# Patient Record
Sex: FEMALE | Race: WHITE | Employment: UNEMPLOYED | URBAN - METROPOLITAN AREA
[De-identification: names, ages, dates, MRNs, and addresses within clinical notes are randomized per-mention and may not be internally consistent; named-entity substitution may affect disease eponyms.]

---

## 2021-04-04 ENCOUNTER — APPOINTMENT (OUTPATIENT)
Dept: VASCULAR SURGERY | Age: 51
DRG: 271 | End: 2021-04-04
Attending: EMERGENCY MEDICINE
Payer: COMMERCIAL

## 2021-04-04 ENCOUNTER — HOSPITAL ENCOUNTER (INPATIENT)
Age: 51
LOS: 10 days | Discharge: HOME HEALTH | DRG: 271 | End: 2021-04-14
Attending: EMERGENCY MEDICINE | Admitting: HOSPITALIST
Payer: COMMERCIAL

## 2021-04-04 ENCOUNTER — APPOINTMENT (OUTPATIENT)
Dept: CT IMAGING | Age: 51
DRG: 271 | End: 2021-04-04
Attending: EMERGENCY MEDICINE
Payer: COMMERCIAL

## 2021-04-04 DIAGNOSIS — M86.172 OTHER ACUTE OSTEOMYELITIS OF LEFT FOOT (HCC): Primary | ICD-10-CM

## 2021-04-04 DIAGNOSIS — L03.032 CELLULITIS OF TOE OF LEFT FOOT: ICD-10-CM

## 2021-04-04 PROBLEM — M86.9 OSTEOMYELITIS (HCC): Status: ACTIVE | Noted: 2021-04-04

## 2021-04-04 PROBLEM — I73.9 PAD (PERIPHERAL ARTERY DISEASE) (HCC): Status: ACTIVE | Noted: 2021-04-04

## 2021-04-04 PROBLEM — N17.9 AKI (ACUTE KIDNEY INJURY) (HCC): Status: ACTIVE | Noted: 2021-04-04

## 2021-04-04 PROBLEM — E11.621 TYPE 2 DIABETES MELLITUS WITH LEFT DIABETIC FOOT ULCER (HCC): Status: ACTIVE | Noted: 2021-04-04

## 2021-04-04 PROBLEM — E11.52 TYPE 2 DIABETES MELLITUS WITH DIABETIC PERIPHERAL ANGIOPATHY WITH GANGRENE (HCC): Status: ACTIVE | Noted: 2021-04-04

## 2021-04-04 PROBLEM — L97.529 TYPE 2 DIABETES MELLITUS WITH LEFT DIABETIC FOOT ULCER (HCC): Status: ACTIVE | Noted: 2021-04-04

## 2021-04-04 LAB
ALBUMIN SERPL-MCNC: 2.3 G/DL (ref 3.4–5)
ALBUMIN/GLOB SERPL: 0.5 {RATIO} (ref 0.8–1.7)
ALP SERPL-CCNC: 166 U/L (ref 45–117)
ALT SERPL-CCNC: 7 U/L (ref 13–56)
ANION GAP SERPL CALC-SCNC: 9 MMOL/L (ref 3–18)
AST SERPL-CCNC: 9 U/L (ref 10–38)
BASOPHILS # BLD: 0 K/UL (ref 0–0.1)
BASOPHILS NFR BLD: 0 % (ref 0–2)
BILIRUB SERPL-MCNC: 0.2 MG/DL (ref 0.2–1)
BUN SERPL-MCNC: 13 MG/DL (ref 7–18)
BUN/CREAT SERPL: 9 (ref 12–20)
CALCIUM SERPL-MCNC: 9 MG/DL (ref 8.5–10.1)
CHLORIDE SERPL-SCNC: 100 MMOL/L (ref 100–111)
CO2 SERPL-SCNC: 22 MMOL/L (ref 21–32)
CREAT SERPL-MCNC: 1.47 MG/DL (ref 0.6–1.3)
CRP SERPL-MCNC: 14.8 MG/DL (ref 0–0.3)
DIFFERENTIAL METHOD BLD: ABNORMAL
EOSINOPHIL # BLD: 0.1 K/UL (ref 0–0.4)
EOSINOPHIL NFR BLD: 1 % (ref 0–5)
ERYTHROCYTE [DISTWIDTH] IN BLOOD BY AUTOMATED COUNT: 12 % (ref 11.6–14.5)
ERYTHROCYTE [SEDIMENTATION RATE] IN BLOOD: >140 MM/HR (ref 0–30)
GLOBULIN SER CALC-MCNC: 4.7 G/DL (ref 2–4)
GLUCOSE BLD STRIP.AUTO-MCNC: 221 MG/DL (ref 70–110)
GLUCOSE BLD STRIP.AUTO-MCNC: 228 MG/DL (ref 70–110)
GLUCOSE SERPL-MCNC: 273 MG/DL (ref 74–99)
HBA1C MFR BLD: 8.4 % (ref 4.2–5.6)
HCT VFR BLD AUTO: 28.4 % (ref 35–45)
HGB BLD-MCNC: 9.5 G/DL (ref 12–16)
LACTATE BLD-SCNC: 1.86 MMOL/L (ref 0.4–2)
LYMPHOCYTES # BLD: 0.8 K/UL (ref 0.9–3.6)
LYMPHOCYTES NFR BLD: 6 % (ref 21–52)
MCH RBC QN AUTO: 31.5 PG (ref 24–34)
MCHC RBC AUTO-ENTMCNC: 33.5 G/DL (ref 31–37)
MCV RBC AUTO: 94 FL (ref 74–97)
MONOCYTES # BLD: 1 K/UL (ref 0.05–1.2)
MONOCYTES NFR BLD: 7 % (ref 3–10)
NEUTS SEG # BLD: 11.6 K/UL (ref 1.8–8)
NEUTS SEG NFR BLD: 86 % (ref 40–73)
PLATELET # BLD AUTO: 531 K/UL (ref 135–420)
PMV BLD AUTO: 8.3 FL (ref 9.2–11.8)
POTASSIUM SERPL-SCNC: 4.4 MMOL/L (ref 3.5–5.5)
PROT SERPL-MCNC: 7 G/DL (ref 6.4–8.2)
RBC # BLD AUTO: 3.02 M/UL (ref 4.2–5.3)
SODIUM SERPL-SCNC: 131 MMOL/L (ref 136–145)
WBC # BLD AUTO: 13.5 K/UL (ref 4.6–13.2)

## 2021-04-04 PROCEDURE — 93926 LOWER EXTREMITY STUDY: CPT

## 2021-04-04 PROCEDURE — 74011250636 HC RX REV CODE- 250/636: Performed by: HOSPITALIST

## 2021-04-04 PROCEDURE — 96375 TX/PRO/DX INJ NEW DRUG ADDON: CPT

## 2021-04-04 PROCEDURE — 74011250637 HC RX REV CODE- 250/637: Performed by: NURSE PRACTITIONER

## 2021-04-04 PROCEDURE — 73701 CT LOWER EXTREMITY W/DYE: CPT

## 2021-04-04 PROCEDURE — 96365 THER/PROPH/DIAG IV INF INIT: CPT

## 2021-04-04 PROCEDURE — 85652 RBC SED RATE AUTOMATED: CPT

## 2021-04-04 PROCEDURE — 85025 COMPLETE CBC W/AUTO DIFF WBC: CPT

## 2021-04-04 PROCEDURE — 74011636637 HC RX REV CODE- 636/637: Performed by: HOSPITALIST

## 2021-04-04 PROCEDURE — 74011250637 HC RX REV CODE- 250/637: Performed by: FAMILY MEDICINE

## 2021-04-04 PROCEDURE — 83036 HEMOGLOBIN GLYCOSYLATED A1C: CPT

## 2021-04-04 PROCEDURE — 74011000250 HC RX REV CODE- 250: Performed by: EMERGENCY MEDICINE

## 2021-04-04 PROCEDURE — 80053 COMPREHEN METABOLIC PANEL: CPT

## 2021-04-04 PROCEDURE — 86140 C-REACTIVE PROTEIN: CPT

## 2021-04-04 PROCEDURE — 74011250637 HC RX REV CODE- 250/637: Performed by: HOSPITALIST

## 2021-04-04 PROCEDURE — 83605 ASSAY OF LACTIC ACID: CPT

## 2021-04-04 PROCEDURE — 74011000636 HC RX REV CODE- 636: Performed by: EMERGENCY MEDICINE

## 2021-04-04 PROCEDURE — 87040 BLOOD CULTURE FOR BACTERIA: CPT

## 2021-04-04 PROCEDURE — 74011250636 HC RX REV CODE- 250/636: Performed by: EMERGENCY MEDICINE

## 2021-04-04 PROCEDURE — 65270000029 HC RM PRIVATE

## 2021-04-04 PROCEDURE — 82962 GLUCOSE BLOOD TEST: CPT

## 2021-04-04 PROCEDURE — 99285 EMERGENCY DEPT VISIT HI MDM: CPT

## 2021-04-04 RX ORDER — MAGNESIUM SULFATE 100 %
16 CRYSTALS MISCELLANEOUS AS NEEDED
Status: DISCONTINUED | OUTPATIENT
Start: 2021-04-04 | End: 2021-04-14 | Stop reason: HOSPADM

## 2021-04-04 RX ORDER — HEPARIN SODIUM 5000 [USP'U]/ML
5000 INJECTION, SOLUTION INTRAVENOUS; SUBCUTANEOUS EVERY 8 HOURS
Status: DISCONTINUED | OUTPATIENT
Start: 2021-04-04 | End: 2021-04-14 | Stop reason: HOSPADM

## 2021-04-04 RX ORDER — SODIUM CHLORIDE 9 MG/ML
75 INJECTION, SOLUTION INTRAVENOUS CONTINUOUS
Status: DISCONTINUED | OUTPATIENT
Start: 2021-04-04 | End: 2021-04-11

## 2021-04-04 RX ORDER — ATORVASTATIN CALCIUM 20 MG/1
40 TABLET, FILM COATED ORAL DAILY
Status: DISCONTINUED | OUTPATIENT
Start: 2021-04-04 | End: 2021-04-14 | Stop reason: HOSPADM

## 2021-04-04 RX ORDER — DEXTROSE MONOHYDRATE 100 MG/ML
125-250 INJECTION, SOLUTION INTRAVENOUS AS NEEDED
Status: DISCONTINUED | OUTPATIENT
Start: 2021-04-04 | End: 2021-04-14 | Stop reason: HOSPADM

## 2021-04-04 RX ORDER — SODIUM CHLORIDE 0.9 % (FLUSH) 0.9 %
5-40 SYRINGE (ML) INJECTION AS NEEDED
Status: DISCONTINUED | OUTPATIENT
Start: 2021-04-04 | End: 2021-04-14 | Stop reason: HOSPADM

## 2021-04-04 RX ORDER — SULFAMETHOXAZOLE AND TRIMETHOPRIM 800; 160 MG/1; MG/1
1 TABLET ORAL 2 TIMES DAILY
COMMUNITY
End: 2021-04-14

## 2021-04-04 RX ORDER — SODIUM CHLORIDE 0.9 % (FLUSH) 0.9 %
5-40 SYRINGE (ML) INJECTION EVERY 8 HOURS
Status: DISCONTINUED | OUTPATIENT
Start: 2021-04-04 | End: 2021-04-14 | Stop reason: HOSPADM

## 2021-04-04 RX ORDER — TRAMADOL HYDROCHLORIDE 50 MG/1
50 TABLET ORAL
Status: DISCONTINUED | OUTPATIENT
Start: 2021-04-04 | End: 2021-04-14 | Stop reason: HOSPADM

## 2021-04-04 RX ORDER — GUAIFENESIN 100 MG/5ML
81 LIQUID (ML) ORAL
Status: COMPLETED | OUTPATIENT
Start: 2021-04-04 | End: 2021-04-04

## 2021-04-04 RX ORDER — VANCOMYCIN/0.9 % SOD CHLORIDE 1.5G/250ML
1500 PLASTIC BAG, INJECTION (ML) INTRAVENOUS ONCE
Status: COMPLETED | OUTPATIENT
Start: 2021-04-04 | End: 2021-04-04

## 2021-04-04 RX ORDER — KETOROLAC TROMETHAMINE 15 MG/ML
15 INJECTION, SOLUTION INTRAMUSCULAR; INTRAVENOUS
Status: COMPLETED | OUTPATIENT
Start: 2021-04-04 | End: 2021-04-04

## 2021-04-04 RX ORDER — CALCIUM CARB/MAGNESIUM CARB 311-232MG
5 TABLET ORAL
Status: DISCONTINUED | OUTPATIENT
Start: 2021-04-04 | End: 2021-04-14 | Stop reason: HOSPADM

## 2021-04-04 RX ORDER — INSULIN LISPRO 100 [IU]/ML
INJECTION, SOLUTION INTRAVENOUS; SUBCUTANEOUS
Status: DISCONTINUED | OUTPATIENT
Start: 2021-04-04 | End: 2021-04-14 | Stop reason: HOSPADM

## 2021-04-04 RX ADMIN — IOPAMIDOL 100 ML: 612 INJECTION, SOLUTION INTRAVENOUS at 12:43

## 2021-04-04 RX ADMIN — INSULIN LISPRO 4 UNITS: 100 INJECTION, SOLUTION INTRAVENOUS; SUBCUTANEOUS at 22:23

## 2021-04-04 RX ADMIN — ATORVASTATIN CALCIUM 40 MG: 20 TABLET, FILM COATED ORAL at 22:21

## 2021-04-04 RX ADMIN — SODIUM CHLORIDE 1000 ML: 900 INJECTION, SOLUTION INTRAVENOUS at 12:40

## 2021-04-04 RX ADMIN — Medication 10 ML: at 17:39

## 2021-04-04 RX ADMIN — HEPARIN SODIUM 5000 UNITS: 5000 INJECTION INTRAVENOUS; SUBCUTANEOUS at 17:49

## 2021-04-04 RX ADMIN — ASPIRIN 81 MG: 81 TABLET, CHEWABLE ORAL at 22:21

## 2021-04-04 RX ADMIN — TRAMADOL HYDROCHLORIDE 50 MG: 50 TABLET, FILM COATED ORAL at 20:36

## 2021-04-04 RX ADMIN — Medication 5 MG: at 23:29

## 2021-04-04 RX ADMIN — INSULIN LISPRO 4 UNITS: 100 INJECTION, SOLUTION INTRAVENOUS; SUBCUTANEOUS at 17:46

## 2021-04-04 RX ADMIN — SODIUM CHLORIDE 75 ML/HR: 900 INJECTION, SOLUTION INTRAVENOUS at 17:39

## 2021-04-04 RX ADMIN — SODIUM CHLORIDE 1000 ML: 900 INJECTION, SOLUTION INTRAVENOUS at 12:26

## 2021-04-04 RX ADMIN — Medication 10 ML: at 22:26

## 2021-04-04 RX ADMIN — KETOROLAC TROMETHAMINE 15 MG: 15 INJECTION, SOLUTION INTRAMUSCULAR; INTRAVENOUS at 12:39

## 2021-04-04 RX ADMIN — WATER 1 G: 1 INJECTION INTRAMUSCULAR; INTRAVENOUS; SUBCUTANEOUS at 12:39

## 2021-04-04 RX ADMIN — VANCOMYCIN HYDROCHLORIDE 1500 MG: 10 INJECTION, POWDER, LYOPHILIZED, FOR SOLUTION INTRAVENOUS at 13:05

## 2021-04-04 NOTE — PROGRESS NOTES
Pharmacy Dosing Services:   Consult for Vancomycin Dosing by Pharmacy by Dr. Rosa Maria Núñez provided for this 46y.o. year old female , for indication of SSTI. Day of Therapy 1    Ht Readings from Last 1 Encounters:   04/04/21 172.7 cm (68\")        Wt Readings from Last 1 Encounters:   04/04/21 68.5 kg (151 lb)        Other Current Antibiotics Ceftriaxone   Serum Creatinine Lab Results   Component Value Date/Time    Creatinine 1.47 (H) 04/04/2021 12:05 PM      Creatinine Clearance Estimated Creatinine Clearance: 45.7 mL/min (A) (based on SCr of 1.47 mg/dL (H)). BUN Lab Results   Component Value Date/Time    BUN 13 04/04/2021 12:05 PM      WBC Lab Results   Component Value Date/Time    WBC 13.5 (H) 04/04/2021 12:05 PM      H/H Lab Results   Component Value Date/Time    HGB 9.5 (L) 04/04/2021 12:05 PM      Platelets Lab Results   Component Value Date/Time    PLATELET 021 (H) 53/83/4154 12:05 PM      Temp 97.5 °F (36.4 °C)     Start Vancomycin therapy, with loading dose of 1500 (mg) given at 1305 on 4/4/21. Follow with maintenance dose of 1000 (mg) at 1300 on 4/5/21, every 24 hours. Dose calculated to approximate a therapeutic trough of 10-15 mcg/mL. Pharmacy to follow daily and will make changes to dose and/or frequency based on clinical status.       Pharmacist Luis Toney, BELLED

## 2021-04-04 NOTE — PROGRESS NOTES
Bedside and Verbal shift change report given to Cat Salazar RN  (oncoming nurse) by Ashley OTERO, AMANDEEP (offgoing nurse). Report included the following information SBAR, Kardex and MAR. SHIFT UPDATES:  Patient was admitted with a diagnosis of osteomyelitis of right foot with necrosis of great metatarsal of right foot. Patient presented with Lactic level of 1.86 and presented with no complications or signs/symptoms of infection. Podiatry were consulted to follow up in accordance to osteomyelitis which was confirmed via bilateral duplex scan study performed today prior to unit transfer. Additionally, wound consult was inputted for assessment of patient's right great toe necrosis by wound team. Dr. Rossy Gutierres was informed that patient had a blood sugar of 221. Patient was started on insulin lispro sliding scale therapy AC/HS and ordered Diabetic Carb Control diet. Purewick catheter was implemented for urinary output monitoring due to patient's difficulty with walking to bathroom due to left foot wound. Additionally, patient's left foot wound was dressed with dry dressing (clean 4x4 gauzes, kerlix wrap and Ace bandaging). Patient is receiving IV Normal Saline at 75 cc/hr for IV continuous fluid therapy. Patient has Tramadol 50 mg by mouth every 6 hours PRN for pain. Patient did not present with any concerns of pain. ABNORMAL LAB:   Results for Jeyson Hill (MRN [id number]) as of 4/4/2021 14:48   Ref.  Range 4/4/2021 12:05 4/4/2021 12:19 4/4/2021 12:25   Lactic Acid (POC) Latest Ref Range: 0.40 - 2.00 mmol/L  1.86    WBC Latest Ref Range: 4.6 - 13.2 K/uL 13.5 (H)     RBC Latest Ref Range: 4.20 - 5.30 M/uL 3.02 (L)     HGB Latest Ref Range: 12.0 - 16.0 g/dL 9.5 (L)     HCT Latest Ref Range: 35.0 - 45.0 % 28.4 (L)     MCV Latest Ref Range: 74.0 - 97.0 FL 94.0     MCH Latest Ref Range: 24.0 - 34.0 PG 31.5     MCHC Latest Ref Range: 31.0 - 37.0 g/dL 33.5     RDW Latest Ref Range: 11.6 - 14.5 % 12.0     PLATELET Latest Ref Range: 135 - 420 K/uL 531 (H)     MPV Latest Ref Range: 9.2 - 11.8 FL 8.3 (L)     NEUTROPHILS Latest Ref Range: 40 - 73 % 86 (H)     LYMPHOCYTES Latest Ref Range: 21 - 52 % 6 (L)     MONOCYTES Latest Ref Range: 3 - 10 % 7     EOSINOPHILS Latest Ref Range: 0 - 5 % 1     BASOPHILS Latest Ref Range: 0 - 2 % 0     DF Latest Units:   AUTOMATED     ABS. NEUTROPHILS Latest Ref Range: 1.8 - 8.0 K/UL 11.6 (H)     ABS. LYMPHOCYTES Latest Ref Range: 0.9 - 3.6 K/UL 0.8 (L)     ABS. MONOCYTES Latest Ref Range: 0.05 - 1.2 K/UL 1.0     ABS. EOSINOPHILS Latest Ref Range: 0.0 - 0.4 K/UL 0.1     ABS. BASOPHILS Latest Ref Range: 0.0 - 0.1 K/UL 0.0     Sed rate, automated Latest Ref Range: 0 - 30 mm/hr >140 (H)     Sodium Latest Ref Range: 136 - 145 mmol/L 131 (L)     Potassium Latest Ref Range: 3.5 - 5.5 mmol/L 4.4     Chloride Latest Ref Range: 100 - 111 mmol/L 100     CO2 Latest Ref Range: 21 - 32 mmol/L 22     Anion gap Latest Ref Range: 3.0 - 18 mmol/L 9     Glucose Latest Ref Range: 74 - 99 mg/dL 273 (H)     BUN Latest Ref Range: 7.0 - 18 MG/DL 13     Creatinine Latest Ref Range: 0.6 - 1.3 MG/DL 1.47 (H)     BUN/Creatinine ratio Latest Ref Range: 12 - 20   9 (L)     Calcium Latest Ref Range: 8.5 - 10.1 MG/DL 9.0     GFR est non-AA Latest Ref Range: >60 ml/min/1.73m2 37 (L)     GFR est AA Latest Ref Range: >60 ml/min/1.73m2 45 (L)     Bilirubin, total Latest Ref Range: 0.2 - 1.0 MG/DL 0.2     Protein, total Latest Ref Range: 6.4 - 8.2 g/dL 7.0     Albumin Latest Ref Range: 3.4 - 5.0 g/dL 2.3 (L)     Globulin Latest Ref Range: 2.0 - 4.0 g/dL 4.7 (H)     A-G Ratio Latest Ref Range: 0.8 - 1.7   0.5 (L)     ALT Latest Ref Range: 13 - 56 U/L 7 (L)     AST Latest Ref Range: 10 - 38 U/L 9 (L)     Alk. phosphatase Latest Ref Range: 45 - 117 U/L 166 (H)     CULTURE, BLOOD Unknown   Rpt   C REACTIVE PROTEIN, QT Unknown Rpt       Wounds?   Right Foot Necrotic Great Metatarsal (Big toe) (Wound consult was inputted for assessment. ) Right Anterior ankle and right leg abrasion (Open to Air). Central Lines? None. Last BM: 4/1/2021       Pending Labs for AM Draw: None. Discharge plan:  Patient was admitted on 4/4/2021 and is pending to have case management assessment to determine the possible discharge location and transition needs. Nightshift RN Staff will be notified to inform dayshift RN staff on 4/5/2021 to follow up with case management team on 4/5/2021 in accordance to performing discharge needs & location assessment.     Bryan Sanchez  4/4/2021  7:02 PM

## 2021-04-04 NOTE — H&P
History & Physical    Patient: Magnus Pinto MRN: [id number]  CSN: [id number]    YOB: 1970  Age: 46 y.o. Sex: female      DOA: 4/4/2021  Primary Care Provider:  None      Assessment/Plan     Patient Active Problem List   Diagnosis Code    Osteomyelitis ([facility] M86.9    Type 2 diabetes mellitus with left diabetic foot ulcer ([facility] E11.621, L97.529    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 ([facility] E11.52    YUNIEL (acute kidney injury) ([facility] N17.9    PAD (peripheral artery disease) ([facility] I73.9       Admit to medical floor    DFU/osteomyelitis -  Started on antibiotics vancomycin, ceftriaxone. Podiatry consulted by ER. PAD -  Left LE arterial dopplers reviewed. Discussed with Dr. Lana Gomes  Likely chronic occlusion  He will see patient. YUNIEL -  Likely CKD from DM. Follow renal function. DM -  Start on SSI  ADA diet  Check HbA1c    Estimated length of stay : 3-5 days    CC: left foot DFU/gangrene       HPI:     Magnus Pinto is a 46 y.o. female with no significant past history with concerns of left great toe infection and bluish discoloration. Patient reports that she first noticed a problem with her left great toe about a week ago. She was initially seen at [facility] on March 25, 2021. X-ray there showed osteomyelitis she was given antibiotic Bactrim and was asked to follow-up with podiatry. She was seen by podiatry on April 1, 2021. She was advised to get admitted to the hospital or go to ER however she needed to run some errands. She presented to ER today. She denies any fever, chills, headache, shortness of breath, nausea, vomiting. She denies any history of diabetes or any heart disease. She denies any problem with her kidneys, no history of any vascular disease. In ER she was noted to have elevated white count of above 13,000. Her left great toe gangrenous. Her BUNs/creatinine at 13/1.47. Her blood glucose at 273.   CT scan of her left great toe showed evidence of osteomyelitis and first MTP septic arthritis. Lower extremity arterial Doppler with evidence of peripheral arterial disease. History reviewed. No pertinent past medical history. History reviewed. No pertinent surgical history. History reviewed. No pertinent family history. Social History     Socioeconomic History    Marital status:      Spouse name: Not on file    Number of children: Not on file    Years of education: Not on file    Highest education level: Not on file   Tobacco Use    Smoking status: Current Every Day Smoker     Packs/day: 1.50    Smokeless tobacco: Never Used   Substance and Sexual Activity    Alcohol use: Not Currently     Comment: on rare occasion    Drug use: Not Currently       Prior to Admission medications    Medication Sig Start Date End Date Taking? Authorizing Provider   trimethoprim-sulfamethoxazole (BACTRIM DS, SEPTRA DS) 160-800 mg per tablet Take 1 Tab by mouth two (2) times a day. Indications: Left foot infection. Yes Other, MD Daryn       No Known Allergies    Review of Systems  Gen: No fever, chills, malaise, weight loss/gain. Heent: No headache, rhinorrhea, epistaxis, ear pain, hearing loss, sinus pain, neck pain/stiffness, sore throat. Heart: No chest pain, palpitations, EUGENE, pnd, or orthopnea. Resp: No cough, hemoptysis, wheezing and shortness of breath. GI: No nausea, vomiting, diarrhea, constipation, melena or hematochezia. : No urinary obstruction, dysuria or hematuria. Derm: see above. Musc/skeletal: no bone or joint complains. Vasc: see above  Endo: No heat/cold intolerance, no polyuria,polydipsia or polyphagia. Neuro: No unilateral weakness, numbness, tingling. No seizures. Heme: No easy bruising or bleeding.           Physical Exam:     Physical Exam:  Visit Vitals  /80 (BP 1 Location: Right upper arm, BP Patient Position: At rest)   Pulse 80   Temp 98.3 °F (36.8 °C)   Resp 18   Ht 5' 8.03\" (1.728 m)   Wt 73.1 kg (161 lb 3.2 oz)   SpO2 100%   BMI 24.49 kg/m²      O2 Device: Room air    Temp (24hrs), Av.9 °F (36.6 °C), Min:97.5 °F (36.4 °C), Max:98.3 °F (36.8 °C)    No intake/output data recorded. No intake/output data recorded. General:  Awake, cooperative, no distress. Head:  Normocephalic, without obvious abnormality, atraumatic. Eyes:  Conjunctivae/corneas clear, sclera anicteric, PERRL, EOMs intact. Nose: Nares normal. No drainage or sinus tenderness. Throat: Lips, mucosa, and tongue normal.    Neck: Supple, symmetrical, trachea midline, no adenopathy. Lungs:   Clear to auscultation bilaterally. Heart:   S1, S2, no murmur, click, rub or gallop. Abdomen: Soft, non-tender. Bowel sounds normal. No masses,  No organomegaly. Extremities: Left great toe gangrene, DFU. Foot warm   Pulses: Absent/weak distal pulses bilaterally. Skin: Skin color pink, turgor normal. See above   Neurologic: CNII-XII intact. No focal motor or sensory deficit.        Labs Reviewed:    CMP:   Lab Results   Component Value Date/Time     (L) 2021 12:05 PM    K 4.4 2021 12:05 PM     2021 12:05 PM    CO2 22 2021 12:05 PM    AGAP 9 2021 12:05 PM     (H) 2021 12:05 PM    BUN 13 2021 12:05 PM    CREA 1.47 (H) 2021 12:05 PM    GFRAA 45 (L) 2021 12:05 PM    GFRNA 37 (L) 2021 12:05 PM    CA 9.0 2021 12:05 PM    ALB 2.3 (L) 2021 12:05 PM    TP 7.0 2021 12:05 PM    GLOB 4.7 (H) 2021 12:05 PM    AGRAT 0.5 (L) 2021 12:05 PM    ALT 7 (L) 2021 12:05 PM     CBC:   Lab Results   Component Value Date/Time    WBC 13.5 (H) 2021 12:05 PM    HGB 9.5 (L) 2021 12:05 PM    HCT 28.4 (L) 2021 12:05 PM     (H) 2021 12:05 PM         Procedures/imaging: see electronic medical records for all procedures/Xrays and details which were not copied into this note but were reviewed prior to creation of Plan    Please note that this dictation was completed with [employer], the computer voice recognition software. Quite often unanticipated grammatical, syntax, homophones, and other interpretive errors are inadvertently transcribed by the computer software. Please disregard these errors. Please excuse any errors that have escaped final proofreading.         CC: None

## 2021-04-04 NOTE — ED TRIAGE NOTES
Patient ambulatory into ER c/o of L foot pain. Patient [facility] and was put on antibiotics for infected L big toe and side of foot. Pt followed up with orthopedic and was told to come to the hospital if foot did not get better after continuing antibiotic.

## 2021-04-04 NOTE — PROGRESS NOTES
04/04/21 1135 04/04/21 1215 04/04/21 1230   Vital Signs   Temp 97.5 °F (36.4 °C)  --   --    Temp Source Tympanic  --   --    Pulse (Heart Rate) (!) 107  --  85   Resp Rate 18  --  16   O2 Sat (%) 100 %  --  100 %   BP 98/76  --  120/74   MAP (Monitor)  --   --  83   MAP (Calculated) 83  --  89   BP 1 Method Automatic  --   --    BP 1 Location  --   --   --    BP Patient Position At rest  --   --    Electrodes Replaced  --  Yes  --    Pain 1   Pain Scale 1 Numeric (0 - 10)  --   --    Pain Intensity 1 5  --   --    Patient Stated Pain Goal 0  --   --    Pain Location 1 Foot  --   --    Pain Orientation 1 Right  --   --    Pain Description 1 Aching  --   --       04/04/21 1600   Vital Signs   Temp 98.3 °F (36.8 °C)   Temp Source Oral   Pulse (Heart Rate) 80   Resp Rate 18   O2 Sat (%) 100 %   /80   MAP (Monitor)  --    MAP (Calculated) 90   BP 1 Method Automatic   BP 1 Location Right upper arm   BP Patient Position At rest   Electrodes Replaced  --    Pain 1   Pain Scale 1 Numeric (0 - 10)   Pain Intensity 1 0   Patient Stated Pain Goal 0   Pain Location 1  --    Pain Orientation 1  --    Pain Description 1  --        S: Patient's admission to unit & admission profile/assessment completed/MD notified of admission to unit. B: As of 1454 Pm, ED RN Leda Anderson provided telephone handoff report for patient who was admitted with left great metatarasal osteomyelitis with necrosis. Nurse Flo Dunn stated that patient sustained an injury to left foot and right leg after falling on 3/24/2021. Nurse Flo Dunn stated that patient was taking oral Bactrim for treatment but informed nursing staff that wound had worsen since discharge which was why she presented for admission today. Patient's bilateral duplex study presented with limited blood flow to left limb. Nurse Will Krueger stated that patient received 2 liters of normal saline 1000 cc bolus while in ED.  Nurse Will Krueger stated that patient presented with an assessed capillary refill of left extremity of greater than 5 seconds. Nurse Abhay Trejo stated that patient presented awake, alert and responsive with the following vital signs listed above for 1130 am and 1230 Pm prior to transfer to unit. Understanding and acceptance of handoff report was verbalized. A: Patient presented to unit via stretcher with ED staff around 1600 pm and presented awake, alert and responsive with the following vital signs listed above and a blood sugar level of 221. Patient presented with an incentive spirometry goal rate of 2500. Secondary Skin assessment was performed with Charge RN Dianna Pinto in which patient presented with left great metatarsal necrosis and right anterior ankle and knee abrasion. Wound consult was inputted to assess patient's left great metatarsal necrosis. Podiatry consult order was acknowledged. Patient's admission profile and assessment were completed. Patient had external purewick catheter placed for urinary output monitoring. Patient was informed of fall protocol/precautions and patient's bedside rails were advanced to ensure patient safety prior to leaving the room. At 1600 pm, Attending hospitalist Dr. Yesenia Alvarado was provided  page and at 1630 pm Dr. Kathy Schaeffer as notified during PM rounds of patient's admission to unit and blood sugar level of 221. R: Will continue with prescribed plan of care as directed.    Bryan Sanchez  4/4/2021  4:52 PM

## 2021-04-04 NOTE — ED PROVIDER NOTES
[address]  EMERGENCY DEPARTMENT HISTORY AND PHYSICAL EXAM    12:11 PM    Date: 4/4/2021  Patient Name: Gera Area    History of Presenting Illness     Chief Complaint   Patient presents with    Foot Pain       History Provided By: Patient  Location/Duration/Severity/Modifying factors   41-year-old female with no provided past medical history presenting to the emergency department with left great toe foot wound. It has been going on for about 1 week. [facility] Patient tells me that she had x-rays done of her left knee, and left tib-fib was diagnosed with a \"hairline fracture\" of the knee area. She tells me that she was also given prescription for Bactrim at that time. On the paperwork she provided there was a diagnosis of acute osteomyelitis as well. Patient is given follow-up with a podiatrist.  She says she saw the podiatrist on 4/1 put her back again on Bactrim. Is unclear exactly when the toe turned black. It is black today. Reports it was black when she saw the podiatrist a few days ago. She reports that she was told that if it did not improve on the Bactrim to come to the ER. Her pain is rated as a 5 out of 10. Denies any history of diabetes. Reports family history of diabetes. She denies any history of vascular disease that she is aware of. Reports that she had a callus in the area that over time became infected. This was about a month ago and notes initial symptoms develop.           PCP: None    Current Facility-Administered Medications   Medication Dose Route Frequency Provider Last Rate Last Admin    cefTRIAXone (ROCEPHIN) 1 g in sterile water (preservative free) 10 mL IV syringe  1 g IntraVENous Q24H Alek MAZARIEGOS DO   1 g at 04/04/21 1239    sodium chloride 0.9 % bolus infusion 1,000 mL  1,000 mL IntraVENous Frank MAZARIEGOS DO   Stopped at 04/04/21 1600    Followed by   Polly Cushing sodium chloride 0.9 % bolus infusion 1,000 mL 1,000 mL IntraVENous Angelita Middleton DO   Stopped at 04/04/21 1600    Followed by   [facility] sodium chloride 0.9 % bolus infusion 55 mL  55 mL IntraVENous ONCE Jostin Schmitt DO        vancomycin (VANCOCIN) 1500 mg in  ml infusion  1,500 mg IntraVENous Jostin Middleton DO   Stopped at 04/04/21 1600    [START ON 4/5/2021] vancomycin (VANCOCIN) 1,000 mg in 0.9% sodium chloride 250 mL (VIAL-MATE)  1,000 mg IntraVENous Q24H Gideon Boyle DO        Vancomycin Dosing Per Pharmacy  1 Each Other Rx Dosing/Monitoring Gideon Boyle DO           Past History     Past Medical History:  History reviewed. No pertinent past medical history. Past Surgical History:  History reviewed. No pertinent surgical history. Family History:  History reviewed. No pertinent family history. Social History:  Social History     Tobacco Use    Smoking status: Current Every Day Smoker     Packs/day: 1.50    Smokeless tobacco: Never Used   Substance Use Topics    Alcohol use: Not Currently     Comment: on rare occasion    Drug use: Not Currently       Allergies:  No Known Allergies    I reviewed and confirmed the above information with patient and updated as necessary. Review of Systems     Review of Systems   Constitutional: Negative for chills and fever. HENT: Negative for congestion, rhinorrhea, sinus pressure and sneezing. Eyes: Negative for visual disturbance. Respiratory: Negative for cough and shortness of breath. Cardiovascular: Negative for chest pain. Gastrointestinal: Negative for abdominal pain, diarrhea, nausea and vomiting. Genitourinary: Negative for dysuria, frequency and urgency. Musculoskeletal: Negative for back pain and neck pain. Skin: Positive for color change and wound. Negative for rash. Neurological: Negative for syncope, numbness and headaches.        Physical Exam     Visit Vitals  /74   Pulse 85   Temp 97.5 °F (36.4 °C)   Resp 16   Ht 5' 8\" (1.727 m)   Wt 68.5 kg (151 lb) SpO2 100%   BMI 22.96 kg/m²       Physical Exam  Vitals signs and nursing note reviewed. Constitutional:       General: She is not in acute distress. Appearance: Normal appearance. She is normal weight. She is not toxic-appearing. HENT:      Head: Normocephalic and atraumatic. Right Ear: External ear normal.      Left Ear: External ear normal.      Nose: Nose normal.      Mouth/Throat:      Mouth: Mucous membranes are moist.      Pharynx: Oropharynx is clear. Eyes:      Conjunctiva/sclera: Conjunctivae normal.      Pupils: Pupils are equal, round, and reactive to light. Neck:      Musculoskeletal: Normal range of motion and neck supple. No muscular tenderness. Cardiovascular:      Rate and Rhythm: Regular rhythm. Tachycardia present. Pulses: Normal pulses. Heart sounds: Normal heart sounds. No murmur. Pulmonary:      Effort: Pulmonary effort is normal.      Breath sounds: Normal breath sounds. No wheezing, rhonchi or rales. Abdominal:      General: Abdomen is flat. Tenderness: There is no abdominal tenderness. There is no guarding or rebound. Musculoskeletal: Normal range of motion. General: No swelling or tenderness. Right lower leg: No edema. Left lower leg: No edema. Comments: Left great toe was black in appearance, significant opening of the wound, does not clearly probe to bone over the MTP. The patient retains movement of the great toe. Very foul-smelling purulence, see picture below. Lymphadenopathy:      Cervical: No cervical adenopathy. Skin:     General: Skin is warm and dry. Capillary Refill: Capillary refill takes less than 2 seconds. Findings: No rash. Neurological:      General: No focal deficit present. Mental Status: She is alert.              Diagnostic Study Results     Labs -  Recent Results (from the past 24 hour(s))   CBC WITH AUTOMATED DIFF    Collection Time: 04/04/21 12:05 PM   Result Value Ref Range    WBC 13. 5 (H) 4.6 - 13.2 K/uL    RBC 3.02 (L) 4.20 - 5.30 M/uL    HGB 9.5 (L) 12.0 - 16.0 g/dL    HCT 28.4 (L) 35.0 - 45.0 %    MCV 94.0 74.0 - 97.0 FL    MCH 31.5 24.0 - 34.0 PG    MCHC 33.5 31.0 - 37.0 g/dL    RDW 12.0 11.6 - 14.5 %    PLATELET 977 (H) 311 - 420 K/uL    MPV 8.3 (L) 9.2 - 11.8 FL    NEUTROPHILS 86 (H) 40 - 73 %    LYMPHOCYTES 6 (L) 21 - 52 %    MONOCYTES 7 3 - 10 %    EOSINOPHILS 1 0 - 5 %    BASOPHILS 0 0 - 2 %    ABS. NEUTROPHILS 11.6 (H) 1.8 - 8.0 K/UL    ABS. LYMPHOCYTES 0.8 (L) 0.9 - 3.6 K/UL    ABS. MONOCYTES 1.0 0.05 - 1.2 K/UL    ABS. EOSINOPHILS 0.1 0.0 - 0.4 K/UL    ABS. BASOPHILS 0.0 0.0 - 0.1 K/UL    DF AUTOMATED     METABOLIC PANEL, COMPREHENSIVE    Collection Time: 04/04/21 12:05 PM   Result Value Ref Range    Sodium 131 (L) 136 - 145 mmol/L    Potassium 4.4 3.5 - 5.5 mmol/L    Chloride 100 100 - 111 mmol/L    CO2 22 21 - 32 mmol/L    Anion gap 9 3.0 - 18 mmol/L    Glucose 273 (H) 74 - 99 mg/dL    BUN 13 7.0 - 18 MG/DL    Creatinine 1.47 (H) 0.6 - 1.3 MG/DL    BUN/Creatinine ratio 9 (L) 12 - 20      GFR est AA 45 (L) >60 ml/min/1.73m2    GFR est non-AA 37 (L) >60 ml/min/1.73m2    Calcium 9.0 8.5 - 10.1 MG/DL    Bilirubin, total 0.2 0.2 - 1.0 MG/DL    ALT (SGPT) 7 (L) 13 - 56 U/L    AST (SGOT) 9 (L) 10 - 38 U/L    Alk.  phosphatase 166 (H) 45 - 117 U/L    Protein, total 7.0 6.4 - 8.2 g/dL    Albumin 2.3 (L) 3.4 - 5.0 g/dL    Globulin 4.7 (H) 2.0 - 4.0 g/dL    A-G Ratio 0.5 (L) 0.8 - 1.7     SED RATE (ESR)    Collection Time: 04/04/21 12:05 PM   Result Value Ref Range    Sed rate, automated >140 (H) 0 - 30 mm/hr   POC LACTIC ACID    Collection Time: 04/04/21 12:19 PM   Result Value Ref Range    Lactic Acid (POC) 1.86 0.40 - 2.00 mmol/L   DUPLEX LOWER EXT ARTERY LEFT    Collection Time: 04/04/21  2:04 PM   Result Value Ref Range    Right CFA dist sys .7 cm/s    Left CFA dist sys .3 cm/s    Left super femoral dist sys PSV 80.6 cm/s    Left Prox PFA A .0 cm/s    Left popliteal dist sys PSV 41.8 cm/s    Left popliteal prox sys PSV 84.5 cm/s    Left Dist PTA PSV 15.1 cm/s    Left Dist PTA PSV 36.6 cm/s    Left Dist PTA PSV 36.6 cm/s    Left Dist Peroneal Velocity 47.9 cm/s    Left Dist RON Velocity 23.9 cm/s    Left Pop A Prox Zain Ratio 1.05          Radiologic Studies -   CT FOOT LT W CONT   Final Result      1. Ulcer along the plantar and medial aspect of distal forefoot extends very   close to the bone. Soft tissue edema with extensive mottled soft tissue air and   areas of platelike fluid around the base of the first toe and plantar aspect of   first metatarsal head, likely combination of soft tissue infection and ischemia. 2. Mottled air within the bone at the distal first metatarsal and proximal   phalangeal base with areas of bony irregularity. While extent of air may in part   reflect ischemia, strongly suspect osteomyelitis and first MTP septic arthritis. X-Ray from [facility] 3/25 Below                Medical Decision Making   I am the first provider for this patient. I reviewed the vital signs, available nursing notes, past medical history, past surgical history, family history and social history. Vital Signs-Reviewed the patient's vital signs. Records Reviewed: Nursing Notes and Old Medical Records (Time of Review: 12:11 PM)    ED Course: Progress Notes, Reevaluation, and Consults:  ED Course as of Apr 04 1535   Sun Apr 04, 2021   1210 Reviewed the x-rays [facility] from [facility].  Does seem consistent with an erosive or lytic lesion of the left MTP, concerning for osteomyelitis. [SADIE]      ED Course User Index  [SADIE] Blackburn [place], DO     Consult to podiatry: Discussed with Dr. Maribell Early, of the podiatry service. Agreed with plan for admission. Advised patient's primary podiatrist could see but may need reconsultation.     Provider Notes (Medical Decision Making):   MDM  Number of Diagnoses or Management Options  Cellulitis of toe of left foot  Other acute osteomyelitis of left foot ([facility] [address]  Diagnosis management comments: 49-year-old female with no medical history presenting to the ED with complaints of a left great toe wound. On exam it is clinically black and ischemic. Looks gangrenous. Clinically adjacent cellulitis. [facility] and had a podiatrist office visit recently. She was put on 2 courses of Bactrim. Differential includes ischemic limb, osteomyelitis, cellulitis, necrotizing fasciitis, abscess, wet gangrene, dry gangrene, etc.    Plan will be to get blood cultures, blood work, CT of the foot, arterial Dopplers as well. She was able to have a dopplerable pulse however I cannot palpate on exam suspect likely peripheral vascular disease. Plan will be to get the above work-up contact podiatry for further guidance. I will go ahead and start her on Rocephin and vancomycin. Reviewed her results: The patient has of vascular disease in her lower extremities. 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  CT shows possible ulceration, significant osteomyelitis and septic arthritis of the first MTP. Patient was started on parenteral antibiotics. Discussed with the hospitalist on-call, Dr. Olimpia Braswell who agreed to admit the patient. Patient updated and agree with the plan for admission as well. Re Chan DO        Procedures        Core Measures:  For Hospitalized Patients:    1. Hospitalization Decision Time:  The decision to hospitalize the patient was made by Re Chan DO at 1400 on 4/4/2021    2. Aspirin: Aspirin was not given because the patient did not present with a stroke at the time of their Emergency Department evaluation    2:04 PM  Patient is being admitted to the hospital by Olimpia Braswell. The results of their tests and reasons for their admission have been discussed with them and/or available family.  They convey agreement and understanding for the need to be admitted and for their admission diagnosis. CONDITIONS ON ADMISSION:  Sepsis is not present at the time of admission. Deep Vein Thrombosis is not present at the time of admission. Thrombosis is not present at the time of admission. Urinary Tract Infection is not present at the time of admission. Pneumonia is not present at the time of admission. MRSA is not determined to be present or not present at the time of admission, strong suspicion for MRSA in wound. Wound infection is present at the time of admission. Pressure Ulcer is not present at the time of admission. CLINICAL IMPRESSION:    1. Other acute osteomyelitis of left foot ([address]    2. Cellulitis of toe of left foot          Diagnosis     Clinical Impression:   1. Other acute osteomyelitis of left foot ([address]    2. Cellulitis of toe of left foot        Disposition: Admit    Follow-up Information    None          Current Discharge Medication List      CONTINUE these medications which have NOT CHANGED    Details   trimethoprim-sulfamethoxazole (BACTRIM DS, SEPTRA DS) 160-800 mg per tablet Take 1 Tab by mouth two (2) times a day. Arabella Blanton DO   Emergency Medicine   April 4, 2021, 12:11 PM     This note is dictated utilizing Dragon voice recognition software. Unfortunately this leads to occasional typographical errors using the voice recognition. I apologize in advance if the situation occurs. If questions occur please do not hesitate to contact me directly.     Arabella Blanton, DO

## 2021-04-04 NOTE — PROGRESS NOTES
Patient presented to hospital today and presents compliant to current plan of care as directed.    Bryan Sanchez  4/4/2021  4:37 PM

## 2021-04-05 ENCOUNTER — APPOINTMENT (OUTPATIENT)
Dept: GENERAL RADIOLOGY | Age: 51
DRG: 271 | End: 2021-04-05
Attending: HOSPITALIST
Payer: COMMERCIAL

## 2021-04-05 ENCOUNTER — APPOINTMENT (OUTPATIENT)
Dept: VASCULAR SURGERY | Age: 51
DRG: 271 | End: 2021-04-05
Attending: NURSE PRACTITIONER
Payer: COMMERCIAL

## 2021-04-05 ENCOUNTER — ANESTHESIA EVENT (OUTPATIENT)
Dept: SURGERY | Age: 51
DRG: 271 | End: 2021-04-05
Payer: COMMERCIAL

## 2021-04-05 PROBLEM — I65.23 BILATERAL CAROTID ARTERY STENOSIS: Status: ACTIVE | Noted: 2021-04-05

## 2021-04-05 LAB
ANION GAP SERPL CALC-SCNC: 4 MMOL/L (ref 3–18)
BUN SERPL-MCNC: 12 MG/DL (ref 7–18)
BUN/CREAT SERPL: 10 (ref 12–20)
CALCIUM SERPL-MCNC: 7.9 MG/DL (ref 8.5–10.1)
CHLORIDE SERPL-SCNC: 108 MMOL/L (ref 100–111)
CO2 SERPL-SCNC: 23 MMOL/L (ref 21–32)
CREAT SERPL-MCNC: 1.18 MG/DL (ref 0.6–1.3)
ERYTHROCYTE [DISTWIDTH] IN BLOOD BY AUTOMATED COUNT: 12.1 % (ref 11.6–14.5)
GLUCOSE BLD STRIP.AUTO-MCNC: 138 MG/DL (ref 70–110)
GLUCOSE BLD STRIP.AUTO-MCNC: 162 MG/DL (ref 70–110)
GLUCOSE BLD STRIP.AUTO-MCNC: 164 MG/DL (ref 70–110)
GLUCOSE BLD STRIP.AUTO-MCNC: 180 MG/DL (ref 70–110)
GLUCOSE SERPL-MCNC: 117 MG/DL (ref 74–99)
HCT VFR BLD AUTO: 24 % (ref 35–45)
HGB BLD-MCNC: 7.9 G/DL (ref 12–16)
LEFT CCA DIST DIAS: 38.1 CM/S
LEFT CCA DIST SYS: 115.7 CM/S
LEFT CCA MID DIAS: 25.2 CM/S
LEFT CCA MID SYS: 101.11 CM/S
LEFT CCA PROX DIAS: 32.3 CM/S
LEFT CCA PROX SYS: 210.7 CM/S
LEFT CFA DIST SYS PSV: 162.3 CM/S
LEFT DIST ATA VELOCITY: 23.9 CM/S
LEFT DIST PTA PSV: 15.1 CM/S
LEFT DIST PTA PSV: 36.6 CM/S
LEFT DIST PTA PSV: 36.6 CM/S
LEFT ECA DIAS: 16.94 CM/S
LEFT ECA SYS: 131.2 CM/S
LEFT ICA DIST DIAS: 25.2 CM/S
LEFT ICA DIST SYS: 78.5 CM/S
LEFT ICA MID DIAS: 44 CM/S
LEFT ICA MID SYS: 141 CM/S
LEFT ICA PROX DIAS: 80 CM/S
LEFT ICA PROX SYS: 247 CM/S
LEFT ICA/CCA SYS: 2.14
LEFT PERONEAL DIST VELOCITY: 47.9 CM/S
LEFT POP A PROX VEL RATIO: 1.05
LEFT POPLITEAL DIST SYS PSV: 41.8 CM/S
LEFT POPLITEAL PROX SYS PSV: 84.5 CM/S
LEFT PROX PFA A PSV: 207 CM/S
LEFT SUBCLAVIAN DIAS: 0 CM/S
LEFT SUBCLAVIAN SYS: 194 CM/S
LEFT SUPER FEMORAL DIST SYS PSV: 80.6 CM/S
LEFT VERTEBRAL DIAS: 20.35 CM/S
LEFT VERTEBRAL SYS: 91.4 CM/S
MCH RBC QN AUTO: 31.2 PG (ref 24–34)
MCHC RBC AUTO-ENTMCNC: 32.9 G/DL (ref 31–37)
MCV RBC AUTO: 94.9 FL (ref 74–97)
PLATELET # BLD AUTO: 411 K/UL (ref 135–420)
PMV BLD AUTO: 8.2 FL (ref 9.2–11.8)
POTASSIUM SERPL-SCNC: 5.3 MMOL/L (ref 3.5–5.5)
RBC # BLD AUTO: 2.53 M/UL (ref 4.2–5.3)
RIGHT CCA DIST DIAS: 24.8 CM/S
RIGHT CCA DIST SYS: 127.3 CM/S
RIGHT CCA MID DIAS: 18.4 CM/S
RIGHT CCA MID SYS: 185.64 CM/S
RIGHT CCA PROX DIAS: 29.6 CM/S
RIGHT CCA PROX SYS: 213.5 CM/S
RIGHT CFA DIST SYS PSV: 156.7 CM/S
RIGHT ECA DIAS: 14.97 CM/S
RIGHT ECA SYS: 115.5 CM/S
RIGHT ICA DIST DIAS: 54.8 CM/S
RIGHT ICA DIST SYS: 184.9 CM/S
RIGHT ICA MID DIAS: 79.5 CM/S
RIGHT ICA MID SYS: 237.9 CM/S
RIGHT ICA PROX DIAS: 107.1 CM/S
RIGHT ICA PROX SYS: 288.5 CM/S
RIGHT ICA/CCA SYS: 2.3
RIGHT SUBCLAVIAN DIAS: 18.1 CM/S
RIGHT SUBCLAVIAN SYS: 234.7 CM/S
RIGHT VERTEBRAL DIAS: 7.25 CM/S
RIGHT VERTEBRAL SYS: 30.3 CM/S
SODIUM SERPL-SCNC: 135 MMOL/L (ref 136–145)
WBC # BLD AUTO: 9.3 K/UL (ref 4.6–13.2)

## 2021-04-05 PROCEDURE — 74011250636 HC RX REV CODE- 250/636: Performed by: EMERGENCY MEDICINE

## 2021-04-05 PROCEDURE — 74011636637 HC RX REV CODE- 636/637: Performed by: HOSPITALIST

## 2021-04-05 PROCEDURE — 71045 X-RAY EXAM CHEST 1 VIEW: CPT

## 2021-04-05 PROCEDURE — 93880 EXTRACRANIAL BILAT STUDY: CPT

## 2021-04-05 PROCEDURE — 74011250636 HC RX REV CODE- 250/636: Performed by: HOSPITALIST

## 2021-04-05 PROCEDURE — 74011000250 HC RX REV CODE- 250: Performed by: EMERGENCY MEDICINE

## 2021-04-05 PROCEDURE — 80048 BASIC METABOLIC PNL TOTAL CA: CPT

## 2021-04-05 PROCEDURE — 65270000029 HC RM PRIVATE

## 2021-04-05 PROCEDURE — 74011250637 HC RX REV CODE- 250/637: Performed by: HOSPITALIST

## 2021-04-05 PROCEDURE — 74011250636 HC RX REV CODE- 250/636: Performed by: FAMILY MEDICINE

## 2021-04-05 PROCEDURE — 82962 GLUCOSE BLOOD TEST: CPT

## 2021-04-05 PROCEDURE — 85027 COMPLETE CBC AUTOMATED: CPT

## 2021-04-05 PROCEDURE — 36415 COLL VENOUS BLD VENIPUNCTURE: CPT

## 2021-04-05 RX ORDER — ACETAMINOPHEN 325 MG/1
650 TABLET ORAL
Status: DISCONTINUED | OUTPATIENT
Start: 2021-04-05 | End: 2021-04-14 | Stop reason: HOSPADM

## 2021-04-05 RX ORDER — MORPHINE SULFATE 2 MG/ML
2 INJECTION, SOLUTION INTRAMUSCULAR; INTRAVENOUS
Status: DISCONTINUED | OUTPATIENT
Start: 2021-04-05 | End: 2021-04-14 | Stop reason: HOSPADM

## 2021-04-05 RX ORDER — ASPIRIN 81 MG/1
81 TABLET ORAL DAILY
Status: DISCONTINUED | OUTPATIENT
Start: 2021-04-05 | End: 2021-04-05

## 2021-04-05 RX ADMIN — INSULIN LISPRO 2 UNITS: 100 INJECTION, SOLUTION INTRAVENOUS; SUBCUTANEOUS at 16:48

## 2021-04-05 RX ADMIN — ASPIRIN 81 MG: 81 TABLET, COATED ORAL at 16:04

## 2021-04-05 RX ADMIN — WATER 1 G: 1 INJECTION INTRAMUSCULAR; INTRAVENOUS; SUBCUTANEOUS at 12:41

## 2021-04-05 RX ADMIN — TRAMADOL HYDROCHLORIDE 50 MG: 50 TABLET, FILM COATED ORAL at 09:55

## 2021-04-05 RX ADMIN — TRAMADOL HYDROCHLORIDE 50 MG: 50 TABLET, FILM COATED ORAL at 16:04

## 2021-04-05 RX ADMIN — Medication 10 ML: at 07:20

## 2021-04-05 RX ADMIN — HEPARIN SODIUM 5000 UNITS: 5000 INJECTION INTRAVENOUS; SUBCUTANEOUS at 01:21

## 2021-04-05 RX ADMIN — SODIUM CHLORIDE 75 ML/HR: 900 INJECTION, SOLUTION INTRAVENOUS at 23:23

## 2021-04-05 RX ADMIN — Medication 10 ML: at 16:50

## 2021-04-05 RX ADMIN — MORPHINE SULFATE 2 MG: 2 INJECTION, SOLUTION INTRAMUSCULAR; INTRAVENOUS at 20:14

## 2021-04-05 RX ADMIN — MORPHINE SULFATE 2 MG: 2 INJECTION, SOLUTION INTRAMUSCULAR; INTRAVENOUS at 01:21

## 2021-04-05 RX ADMIN — INSULIN LISPRO 2 UNITS: 100 INJECTION, SOLUTION INTRAVENOUS; SUBCUTANEOUS at 22:15

## 2021-04-05 RX ADMIN — SODIUM CHLORIDE 75 ML/HR: 900 INJECTION, SOLUTION INTRAVENOUS at 07:19

## 2021-04-05 RX ADMIN — Medication 10 ML: at 22:16

## 2021-04-05 RX ADMIN — MORPHINE SULFATE 2 MG: 2 INJECTION, SOLUTION INTRAMUSCULAR; INTRAVENOUS at 23:23

## 2021-04-05 RX ADMIN — VANCOMYCIN HYDROCHLORIDE 1000 MG: 1 INJECTION, POWDER, LYOPHILIZED, FOR SOLUTION INTRAVENOUS at 12:41

## 2021-04-05 NOTE — DIABETES MGMT
Diabetes Patient/Family Education Record  Factors That  May Influence Patients Ability  to Learn or  Comply with Recommendations   []   Language barrier    []   Cultural needs   []   Motivation    []   Cognitive limitation    []   Physical   []   Education    []   Physiological factors   []   Hearing/vision/speaking impairment   []   [employer] beliefs    []   Financial factors   []  Other:   [x]  No factors identified at this time.      Person Instructed:   [x]   Patient   []   Family   []  Other     Preference for Learning:   [x]   Verbal   [x]   Written   []  Demonstration     Level of Comprehension & Competence:    [x]  Good                                      [x] Fair                                     []  Poor                             []  Needs Reinforcement   [x]  Teachback completed    Education Component: pt provided with What is Diabetes Survival Booklet; general overview of DM disease process   [x]  Medication management, including how to administer insulin (if appropriate) and potential medication interactions    []  Nutritional management -obtain usual meal pattern   []  Exercise   []  Signs, symptoms, and treatment of hyperglycemia and hypoglycemia   [] Prevention, recognition and treatment of hyperglycemia and hypoglycemia   []  Importance of blood glucose monitoring and how to obtain a blood glucose meter    []  Instruction on use of the blood glucose meter   [x]  Discuss the importance of HbA1C monitoring    []  Sick day guidelines   []  Proper use and disposal of lancets, needles, syringes or insulin pens (if appropriate)   []  Potential long-term complications (retinopathy, kidney disease, neuropathy, foot care)   [] Information about whom to contact in case of emergency or for more information    []  Goal:  Patient/family will demonstrate understanding of Diabetes Self Management Skills by: (date) __5/31_____  Plan for post-discharge education or self-management support:    [] Outpatient class schedule provided            [] Patient Declined    [] Scheduled for outpatient classes (date) _______  Verify:  Does patient understand how diabetes medications work? ________n/a____________________  Does patient know what their most recent A1c is? ____________yes_______________________  Does patient monitor glucose at home? ______________n/a_____________________________  Does patient have difficulty obtaining diabetes medications or testing supplies? _____no____________         Patsy Rodrigues MS, RN, CDE  Glycemic Control Team  [phone]  Pager [phone] ([facility] 8:00-4:30P)  *After Hours pager [phone]

## 2021-04-05 NOTE — PROGRESS NOTES
Podiatry:  Patient seen today at bedside for left foot infection with gangrene. She is scheduled for an angiogram tomorrow for her left lower extremity. Ordered MRI left foot to show full extent of bone infection. Hopefully she can have that done in the morning. She is scheduled to the OR tomorrow afternoon at 6 PM for left foot surgery. See consult for details.

## 2021-04-05 NOTE — CONSULTS
Podiatry Consult    Subjective:         Date of Consultation: April 5, 2021     Referring Physician: Dr Audrey Agudelo    Patient is a 46 y.o.  female diabetic who is being seen for painful, black, gangrenous left foot. She states that it started about 1.5 weeks ago when her callus came off the bottom of her left foot. Then she fell and hit her foot a few days later and noticed her left big toe turning black. So she went to [facility] first where she was given a antibiotic shot and sent to Dr. Jeff Easton. She was seen by Dr. Jeff Easton who sent her to [facility]. Patient Active Problem List    Diagnosis Date Noted    Bilateral carotid artery stenosis 04/05/2021    Osteomyelitis ([facility] 04/04/2021    Type 2 diabetes mellitus with left diabetic foot ulcer ([facility] 04/04/2021    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 ([facility] 04/04/2021    YUNIEL (acute kidney injury) ([facility] 04/04/2021    PAD (peripheral artery disease) ([facility] 04/04/2021     History reviewed. No pertinent past medical history. History reviewed. No pertinent surgical history. History reviewed. No pertinent family history.    Social History     Tobacco Use    Smoking status: Current Every Day Smoker     Packs/day: 1.50    Smokeless tobacco: Never Used   Substance Use Topics    Alcohol use: Not Currently     Comment: on rare occasion     Current Facility-Administered Medications   Medication Dose Route Frequency Provider Last Rate Last Admin    morphine injection 2 mg  2 mg IntraVENous Q3H PRN Esdras Ribera MD   2 mg at 04/05/21 0121    cefTRIAXone (ROCEPHIN) 1 g in sterile water (preservative free) 10 mL IV syringe  1 g IntraVENous Q24H Funmilayo Loth K, DO   1 g at 04/05/21 1241    vancomycin (VANCOCIN) 1,000 mg in 0.9% sodium chloride 250 mL (VIAL-MATE)  1,000 mg IntraVENous Q24H Funmilayo Loth K, DO   1,000 mg at 04/05/21 1241    Vancomycin Dosing Per Pharmacy  1 Each Other Rx Dosing/Monitoring Warden Konrad DO        sodium chloride (NS) flush 5-40 mL  5-40 mL IntraVENous Q8H Zee Bonilla MD   10 mL at 21 1650    sodium chloride (NS) flush 5-40 mL  5-40 mL IntraVENous PRN Narayan Zimmerman MD        0.9% sodium chloride infusion  75 mL/hr IntraVENous CONTINUOUS Kaycee GARCIA MD 75 mL/hr at 21 0719 75 mL/hr at 21 0719    heparin (porcine) injection 5,000 Units  5,000 Units SubCUTAneous Q8H Kaycee GARCIA MD   Stopped at 21 1000    glucose chewable tablet 16 g  16 g Oral PRN Narayan Zimmerman MD        glucagon (GLUCAGEN) injection 1 mg  1 mg IntraMUSCular PRN Narayan Zimmerman MD        insulin lispro (HUMALOG) injection   SubCUTAneous AC&HS Narayan Zimmerman MD   2 Units at 21 1648    dextrose 10% infusion 125-250 mL  125-250 mL IntraVENous PRN Narayan Zimmerman MD        traMADoL Delford Ally) tablet 50 mg  50 mg Oral Q6H PRN Narayan Zimmerman MD   50 mg at 21 1604    atorvastatin (LIPITOR) tablet 40 mg  40 mg Oral DAILY Jace MOSQUEDA NP   40 mg at 21 2221    melatonin (rapid dissolve) tablet 5 mg  5 mg Oral QHS PRN Vladislav Ribera MD   5 mg at 21 2329      No Known Allergies     Review of Systems:  A comprehensive review of systems was negative except for that written in the History of Present Illness. Objective:     Patient Vitals for the past 8 hrs:   BP Temp Pulse Resp SpO2   21 1524 108/64 98.6 °F (37 °C) 76 18 100 %     Temp (24hrs), Av.1 °F (37.3 °C), Min:98.6 °F (37 °C), Max:99.7 °F (37.6 °C)      Physical Exam:        Vascular:  Dorsalis pedis pulses are 0/4 bilateral.  Posterior tibial pulses are 0/4 bilateral.  Capillary fill time is less than 5 seconds, bilateral.  Edema is observed left lower extremity. Varicosities are not observed bilateral lower extremities.    Derm:  Skin temperature of lower extremities warm to cool proximal to distal bilateral.  Inspection of skin shows black gangrenous changes to the left big toe and medial forefoot with open ulcer plantar medial first MPJ. Surrounding erythema with purulent drainage left forefoot. Ortho:  Muscle strength 5/5 for all groups tested. Muscle tone normal. Inspection/palpation of bones - joints- muscle shows - within normal limits bilateral lower extremities, except left foot. Neuro:  Light touch, sharp/dull, vibratory, and proprioception sensations all decreased bilateral lower extremities. Deep tendon reflexes decreased bilaterally.       Lab Review:   Recent Results (from the past 24 hour(s))   GLUCOSE, POC    Collection Time: 04/04/21 10:14 PM   Result Value Ref Range    Glucose (POC) 228 (H) 70 - 904 mg/dL   METABOLIC PANEL, BASIC    Collection Time: 04/05/21  2:30 AM   Result Value Ref Range    Sodium 135 (L) 136 - 145 mmol/L    Potassium 5.3 3.5 - 5.5 mmol/L    Chloride 108 100 - 111 mmol/L    CO2 23 21 - 32 mmol/L    Anion gap 4 3.0 - 18 mmol/L    Glucose 117 (H) 74 - 99 mg/dL    BUN 12 7.0 - 18 MG/DL    Creatinine 1.18 0.6 - 1.3 MG/DL    BUN/Creatinine ratio 10 (L) 12 - 20      GFR est AA 59 (L) >60 ml/min/1.73m2    GFR est non-AA 48 (L) >60 ml/min/1.73m2    Calcium 7.9 (L) 8.5 - 10.1 MG/DL   CBC W/O DIFF    Collection Time: 04/05/21  2:30 AM   Result Value Ref Range    WBC 9.3 4.6 - 13.2 K/uL    RBC 2.53 (L) 4.20 - 5.30 M/uL    HGB 7.9 (L) 12.0 - 16.0 g/dL    HCT 24.0 (L) 35.0 - 45.0 %    MCV 94.9 74.0 - 97.0 FL    MCH 31.2 24.0 - 34.0 PG    MCHC 32.9 31.0 - 37.0 g/dL    RDW 12.1 11.6 - 14.5 %    PLATELET 825 122 - 019 K/uL    MPV 8.2 (L) 9.2 - 11.8 FL   GLUCOSE, POC    Collection Time: 04/05/21  7:48 AM   Result Value Ref Range    Glucose (POC) 138 (H) 70 - 110 mg/dL   GLUCOSE, POC    Collection Time: 04/05/21 11:08 AM   Result Value Ref Range    Glucose (POC) 162 (H) 70 - 110 mg/dL   DUPLEX CAROTID BILATERAL    Collection Time: 04/05/21 11:55 AM   Result Value Ref Range    Right cca dist sys 127.3 cm/s    Right CCA dist quiñones 24.8 cm/s    RIGHT COMMON CAROTID ARTERY MID S 185.64 cm/s    RIGHT COMMON CAROTID ARTERY MID D 18.40 cm/s    Right CCA prox sys 213.5 cm/s    Right CCA prox quiñones 29.6 cm/s    Right eca sys 115.5 cm/s    RIGHT EXTERNAL CAROTID ARTERY D 14.97 cm/s    Right ICA dist sys 184.9 cm/s    Right ICA dist quiñones 54.8 cm/s    Right ICA mid sys 237.9 cm/s    Right ICA mid quiñones 79.5 cm/s    Right ICA prox sys 288.5 cm/s    Right ICA prox quiñones 107.1 cm/s    Right vertebral sys 30.3 cm/s    RIGHT VERTEBRAL ARTERY D 7.25 cm/s    Right subclavian sys 234.7 cm/s    RIGHT SUBCLAVIAN ARTERY D 18.10 cm/s    Right ICA/CCA sys 2.3     Left CCA dist sys 115.7 cm/s    Left CCA dist quiñones 38.1 cm/s    LEFT COMMON CAROTID ARTERY MID S 101.11 cm/s    LEFT COMMON CAROTID ARTERY MID D 25.20 cm/s    Left CCA prox sys 210.7 cm/s    Left CCA prox quiñones 32.3 cm/s    Left ECA sys 131.2 cm/s    LEFT EXTERNAL CAROTID ARTERY D 16.94 cm/s    Left ICA dist sys 78.5 cm/s    Left ICA dist quiñones 25.2 cm/s    Left ICA mid sys 141.0 cm/s    Left ICA mid quiñones 44.0 cm/s    Left ICA prox sys 247.0 cm/s    Left ICA prox quiñones 80.0 cm/s    Left vertebral sys 91.4 cm/s    LEFT VERTEBRAL ARTERY D 20.35 cm/s    Left subclavian sys 194.0 cm/s    LEFT SUBCLAVIAN ARTERY D 0.00 cm/s    Left ICA/CCA sys 2.14    GLUCOSE, POC    Collection Time: 04/05/21  3:29 PM   Result Value Ref Range    Glucose (POC) 164 (H) 70 - 110 mg/dL       Impression:     Uncontrolled diabetes with vascular and neurological manifestation  Gangrene left foot with cellulitis and ulcer  Rule out osteomyelitis left foot    Recommendation:     Patient seen at bedside today for left foot gangrene. Explained to the patient that she would require surgical correction to remove all dead, black, gangrenous tissue left foot with underlying bone to save her left foot. She stated that she understood this and agreed. Patient is scheduled to OR tomorrow afternoon (6PM) after her angiogram for Dr. Josette Ly.   I called and spoke to vascular tonight, and  Roberto Farfan agreed with the need for her surgical correction with amputation, but hopefully would be able to restore blood flow to save her foot/leg. Continue IV antibiotics as per medicine. May consider ID consult. Recommend MRI left foot to see full extent of bone infection. CT suggests bone infection left foot without specific amount of bone involvement. (big toe, first metatarsal, second metatarsal.. Rizwan Finley )   Recommend ANGELIC vascular study of right lower extremity as per Dr Antoni Rainey

## 2021-04-05 NOTE — PROGRESS NOTES
Reason for Admission:    Osteomylitis L great toe                   RUR Score:   9%                  Plan for utilizing home health:          PCP: First and Last name:  None     Name of Practice:    Are you a current patient: Yes/No:    Approximate date of last visit:    Can you participate in a virtual visit with your PCP:                     Current Advanced Directive/Advance Care Plan: Full Code      Healthcare Decision Maker:   Click here to complete [place] including selection of the Healthcare Decision Maker Relationship (ie \"Primary\")                             Transition of Care Plan:  Home with Physician F/U vs [facility]/HH (Pt will need to schedule her own PCP f/u)     Patient lives with her mother and was independent prior to this admission. Pt states that [facility] and was not interested in a PCP at this time. Pt transports herself around. Noted MD flaca please advise if pt will need to d/c on ABX therapy. CM will continue to follow. Care Management Interventions  PCP Verified by CM: No(Pt does not have a PCP.  Goes to [facility].)  Mode of Transport at Discharge: Self  Current Support Network: Relative's Home(Pt lives with her mother)  Confirm Follow Up Transport: Family  The Plan for Transition of Care is Related to the Following Treatment Goals : Osteomylitis L great toe  Discharge Location  Discharge Placement: Home with family assistance

## 2021-04-05 NOTE — PROGRESS NOTES
Request noted for angiogram on 4/6/2021. Plan for pt to be NPO after MN.  Orders placed for PT/INR and PTT to be done in AM.

## 2021-04-05 NOTE — PROGRESS NOTES
Hospitalist Progress Note    Patient: Blanca Hernandez MRN: [id number]  CSN: [id number]    YOB: 1970  Age: 46 y.o. Sex: female    DOA: 4/4/2021 LOS:  LOS: 1 day                Assessment/Plan     Patient Active Problem List   Diagnosis Code    Osteomyelitis ([facility] M86.9    Type 2 diabetes mellitus with left diabetic foot ulcer ([facility] E11.621, L97.529    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 ([facility] E11.52    YUNIEL (acute kidney injury) ([facility] N17.9    PAD (peripheral artery disease) ([facility] I73.9    Bilateral carotid artery stenosis I65.23            46 y.o. female with no significant past history with concerns of left great toe infection and bluish discoloration. Likely undiagnosed DM and CKD. DFU/osteomyelitis/gangrene -  Started on antibiotics vancomycin, ceftriaxone. Discussed withPodiatry. Plan for amputation tomorrow     PAD -  Left LE arterial dopplers reviewed. Plan for angiogram tomorrow. Bilateral carotid artery -  Carotid doppler with 70% stenosis bilaterally. Will start on aspirin post surgery.     YUNIEL -  Likely CKD from DM. Follow renal function.      DM -  continue on SSI  ADA diet  HbA1c 8.4    Disposition : TBD    Review of systems  General: No fevers or chills. Cardiovascular: No chest pain or pressure. No palpitations. Pulmonary: No shortness of breath. Gastrointestinal: No nausea, vomiting. Physical Exam:  General: Awake, cooperative, no acute distress    HEENT: NC, Atraumatic. PERRLA, anicteric sclerae. Lungs: CTA Bilaterally. No Wheezing/Rhonchi/Rales. Heart:  S1 S2,  No murmur, No Rubs, No Gallops  Abdomen: Soft, Non distended, Non tender.  +Bowel sounds,   Extremities: Left great toe gangrene, no palpable distal pulses bilaterally  Psych:   Not anxious or agitated. Neurologic:  No acute neurological deficit.            Vital signs/Intake and Output:  Visit Vitals  /64 (BP 1 Location: Right upper arm, BP Patient Position: At rest)   Pulse 76   Temp 98.6 °F (37 °C)   Resp 18   Ht 5' 8.03\" (1.728 m)   Wt 73.1 kg (161 lb 3.2 oz)   SpO2 100%   BMI 24.49 kg/m²     Current Shift:  04/05 0701 - 04/05 1900  In: -   Out: 1000 [Urine:1000]  Last three shifts:  04/03 1901 - 04/05 0700  In: 2451.3 [P.O.:480; I.V.:1971.3]  Out: 1500 [Urine:1500]            Labs: Results:       Chemistry Recent Labs     04/05/21 0230 04/04/21  1205   * 273*   * 131*   K 5.3 4.4    100   CO2 23 22   BUN 12 13   CREA 1.18 1.47*   CA 7.9* 9.0   AGAP 4 9   BUCR 10* 9*   AP  --  166*   TP  --  7.0   ALB  --  2.3*   GLOB  --  4.7*   AGRAT  --  0.5*      CBC w/Diff Recent Labs     04/05/21 0230 04/04/21  1205   WBC 9.3 13.5*   RBC 2.53* 3.02*   HGB 7.9* 9.5*   HCT 24.0* 28.4*    531*   GRANS  --  86*   LYMPH  --  6*   EOS  --  1      Cardiac Enzymes No results for input(s): CPK, CKND1, CONSTANTINO in the last 72 hours. No lab exists for component: CKRMB, TROIP   Coagulation No results for input(s): PTP, INR, APTT, INREXT, INREXT in the last 72 hours. Lipid Panel No results found for: CHOL, CHOLPOCT, CHOLX, CHLST, CHOLV, 056569, HDL, HDLP, LDL, LDLC, DLDLP, 442853, VLDLC, VLDL, TGLX, TRIGL, TRIGP, TGLPOCT, CHHD, CHHDX   BNP No results for input(s): BNPP in the last 72 hours.    Liver Enzymes Recent Labs     04/04/21  1205   TP 7.0   ALB 2.3*   *      Thyroid Studies No results found for: T4, T3U, TSH, TSHEXT, TSHEXT     Procedures/imaging: see electronic medical records for all procedures/Xrays and details which were not copied into this note but were reviewed prior to creation of Plan

## 2021-04-05 NOTE — DIABETES MGMT
GLYCEMIC CONTROL PLAN OF CARE        Diabetes Management:      Assessment: known h/o T2DM,HbA1C not within recommended range for age + comorbids    BG in target range (non-ICU\" < 180 ; -180):  [] Yes  [x] No      Steroids: No    TDD previous day = 8 units Humalog Normal Insulin Sensitivity Corrective Coverage    Recommend: assessment of home DM med management   - recommend scheduled dose of insulin once PO intake resumes    Addendum:  - pt told this writer no h/o DM prior to admission to [facility]  - [address] RN will provide new diagnosis education  - see [facility] RN ed notes    Most recent blood glucose results:   Lab Results   Component Value Date/Time     (H) 04/05/2021 02:30 AM    GLUCPOC 162 (H) 04/05/2021 11:08 AM    GLUCPOC 138 (H) 04/05/2021 07:48 AM    GLUCPOC 228 (H) 04/04/2021 10:14 PM         Hypo: No    HbA1C: equivalent  to ave BGlucose of 192 mg/dl for 2-3 months prior to admission  Lab Results   Component Value Date/Time    Hemoglobin A1c 8.4 (H) 04/04/2021 12:05 PM       Adequate glycemic control PTA:  [] Yes  [x] No      Home diabetes medications:   Key Antihyperglycemic Medications     Patient is on no antihyperglycemic meds.         Goals:  Blood glucose will be within target range of 70 - 180 mg/dL by: 4/22    Diet:   Active Orders   Diet    DIET NPO Except Meds, Past Midnight       Patient Vitals for the past 100 hrs:   % Diet Eaten   04/04/21 1909 100 %       Education:  __X___ Refer to Diabetes Education Record                       _____ Education not indicated at this time        Mikayla Apodaca 87, RN, CDE  Glycemic Control Team  [phone]  Pager [phone] (M-TH 8:00-4:30P)  *After Hours pager [phone]

## 2021-04-05 NOTE — WOUND CARE
IP WOUND CONSULT Black Yepez MEDICAL RECORD NUMBER:  [id number] AGE: 46 y.o. GENDER: female  : 1970 TODAY'S DATE:  2021 GENERAL  
 
[] Follow-up [x] New Consult Black Yepez is a 46 y.o. female referred by:  
[] Physician 
[x] Nursing 
[] Other: PAST MEDICAL HISTORY History reviewed. No pertinent past medical history. PAST SURGICAL HISTORY History reviewed. No pertinent surgical history. FAMILY HISTORY History reviewed. No pertinent family history. SOCIAL HISTORY Social History Tobacco Use  Smoking status: Current Every Day Smoker Packs/day: 1.50  Smokeless tobacco: Never Used Substance Use Topics  Alcohol use: Not Currently Comment: on rare occasion  Drug use: Not Currently ALLERGIES No Known Allergies MEDICATIONS No current facility-administered medications on file prior to encounter. Current Outpatient Medications on File Prior to Encounter Medication Sig Dispense Refill  trimethoprim-sulfamethoxazole (BACTRIM DS, SEPTRA DS) 160-800 mg per tablet Take 1 Tab by mouth two (2) times a day. Indications: Left foot infection. Wt Readings from Last 3 Encounters:  
21 73.1 kg (161 lb 3.2 oz)  
13 68 kg (150 lb) [unfilled] Visit Vitals /62 (BP 1 Location: Right upper arm, BP Patient Position: At rest) Pulse 70 Temp 99.1 °F (37.3 °C) Resp 16 Ht 5' 8.03\" (1.728 m) Wt 73.1 kg (161 lb 3.2 oz) SpO2 97% BMI 24.49 kg/m² ASSESSMENT Ulcer Identification: 
Ulcer Type: diabetic Contributing Factors: diabetes, poor glucose control and arterial insufficiency PLAN Skin Care & Pressure Relief Recommendations Minimize layers of linen Pads under patient to optimize support surface Turn/reposition approximately every 2 hours Manage incontinence Promote continence; Skin Protective lotion/cream to buttocks and sacrum daily and as needed with incontinence care Offload heels pillows Recommendations: Pt seen by wound care, dry dressing applied to left great toe. Patient waiting to be seen by Podiatry, wound care will be available for advanced dressings and wound care if requested. Teaching completed with:  
[x] Patient          
[] Family member      
[] Caregiver         
[] Nursing 
[] Other Patient/Caregiver Teaching: 
Level of patient/caregiver understanding able to:  
[] Indicates understanding       [] Needs reinforcement 
[] Unsuccessful      [x] Verbal Understanding 
[] Demonstrated understanding       [] No evidence of learning 
[] Refused teaching         [] N/A Electronically signed by Melinda Ross RN on 4/5/2021 at 11:18 AM

## 2021-04-05 NOTE — PROGRESS NOTES
7444 Received bedside shift report from off going nurse Cat Salazar, RN. Report included the following information SBAR, Kardex, Intake/Output, MAR and Recent Results. 1006 Pt ambulating with walker to the bathroom independently. [id number] Paged Dr. Helen Ordaz to inform him that radiology said angiogram will be tomorrow Tuesday. Received telephone order with read back for nicotine patch. Paged Dr. Enmanuel Sevilla to request NPO status to be changed based on procedure tomorrow. Dr. Enmanuel Sevilla said that he didn't place the NPO status and that he anticipates surgery today to remove the toe (podiatry). Informed patient that for now we must remain NPO.    1119 Pt off floor to vascular. 1920 Gave bedside shift report to oncoming nurse Cat Salazar, AMANDEEP. Report included the following information: SBAR, Kardex, Intake/Output, MAR and Recent Results.

## 2021-04-05 NOTE — CONSULTS
Vascular Surgery Consult      Impression:     Dry gangrene of the Left hallux  PAD with SFA occlusion  Premature atherosclerosis  Tobacco dependence  DM type II, new onset  B/L carotid bruits on exam by Dr. London Rodriguez:     - Recommend podiatry consult  - Will need LLE arteriogram this week to improve perfusion for healing a digital amputation  - NPO at midnight tonight, will discuss with attendings tomorrow am timing of possible intervention  - Obtain a carotid duplex tomorrow for B/L bruits  - Discussed the importance of absolute smoking cessation, pt agrees she needs to decrease the amount of cigarettes she smokes  - Needs aggressive medical management for DM (new diagnosis this admission)  - Start antiplatelet tx with ASA 81 mg and statin therapy with atorvastatin 40 mg (AST and ALT normal at 7 and 9), goal LDL < 100 mg/dL    Reason for consultation: Left hallux gangrene, PAD    HPI:  Claudette Lawson is a 46 year ool d woman who was admitted today for osteomyelitis of her Left big toe. She has had a \"black toe\" for approximately 1 week. Pt was seen by an OP podiatrist and x-rays were obtained. A PVL was obtained in the ED today that shows occlusion of the SFA. On questioning, she denies any symptoms of claudication though she is vague on the distance she is able to walk. 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 She states she fell a few days go and has some superficial abrasions to her Right leg that appear to be healing approprietly. Not on any antiplatelet or statin tx.     Patient Active Problem List   Diagnosis Code    Osteomyelitis ([address] M86.9    Type 2 diabetes mellitus with left diabetic foot ulcer ([address] E11.621, L97.529    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 ([address] E11.52    YUNIEL (acute kidney injury) ([address] N17.9    PAD (peripheral artery disease) ([facility] I73.9 History reviewed. No pertinent past medical history. History reviewed. No pertinent surgical history. History reviewed. No pertinent family history. No Known Allergies      Home Medications:     Prior to Admission medications    Medication Sig Start Date End Date Taking? Authorizing Provider   trimethoprim-sulfamethoxazole (BACTRIM DS, SEPTRA DS) 160-800 mg per tablet Take 1 Tab by mouth two (2) times a day. Indications: Left foot infection. Yes Other, MD Daryn       Review of Systems:     Denies active c/o chest pain or shortness of breath at rest.  Denies recent fever/chills. Denies nausea/vomiting. No known clotting or bleeding diathesis. No recent illness. Physical Assessment:     Visit Vitals  BP (!) 118/56 (BP 1 Location: Left upper arm)   Pulse 81   Temp 99.7 °F (37.6 °C)   Resp 18   Ht 5' 8.03\" (1.728 m)   Wt 73.1 kg (161 lb 3.2 oz)   SpO2 100%   BMI 24.49 kg/m²     Neuro: Alert and oriented  HEENT: Bilateral cervical bruits  CV: RRR  Resp: Normal effort  GI: soft, non-tender, non-distended  MSK: pedal and popliteal pulses non-palpable, R femoral 2+ and L femoral +1, BL feet overall appear warm and well perfused except for the Left hallux  Skin: warm, dry, dry gangrene to L hallux, no odor or significant drainage         Basic Metabolic Profile  Lab Results   Component Value Date     (L) 04/04/2021    CO2 22 04/04/2021    BUN 13 04/04/2021       Paz Lau NP-C [facility]      As above. Seen and examined by me. 47 yo female with gangrenous left great toe from diabetes (new dx) and arterial insufficiency. Smokes 1.5 ppd. Palpable femoral pulses bilaterally and no palpable popliteal or pedal pulses. Duplex of LLE confirms SFA occlusion with reconstitution of distal SFA/pop/tibial vessels. Left foot o/w warm and clinical picture not c/w acute limb ischemia.   Will need podiatric debridement and will tentatively plan LLE arteriogram with intervention for 4/6.     [facility]  [facility] Asst Prof Surgery  PG [phone]  Cell [phone]

## 2021-04-06 ENCOUNTER — APPOINTMENT (OUTPATIENT)
Dept: MRI IMAGING | Age: 51
DRG: 271 | End: 2021-04-06
Attending: PODIATRIST
Payer: COMMERCIAL

## 2021-04-06 ENCOUNTER — ANESTHESIA (OUTPATIENT)
Dept: SURGERY | Age: 51
DRG: 271 | End: 2021-04-06
Payer: COMMERCIAL

## 2021-04-06 ENCOUNTER — APPOINTMENT (OUTPATIENT)
Dept: INTERVENTIONAL RADIOLOGY/VASCULAR | Age: 51
DRG: 271 | End: 2021-04-06
Attending: STUDENT IN AN ORGANIZED HEALTH CARE EDUCATION/TRAINING PROGRAM
Payer: COMMERCIAL

## 2021-04-06 LAB
ABO + RH BLD: NORMAL
ANION GAP SERPL CALC-SCNC: 6 MMOL/L (ref 3–18)
APTT PPP: 46.1 SEC (ref 23–36.4)
ATRIAL RATE: 71 BPM
BLOOD GROUP ANTIBODIES SERPL: NORMAL
BUN SERPL-MCNC: 13 MG/DL (ref 7–18)
BUN/CREAT SERPL: 13 (ref 12–20)
CALCIUM SERPL-MCNC: 8.2 MG/DL (ref 8.5–10.1)
CALCULATED P AXIS, ECG09: 33 DEGREES
CALCULATED R AXIS, ECG10: 56 DEGREES
CALCULATED T AXIS, ECG11: 44 DEGREES
CHLORIDE SERPL-SCNC: 107 MMOL/L (ref 100–111)
CO2 SERPL-SCNC: 23 MMOL/L (ref 21–32)
CREAT SERPL-MCNC: 1.03 MG/DL (ref 0.6–1.3)
DIAGNOSIS, 93000: NORMAL
ERYTHROCYTE [DISTWIDTH] IN BLOOD BY AUTOMATED COUNT: 12.2 % (ref 11.6–14.5)
GLUCOSE BLD STRIP.AUTO-MCNC: 133 MG/DL (ref 70–110)
GLUCOSE BLD STRIP.AUTO-MCNC: 134 MG/DL (ref 70–110)
GLUCOSE BLD STRIP.AUTO-MCNC: 158 MG/DL (ref 70–110)
GLUCOSE BLD STRIP.AUTO-MCNC: 195 MG/DL (ref 70–110)
GLUCOSE BLD STRIP.AUTO-MCNC: 198 MG/DL (ref 70–110)
GLUCOSE SERPL-MCNC: 131 MG/DL (ref 74–99)
HCG SERPL QL: NEGATIVE
HCT VFR BLD AUTO: 23.5 % (ref 35–45)
HGB BLD-MCNC: 7.6 G/DL (ref 12–16)
INR PPP: 1.2 (ref 0.8–1.2)
MCH RBC QN AUTO: 31 PG (ref 24–34)
MCHC RBC AUTO-ENTMCNC: 32.3 G/DL (ref 31–37)
MCV RBC AUTO: 95.9 FL (ref 74–97)
P-R INTERVAL, ECG05: 164 MS
PLATELET # BLD AUTO: 394 K/UL (ref 135–420)
PMV BLD AUTO: 8.2 FL (ref 9.2–11.8)
POTASSIUM SERPL-SCNC: 5.6 MMOL/L (ref 3.5–5.5)
PROTHROMBIN TIME: 15.4 SEC (ref 11.5–15.2)
Q-T INTERVAL, ECG07: 378 MS
QRS DURATION, ECG06: 86 MS
QTC CALCULATION (BEZET), ECG08: 410 MS
RBC # BLD AUTO: 2.45 M/UL (ref 4.2–5.3)
SODIUM SERPL-SCNC: 136 MMOL/L (ref 136–145)
SPECIMEN EXP DATE BLD: NORMAL
VENTRICULAR RATE, ECG03: 71 BPM
WBC # BLD AUTO: 8.8 K/UL (ref 4.6–13.2)

## 2021-04-06 PROCEDURE — 77030013708 HC HNDPC SUC IRR PULS STRY –B: Performed by: PODIATRIST

## 2021-04-06 PROCEDURE — 85730 THROMBOPLASTIN TIME PARTIAL: CPT

## 2021-04-06 PROCEDURE — 74011250636 HC RX REV CODE- 250/636: Performed by: EMERGENCY MEDICINE

## 2021-04-06 PROCEDURE — 77030040361 HC SLV COMPR DVT MDII -B: Performed by: PODIATRIST

## 2021-04-06 PROCEDURE — 36415 COLL VENOUS BLD VENIPUNCTURE: CPT

## 2021-04-06 PROCEDURE — 87186 SC STD MICRODIL/AGAR DIL: CPT

## 2021-04-06 PROCEDURE — 74011636637 HC RX REV CODE- 636/637: Performed by: HOSPITALIST

## 2021-04-06 PROCEDURE — 74011250636 HC RX REV CODE- 250/636: Performed by: FAMILY MEDICINE

## 2021-04-06 PROCEDURE — A9575 INJ GADOTERATE MEGLUMI 0.1ML: HCPCS | Performed by: HOSPITALIST

## 2021-04-06 PROCEDURE — 73720 MRI LWR EXTREMITY W/O&W/DYE: CPT

## 2021-04-06 PROCEDURE — 76010000154 HC OR TIME FIRST 0.5 HR: Performed by: PODIATRIST

## 2021-04-06 PROCEDURE — 80048 BASIC METABOLIC PNL TOTAL CA: CPT

## 2021-04-06 PROCEDURE — 76210000006 HC OR PH I REC 0.5 TO 1 HR: Performed by: PODIATRIST

## 2021-04-06 PROCEDURE — 65270000029 HC RM PRIVATE

## 2021-04-06 PROCEDURE — 74011250637 HC RX REV CODE- 250/637: Performed by: HOSPITALIST

## 2021-04-06 PROCEDURE — 87077 CULTURE AEROBIC IDENTIFY: CPT

## 2021-04-06 PROCEDURE — 74011250637 HC RX REV CODE- 250/637: Performed by: NURSE PRACTITIONER

## 2021-04-06 PROCEDURE — 74011636320 HC RX REV CODE- 636/320: Performed by: HOSPITALIST

## 2021-04-06 PROCEDURE — 77030006788 HC BLD SAW OSC STRY -B: Performed by: PODIATRIST

## 2021-04-06 PROCEDURE — 86901 BLOOD TYPING SEROLOGIC RH(D): CPT

## 2021-04-06 PROCEDURE — 88305 TISSUE EXAM BY PATHOLOGIST: CPT

## 2021-04-06 PROCEDURE — 85610 PROTHROMBIN TIME: CPT

## 2021-04-06 PROCEDURE — 74011250636 HC RX REV CODE- 250/636: Performed by: HOSPITALIST

## 2021-04-06 PROCEDURE — 74011250636 HC RX REV CODE- 250/636: Performed by: PODIATRIST

## 2021-04-06 PROCEDURE — 87205 SMEAR GRAM STAIN: CPT

## 2021-04-06 PROCEDURE — 76060000031 HC ANESTHESIA FIRST 0.5 HR: Performed by: PODIATRIST

## 2021-04-06 PROCEDURE — 85027 COMPLETE CBC AUTOMATED: CPT

## 2021-04-06 PROCEDURE — 74011000250 HC RX REV CODE- 250: Performed by: EMERGENCY MEDICINE

## 2021-04-06 PROCEDURE — 74011250637 HC RX REV CODE- 250/637: Performed by: FAMILY MEDICINE

## 2021-04-06 PROCEDURE — 88311 DECALCIFY TISSUE: CPT

## 2021-04-06 PROCEDURE — 88304 TISSUE EXAM BY PATHOLOGIST: CPT

## 2021-04-06 PROCEDURE — 74011250636 HC RX REV CODE- 250/636: Performed by: ANESTHESIOLOGY

## 2021-04-06 PROCEDURE — 82962 GLUCOSE BLOOD TEST: CPT

## 2021-04-06 PROCEDURE — 2709999900 HC NON-CHARGEABLE SUPPLY: Performed by: PODIATRIST

## 2021-04-06 PROCEDURE — 87075 CULTR BACTERIA EXCEPT BLOOD: CPT

## 2021-04-06 PROCEDURE — 93005 ELECTROCARDIOGRAM TRACING: CPT

## 2021-04-06 PROCEDURE — 74011000250 HC RX REV CODE- 250: Performed by: PODIATRIST

## 2021-04-06 PROCEDURE — 84703 CHORIONIC GONADOTROPIN ASSAY: CPT

## 2021-04-06 RX ORDER — HEPARIN SODIUM 200 [USP'U]/100ML
INJECTION, SOLUTION INTRAVENOUS
Status: DISCONTINUED
Start: 2021-04-06 | End: 2021-04-06 | Stop reason: WASHOUT

## 2021-04-06 RX ORDER — MIDAZOLAM HYDROCHLORIDE 1 MG/ML
.5-2 INJECTION, SOLUTION INTRAMUSCULAR; INTRAVENOUS
Status: DISCONTINUED | OUTPATIENT
Start: 2021-04-06 | End: 2021-04-13 | Stop reason: HOSPADM

## 2021-04-06 RX ORDER — HEPARIN SODIUM 1000 [USP'U]/ML
1000-10000 INJECTION, SOLUTION INTRAVENOUS; SUBCUTANEOUS
Status: DISCONTINUED | OUTPATIENT
Start: 2021-04-06 | End: 2021-04-14 | Stop reason: HOSPADM

## 2021-04-06 RX ORDER — FENTANYL CITRATE 50 UG/ML
25-100 INJECTION, SOLUTION INTRAMUSCULAR; INTRAVENOUS
Status: DISCONTINUED | OUTPATIENT
Start: 2021-04-06 | End: 2021-04-07 | Stop reason: SDUPTHER

## 2021-04-06 RX ORDER — SODIUM CHLORIDE, SODIUM LACTATE, POTASSIUM CHLORIDE, CALCIUM CHLORIDE 600; 310; 30; 20 MG/100ML; MG/100ML; MG/100ML; MG/100ML
125 INJECTION, SOLUTION INTRAVENOUS CONTINUOUS
Status: DISCONTINUED | OUTPATIENT
Start: 2021-04-06 | End: 2021-04-06

## 2021-04-06 RX ORDER — LIDOCAINE HYDROCHLORIDE 10 MG/ML
1-10 INJECTION, SOLUTION EPIDURAL; INFILTRATION; INTRACAUDAL; PERINEURAL
Status: ACTIVE | OUTPATIENT
Start: 2021-04-06 | End: 2021-04-07

## 2021-04-06 RX ORDER — HEPARIN SODIUM 200 [USP'U]/100ML
500 INJECTION, SOLUTION INTRAVENOUS
Status: DISPENSED | OUTPATIENT
Start: 2021-04-06 | End: 2021-04-07

## 2021-04-06 RX ORDER — ONDANSETRON 2 MG/ML
4 INJECTION INTRAMUSCULAR; INTRAVENOUS
Status: DISCONTINUED | OUTPATIENT
Start: 2021-04-06 | End: 2021-04-14 | Stop reason: HOSPADM

## 2021-04-06 RX ORDER — PROPOFOL 10 MG/ML
INJECTION, EMULSION INTRAVENOUS AS NEEDED
Status: DISCONTINUED | OUTPATIENT
Start: 2021-04-06 | End: 2021-04-06 | Stop reason: HOSPADM

## 2021-04-06 RX ORDER — MIDAZOLAM HYDROCHLORIDE 1 MG/ML
INJECTION, SOLUTION INTRAMUSCULAR; INTRAVENOUS AS NEEDED
Status: DISCONTINUED | OUTPATIENT
Start: 2021-04-06 | End: 2021-04-06 | Stop reason: HOSPADM

## 2021-04-06 RX ORDER — FENTANYL CITRATE 50 UG/ML
INJECTION, SOLUTION INTRAMUSCULAR; INTRAVENOUS AS NEEDED
Status: DISCONTINUED | OUTPATIENT
Start: 2021-04-06 | End: 2021-04-06 | Stop reason: HOSPADM

## 2021-04-06 RX ADMIN — WATER 1 G: 1 INJECTION INTRAMUSCULAR; INTRAVENOUS; SUBCUTANEOUS at 12:23

## 2021-04-06 RX ADMIN — Medication 10 ML: at 06:00

## 2021-04-06 RX ADMIN — PROPOFOL 150 MG: 10 INJECTION, EMULSION INTRAVENOUS at 18:59

## 2021-04-06 RX ADMIN — MORPHINE SULFATE 2 MG: 2 INJECTION, SOLUTION INTRAMUSCULAR; INTRAVENOUS at 23:35

## 2021-04-06 RX ADMIN — VANCOMYCIN HYDROCHLORIDE 1000 MG: 1 INJECTION, POWDER, LYOPHILIZED, FOR SOLUTION INTRAVENOUS at 23:34

## 2021-04-06 RX ADMIN — ACETAMINOPHEN 650 MG: 325 TABLET, FILM COATED ORAL at 00:18

## 2021-04-06 RX ADMIN — MIDAZOLAM 2 MG: 1 INJECTION INTRAMUSCULAR; INTRAVENOUS at 18:49

## 2021-04-06 RX ADMIN — ATORVASTATIN CALCIUM 40 MG: 20 TABLET, FILM COATED ORAL at 08:45

## 2021-04-06 RX ADMIN — VANCOMYCIN HYDROCHLORIDE 1000 MG: 1 INJECTION, POWDER, LYOPHILIZED, FOR SOLUTION INTRAVENOUS at 12:22

## 2021-04-06 RX ADMIN — ONDANSETRON HYDROCHLORIDE 4 MG: 2 INJECTION INTRAMUSCULAR; INTRAVENOUS at 17:54

## 2021-04-06 RX ADMIN — TRAMADOL HYDROCHLORIDE 50 MG: 50 TABLET, FILM COATED ORAL at 20:35

## 2021-04-06 RX ADMIN — TRAMADOL HYDROCHLORIDE 50 MG: 50 TABLET, FILM COATED ORAL at 02:56

## 2021-04-06 RX ADMIN — Medication 10 ML: at 21:22

## 2021-04-06 RX ADMIN — GADOTERATE MEGLUMINE 15 ML: 376.9 INJECTION INTRAVENOUS at 11:10

## 2021-04-06 RX ADMIN — HEPARIN SODIUM 5000 UNITS: 5000 INJECTION INTRAVENOUS; SUBCUTANEOUS at 02:53

## 2021-04-06 RX ADMIN — FENTANYL CITRATE 100 MCG: 50 INJECTION, SOLUTION INTRAMUSCULAR; INTRAVENOUS at 18:59

## 2021-04-06 RX ADMIN — SODIUM CHLORIDE 75 ML/HR: 900 INJECTION, SOLUTION INTRAVENOUS at 20:35

## 2021-04-06 RX ADMIN — INSULIN LISPRO 2 UNITS: 100 INJECTION, SOLUTION INTRAVENOUS; SUBCUTANEOUS at 17:55

## 2021-04-06 RX ADMIN — SODIUM CHLORIDE, SODIUM LACTATE, POTASSIUM CHLORIDE, AND CALCIUM CHLORIDE 125 ML/HR: 600; 310; 30; 20 INJECTION, SOLUTION INTRAVENOUS at 17:55

## 2021-04-06 NOTE — CONSULTS
History & Physical      Patient: Brunilda Torre               Sex: female          DOA: 4/4/2021       YOB: 1970      Age:  46 y.o.        LOS:  LOS: 2 days               Subjective:   Brunilda Torre is a 46 y.o. female  who I saw last week. She had presented with wet gangrene and I recommended she go to the hospital immediately. She presented a few days later and was subsequently admitted. She has no pain.  She underwent endovascular procedure today    Medications:     Current Facility-Administered Medications:     vancomycin (VANCOCIN) 1,000 mg in 0.9% sodium chloride 250 mL (VIAL-MATE), 1,000 mg, IntraVENous, Q12H, Jostin Lemus DO, 1,000 mg at 04/06/21 1222    ondansetron (ZOFRAN) injection 4 mg, 4 mg, IntraVENous, Q8H PRN, Zee Valenzuela MD, 4 mg at 04/06/21 1754    lidocaine (PF) (XYLOCAINE) 10 mg/mL (1 %) injection 1-10 mL, 1-10 mL, SubCUTAneous, RAD ONCE, Anthony Berman MD    heparinized saline 2 units/mL infusion 1,000 Units, 500 mL, Irrigation, RAD ONCE, Anthony Berman MD    iopamidoL (ISOVUE 250) 51 % contrast injection 1-100 mL, 1-100 mL, IntraarTERial, RAD CONTINUOUS, Anthony Berman MD    fentaNYL citrate (PF) injection  mcg,  mcg, IntraVENous, Rad Antonella Nieves Gordan Coventry, MD    midazolam (VERSED) injection 0.5-2 mg, 0.5-2 mg, IntraVENous, Rad Antonella Nieves Gordan Coventry, MD    heparin (porcine) 1,000 unit/mL injection 1,000-10,000 Units, 1,000-10,000 Units, IntraVENous, Rad Antonella Nieves Gordan Coventry, MD    lactated Ringers infusion, 125 mL/hr, IntraVENous, CONTINUOUS, Senthil Schultz DPM, Last Rate: 125 mL/hr at 04/06/21 1755, 125 mL/hr at 04/06/21 1755    morphine injection 2 mg, 2 mg, IntraVENous, Q3H PRN, Payal Ribera MD, 2 mg at 04/05/21 4833    acetaminophen (TYLENOL) tablet 650 mg, 650 mg, Oral, Q6H PRN, Trevor Ribera MD, 650 mg at 04/06/21 0018    cefTRIAXone (ROCEPHIN) 1 g in sterile water (preservative free) 10 mL IV syringe, 1 g, IntraVENous, Q24H, Jostin Snow, DO, 1 g at 04/06/21 1223    Vancomycin Dosing Per Pharmacy, 1 Each, Other, Rx Dosing/Monitoring, [facility],     sodium chloride (NS) flush 5-40 mL, 5-40 mL, IntraVENous, Q8H, Zee Bonilla MD, 10 mL at 04/06/21 0600    sodium chloride (NS) flush 5-40 mL, 5-40 mL, IntraVENous, PRN, Zee Cabrales MD    0.9% sodium chloride infusion, 75 mL/hr, IntraVENous, CONTINUOUS, Zee Cabrales MD, Last Rate: 75 mL/hr at 04/05/21 2323, 75 mL/hr at 04/05/21 2323    heparin (porcine) injection 5,000 Units, 5,000 Units, SubCUTAneous, Q8H, Zee Bonilla MD, Stopped at 04/06/21 1000    glucose chewable tablet 16 g, 16 g, Oral, PRN, Rosina Elliott MD    glucagon (GLUCAGEN) injection 1 mg, 1 mg, IntraMUSCular, PRN, Rosina Elliott MD    insulin lispro (HUMALOG) injection, , SubCUTAneous, AC&HS, Rosina Elliott MD, 2 Units at 04/06/21 1755    dextrose 10% infusion 125-250 mL, 125-250 mL, IntraVENous, PRN, Rosina Elliott MD    traMADoL (ULTRAM) tablet 50 mg, 50 mg, Oral, Q6H PRN, Rosina Elliott MD, 50 mg at 04/06/21 0256    atorvastatin (LIPITOR) tablet 40 mg, 40 mg, Oral, DAILY, Isamar MOSQUEDA NP, 40 mg at 04/06/21 0845    melatonin (rapid dissolve) tablet 5 mg, 5 mg, Oral, QHS PRN, Nilo Ribera MD, 5 mg at 04/04/21 2329    Review of Systems  Negative for chest pain and shortness of breath        Objective:      Visit Vitals  BP (!) 146/54 (BP 1 Location: Left upper arm, BP Patient Position: At rest;Supine)   Pulse 73   Temp 98.2 °F (36.8 °C)   Resp 16   Ht 5' 8.03\" (1.728 m)   Wt 73.1 kg (161 lb 3.2 oz)   SpO2 99%   BMI 24.49 kg/m²       Physical Exam:  Infectious gangrene to the left foot and great toe  Purulence    Labs:  Recent Results (from the past 24 hour(s))   GLUCOSE, POC    Collection Time: 04/05/21 10:14 PM   Result Value Ref Range    Glucose (POC) 180 (H) 70 - 110 mg/dL   METABOLIC PANEL, BASIC    Collection Time: 04/06/21  3:55 AM   Result Value Ref Range    Sodium 136 136 - 145 mmol/L    Potassium 5.6 (H) 3.5 - 5.5 mmol/L    Chloride 107 100 - 111 mmol/L    CO2 23 21 - 32 mmol/L    Anion gap 6 3.0 - 18 mmol/L    Glucose 131 (H) 74 - 99 mg/dL    BUN 13 7.0 - 18 MG/DL    Creatinine 1.03 0.6 - 1.3 MG/DL    BUN/Creatinine ratio 13 12 - 20      GFR est AA >60 >60 ml/min/1.73m2    GFR est non-AA 56 (L) >60 ml/min/1.73m2    Calcium 8.2 (L) 8.5 - 10.1 MG/DL   CBC W/O DIFF    Collection Time: 04/06/21  3:55 AM   Result Value Ref Range    WBC 8.8 4.6 - 13.2 K/uL    RBC 2.45 (L) 4.20 - 5.30 M/uL    HGB 7.6 (L) 12.0 - 16.0 g/dL    HCT 23.5 (L) 35.0 - 45.0 %    MCV 95.9 74.0 - 97.0 FL    MCH 31.0 24.0 - 34.0 PG    MCHC 32.3 31.0 - 37.0 g/dL    RDW 12.2 11.6 - 14.5 %    PLATELET 216 399 - 267 K/uL    MPV 8.2 (L) 9.2 - 11.8 FL   PROTHROMBIN TIME + INR    Collection Time: 04/06/21  3:55 AM   Result Value Ref Range    Prothrombin time 15.4 (H) 11.5 - 15.2 sec    INR 1.2 0.8 - 1.2     PTT    Collection Time: 04/06/21  3:55 AM   Result Value Ref Range    aPTT 46.1 (H) 23.0 - 36.4 SEC   GLUCOSE, POC    Collection Time: 04/06/21  7:19 AM   Result Value Ref Range    Glucose (POC) 195 (H) 70 - 110 mg/dL   EKG, 12 LEAD, INITIAL    Collection Time: 04/06/21  7:22 AM   Result Value Ref Range    Ventricular Rate 71 BPM    Atrial Rate 71 BPM    P-R Interval 164 ms    QRS Duration 86 ms    Q-T Interval 378 ms    QTC Calculation (Bezet) 410 ms    Calculated P Axis 33 degrees    Calculated R Axis 56 degrees    Calculated T Axis 44 degrees    Diagnosis       Normal sinus rhythm  Normal ECG  No previous ECGs available     GLUCOSE, POC    Collection Time: 04/06/21 11:44 AM   Result Value Ref Range    Glucose (POC) 198 (H) 70 - 110 mg/dL   GLUCOSE, POC    Collection Time: 04/06/21  5:26 PM   Result Value Ref Range    Glucose (POC) 158 (H) 70 - 110 mg/dL           Assessment/Plan     Principal Problem: Osteomyelitis ([facility] (4/4/2021)    Active Problems:    Type 2 diabetes mellitus with left diabetic foot ulcer ([facility] (4/4/2021)      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 ([facility] (4/4/2021)      YUNIEL (acute kidney injury) ([facility] (4/4/2021)      PAD (peripheral artery disease) ([facility] (4/4/2021)      Bilateral carotid artery stenosis (4/5/2021)        Patient seen and evaluated today. Recommended open partial TMA. Risk, Hopefully foot can be reperfused by vascular.   I will resect and amputate all infected and non viable tissue with hopes of wound closure and limb salvage later      Doretha Medina DPM  April 6, 2021

## 2021-04-06 NOTE — DIABETES MGMT
GLYCEMIC CONTROL PLAN OF CARE        Diabetes Management:      Assessment: known h/o T2DM,HbA1C not within recommended range for age + comorbids    BG in target range (non-ICU\" < 180 ; -180):  [] Yes  [x] No      Steroids: No    TDD previous day = 4 units Humalog Normal Insulin Sensitivity Corrective Coverage    Recommend: continue with corrective coverage  - recommend discharge on Metformin with OP PCM follow up  - pt told this writer no h/o DM prior to admission to [facility]  - [address] RN will provide new diagnosis education  - see [facility] RN ed notes    Most recent blood glucose results:   Lab Results   Component Value Date/Time     (H) 04/06/2021 03:55 AM    GLUCPOC 198 (H) 04/06/2021 11:44 AM    GLUCPOC 195 (H) 04/06/2021 07:19 AM    GLUCPOC 180 (H) 04/05/2021 10:14 PM         Hypo: No    HbA1C: equivalent  to ave BGlucose of 192 mg/dl for 2-3 months prior to admission  Lab Results   Component Value Date/Time    Hemoglobin A1c 8.4 (H) 04/04/2021 12:05 PM       Adequate glycemic control PTA:  [] Yes  [x] No      Home diabetes medications:   Key Antihyperglycemic Medications     Patient is on no antihyperglycemic meds.         Goals:  Blood glucose will be within target range of 70 - 180 mg/dL by: 4/22    Diet:   Active Orders   Diet    DIET NPO Except Meds, Past Midnight       Patient Vitals for the past 100 hrs:   % Diet Eaten   04/04/21 1909 100 %       Education:  __X___ Refer to Diabetes Education Record                       _____ Education not indicated at this time        Everett Apodaca 87, RN, CDE  Glycemic Control Team  [phone]  Pager [phone] (M-TH 8:00-4:30P)  *After Hours pager [phone]

## 2021-04-06 NOTE — ANESTHESIA PREPROCEDURE EVALUATION
Relevant Problems   No relevant active problems       Anesthetic History   No history of anesthetic complications            Review of Systems / Medical History  Patient summary reviewed, nursing notes reviewed and pertinent labs reviewed    Pulmonary  Within defined limits                 Neuro/Psych   Within defined limits           Cardiovascular                  Exercise tolerance: >4 METS  Comments: Carotid disease and peripheral vascular dz   GI/Hepatic/Renal     GERD    Renal disease: CRI    Pertinent negatives: No liver disease   Endo/Other    Diabetes         Other Findings   Comments: Infected foot (osteo)          Physical Exam    Airway  Mallampati: III  TM Distance: 4 - 6 cm  Neck ROM: normal range of motion   Mouth opening: Normal     Cardiovascular               Dental    Dentition: Poor dentition     Pulmonary                 Abdominal  GI exam deferred       Other Findings            Anesthetic Plan    ASA: 3  Anesthesia type: MAC          Induction: Intravenous  Anesthetic plan and risks discussed with: Patient

## 2021-04-06 NOTE — PROGRESS NOTES
Vancomycin - Pharmacy to Dose ( Skin and Soft Tissue Infection )    Patient currently on Vancomycin 1000 mg IV q24hrs. Renal Function has improved, CrCl 65.3 ml/min  Will change dosing to 1000 mg IV q12hrs to achieve Goal Trough of 10-15 mcg/ml. Level after 3-4 doses. Pharmacy will continue to follow and adjust.    Tresa Hay [facility]  [phone]

## 2021-04-06 NOTE — PROGRESS NOTES
Case Management noted scheduled surgical intervention. If appropriate, please order PT/OT consult following surgical intervention to assist with discharge planning. CM continuing to follow patient for d/c needs.

## 2021-04-06 NOTE — ANESTHESIA POSTPROCEDURE EVALUATION
Post-Anesthesia Evaluation and Assessment    Patient: Cholo Parada MRN: [id number]  SSN: [id number]   YOB: 1970  Age: 46 y.o. Sex: female      Cardiovascular Function/Vital Signs  BP (!) 142/66   Pulse 72   Temp 36.5 °C (97.7 °F)   Resp 14   Ht 5' 8.03\" (1.728 m)   Wt 73.1 kg (161 lb 3.2 oz)   SpO2 98%   BMI 24.49 kg/m²     Patient is status post Procedure(s) with comments:  INCISION AND DRAINAGE WITH AMPUTATION LEFT 1ST RAY AND AMPUTATION OF 1ST PROXIMAL METATARSAL - PULSE LAVAGE, TPS. Nausea/Vomiting: Controlled. Postoperative hydration reviewed and adequate. Pain:  Pain Scale 1: Numeric (0 - 10) (04/06/21 1941)  Pain Intensity 1: 0 (04/06/21 1941)   Managed. Neurological Status:   Neuro (WDL): Within Defined Limits (04/06/21 1941)   At baseline. Mental Status and Level of Consciousness: Arousable. Pulmonary Status:   O2 Device: Room air (04/06/21 1941)   Adequate oxygenation and airway patent. Complications related to anesthesia: None    Post-anesthesia assessment completed. No concerns. Patient has met all discharge requirements. Signed By: Luis Brody CRNA    April 6, 2021             Procedure(s):  INCISION AND DRAINAGE WITH AMPUTATION LEFT 1ST RAY AND AMPUTATION OF 1ST PROXIMAL METATARSAL. general    <BSHSIANPOST>    INITIAL Post-op Vital signs:   Vitals Value Taken Time   /66 04/06/21 1941   Temp 36.5 °C (97.7 °F) 04/06/21 1921   Pulse 74 04/06/21 1943   Resp 11 04/06/21 1943   SpO2 99 % 04/06/21 1943   Vitals shown include unvalidated device data.

## 2021-04-06 NOTE — PROGRESS NOTES
Bedside shift change report given to Dustin Bullock  (oncoming nurse) by Cat Salazar RN  (offgoing nurse). Report included the following information SBAR, Kardex and Recent Results.

## 2021-04-06 NOTE — PROGRESS NOTES
0516 Received bedside shift report from off going nurse Cat Salazar, RN. Report included the following information SBAR, Kardex, Intake/Output, MAR and Recent Results. 0800 KRISTIN Manning informed me that the patient told her she does not want Dr. Brannon Darnell to operate on her and that she wants a different surgeon. I approached patient to clarify her desires and the patient told me she has it all under control. She informed me that she spoke with her preferred Dr. Tori Stewart today and that he said that he would do the surgery. She said she didn't need any assistance from me. 1042 MRI informed me that patient is nauseaus and vomiting prior to entering MRI machine. Paged Dr. Robel Tomas for possible medication therapy. 1630 Pt off floor to Stephanie Kamara 50 bedside shift report to oncoming nurse Cat Salazar, AMANDEEP. Report included the following information: SBAR, Kardex, Intake/Output, MAR and Recent Results.

## 2021-04-06 NOTE — PERIOP NOTES
TRANSFER - OUT REPORT:    Verbal report given to April RN on Blanca Erp  being transferred to [facility] for routine post - op       Report consisted of patients Situation, Background, Assessment and   Recommendations(SBAR). Information from the following report(s) SBAR, Procedure Summary, Intake/Output and MAR was reviewed with the receiving nurse. Lines:   Peripheral IV 04/06/21 Anterior;Right Wrist (Active)   Site Assessment Clean, dry, & intact 04/06/21 1921   Phlebitis Assessment 0 04/06/21 1921   Infiltration Assessment 0 04/06/21 1921   Dressing Status Clean, dry, & intact 04/06/21 1921   Dressing Type Transparent;Tape 04/06/21 1921   Hub Color/Line Status Pink; Infusing 04/06/21 1921        Opportunity for questions and clarification was provided.       Patient transported with:   Registered Nurse

## 2021-04-06 NOTE — PROGRESS NOTES
Dr. Rosalia Oquendo notified temp 101.1, received orders to give Tylenol for fever. Asked MD about giving SQ Heparin at 0200 and tomorrow.  Instructed to give 0200 dose but to hold remainder doses prior to surgery on 4/6/21

## 2021-04-06 NOTE — BRIEF OP NOTE
Brief Postoperative Note    Patient: Deena Bell  YOB: 1970  MRN: [id number]    Date of Procedure: 4/6/2021     Pre-Op Diagnosis: GANGRENE OF LEFT HALLUX    Post-Op Diagnosis: Same as preoperative diagnosis.       Procedure(s):  INCISION AND DRAINAGE WITH AMPUTATION LEFT 1ST RAY, PT IS IN ROOM 316    Surgeon(s):  Lor Schultz DPM    Surgical Assistant: Surg Asst-1: Naila Segal    Anesthesia: MAC     Estimated Blood Loss (mL): Minimal    Complications: None    Specimens:   ID Type Source Tests Collected by Time Destination   1 : Left 1st Ray Preservative Foot, left  Janet Andrea DPM 4/6/2021 1906 Pathology   2 : Left Proximal Metatarsal Preservative   Janet Andrea DPM 4/6/2021 1908 Pathology   1 : Left Foot Swab Wound  CULTURE, ANAEROBIC, CULTURE, WOUND Edward Carolina DPM 4/6/2021 1907 Microbiology        Implants: * No implants in log *    Drains: * No LDAs found *    Findings: Necrotic bone and soft tissue,,gangrene    Electronically Signed by Namrata Graham DPM on 4/6/2021 at 7:24 PM

## 2021-04-06 NOTE — PROGRESS NOTES
Hospitalist Progress Note    Patient: Magnus Pinto MRN: [id number]  CSN: [id number]    YOB: 1970  Age: 46 y.o. Sex: female    DOA: 4/4/2021 LOS:  LOS: 2 days                Assessment/Plan     Patient Active Problem List   Diagnosis Code    Osteomyelitis ([facility] M86.9    Type 2 diabetes mellitus with left diabetic foot ulcer [facility] E11.621, L97.529    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 ([facility] E11.52    YUNIEL (acute kidney injury) ([facility] N17.9    PAD (peripheral artery disease) ([facility]) I73.9    Bilateral carotid artery stenosis I65.23            46 y.o. female with no significant past history with concerns of left great toe infection and bluish discoloration. Likely undiagnosed DM and CKD. DFU/osteomyelitis/gangrene -  on antibiotics vancomycin, ceftriaxone. Plan for amputation today     PAD -  Left LE arterial dopplers reviewed. Plan for left lower ext angiogram today. Bilateral carotid artery -  Carotid doppler with 70% stenosis bilaterally. Will start on aspirin post surgery.     YUNIEL -  Likely CKD from DM. Follow renal function.      DM -  continue on SSI  ADA diet  HbA1c 8.4    Disposition : TBD    Review of systems  General: No fevers or chills. Cardiovascular: No chest pain or pressure. No palpitations. Pulmonary: No shortness of breath. Gastrointestinal: No nausea, vomiting. Physical Exam:  General: Awake, cooperative, no acute distress    HEENT: NC, Atraumatic. PERRLA, anicteric sclerae. Lungs: CTA Bilaterally. No Wheezing/Rhonchi/Rales. Heart:  S1 S2,  No murmur, No Rubs, No Gallops  Abdomen: Soft, Non distended, Non tender.  +Bowel sounds,   Extremities: Left great toe gangrene, no palpable distal pulses bilaterally  Psych:   Not anxious or agitated. Neurologic:  No acute neurological deficit.            Vital signs/Intake and Output:  Visit Vitals  /62 (BP 1 Location: Left arm, BP Patient Position: At rest)   Pulse 72   Temp 98.3 °F (36.8 °C)   Resp 18   Ht 5' 8.03\" (1.728 m)   Wt 73.1 kg (161 lb 3.2 oz)   SpO2 98%   BMI 24.49 kg/m²     Current Shift:  04/06 0701 - 04/06 1900  In: -   Out: 700 [Urine:700]  Last three shifts:  04/04 1901 - 04/06 0700  In: 4463.8 [P.O.:720; I.V.:3743.8]  Out: 3500 [Urine:3500]            Labs: Results:       Chemistry Recent Labs     04/06/21  0355 04/05/21  0230 04/04/21  1205   * 117* 273*    135* 131*   K 5.6* 5.3 4.4    108 100   CO2 23 23 22   BUN 13 12 13   CREA 1.03 1.18 1.47*   CA 8.2* 7.9* 9.0   AGAP 6 4 9   BUCR 13 10* 9*   AP  --   --  166*   TP  --   --  7.0   ALB  --   --  2.3*   GLOB  --   --  4.7*   AGRAT  --   --  0.5*      CBC w/Diff Recent Labs     04/06/21  0355 04/05/21  0230 04/04/21  1205   WBC 8.8 9.3 13.5*   RBC 2.45* 2.53* 3.02*   HGB 7.6* 7.9* 9.5*   HCT 23.5* 24.0* 28.4*    411 531*   GRANS  --   --  86*   LYMPH  --   --  6*   EOS  --   --  1      Cardiac Enzymes No results for input(s): CPK, CKND1, CONSTANTINO in the last 72 hours. No lab exists for component: CKRMB, TROIP   Coagulation Recent Labs     04/06/21 0355   PTP 15.4*   INR 1.2   APTT 46.1*       Lipid Panel No results found for: CHOL, CHOLPOCT, CHOLX, CHLST, CHOLV, 027661, HDL, HDLP, LDL, LDLC, DLDLP, 269434, VLDLC, VLDL, TGLX, TRIGL, TRIGP, TGLPOCT, CHHD, CHHDX   BNP No results for input(s): BNPP in the last 72 hours.    Liver Enzymes Recent Labs     04/04/21  1205   TP 7.0   ALB 2.3*   *      Thyroid Studies No results found for: T4, T3U, TSH, TSHEXT, TSHEXT     Procedures/imaging: see electronic medical records for all procedures/Xrays and details which were not copied into this note but were reviewed prior to creation of Plan

## 2021-04-07 ENCOUNTER — APPOINTMENT (OUTPATIENT)
Dept: VASCULAR SURGERY | Age: 51
DRG: 271 | End: 2021-04-07
Attending: HOSPITALIST
Payer: COMMERCIAL

## 2021-04-07 ENCOUNTER — APPOINTMENT (OUTPATIENT)
Dept: INTERVENTIONAL RADIOLOGY/VASCULAR | Age: 51
DRG: 271 | End: 2021-04-07
Attending: STUDENT IN AN ORGANIZED HEALTH CARE EDUCATION/TRAINING PROGRAM
Payer: COMMERCIAL

## 2021-04-07 LAB
ANION GAP SERPL CALC-SCNC: 6 MMOL/L (ref 3–18)
BUN SERPL-MCNC: 10 MG/DL (ref 7–18)
BUN/CREAT SERPL: 11 (ref 12–20)
CALCIUM SERPL-MCNC: 8.5 MG/DL (ref 8.5–10.1)
CHLORIDE SERPL-SCNC: 106 MMOL/L (ref 100–111)
CO2 SERPL-SCNC: 24 MMOL/L (ref 21–32)
CREAT SERPL-MCNC: 0.87 MG/DL (ref 0.6–1.3)
ERYTHROCYTE [DISTWIDTH] IN BLOOD BY AUTOMATED COUNT: 11.7 % (ref 11.6–14.5)
GLUCOSE BLD STRIP.AUTO-MCNC: 129 MG/DL (ref 70–110)
GLUCOSE BLD STRIP.AUTO-MCNC: 175 MG/DL (ref 70–110)
GLUCOSE BLD STRIP.AUTO-MCNC: 175 MG/DL (ref 70–110)
GLUCOSE BLD STRIP.AUTO-MCNC: 193 MG/DL (ref 70–110)
GLUCOSE SERPL-MCNC: 157 MG/DL (ref 74–99)
HCT VFR BLD AUTO: 24.8 % (ref 35–45)
HGB BLD-MCNC: 7.8 G/DL (ref 12–16)
MCH RBC QN AUTO: 30.4 PG (ref 24–34)
MCHC RBC AUTO-ENTMCNC: 31.5 G/DL (ref 31–37)
MCV RBC AUTO: 96.5 FL (ref 74–97)
PLATELET # BLD AUTO: 374 K/UL (ref 135–420)
PMV BLD AUTO: 8.8 FL (ref 9.2–11.8)
POTASSIUM SERPL-SCNC: 5.4 MMOL/L (ref 3.5–5.5)
RBC # BLD AUTO: 2.57 M/UL (ref 4.2–5.3)
SODIUM SERPL-SCNC: 136 MMOL/L (ref 136–145)
WBC # BLD AUTO: 10.5 K/UL (ref 4.6–13.2)

## 2021-04-07 PROCEDURE — 93926 LOWER EXTREMITY STUDY: CPT

## 2021-04-07 PROCEDURE — 85027 COMPLETE CBC AUTOMATED: CPT

## 2021-04-07 PROCEDURE — 99152 MOD SED SAME PHYS/QHP 5/>YRS: CPT

## 2021-04-07 PROCEDURE — 74011000250 HC RX REV CODE- 250: Performed by: EMERGENCY MEDICINE

## 2021-04-07 PROCEDURE — 82962 GLUCOSE BLOOD TEST: CPT

## 2021-04-07 PROCEDURE — 047N3ZZ DILATION OF LEFT POPLITEAL ARTERY, PERCUTANEOUS APPROACH: ICD-10-PCS | Performed by: STUDENT IN AN ORGANIZED HEALTH CARE EDUCATION/TRAINING PROGRAM

## 2021-04-07 PROCEDURE — 74011000636 HC RX REV CODE- 636: Performed by: STUDENT IN AN ORGANIZED HEALTH CARE EDUCATION/TRAINING PROGRAM

## 2021-04-07 PROCEDURE — C1725 CATH, TRANSLUMIN NON-LASER: HCPCS

## 2021-04-07 PROCEDURE — B4101ZZ FLUOROSCOPY OF ABDOMINAL AORTA USING LOW OSMOLAR CONTRAST: ICD-10-PCS | Performed by: STUDENT IN AN ORGANIZED HEALTH CARE EDUCATION/TRAINING PROGRAM

## 2021-04-07 PROCEDURE — 74011250636 HC RX REV CODE- 250/636: Performed by: HOSPITALIST

## 2021-04-07 PROCEDURE — 047L3Z1 DILATION OF LEFT FEMORAL ARTERY USING DRUG-COATED BALLOON, PERCUTANEOUS APPROACH: ICD-10-PCS | Performed by: STUDENT IN AN ORGANIZED HEALTH CARE EDUCATION/TRAINING PROGRAM

## 2021-04-07 PROCEDURE — 04CL3ZZ EXTIRPATION OF MATTER FROM LEFT FEMORAL ARTERY, PERCUTANEOUS APPROACH: ICD-10-PCS | Performed by: STUDENT IN AN ORGANIZED HEALTH CARE EDUCATION/TRAINING PROGRAM

## 2021-04-07 PROCEDURE — 74011250636 HC RX REV CODE- 250/636: Performed by: EMERGENCY MEDICINE

## 2021-04-07 PROCEDURE — 36415 COLL VENOUS BLD VENIPUNCTURE: CPT

## 2021-04-07 PROCEDURE — 74011250636 HC RX REV CODE- 250/636: Performed by: STUDENT IN AN ORGANIZED HEALTH CARE EDUCATION/TRAINING PROGRAM

## 2021-04-07 PROCEDURE — B41G1ZZ FLUOROSCOPY OF LEFT LOWER EXTREMITY ARTERIES USING LOW OSMOLAR CONTRAST: ICD-10-PCS | Performed by: STUDENT IN AN ORGANIZED HEALTH CARE EDUCATION/TRAINING PROGRAM

## 2021-04-07 PROCEDURE — 65270000029 HC RM PRIVATE

## 2021-04-07 PROCEDURE — 74011250637 HC RX REV CODE- 250/637: Performed by: STUDENT IN AN ORGANIZED HEALTH CARE EDUCATION/TRAINING PROGRAM

## 2021-04-07 PROCEDURE — 77030013516 IR ANGIO EXT LOWER LT

## 2021-04-07 PROCEDURE — 99153 MOD SED SAME PHYS/QHP EA: CPT

## 2021-04-07 PROCEDURE — 74011636637 HC RX REV CODE- 636/637: Performed by: HOSPITALIST

## 2021-04-07 PROCEDURE — 80048 BASIC METABOLIC PNL TOTAL CA: CPT

## 2021-04-07 PROCEDURE — 047Q3ZZ DILATION OF LEFT ANTERIOR TIBIAL ARTERY, PERCUTANEOUS APPROACH: ICD-10-PCS | Performed by: STUDENT IN AN ORGANIZED HEALTH CARE EDUCATION/TRAINING PROGRAM

## 2021-04-07 PROCEDURE — 74011250637 HC RX REV CODE- 250/637: Performed by: FAMILY MEDICINE

## 2021-04-07 PROCEDURE — 74011250636 HC RX REV CODE- 250/636: Performed by: FAMILY MEDICINE

## 2021-04-07 RX ORDER — LIDOCAINE HYDROCHLORIDE 10 MG/ML
1-10 INJECTION, SOLUTION EPIDURAL; INFILTRATION; INTRACAUDAL; PERINEURAL
Status: DISCONTINUED | OUTPATIENT
Start: 2021-04-07 | End: 2021-04-11

## 2021-04-07 RX ORDER — CLOPIDOGREL 300 MG/1
300 TABLET, FILM COATED ORAL ONCE
Status: COMPLETED | OUTPATIENT
Start: 2021-04-07 | End: 2021-04-07

## 2021-04-07 RX ORDER — HEPARIN SODIUM 200 [USP'U]/100ML
500 INJECTION, SOLUTION INTRAVENOUS
Status: DISCONTINUED | OUTPATIENT
Start: 2021-04-07 | End: 2021-04-11

## 2021-04-07 RX ORDER — GUAIFENESIN 100 MG/5ML
81 LIQUID (ML) ORAL DAILY
Status: DISCONTINUED | OUTPATIENT
Start: 2021-04-07 | End: 2021-04-14 | Stop reason: HOSPADM

## 2021-04-07 RX ORDER — ONDANSETRON 2 MG/ML
4 INJECTION INTRAMUSCULAR; INTRAVENOUS ONCE
Status: COMPLETED | OUTPATIENT
Start: 2021-04-07 | End: 2021-04-07

## 2021-04-07 RX ORDER — CLOPIDOGREL BISULFATE 75 MG/1
75 TABLET ORAL DAILY
Status: DISCONTINUED | OUTPATIENT
Start: 2021-04-08 | End: 2021-04-14 | Stop reason: HOSPADM

## 2021-04-07 RX ORDER — ONDANSETRON 2 MG/ML
INJECTION INTRAMUSCULAR; INTRAVENOUS
Status: DISPENSED
Start: 2021-04-07 | End: 2021-04-08

## 2021-04-07 RX ORDER — FENTANYL CITRATE 50 UG/ML
25-100 INJECTION, SOLUTION INTRAMUSCULAR; INTRAVENOUS
Status: DISCONTINUED | OUTPATIENT
Start: 2021-04-07 | End: 2021-04-12 | Stop reason: HOSPADM

## 2021-04-07 RX ADMIN — FENTANYL CITRATE 25 MCG: 50 INJECTION, SOLUTION INTRAMUSCULAR; INTRAVENOUS at 13:48

## 2021-04-07 RX ADMIN — MIDAZOLAM 0.5 MG: 1 INJECTION INTRAMUSCULAR; INTRAVENOUS at 15:05

## 2021-04-07 RX ADMIN — MORPHINE SULFATE 2 MG: 2 INJECTION, SOLUTION INTRAMUSCULAR; INTRAVENOUS at 04:07

## 2021-04-07 RX ADMIN — ONDANSETRON 4 MG: 2 INJECTION INTRAMUSCULAR; INTRAVENOUS at 15:57

## 2021-04-07 RX ADMIN — Medication 10 ML: at 17:39

## 2021-04-07 RX ADMIN — MIDAZOLAM 0.5 MG: 1 INJECTION INTRAMUSCULAR; INTRAVENOUS at 13:33

## 2021-04-07 RX ADMIN — FENTANYL CITRATE 25 MCG: 50 INJECTION, SOLUTION INTRAMUSCULAR; INTRAVENOUS at 15:14

## 2021-04-07 RX ADMIN — HEPARIN SODIUM IN SODIUM CHLORIDE 1000 UNITS: 200 INJECTION INTRAVENOUS at 12:53

## 2021-04-07 RX ADMIN — FENTANYL CITRATE 25 MCG: 50 INJECTION, SOLUTION INTRAMUSCULAR; INTRAVENOUS at 14:42

## 2021-04-07 RX ADMIN — MIDAZOLAM 0.5 MG: 1 INJECTION INTRAMUSCULAR; INTRAVENOUS at 16:00

## 2021-04-07 RX ADMIN — MIDAZOLAM 1 MG: 1 INJECTION INTRAMUSCULAR; INTRAVENOUS at 13:13

## 2021-04-07 RX ADMIN — HEPARIN SODIUM 7000 UNITS: 1000 INJECTION INTRAVENOUS; SUBCUTANEOUS at 13:36

## 2021-04-07 RX ADMIN — MIDAZOLAM 0.5 MG: 1 INJECTION INTRAMUSCULAR; INTRAVENOUS at 14:03

## 2021-04-07 RX ADMIN — MIDAZOLAM 0.5 MG: 1 INJECTION INTRAMUSCULAR; INTRAVENOUS at 15:45

## 2021-04-07 RX ADMIN — HEPARIN SODIUM 5000 UNITS: 5000 INJECTION INTRAVENOUS; SUBCUTANEOUS at 18:43

## 2021-04-07 RX ADMIN — WATER 1 G: 1 INJECTION INTRAMUSCULAR; INTRAVENOUS; SUBCUTANEOUS at 18:44

## 2021-04-07 RX ADMIN — FENTANYL CITRATE 25 MCG: 50 INJECTION, SOLUTION INTRAMUSCULAR; INTRAVENOUS at 13:33

## 2021-04-07 RX ADMIN — HEPARIN SODIUM 5000 UNITS: 5000 INJECTION INTRAVENOUS; SUBCUTANEOUS at 02:00

## 2021-04-07 RX ADMIN — ONDANSETRON HYDROCHLORIDE 4 MG: 2 INJECTION INTRAMUSCULAR; INTRAVENOUS at 18:43

## 2021-04-07 RX ADMIN — IOPAMIDOL 114 ML: 510 INJECTION, SOLUTION INTRAVASCULAR at 16:29

## 2021-04-07 RX ADMIN — ACETAMINOPHEN 650 MG: 325 TABLET, FILM COATED ORAL at 21:58

## 2021-04-07 RX ADMIN — MIDAZOLAM 0.5 MG: 1 INJECTION INTRAMUSCULAR; INTRAVENOUS at 14:42

## 2021-04-07 RX ADMIN — FENTANYL CITRATE 50 MCG: 50 INJECTION, SOLUTION INTRAMUSCULAR; INTRAVENOUS at 13:13

## 2021-04-07 RX ADMIN — MORPHINE SULFATE 2 MG: 2 INJECTION, SOLUTION INTRAMUSCULAR; INTRAVENOUS at 18:42

## 2021-04-07 RX ADMIN — ASPIRIN 81 MG: 81 TABLET, CHEWABLE ORAL at 16:57

## 2021-04-07 RX ADMIN — FENTANYL CITRATE 25 MCG: 50 INJECTION, SOLUTION INTRAMUSCULAR; INTRAVENOUS at 15:23

## 2021-04-07 RX ADMIN — CLOPIDOGREL BISULFATE 300 MG: 300 TABLET, FILM COATED ORAL at 16:57

## 2021-04-07 RX ADMIN — Medication 5 MG: at 22:00

## 2021-04-07 RX ADMIN — VANCOMYCIN HYDROCHLORIDE 1000 MG: 1 INJECTION, POWDER, LYOPHILIZED, FOR SOLUTION INTRAVENOUS at 17:34

## 2021-04-07 RX ADMIN — HEPARIN SODIUM 2000 UNITS: 1000 INJECTION INTRAVENOUS; SUBCUTANEOUS at 14:31

## 2021-04-07 RX ADMIN — Medication 10 ML: at 06:00

## 2021-04-07 RX ADMIN — HEPARIN SODIUM 1000 UNITS: 1000 INJECTION INTRAVENOUS; SUBCUTANEOUS at 15:27

## 2021-04-07 RX ADMIN — INSULIN LISPRO 2 UNITS: 100 INJECTION, SOLUTION INTRAVENOUS; SUBCUTANEOUS at 21:50

## 2021-04-07 RX ADMIN — FENTANYL CITRATE 25 MCG: 50 INJECTION, SOLUTION INTRAMUSCULAR; INTRAVENOUS at 15:59

## 2021-04-07 NOTE — OP NOTES
[facility]  OPERATIVE REPORT    Name:  Miguel Angel Portillo  MR#:   [id number]  :  1970  ACCOUNT #:  [de-identified]  DATE OF SERVICE:  2021    PREOPERATIVE DIAGNOSES:  1. Abscess, cellulitis, left foot. 2.  Gangrene, left foot. 3.  Possible osteomyelitis, left foot. 4.  Peripheral vascular disease, left foot. POSTOPERATIVE DIAGNOSES:  1. Abscess, cellulitis, left foot. 2.  Gangrene, left foot. 3.  Possible osteomyelitis, left foot. 4.  Peripheral vascular disease, left foot. PROCEDURE PERFORMED:  1. Incision and drainage of abscess, subfascial, left foot. 2.  Excision of ulcer, left foot. 3.  Excision of infected bone, first metatarsal and toe, left foot. SURGEON:  Wilfredo Schultz DPM.    ASSISTANT:  None    ANESTHESIA:  General.    COMPLICATIONS:  None. SPECIMENS REMOVED:  Bone. IMPLANTS:  None. ESTIMATED BLOOD LOSS:  None. INDICATIONS:  This is an unfortunate 40-year-old female who had severe gangrene of the left foot, for the most part demarcated around the first ray, but there are early signs of ischemic change throughout the midfoot and second toe. She has poor vascularization to the foot and is scheduled for an endovascular procedure with Dr. Navi Salinas. High risk for limb amputation and lower leg amputation. I reviewed the procedure with her at length and discussed the risks, benefits, and alternatives. PREPARATION OF PROCEDURE:  The patient was taken to the operating room and placed on the operative table in a supine position. After induction of IV sedation and administration of antibiotic, which she received on the floor, the left lower extremity was then prepped and draped in usual sterile manner. The blood was then exsanguinated. PROCEDURE #1:  Incision and drainage of subfascial abscess, left foot:   At this point, an incision was made along the plantar medial aspect of the left foot and deepened down to the level of the underlying fascia at which point, this was incised. They used blunt dissection to carry the dissection down to the level of the deep medial compartment of the left foot. Cultures were taken for culture and sensitivity, and I evacuated the liquefactive necrosis and abscess from the area. PROCEDURE #2:  Excision of ulcer, left foot: At this point, a circumferential incision with sharp debridement was carried out around the plantar aspect of the first metatarsal of the left foot. I excised the gangrenous tissue with the use of a scalpel blade. I removed it in its entirety. Very little bleeding was noted, very poor perfusion to the foot. The ulcer excised was approximately 30 cm2, full thickness down to the level of the underlying muscle. PROCEDURE #3:  Incision to bone cortex with amputation of the infected first metatarsal and great toe, left foot: At this point, I created a circumferential incision around the first ray. I transected the first metatarsal proximally and disarticulated the first ray completely. I then sent it to Pathology for gross and microscopic examination. I also took a small sample of the proximal first metatarsal, so as to assess for any residual infection. A Simpulse irrigation was carried out around the open wound and I debrided all the liquefactive necrosis at length and then packed the wound open. She will be considered for revascularization procedure tomorrow and hopefully transmetatarsal amputation later in the week with flap closure.       GREGORIO Cobb/IKE_SAMANTHAM_I/[email or web address]_P  D:  04/06/2021 19:29  T:  04/07/2021 5:45  JOB #:  [id number]

## 2021-04-07 NOTE — PROGRESS NOTES
TRANSFER - OUT REPORT:    Verbal report given to AMANDEEP Desai(name) on Reidbie Caller  being transferred to care unit(unit) for routine progression of care       Report consisted of patients Situation, Background, Assessment and   Recommendations(SBAR). Information from the following report(s) SBAR, Procedure Summary, MAR, Med Rec Status and Cardiac Rhythm nsr was reviewed with the receiving nurse. Lines:   Peripheral IV 04/06/21 Anterior;Right Wrist (Active)   Site Assessment Clean, dry, & intact 04/07/21 0400   Phlebitis Assessment 0 04/07/21 0400   Infiltration Assessment 0 04/07/21 0400   Dressing Status Clean, dry, & intact 04/07/21 0400   Dressing Type Transparent 04/07/21 0400   Hub Color/Line Status Pink; Infusing 04/07/21 0400   Alcohol Cap Used Yes 04/07/21 0400        Opportunity for questions and clarification was provided.       Patient transported with:   Registered Nurse  Pressure dressing CDI R groin

## 2021-04-07 NOTE — PROGRESS NOTES
Bedside shift change report given to Martha Jackson (oncoming nurse) by Arpit Boykin RN (offgoing nurse). Report included the following information SBAR, Kardex and Recent Results.

## 2021-04-07 NOTE — PROGRESS NOTES
Pt reports she has the hiccups, denies nausea at this time but zofran to be  Administered per verbal order by md nimo.

## 2021-04-07 NOTE — PROGRESS NOTES
1640 Received S/P Angiogram. Pt awake and alert and tolerated procedure well. Rt groin accessed, Tegaderm dressing dry and intact. Pedal and post-tibial positive with doppler. Closure device perclose in place. Pt denies much pain, 3/10 and tolerable. 1700 Pt tolerated dinner well. No further complaints. 1745 U/S being done @ bedside. .    1800 Primary nurse updated and pt will be taken back to room. 1309 Calos Turcios completed and pt taken back to room in stable condition. Hand over to primary nurse.

## 2021-04-07 NOTE — PROGRESS NOTES
2010- Pt returned from pacu, frequent vss, pt assessed. Pt with no complaints of pain at this time. Pt given gingerale and tv dinner and jello per request. Pt instructed to call to prevent fall. Pt voiced understanding.  Left foot ace c/d/i
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
## 2021-04-07 NOTE — OP NOTES
VASCULAR SURGERY OPERATIVE REPORT    PATIENT NAME:  Bo Carlin  [id number]  1970    DATE OF PROCEDURE:  4/7/2021    PREOP DIAGNOSIS:  R C5 chronic limb threatening ischemia  Status post left first metatarsal amputation  Diabetic wound  SFA occlusion      POSTOP DIAGNOSIS:   Same    PROCEDURE:  Procedure(s):  1. Ultrasound-guided access of the right common femoral artery  2. Limited oblique iliofemoral arteriogram on the right  3. Aortogram  4. Left lower extremity angiogram  5. Percutaneous angioplasty left anterior tibial artery origin and tibioperoneal trunk, 2 mm balloon  6. Percutaneous angioplasty left SFA 4 mm balloon and 5 mm DCB  7. Percutaneous angioplasty left PFA, 4 mm  8. Percutaneous atherectomy left SFA, Hawk 1M  9. Percutaneous closure of right common femoral arteriotomy, Pro-glide  10. Radiographic supervision interpretation    SURGEON:  Phuong Martini MD    ANESTHESIA:  Local anesthetic with 1% lidocaine and moderate sedation administered by AMANDEEP Ramirez, under my direct supervision from 1313 hrs. until 1621 hrs. 4 mg of Versed and 200 mcg of fentanyl    ESTIMATED BLOOD LOSS:  30 mL    CONTRAST: 114 mL    FLUOROSCOPY TIME: 51.8 minutes / 304 mGy    INDICATIONS:  Ms. Erin Estrella is a 46 y.o. female who presented with uncontrolled diabetes and a left foot wound. She underwent left first metatarsal ray amputation. For improvement in wound healing due to chronic limb threatening ischemia, she was offered arteriogram and possible endovascular interventions on the left. Benefits, alternatives, and risks of the procedure were discussed. All questions were answered. The patient understands and is willing to proceed. DESCRIPTION OF PROCEDURE:  The patient was properly identified, and after ensuring the appropriately signed documents were secured, the patient was brought to the endovascular suite and placed supine on the table. She was administered monitored sedation.   A appropriate timeout procedure was completed wherein the patient, procedure, site, positioning, allergies, equipment, and administration of antibiotics were verified prior to beginning an agreed-upon by all present. The bilateral groins were prepped and draped in usual sterile fashion. Under ultrasound guidance, the right common femoral artery was visualized and found to be patent. Local anesthetic was instilled in the skin and subcutaneous tissues overlying the artery. Using Seldinger technique the artery was accessed with a micropuncture following a Bentson wire into the distal aorta over which a 5 Kyrgyz sheath was placed through which a Omni catheter was placed in the perivisceral aorta. Flush aortogram was obtained demonstrating patent bilateral renal arteries without stenosis. The infrarenal aorta is patent without stenosis. The aortic bifurcation is a narrow angle and there is 50% calcium on the left common iliac artery origin. On the right the common and external iliac arteries are patent. On the left, the internal iliac artery origin is severely stenotic. The left external iliac artery is patent. Catheter and wire were passed over the aortic bifurcation to the left external iliac artery where bolus evangelina arteriogram was obtained of the left leg. This demonstrates patent profunda origin with 75 to 90% stenosis with post dilation at the origin of the profunda the SFA origin is just patent and then occludes just after the origin until Mariano's canal where it 3 fills by collaterals the distal SFA and P1 segment have diffuse calcifications the distal popliteal artery is patent without stenosis. The RON origin has greater than 75% stenosis the TP trunk origin greater than 75% stenosis there is two-vessel runoff to the foot via anterior tibial artery and peroneal artery the posterior tibial artery occludes in the mid calf. There is a plantar arch that is contiguous with in the foot.   After reviewing the diagnostic imaging, intervention was indicated. The patient was given an intravenous bolus of heparin and this was allowed adequate time to circulate. The 5 Jordanian sheath was exchanged for a 6 [place] by 45 cm sheath placed in the left common femoral artery. The SFA lesion was crossed with a Glidewire and a 3 5 catheter in a subintimal fashion. Follow-up angiogram demonstrates reentrance into the true lumen next wire and catheter were passed into the peroneal artery and a 1 4 wire was placed in the peroneal artery and a 1 4 wire was placed into the anterior tibial artery. The origins of these vessels were angioplastied with a 2 mm balloon. Post angiogram demonstrates adequate result. The SFA was then angioplastied with a 4 mm balloon with good result in reasonable luminal gain however the line of bulky calcium was evident. The origin of the SFA and the proximal SFA were then percutaneously arthrectomy with [facility] 1M device. Following this, the SFA was angioplastied with a 5 mm DCB. Post angiogram demonstrates adequate flow throughout the leg without evidence for dissection or perforation with two-vessel runoff into the foot. No further endovascular interventions were indicated. The spider wire was then recaptured with an 035 catheter and removed entirely. There were small bits of debris. Next the 6 Jordanian sheath was withdrawn into the right external iliac artery and limited right iliofemoral oblique arteriogram was obtained demonstrating patent right external iliac artery common femoral artery SFA origin and PFA origin with anteriorly placed arteriotomy within the common femoral artery suitable for percutaneous closure. This was then achieved with a proglide device after the sheath was removed. Excellent hemostasis was achieved. Sterile dressings applied. This ended the procedure. All wires catheters and sheaths have been removed.   Patient taken a postprocedure care unit in good condition apparent tolerated the procedure well.     Tavo Bolden MD, [address] [facility]  [phone] office  [phone] mobile

## 2021-04-07 NOTE — DIABETES MGMT
GLYCEMIC CONTROL PLAN OF CARE        Diabetes Management:      Assessment: known h/o T2DM,HbA1C not within recommended range for age + comorbids    BG in target range (non-ICU\" < 180 ; -180):  [] Yes  [x] No      Steroids: No    TDD previous day = 2 units Humalog Normal Insulin Sensitivity Corrective Coverage    Recommend: continue with corrective coverage  - recommend discharge on Metformin with OP PCM follow up  - pt told this writer no h/o DM prior to admission to [facility]  - [address] RN will provide new diagnosis education  - see GC RN ed notes    Most recent blood glucose results:   Lab Results   Component Value Date/Time     (H) 04/07/2021 01:22 AM    GLUCPOC 175 (H) 04/07/2021 07:33 AM    GLUCPOC 133 (H) 04/06/2021 09:03 PM    GLUCPOC 134 (H) 04/06/2021 07:25 PM         Hypo: No    HbA1C: equivalent  to ave BGlucose of 192 mg/dl for 2-3 months prior to admission  Lab Results   Component Value Date/Time    Hemoglobin A1c 8.4 (H) 04/04/2021 12:05 PM       Adequate glycemic control PTA:  [] Yes  [x] No      Home diabetes medications:   Key Antihyperglycemic Medications     Patient is on no antihyperglycemic meds.         Goals:  Blood glucose will be within target range of 70 - 180 mg/dL by: 4/22    Diet:   Active Orders   Diet    DIET NPO       Patient Vitals for the past 100 hrs:   % Diet Eaten   04/07/21 0621 0 %   04/04/21 1909 100 %       Education:  __X___ Refer to Diabetes Education Record (attempted to see patient today off unit for procedure)                       _____ Education not indicated at this time        Mir Apodaca 87, RN, CDE  Glycemic Control Team  [phone]  Pager [phone] (M-TH 8:00-4:30P)  *After Hours pager [phone]

## 2021-04-07 NOTE — WOUND CARE
Nursing staff to perform daily wound care as ordered by Dr. Marcos Sosa ( \"Open amp left foot. ..daily dressing change and irrigation\") . Wound care team not available for daily dressing changes.

## 2021-04-07 NOTE — PROGRESS NOTES
conducted a Follow up consultation and Spiritual Assessment for Elin Law, who is a 46 y.o.,female. The  provided the following Interventions:  Continued the relationship of care and support. Listened empathically. Patient just wants to go home  Offered assurance of continued prayer on patients behalf. Chart reviewed. The following outcomes were achieved:  Patient expressed gratitude for pastoral care visit. Assessment:  There are no further spiritual or Bahai issues which require Spiritual Care Services interventions at this time. Plan:  Chaplains will continue to follow and will provide pastoral care on an as needed/requested basis.  recommends bedside caregivers page  on duty if patient shows signs of acute spiritual or emotional distress.       Sister Binta Holliday, [place], [address]  [phone]

## 2021-04-07 NOTE — PROGRESS NOTES
Patient transferred to room 316 via bed stable, VSS. Dressing to LT foot remains clean, dry, and intact.        04/06/21 2001   Modified Jose R Score   Activity 2   Respiration 2   Circulation 2   Consciousness 2   O2 Saturation 2   Jose R Score 10   Vital Signs   Temp 99.5 °F (37.5 °C)   Temp Source Oral   Pulse (Heart Rate) 70   Resp Rate 16   BP (!) 142/62   BP 1 Location Left upper arm   BP Patient Position At rest;Supine   BP 1 Method Automatic   Oxygen Therapy   O2 Sat (%) 96 %

## 2021-04-07 NOTE — PROGRESS NOTES
Hospitalist Progress Note    Patient: Jakub Armijo MRN: [id number]  CSN: [id number]    YOB: 1970  Age: 46 y.o. Sex: female    DOA: 4/4/2021 LOS:  LOS: 3 days                Assessment/Plan     Patient Active Problem List   Diagnosis Code    Osteomyelitis ([facility] M86.9    Type 2 diabetes mellitus with left diabetic foot ulcer ([facility] E11.621, L97.529    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 ([facility] E11.52    YUNIEL (acute kidney injury) ([facility] N17.9    PAD (peripheral artery disease) ([facility] I73.9    Bilateral carotid artery stenosis I65.23        Multiple attempts to see patient today, off floor for procedure. 46 y.o. female with no significant past history with concerns of left great toe infection and bluish discoloration. Likely undiagnosed DM and CKD. DFU/osteomyelitis/gangrene -  Continue with antibiotics  S/p I&D with amputation left 1st ray. On 04/06/2021     PAD -  Left LE arterial dopplers reviewed. Plan for left lower ext angiogram today. Bilateral carotid artery -  Carotid doppler with 70% stenosis bilaterally. Will start on aspirin post surgery.     YUNIEL -  resolved     DM -  continue on SSI  ADA diet  HbA1c 8.4    Disposition : TBD    Review of systems  General: No fevers or chills. Cardiovascular: No chest pain or pressure. No palpitations. Pulmonary: No shortness of breath. Gastrointestinal: No nausea, vomiting. Physical Exam:  General: Awake, cooperative, no acute distress    HEENT: NC, Atraumatic. PERRLA, anicteric sclerae. Lungs: CTA Bilaterally. No Wheezing/Rhonchi/Rales. Heart:  S1 S2,  No murmur, No Rubs, No Gallops  Abdomen: Soft, Non distended, Non tender.  +Bowel sounds,   Extremities: Left great toe gangrene, no palpable distal pulses bilaterally  Psych:   Not anxious or agitated. Neurologic:  No acute neurological deficit.            Vital signs/Intake and Output:  Visit Vitals  BP (!) 148/64 (BP 1 Location: Right upper arm, BP Patient Position: At rest)   Pulse 72   Temp 98.1 °F (36.7 °C)   Resp 20   Ht 5' 8.03\" (1.728 m)   Wt 73.1 kg (161 lb 3.2 oz)   SpO2 99%   BMI 24.49 kg/m²     Current Shift:  No intake/output data recorded. Last three shifts:  04/06 0701 - 04/07 1900  In: 0   Out: 1700 [Urine:1700]            Labs: Results:       Chemistry Recent Labs     04/07/21  0122 04/06/21 0355 04/05/21  0230   * 131* 117*    136 135*   K 5.4 5.6* 5.3    107 108   CO2 24 23 23   BUN 10 13 12   CREA 0.87 1.03 1.18   CA 8.5 8.2* 7.9*   AGAP 6 6 4   BUCR 11* 13 10*      CBC w/Diff Recent Labs     04/07/21 0122 04/06/21 0355 04/05/21  0230   WBC 10.5 8.8 9.3   RBC 2.57* 2.45* 2.53*   HGB 7.8* 7.6* 7.9*   HCT 24.8* 23.5* 24.0*    394 411      Cardiac Enzymes No results for input(s): CPK, CKND1, CONSTANTINO in the last 72 hours. No lab exists for component: CKRMB, TROIP   Coagulation Recent Labs     04/06/21 0355   PTP 15.4*   INR 1.2   APTT 46.1*       Lipid Panel No results found for: CHOL, CHOLPOCT, CHOLX, CHLST, CHOLV, 872203, HDL, HDLP, LDL, LDLC, DLDLP, 041243, VLDLC, VLDL, TGLX, TRIGL, TRIGP, TGLPOCT, CHHD, CHHDX   BNP No results for input(s): BNPP in the last 72 hours. Liver Enzymes No results for input(s): TP, ALB, TBIL, AP in the last 72 hours.     No lab exists for component: SGOT, GPT, DBIL   Thyroid Studies No results found for: T4, T3U, TSH, TSHEXT, TSHEXT     Procedures/imaging: see electronic medical records for all procedures/Xrays and details which were not copied into this note but were reviewed prior to creation of Plan

## 2021-04-08 LAB
ANION GAP SERPL CALC-SCNC: 6 MMOL/L (ref 3–18)
BUN SERPL-MCNC: 8 MG/DL (ref 7–18)
BUN/CREAT SERPL: 11 (ref 12–20)
CALCIUM SERPL-MCNC: 7.6 MG/DL (ref 8.5–10.1)
CHLORIDE SERPL-SCNC: 108 MMOL/L (ref 100–111)
CO2 SERPL-SCNC: 24 MMOL/L (ref 21–32)
CREAT SERPL-MCNC: 0.71 MG/DL (ref 0.6–1.3)
DATE LAST DOSE: ABNORMAL
GLUCOSE BLD STRIP.AUTO-MCNC: 117 MG/DL (ref 70–110)
GLUCOSE BLD STRIP.AUTO-MCNC: 144 MG/DL (ref 70–110)
GLUCOSE BLD STRIP.AUTO-MCNC: 195 MG/DL (ref 70–110)
GLUCOSE BLD STRIP.AUTO-MCNC: 263 MG/DL (ref 70–110)
GLUCOSE SERPL-MCNC: 88 MG/DL (ref 74–99)
POTASSIUM SERPL-SCNC: 4.2 MMOL/L (ref 3.5–5.5)
REPORTED DOSE,DOSE: ABNORMAL UNITS
REPORTED DOSE/TIME,TMG: 526
RIGHT CFA DIST SYS PSV: 185.9 CM/S
RIGHT DIST ATA VELOCITY: 40.1 CM/S
RIGHT DIST PTA PSV: 22.6 CM/S
RIGHT PERONEAL MID SYS PSV: 35.3 CM/S
RIGHT POP A PROX VEL RATIO: 1.31
RIGHT POPLITEAL DIST SYS PSV: 46 CM/S
RIGHT POPLITEAL PROX SYS PSV: 50.3 CM/S
RIGHT PROX PFA A PSV: 336.9 CM/S
RIGHT SFA MID VEL RATIO: 0
RIGHT SFA PROX VEL RATIO: 0.2
RIGHT SUPER FEMORAL DIST SYS PSV: 38.4 CM/S
RIGHT SUPER FEMORAL MID SYS PSV: 0 CM/S
RIGHT SUPER FEMORAL PROX SYS PSV: 40.7 CM/S
SODIUM SERPL-SCNC: 138 MMOL/L (ref 136–145)
VANCOMYCIN TROUGH SERPL-MCNC: 20.2 UG/ML (ref 10–20)

## 2021-04-08 PROCEDURE — 74011250637 HC RX REV CODE- 250/637: Performed by: NURSE PRACTITIONER

## 2021-04-08 PROCEDURE — 80202 ASSAY OF VANCOMYCIN: CPT

## 2021-04-08 PROCEDURE — 74011250637 HC RX REV CODE- 250/637: Performed by: HOSPITALIST

## 2021-04-08 PROCEDURE — 74011250636 HC RX REV CODE- 250/636: Performed by: HOSPITALIST

## 2021-04-08 PROCEDURE — 74011636637 HC RX REV CODE- 636/637: Performed by: HOSPITALIST

## 2021-04-08 PROCEDURE — 82962 GLUCOSE BLOOD TEST: CPT

## 2021-04-08 PROCEDURE — 65270000029 HC RM PRIVATE

## 2021-04-08 PROCEDURE — 80048 BASIC METABOLIC PNL TOTAL CA: CPT

## 2021-04-08 PROCEDURE — 36415 COLL VENOUS BLD VENIPUNCTURE: CPT

## 2021-04-08 PROCEDURE — 74011250637 HC RX REV CODE- 250/637: Performed by: STUDENT IN AN ORGANIZED HEALTH CARE EDUCATION/TRAINING PROGRAM

## 2021-04-08 PROCEDURE — 74011000250 HC RX REV CODE- 250: Performed by: EMERGENCY MEDICINE

## 2021-04-08 PROCEDURE — 74011250636 HC RX REV CODE- 250/636: Performed by: EMERGENCY MEDICINE

## 2021-04-08 RX ADMIN — WATER 1 G: 1 INJECTION INTRAMUSCULAR; INTRAVENOUS; SUBCUTANEOUS at 12:09

## 2021-04-08 RX ADMIN — INSULIN LISPRO 2 UNITS: 100 INJECTION, SOLUTION INTRAVENOUS; SUBCUTANEOUS at 21:15

## 2021-04-08 RX ADMIN — SODIUM CHLORIDE 75 ML/HR: 900 INJECTION, SOLUTION INTRAVENOUS at 02:10

## 2021-04-08 RX ADMIN — TRAMADOL HYDROCHLORIDE 50 MG: 50 TABLET, FILM COATED ORAL at 19:42

## 2021-04-08 RX ADMIN — VANCOMYCIN HYDROCHLORIDE 1000 MG: 1 INJECTION, POWDER, LYOPHILIZED, FOR SOLUTION INTRAVENOUS at 05:26

## 2021-04-08 RX ADMIN — CLOPIDOGREL BISULFATE 75 MG: 75 TABLET ORAL at 08:43

## 2021-04-08 RX ADMIN — ATORVASTATIN CALCIUM 40 MG: 20 TABLET, FILM COATED ORAL at 08:43

## 2021-04-08 RX ADMIN — HEPARIN SODIUM 5000 UNITS: 5000 INJECTION INTRAVENOUS; SUBCUTANEOUS at 12:09

## 2021-04-08 RX ADMIN — HEPARIN SODIUM 5000 UNITS: 5000 INJECTION INTRAVENOUS; SUBCUTANEOUS at 17:58

## 2021-04-08 RX ADMIN — SODIUM CHLORIDE 75 ML/HR: 900 INJECTION, SOLUTION INTRAVENOUS at 16:20

## 2021-04-08 RX ADMIN — INSULIN LISPRO 6 UNITS: 100 INJECTION, SOLUTION INTRAVENOUS; SUBCUTANEOUS at 12:08

## 2021-04-08 RX ADMIN — VANCOMYCIN HYDROCHLORIDE 1000 MG: 1 INJECTION, POWDER, LYOPHILIZED, FOR SOLUTION INTRAVENOUS at 17:59

## 2021-04-08 RX ADMIN — HEPARIN SODIUM 5000 UNITS: 5000 INJECTION INTRAVENOUS; SUBCUTANEOUS at 02:08

## 2021-04-08 RX ADMIN — ASPIRIN 81 MG: 81 TABLET, CHEWABLE ORAL at 08:43

## 2021-04-08 RX ADMIN — Medication 10 ML: at 22:00

## 2021-04-08 RX ADMIN — Medication 10 ML: at 14:36

## 2021-04-08 NOTE — PROGRESS NOTES
Up with walker to bathroom and surgical shoe on L foot. Voiding to clear yellow urine. L foot non-wt bearing, dressing CDI. Given Tylenol for mild foot pain and Melatonin for sleep prn. Pt irritated when staff enters room    0210 Pt reluctance to VS checks, patient care and medication administration. Accepted assistance with OOB activity. Tylenol given for mild foot pain     0750 Bedside and Verbal shift change report given to Willard Kumari RN (oncoming nurse) by Chrissy Mckinnon RN   (offgoing nurse). Report included the following information SBAR, Kardex, Intake/Output, MAR and Recent Results.

## 2021-04-08 NOTE — PROGRESS NOTES
Progress Note      Patient: Deena Bell               Sex: female          DOA: 4/4/2021       YOB: 1970      Age:  46 y.o.        LOS:  LOS: 4 days               Subjective:   Patient seen today for follow up.   No pain    Medications:     Current Facility-Administered Medications:     Vancomycin Trough Level - 30 minutes prior to 17:00 dose 4/8/21, 1 Each, Other, ONCE, Jostin Boyer DO    heparinized saline 2 units/mL infusion 1,000 Units, 500 mL, Irrigation, RAD CONTINUOUS, Puma Berman MD, 1,000 Units at 04/07/21 1253    iopamidoL (ISOVUE 250) 51 % contrast injection 1-100 mL, 1-100 mL, IntraarTERial, RAD CONTINUOUS, Alana Young MD, 114 mL at 04/07/21 1629    lidocaine (PF) (XYLOCAINE) 10 mg/mL (1 %) injection 1-10 mL, 1-10 mL, SubCUTAneous, RAD CONTINUOUS, Puma Berman MD    fentaNYL citrate (PF) injection  mcg,  mcg, IntraVENous, Rad Multiple, Puma Berman MD, 25 mcg at 04/07/21 1559    clopidogreL (PLAVIX) tablet 75 mg, 75 mg, Oral, DAILY, Puma Berman MD, 75 mg at 04/08/21 3487    aspirin chewable tablet 81 mg, 81 mg, Oral, DAILY, Alana Young MD, 81 mg at 04/08/21 0843    vancomycin (VANCOCIN) 1,000 mg in 0.9% sodium chloride 250 mL (VIAL-MATE), 1,000 mg, IntraVENous, Q12H, oJstin Leija DO, 1,000 mg at 04/08/21 0526    ondansetron (ZOFRAN) injection 4 mg, 4 mg, IntraVENous, Q8H PRN, Zee Brewer MD, 4 mg at 04/07/21 1843    midazolam (VERSED) injection 0.5-2 mg, 0.5-2 mg, IntraVENous, Rad Multiple, Puma Berman MD, 0.5 mg at 04/07/21 1600    heparin (porcine) 1,000 unit/mL injection 1,000-10,000 Units, 1,000-10,000 Units, IntraVENous, Rad Multiple, Antonella, Puma Jeff MD, 1,000 Units at 04/07/21 1527    morphine injection 2 mg, 2 mg, IntraVENous, Q3H PRN, Delaney Ribera MD, 2 mg at 04/07/21 1842    acetaminophen (TYLENOL) tablet 650 mg, 650 mg, Oral, Q6H PRN, Mayra Rojas MD, 650 mg at 04/07/21 2158    cefTRIAXone (ROCEPHIN) 1 g in sterile water (preservative free) 10 mL IV syringe, 1 g, IntraVENous, Q24H, Jostin Andrade DO, 1 g at 04/08/21 1209    Vancomycin Dosing Per Pharmacy, 1 Each, Other, Rx Dosing/Monitoring, Hunter Willams DO    sodium chloride (NS) flush 5-40 mL, 5-40 mL, IntraVENous, Q8H, Zee Bonilla MD, 10 mL at 04/08/21 1436    sodium chloride (NS) flush 5-40 mL, 5-40 mL, IntraVENous, PRN, Zee Ward MD    0.9% sodium chloride infusion, 75 mL/hr, IntraVENous, CONTINUOUS, Zee Ward MD, Last Rate: 75 mL/hr at 04/08/21 1620, 75 mL/hr at 04/08/21 1620    heparin (porcine) injection 5,000 Units, 5,000 Units, SubCUTAneous, Q8H, Zee Bonilla MD, 5,000 Units at 04/08/21 1209    glucose chewable tablet 16 g, 16 g, Oral, PRN, Carmine Garcia MD    glucagon (GLUCAGEN) injection 1 mg, 1 mg, IntraMUSCular, PRN, Carmine Garcia MD    insulin lispro (HUMALOG) injection, , SubCUTAneous, AC&HS, Carmine Garcia MD, Stopped at 04/08/21 1620    dextrose 10% infusion 125-250 mL, 125-250 mL, IntraVENous, PRN, Carmine Garcia MD    traMADoL Jake Hoit) tablet 50 mg, 50 mg, Oral, Q6H PRN, Carmine Garcia MD, 50 mg at 04/06/21 2035    atorvastatin (LIPITOR) tablet 40 mg, 40 mg, Oral, DAILY, Yanique MOSQUEDA NP, 40 mg at 04/08/21 0843    melatonin (rapid dissolve) tablet 5 mg, 5 mg, Oral, QHS PRN, Cary Ribera MD, 5 mg at 04/07/21 2200    Review of Systems  Negative for chest pain and shortness of breath        Objective:      Visit Vitals  /63   Pulse 76   Temp 98.6 °F (37 °C)   Resp 18   Ht 5' 8.03\" (1.728 m)   Wt 73.1 kg (161 lb 3.2 oz)   SpO2 98%   BMI 24.49 kg/m²       Physical Exam:  Open amputation site seems to be well perfused but necrotic tissue still noted. No SOI.     Labs:  Recent Results (from the past 24 hour(s))   DUPLEX LOWER EXT ARTERY RIGHT    Collection Time: 04/07/21  6:16 PM   Result Value Ref Range    Right CFA dist sys .9 cm/s    Right Prox PFA A .9 cm/s    Right super femoral dist sys PSV 38.4 cm/s    Right super femoral mid sys PSV 0.0 cm/s    Right super femoral prox sys PSV 40.7 cm/s    Right popliteal dist sys PSV 46.0 cm/s    Right popliteal prox sys PSV 50.3 cm/s    Right mid peroneal sys PSV 35.3 cm/s    Right Dist PTA PSV 22.6 cm/s    Right Dist RON Velocity 40.1 cm/s    Right SFA Prox Zain Ratio 0.2     Right SFA Mid Zain Ratio 0.0     Right Pop A Prox Zain Ratio 1.31    GLUCOSE, POC    Collection Time: 04/07/21  9:14 PM   Result Value Ref Range    Glucose (POC) 175 (H) 70 - 195 mg/dL   METABOLIC PANEL, BASIC    Collection Time: 04/08/21  2:22 AM   Result Value Ref Range    Sodium 138 136 - 145 mmol/L    Potassium 4.2 3.5 - 5.5 mmol/L    Chloride 108 100 - 111 mmol/L    CO2 24 21 - 32 mmol/L    Anion gap 6 3.0 - 18 mmol/L    Glucose 88 74 - 99 mg/dL    BUN 8 7.0 - 18 MG/DL    Creatinine 0.71 0.6 - 1.3 MG/DL    BUN/Creatinine ratio 11 (L) 12 - 20      GFR est AA >60 >60 ml/min/1.73m2    GFR est non-AA >60 >60 ml/min/1.73m2    Calcium 7.6 (L) 8.5 - 10.1 MG/DL   GLUCOSE, POC    Collection Time: 04/08/21  7:52 AM   Result Value Ref Range    Glucose (POC) 117 (H) 70 - 110 mg/dL   GLUCOSE, POC    Collection Time: 04/08/21 11:17 AM   Result Value Ref Range    Glucose (POC) 263 (H) 70 - 110 mg/dL   GLUCOSE, POC    Collection Time: 04/08/21  4:11 PM   Result Value Ref Range    Glucose (POC) 144 (H) 70 - 110 mg/dL   VANCOMYCIN, TROUGH    Collection Time: 04/08/21  4:50 PM   Result Value Ref Range    Vancomycin,trough 20.2 (HH) 10.0 - 20.0 ug/mL    Reported dose date [id number]      Reported dose time: 0526      Reported dose: 1000 MG UNITS           Assessment/Plan     Principal Problem:    Osteomyelitis ([facility] (4/4/2021)    Active Problems:    Type 2 diabetes mellitus with left diabetic foot ulcer ([facility] (4/4/2021)      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 ([facility] (4/4/2021)      YUNIEL (acute kidney injury) ([facility] (4/4/2021)      PAD [facility]peripheral artery disease) [facility] (4/4/2021)      Bilateral carotid artery stenosis (4/5/2021)        Patient seen and evaluated today. Wound care has not been by to see the patient yet. Dressing changed. Will order wound vac and try for TMA and flap closure next week. Pathology reveals osteomyelitis of distal and proximal 1st MT  Cultures S.  Aureus  ID consult

## 2021-04-08 NOTE — PROGRESS NOTES
Problem: Falls - Risk of  Goal: *Absence of Falls  Description: Document Vasquez Maple Fall Risk and appropriate interventions in the flowsheet. Outcome: Progressing Towards Goal  Note: Fall Risk Interventions:  Mobility Interventions: Assess mobility with egress test         Medication Interventions: Teach patient to arise slowly    Elimination Interventions:  Toilet paper/wipes in reach    History of Falls Interventions: Evaluate medications/consider consulting pharmacy

## 2021-04-08 NOTE — PROGRESS NOTES
1006  Bedside and verbal shift change report given to Cassi Yap RN (on coming nurse) by Chuy Adorno RN (off going nurse). Report included the following information SBAR, Kardex, OR Summary, Intake/Output and MAR.    1620  Pt wants to go home. 1658  Notified Lab for vanco trough needed on 1630.    1942  Bedside and verbal shift change report given by AMANDEEP Chan (off going nurse) to PETROS Bernabe RN(on coming nurse). Report included the following information SBAR, Kardex, OR Summary, Intake/Output and MAR.

## 2021-04-08 NOTE — PROGRESS NOTES
Pharmacy Clinical Services: Vancomycin   Scr improved to 0.71  CrCl ~ 94.7 ml/min   WBC = 10.5 on 4/7/21  Afebrile   Indication: Diabetic foot ulcer / Gangrene / Osteomyelitis ==> s/p amputation left great toe on 4/6/21  Vancomycin trough = 20.2 ( goal 15-20 )  Last dose 4/8/21 @ 1800 ==> no change but will time out next dose to 10 am tomorrow to let trough come down slightly   Continue Vancomycin 1 gm IV q12h   Pharmacy to continue to follow and adjust dose as necessary based on clinical status  Jean Ortega [phone]

## 2021-04-08 NOTE — PROGRESS NOTES
Hospitalist Progress Note    Patient: Brunilda Torre MRN: [id number]  CSN: [id number]    YOB: 1970  Age: 46 y.o. Sex: female    DOA: 4/4/2021 LOS:  LOS: 4 days                Assessment/Plan     Patient Active Problem List   Diagnosis Code    Osteomyelitis ([facility] 75[facility] M86.9    Type 2 diabetes mellitus with left diabetic foot ulcer ([facility] E11.621, L97.529    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 ([facility] E11.52    YUNIEL (acute kidney injury) ([facility] N17.9    PAD (peripheral artery disease) ([facility] I73.9    Bilateral carotid artery stenosis I65.23            46 y.o. female with no significant past history with concerns of left great toe infection and bluish discoloration. Likely undiagnosed DM and CKD. DFU/osteomyelitis/gangrene -  Continue with antibiotics  S/p I&D with amputation left 1st ray. On 04/06/2021     PAD -  Left LE arterial dopplers reviewed. S/p left LE angiogram with angioplasty, atherectomy. Bilateral carotid artery -  Carotid doppler with 70% stenosis bilaterally. On aspirin, plavix, statin     YUNIEL -  resolved     DM -  continue on SSI  ADA diet  HbA1c 8.4    She is very uncooperative, does not want to discuss about her care. Does not want to listen about her severe PVD. Disposition : TBD    Review of systems  General: No fevers or chills. Cardiovascular: No chest pain or pressure. No palpitations. Pulmonary: No shortness of breath. Gastrointestinal: No nausea, vomiting. Physical Exam:  General: Awake, cooperative, no acute distress    HEENT: NC, Atraumatic. PERRLA, anicteric sclerae. Bilateral carotid bruit. Lungs: CTA Bilaterally. No Wheezing/Rhonchi/Rales. Heart:  S1 S2,  No murmur, No Rubs, No Gallops  Abdomen: Soft, Non distended, Non tender.  +Bowel sounds,   Extremities: Left foot with dressing, s/p surgery as above  Psych:   Not anxious or agitated. Neurologic:  No acute neurological deficit.            Vital signs/Intake and Output:  Visit Vitals  BP (!) 119/56   Pulse 81   Temp 98 °F (36.7 °C)   Resp 18   Ht 5' 8.03\" (1.728 m)   Wt 73.1 kg (161 lb 3.2 oz)   SpO2 99%   BMI 24.49 kg/m²     Current Shift:  No intake/output data recorded. Last three shifts:  04/06 1901 - 04/08 0700  In: 1360 [I.V.:1360]  Out: 1800 [Urine:1800]            Labs: Results:       Chemistry Recent Labs     04/08/21  0222 04/07/21 0122 04/06/21  0355   GLU 88 157* 131*    136 136   K 4.2 5.4 5.6*    106 107   CO2 24 24 23   BUN 8 10 13   CREA 0.71 0.87 1.03   CA 7.6* 8.5 8.2*   AGAP 6 6 6   BUCR 11* 11* 13      CBC w/Diff Recent Labs     04/07/21 0122 04/06/21  0355   WBC 10.5 8.8   RBC 2.57* 2.45*   HGB 7.8* 7.6*   HCT 24.8* 23.5*    394      Cardiac Enzymes No results for input(s): CPK, CKND1, CONSTANTINO in the last 72 hours. No lab exists for component: CKRMB, TROIP   Coagulation Recent Labs     04/06/21  0355   PTP 15.4*   INR 1.2   APTT 46.1*       Lipid Panel No results found for: CHOL, CHOLPOCT, CHOLX, CHLST, CHOLV, 852064, HDL, HDLP, LDL, LDLC, DLDLP, 797733, VLDLC, VLDL, TGLX, TRIGL, TRIGP, TGLPOCT, CHHD, CHHDX   BNP No results for input(s): BNPP in the last 72 hours. Liver Enzymes No results for input(s): TP, ALB, TBIL, AP in the last 72 hours.     No lab exists for component: SGOT, GPT, DBIL   Thyroid Studies No results found for: T4, T3U, TSH, TSHEXT, TSHEXT     Procedures/imaging: see electronic medical records for all procedures/Xrays and details which were not copied into this note but were reviewed prior to creation of Plan

## 2021-04-09 ENCOUNTER — APPOINTMENT (OUTPATIENT)
Dept: INTERVENTIONAL RADIOLOGY/VASCULAR | Age: 51
DRG: 271 | End: 2021-04-09
Attending: INTERNAL MEDICINE
Payer: COMMERCIAL

## 2021-04-09 PROBLEM — I96 GANGRENE OF LEFT FOOT (HCC): Status: ACTIVE | Noted: 2021-04-09

## 2021-04-09 LAB
ANION GAP SERPL CALC-SCNC: 4 MMOL/L (ref 3–18)
BACTERIA SPEC CULT: ABNORMAL
BACTERIA SPEC CULT: ABNORMAL
BUN SERPL-MCNC: 7 MG/DL (ref 7–18)
BUN/CREAT SERPL: 9 (ref 12–20)
CALCIUM SERPL-MCNC: 7.9 MG/DL (ref 8.5–10.1)
CHLORIDE SERPL-SCNC: 108 MMOL/L (ref 100–111)
CO2 SERPL-SCNC: 26 MMOL/L (ref 21–32)
CREAT SERPL-MCNC: 0.79 MG/DL (ref 0.6–1.3)
GLUCOSE BLD STRIP.AUTO-MCNC: 134 MG/DL (ref 70–110)
GLUCOSE BLD STRIP.AUTO-MCNC: 198 MG/DL (ref 70–110)
GLUCOSE BLD STRIP.AUTO-MCNC: 241 MG/DL (ref 70–110)
GLUCOSE BLD STRIP.AUTO-MCNC: 72 MG/DL (ref 70–110)
GLUCOSE SERPL-MCNC: 114 MG/DL (ref 74–99)
GRAM STN SPEC: ABNORMAL
GRAM STN SPEC: ABNORMAL
POTASSIUM SERPL-SCNC: 5.1 MMOL/L (ref 3.5–5.5)
SERVICE CMNT-IMP: ABNORMAL
SERVICE CMNT-IMP: ABNORMAL
SODIUM SERPL-SCNC: 138 MMOL/L (ref 136–145)

## 2021-04-09 PROCEDURE — 80048 BASIC METABOLIC PNL TOTAL CA: CPT

## 2021-04-09 PROCEDURE — 74011250636 HC RX REV CODE- 250/636: Performed by: INTERNAL MEDICINE

## 2021-04-09 PROCEDURE — 36573 INSJ PICC RS&I 5 YR+: CPT

## 2021-04-09 PROCEDURE — 74011250637 HC RX REV CODE- 250/637: Performed by: STUDENT IN AN ORGANIZED HEALTH CARE EDUCATION/TRAINING PROGRAM

## 2021-04-09 PROCEDURE — 74011250636 HC RX REV CODE- 250/636: Performed by: PHYSICIAN ASSISTANT

## 2021-04-09 PROCEDURE — 77030019934 HC DRSG VAC ASST KCON -B

## 2021-04-09 PROCEDURE — 74011636637 HC RX REV CODE- 636/637: Performed by: HOSPITALIST

## 2021-04-09 PROCEDURE — 82962 GLUCOSE BLOOD TEST: CPT

## 2021-04-09 PROCEDURE — 74011250636 HC RX REV CODE- 250/636: Performed by: HOSPITALIST

## 2021-04-09 PROCEDURE — 02HV33Z INSERTION OF INFUSION DEVICE INTO SUPERIOR VENA CAVA, PERCUTANEOUS APPROACH: ICD-10-PCS | Performed by: RADIOLOGY

## 2021-04-09 PROCEDURE — 74011000250 HC RX REV CODE- 250: Performed by: INTERNAL MEDICINE

## 2021-04-09 PROCEDURE — 65270000029 HC RM PRIVATE

## 2021-04-09 PROCEDURE — 74011250637 HC RX REV CODE- 250/637: Performed by: NURSE PRACTITIONER

## 2021-04-09 PROCEDURE — 74011250636 HC RX REV CODE- 250/636

## 2021-04-09 PROCEDURE — 36415 COLL VENOUS BLD VENIPUNCTURE: CPT

## 2021-04-09 PROCEDURE — 74011000250 HC RX REV CODE- 250: Performed by: PHYSICIAN ASSISTANT

## 2021-04-09 PROCEDURE — 74011250637 HC RX REV CODE- 250/637: Performed by: HOSPITALIST

## 2021-04-09 PROCEDURE — 97605 NEG PRS WND THER DME<=50SQCM: CPT

## 2021-04-09 PROCEDURE — 2709999900 HC NON-CHARGEABLE SUPPLY

## 2021-04-09 RX ORDER — LIDOCAINE HYDROCHLORIDE 10 MG/ML
1-20 INJECTION INFILTRATION; PERINEURAL
Status: COMPLETED | OUTPATIENT
Start: 2021-04-09 | End: 2021-04-09

## 2021-04-09 RX ORDER — HEPARIN SODIUM (PORCINE) LOCK FLUSH IV SOLN 100 UNIT/ML 100 UNIT/ML
500 SOLUTION INTRAVENOUS
Status: DISCONTINUED | OUTPATIENT
Start: 2021-04-09 | End: 2021-04-14 | Stop reason: HOSPADM

## 2021-04-09 RX ORDER — HEPARIN SODIUM 200 [USP'U]/100ML
500 INJECTION, SOLUTION INTRAVENOUS
Status: DISPENSED | OUTPATIENT
Start: 2021-04-09 | End: 2021-04-09

## 2021-04-09 RX ADMIN — ONDANSETRON HYDROCHLORIDE 4 MG: 2 INJECTION INTRAMUSCULAR; INTRAVENOUS at 17:14

## 2021-04-09 RX ADMIN — ASPIRIN 81 MG: 81 TABLET, CHEWABLE ORAL at 08:38

## 2021-04-09 RX ADMIN — Medication 10 ML: at 14:00

## 2021-04-09 RX ADMIN — INSULIN LISPRO 2 UNITS: 100 INJECTION, SOLUTION INTRAVENOUS; SUBCUTANEOUS at 12:39

## 2021-04-09 RX ADMIN — HEPARIN SODIUM IN SODIUM CHLORIDE 1000 UNITS: 200 INJECTION INTRAVENOUS at 11:50

## 2021-04-09 RX ADMIN — HEPARIN SODIUM (PORCINE) LOCK FLUSH IV SOLN 100 UNIT/ML 500 UNITS: 100 SOLUTION at 11:51

## 2021-04-09 RX ADMIN — HEPARIN SODIUM 5000 UNITS: 5000 INJECTION INTRAVENOUS; SUBCUTANEOUS at 03:22

## 2021-04-09 RX ADMIN — INSULIN LISPRO 4 UNITS: 100 INJECTION, SOLUTION INTRAVENOUS; SUBCUTANEOUS at 18:06

## 2021-04-09 RX ADMIN — MEROPENEM 1 G: 1 INJECTION, POWDER, FOR SOLUTION INTRAVENOUS at 10:59

## 2021-04-09 RX ADMIN — MEROPENEM 1 G: 1 INJECTION, POWDER, FOR SOLUTION INTRAVENOUS at 18:06

## 2021-04-09 RX ADMIN — ATORVASTATIN CALCIUM 40 MG: 20 TABLET, FILM COATED ORAL at 08:38

## 2021-04-09 RX ADMIN — Medication 10 ML: at 06:40

## 2021-04-09 RX ADMIN — HEPARIN SODIUM 5000 UNITS: 5000 INJECTION INTRAVENOUS; SUBCUTANEOUS at 18:06

## 2021-04-09 RX ADMIN — CLOPIDOGREL BISULFATE 75 MG: 75 TABLET ORAL at 08:38

## 2021-04-09 RX ADMIN — Medication 10 ML: at 21:54

## 2021-04-09 RX ADMIN — LIDOCAINE HYDROCHLORIDE 3 ML: 10 INJECTION, SOLUTION INFILTRATION; PERINEURAL at 11:51

## 2021-04-09 RX ADMIN — TRAMADOL HYDROCHLORIDE 50 MG: 50 TABLET, FILM COATED ORAL at 17:14

## 2021-04-09 RX ADMIN — TRAMADOL HYDROCHLORIDE 50 MG: 50 TABLET, FILM COATED ORAL at 08:39

## 2021-04-09 NOTE — PROCEDURES
Interventional Radiology Brief Procedure Note    Performed By:  Kurt Hemphill PA-C    Attending Radiologist: Michelle Parker MD    Pre-operative Diagnosis:  Need for long term IV antibiotics    Post-operative Diagnosis: Same as pre-op diagnosis    Procedure(s) Performed:  Ultrasound & fluoroscopic guided PICC line placement    Anesthesia:  Local      Findings:  Successful right arm dual lumen 5F PICC line placement. Please see dictated report for details. Complications: None immediate    Estimated Blood Loss:  Minimal    Specimens: None    Condition: Stable    Disposition:  Return to nursing unit. PICC line is ready for immediate use.        Kurt Hemphill PA-C  [facility]  Vascular & Interventional Radiology  4/9/2021

## 2021-04-09 NOTE — PROGRESS NOTES
Hospitalist Progress Note    Patient: Saleem Go MRN: [id number]  CSN: [id number]    YOB: 1970  Age: 46 y.o. Sex: female    DOA: 4/4/2021 LOS:  LOS: 5 days                Assessment/Plan     Patient Active Problem List   Diagnosis Code    Osteomyelitis ([facility] 75[facility] M86.9    Type 2 diabetes mellitus with left diabetic foot ulcer ([facility] E11.621, L97.529    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 ([facility] E11.52    YUNIEL (acute kidney injury) ([facility] N17.9    PAD (peripheral artery disease) ([facility] I73.9    Bilateral carotid artery stenosis I65.23    Gangrene of left foot [facility]) I96            46 y.o. female with no significant past history with concerns of left great toe infection and bluish discoloration. Likely undiagnosed DM and CKD. DFU/gangrene -  Continue with antibiotics  S/p I&D with amputation left 1st ray. On 04/06/2021. Plan for TMA on Tuesday    Osteomyelitis -  Seen by ID,  Cultures growing MSSA  On meropenem     PAD -  Left LE arterial dopplers reviewed. S/p left LE angiogram with angioplasty, atherectomy. Discussed with vascular, right LE arterial dopplers with SFA occlusion is chronic. Will need angiogram and further as outpatient. Bilateral carotid artery -  Carotid doppler with 70% stenosis bilaterally. On aspirin, plavix, statin     YUNIEL -  resolved     DM -  continue on SSI  ADA diet  HbA1c 8.4    She is very uncooperative, does not want to discuss about her care. Does not want to listen about her severe PVD. Disposition : TBD    Review of systems  General: No fevers or chills. Cardiovascular: No chest pain or pressure. No palpitations. Pulmonary: No shortness of breath. Gastrointestinal: No nausea, vomiting. Physical Exam:  General: Awake, cooperative, no acute distress    HEENT: NC, Atraumatic. PERRLA, anicteric sclerae. Bilateral carotid bruit. Lungs: CTA Bilaterally. No Wheezing/Rhonchi/Rales.   Heart:  S1 S2,  No murmur, No Rubs, No Gallops  Abdomen: Soft, Non distended, Non tender.  +Bowel sounds,   Extremities: Left foot with dressing, s/p surgery as above  Psych:   Not anxious or agitated. Neurologic:  No acute neurological deficit. Vital signs/Intake and Output:  Visit Vitals  /60   Pulse 70   Temp 98.5 °F (36.9 °C)   Resp 18   Ht 5' 8.03\" (1.728 m)   Wt 73.1 kg (161 lb 3.2 oz)   SpO2 100%   BMI 24.49 kg/m²     Current Shift:  No intake/output data recorded. Last three shifts:  04/07 1901 - 04/09 0700  In: 2298 [P.O.:360; I.V.:1938]  Out: 800 [Urine:800]            Labs: Results:       Chemistry Recent Labs     04/09/21 0220 04/08/21  0222 04/07/21  0122   * 88 157*    138 136   K 5.1 4.2 5.4    108 106   CO2 26 24 24   BUN 7 8 10   CREA 0.79 0.71 0.87   CA 7.9* 7.6* 8.5   AGAP 4 6 6   BUCR 9* 11* 11*      CBC w/Diff Recent Labs     04/07/21  0122   WBC 10.5   RBC 2.57*   HGB 7.8*   HCT 24.8*         Cardiac Enzymes No results for input(s): CPK, CKND1, CONSTANTINO in the last 72 hours. No lab exists for component: CKRMB, TROIP   Coagulation No results for input(s): PTP, INR, APTT, INREXT, INREXT in the last 72 hours. Lipid Panel No results found for: CHOL, CHOLPOCT, CHOLX, CHLST, CHOLV, 371714, HDL, HDLP, LDL, LDLC, DLDLP, 319627, VLDLC, VLDL, TGLX, TRIGL, TRIGP, TGLPOCT, CHHD, CHHDX   BNP No results for input(s): BNPP in the last 72 hours. Liver Enzymes No results for input(s): TP, ALB, TBIL, AP in the last 72 hours.     No lab exists for component: SGOT, GPT, DBIL   Thyroid Studies No results found for: T4, T3U, TSH, TSHEXT, TSHEXT     Procedures/imaging: see electronic medical records for all procedures/Xrays and details which were not copied into this note but were reviewed prior to creation of Plan

## 2021-04-09 NOTE — PROGRESS NOTES
Problem: Falls - Risk of  Goal: *Absence of Falls  Description: Document Marietta Fall Risk and appropriate interventions in the flowsheet. Outcome: Progressing Towards Goal  Note: Fall Risk Interventions:  Mobility Interventions: Assess mobility with egress test         Medication Interventions: Teach patient to arise slowly    Elimination Interventions: Call light in reach, Patient to call for help with toileting needs    History of Falls Interventions: Evaluate medications/consider consulting pharmacy         Problem: Patient Education: Go to Patient Education Activity  Goal: Patient/Family Education  Outcome: Progressing Towards Goal     Problem: Lower Extremity Wound Care  Goal: *Non-infected wound: Improvement of existing wound, absence of infection, and maintenance of skin integrity  Outcome: Progressing Towards Goal  Goal: *Infected Wound: Prevention of further infection and promotion of healing  Description: Infection control procedures (eg: clean dressings, clean gloves, hand washing, precautions to isolate wound from contamination, sterile instruments used for wound debridement) should be implemented.   Outcome: Progressing Towards Goal  Goal: Interventions  Outcome: Progressing Towards Goal

## 2021-04-09 NOTE — PROGRESS NOTES
Bedside and Verbal shift change report given to [address] (oncoming nurse) by Abril Lopez (offgoing nurse). Report included the following information SBAR, Kardex, Intake/Output, MAR and Recent Results. Shift Summary-   Patient Vitals for the past 12 hrs:   Temp Pulse Resp BP SpO2   04/09/21 0317 98.7 °F (37.1 °C) 69 18 124/62 97 %   04/08/21 2115 98.6 °F (37 °C) 73 18 128/73 98 %   04/08/21 1620 98.6 °F (37 °C) 76 18 104/63 98 %   Pt given PRN tramadol x1 overnight for pain rated 5/10 to L foot. Bedside and Verbal shift change report given to [facility] (oncoming nurse) by [address] (offgoing nurse). Report included the following information SBAR, Kardex, Intake/Output, MAR and Recent Results.

## 2021-04-09 NOTE — CONSULTS
[facility]  (A Division of [address] [place] [address])      Consultation Note      Date of Admission: 4/4/2021    Date of Note: 4/9/2021      Reason for Referral: Gangrene L foot      Current Antimicrobials:    Prior Antimicrobials:  1. Vanco IV (4/4-) #5  2. CAX IV (4/4-) #5 none       Assessment: Rec / Plan:   L Foot (dry) gangrene - Hallux area/OM  4/4:  CRP 148mg/L  All this is the setting of newly diagnosed DMT2 (unbeknownst to her) and significant PVD/PAD for which she underwent re-vascularization procedure (4/7) after her debridement (4/6). I called [facility] and her OR cultures are growing MSSA only -- but she has friends present as well given her gangrene/gas. Will broaden her abx and drop the vancomycin today. ->POD #3    PICC today  Stop the vancomycin and CAX broadening out to meropenem so as to kill the anaerobes that were present. She'll likely need more debridements before this is all done/goes home. Go long - 6wks IV followed by PO if there are still skin deficits still present. My colleague Dr Cony Santana will  care on Monday. Thanks for consult   PVD/PAD - carotid disease present too    DMT2 - poor/uncontrolled A1c 8.4%    YUNIEL - resolved              Microbiology:  4/6 - OR (+) MSSA     4/4 - BCx x2 (-)      Lines / Catheters: peripherals      HPI:  CC:  \"Is my truck safe? Is it still in parking lot? \"  Ms Valeriano Espinal is a 50y newly-diagnosed diabetic WF who was admitted 4/4 with several day history of DFU L great toe/hallux area for which she went to amputation L 1st MT/toe on 4/6 after re-vascularization procedure of her L leg. States to me that she has a big callous on that area of her foot for longest time and it fell off last week showing the ulcer. See the ED picture. Yah. Better today. Wants to go  Home as soon as possible. Open to more debridements as needed.   Worried about her yellow Kassie Miu in the ED parking lot (\"Is it still there?\")    Manager at [employer]. Active Hospital Problems    Diagnosis Date Noted    Bilateral carotid artery stenosis 04/05/2021    Osteomyelitis ([facility] 04/04/2021    Type 2 diabetes mellitus with left diabetic foot ulcer ([facility] 04/04/2021    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 ([facility] 04/04/2021    YUNIEL (acute kidney injury) ([facility] 04/04/2021    PAD (peripheral artery disease) ([facility] 04/04/2021     History reviewed. No pertinent past medical history. History reviewed. No pertinent surgical history. History reviewed. No pertinent family history.   Social History     Socioeconomic History    Marital status:      Spouse name: Not on file    Number of children: Not on file    Years of education: Not on file    Highest education level: Not on file   Occupational History    Not on file   Social Needs    Financial resource strain: Not on file    Food insecurity     Worry: Not on file     Inability: Not on file    Transportation needs     Medical: Not on file     Non-medical: Not on file   Tobacco Use    Smoking status: Current Every Day Smoker     Packs/day: 1.50    Smokeless tobacco: Never Used   Substance and Sexual Activity    Alcohol use: Not Currently     Comment: on rare occasion    Drug use: Not Currently    Sexual activity: Not on file   Lifestyle    Physical activity     Days per week: Not on file     Minutes per session: Not on file    Stress: Not on file   Relationships    Social connections     Talks on phone: Not on file     Gets together: Not on file     Attends Shinto service: Not on file     Active member of club or organization: Not on file     Attends meetings of clubs or organizations: Not on file     Relationship status: Not on file    Intimate partner violence     Fear of current or ex partner: Not on file     Emotionally abused: Not on file     Physically abused: Not on file     Forced sexual activity: Not on file   Other Topics Concern    Not on file   Social History Narrative    Not on file       Allergies:  Patient has no known allergies.      Medications:  Current Facility-Administered Medications   Medication Dose Route Frequency    meropenem (MERREM) 1 g in sterile water (preservative free) 20 mL IV syringe  1 g IntraVENous Q8H    heparinized saline 2 units/mL infusion 1,000 Units  500 mL Irrigation RAD CONTINUOUS    iopamidoL (ISOVUE 250) 51 % contrast injection 1-100 mL  1-100 mL IntraarTERial RAD CONTINUOUS    lidocaine (PF) (XYLOCAINE) 10 mg/mL (1 %) injection 1-10 mL  1-10 mL SubCUTAneous RAD CONTINUOUS    fentaNYL citrate (PF) injection  mcg   mcg IntraVENous Rad Multiple    clopidogreL (PLAVIX) tablet 75 mg  75 mg Oral DAILY    aspirin chewable tablet 81 mg  81 mg Oral DAILY    ondansetron (ZOFRAN) injection 4 mg  4 mg IntraVENous Q8H PRN    midazolam (VERSED) injection 0.5-2 mg  0.5-2 mg IntraVENous Rad Multiple    heparin (porcine) 1,000 unit/mL injection 1,000-10,000 Units  1,000-10,000 Units IntraVENous Rad Multiple    morphine injection 2 mg  2 mg IntraVENous Q3H PRN    acetaminophen (TYLENOL) tablet 650 mg  650 mg Oral Q6H PRN    sodium chloride (NS) flush 5-40 mL  5-40 mL IntraVENous Q8H    sodium chloride (NS) flush 5-40 mL  5-40 mL IntraVENous PRN    0.9% sodium chloride infusion  75 mL/hr IntraVENous CONTINUOUS    heparin (porcine) injection 5,000 Units  5,000 Units SubCUTAneous Q8H    glucose chewable tablet 16 g  16 g Oral PRN    glucagon (GLUCAGEN) injection 1 mg  1 mg IntraMUSCular PRN    insulin lispro (HUMALOG) injection   SubCUTAneous AC&HS    dextrose 10% infusion 125-250 mL  125-250 mL IntraVENous PRN    traMADoL (ULTRAM) tablet 50 mg  50 mg Oral Q6H PRN    atorvastatin (LIPITOR) tablet 40 mg  40 mg Oral DAILY    melatonin (rapid dissolve) tablet 5 mg  5 mg Oral QHS PRN        ROS:  Constitutional: negative for fevers, chills and sweats  Respiratory: negative for cough or dyspnea on exertion  Cardiovascular: negative for chest pain, dyspnea, lower extremity edema  Gastrointestinal: negative for nausea, vomiting, diarrhea and abdominal pain     Physical Exam:    HPI:  Temp (24hrs), Av.5 °F (36.9 °C), Min:98.1 °F (36.7 °C), Max:98.7 °F (37.1 °C)    Visit Vitals  /69 (BP 1 Location: Right upper arm, BP Patient Position: At rest)   Pulse 75   Temp 98.1 °F (36.7 °C)   Resp 16   Ht 5' 8.03\" (1.728 m)   Wt 73.1 kg (161 lb 3.2 oz)   LMP 2018 (Within Years)   SpO2 98%   BMI 24.49 kg/m²       General: Well developed, well nourished 46 y.o. WHITE female in no acute distress. ENT: ENT exam normal, no neck nodes or sinus tenderness  Head: normocephalic, without obvious abnormality  Mouth:  mucous membranes moist, pharynx normal without lesions  Neck: supple, symmetrical, trachea midline   Cardio:  regular rate and rhythm, S1, S2 normal, no murmur, click, rub or gallop  Chest: inspection normal - no chest wall deformities or tenderness, respiratory effort normal  Lungs: clear to auscultation, no wheezes or rales and unlabored breathing  Abdomen: soft, non-tender. Bowel sounds normal. No masses, no organomegaly.   Extremities:  no redness or tenderness in the calves or thighs, no edema, vascular changes R foot (no hair); L foot wrapped (but see admission/ED photo)  Neuro: Grossly normal       Lab results:    Chemistry  Recent Labs     21  0220 21  0222 21  0122   * 88 157*    138 136   K 5.1 4.2 5.4    108 106   CO2 26 24 24   BUN 7 8 10   CREA 0.79 0.71 0.87   CA 7.9* 7.6* 8.5   AGAP 4 6 6   BUCR 9* 11* 11*       CBC w/ Diff  Recent Labs     21  0122   WBC 10.5   RBC 2.57*   HGB 7.8*   HCT 24.8*          Microbiology  All Micro Results     Procedure Component Value Units Date/Time    CULTURE, ANAEROBIC [[id number]] Collected: 21 1907    Order Status: Completed Specimen: Foot, left Updated: 21 1011     Special Requests: NO SPECIAL REQUESTS        Culture result: CHECKING FOR POSSIBLE  ANAEROBES       CULTURE, WOUND Eulalio Pennant STAIN [[id number]]  (Abnormal)  (Susceptibility) Collected: 04/06/21 1907    Order Status: Completed Specimen: Wound from Foot Updated: 04/09/21 1010     Special Requests: NO SPECIAL REQUESTS        GRAM STAIN NO WBC'S SEEN         NO ORGANISMS SEEN        Culture result:       LIGHT STAPHYLOCOCCUS AUREUS          CULTURE, BLOOD [[id number]] Collected: 04/04/21 1230    Order Status: Completed Specimen: Blood Updated: 04/09/21 0719     Special Requests: NO SPECIAL REQUESTS        Culture result: NO GROWTH 5 DAYS       CULTURE, BLOOD [[id number]] Collected: 04/04/21 1225    Order Status: Completed Specimen: Blood Updated: 04/09/21 0719     Special Requests: NO SPECIAL REQUESTS        Culture result: NO GROWTH 5 DAYS              Mel Vieyra MD  Cell [phone]  [facility]  4/9/2021   10:35 AM

## 2021-04-09 NOTE — WOUND CARE
2nd attempt today to see patient today to apply wound vac. 1st attempt, pt down in intervention radiology. 2nd attempt, pt stated she was on the phone and now is not a good time. Was not given the opportunity to explain the purpose of visit. Will attempt to apply wound vac again, if pt refuses or is not available, wound vac will not be applied until Monday 4/12.

## 2021-04-09 NOTE — PROGRESS NOTES
0840- Pt given scheduled AM meds and PRN pain med. 1000- Reassessed pt pain. Pt stated the pain is a lot better now. 1330- Attempted to restart pt continuous fluids however pt refused at this time. Pt educated. Will attempt again to restart fluids at a later time when pt is ready.

## 2021-04-09 NOTE — DIABETES MGMT
Diabetes Patient/Family Education Record  Factors That  May Influence Patients Ability  to Learn or  Comply with Recommendations   []   Language barrier    []   Cultural needs   []   Motivation    []   Cognitive limitation    []   Physical   []   Education    []   Physiological factors   []   Hearing/vision/speaking impairment   []   Latter-day beliefs    []   Financial factors   []  Other:   [x]  No factors identified at this time.      Person Instructed:   [x]   Patient   []   Family   []  Other     Preference for Learning:   [x]   Verbal   [x]   Written  Healthy meal planning  Target BG and A1C  5 day sample menus   []  Demonstration     Level of Comprehension & Competence:    [x]  Good                                      [] Fair                                     []  Poor                             []  Needs Reinforcement   [x]  Teachback completed    Education Component:   [x]  Medication management, including how to administer insulin (if appropriate) and potential medication interactions    [x]  Nutritional management -obtain usual meal pattern   []  Exercise   []  Signs, symptoms, and treatment of hyperglycemia and hypoglycemia   [] Prevention, recognition and treatment of hyperglycemia and hypoglycemia   []  Importance of blood glucose monitoring and how to obtain a blood glucose meter    []  Instruction on use of the blood glucose meter   [x]  Discuss the importance of HbA1C monitoring    []  Sick day guidelines   []  Proper use and disposal of lancets, needles, syringes or insulin pens (if appropriate)   [x]  Potential long-term complications (retinopathy, kidney disease, neuropathy, foot care)   [] Information about whom to contact in case of emergency or for more information    []  Goal:  Patient/family will demonstrate understanding of Diabetes Self Management Skills by: (date) _5/1/21______  Plan for post-discharge education or self-management support:    [] Outpatient class schedule provided            [] Patient Declined    [] Scheduled for outpatient classes (date) _______  Verify:  Does patient understand how diabetes medications work? __discussed breifly________________________  Does patient know what their most recent A1c is? __yes_________________________________  Does patient monitor glucose at home? __newly dx_________________________________________  Does patient have difficulty obtaining diabetes medications or testing supplies?  ___do not anticipate______________       Claudette Marshall, [address] [place] [address], [facility]   Office:  [phone]

## 2021-04-09 NOTE — DIABETES MGMT
Initial Nutrition Assessment and Glycemic Control Plan of Care    Type and Reason for Visit: Initial, Patient education    Nutrition Recommendations/Plan:   1. On-going diabetes education -see diabetes education record for details. 2. Adjust diet to 1500 calorie consistent CHO to better cover estimated nutrient needs. Nutrition Assessment:     Pt admitted with dry gangrene, hallux area/OM left foot, T2DM (newly diagnosed), PAD, YUNIEL. She is s/p debridement 4/6/21 and  revascularization procedure 4/7/21. Pt appears well nourished and po intake noted to be adequate per meal time observation. Pt seems concerned about \"eating too much\" with respect to her blood glucose. Diet history reveals irregular eating habits PTA. Malnutrition Assessment:  Malnutrition Status:  No malnutrition      Diabetes Management:   Provided on-going diabetes education for home including meal planning guidelines and BG targets. Pt appears to be cutting back too severely with her diet/menu choices. Discussed appropriate, healthy CHO sources and amounts. Recent blood glucose:    Lab Results   Component Value Date/Time    Glucose 114 (H) 04/09/2021 02:20 AM    Glucose (POC) 198 (H) 04/09/2021 10:55 AM      Within target range (non-ICU: <140; ICU<180):  varied    Current Insulin regimen: corrective lispro, normal insulin sensitivity ACHS  Home medication/insulin regimen: none - newly dx  HbA1c: 8.4% equivalent  to ave Blood Glucose of 192  mg/dl for 2-3 months prior to admission   Adequate glycemic control PTA:   No, newly dx    Nutrition History and Allergies: NKFA's. pt reports her usual weight is 151 lbs and to her knowledge it was stable PTA. She usually skips breakfast and lunch but eats dinner. At one time she consumed several regular Dr. Hernandez  sodas/day but states she recently cut back to about 1/d. Does not  care for sweets but likes fried foods.     Estimated Daily Nutrient Needs:  Energy (kcal): 8995; Weight Used for Energy Requirements: Current  Protein (g): 85; Weight Used for Protein Requirements: Current  Fluid (ml/day): 1800; Method Used for Fluid Requirements: 1 ml/kcal    Nutrition Related Findings:       Pt reports her appetite if good and states she usually does not eat a lot. Currently eating lunch with interest.  Medications include Lipitor, NS at 75 ml/hr. PICC placed 4/9/21. Wounds:    Diabetic ulcer       Current Nutrition Therapies:  DIET DIABETIC WITH OPTIONS Consistent Carb 1200kcal; Regular   Meal intake:   Patient Vitals for the past 168 hrs:   % Diet Eaten   04/08/21 1621 30 %   04/08/21 0842 100 %   04/07/21 0621 0 %   04/04/21 1909 100 %       Anthropometric Measures:  · Height:  5' 8.03\" (172.8 cm)  · Current Body Wt:  70.9 kg (156 lb 4.9 oz)(4/6/21)   · Admission Body Wt:       · Usual Body Wt:  68.5 kg (151 lb)     · Ideal Body Wt:  140 lbs:      · BMI Category:  Normal weight (BMI 22.0-24.9) age over 72     Last Weight Metrics:  Weight Loss Metrics 4/4/2021 7/30/2013   Today's Wt 161 lb 3.2 oz 150 lb   BMI 24.49 kg/m2 21.52 kg/m2       Nutrition Diagnosis:   · Altered nutrition-related lab values related to endocrine dysfunction as evidenced by lab values(A1C - 8.4%)    Nutrition Interventions:   Food and/or Nutrient Delivery: Modify current diet  Nutrition Education /Couseling:  Education completed  Coordination of Nutrition Care: Continue to monitor while inpatient    Goals:   Blood glucose will be in target range (70 to 180 mg/dL) within 3 days. PO intake will meet >75% of patient estimated nutritional needs within the next 7 days.         Nutrition Monitoring and Evaluation:   Behavioral-Environmental Outcomes: Knowledge or skill  Food/Nutrient Intake Outcomes: Food and nutrient intake  Physical Signs/Symptoms Outcomes: Biochemical data, Skin, Meal time behavior    Discharge Planning:    Continue current diet     Electronically signed by Claudette Marshall, RD on 4/9/2021 at 1:53 PM    Contact: Monster Farmer, [address], [address] [place] [address]   Office:  [phone]

## 2021-04-09 NOTE — WOUND CARE
IP WOUND CONSULT Heri Mills MEDICAL RECORD NUMBER:  [id number] AGE: 46 y.o. GENDER: female  : 1970 TODAY'S DATE:  2021 GENERAL [x] Follow-up [] New Consult Heri Mills is a 46 y.o. female referred by:  
[x] Physician 
[] Nursing 
[] Other: PAST MEDICAL HISTORY History reviewed. No pertinent past medical history. PAST SURGICAL HISTORY History reviewed. No pertinent surgical history. FAMILY HISTORY History reviewed. No pertinent family history. SOCIAL HISTORY Social History Tobacco Use  Smoking status: Current Every Day Smoker Packs/day: 1.50  Smokeless tobacco: Never Used Substance Use Topics  Alcohol use: Not Currently Comment: on rare occasion  Drug use: Not Currently ALLERGIES No Known Allergies MEDICATIONS No current facility-administered medications on file prior to encounter. Current Outpatient Medications on File Prior to Encounter Medication Sig Dispense Refill  trimethoprim-sulfamethoxazole (BACTRIM DS, SEPTRA DS) 160-800 mg per tablet Take 1 Tab by mouth two (2) times a day. Indications: Left foot infection. Wt Readings from Last 3 Encounters:  
21 73.1 kg (161 lb 3.2 oz) 21 70.9 kg (156 lb 4.8 oz) 13 68 kg (150 lb) [unfilled] Visit Vitals /60 Pulse 70 Temp 98.5 °F (36.9 °C) Resp 18 Ht 5' 8.03\" (1.728 m) Wt 73.1 kg (161 lb 3.2 oz) LMP 2018 (Within Years) SpO2 100% BMI 24.49 kg/m² ASSESSMENT Ulcer Identification: 
Ulcer Type: diabetic Contributing Factors: surgically created Wound Foot Medial;Left 21 (Active) Wound Etiology Surgical 21 1605 Dressing Status New dressing applied 21 8096 Cleansed Cleansed with saline 21 1605 Dressing/Treatment Negative Pressure Wound Therapy 21 1605 Wound Length (cm) 6.3 cm 21 1605 Wound Width (cm) 6 cm 21 1605 Wound Depth (cm) 2 cm 04/09/21 1605 Wound Surface Area (cm^2) 37.8 cm^2 04/09/21 1605 Wound Volume (cm^3) 75.6 cm^3 04/09/21 1605 Wound Assessment Devitalized tissue;Pink/red;Purple/maroon 04/09/21 1605 Drainage Amount Small 04/09/21 1605 Drainage Description Serosanguinous 04/09/21 1605 Wound Odor Mild 04/09/21 1605 Mary-Wound/Incision Assessment Fragile 04/09/21 1605 Edges Defined edges 04/09/21 1605 Number of days: 0 PLAN Skin Care & Pressure Relief Recommendations Minimize layers of linen Pads under patient to optimize support surface Turn/reposition approximately every 2 hours Pillow wedges Offload heels pillows Physician/Provider notified: Yes Recommendations: Negative Pressure therapy suction set at 125 mm hg.  Secured with kenneth and ace wrap. Teaching completed with:  
[x] Patient          
[] Family member      
[] Caregiver         
[] Nursing 
[] Other Patient/Caregiver Teaching: 
Level of patient/caregiver understanding able to:  
[] Indicates understanding       [] Needs reinforcement 
[] Unsuccessful      [x] Verbal Understanding 
[] Demonstrated understanding       [] No evidence of learning 
[] Refused teaching         [] N/A Electronically signed by Vanna Golden RN on 4/9/2021 at 4:08 PM  
 
 
 
Negative Pressure NAME:  Brice Bustos YOB: 1970 MEDICAL RECORD NUMBER:  [id number] DATE:  4/4/2021  Applied Negative Pressure to left foot wound(s)  [x] Applied skin barrier prep to mary-wound.  [x] Cut strips of plastic drape to picture frame wound so that mary-wound is     covered with the drape.  [] If bridging dressing to less prominent site, cover any intact skin that will come in contact with the Negative Pressure Therapy sponge, gauze or channel drain with plastic drape. The sponge should never touch intact skin.   
 [x] Cut sponge, gauze or channel drain to size which will fit into the wound/ulcer bed without being forced.  [x] Be sure the sponge is large enough to hold the entire round plastic flange which is attached to the tubing. Never allow flange to be larger than the sponge or it will produce suction damaging intact skin.  Total number of individual pieces of foam used within the wound bed: 1 
 [x] If bridging the dressing away from the primary site, be sure the bridge leads to a piece of sponge large enough to hold the entire flange without allowing any of the flange to overlap onto intact skin.  [x] Covered sponge, gauze or channel drain with plastic drape.  [x] Cut a hole in this plastic drape directly over the sponge the same size as the plastic drain tubing.  [x] Removed plastic liner from flange and apply it directly over the hole you cut.  [x] Removed the plastic cover from the flange.  [x] Attached the tubing to the wound/ulcer Negative Pressure Therapy and turn it on to be sure a vacuum is created and that there are no leaks.  [x] If air leaks occur, use plastic drape to patch them.  [x] Secured Negative Pressure Therapy dressing with ace wrap loosely if located on an extremity. Maintain tubing outside of ace wrap. Tubing must not exert pressure on intact skin. Response to treatment:  Patient tolerated vac application, states pain level was a 8. Bedside RN to give pain medication. Applied per [facility] Guidelines Electronically signed by Jolly Osman RN on 4/9/2021 at 4:12 PM

## 2021-04-09 NOTE — PROGRESS NOTES
DISCHARGE PLAN: Anticipated to remain hospitalized through weekend. Home with [facility] vs HH on 6 weeks IV ABX therapy.      Patient lives with her mother and was independent prior to this admission. Pt states that she goes to [facility] and was not interested in a PCP at this time. Pt transports herself around. Noted osteomylitis. CM will continue to follow for d/c needs.

## 2021-04-10 LAB
ANION GAP SERPL CALC-SCNC: 5 MMOL/L (ref 3–18)
BACTERIA SPEC CULT: NORMAL
BACTERIA SPEC CULT: NORMAL
BUN SERPL-MCNC: 7 MG/DL (ref 7–18)
BUN/CREAT SERPL: 8 (ref 12–20)
CALCIUM SERPL-MCNC: 7.8 MG/DL (ref 8.5–10.1)
CHLORIDE SERPL-SCNC: 108 MMOL/L (ref 100–111)
CO2 SERPL-SCNC: 27 MMOL/L (ref 21–32)
CREAT SERPL-MCNC: 0.83 MG/DL (ref 0.6–1.3)
GLUCOSE BLD STRIP.AUTO-MCNC: 125 MG/DL (ref 70–110)
GLUCOSE BLD STRIP.AUTO-MCNC: 146 MG/DL (ref 70–110)
GLUCOSE BLD STRIP.AUTO-MCNC: 156 MG/DL (ref 70–110)
GLUCOSE BLD STRIP.AUTO-MCNC: 173 MG/DL (ref 70–110)
GLUCOSE SERPL-MCNC: 133 MG/DL (ref 74–99)
POTASSIUM SERPL-SCNC: 4.2 MMOL/L (ref 3.5–5.5)
SERVICE CMNT-IMP: NORMAL
SERVICE CMNT-IMP: NORMAL
SODIUM SERPL-SCNC: 140 MMOL/L (ref 136–145)

## 2021-04-10 PROCEDURE — 80048 BASIC METABOLIC PNL TOTAL CA: CPT

## 2021-04-10 PROCEDURE — 74011250637 HC RX REV CODE- 250/637: Performed by: STUDENT IN AN ORGANIZED HEALTH CARE EDUCATION/TRAINING PROGRAM

## 2021-04-10 PROCEDURE — 74011636637 HC RX REV CODE- 636/637: Performed by: INTERNAL MEDICINE

## 2021-04-10 PROCEDURE — 74011000250 HC RX REV CODE- 250: Performed by: INTERNAL MEDICINE

## 2021-04-10 PROCEDURE — 82962 GLUCOSE BLOOD TEST: CPT

## 2021-04-10 PROCEDURE — 74011250637 HC RX REV CODE- 250/637: Performed by: NURSE PRACTITIONER

## 2021-04-10 PROCEDURE — 74011000250 HC RX REV CODE- 250: Performed by: FAMILY MEDICINE

## 2021-04-10 PROCEDURE — 74011636637 HC RX REV CODE- 636/637: Performed by: HOSPITALIST

## 2021-04-10 PROCEDURE — 74011250636 HC RX REV CODE- 250/636: Performed by: INTERNAL MEDICINE

## 2021-04-10 PROCEDURE — 74011250636 HC RX REV CODE- 250/636: Performed by: FAMILY MEDICINE

## 2021-04-10 PROCEDURE — 65270000029 HC RM PRIVATE

## 2021-04-10 PROCEDURE — 74011250636 HC RX REV CODE- 250/636: Performed by: HOSPITALIST

## 2021-04-10 PROCEDURE — 36593 DECLOT VASCULAR DEVICE: CPT

## 2021-04-10 RX ORDER — INSULIN LISPRO 100 [IU]/ML
2 INJECTION, SOLUTION INTRAVENOUS; SUBCUTANEOUS
Status: DISCONTINUED | OUTPATIENT
Start: 2021-04-10 | End: 2021-04-14 | Stop reason: HOSPADM

## 2021-04-10 RX ADMIN — MEROPENEM 1 G: 1 INJECTION, POWDER, FOR SOLUTION INTRAVENOUS at 18:32

## 2021-04-10 RX ADMIN — Medication 10 ML: at 05:50

## 2021-04-10 RX ADMIN — SODIUM CHLORIDE 75 ML/HR: 900 INJECTION, SOLUTION INTRAVENOUS at 17:23

## 2021-04-10 RX ADMIN — CLOPIDOGREL BISULFATE 75 MG: 75 TABLET ORAL at 10:13

## 2021-04-10 RX ADMIN — ALTEPLASE 0.5 MG: 2.2 INJECTION, POWDER, LYOPHILIZED, FOR SOLUTION INTRAVENOUS at 05:00

## 2021-04-10 RX ADMIN — ATORVASTATIN CALCIUM 40 MG: 20 TABLET, FILM COATED ORAL at 10:13

## 2021-04-10 RX ADMIN — Medication 10 ML: at 17:23

## 2021-04-10 RX ADMIN — MEROPENEM 1 G: 1 INJECTION, POWDER, FOR SOLUTION INTRAVENOUS at 03:26

## 2021-04-10 RX ADMIN — HEPARIN SODIUM 5000 UNITS: 5000 INJECTION INTRAVENOUS; SUBCUTANEOUS at 17:22

## 2021-04-10 RX ADMIN — INSULIN LISPRO 2 UNITS: 100 INJECTION, SOLUTION INTRAVENOUS; SUBCUTANEOUS at 17:22

## 2021-04-10 RX ADMIN — ASPIRIN 81 MG: 81 TABLET, CHEWABLE ORAL at 10:13

## 2021-04-10 RX ADMIN — Medication 10 ML: at 22:09

## 2021-04-10 RX ADMIN — INSULIN LISPRO 2 UNITS: 100 INJECTION, SOLUTION INTRAVENOUS; SUBCUTANEOUS at 13:53

## 2021-04-10 RX ADMIN — HEPARIN SODIUM 5000 UNITS: 5000 INJECTION INTRAVENOUS; SUBCUTANEOUS at 10:13

## 2021-04-10 RX ADMIN — MEROPENEM 1 G: 1 INJECTION, POWDER, FOR SOLUTION INTRAVENOUS at 12:22

## 2021-04-10 RX ADMIN — HEPARIN SODIUM 5000 UNITS: 5000 INJECTION INTRAVENOUS; SUBCUTANEOUS at 03:26

## 2021-04-10 NOTE — ROUTINE PROCESS
1928 Bedside and Verbal shift change report given to Lai Greenfield RN (oncoming nurse) by PETROS Dietz RN (offgoing nurse). Report included the following information SBAR, Kardex, Intake/Output, MAR and Recent Results. Pt currently refusing vitals states he just did them. [address] with Dr Maged Lama R/T no blood return to purple port on DL PICC line. N.O for Cathflo.  
 
0602 Blood return noted to purple port on PICC, Cathflo deemed effective Bedside and Verbal shift change report given to Antwon Vinson RN (oncoming nurse) by Lai Greenfield RN (offgoing nurse). Report included the following information SBAR, Kardex, Intake/Output, MAR and Recent Results.

## 2021-04-10 NOTE — PROGRESS NOTES
Hospitalist Progress Note    Patient: Deena Bell MRN: [id number]  CSN: [id number]    YOB: 1970  Age: 46 y.o. Sex: female    DOA: 4/4/2021 LOS:  LOS: 6 days            Assessment/Plan   1. Left great toe osteomyelitis for TMA . Cultures growing MSSA- currently on vanc/meropenem  2. PAD  3. DM2  4. Tobacco use  5.carotid stenosis  6. yuniel- resolved    Plan:  - continue IV antibiotics  - continue accuchecks, start low dose meal time insulin, correction if needed  - stop IV fluids  - discussed need for tobacco cessation in light of her current medical conditions. She gets angry and does not \" want to hear it right now\"      Patient Active Problem List   Diagnosis Code    Osteomyelitis ([facility] M86.9    Type 2 diabetes mellitus with left diabetic foot ulcer ([facility] E11.621, L97.529    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 ([facility] E11.52    YUNIEL (acute kidney injury) ([facility] N17.9    PAD (peripheral artery disease) ([facility]) I73.9    Bilateral carotid artery stenosis I65.23    Gangrene of left foot ([facility]) I96               Subjective:    cc: \" im fine\"  No acute events overnight        REVIEW OF SYSTEMS:  General: No fevers or chills. Cardiovascular: No chest pain or pressure. No palpitations. Pulmonary: No shortness of breath. Gastrointestinal: No nausea, vomiting. Objective:        Vital signs/Intake and Output:  Visit Vitals  BP (!) 157/75 (BP 1 Location: Left upper arm, BP Patient Position: Sitting)   Pulse 73   Temp 97.7 °F (36.5 °C)   Resp 16   Ht 5' 8.03\" (1.728 m)   Wt 79 kg (174 lb 1.6 oz)   SpO2 100%   BMI 26.47 kg/m²     Current Shift:  No intake/output data recorded. Last three shifts:  No intake/output data recorded. Body mass index is 26.47 kg/m².     Physical Exam:  GEN: Alert and oriented times three NAD  EYES: conjunctiva normal, lids with out lesions  HEENT: MMM, No thyromegaly, no lymphadenopathy  HEART: RRR +S1 +S2, no JVD, pulses 2+ distally  LUNGS: CTA B/L no wheezes, rales or rhonchi  ABDOMEN: + BS, soft NT/ND no organomegaly,  No rebound  EXTREMITIES: No edema cyanosis, cap refill normal foot wrapped cdi   SKIN: no rashes or skin breakdown, no nodules, normal turgor  Current Facility-Administered Medications   Medication Dose Route Frequency    meropenem (MERREM) 1 g in sterile water (preservative free) 20 mL IV syringe  1 g IntraVENous Q8H    heparin (porcine) 100 unit/mL injection 500 Units  500 Units InterCATHeter Q8H PRN    heparinized saline 2 units/mL infusion 1,000 Units  500 mL Irrigation RAD CONTINUOUS    iopamidoL (ISOVUE 250) 51 % contrast injection 1-100 mL  1-100 mL IntraarTERial RAD CONTINUOUS    lidocaine (PF) (XYLOCAINE) 10 mg/mL (1 %) injection 1-10 mL  1-10 mL SubCUTAneous RAD CONTINUOUS    fentaNYL citrate (PF) injection  mcg   mcg IntraVENous Rad Multiple    clopidogreL (PLAVIX) tablet 75 mg  75 mg Oral DAILY    aspirin chewable tablet 81 mg  81 mg Oral DAILY    ondansetron (ZOFRAN) injection 4 mg  4 mg IntraVENous Q8H PRN    midazolam (VERSED) injection 0.5-2 mg  0.5-2 mg IntraVENous Rad Multiple    heparin (porcine) 1,000 unit/mL injection 1,000-10,000 Units  1,000-10,000 Units IntraVENous Rad Multiple    morphine injection 2 mg  2 mg IntraVENous Q3H PRN    acetaminophen (TYLENOL) tablet 650 mg  650 mg Oral Q6H PRN    sodium chloride (NS) flush 5-40 mL  5-40 mL IntraVENous Q8H    sodium chloride (NS) flush 5-40 mL  5-40 mL IntraVENous PRN    0.9% sodium chloride infusion  75 mL/hr IntraVENous CONTINUOUS    heparin (porcine) injection 5,000 Units  5,000 Units SubCUTAneous Q8H    glucose chewable tablet 16 g  16 g Oral PRN    glucagon (GLUCAGEN) injection 1 mg  1 mg IntraMUSCular PRN    insulin lispro (HUMALOG) injection   SubCUTAneous AC&HS    dextrose 10% infusion 125-250 mL  125-250 mL IntraVENous PRN    traMADoL (ULTRAM) tablet 50 mg  50 mg Oral Q6H PRN    atorvastatin (LIPITOR) tablet 40 mg  40 mg Oral DAILY    melatonin (rapid dissolve) tablet 5 mg  5 mg Oral QHS PRN         All the patient's labs over the past 24 hours were reviewed both during my initial daily workflow process and at the time notated as \"note time\" in [facility]. (It is not time stamped separately in this workflow.)  Select labs are listed below.         Labs: Results:       Chemistry Recent Labs     04/10/21  0333 04/09/21  0220 04/08/21  0222   * 114* 88    138 138   K 4.2 5.1 4.2    108 108   CO2 27 26 24   BUN 7 7 8   CREA 0.83 0.79 0.71   CA 7.8* 7.9* 7.6*   AGAP 5 4 6   BUCR 8* 9* 11*                                  Procedures/imaging: see electronic medical records for all procedures/Xrays and details which were not copied into this note but were reviewed prior to creation of [facility], DO  Internal Medicine/Geriatrics

## 2021-04-10 NOTE — PROGRESS NOTES
Problem: Falls - Risk of  Goal: *Absence of Falls  Description: Document Isidro Lee Fall Risk and appropriate interventions in the flowsheet. Outcome: Progressing Towards Goal  Note: Fall Risk Interventions:  Mobility Interventions: Utilize walker, cane, or other assistive device, Assess mobility with egress test         Medication Interventions: Teach patient to arise slowly, Evaluate medications/consider consulting pharmacy    Elimination Interventions: Call light in reach, Patient to call for help with toileting needs    History of Falls Interventions: Evaluate medications/consider consulting pharmacy         Problem: Patient Education: Go to Patient Education Activity  Goal: Patient/Family Education  Outcome: Progressing Towards Goal     Problem: Diabetes Self-Management  Goal: *Disease process and treatment process  Description: Define diabetes and identify own type of diabetes; list 3 options for treating diabetes. Outcome: Progressing Towards Goal  Goal: *Incorporating nutritional management into lifestyle  Description: Describe effect of type, amount and timing of food on blood glucose; list 3 methods for planning meals. Outcome: Progressing Towards Goal  Goal: *Incorporating physical activity into lifestyle  Description: State effect of exercise on blood glucose levels. Outcome: Progressing Towards Goal  Goal: *Developing strategies to promote health/change behavior  Description: Define the ABC's of diabetes; identify appropriate screenings, schedule and personal plan for screenings. Outcome: Progressing Towards Goal  Goal: *Using medications safely  Description: State effect of diabetes medications on diabetes; name diabetes medication taking, action and side effects. Outcome: Progressing Towards Goal  Goal: *Monitoring blood glucose, interpreting and using results  Description: Identify recommended blood glucose targets  and personal targets.   Outcome: Progressing Towards Goal  Goal: *Prevention, detection, treatment of acute complications  Description: List symptoms of hyper- and hypoglycemia; describe how to treat low blood sugar and actions for lowering  high blood glucose level. Outcome: Progressing Towards Goal  Goal: *Prevention, detection and treatment of chronic complications  Description: Define the natural course of diabetes and describe the relationship of blood glucose levels to long term complications of diabetes. Outcome: Progressing Towards Goal  Goal: *Developing strategies to address psychosocial issues  Description: Describe feelings about living with diabetes; identify support needed and support network  Outcome: Progressing Towards Goal  Goal: *Insulin pump training  Outcome: Progressing Towards Goal  Goal: *Sick day guidelines  Outcome: Progressing Towards Goal  Goal: *Patient Specific Goal (EDIT GOAL, INSERT TEXT)  Outcome: Progressing Towards Goal     Problem: Pressure Injury - Risk of  Goal: *Prevention of pressure injury  Description: Document Flo Scale and appropriate interventions in the flowsheet.   Outcome: Progressing Towards Goal  Note: Pressure Injury Interventions:  Sensory Interventions: Minimize linen layers    Moisture Interventions: Absorbent underpads    Activity Interventions: PT/OT evaluation, Pressure redistribution bed/mattress(bed type)    Mobility Interventions: PT/OT evaluation, Pressure redistribution bed/mattress (bed type)    Nutrition Interventions: Document food/fluid/supplement intake    Friction and Shear Interventions: Minimize layers

## 2021-04-10 NOTE — PROGRESS NOTES
Problem: Falls - Risk of  Goal: *Absence of Falls  Description: Document Mauricio Jane Fall Risk and appropriate interventions in the flowsheet.   Outcome: Progressing Towards Goal  Note: Fall Risk Interventions:  Mobility Interventions: Utilize walker, cane, or other assistive device, Assess mobility with egress test         Medication Interventions: Teach patient to arise slowly, Evaluate medications/consider consulting pharmacy    Elimination Interventions: Call light in reach, Patient to call for help with toileting needs    History of Falls Interventions: Evaluate medications/consider consulting pharmacy

## 2021-04-11 LAB
ANION GAP SERPL CALC-SCNC: 4 MMOL/L (ref 3–18)
BASOPHILS # BLD: 0.1 K/UL (ref 0–0.1)
BASOPHILS NFR BLD: 1 % (ref 0–2)
BUN SERPL-MCNC: 9 MG/DL (ref 7–18)
BUN/CREAT SERPL: 11 (ref 12–20)
CALCIUM SERPL-MCNC: 8.1 MG/DL (ref 8.5–10.1)
CHLORIDE SERPL-SCNC: 108 MMOL/L (ref 100–111)
CO2 SERPL-SCNC: 28 MMOL/L (ref 21–32)
CREAT SERPL-MCNC: 0.84 MG/DL (ref 0.6–1.3)
DIFFERENTIAL METHOD BLD: ABNORMAL
EOSINOPHIL # BLD: 0.2 K/UL (ref 0–0.4)
EOSINOPHIL NFR BLD: 2 % (ref 0–5)
ERYTHROCYTE [DISTWIDTH] IN BLOOD BY AUTOMATED COUNT: 11.9 % (ref 11.6–14.5)
GLUCOSE BLD STRIP.AUTO-MCNC: 132 MG/DL (ref 70–110)
GLUCOSE BLD STRIP.AUTO-MCNC: 143 MG/DL (ref 70–110)
GLUCOSE BLD STRIP.AUTO-MCNC: 157 MG/DL (ref 70–110)
GLUCOSE BLD STRIP.AUTO-MCNC: 165 MG/DL (ref 70–110)
GLUCOSE SERPL-MCNC: 120 MG/DL (ref 74–99)
HCT VFR BLD AUTO: 22.6 % (ref 35–45)
HGB BLD-MCNC: 7.2 G/DL (ref 12–16)
LYMPHOCYTES # BLD: 0.9 K/UL (ref 0.9–3.6)
LYMPHOCYTES NFR BLD: 12 % (ref 21–52)
MCH RBC QN AUTO: 30.5 PG (ref 24–34)
MCHC RBC AUTO-ENTMCNC: 31.9 G/DL (ref 31–37)
MCV RBC AUTO: 95.8 FL (ref 74–97)
MONOCYTES # BLD: 0.5 K/UL (ref 0.05–1.2)
MONOCYTES NFR BLD: 7 % (ref 3–10)
NEUTS SEG # BLD: 5.5 K/UL (ref 1.8–8)
NEUTS SEG NFR BLD: 77 % (ref 40–73)
PLATELET # BLD AUTO: 332 K/UL (ref 135–420)
PMV BLD AUTO: 8.7 FL (ref 9.2–11.8)
POTASSIUM SERPL-SCNC: 4.3 MMOL/L (ref 3.5–5.5)
RBC # BLD AUTO: 2.36 M/UL (ref 4.2–5.3)
SODIUM SERPL-SCNC: 140 MMOL/L (ref 136–145)
WBC # BLD AUTO: 7.2 K/UL (ref 4.6–13.2)

## 2021-04-11 PROCEDURE — 74011250636 HC RX REV CODE- 250/636: Performed by: INTERNAL MEDICINE

## 2021-04-11 PROCEDURE — 74011250636 HC RX REV CODE- 250/636: Performed by: HOSPITALIST

## 2021-04-11 PROCEDURE — 65270000029 HC RM PRIVATE

## 2021-04-11 PROCEDURE — 74011636637 HC RX REV CODE- 636/637: Performed by: INTERNAL MEDICINE

## 2021-04-11 PROCEDURE — 74011250637 HC RX REV CODE- 250/637: Performed by: STUDENT IN AN ORGANIZED HEALTH CARE EDUCATION/TRAINING PROGRAM

## 2021-04-11 PROCEDURE — 74011636637 HC RX REV CODE- 636/637: Performed by: HOSPITALIST

## 2021-04-11 PROCEDURE — 74011250636 HC RX REV CODE- 250/636: Performed by: FAMILY MEDICINE

## 2021-04-11 PROCEDURE — 85025 COMPLETE CBC W/AUTO DIFF WBC: CPT

## 2021-04-11 PROCEDURE — 82962 GLUCOSE BLOOD TEST: CPT

## 2021-04-11 PROCEDURE — 80048 BASIC METABOLIC PNL TOTAL CA: CPT

## 2021-04-11 PROCEDURE — 74011250637 HC RX REV CODE- 250/637: Performed by: NURSE PRACTITIONER

## 2021-04-11 PROCEDURE — 74011250637 HC RX REV CODE- 250/637: Performed by: INTERNAL MEDICINE

## 2021-04-11 PROCEDURE — 74011000250 HC RX REV CODE- 250: Performed by: INTERNAL MEDICINE

## 2021-04-11 RX ORDER — LOSARTAN POTASSIUM 25 MG/1
25 TABLET ORAL DAILY
Status: DISCONTINUED | OUTPATIENT
Start: 2021-04-11 | End: 2021-04-14 | Stop reason: HOSPADM

## 2021-04-11 RX ADMIN — MORPHINE SULFATE 2 MG: 2 INJECTION, SOLUTION INTRAMUSCULAR; INTRAVENOUS at 03:57

## 2021-04-11 RX ADMIN — ATORVASTATIN CALCIUM 40 MG: 20 TABLET, FILM COATED ORAL at 09:26

## 2021-04-11 RX ADMIN — HEPARIN SODIUM 5000 UNITS: 5000 INJECTION INTRAVENOUS; SUBCUTANEOUS at 09:26

## 2021-04-11 RX ADMIN — MEROPENEM 1 G: 1 INJECTION, POWDER, FOR SOLUTION INTRAVENOUS at 11:35

## 2021-04-11 RX ADMIN — Medication 10 ML: at 06:19

## 2021-04-11 RX ADMIN — ASPIRIN 81 MG: 81 TABLET, CHEWABLE ORAL at 09:26

## 2021-04-11 RX ADMIN — CLOPIDOGREL BISULFATE 75 MG: 75 TABLET ORAL at 09:26

## 2021-04-11 RX ADMIN — HEPARIN SODIUM 5000 UNITS: 5000 INJECTION INTRAVENOUS; SUBCUTANEOUS at 03:29

## 2021-04-11 RX ADMIN — LOSARTAN POTASSIUM 25 MG: 25 TABLET, FILM COATED ORAL at 09:26

## 2021-04-11 RX ADMIN — INSULIN LISPRO 2 UNITS: 100 INJECTION, SOLUTION INTRAVENOUS; SUBCUTANEOUS at 14:05

## 2021-04-11 RX ADMIN — INSULIN LISPRO 2 UNITS: 100 INJECTION, SOLUTION INTRAVENOUS; SUBCUTANEOUS at 13:00

## 2021-04-11 RX ADMIN — MEROPENEM 1 G: 1 INJECTION, POWDER, FOR SOLUTION INTRAVENOUS at 03:30

## 2021-04-11 RX ADMIN — INSULIN LISPRO 2 UNITS: 100 INJECTION, SOLUTION INTRAVENOUS; SUBCUTANEOUS at 22:38

## 2021-04-11 RX ADMIN — Medication 10 ML: at 22:37

## 2021-04-11 RX ADMIN — Medication 10 ML: at 14:06

## 2021-04-11 RX ADMIN — INSULIN LISPRO 2 UNITS: 100 INJECTION, SOLUTION INTRAVENOUS; SUBCUTANEOUS at 09:25

## 2021-04-11 RX ADMIN — MORPHINE SULFATE 2 MG: 2 INJECTION, SOLUTION INTRAMUSCULAR; INTRAVENOUS at 18:15

## 2021-04-11 RX ADMIN — MEROPENEM 1 G: 1 INJECTION, POWDER, FOR SOLUTION INTRAVENOUS at 18:07

## 2021-04-11 RX ADMIN — INSULIN LISPRO 2 UNITS: 100 INJECTION, SOLUTION INTRAVENOUS; SUBCUTANEOUS at 18:08

## 2021-04-11 RX ADMIN — HEPARIN SODIUM 5000 UNITS: 5000 INJECTION INTRAVENOUS; SUBCUTANEOUS at 18:08

## 2021-04-11 NOTE — PROGRESS NOTES
Hospitalist Progress Note    Patient: Brice Bustos MRN: [id number]  CSN: [id number]    YOB: 1970  Age: 46 y.o. Sex: female    DOA: 4/4/2021 LOS:  LOS: 7 days            Assessment/Plan   1. Left great toe osteomyelitis for TMA . Cultures growing MSSA- currently on vanc/meropenem  2. PAD  3. DM2  4. Elevated BP  5. Tobacco use  6.carotid stenosis  7. yuniel- resolved  8. ABL anemia, hg stable     Plan:  - continue IV antibiotics  - continue accuchecks, continue low dose meal time insulin, correction if needed  - stop IV fluids- didn't come off chart  - start low dose losartan  - dvt ppx heparin    Patient Active Problem List   Diagnosis Code    Osteomyelitis ([facility] M86.9    Type 2 diabetes mellitus with left diabetic foot ulcer ([facility] E11.621, L97.529    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 ([facility] [facility] E11.52    YUNIEL (acute kidney injury) ([facility] N17.9    PAD (peripheral artery disease) ([facility] I73.9    Bilateral carotid artery stenosis I65.23    Gangrene of left foot (HCC) I96               Subjective:    cc: osteomyelitis  No acute events overnight        REVIEW OF SYSTEMS:  General: No fevers or chills. Cardiovascular: No chest pain or pressure. No palpitations. Pulmonary: No shortness of breath. Gastrointestinal: No nausea, vomiting. Objective:        Vital signs/Intake and Output:  Visit Vitals  BP (!) 143/55 (BP 1 Location: Left upper arm, BP Patient Position: At rest)   Pulse 77   Temp 98.3 °F (36.8 °C)   Resp 16   Ht 5' 8.03\" (1.728 m)   Wt 79 kg (174 lb 1.6 oz)   SpO2 99%   BMI 26.47 kg/m²     Current Shift:  04/11 0701 - 04/11 1900  In: -   Out: 3000 [Urine:3000]  Last three shifts:  04/09 1901 - 04/11 0700  In: -   Out: 3300 [Urine:3300]    Body mass index is 26.47 kg/m².     Physical Exam:  GEN: Alert and oriented times three NAD  EYES: conjunctiva normal, lids with out lesions  HEENT: MMM, No thyromegaly, no lymphadenopathy  HEART: RRR +S1 +S2, no JVD, pulses 2+ distally  LUNGS: CTA B/L no wheezes, rales or rhonchi  ABDOMEN: + BS, soft NT/ND no organomegaly,  No rebound  EXTREMITIES: No edema cyanosis, cap refill normal, foot wrapped cdi   SKIN: no rashes or skin breakdown, no nodules, normal turgor  Current Facility-Administered Medications   Medication Dose Route Frequency    losartan (COZAAR) tablet 25 mg  25 mg Oral DAILY    insulin lispro (HUMALOG) injection 2 Units  2 Units SubCUTAneous TIDAC    meropenem (MERREM) 1 g in sterile water (preservative free) 20 mL IV syringe  1 g IntraVENous Q8H    heparin (porcine) 100 unit/mL injection 500 Units  500 Units InterCATHeter Q8H PRN    heparinized saline 2 units/mL infusion 1,000 Units  500 mL Irrigation RAD CONTINUOUS    iopamidoL (ISOVUE 250) 51 % contrast injection 1-100 mL  1-100 mL IntraarTERial RAD CONTINUOUS    lidocaine (PF) (XYLOCAINE) 10 mg/mL (1 %) injection 1-10 mL  1-10 mL SubCUTAneous RAD CONTINUOUS    fentaNYL citrate (PF) injection  mcg   mcg IntraVENous Rad Multiple    clopidogreL (PLAVIX) tablet 75 mg  75 mg Oral DAILY    aspirin chewable tablet 81 mg  81 mg Oral DAILY    ondansetron (ZOFRAN) injection 4 mg  4 mg IntraVENous Q8H PRN    midazolam (VERSED) injection 0.5-2 mg  0.5-2 mg IntraVENous Rad Multiple    heparin (porcine) 1,000 unit/mL injection 1,000-10,000 Units  1,000-10,000 Units IntraVENous Rad Multiple    morphine injection 2 mg  2 mg IntraVENous Q3H PRN    acetaminophen (TYLENOL) tablet 650 mg  650 mg Oral Q6H PRN    sodium chloride (NS) flush 5-40 mL  5-40 mL IntraVENous Q8H    sodium chloride (NS) flush 5-40 mL  5-40 mL IntraVENous PRN    heparin (porcine) injection 5,000 Units  5,000 Units SubCUTAneous Q8H    glucose chewable tablet 16 g  16 g Oral PRN    glucagon (GLUCAGEN) injection 1 mg  1 mg IntraMUSCular PRN    insulin lispro (HUMALOG) injection   SubCUTAneous AC&HS    dextrose 10% infusion 125-250 mL  125-250 mL IntraVENous PRN    traMADoL (ULTRAM) tablet 50 mg  50 mg Oral Q6H PRN    atorvastatin (LIPITOR) tablet 40 mg  40 mg Oral DAILY    melatonin (rapid dissolve) tablet 5 mg  5 mg Oral QHS PRN         All the patient's labs over the past 24 hours were reviewed both during my initial daily workflow process and at the time notated as \"note time\" in [employer]. (It is not time stamped separately in this workflow.)  Select labs are listed below.         Labs: Results:       Chemistry Recent Labs     04/11/21  0411 04/10/21  0333 04/09/21  0220   * 133* 114*    140 138   K 4.3 4.2 5.1    108 108   CO2 28 27 26   BUN 9 7 7   CREA 0.84 0.83 0.79   CA 8.1* 7.8* 7.9*   AGAP 4 5 4   BUCR 11* 8* 9*      CBC w/Diff Recent Labs     04/11/21 0411   WBC 7.2   RBC 2.36*   HGB 7.2*   HCT 22.6*      GRANS 77*   LYMPH 12*   EOS 2                              Procedures/imaging: see electronic medical records for all procedures/Xrays and details which were not copied into this note but were reviewed prior to creation of Harshil Connelly DO  Internal Medicine/Geriatrics

## 2021-04-11 NOTE — PROGRESS NOTES
Bedside and verbal report given to DANIELLE Adan RN (oncoming nurse) by Aroldo Mccollum, AMANDEEP  (off going nurse). Report included the following information SBAR, Kardex, OR Summary, Intake/Output, and MAR.    0357 PRN Morphine given for pain  Uneventful shift, no acute changes    Bedside and verbal report given by (off going nurse) DANIELLE Adan RN to (oncoming nurse) Aroldo Mccollum RN. Report included the following information SBAR, Kardex, OR Summary, Intake/Output, and MAR.

## 2021-04-11 NOTE — PROGRESS NOTES
Problem: Falls - Risk of  Goal: *Absence of Falls  Description: Document Anthony Bullock Fall Risk and appropriate interventions in the flowsheet. Outcome: Progressing Towards Goal  Note: Fall Risk Interventions:  Mobility Interventions: Utilize walker, cane, or other assistive device, PT Consult for mobility concerns, PT Consult for assist device competence         Medication Interventions: Teach patient to arise slowly    Elimination Interventions: Call light in reach, Toilet paper/wipes in reach    History of Falls Interventions: Evaluate medications/consider consulting pharmacy         Problem: Lower Extremity Wound Care  Goal: *Non-infected wound: Improvement of existing wound, absence of infection, and maintenance of skin integrity  Outcome: Progressing Towards Goal  Goal: *Infected Wound: Prevention of further infection and promotion of healing  Description: Infection control procedures (eg: clean dressings, clean gloves, hand washing, precautions to isolate wound from contamination, sterile instruments used for wound debridement) should be implemented. Outcome: Progressing Towards Goal  Goal: Interventions  Outcome: Progressing Towards Goal     Problem: Diabetes Self-Management  Goal: *Disease process and treatment process  Description: Define diabetes and identify own type of diabetes; list 3 options for treating diabetes. Outcome: Progressing Towards Goal  Goal: *Incorporating nutritional management into lifestyle  Description: Describe effect of type, amount and timing of food on blood glucose; list 3 methods for planning meals. Outcome: Progressing Towards Goal  Goal: *Incorporating physical activity into lifestyle  Description: State effect of exercise on blood glucose levels. Outcome: Progressing Towards Goal  Goal: *Developing strategies to promote health/change behavior  Description: Define the ABC's of diabetes; identify appropriate screenings, schedule and personal plan for screenings.   Outcome: Progressing Towards Goal  Goal: *Using medications safely  Description: State effect of diabetes medications on diabetes; name diabetes medication taking, action and side effects. Outcome: Progressing Towards Goal  Goal: *Monitoring blood glucose, interpreting and using results  Description: Identify recommended blood glucose targets  and personal targets. Outcome: Progressing Towards Goal  Goal: *Prevention, detection, treatment of acute complications  Description: List symptoms of hyper- and hypoglycemia; describe how to treat low blood sugar and actions for lowering  high blood glucose level. Outcome: Progressing Towards Goal  Goal: *Prevention, detection and treatment of chronic complications  Description: Define the natural course of diabetes and describe the relationship of blood glucose levels to long term complications of diabetes. Outcome: Progressing Towards Goal  Goal: *Developing strategies to address psychosocial issues  Description: Describe feelings about living with diabetes; identify support needed and support network  Outcome: Progressing Towards Goal  Goal: *Insulin pump training  Outcome: Progressing Towards Goal  Goal: *Sick day guidelines  Outcome: Progressing Towards Goal  Goal: *Patient Specific Goal (EDIT GOAL, INSERT TEXT)  Outcome: Progressing Towards Goal     Problem: Pressure Injury - Risk of  Goal: *Prevention of pressure injury  Description: Document Flo Scale and appropriate interventions in the flowsheet.   Outcome: Progressing Towards Goal  Note: Pressure Injury Interventions:  Sensory Interventions: Minimize linen layers    Moisture Interventions: Absorbent underpads    Activity Interventions: Pressure redistribution bed/mattress(bed type), PT/OT evaluation    Mobility Interventions: HOB 30 degrees or less, Pressure redistribution bed/mattress (bed type), PT/OT evaluation    Nutrition Interventions: Document food/fluid/supplement intake    Friction and Shear Interventions: Minimize layers                Problem: Pain  Goal: *Control of Pain  Outcome: Progressing Towards Goal     Problem: Patient Education: Go to Patient Education Activity  Goal: Patient/Family Education  Outcome: Progressing Towards Goal

## 2021-04-11 NOTE — ROUTINE PROCESS
Bedside and Verbal shift change report given to Eloisa Patterson RN (oncoming nurse) by DANIELLE Adan RN (offgoing nurse). Report included the following information SBAR, Kardex, Procedure Summary, Intake/Output, MAR, and Recent Results. 1815- Patient requesting pain medication for 6/10 pain. Medication given per orders. Bedside and Verbal shift change report given to DANIELLE Adan RN (oncoming nurse) by Eloisa Patterson RN (offgoing nurse). Report included the following information SBAR, Kardex, Procedure Summary, Intake/Output, MAR, and Recent Results.

## 2021-04-11 NOTE — PROGRESS NOTES
Problem: Falls - Risk of  Goal: *Absence of Falls  Description: Document Mesfin Ace Fall Risk and appropriate interventions in the flowsheet. Outcome: Progressing Towards Goal  Note: Fall Risk Interventions:  Mobility Interventions: Utilize walker, cane, or other assistive device         Medication Interventions: Teach patient to arise slowly    Elimination Interventions: Call light in reach    History of Falls Interventions: Evaluate medications/consider consulting pharmacy         Problem: Pressure Injury - Risk of  Goal: *Prevention of pressure injury  Description: Document Flo Scale and appropriate interventions in the flowsheet.   Outcome: Progressing Towards Goal  Note: Pressure Injury Interventions:  Sensory Interventions: Minimize linen layers    Moisture Interventions: Absorbent underpads    Activity Interventions: Increase time out of bed    Mobility Interventions: PT/OT evaluation    Nutrition Interventions: Document food/fluid/supplement intake    Friction and Shear Interventions: Minimize layers

## 2021-04-12 ENCOUNTER — ANESTHESIA (OUTPATIENT)
Dept: SURGERY | Age: 51
DRG: 271 | End: 2021-04-12
Payer: COMMERCIAL

## 2021-04-12 ENCOUNTER — ANESTHESIA EVENT (OUTPATIENT)
Dept: SURGERY | Age: 51
DRG: 271 | End: 2021-04-12
Payer: COMMERCIAL

## 2021-04-12 ENCOUNTER — HOME HEALTH ADMISSION (OUTPATIENT)
Dept: HOME HEALTH SERVICES | Facility: HOME HEALTH | Age: 51
End: 2021-04-12
Payer: COMMERCIAL

## 2021-04-12 PROBLEM — Z91.199 NON COMPLIANCE WITH MEDICAL TREATMENT: Status: ACTIVE | Noted: 2021-04-12

## 2021-04-12 LAB
ANION GAP SERPL CALC-SCNC: 5 MMOL/L (ref 3–18)
BASOPHILS # BLD: 0.1 K/UL (ref 0–0.1)
BASOPHILS NFR BLD: 1 % (ref 0–2)
BUN SERPL-MCNC: 11 MG/DL (ref 7–18)
BUN/CREAT SERPL: 13 (ref 12–20)
CALCIUM SERPL-MCNC: 7.9 MG/DL (ref 8.5–10.1)
CHLORIDE SERPL-SCNC: 107 MMOL/L (ref 100–111)
CO2 SERPL-SCNC: 27 MMOL/L (ref 21–32)
CREAT SERPL-MCNC: 0.87 MG/DL (ref 0.6–1.3)
CRP SERPL-MCNC: 3.7 MG/DL (ref 0–0.3)
DIFFERENTIAL METHOD BLD: ABNORMAL
EOSINOPHIL # BLD: 0.2 K/UL (ref 0–0.4)
EOSINOPHIL NFR BLD: 2 % (ref 0–5)
ERYTHROCYTE [DISTWIDTH] IN BLOOD BY AUTOMATED COUNT: 12.3 % (ref 11.6–14.5)
GLUCOSE BLD STRIP.AUTO-MCNC: 120 MG/DL (ref 70–110)
GLUCOSE BLD STRIP.AUTO-MCNC: 122 MG/DL (ref 70–110)
GLUCOSE BLD STRIP.AUTO-MCNC: 144 MG/DL (ref 70–110)
GLUCOSE BLD STRIP.AUTO-MCNC: 145 MG/DL (ref 70–110)
GLUCOSE BLD STRIP.AUTO-MCNC: 152 MG/DL (ref 70–110)
GLUCOSE SERPL-MCNC: 135 MG/DL (ref 74–99)
HCT VFR BLD AUTO: 22.4 % (ref 35–45)
HGB BLD-MCNC: 7.3 G/DL (ref 12–16)
LYMPHOCYTES # BLD: 1.1 K/UL (ref 0.9–3.6)
LYMPHOCYTES NFR BLD: 13 % (ref 21–52)
MCH RBC QN AUTO: 31.1 PG (ref 24–34)
MCHC RBC AUTO-ENTMCNC: 32.6 G/DL (ref 31–37)
MCV RBC AUTO: 95.3 FL (ref 74–97)
MONOCYTES # BLD: 0.7 K/UL (ref 0.05–1.2)
MONOCYTES NFR BLD: 9 % (ref 3–10)
NEUTS SEG # BLD: 6 K/UL (ref 1.8–8)
NEUTS SEG NFR BLD: 74 % (ref 40–73)
PLATELET # BLD AUTO: 327 K/UL (ref 135–420)
PMV BLD AUTO: 8.6 FL (ref 9.2–11.8)
POTASSIUM SERPL-SCNC: 4 MMOL/L (ref 3.5–5.5)
RBC # BLD AUTO: 2.35 M/UL (ref 4.2–5.3)
SODIUM SERPL-SCNC: 139 MMOL/L (ref 136–145)
WBC # BLD AUTO: 8.2 K/UL (ref 4.6–13.2)

## 2021-04-12 PROCEDURE — 2709999900 HC NON-CHARGEABLE SUPPLY: Performed by: PODIATRIST

## 2021-04-12 PROCEDURE — 74011250637 HC RX REV CODE- 250/637: Performed by: NURSE PRACTITIONER

## 2021-04-12 PROCEDURE — 88307 TISSUE EXAM BY PATHOLOGIST: CPT

## 2021-04-12 PROCEDURE — 0Y6N0ZC DETACHMENT AT LEFT FOOT, PARTIAL 3RD RAY, OPEN APPROACH: ICD-10-PCS | Performed by: PODIATRIST

## 2021-04-12 PROCEDURE — 77030006788 HC BLD SAW OSC STRY -B: Performed by: PODIATRIST

## 2021-04-12 PROCEDURE — 85025 COMPLETE CBC W/AUTO DIFF WBC: CPT

## 2021-04-12 PROCEDURE — 0Y6N0Z9 DETACHMENT AT LEFT FOOT, PARTIAL 1ST RAY, OPEN APPROACH: ICD-10-PCS | Performed by: PODIATRIST

## 2021-04-12 PROCEDURE — 74011250636 HC RX REV CODE- 250/636: Performed by: HOSPITALIST

## 2021-04-12 PROCEDURE — 74011000250 HC RX REV CODE- 250: Performed by: INTERNAL MEDICINE

## 2021-04-12 PROCEDURE — 74011250637 HC RX REV CODE- 250/637: Performed by: STUDENT IN AN ORGANIZED HEALTH CARE EDUCATION/TRAINING PROGRAM

## 2021-04-12 PROCEDURE — 74011250637 HC RX REV CODE- 250/637: Performed by: INTERNAL MEDICINE

## 2021-04-12 PROCEDURE — 77030031139 HC SUT VCRL2 J&J -A: Performed by: PODIATRIST

## 2021-04-12 PROCEDURE — 74011250637 HC RX REV CODE- 250/637: Performed by: FAMILY MEDICINE

## 2021-04-12 PROCEDURE — 0Y6N0ZF DETACHMENT AT LEFT FOOT, PARTIAL 5TH RAY, OPEN APPROACH: ICD-10-PCS | Performed by: PODIATRIST

## 2021-04-12 PROCEDURE — 77030013708 HC HNDPC SUC IRR PULS STRY –B: Performed by: PODIATRIST

## 2021-04-12 PROCEDURE — 88311 DECALCIFY TISSUE: CPT

## 2021-04-12 PROCEDURE — 77030040361 HC SLV COMPR DVT MDII -B: Performed by: PODIATRIST

## 2021-04-12 PROCEDURE — 74011000258 HC RX REV CODE- 258: Performed by: HOSPITALIST

## 2021-04-12 PROCEDURE — 82962 GLUCOSE BLOOD TEST: CPT

## 2021-04-12 PROCEDURE — 76210000006 HC OR PH I REC 0.5 TO 1 HR: Performed by: PODIATRIST

## 2021-04-12 PROCEDURE — 74011250636 HC RX REV CODE- 250/636: Performed by: PODIATRIST

## 2021-04-12 PROCEDURE — 86140 C-REACTIVE PROTEIN: CPT

## 2021-04-12 PROCEDURE — 77030002916 HC SUT ETHLN J&J -A: Performed by: PODIATRIST

## 2021-04-12 PROCEDURE — 77030012508 HC MSK AIRWY LMA AMBU -A: Performed by: ANESTHESIOLOGY

## 2021-04-12 PROCEDURE — 76010000138 HC OR TIME 0.5 TO 1 HR: Performed by: PODIATRIST

## 2021-04-12 PROCEDURE — 0Y6N0ZD DETACHMENT AT LEFT FOOT, PARTIAL 4TH RAY, OPEN APPROACH: ICD-10-PCS | Performed by: PODIATRIST

## 2021-04-12 PROCEDURE — 65270000029 HC RM PRIVATE

## 2021-04-12 PROCEDURE — 74011250636 HC RX REV CODE- 250/636: Performed by: ANESTHESIOLOGY

## 2021-04-12 PROCEDURE — 74011000250 HC RX REV CODE- 250: Performed by: REGISTERED NURSE

## 2021-04-12 PROCEDURE — 74011250636 HC RX REV CODE- 250/636: Performed by: INTERNAL MEDICINE

## 2021-04-12 PROCEDURE — 74011250636 HC RX REV CODE- 250/636: Performed by: REGISTERED NURSE

## 2021-04-12 PROCEDURE — 0JXR0ZB TRANSFER LEFT FOOT SUBCUTANEOUS TISSUE AND FASCIA WITH SKIN AND SUBCUTANEOUS TISSUE, OPEN APPROACH: ICD-10-PCS | Performed by: PODIATRIST

## 2021-04-12 PROCEDURE — 0Y6N0ZB DETACHMENT AT LEFT FOOT, PARTIAL 2ND RAY, OPEN APPROACH: ICD-10-PCS | Performed by: PODIATRIST

## 2021-04-12 PROCEDURE — 74011250637 HC RX REV CODE- 250/637: Performed by: HOSPITALIST

## 2021-04-12 PROCEDURE — 76060000032 HC ANESTHESIA 0.5 TO 1 HR: Performed by: PODIATRIST

## 2021-04-12 PROCEDURE — 74011250636 HC RX REV CODE- 250/636: Performed by: FAMILY MEDICINE

## 2021-04-12 PROCEDURE — 77030013079 HC BLNKT BAIR HGGR 3M -A: Performed by: ANESTHESIOLOGY

## 2021-04-12 PROCEDURE — 80048 BASIC METABOLIC PNL TOTAL CA: CPT

## 2021-04-12 RX ORDER — SODIUM CHLORIDE, SODIUM LACTATE, POTASSIUM CHLORIDE, CALCIUM CHLORIDE 600; 310; 30; 20 MG/100ML; MG/100ML; MG/100ML; MG/100ML
INJECTION, SOLUTION INTRAVENOUS
Status: DISCONTINUED | OUTPATIENT
Start: 2021-04-12 | End: 2021-04-12 | Stop reason: HOSPADM

## 2021-04-12 RX ORDER — LIDOCAINE HYDROCHLORIDE 20 MG/ML
INJECTION, SOLUTION EPIDURAL; INFILTRATION; INTRACAUDAL; PERINEURAL AS NEEDED
Status: DISCONTINUED | OUTPATIENT
Start: 2021-04-12 | End: 2021-04-12 | Stop reason: HOSPADM

## 2021-04-12 RX ORDER — ALBUTEROL SULFATE 0.83 MG/ML
2.5 SOLUTION RESPIRATORY (INHALATION)
Status: DISCONTINUED | OUTPATIENT
Start: 2021-04-12 | End: 2021-04-12 | Stop reason: HOSPADM

## 2021-04-12 RX ORDER — SODIUM CHLORIDE 0.9 % (FLUSH) 0.9 %
5-40 SYRINGE (ML) INJECTION AS NEEDED
Status: DISCONTINUED | OUTPATIENT
Start: 2021-04-12 | End: 2021-04-12 | Stop reason: HOSPADM

## 2021-04-12 RX ORDER — EPHEDRINE SULFATE/0.9% NACL/PF 50 MG/5 ML
SYRINGE (ML) INTRAVENOUS AS NEEDED
Status: DISCONTINUED | OUTPATIENT
Start: 2021-04-12 | End: 2021-04-12 | Stop reason: HOSPADM

## 2021-04-12 RX ORDER — MIDAZOLAM HYDROCHLORIDE 1 MG/ML
INJECTION, SOLUTION INTRAMUSCULAR; INTRAVENOUS AS NEEDED
Status: DISCONTINUED | OUTPATIENT
Start: 2021-04-12 | End: 2021-04-12 | Stop reason: HOSPADM

## 2021-04-12 RX ORDER — NALOXONE HYDROCHLORIDE 0.4 MG/ML
0.1 INJECTION, SOLUTION INTRAMUSCULAR; INTRAVENOUS; SUBCUTANEOUS AS NEEDED
Status: DISCONTINUED | OUTPATIENT
Start: 2021-04-12 | End: 2021-04-12 | Stop reason: HOSPADM

## 2021-04-12 RX ORDER — ONDANSETRON 2 MG/ML
INJECTION INTRAMUSCULAR; INTRAVENOUS AS NEEDED
Status: DISCONTINUED | OUTPATIENT
Start: 2021-04-12 | End: 2021-04-12 | Stop reason: HOSPADM

## 2021-04-12 RX ORDER — SODIUM CHLORIDE, SODIUM LACTATE, POTASSIUM CHLORIDE, CALCIUM CHLORIDE 600; 310; 30; 20 MG/100ML; MG/100ML; MG/100ML; MG/100ML
100 INJECTION, SOLUTION INTRAVENOUS CONTINUOUS
Status: DISCONTINUED | OUTPATIENT
Start: 2021-04-12 | End: 2021-04-12 | Stop reason: HOSPADM

## 2021-04-12 RX ORDER — METOCLOPRAMIDE HYDROCHLORIDE 5 MG/ML
INJECTION INTRAMUSCULAR; INTRAVENOUS AS NEEDED
Status: DISCONTINUED | OUTPATIENT
Start: 2021-04-12 | End: 2021-04-12 | Stop reason: HOSPADM

## 2021-04-12 RX ORDER — SODIUM CHLORIDE 0.9 % (FLUSH) 0.9 %
5-40 SYRINGE (ML) INJECTION EVERY 8 HOURS
Status: DISCONTINUED | OUTPATIENT
Start: 2021-04-12 | End: 2021-04-12 | Stop reason: HOSPADM

## 2021-04-12 RX ORDER — HYDROMORPHONE HYDROCHLORIDE 1 MG/ML
0.5 INJECTION, SOLUTION INTRAMUSCULAR; INTRAVENOUS; SUBCUTANEOUS
Status: DISCONTINUED | OUTPATIENT
Start: 2021-04-12 | End: 2021-04-12 | Stop reason: HOSPADM

## 2021-04-12 RX ORDER — SODIUM CHLORIDE, SODIUM LACTATE, POTASSIUM CHLORIDE, CALCIUM CHLORIDE 600; 310; 30; 20 MG/100ML; MG/100ML; MG/100ML; MG/100ML
75 INJECTION, SOLUTION INTRAVENOUS CONTINUOUS
Status: CANCELLED | OUTPATIENT
Start: 2021-04-12

## 2021-04-12 RX ORDER — PROPOFOL 10 MG/ML
INJECTION, EMULSION INTRAVENOUS AS NEEDED
Status: DISCONTINUED | OUTPATIENT
Start: 2021-04-12 | End: 2021-04-12 | Stop reason: HOSPADM

## 2021-04-12 RX ORDER — FENTANYL CITRATE 50 UG/ML
INJECTION, SOLUTION INTRAMUSCULAR; INTRAVENOUS AS NEEDED
Status: DISCONTINUED | OUTPATIENT
Start: 2021-04-12 | End: 2021-04-12 | Stop reason: HOSPADM

## 2021-04-12 RX ORDER — GLYCOPYRROLATE 0.2 MG/ML
INJECTION INTRAMUSCULAR; INTRAVENOUS AS NEEDED
Status: DISCONTINUED | OUTPATIENT
Start: 2021-04-12 | End: 2021-04-12 | Stop reason: HOSPADM

## 2021-04-12 RX ORDER — FENTANYL CITRATE 50 UG/ML
25 INJECTION, SOLUTION INTRAMUSCULAR; INTRAVENOUS AS NEEDED
Status: DISCONTINUED | OUTPATIENT
Start: 2021-04-12 | End: 2021-04-12 | Stop reason: HOSPADM

## 2021-04-12 RX ORDER — DIPHENHYDRAMINE HYDROCHLORIDE 50 MG/ML
12.5 INJECTION, SOLUTION INTRAMUSCULAR; INTRAVENOUS
Status: DISCONTINUED | OUTPATIENT
Start: 2021-04-12 | End: 2021-04-12 | Stop reason: HOSPADM

## 2021-04-12 RX ADMIN — ONDANSETRON HYDROCHLORIDE 4 MG: 2 INJECTION INTRAMUSCULAR; INTRAVENOUS at 18:23

## 2021-04-12 RX ADMIN — MEROPENEM 1 G: 1 INJECTION, POWDER, FOR SOLUTION INTRAVENOUS at 12:31

## 2021-04-12 RX ADMIN — Medication 10 MG: at 18:40

## 2021-04-12 RX ADMIN — GLYCOPYRROLATE 0.2 MG: 0.2 INJECTION INTRAMUSCULAR; INTRAVENOUS at 18:23

## 2021-04-12 RX ADMIN — LIDOCAINE HYDROCHLORIDE 80 MG: 20 INJECTION, SOLUTION EPIDURAL; INFILTRATION; INTRACAUDAL; PERINEURAL at 18:11

## 2021-04-12 RX ADMIN — HEPARIN SODIUM 5000 UNITS: 5000 INJECTION INTRAVENOUS; SUBCUTANEOUS at 02:54

## 2021-04-12 RX ADMIN — FENTANYL CITRATE 100 MCG: 50 INJECTION, SOLUTION INTRAMUSCULAR; INTRAVENOUS at 18:04

## 2021-04-12 RX ADMIN — MIDAZOLAM 2 MG: 1 INJECTION INTRAMUSCULAR; INTRAVENOUS at 18:04

## 2021-04-12 RX ADMIN — MORPHINE SULFATE 2 MG: 2 INJECTION, SOLUTION INTRAMUSCULAR; INTRAVENOUS at 23:06

## 2021-04-12 RX ADMIN — ONDANSETRON HYDROCHLORIDE 4 MG: 2 INJECTION INTRAMUSCULAR; INTRAVENOUS at 23:51

## 2021-04-12 RX ADMIN — Medication 10 MG: at 18:55

## 2021-04-12 RX ADMIN — CLOPIDOGREL BISULFATE 75 MG: 75 TABLET ORAL at 09:09

## 2021-04-12 RX ADMIN — PROPOFOL 200 MG: 10 INJECTION, EMULSION INTRAVENOUS at 18:11

## 2021-04-12 RX ADMIN — Medication 10 MG: at 18:28

## 2021-04-12 RX ADMIN — Medication 5 MG: at 23:06

## 2021-04-12 RX ADMIN — SODIUM CHLORIDE, SODIUM LACTATE, POTASSIUM CHLORIDE, AND CALCIUM CHLORIDE: 600; 310; 30; 20 INJECTION, SOLUTION INTRAVENOUS at 18:04

## 2021-04-12 RX ADMIN — METOCLOPRAMIDE 5 MG: 5 INJECTION, SOLUTION INTRAMUSCULAR; INTRAVENOUS at 18:24

## 2021-04-12 RX ADMIN — ATORVASTATIN CALCIUM 40 MG: 20 TABLET, FILM COATED ORAL at 09:09

## 2021-04-12 RX ADMIN — LOSARTAN POTASSIUM 25 MG: 25 TABLET, FILM COATED ORAL at 09:09

## 2021-04-12 RX ADMIN — FENTANYL CITRATE 25 MCG: 50 INJECTION, SOLUTION INTRAMUSCULAR; INTRAVENOUS at 19:28

## 2021-04-12 RX ADMIN — SODIUM CHLORIDE 1.5 G: 900 INJECTION INTRAVENOUS at 23:06

## 2021-04-12 RX ADMIN — TRAMADOL HYDROCHLORIDE 50 MG: 50 TABLET, FILM COATED ORAL at 09:15

## 2021-04-12 RX ADMIN — Medication 10 MG: at 18:53

## 2021-04-12 RX ADMIN — Medication 10 ML: at 06:27

## 2021-04-12 RX ADMIN — FENTANYL CITRATE 25 MCG: 50 INJECTION, SOLUTION INTRAMUSCULAR; INTRAVENOUS at 19:44

## 2021-04-12 RX ADMIN — Medication 10 ML: at 22:00

## 2021-04-12 RX ADMIN — MEROPENEM 1 G: 1 INJECTION, POWDER, FOR SOLUTION INTRAVENOUS at 02:55

## 2021-04-12 RX ADMIN — SODIUM CHLORIDE 1.5 G: 900 INJECTION INTRAVENOUS at 18:17

## 2021-04-12 RX ADMIN — Medication 10 ML: at 14:00

## 2021-04-12 RX ADMIN — ASPIRIN 81 MG: 81 TABLET, CHEWABLE ORAL at 09:09

## 2021-04-12 RX ADMIN — Medication 10 MG: at 18:26

## 2021-04-12 NOTE — PROGRESS NOTES
[facility]  (A Division of [address] [place] [address])    Follow-up Note      Date of Admission: 4/4/2021     Date of Note:  4/12/2021    Summary:    Dry gangrene of Left great toe/hallux post ampuation 4/6 after re-vascularization. Patient with newly diagnosed DM and with significant PVD/PAD. Wound cultures with mixed growth- MSSA and anaerobes       Interval History:  Events /notes past few days reviewed. Plans for TMA tomorrow. Tolerating abx ok. States disagreement with MD that saw her prior, but not clear as to who she is referring. \"Fiance\"with her         Current Antimicrobials: Prior Antimicrobials:   Meropenem 4/9 to date   Ceftriaxone 4/4- 4/9  Vancomycin 4/4 to 4/9        Assessment:  Plan:   L Foot (dry) gangrene - Hallux area/OM  4/4:  CRP 148mg/L  All this is the setting of newly diagnosed DMT2 (unbeknownst to her) and significant PVD/PAD for which she underwent re-vascularization procedure (4/7) after her debridement (4/6). OR cultures are growing MSSA + anaerobes   ->POD # 6 of left toe     Can step down to Unasyn from meropenem for mixed infection  If no significant soft tissue infection, likely short term abx with resection of OM/infected area. Will follow after surgical procedure.   D/w Dr Fer Paige     PVD/PAD      DMT2-New                  Microbiology:  4/4- Blood culture: 2 set: NGSF  4/6: Wound culture: MSSA + anaerobic growth    Lines / Catheters:    Right PICC- 4/9    Patient Active Problem List   Diagnosis Code    Osteomyelitis ([address] M86.9    Type 2 diabetes mellitus with left diabetic foot ulcer ([address] E11.621, L97.529    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 ([address] E11.52    YUNIEL (acute kidney injury) ([address] N17.9    PAD (peripheral artery disease) ([address] I73.9    Bilateral carotid artery stenosis I65.23    Gangrene of left foot (HCC) I96    Non compliance with medical treatment Z91.19       Current Facility-Administered Medications   Medication Dose Route Frequency    losartan (COZAAR) tablet 25 mg  25 mg Oral DAILY    insulin lispro (HUMALOG) injection 2 Units  2 Units SubCUTAneous TIDAC    meropenem (MERREM) 1 g in sterile water (preservative free) 20 mL IV syringe  1 g IntraVENous Q8H    heparin (porcine) 100 unit/mL injection 500 Units  500 Units InterCATHeter Q8H PRN    clopidogreL (PLAVIX) tablet 75 mg  75 mg Oral DAILY    aspirin chewable tablet 81 mg  81 mg Oral DAILY    ondansetron (ZOFRAN) injection 4 mg  4 mg IntraVENous Q8H PRN    midazolam (VERSED) injection 0.5-2 mg  0.5-2 mg IntraVENous Rad Multiple    heparin (porcine) 1,000 unit/mL injection 1,000-10,000 Units  1,000-10,000 Units IntraVENous Rad Multiple    morphine injection 2 mg  2 mg IntraVENous Q3H PRN    acetaminophen (TYLENOL) tablet 650 mg  650 mg Oral Q6H PRN    sodium chloride (NS) flush 5-40 mL  5-40 mL IntraVENous Q8H    sodium chloride (NS) flush 5-40 mL  5-40 mL IntraVENous PRN    [Held by provider] heparin (porcine) injection 5,000 Units  5,000 Units SubCUTAneous Q8H    glucose chewable tablet 16 g  16 g Oral PRN    glucagon (GLUCAGEN) injection 1 mg  1 mg IntraMUSCular PRN    insulin lispro (HUMALOG) injection   SubCUTAneous AC&HS    dextrose 10% infusion 125-250 mL  125-250 mL IntraVENous PRN    traMADoL (ULTRAM) tablet 50 mg  50 mg Oral Q6H PRN    atorvastatin (LIPITOR) tablet 40 mg  40 mg Oral DAILY    melatonin (rapid dissolve) tablet 5 mg  5 mg Oral QHS PRN           Objective:  Visit Vitals  /68 (BP 1 Location: Left arm, BP Patient Position: At rest)   Pulse 79   Temp 98 °F (36.7 °C)   Resp 16   Ht 5' 8\" (1.727 m)   Wt 79 kg (174 lb 1.6 oz)   LMP 2018 (Within Years)   SpO2 100%   BMI 26.47 kg/m²       Temp (24hrs), Av.4 °F (36.9 °C), Min:98 °F (36.7 °C), Max:98.7 °F (37.1 °C)      GEN: WDWN, in no acute distress  HEENT: Unicteric, intact EOM  CHEST: Non laboured breathing.    EXT: Dressing on left foot  CNS: A and O X2. No gross CN deficit. Moves all extremities  Mood: Flat affect       Lab results:    Chemistry  Recent Labs     04/12/21  0427 04/11/21  0411 04/10/21  0333   * 120* 133*    140 140   K 4.0 4.3 4.2    108 108   CO2 27 28 27   BUN 11 9 7   CREA 0.87 0.84 0.83   CA 7.9* 8.1* 7.8*   AGAP 5 4 5   BUCR 13 11* 8*       CBC w/ Diff  Recent Labs     04/12/21 0427 04/11/21  0411   WBC 8.2 7.2   RBC 2.35* 2.36*   HGB 7.3* 7.2*   HCT 22.4* 22.6*    332   GRANS 74* 77*   LYMPH 13* 12*   EOS 2 2       Microbiology  All Micro Results     Procedure Component Value Units Date/Time    CULTURE, BLOOD [[id number]] Collected: 04/04/21 1225    Order Status: Completed Specimen: Blood Updated: 04/10/21 0609     Special Requests: NO SPECIAL REQUESTS        Culture result: NO GROWTH 6 DAYS       CULTURE, BLOOD [[id number]] Collected: 04/04/21 1230    Order Status: Completed Specimen: Blood Updated: 04/10/21 0609     Special Requests: NO SPECIAL REQUESTS        Culture result: NO GROWTH 6 DAYS       CULTURE, ANAEROBIC [[id number]]  (Abnormal) Collected: 04/06/21 1907    Order Status: Completed Specimen: Foot, left Updated: 04/09/21 1449     Special Requests: NO SPECIAL REQUESTS        Culture result:       LIGHT ANAEROBIC GRAM POSITIVE COCCI          CULTURE, WOUND W [address] [[id number]]  (Abnormal)  (Susceptibility) Collected: 04/06/21 1907    Order Status: Completed Specimen: Wound from Foot Updated: 04/09/21 1010     Special Requests: NO SPECIAL REQUESTS        GRAM STAIN NO WBC'S SEEN         NO ORGANISMS SEEN        Culture result:       LIGHT STAPHYLOCOCCUS AUREUS                 Tania Yu MD  Infectious Disease Specialist  Cell: [phone]: [phone]

## 2021-04-12 NOTE — PERIOP NOTES
[facility] made aware that SBAR is ready for review. Patient assigned room #316.  [facility] will be the nurse

## 2021-04-12 NOTE — PROGRESS NOTES
Bedside and verbal report given to DANIELLE Matias RN (oncoming nurse) by Karl Hardin RN  (off going nurse). Report included the following information SBAR, Kardex, OR Summary, Intake/Output, and MAR. Uneventful shift; no acute changes, no complaints of pain    Bedside and verbal report given by (off going nurse) DANIELLE Matias RN to (oncoming nurse) [address]. Report included the following information SBAR, Kardex, OR Summary, Intake/Output, and MAR.

## 2021-04-12 NOTE — ANESTHESIA POSTPROCEDURE EVALUATION
Procedure(s):  LEFT TRANSMETATARSAL AMPUTATION WITH ROTATION FLAP. general    Anesthesia Post Evaluation      Multimodal analgesia: multimodal analgesia used between 6 hours prior to anesthesia start to PACU discharge  Patient location during evaluation: PACU  Patient participation: complete - patient participated  Level of consciousness: awake  Pain score: 4  Airway patency: patent  Anesthetic complications: no  Cardiovascular status: acceptable  Respiratory status: acceptable  Hydration status: acceptable  Post anesthesia nausea and vomiting:  none  Final Post Anesthesia Temperature Assessment:  Normothermia (36.0-37.5 degrees C)      INITIAL Post-op Vital signs:   Vitals Value Taken Time   /71 04/12/21 1935   Temp 36.3 °C (97.4 °F) 04/12/21 1904   Pulse 93 04/12/21 1937   Resp 10 04/12/21 1937   SpO2 100 % 04/12/21 1937   Vitals shown include unvalidated device data.

## 2021-04-12 NOTE — PROGRESS NOTES
Comprehensive Nutrition Assessment    Type and Reason for Visit: Initial, RD nutrition re-screen/LOS  LOS day 7    Nutrition Recommendations/Plan:    Restart diet following surgery     Nutrition Assessment:   Nutrition History: NKFA's. pt reports her usual weight is 151 lbs and to her knowledge it was stable PTA. She usually skips breakfast and lunch but eats dinner. At one time she consumed several regular Dr. Kim Lao sodas/day but states she recently cut back to about 1/d. Does not  care for sweets but likes fried foods. Cultural/Muslim/Ethnic Food Preference(s): None   Nutrition Background: PMH: DM2, YUNIEL, PAD. Patient presented with foot wound found to be gangrene. Incision and drainage of abscess on 4/7. Plan today for transmetatarsal amputation. Daily Update:  Spoke with patient and family member who was present in the room. Patient reports that she has had a lower appetite and po for about 1 year. However she reports no weight loss during that time. She says she has been trying to do better at eating 3 daily meals here and will try to eat better at home. Patient understood need for additional protein following surgery however declined nutritional supplement at this time. Nutrition Related Findings:     Wound Type: Diabetic ulcer    Current Nutrition Therapies:  DIET NPO    Current Intake:   Average Meal Intake: 51-75% Average Supplement Intake: None ordered Additional Caloric Sources: none    Anthropometric Measures:  Height: 5' 8\" (172.7 cm)  Current Body Wt: 79 kg (174 lb 2.6 oz)(4/9), Weight source: Bed scale  BMI: 26.5, Overweight (BMI 25.0-29. 9)     Ideal Body Weight (lbs) (Calculated): 140 lbs (64 kg), 124.4 %  Usual Body Wt: 68.5 kg (151 lb), Percent weight change: 15.3          Edema: Generalized: No Edema (4/12/2021  9:12 AM)  LLE: Trace (4/12/2021  9:12 AM)     Estimated Daily Nutrient Needs:  Energy (kcal/day): 4475-0822 (Kcal/kg(20-25), Weight Used: Current(79 kg))  Protein (g/day):  (1.2-1.5 g/kg) Weight Used: (Current)  Fluid (ml/day): 1800 (1 ml/kcal)    Nutrition Diagnosis:   · Inadequate protein intake related to increased demand for energy/nutrients as evidenced by wounds(protein needs above)    Nutrition Interventions:   Food and/or Nutrient Delivery: Continue NPO(restart diet following surgery)  Nutrition Education/Counseling: Education completed  Coordination of Nutrition Care: Continue to monitor while inpatient    Goals: Active Goal: Intake >75% of daily nutritional needs within 7 days    Nutrition Monitoring and Evaluation:   Behavioral-Environmental Outcomes: Knowledge or skill  Food/Nutrient Intake Outcomes: Food and nutrient intake  Physical Signs/Symptoms Outcomes: Other (specify)(wounds)    Discharge Planning:     Too soon to determine    Electronically signed by Mino Palafox MS, RD, LD on 4/12/2021 at 12:01 PM.  Contact: [phone]

## 2021-04-12 NOTE — BRIEF OP NOTE
Brief Postoperative Note    Patient: Cholo Parada  YOB: 1970  MRN: [id number]    Date of Procedure: 4/12/2021     Pre-Op Diagnosis: GANGRENE    Post-Op Diagnosis: Same as preoperative diagnosis.       Procedure(s):  LEFT TRANSMETATARSAL AMPUTATION WITH ROTATION FLAP    Surgeon(s):  Isabel Schultz DPM    Surgical Assistant: Surg Asst-1: Dallas Blanton    Anesthesia: General     Estimated Blood Loss (mL): Minimal    Complications: None    Specimens:   ID Type Source Tests Collected by Time Destination   1 : transmetatarsal left foot  Preservative Foot, left  Lexi Fung DPM 4/12/2021 1841 Pathology        Implants: * No implants in log *    Drains: * No LDAs found *    Findings: Necrotic tissue    Electronically Signed by Shantal Millan DPM on 4/12/2021 at 6:43 PM

## 2021-04-12 NOTE — PROGRESS NOTES
0730 Beside and verbal shift change report given to [facility] COLONY (oncoming nurse) by Amrit Sprague (off going nurse). Report included the following information SBAR, Kardex,OR summary, Intake/output and MAR.     1128 Pt refused Blood sugar stick    1915 Bedside and Verbal shift change report given to AMANDEEP Soriano (oncoming nurse) by [facility] (offgoing nurse). Report included the following information SBAR, Kardex, Intake/Output, and MAR.

## 2021-04-12 NOTE — ANESTHESIA PREPROCEDURE EVALUATION
Relevant Problems   No relevant active problems       Anesthetic History   No history of anesthetic complications            Review of Systems / Medical History  Patient summary reviewed and pertinent labs reviewed    Pulmonary              Pertinent negatives: No sleep apnea and smoker     Neuro/Psych   Within defined limits           Cardiovascular    Hypertension: well controlled              Exercise tolerance: >4 METS     GI/Hepatic/Renal     GERD: well controlled        Pertinent negatives: No hiatal hernia   Endo/Other    Diabetes: poorly controlled, type 2, using insulin  Hyperthyroidism: well controlled       Other Findings              Physical Exam    Airway  Mallampati: I  TM Distance: > 6 cm  Neck ROM: normal range of motion   Mouth opening: Normal     Cardiovascular    Rhythm: regular  Rate: normal         Dental      Comments: Multiple chipped and cracked   Pulmonary  Breath sounds clear to auscultation               Abdominal  GI exam deferred       Other Findings            Anesthetic Plan    ASA: 3  Anesthesia type: general          Induction: Intravenous  Anesthetic plan and risks discussed with: Patient

## 2021-04-12 NOTE — PERIOP NOTES
Dr. Ignacia Zhang and Dr. Cassandra Sullivan both aware of plavix and asa dose given today. No further orders.

## 2021-04-12 NOTE — DIABETES MGMT
Diabetes Patient/Family Education Record  Factors That  May Influence Patients Ability  to Learn or  Comply with Recommendations   []   Language barrier    []   Cultural needs   []   Motivation    []   Cognitive limitation    []   Physical   []   Education    []   Physiological factors   []   Hearing/vision/speaking impairment   []   Lutheran beliefs    []   Financial factors   []  Other:   [x]  No factors identified at this time.      Person Instructed:   [x]   Patient   []   Family   []  Other     Preference for Learning:   [x]   Verbal   []   Written   []  Demonstration     Level of Comprehension & Competence:    [x]  Good                                      [] Fair                                     []  Poor                             []  Needs Reinforcement   [x]  Teachback completed    Education Component:   [x]  Medication management, including how to administer insulin (if appropriate) and potential medication interactions    [x]  Nutritional management -obtain usual meal pattern   []  Exercise   []  Signs, symptoms, and treatment of hyperglycemia and hypoglycemia   [] Prevention, recognition and treatment of hyperglycemia and hypoglycemia   []  Importance of blood glucose monitoring and how to obtain a blood glucose meter    []  Instruction on use of the blood glucose meter   [x]  Discuss the importance of HbA1C monitoring    []  Sick day guidelines   []  Proper use and disposal of lancets, needles, syringes or insulin pens (if appropriate)   []  Potential long-term complications (retinopathy, kidney disease, neuropathy, foot care)   [] Information about whom to contact in case of emergency or for more information    [x]  Goal:  Patient/family will demonstrate understanding of Diabetes Self Management Skills by: (date) ___5/31____  Plan for post-discharge education or self-management support:    [] Outpatient class schedule provided            [] Patient Declined    [] Scheduled for outpatient classes (date) _______  Verify:  Does patient understand how diabetes medications work? ______yes______________________  Does patient know what their most recent A1c is? ___yes________________________________  Does patient monitor glucose at home? ___________no________________________________  Does patient have difficulty obtaining diabetes medications or testing supplies? ______no___________         Margarette Gbison MS, RN, CDE  Glycemic Control Team  [phone]  Pager [phone] (- 8:00-4:30P)  *After Hours pager [phone]

## 2021-04-12 NOTE — DISCHARGE INSTRUCTIONS
Patient Education        Angiogram on 4/07/2021 by Dr Ann Escoto : What to Expect at Home  Your Recovery  An angiogram is an X-ray test that uses dye and a camera to take pictures of the blood flow in an artery or a vein. The doctor inserted a thin, flexible tube (catheter) into a blood vessel in your groin. In some cases, the catheter is placed in a blood vessel in the arm. An angiogram is done for many reasons. For example, you may have an angiogram to find the source of bleeding, such as an ulcer. Or it may be done to look for blocked blood vessels in your lungs. After an angiogram, your groin or arm may have a bruise and feel sore for a day or two. You can do light activities around the house but nothing strenuous for several days. Your doctor may give you specific instructions on when you can do your normal activities again, such as driving and going back to work. This care sheet gives you a general idea about how long it will take for you to recover. But each person recovers at a different pace. Follow the steps below to feel better as quickly as possible. How can you care for yourself at home? Activity    · Do not do strenuous exercise and do not lift, pull, or push anything heavy until your doctor says it is okay. This may be for a day or two. You can walk around the house and do light activity, such as cooking.     · If the catheter was placed in your groin, try not to walk up stairs for the first couple of days.     · If the catheter was placed in your arm near your wrist, do not bend your wrist deeply for the first couple of days. Be careful using your hand to get into and out of a chair or bed.     · If your doctor recommends it, get more exercise. Walking is a good choice. Bit by bit, increase the amount you walk every day. Try for at least 30 minutes on most days of the week. Diet    · Drink plenty of fluids to help your body flush out the dye.  If you have kidney, heart, or liver disease and have to limit fluids, talk with your doctor before you increase the amount of fluids you drink.     · You can eat your normal diet. If your stomach is upset, try bland, low-fat foods like plain rice, broiled chicken, toast, and yogurt. Medicines    · Be safe with medicines. Read and follow all instructions on the label. ? If the doctor gave you a prescription medicine for pain, take it as prescribed. ? If you are not taking a prescription pain medicine, ask your doctor if you can take an over-the-counter medicine.     · If you take aspirin or some other blood thinner, ask your doctor if and when to start taking it again. Make sure that you understand exactly what your doctor wants you to do.     · Your doctor will tell you if and when you can restart your medicines. He or she will also give you instructions about taking any new medicines. Care of the catheter site    · You will have a dressing over the cut (incision). A dressing helps the incision heal and protects it. Your doctor will tell you how to take care of this.     · Put ice or a cold pack on the area for 10 to 20 minutes at a time to help with soreness or swelling. Put a thin cloth between the ice and your skin.     · You may shower 24 to 48 hours after the procedure, if your doctor okays it. Pat the incision dry.     · Do not soak the catheter site until it is healed. Don't take a bath for 1 week, or until your doctor tells you it is okay.     · Watch for bleeding from the site. A small amount of blood (up to the size of a quarter) on the bandage can be normal.     · If you are bleeding, lie down and press on the area for 15 minutes to try to make it stop. If the bleeding does not stop, call your doctor or seek immediate medical care. Follow-up care is a key part of your treatment and safety. Be sure to make and go to all appointments, and call your doctor if you are having problems.  It's also a good idea to know your test results and keep a list of the medicines you take. When should you call for help? Call 911 anytime you think you may need emergency care. For example, call if:    · You passed out (lost consciousness).     · You have severe trouble breathing.     · You have sudden chest pain and shortness of breath, or you cough up blood. Call your doctor now or seek immediate medical care if:    · You are bleeding from the area where the catheter was put in your artery.     · You have a fast-growing, painful lump at the catheter site.     · You have signs of infection, such as:  ? Increased pain, swelling, warmth, or redness. ? Red streaks leading from the incision. ? Pus draining from the incision. ? A fever. Watch closely for any changes in your health, and be sure to contact your doctor if:    · You don't get better as expected. Where can you learn more? Go to http://www.gray.com/  Enter [id number] in the search box to learn more about \"Angiogram: What to Expect at Home. \"  Current as of: September 23, 2020               Content Version: 12.8  © 7171-7434 [employer]. Care instructions adapted under license by [employer] (which disclaims liability or warranty for this information). If you have questions about a medical condition or this instruction, always ask your healthcare professional. [address] any warranty or liability for your use of this information. DO YOU KNOW THE WARNING SIGNS OF HEART DISEASE? People with diabetes are 2 times as likely to get heart disease. Talk to your doctor about reducing your risk factors today. What is heart disease?  Heart disease occurs when blood vessels become blocked and the heart can't pump enough oxygen-rich blood to the rest of the body.       Heart disease is a chronic condition that progresses over time: at first, people may not have symptoms, but over time they may experience shortness of breath, fatigue, irregular heartbeat, chest pain, and a lack of ability to exercise or walk around.  This can lead to heart attack, stroke, and eventually heart failure. WHAT CAN YOU DO TO LOWER YOUR RISK & SUPPORT A HEALTHY HEART? Manage your blood glucose, blood pressure, and cholesterol levels.  Know your A1C and time in range. Talk with your doctor about ways to achieve your time in range target.  Ask your doctor about SGLT-2 inhibitors and GLP-1 agonists to lower blood glucose and improve heart health.  A statin at full dose for managing LDL cholesterol is important for people with type 2 diabetes.  Screen for and treat high blood pressure: aim for a blood pressure of less than 130/80. Do not smoke.  If you smoke, talk with your doctor about a plan to quit. Exercise regularly and lose weight if needed.  150 minutes of exercise a week or 10,000 steps per day is a great target for exercise. Exercise helps the heart stay strong and slows the progression of heart failure; if you already have heart disease, talk with your doctor about safe ways to approach physical activity, such as walking.  Ask your doctor if it would be helpful to lose weight. Eat a healthy, balanced diet that supports stable blood glucose levels.  Eat fiber-rich foods -- see [employer]org/nutrition.  Avoid sugar and reduce carbohydrates, salt, and saturated fats. Avoiding highly processed/packaged foods can help you do this.       OTHER RISK FACTORS    []  HIGH BLOOD PRESSURE  []  HIGH CHOLESTEROL  []  OBESITY  []  SMOKING  []  LACK OF PHYSICAL ACTIVITY  []  FAMILY HISTORY OF HEART DISEASE  []

## 2021-04-12 NOTE — PERIOP NOTES
TRANSFER - OUT REPORT:    Verbal report given to AMANDEEP Ghosh (name) on Isaiah Peters  being transferred to [facility](unit) for routine post - op       Report consisted of patients Situation, Background, Assessment and   Recommendations(SBAR). Information from the following report(s) SBAR, Kardex, OR Summary, MAR and Cardiac Rhythm NSR was reviewed with the receiving nurse. Lines:   PICC Double Lumen 66/70/69 Right;Basilic (Active)   Central Line Being Utilized Yes 04/12/21 1925   Criteria for Appropriate Use Long term IV/antibiotic administration 04/12/21 0912   Site Assessment Clean, dry, & intact 04/12/21 1925   Phlebitis Assessment 0 04/12/21 1925   Infiltration Assessment 0 04/12/21 1925   Dressing Status Clean, dry, & intact 04/12/21 1925   Action Taken Open ports on tubing capped 04/12/21 0912   Dressing Type Disk with Chlorhexadine gluconate (CHG) 04/12/21 1925   Hub Color/Line Status Red;Capped;Flushed 04/12/21 0912   Positive Blood Return (Site #1) Yes 04/12/21 0912   Hub Color/Line Status Purple;Capped; Patent 04/12/21 0912   Positive Blood Return (Site #2) Yes 04/12/21 0912   Alcohol Cap Used Yes 04/12/21 0912        Opportunity for questions and clarification was provided.       Patient transported with:   O2 @ 2 liters  Registered Nurse  [facility]

## 2021-04-12 NOTE — DIABETES MGMT
GLYCEMIC CONTROL PLAN OF CARE        Diabetes Management:      Assessment: known h/o T2DM,HbA1C not within recommended range for age + comorbids    BG in target range (non-ICU\" < 180 ; -180):  [] Yes  [x] No      Steroids: No    TDD previous day = 10 units Humalog Normal Insulin Sensitivity Corrective Coverage    Recommend: continue with corrective coverage  - recommend discharge on Metformin with OP PCM follow up  - pt told this writer no h/o DM prior to admission to [facility]  - [address] RN will provide new diagnosis education  - see [facility] RN ed notes    Most recent blood glucose results:   Lab Results   Component Value Date/Time     (H) 04/12/2021 04:27 AM    GLUCPOC 145 (H) 04/12/2021 07:38 AM    GLUCPOC 165 (H) 04/11/2021 10:13 PM    GLUCPOC 132 (H) 04/11/2021 04:43 PM         Hypo: No    HbA1C: equivalent  to ave BGlucose of 192 mg/dl for 2-3 months prior to admission  Lab Results   Component Value Date/Time    Hemoglobin A1c 8.4 (H) 04/04/2021 12:05 PM       Adequate glycemic control PTA:  [] Yes  [x] No      Home diabetes medications:   Key Antihyperglycemic Medications     Patient is on no antihyperglycemic meds.         Goals:  Blood glucose will be within target range of 70 - 180 mg/dL by: 4/22    Diet:   Active Orders   Diet    DIET NPO       Patient Vitals for the past 100 hrs:   % Diet Eaten   04/08/21 1621 30 %   04/08/21 0842 100 %       Education:  __X___ Refer to Diabetes Education Record (attempted to see patient today off unit for procedure)                       _____ Education not indicated at this time        Caro Apodaca 87, RN, CDE  Glycemic Control Team  [phone]  Pager [phone] (M-TH 8:00-4:30P)  *After Hours pager [phone]

## 2021-04-12 NOTE — PROGRESS NOTES
Progress Note      Patient: Adan Rojas               Sex: female          DOA: 4/4/2021       YOB: 1970      Age:  46 y.o.        LOS:  LOS: 8 days               Subjective:   Patient seen today for follow up.   Wound vac in place    Medications:     Current Facility-Administered Medications:     losartan (COZAAR) tablet 25 mg, 25 mg, Oral, DAILY, Love Jobs, DO, 25 mg at 04/11/21 0926    insulin lispro (HUMALOG) injection 2 Units, 2 Units, SubCUTAneous, TIDAC, Love Jobs, DO, 2 Units at 04/11/21 1808    meropenem (MERREM) 1 g in sterile water (preservative free) 20 mL IV syringe, 1 g, IntraVENous, Q8H, Orly Guevara MD, 1 g at 04/12/21 0255    heparin (porcine) 100 unit/mL injection 500 Units, 500 Units, InterCATHeter, Q8H PRN, CHRISTINE Watson, 500 Units at 04/09/21 1151    fentaNYL citrate (PF) injection  mcg,  mcg, IntraVENous, Rad Antonella Nieves Bennett Drum, MD, 25 mcg at 04/07/21 1559    clopidogreL (PLAVIX) tablet 75 mg, 75 mg, Oral, DAILY, Pauline Gowers, MD, 75 mg at 04/11/21 6975    aspirin chewable tablet 81 mg, 81 mg, Oral, DAILY, Pauline Gowers, MD, 81 mg at 04/11/21 0926    ondansetron (ZOFRAN) injection 4 mg, 4 mg, IntraVENous, Q8H PRN, Zee Moreau MD, 4 mg at 04/09/21 1714    midazolam (VERSED) injection 0.5-2 mg, 0.5-2 mg, IntraVENous, Antonella Lguo Bennett Drum, MD, 0.5 mg at 04/07/21 1600    heparin (porcine) 1,000 unit/mL injection 1,000-10,000 Units, 1,000-10,000 Units, IntraVENous, Rad Antonella Nieves Bennett Drum, MD, 1,000 Units at 04/07/21 1527    morphine injection 2 mg, 2 mg, IntraVENous, Q3H PRN, Payal Ribera MD, 2 mg at 04/11/21 1815    acetaminophen (TYLENOL) tablet 650 mg, 650 mg, Oral, Q6H PRN, Gabi Ribera MD, 650 mg at 04/07/21 3099    sodium chloride (NS) flush 5-40 mL, 5-40 mL, IntraVENous, Q8H, Zee Bonilla MD, 10 mL at 04/12/21 3729   sodium chloride (NS) flush 5-40 mL, 5-40 mL, IntraVENous, PRN, Zee Conley MD    heparin (porcine) injection 5,000 Units, 5,000 Units, SubCUTAneous, Q8H, Zee Bonilla MD, 5,000 Units at 04/12/21 0254    glucose chewable tablet 16 g, 16 g, Oral, PRN, Nena Conley MD    glucagon (GLUCAGEN) injection 1 mg, 1 mg, IntraMUSCular, PRN, Larisa Phillips MD    insulin lispro (HUMALOG) injection, , SubCUTAneous, AC&HS, Larisa Phillips MD, 2 Units at 04/11/21 2238    dextrose 10% infusion 125-250 mL, 125-250 mL, IntraVENous, PRN, Larisa Phillips MD    traMADoL (ULTRAM) tablet 50 mg, 50 mg, Oral, Q6H PRN, Larisa Phillips MD, 50 mg at 04/09/21 1714    atorvastatin (LIPITOR) tablet 40 mg, 40 mg, Oral, DAILY, Mimi MOSQUEDA NP, 40 mg at 04/11/21 0926    melatonin (rapid dissolve) tablet 5 mg, 5 mg, Oral, QHS PRN, Danyelle Ribera MD, 5 mg at 04/07/21 2200    Review of Systems  Negative for chest pain and shortness of breath        Objective:      Visit Vitals  /62 (BP 1 Location: Left arm, BP Patient Position: At rest)   Pulse 71   Temp 98.4 °F (36.9 °C)   Resp 16   Ht 5' 8.03\" (1.728 m)   Wt 79 kg (174 lb 1.6 oz)   SpO2 98%   BMI 26.47 kg/m²       Physical Exam:  Open necrotic amp site of the left foot.      Labs:  Recent Results (from the past 24 hour(s))   GLUCOSE, POC    Collection Time: 04/11/21  8:30 AM   Result Value Ref Range    Glucose (POC) 143 (H) 70 - 110 mg/dL   GLUCOSE, POC    Collection Time: 04/11/21 12:31 PM   Result Value Ref Range    Glucose (POC) 157 (H) 70 - 110 mg/dL   GLUCOSE, POC    Collection Time: 04/11/21  4:43 PM   Result Value Ref Range    Glucose (POC) 132 (H) 70 - 110 mg/dL   GLUCOSE, POC    Collection Time: 04/11/21 10:13 PM   Result Value Ref Range    Glucose (POC) 165 (H) 70 - 110 mg/dL   CBC WITH AUTOMATED DIFF    Collection Time: 04/12/21  4:27 AM   Result Value Ref Range    WBC 8.2 4.6 - 13.2 K/uL    RBC 2.35 (L) 4.20 - 5.30 M/uL    HGB 7.3 (L) 12.0 - 16.0 g/dL    HCT 22.4 (L) 35.0 - 45.0 %    MCV 95.3 74.0 - 97.0 FL    MCH 31.1 24.0 - 34.0 PG    MCHC 32.6 31.0 - 37.0 g/dL    RDW 12.3 11.6 - 14.5 %    PLATELET 470 949 - 504 K/uL    MPV 8.6 (L) 9.2 - 11.8 FL    NEUTROPHILS 74 (H) 40 - 73 %    LYMPHOCYTES 13 (L) 21 - 52 %    MONOCYTES 9 3 - 10 %    EOSINOPHILS 2 0 - 5 %    BASOPHILS 1 0 - 2 %    ABS. NEUTROPHILS 6.0 1.8 - 8.0 K/UL    ABS. LYMPHOCYTES 1.1 0.9 - 3.6 K/UL    ABS. MONOCYTES 0.7 0.05 - 1.2 K/UL    ABS. EOSINOPHILS 0.2 0.0 - 0.4 K/UL    ABS. BASOPHILS 0.1 0.0 - 0.1 K/UL    DF AUTOMATED     METABOLIC PANEL, BASIC    Collection Time: 04/12/21  4:27 AM   Result Value Ref Range    Sodium 139 136 - 145 mmol/L    Potassium 4.0 3.5 - 5.5 mmol/L    Chloride 107 100 - 111 mmol/L    CO2 27 21 - 32 mmol/L    Anion gap 5 3.0 - 18 mmol/L    Glucose 135 (H) 74 - 99 mg/dL    BUN 11 7.0 - 18 MG/DL    Creatinine 0.87 0.6 - 1.3 MG/DL    BUN/Creatinine ratio 13 12 - 20      GFR est AA >60 >60 ml/min/1.73m2    GFR est non-AA >60 >60 ml/min/1.73m2    Calcium 7.9 (L) 8.5 - 10.1 MG/DL           Assessment/Plan     Principal Problem:    Gangrene of left foot (HCC) (4/9/2021)    Active Problems:    Osteomyelitis ([facility] [address] (4/4/2021)      Type 2 diabetes mellitus with left diabetic foot ulcer ([facility] [address] (4/4/2021)      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 ([facility] [address] (4/4/2021)      YUNIEL (acute kidney injury) ([facility] [address] (4/4/2021)      PAD (peripheral artery disease) ([facility] [address] (4/4/2021)      Bilateral carotid artery stenosis (4/5/2021)        Patient seen and evaluated today. Recommended transmetatarsal amputation today with flap closure. High probability of BKA given poor perfusion, extent of necrosis and underlying OM. Will make every effort for limb salvage.

## 2021-04-12 NOTE — PROGRESS NOTES
Problem: Falls - Risk of  Goal: *Absence of Falls  Description: Document Vasquez Maple Fall Risk and appropriate interventions in the flowsheet. Outcome: Progressing Towards Goal  Note: Fall Risk Interventions:  Mobility Interventions: Assess mobility with egress test, Patient to call before getting OOB, Utilize walker, cane, or other assistive device         Medication Interventions: Teach patient to arise slowly    Elimination Interventions: Call light in reach    History of Falls Interventions: Evaluate medications/consider consulting pharmacy         Problem: Patient Education: Go to Patient Education Activity  Goal: Patient/Family Education  Outcome: Progressing Towards Goal     Problem: Lower Extremity Wound Care  Goal: *Non-infected wound: Improvement of existing wound, absence of infection, and maintenance of skin integrity  Outcome: Progressing Towards Goal  Goal: *Infected Wound: Prevention of further infection and promotion of healing  Description: Infection control procedures (eg: clean dressings, clean gloves, hand washing, precautions to isolate wound from contamination, sterile instruments used for wound debridement) should be implemented. Outcome: Progressing Towards Goal  Goal: Interventions  Outcome: Progressing Towards Goal     Problem: Patient Education: Go to Patient Education Activity  Goal: Patient/Family Education  Outcome: Progressing Towards Goal     Problem: Diabetes Self-Management  Goal: *Disease process and treatment process  Description: Define diabetes and identify own type of diabetes; list 3 options for treating diabetes. Outcome: Progressing Towards Goal  Goal: *Incorporating nutritional management into lifestyle  Description: Describe effect of type, amount and timing of food on blood glucose; list 3 methods for planning meals.   Outcome: Progressing Towards Goal  Goal: *Incorporating physical activity into lifestyle  Description: State effect of exercise on blood glucose levels. Outcome: Progressing Towards Goal  Goal: *Developing strategies to promote health/change behavior  Description: Define the ABC's of diabetes; identify appropriate screenings, schedule and personal plan for screenings. Outcome: Progressing Towards Goal  Goal: *Using medications safely  Description: State effect of diabetes medications on diabetes; name diabetes medication taking, action and side effects. Outcome: Progressing Towards Goal  Goal: *Monitoring blood glucose, interpreting and using results  Description: Identify recommended blood glucose targets  and personal targets. Outcome: Progressing Towards Goal  Goal: *Prevention, detection, treatment of acute complications  Description: List symptoms of hyper- and hypoglycemia; describe how to treat low blood sugar and actions for lowering  high blood glucose level. Outcome: Progressing Towards Goal  Goal: *Prevention, detection and treatment of chronic complications  Description: Define the natural course of diabetes and describe the relationship of blood glucose levels to long term complications of diabetes. Outcome: Progressing Towards Goal  Goal: *Developing strategies to address psychosocial issues  Description: Describe feelings about living with diabetes; identify support needed and support network  Outcome: Progressing Towards Goal  Goal: *Insulin pump training  Outcome: Progressing Towards Goal  Goal: *Sick day guidelines  Outcome: Progressing Towards Goal  Goal: *Patient Specific Goal (EDIT GOAL, INSERT TEXT)  Outcome: Progressing Towards Goal     Problem: Patient Education: Go to Patient Education Activity  Goal: Patient/Family Education  Outcome: Progressing Towards Goal     Problem: Pressure Injury - Risk of  Goal: *Prevention of pressure injury  Description: Document Flo Scale and appropriate interventions in the flowsheet.   Outcome: Progressing Towards Goal  Note: Pressure Injury Interventions:  Sensory Interventions: Assess changes in LOC, Avoid rigorous massage over bony prominences, Minimize linen layers, Keep linens dry and wrinkle-free, Pressure redistribution bed/mattress (bed type)    Moisture Interventions: Absorbent underpads, Minimize layers    Activity Interventions: Pressure redistribution bed/mattress(bed type)    Mobility Interventions: HOB 30 degrees or less    Nutrition Interventions: Document food/fluid/supplement intake    Friction and Shear Interventions: Minimize layers                Problem: Patient Education: Go to Patient Education Activity  Goal: Patient/Family Education  Outcome: Progressing Towards Goal     Problem: Pain  Goal: *Control of Pain  Outcome: Progressing Towards Goal

## 2021-04-12 NOTE — PROGRESS NOTES
Hospitalist Progress Note    Patient: Vanessa Vaca MRN: [id number]  [facility]: [id number]    YOB: 1970  Age: 46 y.o. Sex: female    DOA: 4/4/2021 LOS:  LOS: 8 days                Assessment/Plan     Patient Active Problem List   Diagnosis Code    Osteomyelitis ([facility] M86.9    Type 2 diabetes mellitus with left diabetic foot ulcer ([facility] E11.621, L97.529    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 ([facility] E11.52    YUNIEL (acute kidney injury) ([facility] N17.9    PAD (peripheral artery disease) ([facility] I73.9    Bilateral carotid artery stenosis I65.23    Gangrene of left foot (HCC) I96    Non compliance with medical treatment Z91.19            46 y.o. female with no significant past history with concerns of left great toe infection and bluish discoloration. Likely undiagnosed DM and CKD. DFU/gangrene -  Continue with antibiotics  S/p I&D with amputation left 1st ray. On 04/06/2021. Plan for TMA today    Osteomyelitis -  Seen by ID,  Cultures growing MSSA  On meropenem     PAD -  Left LE arterial dopplers reviewed. S/p left LE angiogram with angioplasty, atherectomy. Discussed with vascular, right LE arterial dopplers with SFA occlusion is chronic. Will need angiogram and further inventigation as outpatient. Bilateral carotid artery -  Carotid doppler with 70% stenosis bilaterally. On aspirin, plavix, statin     YUNIEL -  resolved     DM -  continue on SSI  ADA diet  HbA1c 8.4    She is very uncooperative, does not want to discuss about her care. Does not want to listen about her severe PVD. Disposition : TBD    Review of systems  General: No fevers or chills. Cardiovascular: No chest pain or pressure. No palpitations. Pulmonary: No shortness of breath. Gastrointestinal: No nausea, vomiting. Physical Exam:  General: Awake, cooperative, no acute distress    HEENT: NC, Atraumatic. PERRLA, anicteric sclerae. Bilateral carotid bruit.   Lungs: CTA Bilaterally. No Wheezing/Rhonchi/Rales. Heart:  S1 S2,  No murmur, No Rubs, No Gallops  Abdomen: Soft, Non distended, Non tender.  +Bowel sounds,   Extremities: Left foot with dressing, s/p surgery as above  Psych:   Not anxious or agitated. Neurologic:  No acute neurological deficit. Vital signs/Intake and Output:  Visit Vitals  BP (!) 153/59   Pulse 67   Temp 98.4 °F (36.9 °C)   Resp 16   Ht 5' 8\" (1.727 m)   Wt 79 kg (174 lb 1.6 oz)   SpO2 99%   BMI 26.47 kg/m²     Current Shift:  No intake/output data recorded. Last three shifts:  04/10 1901 - 04/12 0700  In: -   Out: 3000 [Urine:3000]            Labs: Results:       Chemistry Recent Labs     04/12/21  0427 04/11/21  0411 04/10/21  0333   * 120* 133*    140 140   K 4.0 4.3 4.2    108 108   CO2 27 28 27   BUN 11 9 7   CREA 0.87 0.84 0.83   CA 7.9* 8.1* 7.8*   AGAP 5 4 5   BUCR 13 11* 8*      CBC w/Diff Recent Labs     04/12/21 0427 04/11/21 0411   WBC 8.2 7.2   RBC 2.35* 2.36*   HGB 7.3* 7.2*   HCT 22.4* 22.6*    332   GRANS 74* 77*   LYMPH 13* 12*   EOS 2 2      Cardiac Enzymes No results for input(s): CPK, CKND1, CONSTANTINO in the last 72 hours. No lab exists for component: CKRMB, TROIP   Coagulation No results for input(s): PTP, INR, APTT, INREXT, INREXT in the last 72 hours. Lipid Panel No results found for: CHOL, CHOLPOCT, CHOLX, CHLST, CHOLV, 297133, HDL, HDLP, LDL, LDLC, DLDLP, 813555, VLDLC, VLDL, TGLX, TRIGL, TRIGP, TGLPOCT, CHHD, CHHDX   BNP No results for input(s): BNPP in the last 72 hours. Liver Enzymes No results for input(s): TP, ALB, TBIL, AP in the last 72 hours.     No lab exists for component: SGOT, GPT, DBIL   Thyroid Studies No results found for: T4, T3U, TSH, TSHEXT, TSHEXT     Procedures/imaging: see electronic medical records for all procedures/Xrays and details which were not copied into this note but were reviewed prior to creation of Plan

## 2021-04-12 NOTE — PROGRESS NOTES
Problem: Falls - Risk of  Goal: *Absence of Falls  Description: Document Dallin Khan Fall Risk and appropriate interventions in the flowsheet. Outcome: Progressing Towards Goal  Note: Fall Risk Interventions:  Mobility Interventions: Utilize walker, cane, or other assistive device         Medication Interventions: Teach patient to arise slowly    Elimination Interventions: Call light in reach    History of Falls Interventions: Evaluate medications/consider consulting pharmacy         Problem: Pressure Injury - Risk of  Goal: *Prevention of pressure injury  Description: Document Flo Scale and appropriate interventions in the flowsheet.   Outcome: Progressing Towards Goal  Note: Pressure Injury Interventions:  Sensory Interventions: Minimize linen layers    Moisture Interventions: Absorbent underpads    Activity Interventions: Pressure redistribution bed/mattress(bed type)    Mobility Interventions: HOB 30 degrees or less    Nutrition Interventions: Document food/fluid/supplement intake    Friction and Shear Interventions: Minimize layers                Problem: Pain  Goal: *Control of Pain  Outcome: Progressing Towards Goal

## 2021-04-13 LAB
GLUCOSE BLD STRIP.AUTO-MCNC: 124 MG/DL (ref 70–110)
GLUCOSE BLD STRIP.AUTO-MCNC: 149 MG/DL (ref 70–110)
GLUCOSE BLD STRIP.AUTO-MCNC: 156 MG/DL (ref 70–110)
GLUCOSE BLD STRIP.AUTO-MCNC: 157 MG/DL (ref 70–110)

## 2021-04-13 PROCEDURE — 74011000258 HC RX REV CODE- 258: Performed by: INTERNAL MEDICINE

## 2021-04-13 PROCEDURE — 74011250637 HC RX REV CODE- 250/637: Performed by: STUDENT IN AN ORGANIZED HEALTH CARE EDUCATION/TRAINING PROGRAM

## 2021-04-13 PROCEDURE — 77010033678 HC OXYGEN DAILY

## 2021-04-13 PROCEDURE — 74011636637 HC RX REV CODE- 636/637: Performed by: INTERNAL MEDICINE

## 2021-04-13 PROCEDURE — 74011250636 HC RX REV CODE- 250/636: Performed by: HOSPITALIST

## 2021-04-13 PROCEDURE — 74011250637 HC RX REV CODE- 250/637: Performed by: HOSPITALIST

## 2021-04-13 PROCEDURE — 74011250636 HC RX REV CODE- 250/636: Performed by: INTERNAL MEDICINE

## 2021-04-13 PROCEDURE — 74011250637 HC RX REV CODE- 250/637: Performed by: NURSE PRACTITIONER

## 2021-04-13 PROCEDURE — 74011000258 HC RX REV CODE- 258: Performed by: HOSPITALIST

## 2021-04-13 PROCEDURE — 82962 GLUCOSE BLOOD TEST: CPT

## 2021-04-13 PROCEDURE — 74011250637 HC RX REV CODE- 250/637: Performed by: INTERNAL MEDICINE

## 2021-04-13 PROCEDURE — 65270000029 HC RM PRIVATE

## 2021-04-13 PROCEDURE — 74011636637 HC RX REV CODE- 636/637: Performed by: HOSPITALIST

## 2021-04-13 RX ADMIN — Medication 10 ML: at 06:11

## 2021-04-13 RX ADMIN — LOSARTAN POTASSIUM 25 MG: 25 TABLET, FILM COATED ORAL at 08:40

## 2021-04-13 RX ADMIN — INSULIN LISPRO 2 UNITS: 100 INJECTION, SOLUTION INTRAVENOUS; SUBCUTANEOUS at 08:39

## 2021-04-13 RX ADMIN — INSULIN LISPRO 2 UNITS: 100 INJECTION, SOLUTION INTRAVENOUS; SUBCUTANEOUS at 08:40

## 2021-04-13 RX ADMIN — SODIUM CHLORIDE 3 G: 900 INJECTION INTRAVENOUS at 17:09

## 2021-04-13 RX ADMIN — Medication 10 ML: at 17:08

## 2021-04-13 RX ADMIN — TRAMADOL HYDROCHLORIDE 50 MG: 50 TABLET, FILM COATED ORAL at 12:24

## 2021-04-13 RX ADMIN — SODIUM CHLORIDE 1.5 G: 900 INJECTION INTRAVENOUS at 12:13

## 2021-04-13 RX ADMIN — SODIUM CHLORIDE 1.5 G: 900 INJECTION INTRAVENOUS at 06:11

## 2021-04-13 RX ADMIN — INSULIN LISPRO 2 UNITS: 100 INJECTION, SOLUTION INTRAVENOUS; SUBCUTANEOUS at 17:08

## 2021-04-13 RX ADMIN — CLOPIDOGREL BISULFATE 75 MG: 75 TABLET ORAL at 08:40

## 2021-04-13 RX ADMIN — INSULIN LISPRO 2 UNITS: 100 INJECTION, SOLUTION INTRAVENOUS; SUBCUTANEOUS at 12:13

## 2021-04-13 RX ADMIN — TRAMADOL HYDROCHLORIDE 50 MG: 50 TABLET, FILM COATED ORAL at 23:09

## 2021-04-13 RX ADMIN — ASPIRIN 81 MG: 81 TABLET, CHEWABLE ORAL at 08:40

## 2021-04-13 RX ADMIN — ATORVASTATIN CALCIUM 40 MG: 20 TABLET, FILM COATED ORAL at 08:40

## 2021-04-13 NOTE — PROGRESS NOTES
Hospitalist Progress Note    Patient: Gera Area MRN: [id number]  CSN: [id number]    YOB: 1970  Age: 46 y.o. Sex: female    DOA: 4/4/2021 LOS:  LOS: 9 days                Assessment/Plan     Patient Active Problem List   Diagnosis Code    Osteomyelitis ([facility] M86.9    Type 2 diabetes mellitus with left diabetic foot ulcer ([facility] E11.621, L97.529    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 ([facility] E11.52    YUNIEL (acute kidney injury) ([facility] N17.9    PAD (peripheral artery disease) ([facility] I73.9    Bilateral carotid artery stenosis I65.23    Gangrene of left foot (HCC) I96    Non compliance with medical treatment Z91.19            46 y.o. female with no significant past history with concerns of left great toe infection and bluish discoloration. Likely undiagnosed DM and CKD. DFU/gangrene -  Continue with antibiotics  S/p I&D with amputation left 1st ray. On 04/06/2021. S/p left TMA. Osteomyelitis -  Seen by ID,  Cultures growing MSSA  On meropenem     PAD -  Left LE arterial dopplers reviewed. S/p left LE angiogram with angioplasty, atherectomy. Discussed with vascular, right LE arterial dopplers with SFA occlusion is chronic. Will need angiogram and further inventigation as outpatient. Bilateral carotid artery -  Carotid doppler with 70% stenosis bilaterally. On aspirin, plavix, statin     YUNIEL -  resolved     DM -  continue on SSI  ADA diet  HbA1c 8.4    She is very uncooperative, does not want to discuss about her care. Does not want to listen about her severe PVD. Disposition : TBD    Review of systems  General: No fevers or chills. Cardiovascular: No chest pain or pressure. No palpitations. Pulmonary: No shortness of breath. Gastrointestinal: No nausea, vomiting. Physical Exam:  General: Awake, cooperative, no acute distress    HEENT: NC, Atraumatic. PERRLA, anicteric sclerae. Bilateral carotid bruit.   Lungs: CTA Bilaterally. No Wheezing/Rhonchi/Rales. Heart:  S1 S2,  No murmur, No Rubs, No Gallops  Abdomen: Soft, Non distended, Non tender.  +Bowel sounds,   Extremities: Left foot with dressing, s/p surgery as above  Psych:   Not anxious or agitated. Neurologic:  No acute neurological deficit. Vital signs/Intake and Output:  Visit Vitals  BP (!) 117/47 (BP 1 Location: Left upper arm, BP Patient Position: At rest;Lying right side)   Pulse 87   Temp 98.1 °F (36.7 °C)   Resp 16   Ht 5' 8\" (1.727 m)   Wt 79 kg (174 lb 1.6 oz)   SpO2 100%   BMI 26.47 kg/m²     Current Shift:  No intake/output data recorded. Last three shifts:  04/11 1901 - 04/13 0700  In: 800 [P.O.:500; I.V.:300]  Out: 400             Labs: Results:       Chemistry Recent Labs     04/12/21 0427 04/11/21 0411   * 120*    140   K 4.0 4.3    108   CO2 27 28   BUN 11 9   CREA 0.87 0.84   CA 7.9* 8.1*   AGAP 5 4   BUCR 13 11*      CBC w/Diff Recent Labs     04/12/21 0427 04/11/21 0411   WBC 8.2 7.2   RBC 2.35* 2.36*   HGB 7.3* 7.2*   HCT 22.4* 22.6*    332   GRANS 74* 77*   LYMPH 13* 12*   EOS 2 2      Cardiac Enzymes No results for input(s): CPK, CKND1, CONSTANTINO in the last 72 hours. No lab exists for component: CKRMB, TROIP   Coagulation No results for input(s): PTP, INR, APTT, INREXT, INREXT in the last 72 hours. Lipid Panel No results found for: CHOL, CHOLPOCT, CHOLX, CHLST, CHOLV, 334266, HDL, HDLP, LDL, LDLC, DLDLP, 181547, VLDLC, VLDL, TGLX, TRIGL, TRIGP, TGLPOCT, CHHD, CHHDX   BNP No results for input(s): BNPP in the last 72 hours. Liver Enzymes No results for input(s): TP, ALB, TBIL, AP in the last 72 hours.     No lab exists for component: SGOT, GPT, DBIL   Thyroid Studies No results found for: T4, T3U, TSH, TSHEXT, TSHEXT     Procedures/imaging: see electronic medical records for all procedures/Xrays and details which were not copied into this note but were reviewed prior to creation of Plan

## 2021-04-13 NOTE — OP NOTES
[facility]  OPERATIVE REPORT    Name:  Isidoro Romberg  MR#:   [id number]  :  1970  ACCOUNT #:  [de-identified]  DATE OF SERVICE:  2021    PREOPERATIVE DIAGNOSES:  1.  Gangrene, left foot. 2.  Peripheral artery disease, left foot. 3.  Osteomyelitis. POSTOPERATIVE DIAGNOSES:  1.  Gangrene, left foot. 2.  Peripheral artery disease, left foot. 3.  Osteomyelitis. PROCEDURES PERFORMED:  1. Transmetatarsal amputation of the left foot. 2.  Full-thickness rotational flap, greater than 20 cm2, left foot. SURGEON:  Lissy Hewitt DPM    ASSISTANT:  Roxy Nielsen    ANESTHESIA:  General.    COMPLICATIONS:  None. SPECIMENS REMOVED:  Bone    IMPLANTS:  None. ESTIMATED BLOOD LOSS:  Minimal.    INDICATIONS:  This is a very pleasant but unfortunate 60-year-old female who has had gangrene associated with the left foot. She recently underwent open incision and drainage with partial amputation of the left great toe and first metatarsal.  She is here today for transmetatarsal amputation and attempt at wound closure with rotational flap complications, including delayed healing, nonhealing, continued pain and discomfort, and need for future surgery. No guarantees were given. Informed consent was obtained. Also high risk for lower limb amputation. PROCEDURE:  The patient was taken to the operating room, placed on the table in supine position. After induction of IV sedation and administration of a general anesthetic, the left lower extremity was then prepped and draped in the usual sterile manner. No hemostasis was used. 1.  Transmetatarsal amputation, left foot. At this point, a fishmouth incision was made on the distal aspect of the left foot. The incision was deepened down to the level of the subcutaneous tissue, taking care to Bovie retract or ligate all neurovascular structures as necessary.   The incision was then deepened down to the level of the underlying periosteum, after which point, it was elevated from the underlying bone and a full-thickness rotational flap was created plantarly. I disarticulated the metatarsals so as to maintain an anatomic parabola and I sent to Pathology for gross microscopic examination. After this, I performed copious irrigation with normal sterile saline solution using pulse lavage and bacitracin. 2.  Full-thickness rotational flap, greater than 20 cm2, left. At this point, I had a very large deficit on the medial aspect of the left foot, at the site of previous amputation, incision and drainage. I made a decision at this point to extend the incision from procedure 1 more laterally, and I created a full-thickness flap along the plantar lateral aspect of the left foot. I freed as much soft tissues I could dorsally and plantarly, and then I rotated the skin in a medial direction so as to get full coverage on the medial aspect of the foot. Fortunately, I was able to cover over 20 cm2 of an open area that was left open after the prior operation. The flap plantarly seemed to be well perfused and without significant necrosis. I actually had some redundant skin left over, which I trimmed and then repaired the flap to the dorsal skin using 3-0 Vicryl in several areas, as well as 3-0 nylon in an interrupted horizontal mattress fashion using a no-touch technique. The surgical site was then dressed with Xeroform, 4x4s, Brianne, and Ace bandage dressing lightly applied. The patient was then transported from the OR to the PACU with vital signs stable and vascular status intact. She will follow up as an outpatient in my office.       GREGORIO [id number]  D:  04/12/2021 18:49  T:  04/13/2021 10:39  JOB #:  [id number]

## 2021-04-13 NOTE — DIABETES MGMT
GLYCEMIC CONTROL PLAN OF CARE        Diabetes Management:      Assessment: known h/o T2DM,HbA1C not within recommended range for age + comorbids    BG in target range (non-ICU\" < 180 ; -180):  [] Yes  [x] No      Steroids: No    TDD previous day = NPO for surgery    Recommend: continue with corrective coverage, do believe BG will trend up now that PO intake has resumed  - recommend discharge on Metformin with OP PCM follow up  - pt told this writer no h/o DM prior to admission [facility]  - [address] RN will provide new diagnosis education  - see [facility] RN ed notes    Most recent blood glucose results:   Lab Results   Component Value Date/Time    GLUCPOC 157 (H) 04/13/2021 07:37 AM    GLUCPOC 152 (H) 04/12/2021 09:15 PM    GLUCPOC 122 (H) 04/12/2021 07:04 PM         Hypo: No    HbA1C: equivalent  to ave BGlucose of 192 mg/dl for 2-3 months prior to admission  Lab Results   Component Value Date/Time    Hemoglobin A1c 8.4 (H) 04/04/2021 12:05 PM       Adequate glycemic control PTA:  [] Yes  [x] No      Home diabetes medications:   Key Antihyperglycemic Medications     Patient is on no antihyperglycemic meds. Goals:  Blood glucose will be within target range of 70 - 180 mg/dL by: 4/22    Diet:   Active Orders   Diet    DIET DIABETIC CONSISTENT CARB Regular       No data found.     Education:  __X___ Refer to Diabetes Education Record (attempted to see patient today off unit for procedure)                       _____ Education not indicated at this time        Con Apodaca 87, RN, CDE  Glycemic Control Team  [phone]  Pager [phone] (M-TH 8:00-4:30P)  *After Hours pager [phone]

## 2021-04-13 NOTE — PROGRESS NOTES
[facility]  (A Division of [address] [place] [address])    Consult FU Note      Date of Admission: 4/4/2021     Date of Note:  4/13/2021    Summary:    Dry gangrene of Left great toe/hallux post ampuation 4/6 after re-vascularization. Patient with newly diagnosed DM and with significant PVD/PAD. Wound cultures with mixed growth- MSSA and anaerobes       Subjective: \" I am going home, will not go to rehab no matter what\"      Interval History:  Events/chart reviewed. Had TMA-Left late yesterday. Biopsy sent per op Note. She has no issue with current abx- no            Current Antimicrobials: Prior Antimicrobials:   Unasyn 4/12 to date Ceftriaxone 4/4- 4/9  Vancomycin 4/4 to 4/9  Meropenem 4/9 to 4/12        Assessment:  Plan:   -L Foot (dry) gangrene - Hallux area/OM  Post ray ampuation 4/6/21- with proximal metatarsal OM and 1st proximal phalanx OM  - Left TMA 4/12/21: Pathology sent per Op Note    4/4:  CRP 14.8mg/L 4/12: 3.7  All this is the setting of newly diagnosed DMT2 (unbeknownst to her) and significant PVD/PAD for which she underwent re-vascularization procedure (4/7) after her debridement (4/6). OR cultures are growing MSSA + anaerobes   Likely OM part fully resected by TMA. Op note indicates Pathology sent, can await result for plan of final duration of Antibiotc( If Podiatry feels the resection was clearly higher from OM area, then can shorten duration to 1 week)  FU biopsy result- 4/12    For Outpatient Parenteral Antibiotic therapy( OPAT) to be arranged by [facility]. Routine PICC care( right arm) including use of TPA as needed if clogging. Unasyn 3gm IVPB Q8 hour X6 weeks from 4/12/21  Weekly CBC w/diff, BUN/Cr, CRP, LFT on Mondays- fax result to [phone]- att'n: Dr Jodie Sifuentes with me in 1 week from discharge at wound care clinic. If proximal biopsy margin is clear of OM, will dc abx. Will sign off.  Available as needed if further questions PVD/PAD  DMT2-new            Microbiology:  4/4- Blood culture: 2 set: NGSF  4/6: Wound culture: MSSA + anaerobic growth    Lines / Catheters:    Right PICC- 4/9    Patient Active Problem List   Diagnosis Code    Osteomyelitis ([facility] M86.9    Type 2 diabetes mellitus with left diabetic foot ulcer ([facility] E11.621, L97.529    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 ([facility] E11.52    YUNIEL (acute kidney injury) ([facility] N17.9    PAD (peripheral artery disease) ([facility]) I73.9    Bilateral carotid artery stenosis I65.23    Gangrene of left foot ([facility]) I96    Non compliance with medical treatment Z91.19       Current Facility-Administered Medications   Medication Dose Route Frequency    ampicillin-sulbactam (UNASYN) 1.5 g in 0.9% sodium chloride (MBP/ADV) 50 mL MBP  1.5 g IntraVENous Q6H    losartan (COZAAR) tablet 25 mg  25 mg Oral DAILY    insulin lispro (HUMALOG) injection 2 Units  2 Units SubCUTAneous TIDAC    heparin (porcine) 100 unit/mL injection 500 Units  500 Units InterCATHeter Q8H PRN    clopidogreL (PLAVIX) tablet 75 mg  75 mg Oral DAILY    aspirin chewable tablet 81 mg  81 mg Oral DAILY    ondansetron (ZOFRAN) injection 4 mg  4 mg IntraVENous Q8H PRN    midazolam (VERSED) injection 0.5-2 mg  0.5-2 mg IntraVENous Rad Multiple    heparin (porcine) 1,000 unit/mL injection 1,000-10,000 Units  1,000-10,000 Units IntraVENous Rad Multiple    morphine injection 2 mg  2 mg IntraVENous Q3H PRN    acetaminophen (TYLENOL) tablet 650 mg  650 mg Oral Q6H PRN    sodium chloride (NS) flush 5-40 mL  5-40 mL IntraVENous Q8H    sodium chloride (NS) flush 5-40 mL  5-40 mL IntraVENous PRN    [Held by provider] heparin (porcine) injection 5,000 Units  5,000 Units SubCUTAneous Q8H    glucose chewable tablet 16 g  16 g Oral PRN    glucagon (GLUCAGEN) injection 1 mg  1 mg IntraMUSCular PRN    insulin lispro (HUMALOG) injection   SubCUTAneous AC&HS    dextrose 10% infusion 125-250 mL  125-250 mL IntraVENous PRN    traMADoL (ULTRAM) tablet 50 mg  50 mg Oral Q6H PRN    atorvastatin (LIPITOR) tablet 40 mg  40 mg Oral DAILY    melatonin (rapid dissolve) tablet 5 mg  5 mg Oral QHS PRN           Objective:  Visit Vitals  BP (!) 117/47 (BP 1 Location: Left upper arm, BP Patient Position: At rest;Lying right side)   Pulse 87   Temp 98.1 °F (36.7 °C)   Resp 16   Ht 5' 8\" (1.727 m)   Wt 79 kg (174 lb 1.6 oz)   LMP 2018 (Within Years)   SpO2 100%   BMI 26.47 kg/m²       Temp (24hrs), Av °F (36.7 °C), Min:97.4 °F (36.3 °C), Max:98.7 °F (37.1 °C)      GEN: WDWN, in no acute distress  HEENT: Unicteric, intact EOM  CHEST: Non laboured breathing. EXT: Dressing on left foot  CNS: A and O X2. No gross CN deficit.  Moves all extremities  Mood: Flat affect       Lab results:    Chemistry  Recent Labs     21  0427 21  0411   * 120*    140   K 4.0 4.3    108   CO2 27 28   BUN 11 9   CREA 0.87 0.84   CA 7.9* 8.1*   AGAP 5 4   BUCR 13 11*       CBC w/ Diff  Recent Labs     21  0427 21  0411   WBC 8.2 7.2   RBC 2.35* 2.36*   HGB 7.3* 7.2*   HCT 22.4* 22.6*    332   GRANS 74* 77*   LYMPH 13* 12*   EOS 2 2       Microbiology  All Micro Results     Procedure Component Value Units Date/Time    CULTURE, BLOOD [[id number]] Collected: 21 1225    Order Status: Completed Specimen: Blood Updated: 04/10/21 0609     Special Requests: NO SPECIAL REQUESTS        Culture result: NO GROWTH 6 DAYS       CULTURE, BLOOD [[id number]] Collected: 21 1230    Order Status: Completed Specimen: Blood Updated: 04/10/21 0609     Special Requests: NO SPECIAL REQUESTS        Culture result: NO GROWTH 6 DAYS       CULTURE, ANAEROBIC [[id number]]  (Abnormal) Collected: 21 1907    Order Status: Completed Specimen: Foot, left Updated: 21 144     Special Requests: NO SPECIAL REQUESTS        Culture result:       LIGHT ANAEROBIC GRAM POSITIVE COCCI CULTURE, Lesia Hinton STAIN [[id number]]  (Abnormal)  (Susceptibility) Collected: 04/06/21 1907    Order Status: Completed Specimen: Wound from Foot Updated: 04/09/21 1010     Special Requests: NO SPECIAL REQUESTS        GRAM STAIN NO WBC'S SEEN         NO ORGANISMS SEEN        Culture result:       LIGHT STAPHYLOCOCCUS AUREUS                 Total time: 25 minutes    Tania Burnett MD  [facility])  Cell Number: . If no call or text back within 30 minutes, please call office number.   Office Number: [phone]

## 2021-04-13 NOTE — DIABETES MGMT
Diabetes Patient/Family Education Record  Factors That  May Influence Patients Ability  to Learn or  Comply with Recommendations   []   Language barrier    []   Cultural needs   []   Motivation    []   Cognitive limitation    []   Physical   []   Education    []   Physiological factors   []   Hearing/vision/speaking impairment   []   Taoist beliefs    []   Financial factors   []  Other:   [x]  No factors identified at this time.      Person Instructed:   [x]   Patient   []   Family   []  Other     Preference for Learning:   [x]   Verbal   []   Written   []  Demonstration     Level of Comprehension & Competence:    [x]  Good                                      [] Fair                                     []  Poor                             []  Needs Reinforcement   [x]  Teachback completed    Education Component:   [x]  Medication management, including how to administer insulin (if appropriate) and potential medication interactions    []  Nutritional management -obtain usual meal pattern   []  Exercise   []  Signs, symptoms, and treatment of hyperglycemia and hypoglycemia   [] Prevention, recognition and treatment of hyperglycemia and hypoglycemia   [x]  Importance of blood glucose monitoring and how to obtain a blood glucose meter; pt would like to SMBG; educated pt on best times to monitor blood glucose   []  Instruction on use of the blood glucose meter   [x]  Discuss the importance of HbA1C monitoring    []  Sick day guidelines   []  Proper use and disposal of lancets, needles, syringes or insulin pens (if appropriate)   []  Potential long-term complications (retinopathy, kidney disease, neuropathy, foot care)   [] Information about whom to contact in case of emergency or for more information    []  Goal:  Patient/family will demonstrate understanding of Diabetes Self Management Skills by: (date) _______  Plan for post-discharge education or self-management support:    [] Outpatient class schedule provided            [] Patient Declined    [] Scheduled for outpatient classes (date) _______  Verify:  Does patient understand how diabetes medications work? ____________________________  Does patient know what their most recent A1c is? ___________________________________  Does patient monitor glucose at home? ___________________________________________  Does patient have difficulty obtaining diabetes medications or testing supplies? _________________         Arthea Brittaney MS, RN, CDE  Glycemic Control Team  [phone]  Pager [phone] (M-TH 8:00-4:30P)  *After Hours pager [phone]

## 2021-04-14 VITALS
DIASTOLIC BLOOD PRESSURE: 80 MMHG | TEMPERATURE: 98 F | HEIGHT: 68 IN | RESPIRATION RATE: 18 BRPM | SYSTOLIC BLOOD PRESSURE: 131 MMHG | BODY MASS INDEX: 26.39 KG/M2 | OXYGEN SATURATION: 100 % | HEART RATE: 82 BPM | WEIGHT: 174.1 LBS

## 2021-04-14 LAB — GLUCOSE BLD STRIP.AUTO-MCNC: 151 MG/DL (ref 70–110)

## 2021-04-14 PROCEDURE — 82962 GLUCOSE BLOOD TEST: CPT

## 2021-04-14 PROCEDURE — 74011000258 HC RX REV CODE- 258: Performed by: INTERNAL MEDICINE

## 2021-04-14 PROCEDURE — 74011250637 HC RX REV CODE- 250/637: Performed by: STUDENT IN AN ORGANIZED HEALTH CARE EDUCATION/TRAINING PROGRAM

## 2021-04-14 PROCEDURE — 74011636637 HC RX REV CODE- 636/637: Performed by: HOSPITALIST

## 2021-04-14 PROCEDURE — 74011250637 HC RX REV CODE- 250/637: Performed by: INTERNAL MEDICINE

## 2021-04-14 PROCEDURE — 74011636637 HC RX REV CODE- 636/637: Performed by: INTERNAL MEDICINE

## 2021-04-14 PROCEDURE — 74011250637 HC RX REV CODE- 250/637: Performed by: NURSE PRACTITIONER

## 2021-04-14 PROCEDURE — 74011250636 HC RX REV CODE- 250/636: Performed by: INTERNAL MEDICINE

## 2021-04-14 PROCEDURE — 77010033678 HC OXYGEN DAILY

## 2021-04-14 RX ORDER — LOSARTAN POTASSIUM 25 MG/1
25 TABLET ORAL DAILY
Qty: 30 TAB | Refills: 5 | Status: SHIPPED | OUTPATIENT
Start: 2021-04-15

## 2021-04-14 RX ORDER — ATORVASTATIN CALCIUM 40 MG/1
40 TABLET, FILM COATED ORAL DAILY
Qty: 30 TAB | Refills: 5 | Status: SHIPPED | OUTPATIENT
Start: 2021-04-15

## 2021-04-14 RX ORDER — METFORMIN HYDROCHLORIDE 500 MG/1
500 TABLET ORAL 2 TIMES DAILY WITH MEALS
Qty: 60 TAB | Refills: 0 | Status: SHIPPED | OUTPATIENT
Start: 2021-04-14 | End: 2021-09-03

## 2021-04-14 RX ORDER — CLOPIDOGREL BISULFATE 75 MG/1
75 TABLET ORAL DAILY
Qty: 30 TAB | Refills: 5 | Status: SHIPPED | OUTPATIENT
Start: 2021-04-15 | End: 2021-09-03

## 2021-04-14 RX ORDER — GUAIFENESIN 100 MG/5ML
81 LIQUID (ML) ORAL DAILY
Qty: 30 TAB | Refills: 5 | Status: SHIPPED | OUTPATIENT
Start: 2021-04-15

## 2021-04-14 RX ADMIN — INSULIN LISPRO 2 UNITS: 100 INJECTION, SOLUTION INTRAVENOUS; SUBCUTANEOUS at 12:59

## 2021-04-14 RX ADMIN — SODIUM CHLORIDE 3 G: 900 INJECTION INTRAVENOUS at 12:59

## 2021-04-14 RX ADMIN — Medication 10 ML: at 07:09

## 2021-04-14 RX ADMIN — SODIUM CHLORIDE 3 G: 900 INJECTION INTRAVENOUS at 00:00

## 2021-04-14 RX ADMIN — INSULIN LISPRO 2 UNITS: 100 INJECTION, SOLUTION INTRAVENOUS; SUBCUTANEOUS at 08:42

## 2021-04-14 RX ADMIN — INSULIN LISPRO 2 UNITS: 100 INJECTION, SOLUTION INTRAVENOUS; SUBCUTANEOUS at 08:41

## 2021-04-14 RX ADMIN — ATORVASTATIN CALCIUM 40 MG: 20 TABLET, FILM COATED ORAL at 08:42

## 2021-04-14 RX ADMIN — SODIUM CHLORIDE 3 G: 900 INJECTION INTRAVENOUS at 07:08

## 2021-04-14 RX ADMIN — CLOPIDOGREL BISULFATE 75 MG: 75 TABLET ORAL at 08:42

## 2021-04-14 RX ADMIN — ASPIRIN 81 MG: 81 TABLET, CHEWABLE ORAL at 08:42

## 2021-04-14 RX ADMIN — INSULIN LISPRO 2 UNITS: 100 INJECTION, SOLUTION INTRAVENOUS; SUBCUTANEOUS at 12:58

## 2021-04-14 NOTE — PROGRESS NOTES
CM met with patient to review discharge plan. Pt informed that [facility] will manage her home IV Abx therapy. The RN will report to her home tomorrow morning for her first home dose. [facility] will provide skilled nursing and wound care and will begin services    Pt requested that [facility] staff \"knock very hard on her home door or call/text at [phone] if no one answers. She states that her bedroom is in the back of the home and it's hard to hear knocks on the door. Will, [facility] and Pt's MDs/RN made aware. Pt verbalizes understanding and agreeable to d/c plan. Pt states that her cousin will transport her home today and will assist with her care at home.

## 2021-04-14 NOTE — DISCHARGE SUMMARY
Discharge Summary    Patient: Blanca Hernandez MRN: [id number]  CSN: [id number]    YOB: 1970  Age: 46 y.o. Sex: female    DOA: 4/4/2021 LOS:  LOS: 10 days   Discharge Date:      Primary Care Provider:  None    Admission Diagnoses: Osteomyelitis [facility]) [M86.9]    Discharge Diagnoses:    Problem List as of 4/14/2021 Never Reviewed          Codes Class Noted - Resolved    Non compliance with medical treatment ICD-10-CM: Z91.19  ICD-9-CM: V15.81  4/12/2021 - Present        * (Principal) Gangrene of left foot ([facility] ICD-10-CM: U42  ICD-9-CM: 785.4  4/9/2021 - Present        Bilateral carotid artery stenosis ICD-10-CM: I65.23  ICD-9-CM: 433.10, 433.30  4/5/2021 - Present        Osteomyelitis ([place] [address].) ICD-10-CM: M86.9  ICD-9-CM: 730.20  4/4/2021 - Present        Type 2 diabetes mellitus with left diabetic foot ulcer ([facility] ICD-10-CM: E11.621, S38.021  ICD-9-CM: 250.80, 707.15  4/4/2021 - Present        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 [facility]) ICD-10-CM: E11.52  ICD-9-CM: 250.70, 443.81, 785.4  4/4/2021 - Present        YUNIEL (acute kidney injury) ([facility] ICD-10-CM: N17.9  ICD-9-CM: 584.9  4/4/2021 - Present        PAD (peripheral artery disease) ([facility] ICD-10-CM: I73.9  ICD-9-CM: 443.9  4/4/2021 - Present              Discharge Medications:     Current Discharge Medication List      START taking these medications    Details   ampicillin-sulbactam 3 gram 3 g IVPB 3 g by IntraVENous route every six (6) hours for 42 days. Qty: 1 Dose, Refills: 0      aspirin 81 mg chewable tablet Take 1 Tab by mouth daily. Qty: 30 Tab, Refills: 5      atorvastatin (LIPITOR) 40 mg tablet Take 1 Tab by mouth daily. Qty: 30 Tab, Refills: 5      clopidogreL (PLAVIX) 75 mg tab Take 1 Tab by mouth daily. Qty: 30 Tab, Refills: 5      losartan (COZAAR) 25 mg tablet Take 1 Tab by mouth daily.   Qty: 30 Tab, Refills: 5      metFORMIN (GLUCOPHAGE) 500 mg tablet Take 1 Tab by mouth two (2) times daily (with meals). Qty: 60 Tab, Refills: 0         STOP taking these medications       trimethoprim-sulfamethoxazole (BACTRIM DS, SEPTRA DS) 160-800 mg per tablet Comments:   Reason for Stopping:               Discharge Condition: Good    Procedures : Left 1st ray amputation, left TMA, angiogram LLE with angioplasty and atherectomy. Consults: Infectious Disease, Vascular Surgery and Podiatry      PHYSICAL EXAM   Visit Vitals  /80 (BP 1 Location: Left arm, BP Patient Position: At rest)   Pulse 82   Temp 98 °F (36.7 °C)   Resp 18   Ht 5' 8\" (1.727 m)   Wt 79 kg (174 lb 1.6 oz)   SpO2 100%   BMI 26.47 kg/m²     General: Awake, cooperative, no acute distress    HEENT: NC, Atraumatic. PERRLA, EOMI. Anicteric sclerae. Lungs:  CTA Bilaterally. No Wheezing/Rhonchi/Rales. Heart:  Regular  rhythm,  No murmur, No Rubs, No Gallops  Abdomen: Soft, Non distended, Non tender. +Bowel sounds,   Extremities: Left TMA, weak/absent distal pulses RLE. Psych:   Not anxious or agitated. Neurologic:  No acute neurological deficits. Admission HPI :   Amrit Gonzalez is a 46 y.o. female with no significant past history with concerns of left great toe infection and bluish discoloration. Patient reports that she first noticed a problem with her left great toe about a week ago. She was initially seen at patient [facility] on March 25, 2021. X-ray there showed osteomyelitis she was given antibiotic Bactrim and was asked to follow-up with podiatry. She was seen by podiatry on April 1, 2021. She was advised to get admitted to the hospital or go to ER however she needed to run some errands. She presented to ER today. She denies any fever, chills, headache, shortness of breath, nausea, vomiting. She denies any history of diabetes or any heart disease. She denies any problem with her kidneys, no history of any vascular disease. In ER she was noted to have elevated white count of above 13,000.   Her left great toe gangrenous. Her BUNs/creatinine at 13/1.47. Her blood glucose at 273. CT scan of her left great toe showed evidence of osteomyelitis and first MTP septic arthritis. Lower extremity arterial Doppler with evidence of peripheral arterial disease. Hospital Course :   Ms. Milagros Díaz was admitted to medical floor. She was seen and followed by podiatry, ID and vascular surgery. She did not had any acute events during hospitalization. DFU/gangrene -  Started on antibiotics  S/p I&D with amputation left 1st ray. On 04/06/2021. S/p left TMA. Cultures growing MSSA + anaerobes. ID recommended 6 weeks of unasyn.     Osteomyelitis -  Seen by ID,  Cultures growing MSSA  S/p TMA     PAD -  Left LE arterial dopplers reviewed. S/p left LE angiogram with angioplasty, atherectomy. Discussed with vascular, right LE arterial dopplers with SFA occlusion is chronic. Will need angiogram and further inventigation as outpatient. Started On aspirin, plavix, statin     Bilateral carotid artery -  Carotid doppler with 70% stenosis bilaterally.          YUNIEL -  resolved     DM -  started on SSI  ADA diet  HbA1c 8.4  Will discharge on metformin, follow up with PCP and increase dose to 1000mg. Follow with labs. Activity: Activity as tolerated    Diet: Diabetic Diet    Follow-up: PCP, vascular, podiatry    Disposition: home with home health    Minutes spent on discharge: 45       Labs: Results:       Chemistry Recent Labs     04/12/21 0427   *      K 4.0      CO2 27   BUN 11   CREA 0.87   CA 7.9*   AGAP 5   BUCR 13      CBC w/Diff Recent Labs     04/12/21 0427   WBC 8.2   RBC 2.35*   HGB 7.3*   HCT 22.4*      GRANS 74*   LYMPH 13*   EOS 2      Cardiac Enzymes No results for input(s): CPK, CKND1, CONSTANTINO in the last 72 hours. No lab exists for component: CKRMB, TROIP   Coagulation No results for input(s): PTP, INR, APTT, INREXT in the last 72 hours.     Lipid Panel No results found for: CHOL, CHOLPOCT, CHOLX, CHLST, CHOLV, [id number], HDL, HDLP, LDL, LDLC, DLDLP, 480816, VLDLC, VLDL, TGLX, TRIGL, TRIGP, TGLPOCT, CHHD, CHHDX   BNP No results for input(s): BNPP in the last 72 hours. Liver Enzymes No results for input(s): TP, ALB, TBIL, AP in the last 72 hours. No lab exists for component: SGOT, GPT, DBIL   Thyroid Studies No results found for: T4, T3U, TSH, TSHEXT         Significant Diagnostic Studies: Mri Foot Lt W Wo Cont    Result Date: 4/6/2021  Procedure: MRI of the left forefoot with and without contrast. HISTORY: -From Provider:  rule out osteomyelitis left hallux, first and second metatarsals -Additional: None Comparisons: None Technique: Sagittal, coronal and axial T1 pre-, sagittal and coronal STIR, axial T2 fat-sat and 3 plane post gadolinium T1 fat-sat sequences of the forefoot were obtained. Significant motion artifact degrades the imaging, even with repeated attempts. Findings: Soft tissues: Suspected skin defect along the plantar aspect of the 1st metatarsal head, suggesting a focal ulcer. Subjacent to this, there is low T1, nonenhancing STIR and T2 hyperintense signal, with foci of very low signal intensity, in keeping with gas. There is heterogeneous T1 hypointense, heterogeneously STIR hyperintense nonenhancing soft tissue surrounding the 1st MTP joint. Osseous: 1st digit: There is T1 hypointense, nonenhancing signal with foci of very low signal intensity in keeping with air/gas at the proximal aspect of the 1st proximal phalanx, sesamoids and distal aspect of the 1st metatarsal. More proximally the 1st metatarsal diaphysis, there is ill-defined T1 hypointense, STIR hyperintense, enhancing signal. There is no definite signal abnormality in the subjacent medial/1st cuneiform. Subchondral cyst formation in the proximal medial cuneiform at articulation with the navicular is more in keeping with degenerative change.  There is dorsiflexion and dorsal subluxation of the 1st proximal phalanx at the MTP joint and volar subluxation of the 1st distal phalanx at the IP joint. 2nd-5th digit: There is very slight T1 signal loss, minimally enhancing and STIR hyperintense at the dorsal, medial margin of the base of the 2nd proximal phalanx. It is unclear whether this is related to volume averaging versus focal osseous abnormality such as edema/osteomyelitis. No additional suspicious marrow signal abnormalities are seen. There is dorsiflexion at the MTP joints and dorsiflexion at the PIP joints. Musculotendinous: There is heterogeneous hyperintensity and enhancement in the atrophic intrinsic foot musculature. Mild enhancement surrounding FHL tendon. Lisfranc ligament complex within normal limits. Other: Estimated 0.8 x 3.0 x 4.5 cm lobulated fluid collection, without significant enhancement, plantar to the plantar aponeurosis, slightly insinuating within it at the level of the 1st-3rd metatarsal necks. 1. 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 Poor delineation of the proximal base of the 1st proximal phalanx, suspicious for septic arthritis at the 1st MTP joint. Surrounding soft tissue cellulitis and soft tissue gas tracking from the ulcer site in the plantar aspect of the 1st metatarsal head. 2. Slight signal abnormality and enhancement at the dorsal medial margin of the base of the 2nd metatarsal, in proximity to similar signal abnormality at the lateral base of the 1st metatarsal. It is unclear whether this is related to volume averaging from the joint margin, versus true signal abnormality. If true, this can reflect edema or osteomyelitis. 3. Signal abnormalities in the atrophic intrinsic foot musculature, in keeping with edema/myositis. Mild FHL tenosynovitis.  Lobulated plantar fluid collection, potentially infected/abscess but without evident gas, predominantly superficial and insinuating into the distal plantar aponeurosis. A page was sent to the referring clinician to discuss the results. Ct Foot Lt W Cont    Result Date: 4/4/2021  Exam:  Extremity CT CLINICAL INDICATION/HISTORY: Great toe wound. Assess for abscess or osteomyelitis. COMPARISONS: Plain radiographs, 12/10/2015. TECHNIQUE: CT of the left foot was performed, following intravenous contrast administration. Coronal and sagittal reconstructions were generated. One or more dose reduction techniques were used on this CT: automated exposure control, adjustment of the mAs and/or kVp according to patient size, and iterative reconstruction techniques. The specific techniques used on this CT exam have been documented in the patient's electronic medical record. -------------------------------------------------------------- FINDINGS: BONES: Advanced osteopenia. Plantar and Achilles calcaneal spurs. Numerous small foci of air within the marrow space of the distal first metatarsal and proximal aspect of first proximal phalanx, and in both hallux sesamoids. There are areas of cortical ill-definition along both the medial and lateral aspect of the proximal phalangeal base, as well as the dorsal medial aspect of the first metatarsal head. No large areas of bony destruction identified. There is slight dorsal subluxation at the first MTP articulation and volar subluxation at the first IP articulation, with advanced articular cartilage loss at both joints. No evidence of bony erosion or fracture elsewhere in the foot. SOFT TISSUES: Mild generalized soft tissue edema around the foot. Relatively large area of superficial soft tissue irregularity compatible with ulcer along the medial and plantar aspects of the distal forefoot at the level of the first MTP and proximal toe. Ulcer extends very close to the depth of the bone at the first MTP and tibial hallux sesamoid.  Mottled air around the base of first toe, extending along the plantar and medial aspects of the distal first metatarsal. Areas of ill-defined platelike fluid around the base of the first proximal phalanx and the plantar aspect of the first metatarsal head. First MTP joint effusion. --------------------------------------------------------------    1. Ulcer along the plantar and medial aspect of distal forefoot extends very close to the bone. Soft tissue edema with extensive mottled soft tissue air and areas of platelike fluid around the base of the first toe and plantar aspect of first metatarsal head, likely combination of soft tissue infection and ischemia. 2. Mottled air within the bone at the distal first metatarsal and proximal phalangeal base with areas of bony irregularity. While extent of air may in part reflect ischemia, strongly suspect osteomyelitis and first MTP septic arthritis. Xr Chest Port    Result Date: 4/5/2021  EXAM: One view chest x-ray CLINICAL INDICATION/HISTORY: Preoperative exam with planning for left foot surgery. COMPARISON: No prior relevant study available for comparison. TECHNIQUE: Single AP view of the chest was obtained. _______________ FINDINGS: HEART, VESSELS, MEDIASTINUM: Heart size is normal. No vascular congestion. LUNGS, PLEURAL SPACES: The lungs are clear. No effusion or pneumothorax. BONY THORAX, SOFT TISSUES: Unremarkable. _______________     No acute cardiopulmonary process. Duplex Carotid Bilateral    Result Date: 4/5/2021  Bilateral greater than 70% stenosis of the internal carotid arteries. No significant stenosis in the external carotid arteries bilaterally. Right vertebral artery is bidirectional.     Duplex Lower Ext Artery Left    Result Date: 4/5/2021  · Triphasic Doppler waveforms in the left distal common femoral and profunda femoris artery · Doppler waveforms are absent in the proximal superficial femoral and mid superficial femoral artery. · Left superficial femoral artery is reconstituted distally and shows monophasic flow.       Duplex Lower Ext Artery Right    Result Date: 4/8/2021  · Mid segment of the right superficial femoral artery shows no color flow and doppler signal · Distally the superficial femoral artery is reconstituted by the deeper vessels and shows monophasic flow distally. Ir Guide Insert Picc Over 5 Yrs    Result Date: 4/9/2021  PROCEDURE:  Ultrasound & Fluoroscopic Guided Right Arm PICC Line Placement CLINICAL INDICATION/HISTORY: Need for long-term IV antibiotics PERFORMED BY: Gume Lewis PA-C ATTENDING RADIOLOGIST: Yovana Garcia MD SUPERVISION: General TECHNIQUE: After explaining the procedure to the patient, including the potential risks, benefits, and alternatives, informed written and verbal consent was obtained. A time out was performed to verify appropriate patient, procedure, and site at 1148 hours. The procedure was performed following standard maximal barrier technique which includes, cap, mask, sterile gown, sterile gloves, sterile full body drape, hand hygiene with alcohol foam, and 2% chlorhexidine for cutaneous antisepsis. Sterile ultrasound gel and a sterile cover for the US probe was used. Ultrasound evaluation for potential access sites was performed. The right basilic vein is patent and normally compresses. A permanent recording was created for the patient record. The patient's upper arm was prepped and draped in sterile fashion and 1% Lidocaine was used for local anesthesia. The vein was accessed in the upper arm with a 21 gauge needle under ultrasound guidance. A guide wire was advanced under fluoroscopic control to the superior vena cava. The distance between the superior vena cava and skin puncture site was measured and a 5 [employer] [place] double lumen power PICC was cut to the appropriate length. Overall catheter length was 38 cm. The PICC was advanced to the SVC under fluoroscopic control. The line was flushed with heparinized saline and adhered to the skin with a Statlock device.  Final coned AP view of the chest was obtained to document catheter position. The patient tolerated the procedure well and there were no immediate complications. FINDINGS: Final coned AP view of the chest shows good position of the PICC with its tip in the SVC. Both ports flushed easily and there was good blood return. Radiation dose (reference air kerma):  2 mGy. Fluoroscopy Time: 0.2 minutes. GUIDANCE: Ultrasound and fluoroscopic guidance was used to position (and confirm the position of) the needle and catheter. Image(s) saved in PACS: Ultrasound and fluoroscopy. Successful placement of a double lumen power PICC line via the right arm. The PICC line is ready for immediate use. No results found for this or any previous visit. Please note that this dictation was completed with [email or web address], the computer voice recognition software. Quite often unanticipated grammatical, syntax, homophones, and other interpretive errors are inadvertently transcribed by the computer software. Please disregard these errors. Please excuse any errors that have escaped final proofreading.      CC: None

## 2021-04-14 NOTE — PROGRESS NOTES
Patient refused to have noon vital signs taken or have her blood sugar checked because she is busy doing her taxes

## 2021-04-14 NOTE — PROGRESS NOTES
RN attempted to administer antibiotics due at noon, patient stated she wasn't ready and needs more time, she is tired of being bothered. RN asked patient to please call when she is ready for medications, RN is unable to administer insulin, patient is currently refusing to have an blood glucose check completed via fingerstick.

## 2021-04-14 NOTE — PROGRESS NOTES
Progress Note      Patient: Floyd Cassidy               Sex: female          DOA: 4/4/2021       YOB: 1970      Age:  46 y.o.        LOS:  LOS: 10 days               Subjective:   Patient seen today for follow up. Doing well.  No complaints    Medications:     Current Facility-Administered Medications:     ampicillin-sulbactam (UNASYN) 3 g in 0.9% sodium chloride (MBP/ADV) 100 mL MBP, 3 g, IntraVENous, Q6H, Tania Hendrix MD, Last Rate: 100 mL/hr at 04/14/21 0708, 3 g at 04/14/21 0708    losartan (COZAAR) tablet 25 mg, 25 mg, Oral, DAILY, Maria Del Carmen Ronquillo DO, 25 mg at 04/13/21 0840    insulin lispro (HUMALOG) injection 2 Units, 2 Units, SubCUTAneous, TIDAC, Maria Del Carmen Ronquillo DO, 2 Units at 04/13/21 1708    heparin (porcine) 100 unit/mL injection 500 Units, 500 Units, InterCATHeter, Q8H PRN, CHRISTINE Figueroa, 500 Units at 04/09/21 1151    clopidogreL (PLAVIX) tablet 75 mg, 75 mg, Oral, DAILY, Leif Berman MD, 75 mg at 04/13/21 0840    aspirin chewable tablet 81 mg, 81 mg, Oral, DAILY, Vira Worrell MD, 81 mg at 04/13/21 0840    ondansetron (ZOFRAN) injection 4 mg, 4 mg, IntraVENous, Q8H PRN, Zee Enciso MD, 4 mg at 04/12/21 2351    heparin (porcine) 1,000 unit/mL injection 1,000-10,000 Units, 1,000-10,000 Units, IntraVENous, Rad Multiple, Leif Berman MD, 1,000 Units at 04/07/21 1527    morphine injection 2 mg, 2 mg, IntraVENous, Q3H PRN, Payal Ribera MD, 2 mg at 04/12/21 2306    acetaminophen (TYLENOL) tablet 650 mg, 650 mg, Oral, Q6H PRN, Payal Ribera MD, 650 mg at 04/07/21 2158    sodium chloride (NS) flush 5-40 mL, 5-40 mL, IntraVENous, Q8H, Zee Bonilla MD, 10 mL at 04/14/21 0709    sodium chloride (NS) flush 5-40 mL, 5-40 mL, IntraVENous, PRN, Em Fallon MD    [Held by provider] heparin (porcine) injection 5,000 Units, 5,000 Units, SubCUTAneous, Q8H, Em Fallon MD, Stopped at 04/12/21 1000   glucose chewable tablet 16 g, 16 g, Oral, PRN, Holli Clemente MD    glucagon (GLUCAGEN) injection 1 mg, 1 mg, IntraMUSCular, PRN, Holli Clemente MD    insulin lispro (HUMALOG) injection, , SubCUTAneous, AC&HS, Katt South MD, Stopped at 04/13/21 2200    dextrose 10% infusion 125-250 mL, 125-250 mL, IntraVENous, PRN, Holli Clemente MD    traMADoL (ULTRAM) tablet 50 mg, 50 mg, Oral, Q6H PRN, Katt South MD, 50 mg at 04/13/21 2309    atorvastatin (LIPITOR) tablet 40 mg, 40 mg, Oral, DAILY, Flynn MOSQUEDA NP, 40 mg at 04/13/21 0840    melatonin (rapid dissolve) tablet 5 mg, 5 mg, Oral, QHS PRN, Benny Ribera MD, 5 mg at 04/12/21 2306    Review of Systems  Negative for chest pain and shortness of breath        Objective:      Visit Vitals  BP (!) 118/56 (BP 1 Location: Left arm, BP Patient Position: At rest)   Pulse 71   Temp 98.3 °F (36.8 °C)   Resp 17   Ht 5' 8\" (1.727 m)   Wt 79 kg (174 lb 1.6 oz)   SpO2 98%   BMI 26.47 kg/m²       Physical Exam:  Flap well perfused.  No SOI    Labs:  Recent Results (from the past 24 hour(s))   GLUCOSE, POC    Collection Time: 04/13/21 11:31 AM   Result Value Ref Range    Glucose (POC) 149 (H) 70 - 110 mg/dL   GLUCOSE, POC    Collection Time: 04/13/21  4:19 PM   Result Value Ref Range    Glucose (POC) 156 (H) 70 - 110 mg/dL   GLUCOSE, POC    Collection Time: 04/13/21  9:15 PM   Result Value Ref Range    Glucose (POC) 124 (H) 70 - 110 mg/dL   GLUCOSE, POC    Collection Time: 04/14/21  7:17 AM   Result Value Ref Range    Glucose (POC) 151 (H) 70 - 110 mg/dL           Assessment/Plan     Principal Problem:    Gangrene of left foot ([address] (4/9/2021)    Active Problems:    Osteomyelitis ([facility] (4/4/2021)      Type 2 diabetes mellitus with left diabetic foot ulcer ([address] (4/4/2021)      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 ([facility] (4/4/2021)      YUNIEL (acute kidney injury) ([facility] (4/4/2021)      PAD (peripheral artery disease) ([facility] (4/4/2021)      Bilateral carotid artery stenosis (4/5/2021)      Non compliance with medical treatment (4/12/2021)        Patient seen and evaluated today. Recommended dc home today or tomorrow. FU in my office. If she gets home health for IV abx will also get wound care orders. Doing great and flap appears healthy,.

## 2021-04-14 NOTE — PROGRESS NOTES
Patient received discharge paperwork, educated on medications to discontinue, medications to start, which pharmacy to  medications and her follow-up appointments. Patients questions answered and patient verbalized understanding of everything stated above. Patient's family member will arrive to pick her up at approximately 1600. Patient stated that she does not want vital signs taken or anymore medication while she waits for her family member to arrive.

## 2021-04-14 NOTE — PROGRESS NOTES
Patient requested that RN notify the home health company that the patient does not accept phone calls only text messages.   RN explained that she would contact the  to notify her of the request. RN contacted the  to notify of the request.

## 2021-04-15 ENCOUNTER — HOME CARE VISIT (OUTPATIENT)
Dept: HOME HEALTH SERVICES | Facility: HOME HEALTH | Age: 51
End: 2021-04-15
Payer: COMMERCIAL

## 2021-04-16 ENCOUNTER — HOME CARE VISIT (OUTPATIENT)
Dept: HOME HEALTH SERVICES | Facility: HOME HEALTH | Age: 51
End: 2021-04-16
Payer: COMMERCIAL

## 2021-04-17 ENCOUNTER — HOME CARE VISIT (OUTPATIENT)
Dept: HOME HEALTH SERVICES | Facility: HOME HEALTH | Age: 51
End: 2021-04-17
Payer: COMMERCIAL

## 2021-04-20 ENCOUNTER — HOME CARE VISIT (OUTPATIENT)
Dept: SCHEDULING | Facility: HOME HEALTH | Age: 51
End: 2021-04-20
Payer: COMMERCIAL

## 2021-04-20 ENCOUNTER — HOME CARE VISIT (OUTPATIENT)
Dept: HOME HEALTH SERVICES | Facility: HOME HEALTH | Age: 51
End: 2021-04-20
Payer: COMMERCIAL

## 2021-04-20 PROCEDURE — A6223 GAUZE >16<=48 NO W/SAL W/O B: HCPCS

## 2021-04-20 PROCEDURE — 400013 HH SOC

## 2021-04-20 PROCEDURE — A6446 CONFORM BAND S W>=3" <5"/YD: HCPCS

## 2021-04-20 PROCEDURE — G0299 HHS/HOSPICE OF RN EA 15 MIN: HCPCS

## 2021-04-21 ENCOUNTER — HOSPITAL ENCOUNTER (OUTPATIENT)
Dept: WOUND CARE | Age: 51
Discharge: HOME | End: 2021-04-21
Payer: COMMERCIAL

## 2021-04-21 VITALS
DIASTOLIC BLOOD PRESSURE: 81 MMHG | SYSTOLIC BLOOD PRESSURE: 103 MMHG | TEMPERATURE: 98.7 F | OXYGEN SATURATION: 99 % | RESPIRATION RATE: 18 BRPM | HEART RATE: 93 BPM

## 2021-04-21 DIAGNOSIS — M86.9 DIABETIC FOOT ULCER WITH OSTEOMYELITIS (HCC): ICD-10-CM

## 2021-04-21 DIAGNOSIS — L97.509 DIABETIC FOOT ULCER WITH OSTEOMYELITIS (HCC): ICD-10-CM

## 2021-04-21 DIAGNOSIS — E11.69 DIABETIC FOOT ULCER WITH OSTEOMYELITIS (HCC): ICD-10-CM

## 2021-04-21 DIAGNOSIS — I73.9 PERIPHERAL ARTERIAL DISEASE (HCC): ICD-10-CM

## 2021-04-21 DIAGNOSIS — E11.621 DIABETIC FOOT ULCER WITH OSTEOMYELITIS (HCC): ICD-10-CM

## 2021-04-21 DIAGNOSIS — D50.9 IRON DEFICIENCY ANEMIA, UNSPECIFIED IRON DEFICIENCY ANEMIA TYPE: ICD-10-CM

## 2021-04-21 DIAGNOSIS — M86.272 SUBACUTE OSTEOMYELITIS OF LEFT FOOT (HCC): Primary | ICD-10-CM

## 2021-04-21 PROCEDURE — 99211 OFF/OP EST MAY X REQ PHY/QHP: CPT

## 2021-04-21 RX ORDER — FERROUS SULFATE 324(65)MG
324 TABLET, DELAYED RELEASE (ENTERIC COATED) ORAL 2 TIMES DAILY WITH MEALS
Qty: 6060 TAB | Refills: 0 | Status: SHIPPED | OUTPATIENT
Start: 2021-04-21 | End: 2021-05-21

## 2021-04-21 NOTE — PROGRESS NOTES
[facility]                         (A Division of [address] [place] [address])                                                                                                                       Tania Almaraz MD  Work Cell #: [phone]. If no call or text back within 30 minutes, please call office number. (Prefer text when possible)  Office #:     [phone]-[facility] #8   Office Fax: [phone]    Date of Sevice: 4/21/2021         Outpatient/ Wound clinic  FU for :  Left foot OM, gangrene in new DM patient  Referring MD: Dr Josselyn Hoff    PCP: [facility] goes to [facility]    Summary:    Dry gangrene of Left great toe/hallux post ampuation 4/6 after re-vascularization. Patient with newly diagnosed DM and with significant PVD/PAD. Wound cultures with mixed growth- MSSA and anaerobes. Foot surgery on 4/6- left hallux ampuation and later on 4/12- TMA. Current Antimicrobials:    Prior Antimicrobials:  Unasyn 4/12 to date- via PICC right arm. End date for Unasyn: May 23, 2021 Meropenem 4/9 to 4/12  Ceftriaxone 4/4- 4/9  Vancomycin 4/4 to 4/9       Assessment: Rec / Plan:   L Foot (dry) gangrene - Hallux area/OM  4/4:  CRP 148mg/L  All this is the setting of newly diagnosed DMT2 (unbeknownst to her) and significant PVD/PAD for which she underwent re-vascularization procedure (4/7) after her debridement (4/6).   OR cultures are growing MSSA +   Biopsy margin 4/12- proximal bone clear but Podiatrist clinically impressed otherwise    Other Active Medical issues followed by Other services/PCP:  Anemia- HB about 7  PVD/PAD  DMT2   Follow labs/markers( result not available yet)    D/w Dr Nel Morton, who is concerned by the clinical look of remaining bone. Advises full course for OM likely needed. Will continue with IV ABX management    Stressed to pt DM/PVD care and follow up by establishing with PCP. Gave her Iron supplement for 1 month.  Further refill with established PCP. Encouraged nutritional supplement. NB: SHAHRIAR Middleton after patient left, will call to update her about plan for full course of ABX for OM. Patient Active Problem List   Diagnosis Code    Osteomyelitis ([facility] M86.9    Type 2 diabetes mellitus with left diabetic foot ulcer ([facility] E11.621, L97.529    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 ([facility] E11.52    YUNIEL (acute kidney injury) ([facility] N17.9    PAD (peripheral artery disease) ([facility]) I73.9    Bilateral carotid artery stenosis I65.23    Gangrene of left foot ([facility]) I96    Non compliance with medical treatment Z91.19       Current Outpatient Medications   Medication Sig Dispense    ferrous sulfate 324 mg (65 mg iron) tablet Take 1 Tab by mouth two (2) times daily (with meals) for 30 days. 6060 Tab    sodium chloride (Normal Saline Flush) 10-40 mL by IntraVENous route daily.  heparin sod,porcine/0.9 % NaCl (HEPARIN FLUSH IV) 10 Units/mL by IntraVENous route daily.  acetaminophen (TYLENOL) 500 mg tablet Take 1,000 mg by mouth every six (6) hours as needed for Fever or Pain.  ampicillin-sulbactam 3 gram 3 g IVPB 3 g by IntraVENous route every six (6) hours for 42 days. 1 Dose    aspirin 81 mg chewable tablet Take 1 Tab by mouth daily. 30 Tab    atorvastatin (LIPITOR) 40 mg tablet Take 1 Tab by mouth daily. 30 Tab    clopidogreL (PLAVIX) 75 mg tab Take 1 Tab by mouth daily. 30 Tab    losartan (COZAAR) 25 mg tablet Take 1 Tab by mouth daily. 30 Tab    metFORMIN (GLUCOPHAGE) 500 mg tablet Take 1 Tab by mouth two (2) times daily (with meals). 60 Tab     No current facility-administered medications for this encounter. Subjective: I am waking up to do the antibiotics. No other major issues. Hyperbaric oxygen treatment for wound healing ordered by Podiatry on last visit per patient. Review of Systems - Negative except poor appetite.    No fever or chills   No SOB  No drainage from PICC site or redness  No abd or diarrhea  No vaginal itching/dysuria        Objective:    Visit Vitals  /81 (BP 1 Location: Left upper arm, BP Patient Position: At rest;Sitting)   Pulse 93   Temp 98.7 °F (37.1 °C)   Resp 18   LMP 04/06/2018 (Within Years)   SpO2 99%        GEN: WD chronically ill looking, not in resp distress. Came in wheelchair  HEENT: Unicteric. EOMI intact  CHEST: Non laboured breathing  CVS:RRR, no mur/gallop  ABD: soft. Non tender. MEETA: Deferred  EXT: No apparent swelling or redness on UE/LE joints. Left surgical site is dressed  Skin: Dry and intact. No rash, no redness on general exam  CNS: A, OX3. Moves all extremity. CN grossly ok.        Microbiology:     Blood culture:  4/4- NG    Urine culture: N/A    Respiratory culture: N/A    Tissue culture/ CSF/drainage culture: 4/6- wound culture:   4/6- wound culture: MSSA and anaerobes    Cath tip/ PICC culture: N/A    Biopsy- 4/12:  Gangrenous changes on final diagnosis, with note of proximal bone with clear margins on histology    Lines / Catheters: Right arm PICC    Lab results: Outpatient labs not yet recieved    Total duration of time spent with patient interview and exam and discussion of plan of care, review of chart in care everywhere, discussion with medical staff- nursing/attending and additional specialities as indicated:  35 minutes

## 2021-04-23 ENCOUNTER — HOME CARE VISIT (OUTPATIENT)
Dept: HOME HEALTH SERVICES | Facility: HOME HEALTH | Age: 51
End: 2021-04-23
Payer: COMMERCIAL

## 2021-04-23 ENCOUNTER — HOME CARE VISIT (OUTPATIENT)
Dept: SCHEDULING | Facility: HOME HEALTH | Age: 51
End: 2021-04-23
Payer: COMMERCIAL

## 2021-04-24 VITALS — TEMPERATURE: 97.6 F | HEART RATE: 80 BPM | OXYGEN SATURATION: 96 % | RESPIRATION RATE: 14 BRPM

## 2021-04-26 PROCEDURE — A6222 GAUZE <=16 IN NO W/SAL W/O B: HCPCS

## 2021-04-27 ENCOUNTER — HOME CARE VISIT (OUTPATIENT)
Dept: SCHEDULING | Facility: HOME HEALTH | Age: 51
End: 2021-04-27
Payer: COMMERCIAL

## 2021-04-27 VITALS
TEMPERATURE: 96.3 F | HEART RATE: 75 BPM | SYSTOLIC BLOOD PRESSURE: 120 MMHG | RESPIRATION RATE: 16 BRPM | DIASTOLIC BLOOD PRESSURE: 96 MMHG | OXYGEN SATURATION: 100 %

## 2021-04-27 PROCEDURE — A9270 NON-COVERED ITEM OR SERVICE: HCPCS

## 2021-04-27 PROCEDURE — G0299 HHS/HOSPICE OF RN EA 15 MIN: HCPCS

## 2021-04-27 PROCEDURE — A4452 WATERPROOF TAPE: HCPCS

## 2021-04-27 PROCEDURE — A6252 ABSORPT DRG >16 <=48 W/O BDR: HCPCS

## 2021-04-27 PROCEDURE — MED11139 SCISSORS, LISTER BANDAGE, 3.5

## 2021-05-01 ENCOUNTER — HOME CARE VISIT (OUTPATIENT)
Dept: SCHEDULING | Facility: HOME HEALTH | Age: 51
End: 2021-05-01
Payer: COMMERCIAL

## 2021-05-04 ENCOUNTER — HOME CARE VISIT (OUTPATIENT)
Dept: SCHEDULING | Facility: HOME HEALTH | Age: 51
End: 2021-05-04
Payer: COMMERCIAL

## 2021-05-04 VITALS
DIASTOLIC BLOOD PRESSURE: 77 MMHG | OXYGEN SATURATION: 98 % | RESPIRATION RATE: 16 BRPM | TEMPERATURE: 97.1 F | HEART RATE: 81 BPM | SYSTOLIC BLOOD PRESSURE: 104 MMHG

## 2021-05-04 PROCEDURE — G0299 HHS/HOSPICE OF RN EA 15 MIN: HCPCS

## 2021-05-04 NOTE — CASE COMMUNICATION
Patient has been discharged from [facility]. Patient is receiving her wound care at the hyperbaric chamber treatments 5 days a week. Case communication with Dr Turner Schneider and schedulers regarding discharge. Patient is non-compliant. She is not testing her blood sugars daily, she is not following a Diabetic diet, her BS was over 400 yesterday when tested before her treatment at the hyperbaric chamber. She is contouring to smoke.

## 2021-05-26 ENCOUNTER — HOSPITAL ENCOUNTER (OUTPATIENT)
Dept: WOUND CARE | Age: 51
Discharge: HOME | End: 2021-05-26
Attending: HOSPITALIST
Payer: COMMERCIAL

## 2021-05-26 VITALS
SYSTOLIC BLOOD PRESSURE: 118 MMHG | RESPIRATION RATE: 18 BRPM | DIASTOLIC BLOOD PRESSURE: 65 MMHG | OXYGEN SATURATION: 100 % | TEMPERATURE: 98.5 F | HEART RATE: 91 BPM

## 2021-05-26 PROCEDURE — 99211 OFF/OP EST MAY X REQ PHY/QHP: CPT

## 2021-05-26 RX ORDER — AMOXICILLIN AND CLAVULANATE POTASSIUM 875; 125 MG/1; MG/1
1 TABLET, FILM COATED ORAL 2 TIMES DAILY
Qty: 28 TABLET | Refills: 0 | Status: SHIPPED | OUTPATIENT
Start: 2021-05-26 | End: 2021-06-09

## 2021-05-26 NOTE — PROGRESS NOTES
[facility]                         (A Division of [address] [place] [address])                                                                                                                       Tania Méndez MD  Work Cell #: [phone]. If no call or text back within 30 minutes, please call office number. (Prefer text when possible)  Office #:     [phone]  8473-[facility] #8   Office Fax: [phone]     Date of office Visit: 5/26/2021                                                                     Outpatient/ Wound clinic  FU for :  Left foot OM, gangrene in new DM patient  Referring MD: Dr Elda Orozco  PCP: She goes to [facility]     Summary:    Dry gangrene of Left great toe/hallux post ampuation 4/6 after re-vascularization. Patient with newly diagnosed DM and with significant PVD/PAD. Wound cultures with mixed growth- MSSA and anaerobes. Foot surgery on 4/6- left hallux ampuation and later on 4/12- TMA. Last seen in clinic 4/21/2021            Current Antimicrobials:                                                   Prior Antimicrobials:  Unasyn 4/12 to date- via PICC right arm. End date for Unasyn: May 263, 2021 Meropenem 4/9 to 4/12  Ceftriaxone 4/4- 4/9  Vancomycin 4/4 to 4/9         Assessment: Rec / Plan:   L Foot (dry) gangrene - Hallux area/OM  All this is the setting of newly diagnosed DMT2 (unbeknownst to her) and significant PVD/PAD for which she underwent re-vascularization procedure (4/7) after her debridement (4/6).    OR cultures are growing MSSA +   Biopsy margin 4/12- proximal bone clear but Podiatrist clinically impressed otherwise  4/4:  CRP 14.8  4/2- CRP 3.7  5/24- CRP <1     Other Active Medical issues followed by Other services/PCP:  Anemia- HB about 7  PVD/PAD- Vascular FU done, for Q 6 monthly check up  DMT2--poorly controlled    Necrotic skin overlying 1st metatarsal- dry  No cellulitis or drain on clinical exam  CRP down to NL    PICC from right arm removed under sterile condition. No bleeding noted after done. DC IV unasyn  Cont oral treatment with Augmentin 875mg BID X2 weeks. Hyperbaric oxygen treatment as per Podiatry. Stressed on DM management and smoking cessation. She needs to establish PCP that manages her DM with combined medication treatment. Will check with Care Manager if able to facilitate. FU with me PRN-- she has my card to call me if further assistance needed.              Patient Active Problem List   Diagnosis Code    Osteomyelitis ([facility] M86.9    Type 2 diabetes mellitus with left diabetic foot ulcer ([facility] E11.621, L97.529    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 ([facility] E11.52    YUNIEL (acute kidney injury) ([facility] N17.9    PAD (peripheral artery disease) ([facility] I73.9    Bilateral carotid artery stenosis I65.23    Gangrene of left foot (HCC) I96    Non compliance with medical treatment Z91.19               Current Outpatient Medications   Medication Sig Dispense    ferrous sulfate 324 mg (65 mg iron) tablet Take 1 Tab by mouth two (2) times daily (with meals) for 30 days. 6060 Tab    sodium chloride (Normal Saline Flush) 10-40 mL by IntraVENous route daily.      heparin sod,porcine/0.9 % NaCl (HEPARIN FLUSH IV) 10 Units/mL by IntraVENous route daily.      acetaminophen (TYLENOL) 500 mg tablet Take 1,000 mg by mouth every six (6) hours as needed for Fever or Pain.      ampicillin-sulbactam 3 gram 3 g IVPB 3 g by IntraVENous route every six (6) hours for 42 days. 1 Dose    aspirin 81 mg chewable tablet Take 1 Tab by mouth daily. 30 Tab    atorvastatin (LIPITOR) 40 mg tablet Take 1 Tab by mouth daily. 30 Tab    clopidogreL (PLAVIX) 75 mg tab Take 1 Tab by mouth daily. 30 Tab    losartan (COZAAR) 25 mg tablet Take 1 Tab by mouth daily. 30 Tab    metFORMIN (GLUCOPHAGE) 500 mg tablet Take 1 Tab by mouth two (2) times daily (with meals).  60 Tab      No current facility-administered medications for this encounter.       Subjective: \" I am stressed- have no disablity insurance and am unable to work\"  She is still on IV antibiotics, tolerating well and hyperbaric oxygen treatment. 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 She continues to smoke and her sugar runs 200-250 per pt. She is only on Metformin PO at this time, [facility].     Review of Systems - Negative except poor appetite. No fever or chills   No SOB  No drainage from PICC site or redness  No abd or diarrhea  No vaginal itching/dysuria  Has occasional body aches- wonders if related to IV abx           Objective:     Patient Vitals for the past 24 hrs:   BP Temp Pulse Resp SpO2   05/26/21 1057 118/65 98.5 °F (36.9 °C) 91 18 100 %         GEN: WD chronically ill looking, not in resp distress. Ambulates with cane  HEENT: Unicteric. EOMI intact  CHEST: Non laboured breathing  CVS:RRR, no mur/gallop  ABD: soft. Non tender. MEETA: Deferred  EXT: No apparent swelling or redness on UE/LE joints. Left foot undressed and examined. Has necrotic dry skin covering the 1st metatrsal tip. TMA is dry without any drainage. Skin: Dry and intact. No rash, no redness on general exam  CNS: A, OX3. Moves all extremity.  CN grossly ok.         Microbiology:      Blood culture:  4/4- NG     Urine culture: N/A     Respiratory culture: N/A     Tissue culture/ CSF/drainage culture: 4/6- wound culture:   4/6- wound culture: MSSA and anaerobes     Cath tip/ PICC culture: N/A     Biopsy- 4/12:     Lab results: Outpatient labs from lab key reviewed

## 2021-08-11 ENCOUNTER — HOSPITAL ENCOUNTER (INPATIENT)
Age: 51
LOS: 23 days | Discharge: NURSING, REHAB OR LONG-TERM CARE | DRG: 853 | End: 2021-09-03
Attending: EMERGENCY MEDICINE | Admitting: FAMILY MEDICINE
Payer: COMMERCIAL

## 2021-08-11 ENCOUNTER — APPOINTMENT (OUTPATIENT)
Dept: GENERAL RADIOLOGY | Age: 51
DRG: 853 | End: 2021-08-11
Attending: EMERGENCY MEDICINE
Payer: COMMERCIAL

## 2021-08-11 DIAGNOSIS — R06.02 SOB (SHORTNESS OF BREATH): ICD-10-CM

## 2021-08-11 PROBLEM — I96 GANGRENE OF BOTH FEET (HCC): Status: ACTIVE | Noted: 2021-08-11

## 2021-08-11 PROBLEM — E11.9 DIABETES (HCC): Status: ACTIVE | Noted: 2021-08-11

## 2021-08-11 LAB
ALBUMIN SERPL-MCNC: 1.4 G/DL (ref 3.4–5)
ALBUMIN/GLOB SERPL: 0.4 {RATIO} (ref 0.8–1.7)
ALP SERPL-CCNC: 179 U/L (ref 45–117)
ALT SERPL-CCNC: 7 U/L (ref 13–56)
ANION GAP SERPL CALC-SCNC: 7 MMOL/L (ref 3–18)
AST SERPL-CCNC: 26 U/L (ref 10–38)
BASOPHILS # BLD: 0.3 K/UL (ref 0–0.1)
BASOPHILS NFR BLD: 1 % (ref 0–2)
BILIRUB SERPL-MCNC: 0.4 MG/DL (ref 0.2–1)
BUN SERPL-MCNC: 65 MG/DL (ref 7–18)
BUN/CREAT SERPL: 50 (ref 12–20)
CALCIUM SERPL-MCNC: 7.8 MG/DL (ref 8.5–10.1)
CHLORIDE SERPL-SCNC: 95 MMOL/L (ref 100–111)
CK MB CFR SERPL CALC: ABNORMAL % (ref 0–4)
CK MB SERPL-MCNC: <1 NG/ML (ref 5–25)
CK SERPL-CCNC: 23 U/L (ref 26–192)
CO2 SERPL-SCNC: 27 MMOL/L (ref 21–32)
CREAT SERPL-MCNC: 1.3 MG/DL (ref 0.6–1.3)
DIFFERENTIAL METHOD BLD: ABNORMAL
EOSINOPHIL # BLD: 0 K/UL (ref 0–0.4)
EOSINOPHIL NFR BLD: 0 % (ref 0–5)
ERYTHROCYTE [DISTWIDTH] IN BLOOD BY AUTOMATED COUNT: 14 % (ref 11.6–14.5)
GLOBULIN SER CALC-MCNC: 4 G/DL (ref 2–4)
GLUCOSE BLD STRIP.AUTO-MCNC: 218 MG/DL (ref 70–110)
GLUCOSE SERPL-MCNC: 154 MG/DL (ref 74–99)
HCT VFR BLD AUTO: 23.1 % (ref 35–45)
HGB BLD-MCNC: 7.7 G/DL (ref 12–16)
LACTATE BLD-SCNC: 1.04 MMOL/L (ref 0.4–2)
LYMPHOCYTES # BLD: 1.1 K/UL (ref 0.9–3.6)
LYMPHOCYTES NFR BLD: 4 % (ref 21–52)
MAGNESIUM SERPL-MCNC: 2.5 MG/DL (ref 1.6–2.6)
MCH RBC QN AUTO: 28.7 PG (ref 24–34)
MCHC RBC AUTO-ENTMCNC: 33.3 G/DL (ref 31–37)
MCV RBC AUTO: 86.2 FL (ref 74–97)
MONOCYTES # BLD: 1.1 K/UL (ref 0.05–1.2)
MONOCYTES NFR BLD: 4 % (ref 3–10)
NEUTS SEG # BLD: 25.5 K/UL (ref 1.8–8)
NEUTS SEG NFR BLD: 91 % (ref 40–73)
PHOSPHATE SERPL-MCNC: 3.5 MG/DL (ref 2.5–4.9)
PLATELET # BLD AUTO: 445 K/UL (ref 135–420)
PMV BLD AUTO: 9.1 FL (ref 9.2–11.8)
POTASSIUM SERPL-SCNC: 3.8 MMOL/L (ref 3.5–5.5)
PROT SERPL-MCNC: 5.4 G/DL (ref 6.4–8.2)
RBC # BLD AUTO: 2.68 M/UL (ref 4.2–5.3)
RBC MORPH BLD: ABNORMAL
SODIUM SERPL-SCNC: 129 MMOL/L (ref 136–145)
TROPONIN I SERPL-MCNC: 0.02 NG/ML (ref 0–0.04)
WBC # BLD AUTO: 28 K/UL (ref 4.6–13.2)
WBC MORPH BLD: ABNORMAL

## 2021-08-11 PROCEDURE — 74011636637 HC RX REV CODE- 636/637: Performed by: FAMILY MEDICINE

## 2021-08-11 PROCEDURE — 96365 THER/PROPH/DIAG IV INF INIT: CPT

## 2021-08-11 PROCEDURE — 83036 HEMOGLOBIN GLYCOSYLATED A1C: CPT

## 2021-08-11 PROCEDURE — 99285 EMERGENCY DEPT VISIT HI MDM: CPT

## 2021-08-11 PROCEDURE — 65270000029 HC RM PRIVATE

## 2021-08-11 PROCEDURE — 74011000258 HC RX REV CODE- 258: Performed by: EMERGENCY MEDICINE

## 2021-08-11 PROCEDURE — 74011250637 HC RX REV CODE- 250/637: Performed by: FAMILY MEDICINE

## 2021-08-11 PROCEDURE — 96375 TX/PRO/DX INJ NEW DRUG ADDON: CPT

## 2021-08-11 PROCEDURE — 80053 COMPREHEN METABOLIC PANEL: CPT

## 2021-08-11 PROCEDURE — 94762 N-INVAS EAR/PLS OXIMTRY CONT: CPT

## 2021-08-11 PROCEDURE — 87186 SC STD MICRODIL/AGAR DIL: CPT

## 2021-08-11 PROCEDURE — 82962 GLUCOSE BLOOD TEST: CPT

## 2021-08-11 PROCEDURE — 93005 ELECTROCARDIOGRAM TRACING: CPT

## 2021-08-11 PROCEDURE — 87205 SMEAR GRAM STAIN: CPT

## 2021-08-11 PROCEDURE — 83605 ASSAY OF LACTIC ACID: CPT

## 2021-08-11 PROCEDURE — 74011250636 HC RX REV CODE- 250/636: Performed by: EMERGENCY MEDICINE

## 2021-08-11 PROCEDURE — 87040 BLOOD CULTURE FOR BACTERIA: CPT

## 2021-08-11 PROCEDURE — 71045 X-RAY EXAM CHEST 1 VIEW: CPT

## 2021-08-11 PROCEDURE — 82553 CREATINE MB FRACTION: CPT

## 2021-08-11 PROCEDURE — 83735 ASSAY OF MAGNESIUM: CPT

## 2021-08-11 PROCEDURE — 85025 COMPLETE CBC W/AUTO DIFF WBC: CPT

## 2021-08-11 PROCEDURE — 84100 ASSAY OF PHOSPHORUS: CPT

## 2021-08-11 PROCEDURE — 87077 CULTURE AEROBIC IDENTIFY: CPT

## 2021-08-11 RX ORDER — MORPHINE SULFATE 2 MG/ML
2 INJECTION, SOLUTION INTRAMUSCULAR; INTRAVENOUS
Status: COMPLETED | OUTPATIENT
Start: 2021-08-11 | End: 2021-08-11

## 2021-08-11 RX ORDER — ONDANSETRON 2 MG/ML
4 INJECTION INTRAMUSCULAR; INTRAVENOUS
Status: DISCONTINUED | OUTPATIENT
Start: 2021-08-11 | End: 2021-09-03 | Stop reason: HOSPADM

## 2021-08-11 RX ORDER — SODIUM CHLORIDE 9 MG/ML
100 INJECTION, SOLUTION INTRAVENOUS CONTINUOUS
Status: DISCONTINUED | OUTPATIENT
Start: 2021-08-11 | End: 2021-08-16

## 2021-08-11 RX ORDER — LOSARTAN POTASSIUM 25 MG/1
25 TABLET ORAL DAILY
Status: DISCONTINUED | OUTPATIENT
Start: 2021-08-12 | End: 2021-08-12

## 2021-08-11 RX ORDER — POLYETHYLENE GLYCOL 3350 17 G/17G
17 POWDER, FOR SOLUTION ORAL DAILY PRN
Status: DISCONTINUED | OUTPATIENT
Start: 2021-08-11 | End: 2021-08-20

## 2021-08-11 RX ORDER — ATORVASTATIN CALCIUM 20 MG/1
40 TABLET, FILM COATED ORAL
Status: DISCONTINUED | OUTPATIENT
Start: 2021-08-11 | End: 2021-08-31

## 2021-08-11 RX ORDER — SODIUM CHLORIDE 0.9 % (FLUSH) 0.9 %
5-10 SYRINGE (ML) INJECTION AS NEEDED
Status: DISCONTINUED | OUTPATIENT
Start: 2021-08-11 | End: 2021-08-15

## 2021-08-11 RX ORDER — INSULIN LISPRO 100 [IU]/ML
INJECTION, SOLUTION INTRAVENOUS; SUBCUTANEOUS EVERY 6 HOURS
Status: DISCONTINUED | OUTPATIENT
Start: 2021-08-12 | End: 2021-09-03 | Stop reason: HOSPADM

## 2021-08-11 RX ORDER — VANCOMYCIN 1.75 GRAM/500 ML IN 0.9 % SODIUM CHLORIDE INTRAVENOUS
1750 ONCE
Status: COMPLETED | OUTPATIENT
Start: 2021-08-11 | End: 2021-08-12

## 2021-08-11 RX ORDER — ENOXAPARIN SODIUM 100 MG/ML
40 INJECTION SUBCUTANEOUS DAILY
Status: DISCONTINUED | OUTPATIENT
Start: 2021-08-12 | End: 2021-08-12

## 2021-08-11 RX ORDER — DEXTROSE 50 % IN WATER (D50W) INTRAVENOUS SYRINGE
25-50 AS NEEDED
Status: DISCONTINUED | OUTPATIENT
Start: 2021-08-11 | End: 2021-09-03 | Stop reason: HOSPADM

## 2021-08-11 RX ORDER — ONDANSETRON 4 MG/1
4 TABLET, ORALLY DISINTEGRATING ORAL
Status: DISCONTINUED | OUTPATIENT
Start: 2021-08-11 | End: 2021-09-03 | Stop reason: HOSPADM

## 2021-08-11 RX ORDER — SODIUM CHLORIDE 0.9 % (FLUSH) 0.9 %
5-40 SYRINGE (ML) INJECTION EVERY 8 HOURS
Status: DISCONTINUED | OUTPATIENT
Start: 2021-08-11 | End: 2021-08-15

## 2021-08-11 RX ORDER — MAGNESIUM SULFATE 100 %
16 CRYSTALS MISCELLANEOUS AS NEEDED
Status: DISCONTINUED | OUTPATIENT
Start: 2021-08-11 | End: 2021-09-03 | Stop reason: HOSPADM

## 2021-08-11 RX ORDER — ONDANSETRON 2 MG/ML
4 INJECTION INTRAMUSCULAR; INTRAVENOUS
Status: COMPLETED | OUTPATIENT
Start: 2021-08-11 | End: 2021-08-11

## 2021-08-11 RX ORDER — SODIUM CHLORIDE 0.9 % (FLUSH) 0.9 %
5-40 SYRINGE (ML) INJECTION AS NEEDED
Status: DISCONTINUED | OUTPATIENT
Start: 2021-08-11 | End: 2021-08-15

## 2021-08-11 RX ORDER — ACETAMINOPHEN 650 MG/1
650 SUPPOSITORY RECTAL
Status: DISCONTINUED | OUTPATIENT
Start: 2021-08-11 | End: 2021-08-21

## 2021-08-11 RX ORDER — FAMOTIDINE 20 MG/1
20 TABLET, FILM COATED ORAL 2 TIMES DAILY
Status: DISCONTINUED | OUTPATIENT
Start: 2021-08-12 | End: 2021-08-13

## 2021-08-11 RX ORDER — ACETAMINOPHEN 325 MG/1
650 TABLET ORAL
Status: DISCONTINUED | OUTPATIENT
Start: 2021-08-11 | End: 2021-08-21

## 2021-08-11 RX ADMIN — SODIUM CHLORIDE 1000 ML: 900 INJECTION, SOLUTION INTRAVENOUS at 22:49

## 2021-08-11 RX ADMIN — MORPHINE SULFATE 2 MG: 2 INJECTION, SOLUTION INTRAMUSCULAR; INTRAVENOUS at 22:50

## 2021-08-11 RX ADMIN — INSULIN LISPRO 4 UNITS: 100 INJECTION, SOLUTION INTRAVENOUS; SUBCUTANEOUS at 23:58

## 2021-08-11 RX ADMIN — SODIUM CHLORIDE 917 ML: 900 INJECTION, SOLUTION INTRAVENOUS at 23:58

## 2021-08-11 RX ADMIN — PIPERACILLIN AND TAZOBACTAM 4.5 G: 4; .5 INJECTION, POWDER, LYOPHILIZED, FOR SOLUTION INTRAVENOUS; PARENTERAL at 22:49

## 2021-08-11 RX ADMIN — VANCOMYCIN HYDROCHLORIDE 1750 MG: 10 INJECTION, POWDER, LYOPHILIZED, FOR SOLUTION INTRAVENOUS at 23:13

## 2021-08-11 RX ADMIN — ATORVASTATIN CALCIUM 40 MG: 20 TABLET, FILM COATED ORAL at 23:58

## 2021-08-11 RX ADMIN — ONDANSETRON 4 MG: 2 INJECTION INTRAMUSCULAR; INTRAVENOUS at 22:50

## 2021-08-12 ENCOUNTER — APPOINTMENT (OUTPATIENT)
Dept: INTERVENTIONAL RADIOLOGY/VASCULAR | Age: 51
DRG: 853 | End: 2021-08-12
Attending: SURGERY
Payer: COMMERCIAL

## 2021-08-12 ENCOUNTER — ANESTHESIA (OUTPATIENT)
Dept: SURGERY | Age: 51
DRG: 853 | End: 2021-08-12
Payer: COMMERCIAL

## 2021-08-12 ENCOUNTER — APPOINTMENT (OUTPATIENT)
Dept: VASCULAR SURGERY | Age: 51
DRG: 853 | End: 2021-08-12
Attending: EMERGENCY MEDICINE
Payer: COMMERCIAL

## 2021-08-12 ENCOUNTER — ANESTHESIA EVENT (OUTPATIENT)
Dept: SURGERY | Age: 51
DRG: 853 | End: 2021-08-12
Payer: COMMERCIAL

## 2021-08-12 PROBLEM — D64.9 ANEMIA: Status: ACTIVE | Noted: 2021-08-12

## 2021-08-12 PROBLEM — Z72.0 TOBACCO USE: Status: ACTIVE | Noted: 2021-08-12

## 2021-08-12 PROBLEM — Z01.818 PREOPERATIVE EVALUATION TO RULE OUT SURGICAL CONTRAINDICATION: Status: ACTIVE | Noted: 2021-08-12
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
## 2021-08-12 LAB
ANION GAP SERPL CALC-SCNC: 8 MMOL/L (ref 3–18)
BASOPHILS # BLD: 0 K/UL (ref 0–0.1)
BASOPHILS NFR BLD: 0 % (ref 0–2)
BUN SERPL-MCNC: 57 MG/DL (ref 7–18)
BUN SERPL-MCNC: 61 MG/DL (ref 7–18)
BUN/CREAT SERPL: 43 (ref 12–20)
CALCIUM SERPL-MCNC: 7.3 MG/DL (ref 8.5–10.1)
CHLORIDE SERPL-SCNC: 103 MMOL/L (ref 100–111)
CO2 SERPL-SCNC: 23 MMOL/L (ref 21–32)
CREAT SERPL-MCNC: 1.43 MG/DL (ref 0.6–1.3)
DIFFERENTIAL METHOD BLD: ABNORMAL
EOSINOPHIL # BLD: 0 K/UL (ref 0–0.4)
EOSINOPHIL NFR BLD: 0 % (ref 0–5)
ERYTHROCYTE [DISTWIDTH] IN BLOOD BY AUTOMATED COUNT: 14.3 % (ref 11.6–14.5)
FOLATE SERPL-MCNC: 8.6 NG/ML (ref 3.1–17.5)
GLUCOSE BLD STRIP.AUTO-MCNC: 148 MG/DL (ref 70–110)
GLUCOSE BLD STRIP.AUTO-MCNC: 167 MG/DL (ref 70–110)
GLUCOSE BLD STRIP.AUTO-MCNC: 170 MG/DL (ref 70–110)
GLUCOSE BLD STRIP.AUTO-MCNC: 191 MG/DL (ref 70–110)
GLUCOSE BLD STRIP.AUTO-MCNC: 202 MG/DL (ref 70–110)
GLUCOSE SERPL-MCNC: 174 MG/DL (ref 74–99)
HBA1C MFR BLD: 7 % (ref 4.2–5.6)
HCT VFR BLD AUTO: 17.6 % (ref 35–45)
HGB BLD-MCNC: 5.7 G/DL (ref 12–16)
HISTORY CHECKED?,CKHIST: NORMAL
IRON SATN MFR SERPL: 8 % (ref 20–50)
IRON SERPL-MCNC: 10 UG/DL (ref 50–175)
LYMPHOCYTES # BLD: 0.4 K/UL (ref 0.9–3.6)
LYMPHOCYTES NFR BLD: 2 % (ref 21–52)
MAGNESIUM SERPL-MCNC: 2.4 MG/DL (ref 1.6–2.6)
MCH RBC QN AUTO: 29.1 PG (ref 24–34)
MCHC RBC AUTO-ENTMCNC: 32.4 G/DL (ref 31–37)
MCV RBC AUTO: 89.8 FL (ref 74–97)
MONOCYTES # BLD: 1.6 K/UL (ref 0.05–1.2)
MONOCYTES NFR BLD: 7 % (ref 3–10)
NEUTS SEG # BLD: 20.4 K/UL (ref 1.8–8)
NEUTS SEG NFR BLD: 91 % (ref 40–73)
PLATELET # BLD AUTO: 332 K/UL (ref 135–420)
PLATELET COMMENTS,PCOM: ABNORMAL
PMV BLD AUTO: 9.4 FL (ref 9.2–11.8)
POTASSIUM SERPL-SCNC: 3.7 MMOL/L (ref 3.5–5.5)
RBC # BLD AUTO: 1.96 M/UL (ref 4.2–5.3)
RBC MORPH BLD: ABNORMAL
SODIUM SERPL-SCNC: 134 MMOL/L (ref 136–145)
TIBC SERPL-MCNC: 130 UG/DL (ref 250–450)
VIT B12 SERPL-MCNC: 456 PG/ML (ref 211–911)
WBC # BLD AUTO: 22.4 K/UL (ref 4.6–13.2)
WBC MORPH BLD: ABNORMAL

## 2021-08-12 PROCEDURE — 74011250637 HC RX REV CODE- 250/637: Performed by: STUDENT IN AN ORGANIZED HEALTH CARE EDUCATION/TRAINING PROGRAM

## 2021-08-12 PROCEDURE — 76060000032 HC ANESTHESIA 0.5 TO 1 HR: Performed by: STUDENT IN AN ORGANIZED HEALTH CARE EDUCATION/TRAINING PROGRAM

## 2021-08-12 PROCEDURE — 74011250636 HC RX REV CODE- 250/636: Performed by: STUDENT IN AN ORGANIZED HEALTH CARE EDUCATION/TRAINING PROGRAM

## 2021-08-12 PROCEDURE — 30233N1 TRANSFUSION OF NONAUTOLOGOUS RED BLOOD CELLS INTO PERIPHERAL VEIN, PERCUTANEOUS APPROACH: ICD-10-PCS | Performed by: HOSPITALIST

## 2021-08-12 PROCEDURE — 74011000250 HC RX REV CODE- 250: Performed by: REGISTERED NURSE

## 2021-08-12 PROCEDURE — 76010000138 HC OR TIME 0.5 TO 1 HR: Performed by: STUDENT IN AN ORGANIZED HEALTH CARE EDUCATION/TRAINING PROGRAM

## 2021-08-12 PROCEDURE — 83735 ASSAY OF MAGNESIUM: CPT

## 2021-08-12 PROCEDURE — 36415 COLL VENOUS BLD VENIPUNCTURE: CPT

## 2021-08-12 PROCEDURE — 77030003029 HC SUT VCRL J&J -B: Performed by: STUDENT IN AN ORGANIZED HEALTH CARE EDUCATION/TRAINING PROGRAM

## 2021-08-12 PROCEDURE — 84520 ASSAY OF UREA NITROGEN: CPT

## 2021-08-12 PROCEDURE — 74011000258 HC RX REV CODE- 258: Performed by: FAMILY MEDICINE

## 2021-08-12 PROCEDURE — 74011250636 HC RX REV CODE- 250/636: Performed by: FAMILY MEDICINE

## 2021-08-12 PROCEDURE — 74011636637 HC RX REV CODE- 636/637: Performed by: SPECIALIST

## 2021-08-12 PROCEDURE — 85025 COMPLETE CBC W/AUTO DIFF WBC: CPT

## 2021-08-12 PROCEDURE — 74011250636 HC RX REV CODE- 250/636: Performed by: EMERGENCY MEDICINE

## 2021-08-12 PROCEDURE — 74011000258 HC RX REV CODE- 258: Performed by: REGISTERED NURSE

## 2021-08-12 PROCEDURE — 77030018673: Performed by: STUDENT IN AN ORGANIZED HEALTH CARE EDUCATION/TRAINING PROGRAM

## 2021-08-12 PROCEDURE — P9047 ALBUMIN (HUMAN), 25%, 50ML: HCPCS | Performed by: FAMILY MEDICINE

## 2021-08-12 PROCEDURE — P9016 RBC LEUKOCYTES REDUCED: HCPCS

## 2021-08-12 PROCEDURE — 86901 BLOOD TYPING SEROLOGIC RH(D): CPT

## 2021-08-12 PROCEDURE — 88311 DECALCIFY TISSUE: CPT

## 2021-08-12 PROCEDURE — 74011250636 HC RX REV CODE- 250/636: Performed by: REGISTERED NURSE

## 2021-08-12 PROCEDURE — 0Y6H0Z1 DETACHMENT AT RIGHT LOWER LEG, HIGH, OPEN APPROACH: ICD-10-PCS | Performed by: STUDENT IN AN ORGANIZED HEALTH CARE EDUCATION/TRAINING PROGRAM

## 2021-08-12 PROCEDURE — 74011250636 HC RX REV CODE- 250/636: Performed by: SPECIALIST

## 2021-08-12 PROCEDURE — 2709999900 HC NON-CHARGEABLE SUPPLY: Performed by: STUDENT IN AN ORGANIZED HEALTH CARE EDUCATION/TRAINING PROGRAM

## 2021-08-12 PROCEDURE — 65270000029 HC RM PRIVATE

## 2021-08-12 PROCEDURE — 82962 GLUCOSE BLOOD TEST: CPT

## 2021-08-12 PROCEDURE — 77030031139 HC SUT VCRL2 J&J -A: Performed by: STUDENT IN AN ORGANIZED HEALTH CARE EDUCATION/TRAINING PROGRAM

## 2021-08-12 PROCEDURE — 77030002996 HC SUT SLK J&J -A: Performed by: STUDENT IN AN ORGANIZED HEALTH CARE EDUCATION/TRAINING PROGRAM

## 2021-08-12 PROCEDURE — 74011636637 HC RX REV CODE- 636/637: Performed by: FAMILY MEDICINE

## 2021-08-12 PROCEDURE — 86923 COMPATIBILITY TEST ELECTRIC: CPT

## 2021-08-12 PROCEDURE — 74011250637 HC RX REV CODE- 250/637: Performed by: FAMILY MEDICINE

## 2021-08-12 PROCEDURE — 88307 TISSUE EXAM BY PATHOLOGIST: CPT

## 2021-08-12 PROCEDURE — 77030006754 HC BLD SAW GIGLI ZIMM -B: Performed by: STUDENT IN AN ORGANIZED HEALTH CARE EDUCATION/TRAINING PROGRAM

## 2021-08-12 PROCEDURE — 77030003028 HC SUT VCRL J&J -A: Performed by: STUDENT IN AN ORGANIZED HEALTH CARE EDUCATION/TRAINING PROGRAM

## 2021-08-12 PROCEDURE — 80048 BASIC METABOLIC PNL TOTAL CA: CPT

## 2021-08-12 PROCEDURE — 93925 LOWER EXTREMITY STUDY: CPT

## 2021-08-12 PROCEDURE — 36430 TRANSFUSION BLD/BLD COMPNT: CPT

## 2021-08-12 PROCEDURE — 76210000016 HC OR PH I REC 1 TO 1.5 HR: Performed by: STUDENT IN AN ORGANIZED HEALTH CARE EDUCATION/TRAINING PROGRAM

## 2021-08-12 PROCEDURE — 83540 ASSAY OF IRON: CPT

## 2021-08-12 PROCEDURE — 82607 VITAMIN B-12: CPT

## 2021-08-12 RX ORDER — NALOXONE HYDROCHLORIDE 0.4 MG/ML
0.1 INJECTION, SOLUTION INTRAMUSCULAR; INTRAVENOUS; SUBCUTANEOUS AS NEEDED
Status: DISCONTINUED | OUTPATIENT
Start: 2021-08-12 | End: 2021-09-03 | Stop reason: HOSPADM

## 2021-08-12 RX ORDER — GLYCOPYRROLATE 0.2 MG/ML
INJECTION INTRAMUSCULAR; INTRAVENOUS AS NEEDED
Status: DISCONTINUED | OUTPATIENT
Start: 2021-08-12 | End: 2021-08-12 | Stop reason: HOSPADM

## 2021-08-12 RX ORDER — DEXTROSE 50 % IN WATER (D50W) INTRAVENOUS SYRINGE
25-50 AS NEEDED
Status: DISCONTINUED | OUTPATIENT
Start: 2021-08-12 | End: 2021-08-12 | Stop reason: HOSPADM

## 2021-08-12 RX ORDER — SODIUM CHLORIDE, SODIUM LACTATE, POTASSIUM CHLORIDE, CALCIUM CHLORIDE 600; 310; 30; 20 MG/100ML; MG/100ML; MG/100ML; MG/100ML
INJECTION, SOLUTION INTRAVENOUS
Status: DISCONTINUED | OUTPATIENT
Start: 2021-08-12 | End: 2021-08-12 | Stop reason: HOSPADM

## 2021-08-12 RX ORDER — EPHEDRINE SULFATE/0.9% NACL/PF 50 MG/5 ML
SYRINGE (ML) INTRAVENOUS AS NEEDED
Status: DISCONTINUED | OUTPATIENT
Start: 2021-08-12 | End: 2021-08-12 | Stop reason: HOSPADM

## 2021-08-12 RX ORDER — FERROUS SULFATE 324(65)MG
324 TABLET, DELAYED RELEASE (ENTERIC COATED) ORAL 2 TIMES DAILY WITH MEALS
COMMUNITY
End: 2021-09-03

## 2021-08-12 RX ORDER — ALBUMIN HUMAN 250 G/1000ML
25 SOLUTION INTRAVENOUS ONCE
Status: COMPLETED | OUTPATIENT
Start: 2021-08-12 | End: 2021-08-12

## 2021-08-12 RX ORDER — SODIUM CHLORIDE 9 MG/ML
250 INJECTION, SOLUTION INTRAVENOUS AS NEEDED
Status: DISCONTINUED | OUTPATIENT
Start: 2021-08-12 | End: 2021-08-15

## 2021-08-12 RX ORDER — BUPROPION HYDROCHLORIDE 150 MG/1
150 TABLET, EXTENDED RELEASE ORAL DAILY
COMMUNITY

## 2021-08-12 RX ORDER — HYDROMORPHONE HYDROCHLORIDE 1 MG/ML
0.5 INJECTION, SOLUTION INTRAMUSCULAR; INTRAVENOUS; SUBCUTANEOUS
Status: DISCONTINUED | OUTPATIENT
Start: 2021-08-12 | End: 2021-08-12 | Stop reason: HOSPADM

## 2021-08-12 RX ORDER — DIPHENHYDRAMINE HYDROCHLORIDE 50 MG/ML
12.5 INJECTION, SOLUTION INTRAMUSCULAR; INTRAVENOUS
Status: DISCONTINUED | OUTPATIENT
Start: 2021-08-12 | End: 2021-08-24

## 2021-08-12 RX ORDER — ONDANSETRON 2 MG/ML
4 INJECTION INTRAMUSCULAR; INTRAVENOUS ONCE
Status: DISCONTINUED | OUTPATIENT
Start: 2021-08-12 | End: 2021-08-12 | Stop reason: HOSPADM

## 2021-08-12 RX ORDER — IPRATROPIUM BROMIDE AND ALBUTEROL SULFATE 2.5; .5 MG/3ML; MG/3ML
3 SOLUTION RESPIRATORY (INHALATION)
Status: DISCONTINUED | OUTPATIENT
Start: 2021-08-13 | End: 2021-08-14

## 2021-08-12 RX ORDER — SODIUM CHLORIDE 0.9 % (FLUSH) 0.9 %
5-40 SYRINGE (ML) INJECTION EVERY 8 HOURS
Status: DISCONTINUED | OUTPATIENT
Start: 2021-08-12 | End: 2021-08-15

## 2021-08-12 RX ORDER — BUPROPION HYDROCHLORIDE 150 MG/1
150 TABLET, EXTENDED RELEASE ORAL DAILY
Status: DISCONTINUED | OUTPATIENT
Start: 2021-08-12 | End: 2021-09-03 | Stop reason: HOSPADM

## 2021-08-12 RX ORDER — HYDROCODONE BITARTRATE AND ACETAMINOPHEN 7.5; 325 MG/1; MG/1
2 TABLET ORAL
Status: DISCONTINUED | OUTPATIENT
Start: 2021-08-12 | End: 2021-08-20

## 2021-08-12 RX ORDER — SODIUM CHLORIDE 0.9 % (FLUSH) 0.9 %
5-40 SYRINGE (ML) INJECTION AS NEEDED
Status: DISCONTINUED | OUTPATIENT
Start: 2021-08-12 | End: 2021-08-15

## 2021-08-12 RX ORDER — HEPARIN SODIUM 5000 [USP'U]/ML
5000 INJECTION, SOLUTION INTRAVENOUS; SUBCUTANEOUS EVERY 8 HOURS
Status: DISCONTINUED | OUTPATIENT
Start: 2021-08-13 | End: 2021-09-03 | Stop reason: HOSPADM

## 2021-08-12 RX ORDER — PROPOFOL 10 MG/ML
INJECTION, EMULSION INTRAVENOUS AS NEEDED
Status: DISCONTINUED | OUTPATIENT
Start: 2021-08-12 | End: 2021-08-12 | Stop reason: HOSPADM

## 2021-08-12 RX ORDER — GUAIFENESIN 100 MG/5ML
81 LIQUID (ML) ORAL DAILY
Status: DISCONTINUED | OUTPATIENT
Start: 2021-08-13 | End: 2021-09-03 | Stop reason: HOSPADM

## 2021-08-12 RX ORDER — GABAPENTIN 100 MG/1
100 CAPSULE ORAL
Status: DISCONTINUED | OUTPATIENT
Start: 2021-08-12 | End: 2021-08-21

## 2021-08-12 RX ORDER — MORPHINE SULFATE 2 MG/ML
2 INJECTION, SOLUTION INTRAMUSCULAR; INTRAVENOUS
Status: DISCONTINUED | OUTPATIENT
Start: 2021-08-12 | End: 2021-08-20

## 2021-08-12 RX ORDER — NALOXONE HYDROCHLORIDE 0.4 MG/ML
0.1 INJECTION, SOLUTION INTRAMUSCULAR; INTRAVENOUS; SUBCUTANEOUS
Status: DISCONTINUED | OUTPATIENT
Start: 2021-08-12 | End: 2021-08-12 | Stop reason: HOSPADM

## 2021-08-12 RX ORDER — LIDOCAINE HYDROCHLORIDE 20 MG/ML
INJECTION, SOLUTION EPIDURAL; INFILTRATION; INTRACAUDAL; PERINEURAL AS NEEDED
Status: DISCONTINUED | OUTPATIENT
Start: 2021-08-12 | End: 2021-08-12 | Stop reason: HOSPADM

## 2021-08-12 RX ORDER — INSULIN LISPRO 100 [IU]/ML
INJECTION, SOLUTION INTRAVENOUS; SUBCUTANEOUS ONCE
Status: COMPLETED | OUTPATIENT
Start: 2021-08-12 | End: 2021-08-12

## 2021-08-12 RX ORDER — METFORMIN HYDROCHLORIDE 1000 MG/1
1000 TABLET ORAL 2 TIMES DAILY WITH MEALS
COMMUNITY
End: 2021-09-03

## 2021-08-12 RX ORDER — GLIPIZIDE 5 MG/1
5 TABLET, FILM COATED, EXTENDED RELEASE ORAL DAILY
COMMUNITY
End: 2021-09-03

## 2021-08-12 RX ORDER — MIDAZOLAM HYDROCHLORIDE 1 MG/ML
INJECTION, SOLUTION INTRAMUSCULAR; INTRAVENOUS AS NEEDED
Status: DISCONTINUED | OUTPATIENT
Start: 2021-08-12 | End: 2021-08-12 | Stop reason: HOSPADM

## 2021-08-12 RX ORDER — PREGABALIN 300 MG/1
300 CAPSULE ORAL 2 TIMES DAILY
COMMUNITY
End: 2021-09-03

## 2021-08-12 RX ORDER — HYDROMORPHONE HCL IN WATER/PF 1 MG/ML
SYRINGE (ML) INJECTION
Status: DISCONTINUED | OUTPATIENT
Start: 2021-08-12 | End: 2021-08-13

## 2021-08-12 RX ORDER — FENTANYL CITRATE 50 UG/ML
INJECTION, SOLUTION INTRAMUSCULAR; INTRAVENOUS AS NEEDED
Status: DISCONTINUED | OUTPATIENT
Start: 2021-08-12 | End: 2021-08-12 | Stop reason: HOSPADM

## 2021-08-12 RX ORDER — SODIUM CHLORIDE, SODIUM LACTATE, POTASSIUM CHLORIDE, CALCIUM CHLORIDE 600; 310; 30; 20 MG/100ML; MG/100ML; MG/100ML; MG/100ML
50 INJECTION, SOLUTION INTRAVENOUS CONTINUOUS
Status: DISCONTINUED | OUTPATIENT
Start: 2021-08-12 | End: 2021-08-12 | Stop reason: HOSPADM

## 2021-08-12 RX ORDER — HYDROCODONE BITARTRATE AND ACETAMINOPHEN 7.5; 325 MG/1; MG/1
1 TABLET ORAL
Status: DISCONTINUED | OUTPATIENT
Start: 2021-08-12 | End: 2021-08-20

## 2021-08-12 RX ORDER — LOSARTAN POTASSIUM 50 MG/1
50 TABLET ORAL DAILY
COMMUNITY
End: 2021-09-03

## 2021-08-12 RX ORDER — DULOXETIN HYDROCHLORIDE 60 MG/1
60 CAPSULE, DELAYED RELEASE ORAL DAILY
COMMUNITY

## 2021-08-12 RX ORDER — PREGABALIN 100 MG/1
300 CAPSULE ORAL 2 TIMES DAILY
Status: DISCONTINUED | OUTPATIENT
Start: 2021-08-12 | End: 2021-08-12

## 2021-08-12 RX ORDER — MAGNESIUM SULFATE 100 %
4 CRYSTALS MISCELLANEOUS AS NEEDED
Status: DISCONTINUED | OUTPATIENT
Start: 2021-08-12 | End: 2021-08-12 | Stop reason: HOSPADM

## 2021-08-12 RX ORDER — ALPRAZOLAM 0.5 MG/1
0.5 TABLET ORAL
Status: DISCONTINUED | OUTPATIENT
Start: 2021-08-12 | End: 2021-08-24

## 2021-08-12 RX ORDER — FENTANYL CITRATE 50 UG/ML
25 INJECTION, SOLUTION INTRAMUSCULAR; INTRAVENOUS
Status: DISCONTINUED | OUTPATIENT
Start: 2021-08-12 | End: 2021-08-12 | Stop reason: HOSPADM

## 2021-08-12 RX ORDER — OXYCODONE AND ACETAMINOPHEN 5; 325 MG/1; MG/1
1 TABLET ORAL AS NEEDED
Status: DISCONTINUED | OUTPATIENT
Start: 2021-08-12 | End: 2021-08-12 | Stop reason: HOSPADM

## 2021-08-12 RX ORDER — ONDANSETRON 2 MG/ML
INJECTION INTRAMUSCULAR; INTRAVENOUS AS NEEDED
Status: DISCONTINUED | OUTPATIENT
Start: 2021-08-12 | End: 2021-08-12 | Stop reason: HOSPADM

## 2021-08-12 RX ORDER — ALPRAZOLAM 0.5 MG/1
0.5 TABLET ORAL
COMMUNITY
End: 2021-09-03

## 2021-08-12 RX ADMIN — ALBUMIN (HUMAN) 25 G: 0.25 INJECTION, SOLUTION INTRAVENOUS at 04:51

## 2021-08-12 RX ADMIN — INSULIN LISPRO 3 UNITS: 100 INJECTION, SOLUTION INTRAVENOUS; SUBCUTANEOUS at 21:06

## 2021-08-12 RX ADMIN — ATORVASTATIN CALCIUM 40 MG: 20 TABLET, FILM COATED ORAL at 23:21

## 2021-08-12 RX ADMIN — Medication 20 ML: at 23:22

## 2021-08-12 RX ADMIN — SODIUM CHLORIDE, SODIUM LACTATE, POTASSIUM CHLORIDE, AND CALCIUM CHLORIDE: 600; 310; 30; 20 INJECTION, SOLUTION INTRAVENOUS at 19:47

## 2021-08-12 RX ADMIN — SODIUM CHLORIDE 100 ML/HR: 900 INJECTION, SOLUTION INTRAVENOUS at 23:21

## 2021-08-12 RX ADMIN — PHENYLEPHRINE HYDROCHLORIDE 200 MCG: 10 INJECTION INTRAVENOUS at 20:11

## 2021-08-12 RX ADMIN — Medication 10 ML: at 22:00

## 2021-08-12 RX ADMIN — PHENYLEPHRINE HYDROCHLORIDE 200 MCG: 10 INJECTION INTRAVENOUS at 19:54

## 2021-08-12 RX ADMIN — LIDOCAINE HYDROCHLORIDE 60 MG: 20 INJECTION, SOLUTION INTRAVENOUS at 19:51

## 2021-08-12 RX ADMIN — PROPOFOL 120 MG: 10 INJECTION, EMULSION INTRAVENOUS at 19:51

## 2021-08-12 RX ADMIN — Medication: at 21:45

## 2021-08-12 RX ADMIN — SODIUM CHLORIDE 100 ML/HR: 900 INJECTION, SOLUTION INTRAVENOUS at 01:23

## 2021-08-12 RX ADMIN — PHENYLEPHRINE HYDROCHLORIDE 200 MCG: 10 INJECTION INTRAVENOUS at 20:03

## 2021-08-12 RX ADMIN — PIPERACILLIN AND TAZOBACTAM 3.38 G: 3; .375 INJECTION, POWDER, LYOPHILIZED, FOR SOLUTION INTRAVENOUS at 20:11

## 2021-08-12 RX ADMIN — Medication 10 ML: at 23:22

## 2021-08-12 RX ADMIN — PHENYLEPHRINE HYDROCHLORIDE 200 MCG: 10 INJECTION INTRAVENOUS at 20:20

## 2021-08-12 RX ADMIN — Medication 10 MG: at 20:29

## 2021-08-12 RX ADMIN — PIPERACILLIN AND TAZOBACTAM 3.38 G: 3; .375 INJECTION, POWDER, LYOPHILIZED, FOR SOLUTION INTRAVENOUS at 04:51

## 2021-08-12 RX ADMIN — ACETAMINOPHEN 650 MG: 325 TABLET ORAL at 10:45

## 2021-08-12 RX ADMIN — INSULIN LISPRO 2 UNITS: 100 INJECTION, SOLUTION INTRAVENOUS; SUBCUTANEOUS at 06:20

## 2021-08-12 RX ADMIN — FENTANYL CITRATE 50 MCG: 50 INJECTION, SOLUTION INTRAMUSCULAR; INTRAVENOUS at 19:48

## 2021-08-12 RX ADMIN — FAMOTIDINE 20 MG: 20 TABLET, FILM COATED ORAL at 23:21

## 2021-08-12 RX ADMIN — GLYCOPYRROLATE 0.2 MG: 0.2 INJECTION INTRAMUSCULAR; INTRAVENOUS at 19:47

## 2021-08-12 RX ADMIN — MIDAZOLAM 2 MG: 1 INJECTION INTRAMUSCULAR; INTRAVENOUS at 19:47

## 2021-08-12 RX ADMIN — INSULIN LISPRO 4 UNITS: 100 INJECTION, SOLUTION INTRAVENOUS; SUBCUTANEOUS at 12:46

## 2021-08-12 RX ADMIN — Medication 10 MG: at 20:24

## 2021-08-12 RX ADMIN — Medication 10 ML: at 06:54

## 2021-08-12 RX ADMIN — PIPERACILLIN AND TAZOBACTAM 3.38 G: 3; .375 INJECTION, POWDER, LYOPHILIZED, FOR SOLUTION INTRAVENOUS at 10:39

## 2021-08-12 RX ADMIN — HYDROMORPHONE HYDROCHLORIDE 0.5 MG: 1 INJECTION, SOLUTION INTRAMUSCULAR; INTRAVENOUS; SUBCUTANEOUS at 21:07

## 2021-08-12 RX ADMIN — ONDANSETRON HYDROCHLORIDE 4 MG: 2 INJECTION INTRAMUSCULAR; INTRAVENOUS at 19:57

## 2021-08-12 RX ADMIN — HYDROMORPHONE HYDROCHLORIDE 0.5 MG: 1 INJECTION, SOLUTION INTRAMUSCULAR; INTRAVENOUS; SUBCUTANEOUS at 21:17

## 2021-08-12 RX ADMIN — MORPHINE SULFATE 2 MG: 2 INJECTION, SOLUTION INTRAMUSCULAR; INTRAVENOUS at 12:45

## 2021-08-12 RX ADMIN — VANCOMYCIN HYDROCHLORIDE 1000 MG: 1 INJECTION, POWDER, LYOPHILIZED, FOR SOLUTION INTRAVENOUS at 19:44

## 2021-08-12 RX ADMIN — Medication 10 ML: at 01:24

## 2021-08-12 NOTE — ROUTINE PROCESS
TRANSFER - OUT REPORT:    Verbal report given to Sushil Russell RN(name) on Daisy  being transferred to (unit) for routine progression of care       Report consisted of patients Situation, Background, Assessment and   Recommendations(SBAR). Information from the following report(s) SBAR and ED Summary was reviewed with the receiving nurse. Lines:   PICC Double Lumen 32/77/23 Right;Basilic (Active)       Peripheral IV 08/11/21 Right Antecubital (Active)   Site Assessment Clean, dry, & intact 08/11/21 2217   Phlebitis Assessment 0 08/11/21 2217   Infiltration Assessment 0 08/11/21 2217   Dressing Status Clean, dry, & intact; Occlusive 08/11/21 2217   Dressing Type Tape;Transparent 08/11/21 2217   Hub Color/Line Status Pink 08/11/21 2217   Action Taken Blood drawn 08/11/21 2217   Alcohol Cap Used Yes 08/11/21 2217        Opportunity for questions and clarification was provided.       Patient transported with:   [employer]

## 2021-08-12 NOTE — CONSULTS
[employer] [address] [facility], [address] [facility]  Tel: [phone]  Fax: [address], [place], [place]        PREOPERATIVE HISTORY AND PHYSICAL    Patient: David Arevalo Age: 46 y.o. Sex: female    YOB: 1970 Admit Date: 8/11/2021 PCP: None   MRN: [id number]  CSN: [id number]       ASSESSMENT: Wet gangrene right foot, not salvageable, chronic blood loss anemia present on arrival, hypovolemia, septic shock due to right foot gangrene, acute kidney injury     PLAN: Broad spectrum antibiotics. Blood transfusion. Fluids. Long discussion about infection that is life-threatening. I recommend right guillotine amputation at this time. Will plan angiogram, possible revascularization in the future once acute issues improved. Procedure explained as well as formalization to BKA in 3-5 days. Rationale explained for sepsis control and to allow antibiotics to work on lymphatics. Benefits, alternatives, and risks including bleeding requirng transfusion, infection, non-healing of BKA wound, and possible need for future procedures was discussed. All questions answered, pt voiced understanding and desires to proceed with intervention. Valeria Ashby MD, [address] [place] [address] [facility]  [phone] office  [phone] mobile      Chief Complaint   Patient presents with    Wound Check       HPI: 46y.o. year old female well known to me with new, uncontrolled IDDM, left Edmonson 5 s/p endovascular revascularization of the left leg several months ago with resultant TMA to the left. I saw her several weeks ago, mid July at which time I advised endovascular intervention on the right leg as she had known SFA occlusion and had developed new wounds at the 1st MT head and the 5th MT head. We have discussed diabetic foot care at length. We have discussed smoking cessation at length. She recently had a fall, and was near bed-bound for 3 weeks. Foot infection progressed.   She called an ambulance and was brought to the hospital last night for fever. WBC 22.4. Hgb 5.7. Plts 332. Na 134, Cr 1.43  Hypotensive, BP 92/49, tachycardic 103, Tm 100.4  Getting blood transfusion. She was seen by my partner, Dr. Orville Maharaj earlier this afternoon. She was not interested in amputation. Chief Complaint   Patient presents with    Wound Check       ROS: Denies chest pain. Past Medical History:   Diagnosis Date    PAD (peripheral artery disease) (HCC)      Past Surgical History:   Procedure Laterality Date    HX ORTHOPAEDIC      L TMT amputation     Social History     Socioeconomic History    Marital status:      Spouse name: Not on file    Number of children: Not on file    Years of education: Not on file    Highest education level: Not on file   Occupational History    Not on file   Tobacco Use    Smoking status: Current Every Day Smoker     Packs/day: 1.50    Smokeless tobacco: Never Used   Substance and Sexual Activity    Alcohol use: Not Currently     Comment: on rare occasion    Drug use: Not Currently    Sexual activity: Not on file   Other Topics Concern     Service Not Asked    Blood Transfusions Not Asked    Caffeine Concern Not Asked    Occupational Exposure Not Asked    Hobby Hazards Not Asked    Sleep Concern Not Asked    Stress Concern Not Asked    Weight Concern Not Asked    Special Diet Not Asked    Back Care Not Asked    Exercise Not Asked    Bike Helmet Not Asked   [address] Not Asked    Self-Exams Not Asked   Social History Narrative    Not on file     Social Determinants of Health     Financial Resource Strain:     Difficulty of Paying Living Expenses:    Food Insecurity:     Worried About Running Out of Food in the Last Year:     Ran Out of Food in the Last Year:    Transportation Needs:     Lack of Transportation (Medical):      Lack of Transportation (Non-Medical):    Physical Activity:     Days of Exercise per Week:     Minutes of Exercise per Session:    Stress:     Feeling of Stress :    Social Connections:     Frequency of Communication with Friends and Family:     Frequency of Social Gatherings with Friends and Family:     Attends [employer]ism Services:     Active Member of Clubs or Organizations:     Attends Club or Organization Meetings:     Marital Status:    Intimate Partner Violence:     Fear of Current or Ex-Partner:     Emotionally Abused:     Physically Abused:     Sexually Abused:      Social History     Tobacco Use   Smoking Status Current Every Day Smoker    Packs/day: 1.50   Smokeless Tobacco Never Used     Family History   Problem Relation Age of Onset    Diabetes Mother      No Known Allergies  Current Facility-Administered Medications   Medication Dose Route Frequency Provider Last Rate Last Admin    Vancomycin-Pharmacy to Dose  1 Each Other Rx Dosing/Monitoring Zulema Pinedo MD        vancomycin (VANCOCIN) 1,000 mg in 0.9% sodium chloride 250 mL (VIAL-MATE)  1,000 mg IntraVENous Q18H Zulema Pinedo MD        ALPRAZolam Leveda Silvius) tablet 0.5 mg  0.5 mg Oral QHS PRN Randy Szymanski MD        buPROPion [facility]) tablet 150 mg  150 mg Oral DAILY Randy Szymanski MD        gabapentin (NEURONTIN) capsule 100 mg  100 mg Oral TID PRN Randy Szymanski MD        morphine injection 2 mg  2 mg IntraVENous Q4H PRN Randy Szymanski MD   2 mg at 08/12/21 1245    COVID-19 vac,Ad26-PF (JERONIMO) injection 1 Dose  1 Dose IntraMUSCular PRIOR TO DISCHARGE Randy Szymanski MD        0.9% sodium chloride infusion 250 mL  250 mL IntraVENous PRN David Rebolledo MD        piperacillin-tazobactam (ZOSYN) 3.375 g in 0.9% sodium chloride (MBP/ADV) 100 mL MBP  3.375 g IntraVENous Q6H Randy Szymanski MD        0.9% sodium chloride infusion 250 mL  250 mL IntraVENous PRN David Rebolledo MD        0.9% sodium chloride infusion 250 mL  250 mL IntraVENous PRN Gladystine Manner Danya Crespo MD  sodium chloride (NS) flush 5-10 mL  5-10 mL IntraVENous PRN Andres García MD        sodium chloride (NS) flush 5-40 mL  5-40 mL IntraVENous Q8H Lelo Lopez MD   10 mL at 08/12/21 0654    sodium chloride (NS) flush 5-40 mL  5-40 mL IntraVENous PRN Lelo Lopez MD        acetaminophen (TYLENOL) tablet 650 mg  650 mg Oral Q6H PRN Lelo Lopez MD   650 mg at 08/12/21 1045    Or    acetaminophen (TYLENOL) suppository 650 mg  650 mg Rectal Q6H PRN Lelo Lopez MD        polyethylene glycol (MIRALAX) packet 17 g  17 g Oral DAILY PRN Lelo Lopez MD        ondansetron (ZOFRAN ODT) tablet 4 mg  4 mg Oral Q8H PRN Lelo Lopez MD        Or    ondansetron [facility]) injection 4 mg  4 mg IntraVENous Q6H PRN Lelo Lopez MD        [Held by provider] enoxaparin (LOVENOX) injection 40 mg  40 mg SubCUTAneous DAILY Lelo Lopez MD        famotidine (PEPCID) tablet 20 mg  20 mg Oral BID Lelo Lopez MD        insulin lispro (HUMALOG) injection   SubCUTAneous Q6H Lelo Lopez MD   4 Units at 08/12/21 1246    glucose chewable tablet 16 g  16 g Oral PRN Lelo Lopez MD        glucagon (GLUCAGEN) injection 1 mg  1 mg IntraMUSCular PRN Lelo Lopez MD        dextrose (D50W) injection syrg 12.5-25 g  25-50 mL IntraVENous PRN Lelo Lopez MD        atorvastatin (LIPITOR) tablet 40 mg  40 mg Oral QHS Maggie MEDINA MD   40 mg at 08/11/21 2358    0.9% sodium chloride infusion  100 mL/hr IntraVENous CONTINUOUS Lelo Lopez  mL/hr at 08/12/21 0123 100 mL/hr at 08/12/21 0123        EXAM:   Visit Vitals  BP 98/61   Pulse 85   Temp 98.8 °F (37.1 °C)   Resp 18   Ht 5' 8\" (1.727 m)   Wt 72.6 kg (160 lb)   LMP 04/06/2018 (Within Years)   SpO2 97%   BMI 24.33 kg/m²     General appearance appears of stated age, in no distress  Oral mucosa without pallor or cyanosis, dry MM  Jugular veins without distension  Respiratory effort normal  Wet gangrene of right foot. Very malodorous. Left forefoot TMA with dry eschar. IMAGES VIEWED:  XR CHEST PORT    Result Date: 8/12/2021  No active cardiopulmonary disease.     Lab Results   Component Value Date/Time    WBC 22.4 (H) 08/12/2021 12:15 PM    HCT 17.6 (LL) 08/12/2021 12:15 PM     Lab Results   Component Value Date     (L) 08/12/2021    CO2 23 08/12/2021    BUN 61 (H) 08/12/2021     Lab Results   Component Value Date    INR 1.2 04/06/2021    APTT 46.1 (H) 04/06/2021         Bharath Villalba MD  August 12, 2021  7:31 PM

## 2021-08-12 NOTE — PROGRESS NOTES
Reason for Admission:   Chart reviewed; per H&P, patient is [de-identified] 46 y.o. female with uncontrolled type 2 diabetes mellitus, severe peripheral arterial disease, and tobacco use who has been noncompliant with wound care treatment due to recent fall presents to the ED for fever tonight. She notes that her feet have become gangrenous over the last few days. She fell 3 weeks ago, which has left her almost bedbound. She has been using a bucket to go to the bathroom. She has not been able to go to her hyperbaric appointments or wound care appointments due to this. \"                    RUR Score: Moderate; 23%              PCP: First and Last name:   None  Dr. Cindee Shone     Name of Practice: Family Medicine   Are you a current patient: Yes/No: yes   Approximate date of last visit: 3 weeks ago   Can you participate in a virtual visit if needed: yes    Do you (patient/family) have any concerns for transition/discharge? Depends upon interventions:  Had been scheduled to have revascularization procedure on both legs by [facility] vascular MD, Dr. 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 Patient states she usually takes care of her mother but friend Fawn Baer is doing this while she has been ill.   Per patient, she is waiting to see Dr. Trista Gary to see if he will want to amputate her gangrenous toes. She c/o continuous right leg pain, stated that she had been planning to have revascularization surgery with Dr. Rigoberto Hughes, [facility] vascular. Care manager will follow for discharge needs. Care Management Interventions  PCP Verified by CM:  Yes  Mode of Transport at Discharge: Self  Transition of Care Consult (CM Consult): Discharge Planning  Current Support Network: Own Home (takes care of mother at home)  The Plan for Transition of Care is Related to the Following Treatment Goals : gangrene of both feet, diabetes  The Patient and/or Patient Representative was Provided with a Choice of Provider and Agrees with the Discharge Plan?: Yes  Name of the Patient Representative Who was Provided with a Choice of Provider and Agrees with the Discharge Plan: Chandler Anthony, patient  Discharge Location  Discharge Placement:  (TBD)

## 2021-08-12 NOTE — PROGRESS NOTES
Pharmacy Dosing Services: Vancomycin    Consult for Vancomycin Dosing by Pharmacy by Dr. Marylene Fate provided for this 46y.o. year old female , for indication of Diabetic Foot. Day of Therapy 01    Ht Readings from Last 1 Encounters:   08/11/21 172.7 cm (68\")        Wt Readings from Last 1 Encounters:   08/11/21 72.6 kg (160 lb)        Other Current Antibiotics Zosyn   Significant Cultures pending   Serum Creatinine Lab Results   Component Value Date/Time    Creatinine 1.30 08/11/2021 09:35 PM      Creatinine Clearance Estimated Creatinine Clearance: 51.6 mL/min (based on SCr of 1.3 mg/dL). BUN Lab Results   Component Value Date/Time    BUN 65 (H) 08/11/2021 09:35 PM      WBC Lab Results   Component Value Date/Time    WBC 28.0 (H) 08/11/2021 09:35 PM      H/H Lab Results   Component Value Date/Time    HGB 7.7 (L) 08/11/2021 09:35 PM      Platelets Lab Results   Component Value Date/Time    PLATELET 309 (H) 17/67/9863 09:35 PM      Temp 98.7 °F (37.1 °C)     Start Vancomycin therapy, with loading dose of 1750 mg IV once on [email or web address]. Followed with maintenance dose of Vancomycin 1000 mg IV q18h. Dose calculated to approximate a therapeutic trough of 15-20 mcg/mL. AUC 0-24 /REX (based on an REX of 1 mg/L):   558. 3  ~Target range:  400 - 600. Pharmacy to follow daily and will make changes to dose and/or frequency based on clinical status.     Pharmacist Gato [phone]

## 2021-08-12 NOTE — H&P
History & Physical    Patient: Zeynep Wilson MRN: [id number]  CSN: [id number]    YOB: 1970  Age: 46 y.o. Sex: female      DOA: 8/11/2021  Primary Care Provider:  None      Assessment/Plan     Patient Active Problem List   Diagnosis Code    Osteomyelitis ([facility] M86.9    Type 2 diabetes mellitus with left diabetic foot ulcer ([facility] E11.621, L97.529    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 ([facility] E11.52    YUNIEL (acute kidney injury) ([facility] N17.9    PAD (peripheral artery disease) ([facility] I73.9    Bilateral carotid artery stenosis I65.23    Gangrene of left foot ([facility]) I96    Non compliance with medical treatment Z91.19    Gangrene of both feet ([facility] I96    Fall W19. Drucie Handler Tobacco use Z72.0    Preoperative evaluation to rule out surgical contraindication [id number]     77-year-old female with uncontrolled type 2 diabetes mellitus, severe peripheral arterial disease, and tobacco use who has been noncompliant with wound care and hyperbaric oxygen treatment due to recent fall is admitted for severe gangrene of both feet.     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 of both feetshe will likely require bilateral feet amputation and has had prior left transmetatarsal amputation  --NPO after midnight  Antibiotic treatment with Zosyn and vancomycin IV  Duplex arterial Dopplers of the lower extremities  Vascular surgery consult as she may require revascularization procedure  Podiatry consult for amputation  --Sliding scale insulin  --Follow up hemoglobin A1C    Noncompliance with medical treatment due to recent fall  Physical therapy consult prior to discharge to ensure home safety    Tobacco usehas been counseled extensively to quit    Preoperative evaluation to rule out surgical contraindicationno medical contraindication to urgent surgery    Full code    Prophylaxis: Pepcid p.o., Lovenox SQ held until after surgery    Estimated length of stay : 5 nights    MD Kusum Jacobsonist  ----------------------------------------------------------------------------------------------------------------------------------------------------------------------------------------------------------------  CC: Fever, gangrene of her feet       HPI:     Chandler Anthony is a 46 y.o. female with uncontrolled type 2 diabetes mellitus, severe peripheral arterial disease, and tobacco use who has been noncompliant with wound care treatment due to recent fall presents to the ED for fever tonight. She notes that her feet have become gangrenous over the last few days. She fell 3 weeks ago, which has left her almost bedbound. She has been using a bucket to go to the bathroom. She has not been able to go to her hyperbaric appointments or wound care appointments due to this. She denies any shortness of breath or chest pain. She has no prior history of heart attack. She denies any cough, nausea, vomiting, or diarrhea.     Past Medical History:   Diagnosis Date    PAD (peripheral artery disease) ([facility])        Past Surgical History:   Procedure Laterality Date    HX ORTHOPAEDIC      L TMT amputation       Family History   Problem Relation Age of Onset    Diabetes Mother        Social History     Socioeconomic History    Marital status:      Spouse name: Not on file    Number of children: Not on file    Years of education: Not on file    Highest education level: Not on file   Tobacco Use    Smoking status: Current Every Day Smoker     Packs/day: 1.50    Smokeless tobacco: Never Used   Substance and Sexual Activity    Alcohol use: Not Currently     Comment: on rare occasion    Drug use: Not Currently   Other Topics Concern     Social Determinants of Health     Financial Resource Strain:     Difficulty of Paying Living Expenses:    Food Insecurity:     Worried About Running Out of Food in the Last Year:     [address] in the Last Year: Transportation Needs:     Lack of Transportation (Medical):  Lack of Transportation (Non-Medical):    Physical Activity:     Days of Exercise per Week:     Minutes of Exercise per Session:    Stress:     Feeling of Stress :    Social Connections:     Frequency of Communication with Friends and Family:     Frequency of Social Gatherings with Friends and Family:     Attends [employer] Services:     Active Member of Clubs or Organizations:     Attends Club or Organization Meetings:     Marital Status:        Prior to Admission medications    Medication Sig Start Date End Date Taking? Authorizing Provider   collagenase (SantyL) 250 unit/gram ointment Apply  to affected area daily. Indications: bilat feet   Yes Provider, Historical   ALPRAZolam (XANAX) 0.5 mg tablet Take 0.5 mg by mouth nightly as needed for Sleep. Yes Provider, Historical   pregabalin (LYRICA) 300 mg capsule Take 300 mg by mouth two (2) times a day. Indications: diabetic complication causing injury to some body nerves   Yes Provider, Historical   metFORMIN (GLUCOPHAGE) 1,000 mg tablet Take 1,000 mg by mouth two (2) times daily (with meals). Indications: type 2 diabetes mellitus   Yes Provider, Historical   buPROPion SR (WELLBUTRIN SR) 150 mg SR tablet Take 150 mg by mouth daily. Yes Provider, Historical   DULoxetine (CYMBALTA) 60 mg capsule Take 60 mg by mouth daily. Yes Provider, Historical   losartan (COZAAR) 50 mg tablet Take 50 mg by mouth daily. Yes Provider, Historical   ferrous sulfate 324 mg (65 mg iron) tablet Take 324 mg by mouth two (2) times daily (with meals). Yes Provider, Historical   glipiZIDE SR (GLUCOTROL XL) 5 mg CR tablet Take 5 mg by mouth daily. Yes Provider, Historical   aspirin 81 mg chewable tablet Take 1 Tab by mouth daily. 4/15/21  Yes Bhavana Hurst MD   atorvastatin (LIPITOR) 40 mg tablet Take 1 Tab by mouth daily.  4/15/21  Yes Bhavana Hurst MD   clopidogreL (PLAVIX) 75 mg tab Take 1 Tab by mouth daily. 4/15/21  Yes Paige Thakkar MD   sodium chloride (Normal Saline Flush) 10-40 mL by IntraVENous route daily. Provider, Historical   heparin sod,porcine/0.9 % NaCl (HEPARIN FLUSH IV) 10 Units/mL by IntraVENous route daily. Provider, Historical   acetaminophen (TYLENOL) 500 mg tablet Take 1,000 mg by mouth every six (6) hours as needed for Fever or Pain. Provider, Historical   losartan (COZAAR) 25 mg tablet Take 1 Tab by mouth daily. 4/15/21   Paige Thakkar MD   metFORMIN (GLUCOPHAGE) 500 mg tablet Take 1 Tab by mouth two (2) times daily (with meals). 21   Paige Thakkar MD       No Known Allergies    Review of Systems  Gen: +fever, chills, malaise. Heent: No headache, rhinorrhea, sore throat. Heart: No chest pain, palpitations, EUGENE, pnd, or orthopnea. Resp: No cough, hemoptysis, wheezing and shortness of breath. GI: No nausea, vomiting, diarrhea, constipation, melena or hematochezia. : No urinary obstruction, dysuria or hematuria. Derm: No rash, new skin lesion or pruritis. Musc/skeletal: pain in the feet  Vasc: gangrene of the feet  Endo: No heat/cold intolerance, no polyuria,polydipsia or polyphagia. Neuro: No unilateral weakness, numbness, tingling. No seizures. Heme: No easy bruising or bleeding. Physical Exam:     Physical Exam:  Visit Vitals  BP (!) 92/49 (BP 1 Location: Right upper arm, BP Patient Position: Supine)   Pulse 88   Temp 98.6 °F (37 °C)   Resp 17   Ht 5' 8\" (1.727 m)   Wt 72.6 kg (160 lb)   LMP 2018 (Within Years)   SpO2 100%   BMI 24.33 kg/m²      O2 Device: None (Room air)    Temp (24hrs), Av.2 °F (37.3 °C), Min:98.6 °F (37 °C), Max:100.4 °F (38 °C)     1901 -  0700  In: 100 [I.V.:100]  Out: -    No intake/output data recorded. General:  Awake, older than stated age, cooperative, no distress. Very noxious smell of gangrene apparent from the doorway.    Head:  Normocephalic, without obvious abnormality, atraumatic. Eyes:  Conjunctivae/corneas clear, sclera anicteric. Neck: Supple, symmetrical, trachea midline. Lungs:   Clear to auscultation bilaterally. Heart:  Regular rate and rhythm, S1, S2 normal, no murmur, click, rub or gallop. Abdomen: Soft, non-tender. Bowel sounds normal. No masses,  No organomegaly. Extremities: Bilateral feet with severe gangrene, right>left. Her right foot has an ischemic second toe with extensive gangrenous areas and exposed tendon on the medial and lateral aspect of the feet with surrounding erythema. Left foot with prior transmetatarsal amputation and 2 large gangrenous areas on the dorsal aspect of the foot. Capillary refill normal.   Pulses: 2+ and symmetric all extremities. Skin: Skin color pale, turgor normal. No rashes or lesions   Neurologic: No focal motor or sensory deficit. Labs Reviewed: All lab results for the last 24 hours reviewed. Recent Results (from the past 24 hour(s))   CBC WITH AUTOMATED DIFF    Collection Time: 08/11/21  9:35 PM   Result Value Ref Range    WBC 28.0 (H) 4.6 - 13.2 K/uL    RBC 2.68 (L) 4.20 - 5.30 M/uL    HGB 7.7 (L) 12.0 - 16.0 g/dL    HCT 23.1 (L) 35.0 - 45.0 %    MCV 86.2 74.0 - 97.0 FL    MCH 28.7 24.0 - 34.0 PG    MCHC 33.3 31.0 - 37.0 g/dL    RDW 14.0 11.6 - 14.5 %    PLATELET 188 (H) 879 - 420 K/uL    MPV 9.1 (L) 9.2 - 11.8 FL    NEUTROPHILS 91 (H) 40 - 73 %    LYMPHOCYTES 4 (L) 21 - 52 %    MONOCYTES 4 3 - 10 %    EOSINOPHILS 0 0 - 5 %    BASOPHILS 1 0 - 2 %    ABS. NEUTROPHILS 25.5 (H) 1.8 - 8.0 K/UL    ABS. LYMPHOCYTES 1.1 0.9 - 3.6 K/UL    ABS. MONOCYTES 1.1 0.05 - 1.2 K/UL    ABS. EOSINOPHILS 0.0 0.0 - 0.4 K/UL    ABS.  BASOPHILS 0.3 (H) 0.0 - 0.1 K/UL    DF MANUAL      RBC COMMENTS NORMOCYTIC, NORMOCHROMIC      WBC COMMENTS TOXIC GRANULATION     CARDIAC PANEL,(CK, CKMB & TROPONIN)    Collection Time: 08/11/21  9:35 PM   Result Value Ref Range    CK - MB <1.0 <3.6 ng/ml    CK-MB Index  0.0 - 4.0 %     CALCULATION NOT PERFORMED WHEN RESULT IS BELOW LINEAR LIMIT    CK 23 (L) 26 - 192 U/L    Troponin-I, QT 0.02 0.0 - 4.035 NG/ML   METABOLIC PANEL, COMPREHENSIVE    Collection Time: 08/11/21  9:35 PM   Result Value Ref Range    Sodium 129 (L) 136 - 145 mmol/L    Potassium 3.8 3.5 - 5.5 mmol/L    Chloride 95 (L) 100 - 111 mmol/L    CO2 27 21 - 32 mmol/L    Anion gap 7 3.0 - 18 mmol/L    Glucose 154 (H) 74 - 99 mg/dL    BUN 65 (H) 7.0 - 18 MG/DL    Creatinine 1.30 0.6 - 1.3 MG/DL    BUN/Creatinine ratio 50 (H) 12 - 20      GFR est AA 52 (L) >60 ml/min/1.73m2    GFR est non-AA 43 (L) >60 ml/min/1.73m2    Calcium 7.8 (L) 8.5 - 10.1 MG/DL    Bilirubin, total 0.4 0.2 - 1.0 MG/DL    ALT (SGPT) 7 (L) 13 - 56 U/L    AST (SGOT) 26 10 - 38 U/L    Alk.  phosphatase 179 (H) 45 - 117 U/L    Protein, total 5.4 (L) 6.4 - 8.2 g/dL    Albumin 1.4 (L) 3.4 - 5.0 g/dL    Globulin 4.0 2.0 - 4.0 g/dL    A-G Ratio 0.4 (L) 0.8 - 1.7     MAGNESIUM    Collection Time: 08/11/21  9:35 PM   Result Value Ref Range    Magnesium 2.5 1.6 - 2.6 mg/dL   PHOSPHORUS    Collection Time: 08/11/21  9:35 PM   Result Value Ref Range    Phosphorus 3.5 2.5 - 4.9 MG/DL   POC LACTIC ACID    Collection Time: 08/11/21  9:43 PM   Result Value Ref Range    Lactic Acid (POC) 1.04 0.40 - 2.00 mmol/L   EKG, 12 LEAD, INITIAL    Collection Time: 08/11/21 10:41 PM   Result Value Ref Range    Ventricular Rate 91 BPM    Atrial Rate 91 BPM    P-R Interval 158 ms    QRS Duration 104 ms    Q-T Interval 368 ms    QTC Calculation (Bezet) 452 ms    Calculated P Axis 33 degrees    Calculated R Axis 44 degrees    Calculated T Axis 35 degrees    Diagnosis       Normal sinus rhythm  Normal ECG  When compared with ECG of 06-APR-2021 07:22,  Nonspecific T wave abnormality now evident in Inferior leads     GLUCOSE, POC    Collection Time: 08/11/21 11:41 PM   Result Value Ref Range    Glucose (POC) 218 (H) 70 - 110 mg/dL     CXR pending

## 2021-08-12 NOTE — PROGRESS NOTES
Pharmacy Dosing Services: Zosyn    Pharmacist Renal Dosing Progress Note for    Physician Dr. Luca Sharma    The following medication: Zosyn was automatically dose-adjusted per THE [facility] P&T Committee Protocol, with respect to renal function. Consult provided for this   46 y.o. , female , for the indication of Diabetic Foot. Pt Weight:   Wt Readings from Last 1 Encounters:   08/11/21 72.6 kg (160 lb)         Previous Regimen Zosyn 4.5g IV q6h   Serum Creatinine Lab Results   Component Value Date/Time    Creatinine 1.30 08/11/2021 09:35 PM       Creatinine Clearance Estimated Creatinine Clearance: 51.6 mL/min (based on SCr of 1.3 mg/dL). BUN Lab Results   Component Value Date/Time    BUN 65 (H) 08/11/2021 09:35 PM       Dosage changed to:  Zosyn 3.375 g IV q6h      Pharmacy to continue to monitor patient daily. Will make dosage adjustments based upon changing renal function. Signed Gato.  Contact information: [phone]

## 2021-08-12 NOTE — ROUTINE PROCESS
TRANSFER - IN REPORT:    Verbal report received from AMANDEEP Junior(name) on Daisy  being received from ED(unit) for routine progression of care      Report consisted of patients Situation, Background, Assessment and   Recommendations(SBAR). Information from the following report(s) SBAR, Kardex, Intake/Output, MAR and Recent Results was reviewed with the receiving nurse. Opportunity for questions and clarification was provided. Uneventful shift; no acute changes, no complaints of pain    Bedside and verbal report given by (off going nurse) DANIELLE Zuniga RN to (oncoming nurse) Cydney Gastelum RN. Report included the following information SBAR, Kardex, OR Summary, Intake/Output, and MAR. Assessment completed upon patients arrival to unit and care assumed.

## 2021-08-12 NOTE — PROGRESS NOTES
Problem: Falls - Risk of  Goal: *Absence of Falls  Description: Document Lala Knox Fall Risk and appropriate interventions in the flowsheet.   Outcome: Progressing Towards Goal  Note: Fall Risk Interventions:            Medication Interventions: Patient to call before getting OOB    Elimination Interventions: Call light in reach, Patient to call for help with toileting needs    History of Falls Interventions: Door open when patient unattended, Investigate reason for fall, Consult care management for discharge planning

## 2021-08-12 NOTE — PROGRESS NOTES
attempted to make a visit but patient was out of room.  provided card with information on spiritual care.  remains available for any spiritual care needs and will continue to follow patient through IDT    Rev. groSolar.  Yosi Archibald, [address] [phone]  Pager :[phone]

## 2021-08-12 NOTE — PROGRESS NOTES
0955-Head to toe assessment performed, see flow sheet. 1115-Left 2 messages for Dr. Perla Martinez with vascular surgery through [facility] to follow up with vascular consult. 1238-Critical lab value from Samantha DIEGO: H/H 5.7/17. 6. Paged Dr. Frederick Schwab.   1240-Dr. Frederick Schwab informed of critical resuls and that vascular has not called back yet. Per Dr. Frederick Schwab, she will follow up.   1245 - Pain 8/10. PRN 2 mg morphine pain medication administered at this time. Patient has been educated on side effects. 1323-Dr. Ledezma called and stated he is on call for vascular today but he is not aware of consultation. He stated he will look into it.  1325-Dr. Frederick Schwab states she has paged vascular and take care of reaching out. 1332-Dr. Frederick Schwab on phone with patient asking if she will consent to a blood transfusion. 1503-IR called as patient still has not had the blood transfusion. Per Vane, angio Delayed until tomrrow to get H/H back up. 1505-Per blood bank blood has been ready \"for one minute. \" This nurs will go get blood. 1758-TRANSFER - OUT REPORT:    Verbal report given to Danny BERNSTEIN(name) on Blanka Manzo  being transferred to OR(unit) for ordered procedure       Report consisted of patients Situation, Background, Assessment and   Recommendations(SBAR). Information from the following report(s) Intake/Output, MAR, Recent Results, Pre Procedure Checklist and Procedure Verification was reviewed with the receiving nurse. Lines:   Peripheral IV 08/11/21 Right Antecubital (Active)   Site Assessment Clean, dry, & intact 08/12/21 1134   Phlebitis Assessment 0 08/12/21 1134   Infiltration Assessment 0 08/12/21 1134   Dressing Status Clean, dry, & intact 08/12/21 1134   Dressing Type Tape;Transparent 08/12/21 1134   Hub Color/Line Status Pink; Infusing;Flushed;Patent 08/12/21 1134   Action Taken Open ports on tubing capped 08/12/21 1134   Alcohol Cap Used Yes 08/12/21 1134       Peripheral IV 08/12/21 Distal;Left;Posterior Forearm (Active)   Site Assessment Clean, dry, & intact 08/12/21 1134   Phlebitis Assessment 0 08/12/21 1134   Infiltration Assessment 0 08/12/21 1134   Dressing Status Clean, dry, & intact 08/12/21 1134   Dressing Type Tape;Transparent 08/12/21 1134   Hub Color/Line Status Infusing;Flushed;Patent 08/12/21 1134   Action Taken Open ports on tubing capped 08/12/21 1134   Alcohol Cap Used Yes 08/12/21 1134        Opportunity for questions and clarification was provided. Patient transported with:   Tech  1800-Mary Lou from the [address] called and said that the surgeon wants another unit of blood onhand. Will call Dr. Raimundo Mckeon to request another unit.    1801-Dr. AVITIA on, paged her for unit of blood. Awaiting callback. TRANSFER - OUT REPORT:    Verbal report given Paz BERNSTEINmichel Mcrae Citizen  being transferred to OR(unit) for ordered procedure       Report consisted of patients Situation, Background, Assessment and   Recommendations(SBAR). Information from the following report(s) SBAR, Kardex, Intake/Output, MAR and Recent Results was reviewed with the receiving nurse. Lines:   Peripheral IV 08/11/21 Right Antecubital (Active)   Site Assessment Clean, dry, & intact 08/12/21 1134   Phlebitis Assessment 0 08/12/21 1134   Infiltration Assessment 0 08/12/21 1134   Dressing Status Clean, dry, & intact 08/12/21 1134   Dressing Type Tape;Transparent 08/12/21 1134   Hub Color/Line Status Pink; Infusing;Flushed;Patent 08/12/21 1134   Action Taken Open ports on tubing capped 08/12/21 1134   Alcohol Cap Used Yes 08/12/21 1134       Peripheral IV 08/12/21 Distal;Left;Posterior Forearm (Active)   Site Assessment Clean, dry, & intact 08/12/21 1134   Phlebitis Assessment 0 08/12/21 1134   Infiltration Assessment 0 08/12/21 1134   Dressing Status Clean, dry, & intact 08/12/21 1134   Dressing Type Tape;Transparent 08/12/21 1134   Hub Color/Line Status Infusing;Flushed;Patent 08/12/21 1134   Action Taken Open ports on tubing capped 08/12/21 1134   Alcohol Cap Used Yes 08/12/21 8682        Opportunity for questions and clarification was provided.       Patient transported with:   [facility]

## 2021-08-12 NOTE — PROGRESS NOTES
Spoke with patient's cousin Judith Moreno; patient states that it is okay to discuss her care with her.

## 2021-08-12 NOTE — PROGRESS NOTES
Pt agrees with the surgery, case discussed with Dr. Aicha Rodriguez, Dr. Coleman Sanchez will take her to OR tonight.

## 2021-08-12 NOTE — PROGRESS NOTES
Hospitalist Progress Note-critical care note     Patient: Mac Rebollar MRN: [id number]  [facility]: [id number]    YOB: 1970  Age: 46 y.o. Sex: female    DOA: 8/11/2021 LOS:  LOS: 1 day            Chief complaint: Gangrene of both feet, noncompliant with medical treatment, diabetes, tobacco use, sepsis, anemia    Assessment/Plan         Hospital Problems  Date Reviewed: 8/12/2021        Codes Class Noted POA    Fall ICD-10-CM: W19. Hyla Pila  ICD-9-CM: E888.9  8/12/2021 Yes        Tobacco use ICD-10-CM: Z72.0  ICD-9-CM: 305.1  8/12/2021 Yes        Preoperative evaluation to rule out surgical contraindication ICD-10-CM: Z01.818  ICD-9-CM: V72.83  8/12/2021 Yes        Anemia ICD-10-CM: D64.9  ICD-9-CM: 285.9  8/12/2021 Unknown        * (Principal) Gangrene of both feet ([facility] [address] ICD-10-CM: J06  ICD-9-CM: 785.4  8/11/2021 Yes        Non compliance with medical treatment ICD-10-CM: Z91.19  ICD-9-CM: V15.81  4/12/2021 Yes        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 [facility]) ICD-10-CM: E11.52  ICD-9-CM: 250.70, 443.81, 785.4  4/4/2021 Yes                   69-year-old female with uncontrolled type 2 diabetes mellitus, severe peripheral arterial disease, and tobacco use who has been noncompliant with wound care and hyperbaric oxygen treatment due to recent fall is admitted for severe gangrene of both feet.     Gangrene of bilateral feet   Continue IV antibiotics,, case discussed with Dr. Trevino Michael recommended vascular consult  Talk with vascular team,   Patient follow-up with [facility] Mehrdad Beverly at [facility]  Most likely need surgical intervention.    Continue antibiotics and pain control    Sepsis  Due to foot infection  White count elevated with improving  Continue monitoring      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 of both feet  Sliding scale    Noncompliance with medical treatment due to recent fall  Continue education, PT OT after procedure     tobacco use  Need to quit smoking    Anemia  We will recheck H&H to rule out lab error  Sent iron and B12 folate  Will give blood transfusion     preoperative evaluation   Need intervention aspa     Elevate cr   Continue iv hydration   Avoid iv contrast and nsaids      subjective: feel better, still painful in feet     rn ; bp low,     I have discussed with the patient the rationale for blood component transfusion; its benefits in treating or preventing fatigue, organ damage, or death; and its risk which includes mild transfusion reactions, rare risk of blood borne infection, or more serious but rare reactions. I have discussed the alternatives to transfusion, including the risk and consequences of not receiving transfusion. The patient had an opportunity to ask questions and had agreed to proceed with transfusion of blood components. All questions have been answered. 35 total min's spent on patient care including >50% on counseling/coordinating care. Discussed the above assessments. also discussed labs, medications and hospital course    Addendum -case discussed with Dr. Luverne Kayser     Disposition :tbd,   Review of systems:    General: No fevers or chills. Cardiovascular: No chest pain or pressure. No palpitations. Pulmonary: No shortness of breath. Gastrointestinal: No nausea, vomiting. Mks: painful foot     Vital signs/Intake and Output:  Visit Vitals  BP (!) 119/95   Pulse 88   Temp 98.6 °F (37 °C)   Resp 17   Ht 5' 8\" (1.727 m)   Wt 72.6 kg (160 lb)   SpO2 97%   BMI 24.33 kg/m²     Current Shift:  No intake/output data recorded. Last three shifts:  08/10 1901 - 08/12 0700  In: 100 [I.V.:100]  Out: -     Physical Exam:  General: WD, WN. Alert, cooperative, no acute distress    HEENT: NC, Atraumatic. PERRLA, anicteric sclerae. Lungs: CTA Bilaterally. No Wheezing/Rhonchi/Rales. Heart:  Regular  rhythm,  No murmur, No Rubs, No Gallops  Abdomen: Soft, Non distended, Non tender.   +Bowel sounds,   Extremities: No c/c, left foot toe amputated, gangreen area noted, rt foot large ischemic area -will leonieds   Psych:   Not anxious or agitated. Neurologic:  No acute neurological deficit. Labs: Results:       Chemistry Recent Labs     08/12/21 1215 08/11/21 2135   * 154*   * 129*   K 3.7 3.8    95*   CO2 23 27   BUN 61* 65*   CREA 1.43* 1.30   CA 7.3* 7.8*   AGAP 8 7   BUCR 43* 50*   AP  --  179*   TP  --  5.4*   ALB  --  1.4*   GLOB  --  4.0   AGRAT  --  0.4*      CBC w/Diff Recent Labs     08/12/21 1215 08/11/21 2135   WBC 22.4* 28.0*   RBC 1.96* 2.68*   HGB 5.7* 7.7*   HCT 17.6* 23.1*    445*   GRANS 91* 91*   LYMPH 2* 4*   EOS 0 0      Cardiac Enzymes Recent Labs     08/11/21 2135   CPK 23*   CKND1 CALCULATION NOT PERFORMED WHEN RESULT IS BELOW LINEAR LIMIT      Coagulation No results for input(s): PTP, INR, APTT, INREXT, INREXT in the last 72 hours. Lipid Panel No results found for: CHOL, CHOLPOCT, CHOLX, CHLST, CHOLV, 089056, HDL, HDLP, LDL, LDLC, DLDLP, 249449, VLDLC, VLDL, TGLX, TRIGL, TRIGP, TGLPOCT, CHHD, CHHDX   BNP No results for input(s): BNPP in the last 72 hours. Liver Enzymes Recent Labs     08/11/21 2135   TP 5.4*   ALB 1.4*   *      Thyroid Studies No results found for: T4, T3U, TSH, TSHEXT, TSHEXT     Procedures/imaging: see electronic medical records for all procedures/Xrays and details which were not copied into this note but were reviewed prior to creation of Plan    XR CHEST PORT    Result Date: 8/12/2021  EXAM: XR CHEST PORT CLINICAL INDICATION/HISTORY: Sepsis   > Additional: None. COMPARISON: April 5, 2021 TECHNIQUE: Portable chest _______________ FINDINGS: SUPPORT DEVICES: None. HEART AND MEDIASTINUM: Normal size and contour. Normal pulmonary vasculature. LUNGS AND PLEURAL SPACES: The lungs are well expanded and clear. No focal consolidation, effusion, or pneumothorax. BONY THORAX AND SOFT TISSUES: No acute osseous abnormality.  _______________     No active cardiopulmonary disease.       Deena Green MD

## 2021-08-12 NOTE — ANESTHESIA PREPROCEDURE EVALUATION
Relevant Problems   CARDIOVASCULAR   (+) PAD (peripheral artery disease) (HCC)      RENAL FAILURE   (+) YUNIEL (acute kidney injury) ([facility] [address]      ENDOCRINE   (+) 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 (HCC)   (+) Type 2 diabetes mellitus with left diabetic foot ulcer (HCC)      HEMATOLOGY   (+) Anemia   (+) Osteomyelitis (HCC)       Anesthetic History   No history of anesthetic complications            Review of Systems / Medical History  Patient summary reviewed, nursing notes reviewed and pertinent labs reviewed    Pulmonary          Smoker (quit a month ago)         Neuro/Psych   Within defined limits           Cardiovascular    Hypertension          PAD and hyperlipidemia    Exercise tolerance: >4 METS     GI/Hepatic/Renal  Within defined limits              Endo/Other    Diabetes: poorly controlled    Anemia     Other Findings              Physical Exam    Airway  Mallampati: II  TM Distance: 4 - 6 cm  Neck ROM: normal range of motion   Mouth opening: Normal     Cardiovascular               Dental    Dentition: Poor dentition     Pulmonary                 Abdominal         Other Findings            Anesthetic Plan    ASA: 3, emergent  Anesthesia type: general          Induction: Intravenous  Anesthetic plan and risks discussed with: Patient      Patient anemic all day but only transfused this evening - one unit. Surgeon wishes to proceed given picture of early sepsis. Patient appears hemodynamically stable. Will transfuse second unit intraoperatively.

## 2021-08-12 NOTE — PROGRESS NOTES
0927-Patient reports she sees [facility] as podiatrist.   0928-Per Dr. Lorena Prasad give patient tylenol for pain as BP is 98/52.

## 2021-08-12 NOTE — PROGRESS NOTES
Physical Therapy Screening:  Services are not indicated at this time. [facility] based on results obtained during the nursing admission assessment. The patients chart was reviewed and the patient is not appropriate for a skilled therapy evaluation at this time. Chart review indicates Gangrene of both feet and possible  surgical intervention imminently. Pt also with Hgb 5.7. Please consult physical therapy post procedural  Intervention and include WB'g restrictions as therapy needs arise. Thank you.     Bridgett Yee, PT

## 2021-08-12 NOTE — ED PROVIDER NOTES
EMERGENCY DEPARTMENT HISTORY AND PHYSICAL EXAM    Date: 8/11/2021  Patient Name: Narayan Linn    History of Presenting Illness     Chief Complaint   Patient presents with    Wound Check         History Provided By: Patient    Additional History (Context):   11:06 PM  Narayan Linn is a 46 y.o. female with PMHX of green of both feet, diabetic angiopathy, YUNIEL, peripheral vascular disease history of medication noncompliance who presents to the emergency department C/O of bilateral foot wounds. Patient's been followed by podiatry where she is already had amputations of the toes on the left foot. She has been doing home care and has been followed by cardiovascular specialist at [address] [place] [address] pending assessment for possible or probable amputation of the right foot. She states she is doing her own dressing changes perhaps twice a week although \"I know I am supposed to be doing it every day\" not currently on any antibiotics or specialty medicines. She had a history of being a smoker and states that she is cut back significantly\" she denies any chest pain shortness of breath abdominal pain. She is got a \"muscle tic \"as result of the leg infection. Is transported by ambulance saying that she fell on the floor 2 days ago and was unable to get up for several hours. She has not been on the floor today and states she is still urinating taking care of her food drink and bladder and bowel needs has required.   She has no other further complaints or comments other than \"please do not send me home\"    Social History  She denies any alcohol or drugs but does smoke cigarettes    Family History  Positive for diabetes      PCP: None    Current Facility-Administered Medications   Medication Dose Route Frequency Provider Last Rate Last Admin    sodium chloride (NS) flush 5-10 mL  5-10 mL IntraVENous PRN Javier Uribe MD        piperacillin-tazobactam (ZOSYN) 4.5 g in 0.9% sodium chloride (MBP/ADV) 100 mL MBP 4.5 g IntraVENous Q6H Thelma ORNELAS MD 25 mL/hr at 08/11/21 2249 4.5 g at 08/11/21 2249    sodium chloride 0.9 % bolus infusion 917 mL  917 mL IntraVENous ONCE Clarisa Cardenas MD        vancomycin [facility]) 1750 mg in  ml infusion  1,750 mg IntraVENous ONCE Clarisa Cardenas MD         Current Outpatient Medications   Medication Sig Dispense Refill    sodium chloride (Normal Saline Flush) 10-40 mL by IntraVENous route daily.  heparin sod,porcine/0.9 % NaCl (HEPARIN FLUSH IV) 10 Units/mL by IntraVENous route daily.  acetaminophen (TYLENOL) 500 mg tablet Take 1,000 mg by mouth every six (6) hours as needed for Fever or Pain.  aspirin 81 mg chewable tablet Take 1 Tab by mouth daily. 30 Tab 5    atorvastatin (LIPITOR) 40 mg tablet Take 1 Tab by mouth daily. 30 Tab 5    clopidogreL (PLAVIX) 75 mg tab Take 1 Tab by mouth daily. 30 Tab 5    losartan (COZAAR) 25 mg tablet Take 1 Tab by mouth daily. 30 Tab 5    metFORMIN (GLUCOPHAGE) 500 mg tablet Take 1 Tab by mouth two (2) times daily (with meals). 61 Tab 0       Past History     Past Medical History:  No past medical history on file. Past Surgical History:  No past surgical history on file. Family History:  No family history on file. Social History:  Social History     Tobacco Use    Smoking status: Current Every Day Smoker     Packs/day: 1.50    Smokeless tobacco: Never Used   Substance Use Topics    Alcohol use: Not Currently     Comment: on rare occasion    Drug use: Not Currently       Allergies:  No Known Allergies      Review of Systems   Review of Systems   Constitutional: Positive for activity change, appetite change, chills and fatigue. HENT: Negative. Eyes: Negative. Respiratory: Negative. Cardiovascular: Negative. Gastrointestinal: Negative. Endocrine: Positive for cold intolerance. Musculoskeletal: Positive for gait problem (Inability to walk due to foot problem).    Skin: Positive for wound.        Bilateral lower extremities with gangrene   Allergic/Immunologic: Positive for immunocompromised state. Neurological: Positive for weakness and numbness. No feeling or sensation in either lower extremity   Hematological: Does not bruise/bleed easily. Psychiatric/Behavioral: Positive for dysphoric mood. All other systems reviewed and are negative. Physical Exam     Vitals:    08/11/21 2140 08/11/21 2150 08/11/21 2200 08/11/21 2210   BP:  (!) 112/54 (!) 113/53 (!) 110/54   Pulse:  (!) 102 (!) 101 99   Resp:  13 15 14   Temp:       SpO2:  99% 99%    Weight: 72.6 kg (160 lb)      Height: 5' 8\" (1.727 m)        Physical Exam  Vitals and nursing note reviewed. Constitutional:       General: She is not in acute distress. Appearance: She is well-developed. She is ill-appearing and toxic-appearing. She is not diaphoretic. HENT:      Head: Normocephalic and atraumatic. Mouth/Throat:      Mouth: Mucous membranes are dry. Eyes:      General: No scleral icterus. Extraocular Movements:      Right eye: Normal extraocular motion. Left eye: Normal extraocular motion. Conjunctiva/sclera: Conjunctivae normal.      Pupils: Pupils are equal, round, and reactive to light. Comments: Eyes sunken   Neck:      Trachea: No tracheal deviation. Cardiovascular:      Rate and Rhythm: Regular rhythm. Tachycardia present. Heart sounds: Normal heart sounds. Pulmonary:      Effort: Pulmonary effort is normal. Tachypnea present. No respiratory distress. Breath sounds: Decreased air movement present. No stridor. Decreased breath sounds present. Abdominal:      General: Bowel sounds are decreased. There is no distension. Palpations: Abdomen is soft. Tenderness: There is no abdominal tenderness. There is no rebound. Genitourinary:     Comments: Refused examination of perineum  Musculoskeletal:         General: No tenderness.       Cervical back: Normal range of motion and neck supple. Right foot: Decreased range of motion. Left foot: Decreased range of motion. Comments: Grossly unremarkable without abnormalities   Feet:      Right foot:      Skin integrity: Ulcer, blister, skin breakdown, erythema, warmth and dry skin present. Left foot:      Skin integrity: Ulcer, skin breakdown, erythema and dry skin present. Comments: Left foot  Amputation of toes with peripheral eschar and erosions exposing subcutaneous and muscular tissue. Dorsiflexion tendon is grossly visible overlying the talar dome. Right foot  Extensive dry and wet gangrene with extreme malodor with festering wound and purulent drainage  Skin:     General: Skin is warm. Capillary Refill: Capillary refill takes 2 to 3 seconds. Findings: Wound present. Comments: See notes in foot section   Neurological:      Mental Status: She is alert and oriented to person, place, and time. GCS: GCS eye subscore is 4. GCS verbal subscore is 5. GCS motor subscore is 6. Cranial Nerves: No dysarthria. Sensory: Sensory deficit present. Motor: Weakness and atrophy present. Comments: Generalized weakness 5-/5 without regional deficits. Psychiatric:         Attention and Perception: Attention normal.         Mood and Affect: Mood is depressed. Affect is flat. Speech: Speech is delayed. Behavior: Behavior is cooperative. Thought Content:  Thought content normal.       Diagnostic Study Results     Labs -  Recent Results (from the past 24 hour(s))   CBC WITH AUTOMATED DIFF    Collection Time: 08/11/21  9:35 PM   Result Value Ref Range    WBC 28.0 (H) 4.6 - 13.2 K/uL    RBC 2.68 (L) 4.20 - 5.30 M/uL    HGB 7.7 (L) 12.0 - 16.0 g/dL    HCT 23.1 (L) 35.0 - 45.0 %    MCV 86.2 74.0 - 97.0 FL    MCH 28.7 24.0 - 34.0 PG    MCHC 33.3 31.0 - 37.0 g/dL    RDW 14.0 11.6 - 14.5 %    PLATELET 011 (H) 923 - 420 K/uL    MPV 9.1 (L) 9.2 - 11.8 FL    NEUTROPHILS 91 (H) 40 - 73 %    LYMPHOCYTES 4 (L) 21 - 52 %    MONOCYTES 4 3 - 10 %    EOSINOPHILS 0 0 - 5 %    BASOPHILS 1 0 - 2 %    ABS. NEUTROPHILS 25.5 (H) 1.8 - 8.0 K/UL    ABS. LYMPHOCYTES 1.1 0.9 - 3.6 K/UL    ABS. MONOCYTES 1.1 0.05 - 1.2 K/UL    ABS. EOSINOPHILS 0.0 0.0 - 0.4 K/UL    ABS. BASOPHILS 0.3 (H) 0.0 - 0.1 K/UL    DF MANUAL      RBC COMMENTS NORMOCYTIC, NORMOCHROMIC      WBC COMMENTS TOXIC GRANULATION     CARDIAC PANEL,(CK, CKMB & TROPONIN)    Collection Time: 08/11/21  9:35 PM   Result Value Ref Range    CK - MB <1.0 <3.6 ng/ml    CK-MB Index  0.0 - 4.0 %     CALCULATION NOT PERFORMED WHEN RESULT IS BELOW LINEAR LIMIT    CK 23 (L) 26 - 192 U/L    Troponin-I, QT 0.02 0.0 - 0.807 NG/ML   METABOLIC PANEL, COMPREHENSIVE    Collection Time: 08/11/21  9:35 PM   Result Value Ref Range    Sodium 129 (L) 136 - 145 mmol/L    Potassium 3.8 3.5 - 5.5 mmol/L    Chloride 95 (L) 100 - 111 mmol/L    CO2 27 21 - 32 mmol/L    Anion gap 7 3.0 - 18 mmol/L    Glucose 154 (H) 74 - 99 mg/dL    BUN 65 (H) 7.0 - 18 MG/DL    Creatinine 1.30 0.6 - 1.3 MG/DL    BUN/Creatinine ratio 50 (H) 12 - 20      GFR est AA 52 (L) >60 ml/min/1.73m2    GFR est non-AA 43 (L) >60 ml/min/1.73m2    Calcium 7.8 (L) 8.5 - 10.1 MG/DL    Bilirubin, total 0.4 0.2 - 1.0 MG/DL    ALT (SGPT) 7 (L) 13 - 56 U/L    AST (SGOT) 26 10 - 38 U/L    Alk.  phosphatase 179 (H) 45 - 117 U/L    Protein, total 5.4 (L) 6.4 - 8.2 g/dL    Albumin 1.4 (L) 3.4 - 5.0 g/dL    Globulin 4.0 2.0 - 4.0 g/dL    A-G Ratio 0.4 (L) 0.8 - 1.7     MAGNESIUM    Collection Time: 08/11/21  9:35 PM   Result Value Ref Range    Magnesium 2.5 1.6 - 2.6 mg/dL   PHOSPHORUS    Collection Time: 08/11/21  9:35 PM   Result Value Ref Range    Phosphorus 3.5 2.5 - 4.9 MG/DL   POC LACTIC ACID    Collection Time: 08/11/21  9:43 PM   Result Value Ref Range    Lactic Acid (POC) 1.04 0.40 - 2.00 mmol/L   EKG, 12 LEAD, INITIAL    Collection Time: 08/11/21 10:41 PM   Result Value Ref Range    Ventricular Rate 91 BPM    Atrial Rate 91 BPM    P-R Interval 158 ms    QRS Duration 104 ms    Q-T Interval 368 ms    QTC Calculation (Bezet) 452 ms    Calculated P Axis 33 degrees    Calculated R Axis 44 degrees    Calculated T Axis 35 degrees    Diagnosis       Normal sinus rhythm  Normal ECG  When compared with ECG of 06-APR-2021 07:22,  Nonspecific T wave abnormality now evident in Inferior leads          Radiologic Studies -   Preliminary findings  Portable chest x-ray  No cardiomegaly pulmonary edema or visible culture  Foot X-ray  Extensive soft tissue breakdown without bony lesion  Yola Soto MD      XR CHEST PORT    (Results Pending)     CT Results  (Last 48 hours)    None        CXR Results  (Last 48 hours)    None          Medications given in the ED-  Medications   sodium chloride (NS) flush 5-10 mL (has no administration in time range)   piperacillin-tazobactam (ZOSYN) 4.5 g in 0.9% sodium chloride (MBP/ADV) 100 mL MBP (4.5 g IntraVENous New Bag 8/11/21 2249)   sodium chloride 0.9 % bolus infusion 1,000 mL (1,000 mL IntraVENous New Bag 8/11/21 2249)     Followed by   sodium chloride 0.9 % bolus infusion 917 mL (has no administration in time range)   vancomycin (VANCOCIN) 1750 mg in  ml infusion (has no administration in time range)   morphine injection 2 mg (2 mg IntraVENous Given 8/11/21 2250)   ondansetron (ZOFRAN) injection 4 mg (4 mg IntraVENous Given 8/11/21 2250)         Medical Decision Making   I am the first provider for this patient. I reviewed the vital signs, available nursing notes, past medical history, past surgical history, family history and social history. Vital Signs-Reviewed the patient's vital signs.     Pulse Oximetry Analysis -99% on room air     Cardiac Monitor:  Rate: Room air bpm  Rhythm: Borderline sinus tach    EKG interpretation: (Preliminary read)   Normal sinus rhythm, rate 91, normal ST segments, QTC is 452 10:41 PM     EKG read by Yola Soto MD      Records Reviewed: NURSING NOTES AND PREVIOUS MEDICAL RECORDS    Provider Notes (Medical Decision Making):   Patient has 2 diabetic gangrenous feet which have failed outpatient management. She has [place] dirty dressings which look to be more than several days old. She looks chronically ill in fact significantly septic. The malodor is quite imcompressive. Remarkably her only complaint is a leg twitch and some mild pain to the right lower extremity. Broad-spectrum antibiotics and IV fluids given for covering for sepsis shock however her blood pressure looks to be good except for a low diastolic and no fever remarkably her lactic acid was normal.  White count is significantly elevated with left shift. Blood sugars at 134 will not require correction. LFTs are all unremarkable. CK is significantly depressed probably due to her atrophy. Case was further reviewed with both Dr. Alyce Mckay of podiatry as well as Lacy Grullon of vascular surgery. She will get morning Doppler studies and likely amputation by a guillotine of both feet possibly with further amputation pending evaluation of Doppler studies and input from podiatry and vascular surgery. Will ask medicine to admit. Procedures:  Procedures    ED Course:   11:06 PM: Initial assessment performed. The patients presenting problems have been discussed, and they are in agreement with the care plan formulated and outlined with them. I have encouraged them to ask questions as they arise throughout their visit. Diagnosis and Disposition     Critical Care Time: 75 minutes  CRITICAL CARE NOTE:  11:11 PM  I have spent 75 minutes of critical care time involved in lab review, consultations with specialist, family decision-making, and documentation. During this entire length of time I was immediately available to the patient. Critical Care:   The reason for providing this level of medical care for this critically ill patient was due a critical illness that impaired one or more vital organ systems such that there was a high probability of imminent or life threatening deterioration in the patients condition. This care involved high complexity decision making to assess, manipulate, and support vital system functions, to treat this degreee vital organ system failure and to prevent further life threatening deterioration of the patients condition. Core Measures:  For Hospitalized Patients:    1. Hospitalization Decision Time:  The decision to hospitalize the patient was made by Chaya Garrido MD   at first inspection on 8/11/2021    2. Aspirin: Aspirin was not given because the patient is a bleeding risk    11:02 PM  Patient is being admitted to the hospital by Dr. Issac Paige. The results of their tests and reasons for their admission have been discussed with them and/or available family. They convey agreement and understanding for the need to be admitted and for their admission diagnosis. CONDITIONS ON ADMISSION:  Sepsis is present at the time of admission. Deep Vein Thrombosis is not present at the time of admission. Thrombosis is not present at the time of admission. Pneumonia is not present at the time of admission. MRSA is present at the time of admission. Wound infection is present at the time of admission. Pressure Ulcer is not present at the time of admission. CLINICAL IMPRESSION:    #1.  Gangrene of both lower extremities  #2. Status post amputation of toes on left foot  #3. Diabetes mellitus type 2 with unspecified complication  #4. Noncompliance with medical treatment  #5. Nicotine dependence  _______________________________    This note was partially transcribed via voice recognition software. Although efforts have been made to catch any discrepancies, it may contain sound alike words, grammatical errors, or nonsensical words.

## 2021-08-13 ENCOUNTER — APPOINTMENT (OUTPATIENT)
Dept: GENERAL RADIOLOGY | Age: 51
DRG: 853 | End: 2021-08-13
Attending: SURGERY
Payer: COMMERCIAL

## 2021-08-13 ENCOUNTER — APPOINTMENT (OUTPATIENT)
Dept: NON INVASIVE DIAGNOSTICS | Age: 51
DRG: 853 | End: 2021-08-13
Attending: FAMILY MEDICINE
Payer: COMMERCIAL

## 2021-08-13 PROBLEM — A41.9 SEPSIS (HCC): Status: ACTIVE | Noted: 2021-08-13

## 2021-08-13 PROBLEM — R78.81 BACTEREMIA: Status: ACTIVE | Noted: 2021-08-13

## 2021-08-13 PROBLEM — Z89.511 STATUS POST BELOW-KNEE AMPUTATION OF RIGHT LOWER EXTREMITY (HCC): Status: ACTIVE | Noted: 2021-08-13

## 2021-08-13 PROBLEM — I95.9 HYPOTENSION: Status: ACTIVE | Noted: 2021-08-13

## 2021-08-13 LAB
ALBUMIN SERPL-MCNC: 1.5 G/DL (ref 3.4–5)
ALBUMIN/GLOB SERPL: 0.4 {RATIO} (ref 0.8–1.7)
ALP SERPL-CCNC: 147 U/L (ref 45–117)
ALT SERPL-CCNC: 8 U/L (ref 13–56)
ANION GAP SERPL CALC-SCNC: 6 MMOL/L (ref 3–18)
AST SERPL-CCNC: 18 U/L (ref 10–38)
BASOPHILS # BLD: 0 K/UL (ref 0–0.1)
BASOPHILS NFR BLD: 0 % (ref 0–2)
BILIRUB SERPL-MCNC: 0.7 MG/DL (ref 0.2–1)
BUN SERPL-MCNC: 57 MG/DL (ref 7–18)
BUN/CREAT SERPL: 36 (ref 12–20)
CALCIUM SERPL-MCNC: 7.4 MG/DL (ref 8.5–10.1)
CHLORIDE SERPL-SCNC: 107 MMOL/L (ref 100–111)
CO2 SERPL-SCNC: 24 MMOL/L (ref 21–32)
CREAT SERPL-MCNC: 1.58 MG/DL (ref 0.6–1.3)
DIFFERENTIAL METHOD BLD: ABNORMAL
ECHO AO ASC DIAM: 3.23 CM
ECHO AO ROOT DIAM: 3.14 CM
ECHO AV AREA PEAK VELOCITY: 1.41 CM2
ECHO AV AREA VTI: 1.53 CM2
ECHO AV AREA/BSA PEAK VELOCITY: 0.8 CM2/M2
ECHO AV AREA/BSA VTI: 0.8 CM2/M2
ECHO AV MEAN GRADIENT: 3.62 MMHG
ECHO AV PEAK GRADIENT: 6.74 MMHG
ECHO AV PEAK VELOCITY: 129.85 CM/S
ECHO AV VTI: 27.91 CM
ECHO IVC PROX: 1.86 CM
ECHO LA MAJOR AXIS: 4.26 CM
ECHO LA MINOR AXIS: 2.29 CM
ECHO LA VOL 2C: 67.21 ML (ref 22–52)
ECHO LA VOL 4C: 60.89 ML (ref 22–52)
ECHO LA VOL BP: 72 ML (ref 22–52)
ECHO LA VOL/BSA BIPLANE: 38.71 ML/M2 (ref 16–28)
ECHO LA VOLUME INDEX A2C: 36.13 ML/M2 (ref 16–28)
ECHO LA VOLUME INDEX A4C: 32.74 ML/M2 (ref 16–28)
ECHO LV E' LATERAL VELOCITY: 11 CM/S
ECHO LV E' SEPTAL VELOCITY: 8 CM/S
ECHO LV EDV A2C: 194.56 ML
ECHO LV EDV A4C: 164.24 ML
ECHO LV EDV BP: 187.62 ML (ref 56–104)
ECHO LV EDV INDEX A4C: 88.3 ML/M2
ECHO LV EDV INDEX BP: 100.9 ML/M2
ECHO LV EDV NDEX A2C: 104.6 ML/M2
ECHO LV EJECTION FRACTION A2C: 52 PERCENT
ECHO LV EJECTION FRACTION A4C: 44 PERCENT
ECHO LV EJECTION FRACTION BIPLANE: 50.6 PERCENT (ref 55–100)
ECHO LV ESV A2C: 92.86 ML
ECHO LV ESV A4C: 92.47 ML
ECHO LV ESV BP: 92.75 ML (ref 19–49)
ECHO LV ESV INDEX A2C: 49.9 ML/M2
ECHO LV ESV INDEX A4C: 49.7 ML/M2
ECHO LV ESV INDEX BP: 49.9 ML/M2
ECHO LV INTERNAL DIMENSION DIASTOLIC: 4.42 CM (ref 3.9–5.3)
ECHO LV INTERNAL DIMENSION SYSTOLIC: 3.21 CM
ECHO LV IVSD: 1.06 CM (ref 0.6–0.9)
ECHO LV MASS 2D: 159.4 G (ref 67–162)
ECHO LV MASS INDEX 2D: 85.7 G/M2 (ref 43–95)
ECHO LV POSTERIOR WALL DIASTOLIC: 1.04 CM (ref 0.6–0.9)
ECHO LVOT DIAM: 1.93 CM
ECHO LVOT PEAK GRADIENT: 1.56 MMHG
ECHO LVOT PEAK VELOCITY: 62.5 CM/S
ECHO LVOT SV: 101.7 ML
ECHO LVOT SV: 42.7 ML
ECHO LVOT VTI: 14.59 CM
ECHO MV A VELOCITY: 72.44 CM/S
ECHO MV AREA PHT: 3.16 CM2
ECHO MV E DECELERATION TIME (DT): 239.95 MS
ECHO MV E VELOCITY: 123.59 CM/S
ECHO MV E/A RATIO: 1.71
ECHO MV E/E' LATERAL: 11.24
ECHO MV E/E' RATIO (AVERAGED): 13.34
ECHO MV E/E' SEPTAL: 15.45
ECHO MV PRESSURE HALF TIME (PHT): 69.58 MS
ECHO MV REGURGITANT VTIA: 134.95 CM
ECHO RA AREA 4C: 11.75 CM2
ECHO RV INTERNAL DIMENSION: 4.34 CM
ECHO TV MEAN GRADIENT: 41.78 MMHG
ECHO TV REGURGITANT MAX VELOCITY: 281.99 CM/S
ECHO TV REGURGITANT PEAK GRADIENT: 31.81 MMHG
EOSINOPHIL # BLD: 0 K/UL (ref 0–0.4)
EOSINOPHIL NFR BLD: 0 % (ref 0–5)
ERYTHROCYTE [DISTWIDTH] IN BLOOD BY AUTOMATED COUNT: 15 % (ref 11.6–14.5)
GLOBULIN SER CALC-MCNC: 3.7 G/DL (ref 2–4)
GLUCOSE BLD STRIP.AUTO-MCNC: 185 MG/DL (ref 70–110)
GLUCOSE BLD STRIP.AUTO-MCNC: 194 MG/DL (ref 70–110)
GLUCOSE BLD STRIP.AUTO-MCNC: 216 MG/DL (ref 70–110)
GLUCOSE BLD STRIP.AUTO-MCNC: 216 MG/DL (ref 70–110)
GLUCOSE BLD STRIP.AUTO-MCNC: 295 MG/DL (ref 70–110)
GLUCOSE SERPL-MCNC: 202 MG/DL (ref 74–99)
HCT VFR BLD AUTO: 22.8 % (ref 35–45)
HCT VFR BLD AUTO: 25.5 % (ref 35–45)
HGB BLD-MCNC: 7.5 G/DL (ref 12–16)
HGB BLD-MCNC: 8 G/DL (ref 12–16)
LEFT CFA DIST SYS PSV: 164.8 CM/S
LEFT MID ATA VELOCITY: 35.8 CM/S
LEFT PERONEAL MID SYS PSV: 65.6 CM/S
LEFT POP A PROX VEL RATIO: 1.21
LEFT POPLITEAL DIST SYS PSV: 55.9 CM/S
LEFT POPLITEAL PROX SYS PSV: 102.8 CM/S
LEFT PROX PFA A PSV: 127 CM/S
LEFT SFA DIST VEL RATIO: 1.48
LEFT SFA MID VEL RATIO: 0.8
LEFT SFA PROX VEL RATIO: 0.44
LEFT SUPER FEMORAL DIST SYS PSV: 85 CM/S
LEFT SUPER FEMORAL MID SYS PSV: 57.5 CM/S
LEFT SUPER FEMORAL PROX SYS PSV: 72.1 CM/S
LVOT MG: 0.82 MMHG
LYMPHOCYTES # BLD: 0.6 K/UL (ref 0.9–3.6)
LYMPHOCYTES NFR BLD: 3 % (ref 21–52)
MAGNESIUM SERPL-MCNC: 2.6 MG/DL (ref 1.6–2.6)
MCH RBC QN AUTO: 29.2 PG (ref 24–34)
MCHC RBC AUTO-ENTMCNC: 31.4 G/DL (ref 31–37)
MCV RBC AUTO: 93.1 FL (ref 74–97)
METAMYELOCYTES NFR BLD MANUAL: 1 %
MONOCYTES # BLD: 0.4 K/UL (ref 0.05–1.2)
MONOCYTES NFR BLD: 2 % (ref 3–10)
NEUTS BAND NFR BLD MANUAL: 2 % (ref 0–5)
NEUTS SEG # BLD: 18.6 K/UL (ref 1.8–8)
NEUTS SEG NFR BLD: 92 % (ref 40–73)
PLATELET # BLD AUTO: 322 K/UL (ref 135–420)
PLATELET COMMENTS,PCOM: ABNORMAL
PMV BLD AUTO: 9.4 FL (ref 9.2–11.8)
POTASSIUM SERPL-SCNC: 4.4 MMOL/L (ref 3.5–5.5)
PROT SERPL-MCNC: 5.2 G/DL (ref 6.4–8.2)
RBC # BLD AUTO: 2.74 M/UL (ref 4.2–5.3)
RBC MORPH BLD: ABNORMAL
RIGHT CFA DIST SYS PSV: 128.1 CM/S
RIGHT DIST ATA VELOCITY: 21.2 CM/S
RIGHT PERONEAL DIST VELOCITY: 32 CM/S
RIGHT POP A PROX VEL RATIO: 0.69
RIGHT POPLITEAL DIST SYS PSV: 73.5 CM/S
RIGHT POPLITEAL PROX SYS PSV: 27.3 CM/S
RIGHT PROX PFA A PSV: 250 CM/S
RIGHT PTA MID SYS PSV: 24.4 CM/S
RIGHT SUPER FEMORAL DIST SYS PSV: 39.5 CM/S
SODIUM SERPL-SCNC: 137 MMOL/L (ref 136–145)
VANCOMYCIN SERPL-MCNC: 22.5 UG/ML (ref 5–40)
WBC # BLD AUTO: 19.8 K/UL (ref 4.6–13.2)
WBC MORPH BLD: ABNORMAL

## 2021-08-13 PROCEDURE — 74011000250 HC RX REV CODE- 250: Performed by: STUDENT IN AN ORGANIZED HEALTH CARE EDUCATION/TRAINING PROGRAM

## 2021-08-13 PROCEDURE — 74011250636 HC RX REV CODE- 250/636: Performed by: STUDENT IN AN ORGANIZED HEALTH CARE EDUCATION/TRAINING PROGRAM

## 2021-08-13 PROCEDURE — 87040 BLOOD CULTURE FOR BACTERIA: CPT

## 2021-08-13 PROCEDURE — P9047 ALBUMIN (HUMAN), 25%, 50ML: HCPCS | Performed by: HOSPITALIST

## 2021-08-13 PROCEDURE — 74011000258 HC RX REV CODE- 258: Performed by: STUDENT IN AN ORGANIZED HEALTH CARE EDUCATION/TRAINING PROGRAM

## 2021-08-13 PROCEDURE — 85018 HEMOGLOBIN: CPT

## 2021-08-13 PROCEDURE — 74011250637 HC RX REV CODE- 250/637: Performed by: STUDENT IN AN ORGANIZED HEALTH CARE EDUCATION/TRAINING PROGRAM

## 2021-08-13 PROCEDURE — 65660000000 HC RM CCU STEPDOWN

## 2021-08-13 PROCEDURE — 74011250636 HC RX REV CODE- 250/636: Performed by: INTERNAL MEDICINE

## 2021-08-13 PROCEDURE — 80053 COMPREHEN METABOLIC PANEL: CPT

## 2021-08-13 PROCEDURE — 74011250636 HC RX REV CODE- 250/636: Performed by: HOSPITALIST

## 2021-08-13 PROCEDURE — 74011250636 HC RX REV CODE- 250/636: Performed by: FAMILY MEDICINE

## 2021-08-13 PROCEDURE — 85025 COMPLETE CBC W/AUTO DIFF WBC: CPT

## 2021-08-13 PROCEDURE — 74011636637 HC RX REV CODE- 636/637: Performed by: HOSPITALIST

## 2021-08-13 PROCEDURE — 74011000250 HC RX REV CODE- 250: Performed by: INTERNAL MEDICINE

## 2021-08-13 PROCEDURE — 36415 COLL VENOUS BLD VENIPUNCTURE: CPT

## 2021-08-13 PROCEDURE — 74011636637 HC RX REV CODE- 636/637: Performed by: STUDENT IN AN ORGANIZED HEALTH CARE EDUCATION/TRAINING PROGRAM

## 2021-08-13 PROCEDURE — 94640 AIRWAY INHALATION TREATMENT: CPT

## 2021-08-13 PROCEDURE — C8929 TTE W OR WO FOL WCON,DOPPLER: HCPCS

## 2021-08-13 PROCEDURE — 82962 GLUCOSE BLOOD TEST: CPT

## 2021-08-13 PROCEDURE — 80202 ASSAY OF VANCOMYCIN: CPT

## 2021-08-13 PROCEDURE — 74011250636 HC RX REV CODE- 250/636: Performed by: EMERGENCY MEDICINE

## 2021-08-13 PROCEDURE — 83735 ASSAY OF MAGNESIUM: CPT

## 2021-08-13 RX ORDER — FAMOTIDINE 20 MG/1
20 TABLET, FILM COATED ORAL DAILY
Status: DISCONTINUED | OUTPATIENT
Start: 2021-08-14 | End: 2021-08-15

## 2021-08-13 RX ORDER — CLINDAMYCIN PHOSPHATE 900 MG/50ML
900 INJECTION, SOLUTION INTRAVENOUS EVERY 8 HOURS
Status: DISCONTINUED | OUTPATIENT
Start: 2021-08-13 | End: 2021-08-13

## 2021-08-13 RX ORDER — ALBUMIN HUMAN 250 G/1000ML
25 SOLUTION INTRAVENOUS EVERY 6 HOURS
Status: DISCONTINUED | OUTPATIENT
Start: 2021-08-13 | End: 2021-08-25 | Stop reason: ALTCHOICE

## 2021-08-13 RX ORDER — CLINDAMYCIN PHOSPHATE 900 MG/50ML
900 INJECTION, SOLUTION INTRAVENOUS EVERY 8 HOURS
Status: DISCONTINUED | OUTPATIENT
Start: 2021-08-13 | End: 2021-08-15

## 2021-08-13 RX ORDER — LEVOFLOXACIN 5 MG/ML
500 INJECTION, SOLUTION INTRAVENOUS
Status: DISCONTINUED | OUTPATIENT
Start: 2021-08-13 | End: 2021-08-13

## 2021-08-13 RX ORDER — INSULIN GLARGINE 100 [IU]/ML
5 INJECTION, SOLUTION SUBCUTANEOUS DAILY
Status: DISCONTINUED | OUTPATIENT
Start: 2021-08-13 | End: 2021-09-03 | Stop reason: HOSPADM

## 2021-08-13 RX ORDER — NOREPINEPHRINE BITARTRATE/D5W 8 MG/250ML
2-16 PLASTIC BAG, INJECTION (ML) INTRAVENOUS
Status: DISCONTINUED | OUTPATIENT
Start: 2021-08-13 | End: 2021-08-13

## 2021-08-13 RX ADMIN — CLINDAMYCIN PHOSPHATE 900 MG: 900 INJECTION, SOLUTION INTRAVENOUS at 21:01

## 2021-08-13 RX ADMIN — HYDROCODONE BITARTRATE AND ACETAMINOPHEN 2 TABLET: 7.5; 325 TABLET ORAL at 09:02

## 2021-08-13 RX ADMIN — INSULIN GLARGINE 5 UNITS: 100 INJECTION, SOLUTION SUBCUTANEOUS at 11:13

## 2021-08-13 RX ADMIN — SODIUM CHLORIDE 1000 ML: 900 INJECTION, SOLUTION INTRAVENOUS at 13:31

## 2021-08-13 RX ADMIN — INSULIN LISPRO 4 UNITS: 100 INJECTION, SOLUTION INTRAVENOUS; SUBCUTANEOUS at 08:18

## 2021-08-13 RX ADMIN — SODIUM CHLORIDE 100 ML/HR: 900 INJECTION, SOLUTION INTRAVENOUS at 17:34

## 2021-08-13 RX ADMIN — SODIUM CHLORIDE 500 ML: 900 INJECTION, SOLUTION INTRAVENOUS at 21:07

## 2021-08-13 RX ADMIN — SODIUM CHLORIDE 1000 ML: 900 INJECTION, SOLUTION INTRAVENOUS at 15:27

## 2021-08-13 RX ADMIN — SODIUM CHLORIDE 500 ML: 900 INJECTION, SOLUTION INTRAVENOUS at 23:58

## 2021-08-13 RX ADMIN — MORPHINE SULFATE 2 MG: 2 INJECTION, SOLUTION INTRAMUSCULAR; INTRAVENOUS at 04:53

## 2021-08-13 RX ADMIN — HEPARIN SODIUM 5000 UNITS: 5000 INJECTION INTRAVENOUS; SUBCUTANEOUS at 16:09

## 2021-08-13 RX ADMIN — PIPERACILLIN AND TAZOBACTAM 3.38 G: 3; .375 INJECTION, POWDER, LYOPHILIZED, FOR SOLUTION INTRAVENOUS at 02:00

## 2021-08-13 RX ADMIN — BUPROPION HYDROCHLORIDE 150 MG: 150 TABLET, FILM COATED, EXTENDED RELEASE ORAL at 09:02

## 2021-08-13 RX ADMIN — Medication 10 ML: at 17:34

## 2021-08-13 RX ADMIN — PERFLUTREN 1 ML: 6.52 INJECTION, SUSPENSION INTRAVENOUS at 11:36

## 2021-08-13 RX ADMIN — SODIUM CHLORIDE 150 ML/HR: 900 INJECTION, SOLUTION INTRAVENOUS at 23:57

## 2021-08-13 RX ADMIN — ATORVASTATIN CALCIUM 40 MG: 20 TABLET, FILM COATED ORAL at 21:05

## 2021-08-13 RX ADMIN — Medication 10 ML: at 08:20

## 2021-08-13 RX ADMIN — ALBUMIN (HUMAN) 25 G: 0.25 INJECTION, SOLUTION INTRAVENOUS at 12:24

## 2021-08-13 RX ADMIN — HEPARIN SODIUM 5000 UNITS: 5000 INJECTION INTRAVENOUS; SUBCUTANEOUS at 23:53

## 2021-08-13 RX ADMIN — INSULIN LISPRO 2 UNITS: 100 INJECTION, SOLUTION INTRAVENOUS; SUBCUTANEOUS at 01:30

## 2021-08-13 RX ADMIN — VANCOMYCIN HYDROCHLORIDE 750 MG: 750 INJECTION, POWDER, LYOPHILIZED, FOR SOLUTION INTRAVENOUS at 23:26

## 2021-08-13 RX ADMIN — CEFTRIAXONE SODIUM 2 G: 2 INJECTION, POWDER, FOR SOLUTION INTRAMUSCULAR; INTRAVENOUS at 21:12

## 2021-08-13 RX ADMIN — PIPERACILLIN AND TAZOBACTAM 3.38 G: 3; .375 INJECTION, POWDER, LYOPHILIZED, FOR SOLUTION INTRAVENOUS at 09:03

## 2021-08-13 RX ADMIN — INSULIN LISPRO 6 UNITS: 100 INJECTION, SOLUTION INTRAVENOUS; SUBCUTANEOUS at 12:42

## 2021-08-13 RX ADMIN — ONDANSETRON 4 MG: 2 INJECTION INTRAMUSCULAR; INTRAVENOUS at 20:59

## 2021-08-13 RX ADMIN — IPRATROPIUM BROMIDE AND ALBUTEROL SULFATE 3 ML: .5; 3 SOLUTION RESPIRATORY (INHALATION) at 21:26

## 2021-08-13 RX ADMIN — NALXONE HYDROCHLORIDE 0.1 MG: 0.4 INJECTION INTRAMUSCULAR; INTRAVENOUS; SUBCUTANEOUS at 00:49

## 2021-08-13 RX ADMIN — Medication 10 ML: at 08:18

## 2021-08-13 RX ADMIN — ALBUMIN (HUMAN) 25 G: 0.25 INJECTION, SOLUTION INTRAVENOUS at 21:11

## 2021-08-13 RX ADMIN — Medication 10 ML: at 21:05

## 2021-08-13 RX ADMIN — ONDANSETRON 4 MG: 2 INJECTION INTRAMUSCULAR; INTRAVENOUS at 13:54

## 2021-08-13 RX ADMIN — Medication 10 ML: at 08:19

## 2021-08-13 RX ADMIN — LEVOFLOXACIN 500 MG: 5 INJECTION, SOLUTION INTRAVENOUS at 17:34

## 2021-08-13 RX ADMIN — FAMOTIDINE 20 MG: 20 TABLET, FILM COATED ORAL at 09:02

## 2021-08-13 RX ADMIN — Medication 10 ML: at 21:02

## 2021-08-13 RX ADMIN — ASPIRIN 81 MG: 81 TABLET, CHEWABLE ORAL at 09:02

## 2021-08-13 RX ADMIN — INSULIN LISPRO 2 UNITS: 100 INJECTION, SOLUTION INTRAVENOUS; SUBCUTANEOUS at 23:54

## 2021-08-13 RX ADMIN — ALPRAZOLAM 0.5 MG: 0.5 TABLET ORAL at 20:59
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
## 2021-08-13 NOTE — PROGRESS NOTES
Occupational Therapy Evaluation Attempt     OT eval orders received. Chart reviewed. Attempted Occupational Therapy Evaluation, however, patient unable to be seen due to:  []  Nausea/vomiting  []  Eating  []  Pain  []  Patient too lethargic  []  Off Unit for testing/procedure  []  Dialysis treatment in progress  []  Telemetry Results  [x]  Other: Pt had R BKA last night and now having low BP. Nursing requests to hold therapy assessment today. To be followed tomorrow if appropriate.   Navi Saeed, OTR/L

## 2021-08-13 NOTE — PROGRESS NOTES
Hospitalist Progress Note-critical care note     Patient: Zelda Mixon MRN: [id number]  CSN: [id number]    YOB: 1970  Age: 46 y.o. Sex: female    DOA: 8/11/2021 LOS:  LOS: 2 days            Chief complaint: Gangrene of both feet, noncompliant with medical treatment, diabetes, tobacco use, sepsis, anemia, bacteremia     Assessment/Plan         Hospital Problems  Date Reviewed: 8/12/2021        Codes Class Noted POA    Sepsis ([facility] ICD-10-CM: A41.9  ICD-9-CM: 038.9, 995.91  8/13/2021 Unknown        Status post below-knee amputation of right lower extremity ([facility] ICD-10-CM: Z89.511  ICD-9-CM: V49.75  8/13/2021 Unknown        Bacteremia ICD-10-CM: R78.81  ICD-9-CM: 790.7  8/13/2021 Unknown        Fall ICD-10-CM: Camilo Simms  ICD-9-CM: E888.9  8/12/2021 Yes        Tobacco use ICD-10-CM: Z72.0  ICD-9-CM: 305.1  8/12/2021 Yes        Preoperative evaluation to rule out surgical contraindication ICD-10-CM: Z01.818  ICD-9-CM: V72.83  8/12/2021 Yes        Anemia ICD-10-CM: D64.9  ICD-9-CM: 285.9  8/12/2021 Unknown        * (Principal) Gangrene of both feet ([facility] ICD-10-CM: S56  ICD-9-CM: 785.4  8/11/2021 Yes        Non compliance with medical treatment ICD-10-CM: Z91.19  ICD-9-CM: V15.81  4/12/2021 Yes        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 [facility]) ICD-10-CM: E11.52  ICD-9-CM: 250.70, 443.81, 785.4  4/4/2021 Yes        YUNIEL (acute kidney injury) [facility]) ICD-10-CM: N17.9  ICD-9-CM: 584.9  4/4/2021 Yes                   78-year-old female with uncontrolled type 2 diabetes mellitus, severe peripheral arterial disease, and tobacco use who has been noncompliant with wound care and hyperbaric oxygen treatment due to recent fall is admitted for severe gangrene of both feet.     Sepsis   Continue iv abx,   F/u with bcx   Leukocytosis improving  Echo   Positive bcx -repeat     Gangrene of bilateral feet   Continue IV antibiotics,rt bka on 8/12   Appreciated vascular and podiatrist help   Continue pain control      Bacteremia   ID consult needed while cx back     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 of both feet  Sliding scale, add lantus 5     Noncompliance with medical treatment   Continue education, PT OT     tobacco use  Need to quit smoking    Anemia  Received blood transfusion     rafy stack suspected urinary retention   Continue iv hydration   Avoid iv contrast and nsaids   Nephrologist consult     boarderline bp    Albumin , ns infusion, continue hold home meds for htn      subjective: feel better, still painful in feet     rn ; bp low-check manually better, pt refused to have in/out cath         Disposition :tbd,   Review of systems:    General: No fevers or chills. Cardiovascular: No chest pain or pressure. No palpitations. Pulmonary: No shortness of breath. Gastrointestinal: No nausea, vomiting. Mks: painful foot     Vital signs/Intake and Output:  Visit Vitals  /62 (BP 1 Location: Left lower arm, BP Patient Position: At rest)   Pulse 64   Temp 97.1 °F (36.2 °C)   Resp 16   Ht 5' 8\" (1.727 m)   Wt 72.6 kg (160 lb)   SpO2 100%   BMI 24.33 kg/m²     Current Shift:  No intake/output data recorded. Last three shifts:  08/11 1901 - 08/13 0700  In: 1680 [I.V.:1050]  Out: 20     Physical Exam:  General: WD, WN. Alert, cooperative, no acute distress    HEENT: NC, Atraumatic. PERRLA, anicteric sclerae. Lungs: CTA Bilaterally. No Wheezing/Rhonchi/Rales. Heart:  Regular  rhythm,  No murmur, No Rubs, No Gallops  Abdomen: Soft, Non distended, Non tender. +Bowel sounds,   Extremities: No c/c, left foot wrapped, Rt BKA   Psych:   Not anxious or agitated. Neurologic:  No acute neurological deficit.              Labs: Results:       Chemistry Recent Labs     08/13/21  0414 08/12/21  2253 08/12/21  1215 08/11/21  2135 08/11/21  2135   *  --  174*  --  154*     --  134*  --  129*   K 4.4  --  3.7  --  3.8     --  103  --  95* CO2 24  --  23  --  27   BUN 57* 57* 61*   < > 65*   CREA 1.58*  --  1.43*  --  1.30   CA 7.4*  --  7.3*  --  7.8*   AGAP 6  --  8  --  7   BUCR 36*  --  43*  --  50*   *  --   --   --  179*   TP 5.2*  --   --   --  5.4*   ALB 1.5*  --   --   --  1.4*   GLOB 3.7  --   --   --  4.0   AGRAT 0.4*  --   --   --  0.4*    < > = values in this interval not displayed. CBC w/Diff Recent Labs     08/13/21  0414 08/12/21  1215 08/11/21 2135   WBC 19.8* 22.4* 28.0*   RBC 2.74* 1.96* 2.68*   HGB 8.0* 5.7* 7.7*   HCT 25.5* 17.6* 23.1*    332 445*   GRANS 92* 91* 91*   LYMPH 3* 2* 4*   EOS 0 0 0      Cardiac Enzymes Recent Labs     08/11/21 2135   CPK 23*   CKND1 CALCULATION NOT PERFORMED WHEN RESULT IS BELOW LINEAR LIMIT      Coagulation No results for input(s): PTP, INR, APTT, INREXT, INREXT in the last 72 hours. Lipid Panel No results found for: CHOL, CHOLPOCT, CHOLX, CHLST, CHOLV, 735230, HDL, HDLP, LDL, LDLC, DLDLP, 008467, VLDLC, VLDL, TGLX, TRIGL, TRIGP, TGLPOCT, CHHD, CHHDX   BNP No results for input(s): BNPP in the last 72 hours. Liver Enzymes Recent Labs     08/13/21 0414   TP 5.2*   ALB 1.5*   *      Thyroid Studies No results found for: T4, T3U, TSH, TSHEXT, TSHEXT     Procedures/imaging: see electronic medical records for all procedures/Xrays and details which were not copied into this note but were reviewed prior to creation of Plan    XR CHEST PORT    Result Date: 8/12/2021  EXAM: XR CHEST PORT CLINICAL INDICATION/HISTORY: Sepsis   > Additional: None. COMPARISON: April 5, 2021 TECHNIQUE: Portable chest _______________ FINDINGS: SUPPORT DEVICES: None. HEART AND MEDIASTINUM: Normal size and contour. Normal pulmonary vasculature. LUNGS AND PLEURAL SPACES: The lungs are well expanded and clear. No focal consolidation, effusion, or pneumothorax. BONY THORAX AND SOFT TISSUES: No acute osseous abnormality. _______________     No active cardiopulmonary disease.       Michael Gamez MD

## 2021-08-13 NOTE — PROGRESS NOTES
Bedside and verbal shift change report given to Jose Armando Ly, RN (on coming nurse) by Omari Lakhani RN (off going nurse). Report included the following information SBAR, Kardex, OR Summary, Intake/Output and MAR.    0900 Pt BP trending low made MD aware. 1224 Pt refuse stack insertion. Educated pt about why stack insertion is important help her void. 1307 Update MD about pt BP still trending low. MD put in orders of 2 L bolus. 1323 Bladder scan pt 698ml. Explain to pt that her bladder has a lot of urine in it. Pt stated \" Marcela been rapped before and I dont like people in my whoah. \"    5196 54/89 after 1st bolus. Pt c/o n/v and dizzines when sit up in bed. 1545 Waiting for bed assignment for pt to transfer to ICU    1710 Pt refuse Xray \" stated I will not do anything until I go to the bathroom. \"    1900 Pt voided 450 ml.    1912 Bedside and verbal shift change report given by Jose Armando Ly, RN (off going nurse) to Sonya Kingston RN(on coming nurse). Report included the following information SBAR, Kardex, OR Summary, Intake/Output and MAR.

## 2021-08-13 NOTE — PROGRESS NOTES
RESPIRATORY NOTE:       Pt refuses nebulizer tx, would like to sleep, no problem with breathing at this time.

## 2021-08-13 NOTE — PROGRESS NOTES
Pharmacy Dosing Services: Vancomycin    Consult for Vancomycin Dosing by Pharmacy by Dr. Dequan Castillo  Indication: Diabetic Foot Infection  Day of Therapy: 3    Previous Regimen Vancomycin 1000mg IV Q18H   Last Level 22.5 mcg/mL   Other Current Antibiotics Zosyn; Levaquin   Significant Cultures Pending   Serum Creatinine Lab Results   Component Value Date/Time    Creatinine 1.58 (H) 08/13/2021 04:14 AM      Creatinine Clearance Estimated Creatinine Clearance: 42.5 mL/min (A) (based on SCr of 1.58 mg/dL (H)). BUN Lab Results   Component Value Date/Time    BUN 57 (H) 08/13/2021 04:14 AM       WBC Lab Results   Component Value Date/Time    WBC 19.8 (H) 08/13/2021 04:14 AM      H/H    Lab Results   Component Value Date/Time    HGB 8.0 (L) 08/13/2021 04:14 AM      Platelets Lab Results   Component Value Date/Time    PLATELET 582 91/16/8140 04:14 AM      Temp 97.1 °F (36.2 °C)     Pharmacy note:  Vancomycin random level result today: 22.5 mcg/mL  Dose decreased to Vancomycin 750mg IV Q18H  Next dose scheduled 08/13/21 1400  Estimated AUC with this dosing schedule: 526mcg/mL. hr    Pharmacy will continue to monitor daily and dose based on clinical status.     Tyrone Rios, PharmD  [phone]

## 2021-08-13 NOTE — PROGRESS NOTES
Care manager rounded on patient, s/p right BKA and debridement dorsum left foot, patient has been having low BP today and issues with n/v; during CM visit patient became nauseated with emesis of approx 300ml reddish emesis. Will continue to follow as status improves for discharge placement. Care Management Interventions  PCP Verified by CM:  Yes  Mode of Transport at Discharge: Self  Transition of Care Consult (CM Consult): Discharge Planning  Current Support Network: Own Home (takes care of mother at home)  The Plan for Transition of Care is Related to the Following Treatment Goals : gangrene of both feet, diabetes  The Patient and/or Patient Representative was Provided with a Choice of Provider and Agrees with the Discharge Plan?: Yes  Name of the Patient Representative Who was Provided with a Choice of Provider and Agrees with the Discharge Plan: Magnus Arce, patient  Discharge Location  Discharge Placement:  (TBD)

## 2021-08-13 NOTE — CONSULTS
Brief ID Note from [facility]    Chart reviewed/pt NOT seen  51y vasculopath/diabetic smoker presented with wet gangrene for which she went to quick BKA. Admission BCx both popped (+) within 9h (REAL fast) with what appears to be a Staphylococcus aureus (she grew MSSA from her feet earlier this year) -- all in the setting of hypotension today. Until we know whether or not a MecA gene is present (MRSA) or not (MSSA) -- continue the vancomycin. Once the Staphylococcus aureus is identified, you can continue or stop the vancomycin. I like cefazolin better for MSSA, but CAX ok today/tomorrow to make sure nothing else comes of her BCx (like a GNR). I like CAX better than pip/tzb for killing MSSA. Levofloxacin doesn't reliably kill Staphylococcus aureus in vivo despite what the susceptibilities state. AND finally clindamycin as an adjunct until she clears her shock/hypotension to block the Staphylococcal toxins. She needs daily BCx through the weekend to know when her life-threatening sepsis clears. Dr Eric Pardo will be back Monday, but I'll continue to chart-stalk until then from [place].     Alfred Ramey MD  Cell [phone]  [facility]

## 2021-08-13 NOTE — PROGRESS NOTES
RN called for bp, pt refused stack. called pt and discussed with pt, she refused to stack, even discussed with pt about worsening renal function and potential icu transfer for levophed.

## 2021-08-13 NOTE — PROGRESS NOTES
VASCULAR AND TRANSPLANT       PROGRESS NOTE    Date: 2021    Post-Op Day: 1. S/p right below knee guillotine amputation. S/p debridement dorsum left foot. Plan:   Check xray left foot, r/o gas. Plan to transfer to ICU secondary to hypotension. Continue local wound care to right bka and left foot. Will plan for closure of right bka beginning of next week when patient more medically stable and leukocytosis improves. Assessment:   45 yo female with gangrene both feet. S/p right guillotine amputation. S/p debridement dorsum left foot. White blood cell count has improved since admission but is still markedly elevated. H/h stable from labs early this am.        Subjective:   Pt complaining of not being able to urinate. Denies pain in bilateral lower extremities. Objective:   Admit weight: Weight: 41.1 kg (90 lb 9.7 oz)  Last recorded weight: Weight: 72.6 kg (160 lb)    Temp (24hrs), Av.9 °F (36.6 °C), Min:97.1 °F (36.2 °C), Max:98.8 °F (37.1 °C)          [unfilled]    Intake/Output Summary (Last 24 hours) at 2021 1625  Last data filed at 2021 2128  Gross per 24 hour   Intake 1580 ml   Output 20 ml   Net 1560 ml       The open right below knee amputation stump with blood clots. No active bleeding. I am unable to express purulent drainage. No odor. dorsum right foot with exposed subcutaneous tissue, tendons. Malodorous. Unable to express purulent drainage. Dry eschar tip of tma. Labs:     BMP:   Recent Labs     21  0414 21  2253 21  1215 21   BUN 57* 57* 61* 65*     --  134* 129*   CO2 24  --  23 27     CBC:    Recent Labs     21  0414 21  1215 21  213   WBC 19.8* 22.4* 28.0*   HCT 25.5* 17.6* 23.1*   RDW 15.0* 14.3 14.0     PT/INR: No results for input(s): INR, INREXT in the last 72 hours.     No lab exists for component: PT    Arterial Blood Gases   No results found for: LPMO2, FIO2, PEEP, PH1, PCO2, PO2, HCO3, MODE     MICRO:  Results     Procedure Component Value Units Date/Time    CULTURE, BLOOD [[id number]] Collected: 08/13/21 1150    Order Status: Completed Specimen: Blood Updated: 08/13/21 1208    CULTURE, URINE [[id number]] Collected: 08/11/21 2230    Order Status: Canceled Specimen: Urine from Clean catch     CULTURE, BLOOD [[id number]] Collected: 08/11/21 2225    Order Status: Completed Specimen: Blood Updated: 08/13/21 0724     Special Requests: NO SPECIAL REQUESTS        GRAM STAIN       GRAM POSITIVE COCCI IN CLUSTERS ANAEROBIC BOTTLE                  SMEAR CALLED TO AND CORRECTLY REPEATED BY: Nisha Knapp RN 3S TO CAF [id number] AT [id number]           Culture result:       CULTURE IN [address] UPDATES TO FOLLOW                  Sent to [facility] for ID/Susceptibility if indicated.           CULTURE, BLOOD [[id number]]  (Abnormal) Collected: 08/11/21 2215    Order Status: Completed Specimen: Blood Updated: 08/13/21 1608     Special Requests: NO SPECIAL REQUESTS        GRAM STAIN       GRAM POSITIVE COCCI IN CLUSTERS ANAEROBIC BOTTLE                  SMEAR CALLED TO AND CORRECTLY REPEATED BY: Sulema Ferguson RN 3S TO CAF [id number] AT 36           Culture result:       [facility] POSITIVE COCCI IN CLUSTERS GROWING IN THE ANAEROBIC BOTTLE          CULTURE, Krissy Naqvi STAIN [[id number]]  (Abnormal) Collected: 08/11/21 2145    Order Status: Completed Specimen: Wound from Foot Updated: 08/13/21 1101     Special Requests: NO SPECIAL REQUESTS        GRAM STAIN NO WBC'S SEEN         1+ GRAM NEGATIVE RODS               FEW GRAM POSITIVE COCCI IN PAIRS           Culture result:       MODERATE GRAM NEGATIVE RODS            CHECKING FOR POSSIBLE  OTHER ORGANISMS       CULTURE, Krissy Naqvi STAIN [[id number]]  (Abnormal) Collected: 08/11/21 2145    Order Status: Completed Specimen: Wound from Foot Updated: 08/13/21 1107     Special Requests: NO SPECIAL REQUESTS GRAM STAIN NO WBC'S SEEN         4+ GRAM NEGATIVE RODS               1+ GRAM POSITIVE COCCI IN PAIRS           Culture result: HEAVY GRAM NEGATIVE RODS         CHECKING FOR POSSIBLE  OTHER ORGANISMS                   Edgar Simpson PA-C   [phone].

## 2021-08-13 NOTE — PROGRESS NOTES
Pharmacy Dosing Services: Renal Dose Adjustment (Famotidine)    Pharmacist Renal Dosing Progress Note for Famotidine   Physician: Nona Gamez    Famotidine 20mg PO BID was automatically dose-adjusted tto Famotidine 20mg PO Daily per THE [facility] P&T Committee Protocol, with respect to renal function. Consult provided for this   46 y.o. , female , for the indication of SUP Prophylaxis. Pt Weight:   Wt Readings from Last 1 Encounters:   08/13/21 72.6 kg (160 lb)         Previous Regimen Famotidine 20mg PO BID   Serum Creatinine Lab Results   Component Value Date/Time    Creatinine 1.58 (H) 08/13/2021 04:14 AM       Creatinine Clearance Estimated Creatinine Clearance: 42.5 mL/min (A) (based on SCr of 1.58 mg/dL (H)). BUN Lab Results   Component Value Date/Time    BUN 57 (H) 08/13/2021 04:14 AM         Pharmacy to continue to monitor patient daily and make dosage adjustments based upon changing renal function.     Sagrario Hall, PharmD  [phone]

## 2021-08-13 NOTE — PERIOP NOTES
TRANSFER - OUT REPORT:    Verbal report given to Vito Krishnamurthy  Street, RN(name) on Clementina Villa  being transferred to [facility](unit) for routine post - op       Report consisted of patients Situation, Background, Assessment and   Recommendations(SBAR). Information from the following report(s) SBAR, Kardex, OR Summary, MAR and Cardiac Rhythm NSR was reviewed with the receiving nurse. Lines:   Peripheral IV 08/11/21 Right Antecubital (Active)   Site Assessment Clean, dry, & intact 08/12/21 2054   Phlebitis Assessment 0 08/12/21 2054   Infiltration Assessment 0 08/12/21 2054   Dressing Status Clean, dry, & intact 08/12/21 2054   Dressing Type Tape;Transparent 08/12/21 2054   Hub Color/Line Status Pink; Infusing;Patent 08/12/21 2054   Action Taken Open ports on tubing capped 08/12/21 1134   Alcohol Cap Used Yes 08/12/21 1134       Peripheral IV 08/12/21 Distal;Left;Posterior Forearm (Active)   Site Assessment Clean, dry, & intact 08/12/21 2054   Phlebitis Assessment 0 08/12/21 2054   Infiltration Assessment 0 08/12/21 2054   Dressing Status Clean, dry, & intact 08/12/21 2054   Dressing Type Tape;Transparent 08/12/21 2054   Hub Color/Line Status Infusing;Patent 08/12/21 2054   Action Taken Open ports on tubing capped 08/12/21 1134   Alcohol Cap Used Yes 08/12/21 1134        Opportunity for questions and clarification was provided.       Patient transported with:   O2 @ 2 liters  Registered Nurse

## 2021-08-13 NOTE — ANESTHESIA POSTPROCEDURE EVALUATION
Post-Anesthesia Evaluation and Assessment    Patient: Pierre Simms MRN: [id number]  SSN: [id number]   YOB: 1970  Age: 46 y.o. Sex: female      Cardiovascular Function/Vital Signs  BP (!) 107/48   Pulse 88   Temp 36.9 °C (98.4 °F)   Resp 15   Ht 5' 8\" (1.727 m)   Wt 72.6 kg (160 lb)   SpO2 97%   BMI 24.33 kg/m²     Patient is status post Procedure(s):  RIGHT BELOW KNEE AMPUTATION. Nausea/Vomiting: Controlled. Postoperative hydration reviewed and adequate. Pain:  Pain Scale 1: FLACC (08/12/21 2133)  Pain Intensity 1: 0 (08/12/21 2133)   Managed. Neurological Status:   Neuro (WDL): Exceptions to WDL (08/12/21 2054)   At baseline. Mental Status and Level of Consciousness: Arousable. Pulmonary Status:   O2 Device: Nasal cannula (08/12/21 2059)   Adequate oxygenation and airway patent. Complications related to anesthesia: None    Post-anesthesia assessment completed. No concerns. Patient has met all discharge requirements. Signed By: Juliette Boast, CRNA    August 12, 2021             Procedure(s):  RIGHT BELOW KNEE AMPUTATION. general    <BSHSIANPOST>    INITIAL Post-op Vital signs:   Vitals Value Taken Time   /53 08/12/21 2135   Temp 36.9 °C (98.4 °F) 08/12/21 2054   Pulse 90 08/12/21 2138   Resp 12 08/12/21 2138   SpO2 99 % 08/12/21 2138   Vitals shown include unvalidated device data.
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
## 2021-08-13 NOTE — OP NOTES
VASCULAR SURGERY OPERATIVE REPORT    PATIENT NAME:  Tatum Braxton  [id number]  1970    DATE OF PROCEDURE:  8/12/2021    PREOP DIAGNOSIS: [place] [facility] Chronic limb ischemia, sepsis due to gangrenous right foot,   left foot necrotic dorsal pressure ulcer    POSTOP DIAGNOSIS:   same    PROCEDURE:  Procedure(s):  RIGHT BELOW KNEE AMPUTATION  RIGHT BELOW KNEE AMPUTATION:     SURGEON:  Surgeon(s):  Kaleb Valero MD    ANESTHESIA:  general    SPECIMEN:  Right foot    IMPLANTS:  * No implants in log *    ESTIMATED BLOOD LOSS:  20 mL    FLUIDS:  Per anesthesia record    INDICATIONS:  Ms. Claudette Serrano is a 46 y.o. female who presented with sepsis due to gangrenous right foot. Benefits, alternatives, and risks of the procedure were discussed. All questions were answered. The patient understands and is willing to proceed. DESCRIPTION OF PROCEDURE:  The patient was properly identified, and after ensuring the appropriately signed documents were secured, the patient was brought to the operating room and placed supine on the operative table. General anesthesia was induced by the anesthesia service who monitored the patient throughout the procedure. The right foot was wrapped in [address], then the leg was prepped with betadine scrub, betadine paint, and chlorhexadine and draped sterilely. prepped and draped in the usual sterile fashion. An appropriate timeout procedure was completed where in the patient, procedure, site, positioning, allergies, equipment, and administration of antibiotics were all verified prior to beginning. Using a Gigli saw, the foot was amputated from the leg just above the ankle and just above the obviously necrotic changes. The posterior tibial, anterior tibial, and peroneal arteries were secured with vicryl suture. The greater saphenous vein was tied with vicryl suture. The amputation was irrigated with 2 L NS, dried, and a dressing was applied.   Xeroform, 4x4's, ABD pad, kerlix roll, and Ace wrap. The dorsum of the left foot also has a 4 cm necrotic, malodorous pressure ulcer on the dorsum with tendons exposed. I sharply debrided fibrinous slough and black, dried tissue overlying the tendon with Metzenbaums. I gently probed the edges to ensure there was no tracking pus. There was seropurulent drainage from the wound, however, none bubbled up from the deep. The wound was then dressed with Xeroform, 4x4's, ABD pad, and kerlix roll. The dry eschar over the 1st metatarsal area looks clean. This ended the surgical procedure. All counts of needles, sponges, and instruments were correct at the end the case. No intraoperative complications were noted. I was present and scrubbed as dictated. Sedation was stopped, the patient was relieved of anesthesia, and taken to the postanesthesia care unit in good condition after having tolerated the procedure well.     Bennie Snellen, MD, [address] [place] [address] [facility]  [phone] office  [phone] mobile

## 2021-08-13 NOTE — PROGRESS NOTES
8/13/2021 PT note: consult received and chart reviewed. Evaluation attempted. Pt is s/p R BKA and now with low BP. Nurse advises hold PT today. Will f/u tomorrow if appropriate for PT evaluation.  Thank you for this referral.   Margo Frank, PT

## 2021-08-14 ENCOUNTER — APPOINTMENT (OUTPATIENT)
Dept: GENERAL RADIOLOGY | Age: 51
DRG: 853 | End: 2021-08-14
Attending: SURGERY
Payer: COMMERCIAL

## 2021-08-14 ENCOUNTER — APPOINTMENT (OUTPATIENT)
Dept: GENERAL RADIOLOGY | Age: 51
DRG: 853 | End: 2021-08-14
Attending: INTERNAL MEDICINE
Payer: COMMERCIAL

## 2021-08-14 LAB
ALBUMIN SERPL-MCNC: 2 G/DL (ref 3.4–5)
ALBUMIN/GLOB SERPL: 0.7 {RATIO} (ref 0.8–1.7)
ALP SERPL-CCNC: 104 U/L (ref 45–117)
ALT SERPL-CCNC: 7 U/L (ref 13–56)
ANION GAP SERPL CALC-SCNC: 11 MMOL/L (ref 3–18)
AST SERPL-CCNC: 11 U/L (ref 10–38)
ATRIAL RATE: 91 BPM
BACTERIA SPEC CULT: ABNORMAL
BACTERIA SPEC CULT: NORMAL
BASOPHILS # BLD: 0 K/UL (ref 0–0.1)
BASOPHILS NFR BLD: 0 % (ref 0–2)
BILIRUB SERPL-MCNC: 0.4 MG/DL (ref 0.2–1)
BUN SERPL-MCNC: 45 MG/DL (ref 7–18)
BUN/CREAT SERPL: 34 (ref 12–20)
CALCIUM SERPL-MCNC: 7.1 MG/DL (ref 8.5–10.1)
CALCULATED P AXIS, ECG09: 33 DEGREES
CALCULATED R AXIS, ECG10: 44 DEGREES
CALCULATED T AXIS, ECG11: 35 DEGREES
CHLORIDE SERPL-SCNC: 110 MMOL/L (ref 100–111)
CO2 SERPL-SCNC: 18 MMOL/L (ref 21–32)
CREAT SERPL-MCNC: 1.32 MG/DL (ref 0.6–1.3)
DIAGNOSIS, 93000: NORMAL
DIFFERENTIAL METHOD BLD: ABNORMAL
EOSINOPHIL # BLD: 0 K/UL (ref 0–0.4)
EOSINOPHIL NFR BLD: 0 % (ref 0–5)
ERYTHROCYTE [DISTWIDTH] IN BLOOD BY AUTOMATED COUNT: 15.4 % (ref 11.6–14.5)
GLOBULIN SER CALC-MCNC: 2.9 G/DL (ref 2–4)
GLUCOSE BLD STRIP.AUTO-MCNC: 156 MG/DL (ref 70–110)
GLUCOSE BLD STRIP.AUTO-MCNC: 158 MG/DL (ref 70–110)
GLUCOSE SERPL-MCNC: 151 MG/DL (ref 74–99)
GRAM STN SPEC: ABNORMAL
GRAM STN SPEC: ABNORMAL
GRAM STN SPEC: NORMAL
GRAM STN SPEC: NORMAL
HCT VFR BLD AUTO: 20.8 % (ref 35–45)
HGB BLD-MCNC: 6.8 G/DL (ref 12–16)
LACTATE SERPL-SCNC: 0.6 MMOL/L (ref 0.4–2)
LYMPHOCYTES # BLD: 0.4 K/UL (ref 0.9–3.6)
LYMPHOCYTES NFR BLD: 4 % (ref 21–52)
MAGNESIUM SERPL-MCNC: 2.5 MG/DL (ref 1.6–2.6)
MCH RBC QN AUTO: 28.9 PG (ref 24–34)
MCHC RBC AUTO-ENTMCNC: 32.7 G/DL (ref 31–37)
MCV RBC AUTO: 88.5 FL (ref 74–97)
MONOCYTES # BLD: 0.3 K/UL (ref 0.05–1.2)
MONOCYTES NFR BLD: 3 % (ref 3–10)
NEUTS SEG # BLD: 8.8 K/UL (ref 1.8–8)
NEUTS SEG NFR BLD: 93 % (ref 40–73)
P-R INTERVAL, ECG05: 158 MS
PLATELET # BLD AUTO: 267 K/UL (ref 135–420)
PMV BLD AUTO: 9.8 FL (ref 9.2–11.8)
POTASSIUM SERPL-SCNC: 3.6 MMOL/L (ref 3.5–5.5)
PROT SERPL-MCNC: 4.9 G/DL (ref 6.4–8.2)
Q-T INTERVAL, ECG07: 368 MS
QRS DURATION, ECG06: 104 MS
QTC CALCULATION (BEZET), ECG08: 452 MS
RBC # BLD AUTO: 2.35 M/UL (ref 4.2–5.3)
RBC MORPH BLD: ABNORMAL
RBC MORPH BLD: ABNORMAL
SERVICE CMNT-IMP: ABNORMAL
SERVICE CMNT-IMP: NORMAL
SODIUM SERPL-SCNC: 139 MMOL/L (ref 136–145)
VENTRICULAR RATE, ECG03: 91 BPM
WBC # BLD AUTO: 9.5 K/UL (ref 4.6–13.2)

## 2021-08-14 PROCEDURE — 80053 COMPREHEN METABOLIC PANEL: CPT

## 2021-08-14 PROCEDURE — 85025 COMPLETE CBC W/AUTO DIFF WBC: CPT

## 2021-08-14 PROCEDURE — P9047 ALBUMIN (HUMAN), 25%, 50ML: HCPCS | Performed by: HOSPITALIST

## 2021-08-14 PROCEDURE — 74011250637 HC RX REV CODE- 250/637: Performed by: STUDENT IN AN ORGANIZED HEALTH CARE EDUCATION/TRAINING PROGRAM

## 2021-08-14 PROCEDURE — 74011250636 HC RX REV CODE- 250/636: Performed by: HOSPITALIST

## 2021-08-14 PROCEDURE — 74011250636 HC RX REV CODE- 250/636: Performed by: EMERGENCY MEDICINE

## 2021-08-14 PROCEDURE — 71045 X-RAY EXAM CHEST 1 VIEW: CPT

## 2021-08-14 PROCEDURE — 73620 X-RAY EXAM OF FOOT: CPT

## 2021-08-14 PROCEDURE — 94760 N-INVAS EAR/PLS OXIMETRY 1: CPT

## 2021-08-14 PROCEDURE — 36415 COLL VENOUS BLD VENIPUNCTURE: CPT

## 2021-08-14 PROCEDURE — 74011250636 HC RX REV CODE- 250/636: Performed by: STUDENT IN AN ORGANIZED HEALTH CARE EDUCATION/TRAINING PROGRAM

## 2021-08-14 PROCEDURE — 74011250637 HC RX REV CODE- 250/637: Performed by: INTERNAL MEDICINE

## 2021-08-14 PROCEDURE — 94640 AIRWAY INHALATION TREATMENT: CPT

## 2021-08-14 PROCEDURE — 65660000000 HC RM CCU STEPDOWN

## 2021-08-14 PROCEDURE — 74011000250 HC RX REV CODE- 250: Performed by: INTERNAL MEDICINE

## 2021-08-14 PROCEDURE — 83605 ASSAY OF LACTIC ACID: CPT

## 2021-08-14 PROCEDURE — 74011636637 HC RX REV CODE- 636/637: Performed by: HOSPITALIST

## 2021-08-14 PROCEDURE — 74011250636 HC RX REV CODE- 250/636: Performed by: INTERNAL MEDICINE

## 2021-08-14 PROCEDURE — 87040 BLOOD CULTURE FOR BACTERIA: CPT

## 2021-08-14 PROCEDURE — 74011250637 HC RX REV CODE- 250/637: Performed by: FAMILY MEDICINE

## 2021-08-14 PROCEDURE — 74011000250 HC RX REV CODE- 250: Performed by: STUDENT IN AN ORGANIZED HEALTH CARE EDUCATION/TRAINING PROGRAM

## 2021-08-14 PROCEDURE — 82962 GLUCOSE BLOOD TEST: CPT

## 2021-08-14 PROCEDURE — P9016 RBC LEUKOCYTES REDUCED: HCPCS

## 2021-08-14 PROCEDURE — 36430 TRANSFUSION BLD/BLD COMPNT: CPT

## 2021-08-14 PROCEDURE — 83735 ASSAY OF MAGNESIUM: CPT

## 2021-08-14 RX ORDER — SODIUM BICARBONATE 650 MG/1
1300 TABLET ORAL 2 TIMES DAILY
Status: DISCONTINUED | OUTPATIENT
Start: 2021-08-14 | End: 2021-08-15

## 2021-08-14 RX ORDER — SODIUM CHLORIDE 9 MG/ML
250 INJECTION, SOLUTION INTRAVENOUS AS NEEDED
Status: DISCONTINUED | OUTPATIENT
Start: 2021-08-14 | End: 2021-08-15

## 2021-08-14 RX ORDER — IPRATROPIUM BROMIDE AND ALBUTEROL SULFATE 2.5; .5 MG/3ML; MG/3ML
3 SOLUTION RESPIRATORY (INHALATION)
Status: DISCONTINUED | OUTPATIENT
Start: 2021-08-14 | End: 2021-09-03 | Stop reason: HOSPADM

## 2021-08-14 RX ADMIN — SODIUM BICARBONATE 650 MG TABLET 1300 MG: at 12:09

## 2021-08-14 RX ADMIN — ONDANSETRON 4 MG: 4 TABLET, ORALLY DISINTEGRATING ORAL at 09:34

## 2021-08-14 RX ADMIN — SODIUM CHLORIDE 150 ML/HR: 900 INJECTION, SOLUTION INTRAVENOUS at 22:38

## 2021-08-14 RX ADMIN — ALBUMIN (HUMAN) 25 G: 0.25 INJECTION, SOLUTION INTRAVENOUS at 09:35

## 2021-08-14 RX ADMIN — SODIUM CHLORIDE 150 ML/HR: 900 INJECTION, SOLUTION INTRAVENOUS at 09:30

## 2021-08-14 RX ADMIN — Medication 10 ML: at 14:00

## 2021-08-14 RX ADMIN — INSULIN GLARGINE 5 UNITS: 100 INJECTION, SOLUTION SUBCUTANEOUS at 09:38

## 2021-08-14 RX ADMIN — ALBUMIN (HUMAN) 25 G: 0.25 INJECTION, SOLUTION INTRAVENOUS at 02:59

## 2021-08-14 RX ADMIN — VANCOMYCIN HYDROCHLORIDE 750 MG: 750 INJECTION, POWDER, LYOPHILIZED, FOR SOLUTION INTRAVENOUS at 17:38

## 2021-08-14 RX ADMIN — CLINDAMYCIN PHOSPHATE 900 MG: 900 INJECTION, SOLUTION INTRAVENOUS at 20:29

## 2021-08-14 RX ADMIN — BUPROPION HYDROCHLORIDE 150 MG: 150 TABLET, FILM COATED, EXTENDED RELEASE ORAL at 09:37

## 2021-08-14 RX ADMIN — CLINDAMYCIN PHOSPHATE 900 MG: 900 INJECTION, SOLUTION INTRAVENOUS at 04:01

## 2021-08-14 RX ADMIN — ASPIRIN 81 MG: 81 TABLET, CHEWABLE ORAL at 09:37

## 2021-08-14 RX ADMIN — ALBUMIN (HUMAN) 25 G: 0.25 INJECTION, SOLUTION INTRAVENOUS at 22:38

## 2021-08-14 RX ADMIN — CLINDAMYCIN PHOSPHATE 900 MG: 900 INJECTION, SOLUTION INTRAVENOUS at 12:09

## 2021-08-14 RX ADMIN — CEFTRIAXONE SODIUM 2 G: 2 INJECTION, POWDER, FOR SOLUTION INTRAMUSCULAR; INTRAVENOUS at 20:29

## 2021-08-14 RX ADMIN — FAMOTIDINE 20 MG: 20 TABLET, FILM COATED ORAL at 09:37

## 2021-08-14 RX ADMIN — HEPARIN SODIUM 5000 UNITS: 5000 INJECTION INTRAVENOUS; SUBCUTANEOUS at 09:37

## 2021-08-14 NOTE — PROGRESS NOTES
TRANSFER - IN REPORT:    Verbal report received from [address], RN(name) on Daisy  being received from 3S(unit) for change in patient condition(Hypotension )      Report consisted of patients Situation, Background, Assessment and   Recommendations(SBAR). Information from the following report(s) SBAR, Kardex, ED Summary, Intake/Output, MAR, Recent Results, Med Rec Status and Cardiac Rhythm NSR was reviewed with the receiving nurse. Opportunity for questions and clarification was provided. Assessment completed upon patients arrival to unit and care assumed. 2216  Shift assessment complete  Pt resting quietly with eyes closed and chest rising and falling evenly   Pt c/o generalized pain that was relieved when repositioned   2346  Received orders for 500 ml bolus   0623  hgb 6.8 - paged hospitalist     [facility] [place] [facility] Night Shift Chart Audit    Chart Audit completed? YES    Bedside and Verbal shift change report given to Liliya Sierra RN (oncoming nurse) by Eladia Wells RN (offgoing nurse). Report included the following information SBAR, Kardex, ED Summary, Intake/Output, MAR, Recent Results, Med Rec Status and Cardiac Rhythm NSR.

## 2021-08-14 NOTE — PROGRESS NOTES
0725 : assumed care of patient from [address]  0930 : morning meds and shift assessment completed. Pt sat up in bed, very nauseous. Pt refused nausea medication. 6029 : pt had a small episode of Emesis. Pt given PRN zofran. 1140 : bladder scanned patient prior to void, 929mL average for prevoid, patient placed on bedpan. 1145 : patient voided 1100 ml of yellow urine. Post void scan 200ml. Pt stated she feels a lot better now that shes gone to the bathroom  [phone] : blood transfusion started and verified with second RN. VSS. 1432 : VSS patient tolerating transfusion well. Pt refused Albumin, pt stated \"stop hooking me up to these wires and tubes. I am sick of it and I just want to sleep. \"  3132 : VSS patient tolerating transfusion well. Pt sleeping. 1548 : VSS patient tolerating transfusion well. Pt awake and talking on the phone to family member. 1625 : VSS, patient tolerating procedure well. 1720 : transfusion complete, VSS patient tolerated well. 1900 : Bedside shift change report given to Octavia Nichols RN (oncoming nurse) by Migdalia Allen RN (offgoing nurse). Report included the following information SBAR, Kardex, Intake/Output and MAR.

## 2021-08-14 NOTE — PROGRESS NOTES
Problem: Falls - Risk of  Goal: *Absence of Falls  Description: Document Jn Davenport Fall Risk and appropriate interventions in the flowsheet. Outcome: Progressing Towards Goal  Note: Fall Risk Interventions:  Mobility Interventions: Communicate number of staff needed for ambulation/transfer         Medication Interventions: Teach patient to arise slowly, Patient to call before getting OOB    Elimination Interventions: Call light in reach, Patient to call for help with toileting needs    History of Falls Interventions: Door open when patient unattended         Problem: Diabetes Self-Management  Goal: *Disease process and treatment process  Description: Define diabetes and identify own type of diabetes; list 3 options for treating diabetes. Outcome: Progressing Towards Goal  Goal: *Incorporating nutritional management into lifestyle  Description: Describe effect of type, amount and timing of food on blood glucose; list 3 methods for planning meals. Outcome: Progressing Towards Goal  Goal: *Incorporating physical activity into lifestyle  Description: State effect of exercise on blood glucose levels. Outcome: Progressing Towards Goal  Goal: *Developing strategies to promote health/change behavior  Description: Define the ABC's of diabetes; identify appropriate screenings, schedule and personal plan for screenings. Outcome: Progressing Towards Goal  Goal: *Using medications safely  Description: State effect of diabetes medications on diabetes; name diabetes medication taking, action and side effects. Outcome: Progressing Towards Goal  Goal: *Monitoring blood glucose, interpreting and using results  Description: Identify recommended blood glucose targets  and personal targets.   Outcome: Progressing Towards Goal  Goal: *Prevention, detection, treatment of acute complications  Description: List symptoms of hyper- and hypoglycemia; describe how to treat low blood sugar and actions for lowering  high blood glucose level.  Outcome: Progressing Towards Goal  Goal: *Prevention, detection and treatment of chronic complications  Description: Define the natural course of diabetes and describe the relationship of blood glucose levels to long term complications of diabetes.   Outcome: Progressing Towards Goal  Goal: *Developing strategies to address psychosocial issues  Description: Describe feelings about living with diabetes; identify support needed and support network  Outcome: Progressing Towards Goal  Goal: *Insulin pump training  Outcome: Progressing Towards Goal  Goal: *Sick day guidelines  Outcome: Progressing Towards Goal  Goal: *Patient Specific Goal (EDIT GOAL, INSERT TEXT)  Outcome: Progressing Towards Goal

## 2021-08-14 NOTE — PROGRESS NOTES
8/14/2021 PT note: consult received and chart reviewed. Evaluation attempted. Pt declines participation, stating \"the doctor told me I could take a rest\". Nurse Elvia Rivera also reports pt for PRBC transfusion this afternoon- Hgb 6.8. Will f/u at later time as pt schedule allows for PT evaluation.  Thank you for this referral.   Wendy Cavazos, PT

## 2021-08-14 NOTE — PROGRESS NOTES
1918 - Bedside and Verbal shift change report given to Kush Solomon, RN (oncoming nurse) by PETROS Conde RN (offgoing nurse). Report included the following information SBAR, Kardex, Intake/Output, MAR, Recent Results and Cardiac Rhythm NSR.     2200 - Patient transferred to room 349. Receiving RN at bedside. Patient requested father be called to provide with updates. Phone number verified with patient. No answer; voicemail left with call back number.

## 2021-08-14 NOTE — PROGRESS NOTES
[facility]  Pulmonary, Critical Care, and Sleep Medicine    Pulmonary/critical care note    Name: Blanca Boudreaux MRN: [id number]   : 1970 Hospital: [facility] [place]   Date: 2021  Room: [facility]     Subjective: This patient has been seen and evaluated at the request of Dr. Zeyad Carbajal for hypotension on 2021. Patient is a 46 y.o. female with history of diabetic foot ulcers. She underwent right below-knee guillotine amputation for nonhealing ulcer yesterday by vascular surgery team.  Patient became hypotensive today and I was consulted. Patient blood pressure is being recorded from the wrist.  Due to lack of ICU beds, and subsequent stability and patient condition, patient did not come to the ICU.    2021  Patient seen in telemetry unit. Like yesterday, she is not interested in the MD visit; she reports that she is being disturbed several times by several people. She reports no symptoms. No chest pain or palpitations per  No shortness of breath or cough or wheezing  No abdominal pain or vomiting or diarrhea. No bleeding issues. On room air. Past medical historysignificant for bilateral foot gangrene; medical noncompliance with treatment; poorly controlled diabetes; smoking; anemia; peripheral arterial disease; renal failure. Past Medical History:   Diagnosis Date    PAD [facility]peripheral artery disease) ([facility])       Past Surgical History:   Procedure Laterality Date    HX ORTHOPAEDIC      L TMT amputation      Prior to Admission medications    Medication Sig Start Date End Date Taking? Authorizing Provider   collagenase (SantyL) 250 unit/gram ointment Apply  to affected area daily. Indications: bilat feet   Yes Provider, Historical   ALPRAZolam (XANAX) 0.5 mg tablet Take 0.5 mg by mouth nightly as needed for Sleep. Yes Provider, Historical   pregabalin (LYRICA) 300 mg capsule Take 300 mg by mouth two (2) times a day.  Indications: diabetic complication causing injury to some body nerves   Yes Provider, Historical   metFORMIN (GLUCOPHAGE) 1,000 mg tablet Take 1,000 mg by mouth two (2) times daily (with meals). Indications: type 2 diabetes mellitus   Yes Provider, Historical   buPROPion SR (WELLBUTRIN SR) 150 mg SR tablet Take 150 mg by mouth daily. Yes Provider, Historical   DULoxetine (CYMBALTA) 60 mg capsule Take 60 mg by mouth daily. Yes Provider, Historical   losartan (COZAAR) 50 mg tablet Take 50 mg by mouth daily. Yes Provider, Historical   ferrous sulfate 324 mg (65 mg iron) tablet Take 324 mg by mouth two (2) times daily (with meals). Yes Provider, Historical   glipiZIDE SR (GLUCOTROL XL) 5 mg CR tablet Take 5 mg by mouth daily. Yes Provider, Historical   aspirin 81 mg chewable tablet Take 1 Tab by mouth daily. 4/15/21  Yes Live Pradhan MD   atorvastatin (LIPITOR) 40 mg tablet Take 1 Tab by mouth daily. 4/15/21  Yes Live Pradhan MD   clopidogreL (PLAVIX) 75 mg tab Take 1 Tab by mouth daily. 4/15/21  Yes Live Pradhan MD   sodium chloride (Normal Saline Flush) 10-40 mL by IntraVENous route daily. Provider, Historical   heparin sod,porcine/0.9 % NaCl (HEPARIN FLUSH IV) 10 Units/mL by IntraVENous route daily. Provider, Historical   acetaminophen (TYLENOL) 500 mg tablet Take 1,000 mg by mouth every six (6) hours as needed for Fever or Pain. Provider, Historical   losartan (COZAAR) 25 mg tablet Take 1 Tab by mouth daily. 4/15/21   Live Pradhan MD   metFORMIN (GLUCOPHAGE) 500 mg tablet Take 1 Tab by mouth two (2) times daily (with meals).  4/14/21   Live Pradhan MD     No Known Allergies   Social History     Tobacco Use    Smoking status: Current Every Day Smoker     Packs/day: 1.50    Smokeless tobacco: Never Used   Substance Use Topics    Alcohol use: Not Currently     Comment: on rare occasion      Family History   Problem Relation Age of Onset    Diabetes Mother         Current Facility-Administered Medications Medication Dose Route Frequency    sodium bicarbonate tablet 1,300 mg  1,300 mg Oral BID    albumin human 25% (BUMINATE) solution 25 g  25 g IntraVENous Q6H    insulin glargine (LANTUS) injection 5 Units  5 Units SubCUTAneous DAILY    vancomycin (VANCOCIN) 750 mg in 0.9% sodium chloride 250 mL (VIAL-MATE)  750 mg IntraVENous Q18H    famotidine (PEPCID) tablet 20 mg  20 mg Oral DAILY    cefTRIAXone (ROCEPHIN) 2 g in sterile water (preservative free) 20 mL IV syringe  2 g IntraVENous Q24H    clindamycin (CLEOCIN) 900mg NS 50mL IVPB (premix)  900 mg IntraVENous Q8H    Vancomycin-Pharmacy to Dose  1 Each Other Rx Dosing/Monitoring    buPROPion SR (WELLBUTRIN SR) tablet 150 mg  150 mg Oral DAILY    COVID-19 vac,Ad26-PF (PNMsoft) injection 1 Dose  1 Dose IntraMUSCular PRIOR TO DISCHARGE    aspirin chewable tablet 81 mg  81 mg Oral DAILY    sodium chloride (NS) flush 5-40 mL  5-40 mL IntraVENous Q8H    albuterol-ipratropium (DUO-NEB) 2.5 MG-0.5 MG/3 ML  3 mL Nebulization QID RT    heparin (porcine) injection 5,000 Units  5,000 Units SubCUTAneous Q8H    sodium chloride (NS) flush 5-40 mL  5-40 mL IntraVENous Q8H    sodium chloride (NS) flush 5-40 mL  5-40 mL IntraVENous Q8H    insulin lispro (HUMALOG) injection   SubCUTAneous Q6H    atorvastatin (LIPITOR) tablet 40 mg  40 mg Oral QHS    0.9% sodium chloride infusion  150 mL/hr IntraVENous CONTINUOUS       Review of Systems:  Limited due to patient condition; not keen on providing history. Repeatedly complaining that she is being disturbed frequently by nurses and doctors.       Objective:   Vital Signs:    Visit Vitals  /67   Pulse 80   Temp 97.5 °F (36.4 °C)   Resp 16   Ht 5' 8\" (1.727 m)   Wt 72.6 kg (160 lb)   LMP 2018 (Within Years)   SpO2 99%   BMI 24.33 kg/m²       O2 Device: None   O2 Flow Rate (L/min): 2 l/min   Temp (24hrs), Av.7 °F (36.5 °C), Min:97.3 °F (36.3 °C), Max:98.4 °F (36.9 °C)       Intake/Output:   Last shift: No intake/output data recorded. Last 3 shifts: 08/12 1901 - 08/14 0700  In: 3460 [I.V.:3150]  Out: 470 [Urine:450]    Intake/Output Summary (Last 24 hours) at 8/14/2021 1339  Last data filed at 8/13/2021 1900  Gross per 24 hour   Intake 2200 ml   Output 450 ml   Net 1750 ml         Physical Exam:   Comfortable; on room air; awake and alert; acyanotic  HEENT: pupils not dilated, no scleral jaundice  Neck: No adenopathy or thyroid swelling  CVS: Normal heart sounds; S1 and S2 with no murmur; JVD not elevated  RS: Air entry is good bilateral; no wheezing or crackles; normal respirations at rest  Abd: Soft, no distention, no tenderness, no guarding or rigidity; no hepatosplenomegaly  Neuro: Awake, alert and nonfocal exam  Extrm: no leg edema or swelling or clubbing; right below-knee amputation with dressingno bleeding or hematoma; left foot dressingno bleeding or hematoma discharge.   Skin: no rash  Lymphatic: no cervical or supraclavicular adenopathy  Psych: Irritable mood      Data review:     Recent Results (from the past 24 hour(s))   GLUCOSE, POC    Collection Time: 08/13/21  5:28 PM   Result Value Ref Range    Glucose (POC) 185 (H) 70 - 110 mg/dL   HGB & HCT    Collection Time: 08/13/21  7:04 PM   Result Value Ref Range    HGB 7.5 (L) 12.0 - 16.0 g/dL    HCT 22.8 (L) 35.0 - 45.0 %   GLUCOSE, POC    Collection Time: 08/13/21 11:26 PM   Result Value Ref Range    Glucose (POC) 194 (H) 70 - 110 mg/dL   MAGNESIUM    Collection Time: 08/14/21  3:42 AM   Result Value Ref Range    Magnesium 2.5 1.6 - 2.6 mg/dL   CBC WITH AUTOMATED DIFF    Collection Time: 08/14/21  3:42 AM   Result Value Ref Range    WBC 9.5 4.6 - 13.2 K/uL    RBC 2.35 (L) 4.20 - 5.30 M/uL    HGB 6.8 (L) 12.0 - 16.0 g/dL    HCT 20.8 (L) 35.0 - 45.0 %    MCV 88.5 74.0 - 97.0 FL    MCH 28.9 24.0 - 34.0 PG    MCHC 32.7 31.0 - 37.0 g/dL    RDW 15.4 (H) 11.6 - 14.5 %    PLATELET 388 647 - 348 K/uL    MPV 9.8 9.2 - 11.8 FL    NEUTROPHILS 93 (H) 40 - 73 % LYMPHOCYTES 4 (L) 21 - 52 %    MONOCYTES 3 3 - 10 %    EOSINOPHILS 0 0 - 5 %    BASOPHILS 0 0 - 2 %    ABS. NEUTROPHILS 8.8 (H) 1.8 - 8.0 K/UL    ABS. LYMPHOCYTES 0.4 (L) 0.9 - 3.6 K/UL    ABS. MONOCYTES 0.3 0.05 - 1.2 K/UL    ABS. EOSINOPHILS 0.0 0.0 - 0.4 K/UL    ABS. BASOPHILS 0.0 0.0 - 0.1 K/UL    DF MANUAL      RBC COMMENTS ANISOCYTOSIS  1+        RBC COMMENTS POIKILOCYTOSIS  1+       METABOLIC PANEL, COMPREHENSIVE    Collection Time: 08/14/21  3:42 AM   Result Value Ref Range    Sodium 139 136 - 145 mmol/L    Potassium 3.6 3.5 - 5.5 mmol/L    Chloride 110 100 - 111 mmol/L    CO2 18 (L) 21 - 32 mmol/L    Anion gap 11 3.0 - 18 mmol/L    Glucose 151 (H) 74 - 99 mg/dL    BUN 45 (H) 7.0 - 18 MG/DL    Creatinine 1.32 (H) 0.6 - 1.3 MG/DL    BUN/Creatinine ratio 34 (H) 12 - 20      GFR est AA 51 (L) >60 ml/min/1.73m2    GFR est non-AA 42 (L) >60 ml/min/1.73m2    Calcium 7.1 (L) 8.5 - 10.1 MG/DL    Bilirubin, total 0.4 0.2 - 1.0 MG/DL    ALT (SGPT) 7 (L) 13 - 56 U/L    AST (SGOT) 11 10 - 38 U/L    Alk.  phosphatase 104 45 - 117 U/L    Protein, total 4.9 (L) 6.4 - 8.2 g/dL    Albumin 2.0 (L) 3.4 - 5.0 g/dL    Globulin 2.9 2.0 - 4.0 g/dL    A-G Ratio 0.7 (L) 0.8 - 1.7     LACTIC ACID    Collection Time: 08/14/21  3:42 AM   Result Value Ref Range    Lactic acid 0.6 0.4 - 2.0 MMOL/L   CULTURE, BLOOD    Collection Time: 08/14/21  3:42 AM    Specimen: Blood   Result Value Ref Range    Special Requests: NO SPECIAL REQUESTS      Culture result: NO GROWTH AFTER 3 HOURS     CULTURE, BLOOD    Collection Time: 08/14/21  3:42 AM    Specimen: Blood   Result Value Ref Range    Special Requests: NO SPECIAL REQUESTS      Culture result: NO GROWTH AFTER 3 HOURS     GLUCOSE, POC    Collection Time: 08/14/21  6:24 AM   Result Value Ref Range    Glucose (POC) 158 (H) 70 - 110 mg/dL   GLUCOSE, POC    Collection Time: 08/14/21 12:06 PM   Result Value Ref Range    Glucose (POC) 156 (H) 70 - 110 mg/dL           No results for input(s): FIO2I, IFO2, HCO3I, IHCO3, HCOPOC, PCO2I, PCOPOC, IPHI, PHI, PHPOC, PO2I, PO2POC in the last 72 hours.     No lab exists for component: IPOC2    All Micro Results     Procedure Component Value Units Date/Time    CULTURE, Sharee Williamson STAIN [[id number]]  (Abnormal) Collected: 08/11/21 2145    Order Status: Completed Specimen: Wound from Foot Updated: 08/14/21 1258     Special Requests: NO SPECIAL REQUESTS        GRAM STAIN NO WBC'S SEEN         4+ GRAM NEGATIVE RODS               1+ GRAM POSITIVE COCCI IN PAIRS           Culture result: HEAVY GRAM NEGATIVE RODS               HEAVY POSSIBLE 2ND GRAM NEGATIVE JOYCELYN                  HEAVY POSSIBLE ENTEROCOCCUS SPECIES                  HEAVY POSSIBLE STAPHYLOCOCCUS AUREUS          CULTURE, WOUND W [address] [[id number]]  (Abnormal)  (Susceptibility) Collected: 08/11/21 2145    Order Status: Completed Specimen: Wound from Foot Updated: 08/14/21 1250     Special Requests: NO SPECIAL REQUESTS        GRAM STAIN NO WBC'S SEEN         1+ GRAM NEGATIVE RODS               FEW GRAM POSITIVE COCCI IN PAIRS           Culture result:       MODERATE PROVIDENCIA STUARTII                  MODERATE PROBABLE STAPHYLOCOCCUS AUREUS          CULTURE, BLOOD [[id number]]  (Abnormal) Collected: 08/11/21 2225    Order Status: Completed Specimen: Blood Updated: 08/14/21 1123     Special Requests: NO SPECIAL REQUESTS        GRAM STAIN       GRAM POSITIVE COCCI IN CLUSTERS ANAEROBIC BOTTLE                  SMEAR CALLED TO AND CORRECTLY REPEATED BY: Alessandra Loera RN 3S TO [facility] [id number] AT 0723           Culture result:       GRAM POSITIVE COCCI IN CLUSTERS GROWING IN THE ANAEROBIC BOTTLE          CULTURE, BLOOD [[id number]] Collected: 08/14/21 0342    Order Status: Completed Specimen: Blood Updated: 08/14/21 0735     Special Requests: NO SPECIAL REQUESTS        Culture result: NO GROWTH AFTER 3 HOURS       CULTURE, BLOOD [[id number]] Collected: 08/13/21 1150    Order Status: Completed Specimen: Blood Updated: 08/14/21 0735     Special Requests: NO SPECIAL REQUESTS        Culture result: NO GROWTH AFTER 19 HOURS       CULTURE, BLOOD [[id number]] Collected: 08/14/21 0342    Order Status: Completed Specimen: Blood Updated: 08/14/21 0735     Special Requests: NO SPECIAL REQUESTS        Culture result: NO GROWTH AFTER 3 HOURS       CULTURE, BLOOD [[id number]]  (Abnormal) Collected: 08/11/21 2215    Order Status: Completed Specimen: Blood Updated: 08/13/21 1608     Special Requests: NO SPECIAL REQUESTS        GRAM STAIN       GRAM POSITIVE COCCI IN CLUSTERS ANAEROBIC BOTTLE                  SMEAR CALLED TO AND CORRECTLY REPEATED BY: Lesia Ybarra RN 3S TO [facility] [id number] AT 0502           Culture result:       GRAM POSITIVE COCCI IN CLUSTERS GROWING IN THE ANAEROBIC BOTTLE          CULTURE, URINE [[id number]] Collected: 08/11/21 2230    Order Status: Canceled Specimen: Urine from Clean catch             Imaging:  [x]I have personally reviewed the patients chest radiographs images and report     Results from Hospital Encounter encounter on 08/11/21    XR CHEST PORT    Narrative  EXAM: PORTABLE CHEST    HISTORY: Cough. COMPARISON: 8/11/2021    TECHNIQUE: Single portable view. _______________    FINDINGS:    SUPPORT DEVICES: None    HEART AND MEDIASTINUM: Cardiac size is normal. Mediastinal contours are normal.  Normal pulmonary vasculature. LUNGS AND PLEURAL SPACES: Patchy airspace disease right base likely developing  pneumonia. Small atelectatic streak left base. BONES AND SOFT TISSUES: Bony structures are normal.    _______________    Impression  Patchy airspace disease right base very likely developing pneumonia. Small  atelectatic streak left base. Results from Hospital Encounter encounter on 04/04/21    CT FOOT LT W CONT    Narrative  Exam:  Extremity CT    CLINICAL INDICATION/HISTORY: Great toe wound. Assess for abscess or  osteomyelitis. COMPARISONS: Plain radiographs, 12/10/2015.     TECHNIQUE: CT of the left foot was performed, following intravenous contrast  administration. Coronal and sagittal reconstructions were generated. One or more dose reduction techniques were used on this CT: automated exposure  control, adjustment of the mAs and/or kVp according to patient size, and  iterative reconstruction techniques. The specific techniques used on this CT  exam have been documented in the patient's electronic medical record.    --------------------------------------------------------------  FINDINGS:    BONES: Advanced osteopenia. Plantar and Achilles calcaneal spurs. Numerous small foci of air within the marrow space of the distal first  metatarsal and proximal aspect of first proximal phalanx, and in both hallux  sesamoids. There are areas of cortical ill-definition along both the medial and  lateral aspect of the proximal phalangeal base, as well as the dorsal medial  aspect of the first metatarsal head. No large areas of bony destruction  identified. There is slight dorsal subluxation at the first MTP articulation and volar  subluxation at the first IP articulation, with advanced articular cartilage loss  at both joints. No evidence of bony erosion or fracture elsewhere in the foot. SOFT TISSUES: Mild generalized soft tissue edema around the foot. Relatively large area of superficial soft tissue irregularity compatible with  ulcer along the medial and plantar aspects of the distal forefoot at the level  of the first MTP and proximal toe. Ulcer extends very close to the depth of the  bone at the first MTP and tibial hallux sesamoid. Mottled air around the base of first toe, extending along the plantar and medial  aspects of the distal first metatarsal. Areas of ill-defined platelike fluid  around the base of the first proximal phalanx and the plantar aspect of the  first metatarsal head.     First MTP joint effusion.      --------------------------------------------------------------    Impression  1. Ulcer along the plantar and medial aspect of distal forefoot extends very  close to the bone. Soft tissue edema with extensive mottled soft tissue air and  areas of platelike fluid around the base of the first toe and plantar aspect of  first metatarsal head, likely combination of soft tissue infection and ischemia. 2. Mottled air within the bone at the distal first metatarsal and proximal  phalangeal base with areas of bony irregularity. While extent of air may in part  reflect ischemia, strongly suspect osteomyelitis and first MTP septic arthritis. Echocardiogram 8/13/2021  Interpretation Summary    Result status: Final result   · Image quality for this study was suboptimal. Contrast used: DEFINITY. · LV: Estimated LVEF is 45 - 50%. Normal cavity size and diastolic function. Mild concentric hypertrophy. E/E' ratio is 13.34.  · LA: Mildly dilated left atrium. · RV: Mildly dilated right ventricle. Normal global systolic function. Assessment of RV function: TAPSE=16mm. · AV: Moderate aortic valve leaflet calcification present. · MV: Mitral valve non-specific thickening. Mitral valve leaflet calcification without reduced excursion. Mild mitral valve regurgitation is present. · TV: Mild tricuspid valve regurgitation is present. · PA: Mild pulmonary hypertension. Pulmonary arterial systolic pressure is 35 mmHg.            IMPRESSION:   · Hypotension  · Sepsis  · Type 2 diabetes mellitus with diabetic foot ulcer  · Osteomyelitis  · Peripheral arterial disease  · Anemia  · Acute renal failure  ·   Patient Active Problem List   Diagnosis Code    Osteomyelitis ([address] M86.9    Type 2 diabetes mellitus with left diabetic foot ulcer ([address] E11.621, L97.529    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 ([address] E11.52    YUNIEL (acute kidney injury) ([address] N17.9    PAD (peripheral artery disease) ([facility] I73.9    Bilateral carotid artery stenosis I65.23    Gangrene of left foot (HCC) I96    Non compliance with medical treatment Z91.19    Gangrene of both feet [facility]) I96    Fall W19. Giselle Glaser Tobacco use Z72.0    Preoperative evaluation to rule out surgical contraindication Z01.818    Anemia D64.9    Sepsis (HCC) A41.9    Status post below-knee amputation of right lower extremity (HCC) Z89.511    Bacteremia R78.81    Hypotension I95.9     ·       RECOMMENDATIONS:   · Respirations remained stable; on room air. · Blood pressure seems to be good; continue IV fluids and albumin. · Echocardiogram doneEF 45-50% mild cardiomyopathylikely has risk for coronary artery disease; consider cardiology consultation. · ID: Patient has GPC and GNR sepsiswound culture showing Staphylococcus, Enterococcus, procidentia; history of MSSA in the past; s/p amputation of the right foot. · Blood cultures 8/112 sets GPC in clusters; follow identification and sensitivities. Repeat blood cultures 8/13 and 8/14 negative so far. · Echo 8/13no vegetations reported. · Continue current combination of antibiotics; ID on the case; current antibioticsceftriaxone, clindamycin and IV vancomycin. · Stable renal function; creatinine improved to 1.32; patient has mild CKD. Nephrology on case. · Anemiahemoglobin 6.8 this morning; patient planned for 1 unit PRBC which would also help the blood pressure. · Vasc: Vascular surgery team on case. · GI: Oral diet as tolerated. · Neuro: Mentating well. · Endo: Monitor blood sugars; patient on Lantus and sliding scale insulin. · Fluids:  NS maintenance 150 /hr  · Proph:  DVt and GI prophHeparin and famotidine. · Discussed with patient and updated management plans. · Patient blood pressure stable; does not need ICU; I would sign offplease call if needed. · Prognosis overall poor due to medical noncompliance.     ADVANCE DIRECTIVE: Full Code       High complexity decision making was performed in this consultation and evaluation of this patient who is at high risk for decompensation with multiple organ involvement         Luis M Hester MD
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
## 2021-08-14 NOTE — PROGRESS NOTES
VASCULAR AND TRANSPLANT       PROGRESS NOTE    Date: 2021    Post-Op Day: 2. S/p right below knee guillotine amputation. debridement dorsum right foot. Plan:   Continue local wound care. Continue antibiotics. Plan for closure bka this coming week. Assessment:   47 yo female with atherosclerosis of native arteries with gangrene. 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 Leukocytosis improved. Afebrile. Anemia. Defer transfusion to medical service. Subjective:   Pt complaining of some pain right bka. Objective:   Admit weight: Weight: 41.1 kg (90 lb 9.7 oz)  Last recorded weight: Weight: 72.6 kg (160 lb)    Temp (24hrs), Av.7 °F (36.5 °C), Min:97.3 °F (36.3 °C), Max:98.4 °F (36.9 °C)          [unfilled]    Intake/Output Summary (Last 24 hours) at 2021 1240  Last data filed at 2021 1900  Gross per 24 hour   Intake 2200 ml   Output 450 ml   Net 1750 ml       Right bka wound looks clean, no active bleeding. No purulent drainage. There is some tenderness. Left foot wound clean with exposed subcutaneous tissue and tendon. Labs:     BMP:   Recent Labs     21  0342 21  0414 21  2253 21  1215 21  2135   BUN 45* 57* 57* 61* 65*    137  --  134* 129*   CO2 18* 24  --  23 27     CBC:    Recent Labs     21  0342 21  1904 21  0414 21  1215 21  2135   WBC 9.5  --  19.8* 22.4* 28.0*   HCT 20.8* 22.8* 25.5* 17.6* 23.1*   RDW 15.4*  --  15.0* 14.3 14.0     PT/INR: No results for input(s): INR, INREXT in the last 72 hours.     No lab exists for component: PT    Arterial Blood Gases   No results found for: LPMO2, FIO2, PEEP, PH1, PCO2, PO2, HCO3, MODE     MICRO:  Results     Procedure Component Value Units Date/Time    CULTURE, BLOOD [[id number]] Collected: 21 0342    Order Status: Completed Specimen: Blood Updated: 2135     Special Requests: NO SPECIAL REQUESTS        Culture result: NO GROWTH AFTER 3 HOURS       CULTURE, BLOOD [[id number]] Collected: 08/14/21 0342    Order Status: Completed Specimen: Blood Updated: 08/14/21 0735     Special Requests: NO SPECIAL REQUESTS        Culture result: NO GROWTH AFTER 3 HOURS       CULTURE, BLOOD [[id number]] Collected: 08/13/21 1150    Order Status: Completed Specimen: Blood Updated: 08/14/21 0735     Special Requests: NO SPECIAL REQUESTS        Culture result: NO GROWTH AFTER 19 HOURS       CULTURE, URINE [[id number]] Collected: 08/11/21 2230    Order Status: Canceled Specimen: Urine from Clean catch     CULTURE, BLOOD [[id number]]  (Abnormal) Collected: 08/11/21 2225    Order Status: Completed Specimen: Blood Updated: 08/14/21 1123     Special Requests: NO SPECIAL REQUESTS        GRAM STAIN       GRAM POSITIVE COCCI IN CLUSTERS ANAEROBIC BOTTLE                  SMEAR CALLED TO AND CORRECTLY REPEATED BY: Stuart Early RN 3S TO CAF [id number] AT [address] [place] [address]           Culture result:       GRAM POSITIVE COCCI IN CLUSTERS GROWING IN THE ANAEROBIC BOTTLE          CULTURE, BLOOD [[id number]]  (Abnormal) Collected: 08/11/21 2215    Order Status: Completed Specimen: Blood Updated: 08/13/21 1608     Special Requests: NO SPECIAL REQUESTS        GRAM STAIN       GRAM POSITIVE COCCI IN CLUSTERS ANAEROBIC BOTTLE                  SMEAR CALLED TO AND CORRECTLY REPEATED BY: Lesia Ybarra RN 3S TO CAF [id number] AT 36           Culture result:       Nada File POSITIVE COCCI IN CLUSTERS GROWING IN THE ANAEROBIC BOTTLE          CULTURE, Clau Staff STAIN [[id number]]  (Abnormal) Collected: 08/11/21 2145    Order Status: Completed Specimen: Wound from Foot Updated: 08/13/21 1101     Special Requests: NO SPECIAL REQUESTS        GRAM STAIN NO WBC'S SEEN         1+ GRAM NEGATIVE RODS               FEW GRAM POSITIVE COCCI IN PAIRS           Culture result:       MODERATE GRAM NEGATIVE RODS            CHECKING FOR POSSIBLE  OTHER ORGANISMS       CULTURE, WOUND W Steffi Brower [[id number]]  (Abnormal) Collected: 08/11/21 2148    Order Status: Completed Specimen: Wound from Foot Updated: 08/13/21 1105     Special Requests: NO SPECIAL REQUESTS        GRAM STAIN NO WBC'S SEEN         4+ GRAM NEGATIVE RODS               1+ GRAM POSITIVE COCCI IN PAIRS           Culture result: HEAVY GRAM NEGATIVE RODS         CHECKING FOR POSSIBLE  OTHER ORGANISMS                   Karen Rowland PA-C   [phone].

## 2021-08-14 NOTE — ROUTINE PROCESS
TRANSFER - OUT REPORT:    Verbal report given to Yu Juares RN (name) on Carlee Torres  being transferred to Telemetry (unit) for change in patient condition(Hypotension)       Report consisted of patients Situation, Background, Assessment and   Recommendations(SBAR). Information from the following report(s) SBAR, Kardex, Intake/Output, MAR, Recent Results and Cardiac Rhythm NSR was reviewed with the receiving nurse. Lines:   Peripheral IV 08/11/21 Right Antecubital (Active)   Site Assessment Clean, dry, & intact 08/13/21 0910   Phlebitis Assessment 0 08/13/21 0910   Infiltration Assessment 0 08/13/21 0910   Dressing Status Clean, dry, & intact 08/13/21 0910   Dressing Type Tape;Transparent 08/13/21 0910   Hub Color/Line Status Pink;Flushed; Infusing 08/13/21 0910   Action Taken Open ports on tubing capped 08/13/21 0910   Alcohol Cap Used Yes 08/13/21 0910       Peripheral IV 08/13/21 Right;Proximal Cephalic (Active)   Site Assessment Clean, dry, & intact 08/13/21 1626   Phlebitis Assessment 0 08/13/21 1626   Infiltration Assessment 0 08/13/21 1626   Dressing Status Clean, dry, & intact 08/13/21 1626   Dressing Type Disk with Chlorhexadine gluconate (CHG); Transparent 08/13/21 1626   Hub Color/Line Status Green 08/13/21 1626   Action Taken Other (comment) 08/13/21 1626   Alcohol Cap Used Yes 08/13/21 1626       Peripheral IV 08/13/21 Left;Proximal Cephalic (Active)   Site Assessment Clean, dry, & intact 08/13/21 1629   Phlebitis Assessment 0 08/13/21 1629   Infiltration Assessment 0 08/13/21 1629   Dressing Status Clean, dry, & intact 08/13/21 1629   Dressing Type Disk with Chlorhexadine gluconate (CHG); Transparent 08/13/21 1629   Hub Color/Line Status Green 08/13/21 1629   Action Taken Other (comment) 08/13/21 1629   Alcohol Cap Used Yes 08/13/21 1629        Opportunity for questions and clarification was provided.       Patient transported with:   Monitor  Registered Nurse  Tech

## 2021-08-14 NOTE — PROGRESS NOTES
Attempt for OT eval; chart reviewed. Pt Hgb 6.8; RN Odell Cordero reports pt up for PRBC transfusion soon. Pt resting in bed and states \" my doctor said I can rest for today. \" To be followed later as pt schedule permits.      Emily Zelaya, OTR/L

## 2021-08-14 NOTE — PROGRESS NOTES
Hospitalist Progress Note-critical care note     Patient: Cal Nam MRN: [id number]  [facility]: [id number]    YOB: 1970  Age: 46 y.o. Sex: female    DOA: 8/11/2021 LOS:  LOS: 3 days            Chief complaint: Gangrene of both feet, noncompliant with medical treatment, diabetes, tobacco use, sepsis, anemia, bacteremia , septic shock     Assessment/Plan         Hospital Problems  Date Reviewed: 8/12/2021        Codes Class Noted POA    Sepsis ([facility] ICD-10-CM: A41.9  ICD-9-CM: 038.9, 995.91  8/13/2021 Unknown        Status post below-knee amputation of right lower extremity ([facility] ICD-10-CM: Z89.511  ICD-9-CM: V49.75  8/13/2021 Unknown        Bacteremia ICD-10-CM: R78.81  ICD-9-CM: 790.7  8/13/2021 Unknown        Hypotension ICD-10-CM: I95.9  ICD-9-CM: 458.9  8/13/2021 Unknown        Fall ICD-10-CM: Hemalatha Carrenody. Letta Rend  ICD-9-CM: E888.9  8/12/2021 Yes        Tobacco use ICD-10-CM: Z72.0  ICD-9-CM: 305.1  8/12/2021 Yes        Preoperative evaluation to rule out surgical contraindication ICD-10-CM: Z01.818  ICD-9-CM: V72.83  8/12/2021 Yes        Anemia ICD-10-CM: D64.9  ICD-9-CM: 285.9  8/12/2021 Unknown        * (Principal) Gangrene of both feet ([facility] ICD-10-CM: A15  ICD-9-CM: 785.4  8/11/2021 Yes        Non compliance with medical treatment ICD-10-CM: Z91.19  ICD-9-CM: V15.81  4/12/2021 Yes        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 [facility]) ICD-10-CM: E11.52  ICD-9-CM: 250.70, 443.81, 785.4  4/4/2021 Yes        YUNIEL (acute kidney injury) [facility]) ICD-10-CM: N17.9  ICD-9-CM: 584.9  4/4/2021 Yes                   43-year-old female with uncontrolled type 2 diabetes mellitus, severe peripheral arterial disease, and tobacco use who has been noncompliant with wound care and hyperbaric oxygen treatment due to recent fall is admitted for severe gangrene of both feet.     Sepsis vs septic shock   Improving bp better overnight   Continue iv abx,   F/u with bcx   Leukocytosis resolved Echo 40 % and mild pulm htn   Positive bcx -repeat     Gangrene of bilateral feet and bacteremia   Continue IV antibiotics,rt bka on 8/12   Appreciated all specialist great  Helps    Repeated bcx negative on 13th   Continue monitoring   on vanc and rocephin per ID recommendation         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 of both feet  Sliding scale and  lantus 5     Noncompliance with medical treatment   Continue education, PT OT     tobacco use  Need to quit smoking    Anemia  Received blood transfusion   Will give another unit today     rafy stack suspected urinary retention -still  Refused stack today   Cr mild improving   Continue iv hydration   Avoid iv contrast and nsaids   Nephrologist on board      subjective: feel ok , urinated yesterday     Will decrease fluid rate if bp continue stable , continue iv abx and albumin, blood transfusion. 35 total min's spent on patient care including >50% on counseling/coordinating care. Discussed the above assessments. also discussed labs, medications and hospital course      Disposition :tbd,   Review of systems:    General: No fevers or chills. Tired   Cardiovascular: No chest pain or pressure. No palpitations. Pulmonary: No shortness of breath. Gastrointestinal: No nausea, vomiting. Mks: painful foot     Vital signs/Intake and Output:  Visit Vitals  /67   Pulse 80   Temp 97.5 °F (36.4 °C)   Resp 16   Ht 5' 8\" (1.727 m)   Wt 72.6 kg (160 lb)   SpO2 99%   BMI 24.33 kg/m²     Current Shift:  No intake/output data recorded. Last three shifts:  08/12 1901 - 08/14 0700  In: 3460 [I.V.:3150]  Out: 470 [Urine:450]    Physical Exam:  General: WD, WN. Alert, cooperative, no acute distress    HEENT: NC, Atraumatic. PERRLA, anicteric sclerae. Lungs: CTA Bilaterally. No Wheezing/Rhonchi/Rales. Heart:  Regular  rhythm,  No murmur, No Rubs, No Gallops  Abdomen: Soft, Non distended, Non tender.   +Bowel sounds,   Extremities: No c/c, left foot wrapped, Rt BKA   Psych:   Not anxious or agitated. Neurologic:  No acute neurological deficit. Labs: Results:       Chemistry Recent Labs     08/14/21 0342 08/13/21 0414 08/12/21  2253 08/12/21 1215 08/12/21 1215 08/11/21 2135 08/11/21 2135   * 202*  --   --  174*   < > 154*    137  --   --  134*   < > 129*   K 3.6 4.4  --   --  3.7   < > 3.8    107  --   --  103   < > 95*   CO2 18* 24  --   --  23   < > 27   BUN 45* 57* 57*   < > 61*   < > 65*   CREA 1.32* 1.58*  --   --  1.43*   < > 1.30   CA 7.1* 7.4*  --   --  7.3*   < > 7.8*   AGAP 11 6  --   --  8   < > 7   BUCR 34* 36*  --   --  43*   < > 50*    147*  --   --   --   --  179*   TP 4.9* 5.2*  --   --   --   --  5.4*   ALB 2.0* 1.5*  --   --   --   --  1.4*   GLOB 2.9 3.7  --   --   --   --  4.0   AGRAT 0.7* 0.4*  --   --   --   --  0.4*    < > = values in this interval not displayed. CBC w/Diff Recent Labs     08/14/21 0342 08/13/21  1904 08/13/21 0414 08/12/21  1215 08/12/21  1215   WBC 9.5  --  19.8*  --  22.4*   RBC 2.35*  --  2.74*  --  1.96*   HGB 6.8* 7.5* 8.0*   < > 5.7*   HCT 20.8* 22.8* 25.5*   < > 17.6*     --  322  --  332   GRANS 93*  --  92*  --  91*   LYMPH 4*  --  3*  --  2*   EOS 0  --  0  --  0    < > = values in this interval not displayed. Cardiac Enzymes Recent Labs     08/11/21  2135   CPK 23*   CKND1 CALCULATION NOT PERFORMED WHEN RESULT IS BELOW LINEAR LIMIT      Coagulation No results for input(s): PTP, INR, APTT, INREXT, INREXT in the last 72 hours. Lipid Panel No results found for: CHOL, CHOLPOCT, CHOLX, CHLST, CHOLV, 143353, HDL, HDLP, LDL, LDLC, DLDLP, 480404, VLDLC, VLDL, TGLX, TRIGL, TRIGP, TGLPOCT, CHHD, CHHDX   BNP No results for input(s): BNPP in the last 72 hours.    Liver Enzymes Recent Labs     08/14/21  0342   TP 4.9*   ALB 2.0*         Thyroid Studies No results found for: T4, T3U, TSH, TSHEXT, TSHEXT     Procedures/imaging: see electronic medical records for all procedures/Xrays and details which were not copied into this note but were reviewed prior to creation of Plan    XR CHEST PORT    Result Date: 8/12/2021  EXAM: XR CHEST PORT CLINICAL INDICATION/HISTORY: Sepsis   > Additional: None. COMPARISON: April 5, 2021 TECHNIQUE: Portable chest _______________ FINDINGS: SUPPORT DEVICES: None. HEART AND MEDIASTINUM: Normal size and contour. Normal pulmonary vasculature. LUNGS AND PLEURAL SPACES: The lungs are well expanded and clear. No focal consolidation, effusion, or pneumothorax. BONY THORAX AND SOFT TISSUES: No acute osseous abnormality. _______________     No active cardiopulmonary disease.       Marilee Gamble MD

## 2021-08-15 LAB
ALBUMIN SERPL-MCNC: 2.4 G/DL (ref 3.4–5)
ALBUMIN/GLOB SERPL: 0.8 {RATIO} (ref 0.8–1.7)
ALP SERPL-CCNC: 109 U/L (ref 45–117)
ALT SERPL-CCNC: 7 U/L (ref 13–56)
ANION GAP SERPL CALC-SCNC: 6 MMOL/L (ref 3–18)
AST SERPL-CCNC: 11 U/L (ref 10–38)
BACTERIA SPEC CULT: ABNORMAL
BACTERIA SPEC CULT: ABNORMAL
BASOPHILS # BLD: 0.1 K/UL (ref 0–0.1)
BASOPHILS NFR BLD: 1 % (ref 0–2)
BILIRUB SERPL-MCNC: 0.5 MG/DL (ref 0.2–1)
BUN SERPL-MCNC: 34 MG/DL (ref 7–18)
BUN/CREAT SERPL: 29 (ref 12–20)
CALCIUM SERPL-MCNC: 7.3 MG/DL (ref 8.5–10.1)
CHLORIDE SERPL-SCNC: 115 MMOL/L (ref 100–111)
CO2 SERPL-SCNC: 18 MMOL/L (ref 21–32)
CREAT SERPL-MCNC: 1.18 MG/DL (ref 0.6–1.3)
DATE LAST DOSE: NORMAL
DIFFERENTIAL METHOD BLD: ABNORMAL
EOSINOPHIL # BLD: 0 K/UL (ref 0–0.4)
EOSINOPHIL NFR BLD: 0 % (ref 0–5)
ERYTHROCYTE [DISTWIDTH] IN BLOOD BY AUTOMATED COUNT: 15.8 % (ref 11.6–14.5)
GLOBULIN SER CALC-MCNC: 3 G/DL (ref 2–4)
GLUCOSE BLD STRIP.AUTO-MCNC: 116 MG/DL (ref 70–110)
GLUCOSE BLD STRIP.AUTO-MCNC: 117 MG/DL (ref 70–110)
GLUCOSE SERPL-MCNC: 126 MG/DL (ref 74–99)
GRAM STN SPEC: ABNORMAL
HCT VFR BLD AUTO: 25.3 % (ref 35–45)
HGB BLD-MCNC: 8.3 G/DL (ref 12–16)
LYMPHOCYTES # BLD: 0.4 K/UL (ref 0.9–3.6)
LYMPHOCYTES NFR BLD: 4 % (ref 21–52)
MAGNESIUM SERPL-MCNC: 2.2 MG/DL (ref 1.6–2.6)
MCH RBC QN AUTO: 28.9 PG (ref 24–34)
MCHC RBC AUTO-ENTMCNC: 32.8 G/DL (ref 31–37)
MCV RBC AUTO: 88.2 FL (ref 74–97)
MONOCYTES # BLD: 0.2 K/UL (ref 0.05–1.2)
MONOCYTES NFR BLD: 2 % (ref 3–10)
NEUTS BAND NFR BLD MANUAL: 2 % (ref 0–5)
NEUTS SEG # BLD: 9.7 K/UL (ref 1.8–8)
NEUTS SEG NFR BLD: 91 % (ref 40–73)
PLATELET # BLD AUTO: 292 K/UL (ref 135–420)
PMV BLD AUTO: 9.5 FL (ref 9.2–11.8)
POTASSIUM SERPL-SCNC: 3.7 MMOL/L (ref 3.5–5.5)
PROT SERPL-MCNC: 5.4 G/DL (ref 6.4–8.2)
RBC # BLD AUTO: 2.87 M/UL (ref 4.2–5.3)
RBC MORPH BLD: ABNORMAL
RBC MORPH BLD: ABNORMAL
REPORTED DOSE,DOSE: NORMAL UNITS
REPORTED DOSE/TIME,TMG: 1100
SERVICE CMNT-IMP: ABNORMAL
SODIUM SERPL-SCNC: 139 MMOL/L (ref 136–145)
VANCOMYCIN TROUGH SERPL-MCNC: 18.7 UG/ML (ref 10–20)
WBC # BLD AUTO: 10.4 K/UL (ref 4.6–13.2)

## 2021-08-15 PROCEDURE — 74011636637 HC RX REV CODE- 636/637: Performed by: HOSPITALIST

## 2021-08-15 PROCEDURE — 83735 ASSAY OF MAGNESIUM: CPT

## 2021-08-15 PROCEDURE — 74011250636 HC RX REV CODE- 250/636: Performed by: HOSPITALIST

## 2021-08-15 PROCEDURE — 74011250637 HC RX REV CODE- 250/637: Performed by: INTERNAL MEDICINE

## 2021-08-15 PROCEDURE — 80202 ASSAY OF VANCOMYCIN: CPT

## 2021-08-15 PROCEDURE — 82962 GLUCOSE BLOOD TEST: CPT

## 2021-08-15 PROCEDURE — 74011250636 HC RX REV CODE- 250/636: Performed by: EMERGENCY MEDICINE

## 2021-08-15 PROCEDURE — 85025 COMPLETE CBC W/AUTO DIFF WBC: CPT

## 2021-08-15 PROCEDURE — 74011250637 HC RX REV CODE- 250/637: Performed by: FAMILY MEDICINE

## 2021-08-15 PROCEDURE — P9047 ALBUMIN (HUMAN), 25%, 50ML: HCPCS | Performed by: HOSPITALIST

## 2021-08-15 PROCEDURE — 36415 COLL VENOUS BLD VENIPUNCTURE: CPT

## 2021-08-15 PROCEDURE — 74011250636 HC RX REV CODE- 250/636: Performed by: STUDENT IN AN ORGANIZED HEALTH CARE EDUCATION/TRAINING PROGRAM

## 2021-08-15 PROCEDURE — 65660000000 HC RM CCU STEPDOWN

## 2021-08-15 PROCEDURE — 74011250637 HC RX REV CODE- 250/637: Performed by: STUDENT IN AN ORGANIZED HEALTH CARE EDUCATION/TRAINING PROGRAM

## 2021-08-15 PROCEDURE — 74011250636 HC RX REV CODE- 250/636: Performed by: INTERNAL MEDICINE

## 2021-08-15 PROCEDURE — 87040 BLOOD CULTURE FOR BACTERIA: CPT

## 2021-08-15 PROCEDURE — 74011000250 HC RX REV CODE- 250: Performed by: INTERNAL MEDICINE

## 2021-08-15 PROCEDURE — 94640 AIRWAY INHALATION TREATMENT: CPT

## 2021-08-15 PROCEDURE — 80053 COMPREHEN METABOLIC PANEL: CPT

## 2021-08-15 RX ORDER — DULOXETIN HYDROCHLORIDE 60 MG/1
60 CAPSULE, DELAYED RELEASE ORAL DAILY
Status: DISCONTINUED | OUTPATIENT
Start: 2021-08-16 | End: 2021-09-03 | Stop reason: HOSPADM

## 2021-08-15 RX ORDER — METRONIDAZOLE 250 MG/1
500 TABLET ORAL EVERY 8 HOURS
Status: DISCONTINUED | OUTPATIENT
Start: 2021-08-15 | End: 2021-08-16

## 2021-08-15 RX ORDER — LORAZEPAM 2 MG/ML
1 INJECTION INTRAMUSCULAR ONCE
Status: COMPLETED | OUTPATIENT
Start: 2021-08-16 | End: 2021-08-16

## 2021-08-15 RX ORDER — SODIUM BICARBONATE 650 MG/1
1300 TABLET ORAL 3 TIMES DAILY
Status: DISCONTINUED | OUTPATIENT
Start: 2021-08-15 | End: 2021-08-16

## 2021-08-15 RX ORDER — FAMOTIDINE 20 MG/1
20 TABLET, FILM COATED ORAL 2 TIMES DAILY
Status: DISCONTINUED | OUTPATIENT
Start: 2021-08-15 | End: 2021-08-25 | Stop reason: ALTCHOICE

## 2021-08-15 RX ADMIN — ONDANSETRON 4 MG: 2 INJECTION INTRAMUSCULAR; INTRAVENOUS at 10:54

## 2021-08-15 RX ADMIN — METRONIDAZOLE 500 MG: 250 TABLET ORAL at 09:36

## 2021-08-15 RX ADMIN — INSULIN GLARGINE 5 UNITS: 100 INJECTION, SOLUTION SUBCUTANEOUS at 09:38

## 2021-08-15 RX ADMIN — ALBUMIN (HUMAN) 25 G: 0.25 INJECTION, SOLUTION INTRAVENOUS at 17:17

## 2021-08-15 RX ADMIN — IPRATROPIUM BROMIDE AND ALBUTEROL SULFATE 3 ML: .5; 3 SOLUTION RESPIRATORY (INHALATION) at 12:14

## 2021-08-15 RX ADMIN — CEFTRIAXONE SODIUM 2 G: 2 INJECTION, POWDER, FOR SOLUTION INTRAMUSCULAR; INTRAVENOUS at 22:32

## 2021-08-15 RX ADMIN — METRONIDAZOLE 500 MG: 250 TABLET ORAL at 17:07

## 2021-08-15 RX ADMIN — ALBUMIN (HUMAN) 25 G: 0.25 INJECTION, SOLUTION INTRAVENOUS at 09:22

## 2021-08-15 RX ADMIN — ALBUMIN (HUMAN) 25 G: 0.25 INJECTION, SOLUTION INTRAVENOUS at 22:32

## 2021-08-15 RX ADMIN — CLINDAMYCIN PHOSPHATE 900 MG: 900 INJECTION, SOLUTION INTRAVENOUS at 03:35

## 2021-08-15 RX ADMIN — HEPARIN SODIUM 5000 UNITS: 5000 INJECTION INTRAVENOUS; SUBCUTANEOUS at 09:36

## 2021-08-15 RX ADMIN — FAMOTIDINE 20 MG: 20 TABLET, FILM COATED ORAL at 09:36

## 2021-08-15 RX ADMIN — SODIUM BICARBONATE 650 MG TABLET 1300 MG: at 09:36

## 2021-08-15 RX ADMIN — ASPIRIN 81 MG: 81 TABLET, CHEWABLE ORAL at 09:36

## 2021-08-15 RX ADMIN — SODIUM BICARBONATE 650 MG TABLET 1300 MG: at 17:07

## 2021-08-15 RX ADMIN — BUPROPION HYDROCHLORIDE 150 MG: 150 TABLET, FILM COATED, EXTENDED RELEASE ORAL at 09:38

## 2021-08-15 RX ADMIN — ONDANSETRON 4 MG: 2 INJECTION INTRAMUSCULAR; INTRAVENOUS at 19:50

## 2021-08-15 RX ADMIN — Medication 10 ML: at 14:00

## 2021-08-15 RX ADMIN — ALPRAZOLAM 0.5 MG: 0.5 TABLET ORAL at 23:06

## 2021-08-15 RX ADMIN — VANCOMYCIN HYDROCHLORIDE 750 MG: 750 INJECTION, POWDER, LYOPHILIZED, FOR SOLUTION INTRAVENOUS at 13:15

## 2021-08-15 RX ADMIN — ALPRAZOLAM 0.5 MG: 0.5 TABLET ORAL at 02:45

## 2021-08-15 RX ADMIN — HYDROCODONE BITARTRATE AND ACETAMINOPHEN 1 TABLET: 7.5; 325 TABLET ORAL at 09:21

## 2021-08-15 RX ADMIN — HEPARIN SODIUM 5000 UNITS: 5000 INJECTION INTRAVENOUS; SUBCUTANEOUS at 17:07

## 2021-08-15 NOTE — PROGRESS NOTES
Physical Therapy Evaluation/Treatment Attempt     Chart reviewed. Attempted Physical Therapy Evaluation, however, patient unable to be seen due to:  []  Nausea/vomiting  []  Eating  []  Pain  []  Patient too lethargic  []  Off Unit for testing/procedure  []  Dialysis treatment in progress   []  Telemetry Results  [x]  [phone] Other: Nursing staff in room providing hygiene care. 1010: pt supine in bed, providing short responses, eyes closed through most of conversation. Pt declining to participate in therapy despite encouragement, agreeable to attempt tomorrow. Will follow up tomorrow as patient's schedule allows.    Thank you for this referral.    Lissette Norris, PT, DPT

## 2021-08-15 NOTE — PROGRESS NOTES
Bedside and Verbal shift change report given to Leigh Ann Alba RN (oncoming nurse) by Era Jung RN (offgoing nurse). Report included the following information SBAR, Kardex, ED Summary, Intake/Output, MAR, Recent Results, Med Rec Status and Cardiac Rhythm NSR.     1931  Shift assessment complete  Pt resting quietly with eyes closed and chest rising and falling evenly   No c/o pain or signs of distress  Bed locked and in lowest position   Call bell within reach   0000  Right BKA dressing came off - dressing reapplied with sterile gauze, Kerlex, and ace bandage   0355  Right BKA dressing came off again - dressing reapplied in same way   Held albumin for BP of 140/96  0541  /96 - paged hospitalist     [facility] Night Shift Chart Audit    Chart Audit completed? YES           Bedside and Verbal shift change report given to Yola Churchill RN (oncoming nurse) by Leigh Ann Alba RN (offgoing nurse). Report included the following information SBAR, Kardex, ED Summary, Intake/Output, MAR, Recent Results, Med Rec Status and Cardiac Rhythm NSR.

## 2021-08-15 NOTE — PROGRESS NOTES
Hospitalist Progress Note-critical care note     Patient: Zeynep Wilson MRN: [id number]  [facility]: [id number]    YOB: 1970  Age: 46 y.o. Sex: female    DOA: 8/11/2021 LOS:  LOS: 4 days            Chief complaint: Gangrene of both feet, noncompliant with medical treatment, diabetes, tobacco use, sepsis, anemia, bacteremia , septic shock     Assessment/Plan         Hospital Problems  Date Reviewed: 8/12/2021        Codes Class Noted POA    Sepsis ([facility] ICD-10-CM: A41.9  ICD-9-CM: 038.9, 995.91  8/13/2021 Unknown        Status post below-knee amputation of right lower extremity ([facility] ICD-10-CM: Z89.511  ICD-9-CM: V49.75  8/13/2021 Unknown        Bacteremia ICD-10-CM: R78.81  ICD-9-CM: 790.7  8/13/2021 Unknown        Hypotension ICD-10-CM: I95.9  ICD-9-CM: 458.9  8/13/2021 Unknown        Fall ICD-10-CM: Olenairena Hollis. Don Valencia  ICD-9-CM: E888.9  8/12/2021 Yes        Tobacco use ICD-10-CM: Z72.0  ICD-9-CM: 305.1  8/12/2021 Yes        Preoperative evaluation to rule out surgical contraindication ICD-10-CM: Z01.818  ICD-9-CM: V72.83  8/12/2021 Yes        Anemia ICD-10-CM: D64.9  ICD-9-CM: 285.9  8/12/2021 Unknown        * (Principal) Gangrene of both feet ([facility] ICD-10-CM: P07  ICD-9-CM: 785.4  8/11/2021 Yes        Non compliance with medical treatment ICD-10-CM: Z91.19  ICD-9-CM: V15.81  4/12/2021 Yes        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 [facility]) ICD-10-CM: E11.52  ICD-9-CM: 250.70, 443.81, 785.4  4/4/2021 Yes        YUNIEL (acute kidney injury) [facility]) ICD-10-CM: N17.9  ICD-9-CM: 584.9  4/4/2021 Yes                   25-year-old female with uncontrolled type 2 diabetes mellitus, severe peripheral arterial disease, and tobacco use who has been noncompliant with wound care and hyperbaric oxygen treatment due to recent fall is admitted for severe gangrene of both feet.     Sepsis vs septic shock   bp remained well overnight   Continue iv abx,   F/u with bcx   Leukocytosis resolved   Echo 45 % and mild pulm htn     Gangrene of bilateral feet and bacteremia   Peptoniphilus asaccharolyticus GROWING -will continue to f/u with cx   Continue IV antibiotics,rt bka on 8/12   Appreciated all specialist great  Helps    Repeated bcx negative on 13th -will stop checking   Continue monitoring   on vanc and rocephin per ID recommendation   Vascular will do closure next week and debirdement of another foot         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 of both feet  Sliding scale and  lantus 5     Noncompliance with medical treatment   Continue education, PT OT     tobacco use  Need to quit smoking    Anemia  Received blood transfusion   Continue monitoring     rafy stack suspected urinary retention   Refused stack      subjective: feel tired     rn stable overnight     Disposition :tbd,   Review of systems:    General: No fevers or chills. Tired   Cardiovascular: No chest pain or pressure. No palpitations. Pulmonary: No shortness of breath. Gastrointestinal: No nausea, vomiting. Vital signs/Intake and Output:  Visit Vitals  BP (!) 156/85   Pulse 86   Temp 97.3 °F (36.3 °C)   Resp 18   Ht 5' 8\" (1.727 m)   Wt 72.6 kg (160 lb)   SpO2 99%   BMI 24.33 kg/m²     Current Shift:  No intake/output data recorded. Last three shifts:  08/13 1901 - 08/15 0700  In: 385.4   Out: 1100 [Urine:1100]    Physical Exam:  General: WD, WN. Alert, cooperative, no acute distress    HEENT: NC, Atraumatic. PERRLA, anicteric sclerae. Lungs: CTA Bilaterally. No Wheezing/Rhonchi/Rales. Heart:  Regular  rhythm,  No murmur, No Rubs, No Gallops  Abdomen: Soft, Non distended, Non tender. +Bowel sounds,   Extremities: No c/c, left foot wrapped, Rt BKA -will rewrapped the wound   Psych:   Not anxious or agitated. Neurologic:  No acute neurological deficit.              Labs: Results:       Chemistry Recent Labs     08/15/21  0316 08/14/21  0342 08/13/21  0414   * 151* 202*    139 137 K 3.7 3.6 4.4   * 110 107   CO2 18* 18* 24   BUN 34* 45* 57*   CREA 1.18 1.32* 1.58*   CA 7.3* 7.1* 7.4*   AGAP 6 11 6   BUCR 29* 34* 36*    104 147*   TP 5.4* 4.9* 5.2*   ALB 2.4* 2.0* 1.5*   GLOB 3.0 2.9 3.7   AGRAT 0.8 0.7* 0.4*      CBC w/Diff Recent Labs     08/15/21  0316 08/14/21  0342 08/13/21  1904 08/13/21  0414 08/13/21  0414   WBC 10.4 9.5  --   --  19.8*   RBC 2.87* 2.35*  --   --  2.74*   HGB 8.3* 6.8* 7.5*   < > 8.0*   HCT 25.3* 20.8* 22.8*   < > 25.5*    267  --   --  322   GRANS 91* 93*  --   --  92*   LYMPH 4* 4*  --   --  3*   EOS 0 0  --   --  0    < > = values in this interval not displayed. Cardiac Enzymes No results for input(s): CPK, CKND1, CONSTANTINO in the last 72 hours. No lab exists for component: CKRMB, TROIP   Coagulation No results for input(s): PTP, INR, APTT, INREXT, INREXT in the last 72 hours. Lipid Panel No results found for: CHOL, CHOLPOCT, CHOLX, CHLST, CHOLV, 133192, HDL, HDLP, LDL, LDLC, DLDLP, 050004, VLDLC, VLDL, TGLX, TRIGL, TRIGP, TGLPOCT, CHHD, CHHDX   BNP No results for input(s): BNPP in the last 72 hours. Liver Enzymes Recent Labs     08/15/21  0316   TP 5.4*   ALB 2.4*         Thyroid Studies No results found for: T4, T3U, TSH, TSHEXT, TSHEXT     Procedures/imaging: see electronic medical records for all procedures/Xrays and details which were not copied into this note but were reviewed prior to creation of Plan    XR CHEST PORT    Result Date: 8/12/2021  EXAM: XR CHEST PORT CLINICAL INDICATION/HISTORY: Sepsis   > Additional: None. COMPARISON: April 5, 2021 TECHNIQUE: Portable chest _______________ FINDINGS: SUPPORT DEVICES: None. HEART AND MEDIASTINUM: Normal size and contour. Normal pulmonary vasculature. LUNGS AND PLEURAL SPACES: The lungs are well expanded and clear. No focal consolidation, effusion, or pneumothorax. BONY THORAX AND SOFT TISSUES: No acute osseous abnormality.  _______________     No active cardiopulmonary disease.       Sonny Silverman MD

## 2021-08-15 NOTE — PROGRESS NOTES
Vascular Surgery    Anaerobic Bcx + Peptoniphilus and Finegoldia. Will stop clinda and add flagyl. .  Tentative plan for RLE Bcx closure on Tuesday. I have spoken with Dr. Liberty Hunt. He will evaluate her left foot on Monday. Continue local wound care to right foot.     Kathie Villasenor MD, [address] [place] [address] [facility]  [phone] office  [phone] mobile

## 2021-08-15 NOTE — PROGRESS NOTES
Pharmacy Dosing Services: Renal dosing    The following medication: Pepcid was automatically dose-adjusted per THE [facility] P&T Committee Protocol, with respect to renal function. Consult provided for this   46 y.o. , female , for the indication of SUP. Pt Weight:   Wt Readings from Last 1 Encounters:   08/13/21 72.6 kg (160 lb)         Previous Regimen Pepcid 20 mg tab daily   Serum Creatinine Lab Results   Component Value Date/Time    Creatinine 1.18 08/15/2021 03:16 AM       Creatinine Clearance Estimated Creatinine Clearance: 56.9 mL/min (based on SCr of 1.18 mg/dL). BUN Lab Results   Component Value Date/Time    BUN 34 (H) 08/15/2021 03:16 AM       Dosage changed to:  Pepcid 20 mg tab twice daily    Pharmacy to continue to monitor patient daily. Will make dosage adjustments based upon changing renal function.   Signed Darline Aceves, [address]
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
## 2021-08-16 ENCOUNTER — APPOINTMENT (OUTPATIENT)
Dept: GENERAL RADIOLOGY | Age: 51
DRG: 853 | End: 2021-08-16
Attending: SURGERY
Payer: COMMERCIAL

## 2021-08-16 ENCOUNTER — APPOINTMENT (OUTPATIENT)
Dept: GENERAL RADIOLOGY | Age: 51
DRG: 853 | End: 2021-08-16
Attending: HOSPITALIST
Payer: COMMERCIAL

## 2021-08-16 PROBLEM — R06.00 DYSPNEA: Status: ACTIVE | Noted: 2021-08-16

## 2021-08-16 PROBLEM — I95.9 HYPOTENSION: Status: RESOLVED | Noted: 2021-08-13 | Resolved: 2021-08-16

## 2021-08-16 LAB
ABO + RH BLD: NORMAL
ABO + RH BLD: NORMAL
ALBUMIN SERPL-MCNC: 3 G/DL (ref 3.4–5)
ALBUMIN/GLOB SERPL: 1.2 {RATIO} (ref 0.8–1.7)
ALP SERPL-CCNC: 100 U/L (ref 45–117)
ALT SERPL-CCNC: 8 U/L (ref 13–56)
ANION GAP SERPL CALC-SCNC: 12 MMOL/L (ref 3–18)
AST SERPL-CCNC: 12 U/L (ref 10–38)
BASE DEFICIT BLD-SCNC: 11.8 MMOL/L
BASOPHILS # BLD: 0 K/UL (ref 0–0.1)
BASOPHILS NFR BLD: 0 % (ref 0–2)
BDY SITE: ABNORMAL
BILIRUB SERPL-MCNC: 0.5 MG/DL (ref 0.2–1)
BLD PROD TYP BPU: NORMAL
BLOOD GROUP ANTIBODIES SERPL: NORMAL
BLOOD GROUP ANTIBODIES SERPL: NORMAL
BPU ID: NORMAL
BUN SERPL-MCNC: 24 MG/DL (ref 7–18)
BUN/CREAT SERPL: 26 (ref 12–20)
CALCIUM SERPL-MCNC: 8 MG/DL (ref 8.5–10.1)
CALLED TO:,BCALL1: NORMAL
CALLED TO:,BCALL2: NORMAL
CHLORIDE SERPL-SCNC: 116 MMOL/L (ref 100–111)
CO2 SERPL-SCNC: 16 MMOL/L (ref 21–32)
CREAT SERPL-MCNC: 0.92 MG/DL (ref 0.6–1.3)
CROSSMATCH RESULT,%XM: NORMAL
CRP SERPL-MCNC: 6.2 MG/DL (ref 0–0.3)
D DIMER PPP FEU-MCNC: 3.64 UG/ML(FEU)
DIFFERENTIAL METHOD BLD: ABNORMAL
EOSINOPHIL # BLD: 0 K/UL (ref 0–0.4)
EOSINOPHIL NFR BLD: 0 % (ref 0–5)
ERYTHROCYTE [DISTWIDTH] IN BLOOD BY AUTOMATED COUNT: 16 % (ref 11.6–14.5)
GAS FLOW.O2 O2 DELIVERY SYS: ABNORMAL L/MIN
GLOBULIN SER CALC-MCNC: 2.6 G/DL (ref 2–4)
GLUCOSE BLD STRIP.AUTO-MCNC: 114 MG/DL (ref 70–110)
GLUCOSE BLD STRIP.AUTO-MCNC: 122 MG/DL (ref 70–110)
GLUCOSE BLD STRIP.AUTO-MCNC: 123 MG/DL (ref 70–110)
GLUCOSE BLD STRIP.AUTO-MCNC: 145 MG/DL (ref 70–110)
GLUCOSE BLD STRIP.AUTO-MCNC: 155 MG/DL (ref 70–110)
GLUCOSE BLD STRIP.AUTO-MCNC: 183 MG/DL (ref 70–110)
GLUCOSE SERPL-MCNC: 127 MG/DL (ref 74–99)
HCO3 BLD-SCNC: 13.9 MMOL/L (ref 22–26)
HCT VFR BLD AUTO: 25.5 % (ref 35–45)
HGB BLD-MCNC: 8.2 G/DL (ref 12–16)
LACTATE BLD-SCNC: 1.14 MMOL/L (ref 0.4–2)
LACTATE SERPL-SCNC: 0.9 MMOL/L (ref 0.4–2)
LYMPHOCYTES # BLD: 0.3 K/UL (ref 0.9–3.6)
LYMPHOCYTES NFR BLD: 3 % (ref 21–52)
MAGNESIUM SERPL-MCNC: 2 MG/DL (ref 1.6–2.6)
MCH RBC QN AUTO: 28.8 PG (ref 24–34)
MCHC RBC AUTO-ENTMCNC: 32.2 G/DL (ref 31–37)
MCV RBC AUTO: 89.5 FL (ref 74–97)
MONOCYTES # BLD: 0.3 K/UL (ref 0.05–1.2)
MONOCYTES NFR BLD: 4 % (ref 3–10)
NEUTS SEG # BLD: 7.8 K/UL (ref 1.8–8)
NEUTS SEG NFR BLD: 93 % (ref 40–73)
O2/TOTAL GAS SETTING VFR VENT: 28 %
PCO2 BLD: 30 MMHG (ref 35–45)
PH BLD: 7.27 [PH] (ref 7.35–7.45)
PLATELET # BLD AUTO: 273 K/UL (ref 135–420)
PMV BLD AUTO: 9.9 FL (ref 9.2–11.8)
PO2 BLD: 74 MMHG (ref 80–100)
POTASSIUM SERPL-SCNC: 3.7 MMOL/L (ref 3.5–5.5)
PROT SERPL-MCNC: 5.6 G/DL (ref 6.4–8.2)
RBC # BLD AUTO: 2.85 M/UL (ref 4.2–5.3)
RBC MORPH BLD: ABNORMAL
SAO2 % BLD: 92.8 % (ref 92–97)
SERVICE CMNT-IMP: ABNORMAL
SODIUM SERPL-SCNC: 144 MMOL/L (ref 136–145)
SPECIMEN EXP DATE BLD: NORMAL
SPECIMEN EXP DATE BLD: NORMAL
SPECIMEN TYPE: ABNORMAL
STATUS OF UNIT,%ST: NORMAL
UNIT DIVISION, %UDIV: 0
WBC # BLD AUTO: 8.4 K/UL (ref 4.6–13.2)

## 2021-08-16 PROCEDURE — 87040 BLOOD CULTURE FOR BACTERIA: CPT

## 2021-08-16 PROCEDURE — 86140 C-REACTIVE PROTEIN: CPT

## 2021-08-16 PROCEDURE — 71045 X-RAY EXAM CHEST 1 VIEW: CPT

## 2021-08-16 PROCEDURE — 85379 FIBRIN DEGRADATION QUANT: CPT

## 2021-08-16 PROCEDURE — 74011636637 HC RX REV CODE- 636/637: Performed by: HOSPITALIST

## 2021-08-16 PROCEDURE — 83605 ASSAY OF LACTIC ACID: CPT

## 2021-08-16 PROCEDURE — P9047 ALBUMIN (HUMAN), 25%, 50ML: HCPCS | Performed by: HOSPITALIST

## 2021-08-16 PROCEDURE — 74011636637 HC RX REV CODE- 636/637: Performed by: STUDENT IN AN ORGANIZED HEALTH CARE EDUCATION/TRAINING PROGRAM

## 2021-08-16 PROCEDURE — 74011250636 HC RX REV CODE- 250/636: Performed by: HOSPITALIST

## 2021-08-16 PROCEDURE — 77010033678 HC OXYGEN DAILY

## 2021-08-16 PROCEDURE — 74011250636 HC RX REV CODE- 250/636: Performed by: STUDENT IN AN ORGANIZED HEALTH CARE EDUCATION/TRAINING PROGRAM

## 2021-08-16 PROCEDURE — 82962 GLUCOSE BLOOD TEST: CPT

## 2021-08-16 PROCEDURE — 36415 COLL VENOUS BLD VENIPUNCTURE: CPT

## 2021-08-16 PROCEDURE — 80053 COMPREHEN METABOLIC PANEL: CPT

## 2021-08-16 PROCEDURE — 83735 ASSAY OF MAGNESIUM: CPT

## 2021-08-16 PROCEDURE — 74011000250 HC RX REV CODE- 250: Performed by: INTERNAL MEDICINE

## 2021-08-16 PROCEDURE — 74011250636 HC RX REV CODE- 250/636: Performed by: FAMILY MEDICINE

## 2021-08-16 PROCEDURE — 85025 COMPLETE CBC W/AUTO DIFF WBC: CPT

## 2021-08-16 PROCEDURE — 74011000258 HC RX REV CODE- 258: Performed by: HOSPITALIST

## 2021-08-16 PROCEDURE — 94640 AIRWAY INHALATION TREATMENT: CPT

## 2021-08-16 PROCEDURE — 74011250636 HC RX REV CODE- 250/636: Performed by: INTERNAL MEDICINE

## 2021-08-16 PROCEDURE — 65610000006 HC RM INTENSIVE CARE

## 2021-08-16 PROCEDURE — 74011250636 HC RX REV CODE- 250/636: Performed by: EMERGENCY MEDICINE

## 2021-08-16 PROCEDURE — 74011250637 HC RX REV CODE- 250/637: Performed by: STUDENT IN AN ORGANIZED HEALTH CARE EDUCATION/TRAINING PROGRAM

## 2021-08-16 PROCEDURE — 86901 BLOOD TYPING SEROLOGIC RH(D): CPT

## 2021-08-16 PROCEDURE — 74011250637 HC RX REV CODE- 250/637: Performed by: FAMILY MEDICINE

## 2021-08-16 RX ORDER — MORPHINE SULFATE 2 MG/ML
1 INJECTION, SOLUTION INTRAMUSCULAR; INTRAVENOUS ONCE
Status: COMPLETED | OUTPATIENT
Start: 2021-08-16 | End: 2021-08-16

## 2021-08-16 RX ORDER — VANCOMYCIN HYDROCHLORIDE
1250 EVERY 24 HOURS
Status: DISCONTINUED | OUTPATIENT
Start: 2021-08-17 | End: 2021-08-16

## 2021-08-16 RX ORDER — SODIUM CHLORIDE 9 MG/ML
250 INJECTION, SOLUTION INTRAVENOUS AS NEEDED
Status: DISCONTINUED | OUTPATIENT
Start: 2021-08-16 | End: 2021-09-03 | Stop reason: HOSPADM

## 2021-08-16 RX ORDER — FUROSEMIDE 10 MG/ML
40 INJECTION INTRAMUSCULAR; INTRAVENOUS ONCE
Status: COMPLETED | OUTPATIENT
Start: 2021-08-16 | End: 2021-08-16

## 2021-08-16 RX ORDER — SODIUM CHLORIDE 450 MG/100ML
100 INJECTION, SOLUTION INTRAVENOUS CONTINUOUS
Status: DISCONTINUED | OUTPATIENT
Start: 2021-08-16 | End: 2021-08-16

## 2021-08-16 RX ADMIN — HEPARIN SODIUM 5000 UNITS: 5000 INJECTION INTRAVENOUS; SUBCUTANEOUS at 10:17

## 2021-08-16 RX ADMIN — PIPERACILLIN AND TAZOBACTAM 3.38 G: 3; .375 INJECTION, POWDER, LYOPHILIZED, FOR SOLUTION INTRAVENOUS at 20:20

## 2021-08-16 RX ADMIN — HEPARIN SODIUM 5000 UNITS: 5000 INJECTION INTRAVENOUS; SUBCUTANEOUS at 00:22

## 2021-08-16 RX ADMIN — METRONIDAZOLE 500 MG: 250 TABLET ORAL at 06:58

## 2021-08-16 RX ADMIN — LORAZEPAM 1 MG: 2 INJECTION INTRAMUSCULAR; INTRAVENOUS at 00:05

## 2021-08-16 RX ADMIN — ALBUMIN (HUMAN) 25 G: 0.25 INJECTION, SOLUTION INTRAVENOUS at 17:00

## 2021-08-16 RX ADMIN — INSULIN LISPRO 2 UNITS: 100 INJECTION, SOLUTION INTRAVENOUS; SUBCUTANEOUS at 13:31

## 2021-08-16 RX ADMIN — SODIUM CHLORIDE 250 ML: 900 INJECTION, SOLUTION INTRAVENOUS at 16:00

## 2021-08-16 RX ADMIN — ALBUMIN (HUMAN) 25 G: 0.25 INJECTION, SOLUTION INTRAVENOUS at 04:55

## 2021-08-16 RX ADMIN — MORPHINE SULFATE 1 MG: 2 INJECTION, SOLUTION INTRAMUSCULAR; INTRAVENOUS at 09:53

## 2021-08-16 RX ADMIN — INSULIN GLARGINE 5 UNITS: 100 INJECTION, SOLUTION SUBCUTANEOUS at 10:17

## 2021-08-16 RX ADMIN — METRONIDAZOLE 500 MG: 250 TABLET ORAL at 13:19

## 2021-08-16 RX ADMIN — FUROSEMIDE 40 MG: 10 INJECTION, SOLUTION INTRAMUSCULAR; INTRAVENOUS at 20:20

## 2021-08-16 RX ADMIN — IPRATROPIUM BROMIDE AND ALBUTEROL SULFATE 3 ML: .5; 3 SOLUTION RESPIRATORY (INHALATION) at 10:14

## 2021-08-16 RX ADMIN — FUROSEMIDE 40 MG: 10 INJECTION, SOLUTION INTRAMUSCULAR; INTRAVENOUS at 10:06

## 2021-08-16 RX ADMIN — DULOXETINE HYDROCHLORIDE 60 MG: 60 CAPSULE, DELAYED RELEASE ORAL at 00:05

## 2021-08-16 RX ADMIN — VANCOMYCIN HYDROCHLORIDE 750 MG: 750 INJECTION, POWDER, LYOPHILIZED, FOR SOLUTION INTRAVENOUS at 06:50

## 2021-08-16 RX ADMIN — SODIUM BICARBONATE: 84 INJECTION, SOLUTION INTRAVENOUS at 17:26

## 2021-08-16 RX ADMIN — HEPARIN SODIUM 5000 UNITS: 5000 INJECTION INTRAVENOUS; SUBCUTANEOUS at 16:30

## 2021-08-16 RX ADMIN — PIPERACILLIN AND TAZOBACTAM 3.38 G: 3; .375 INJECTION, POWDER, LYOPHILIZED, FOR SOLUTION INTRAVENOUS at 10:25

## 2021-08-16 NOTE — PROGRESS NOTES
8/16/2021 PT note: Chart reviewed and events of the morning noted (RRT). Will hold PT evaluation at this time and f/u at later time as may be appropriate.  Thank you for this referral.   Ran Garcia, PT

## 2021-08-16 NOTE — PROGRESS NOTES
8/16/2021 PT note: Chart reviewed and pt now for transfer to ICU following RRT this am.  As PT evaluation has been attempted for 4th day today and pt now for ICU transfer, will d/c PT orders at this time. Please re-order Physical Therapy when pt able/willing to participate with therapy intervention.  Thank you for this referral.   Ran Garcia, PT

## 2021-08-16 NOTE — PROGRESS NOTES
Transferred to ICU today for SOB and hypoxia  Had possible infiltrate on CXR 8/14--will recheck  ABG 7.27/30/74/13.9/-11.8  WBC 8, no fever or hypotension    Tentativey plan for formalization/closure right BKA tomorrow per Dr Rodolfo Lovell.   H/H 8/25--will type and screen    [address] [facility]  EV[employer] Asst Prof Surgery  PG [phone]  Cell [phone]

## 2021-08-16 NOTE — PROGRESS NOTES
2200 patient refused oral meds, stated \"I am nauseated, I can't take them. \"   She cannot have more zofran at this time. 2330 Patient is very anxious, states the room is too small and too hot and she cannot breathe. She is sitting upright. Lungs are clear. She states she does have anxiety attacks at times. She has already received PRN Xanex. Mata HENRY and gave cymbalta and ativan as ordered at [facility] 53 Patient is resting quietly. Shift summary: after 0030 the patient continued to sleep with no new clinical concerns.

## 2021-08-16 NOTE — PROGRESS NOTES
Hospitalist Progress Note    Patient: Ellis Perdue MRN: [id number]  [facility]: [id number]    YOB: 1970  Age: 46 y.o. Sex: female    DOA: 8/11/2021 LOS:  LOS: 5 days            Chief complaint :  Gangrene of both feet. 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 Carole Wan Tobacco use Z72.0    Preoperative evaluation to rule out surgical contraindication Z01.818    Anemia D64.9    Sepsis ([facility]) A41.9    Status post below-knee amputation of right lower extremity ([facility] Z89.511    Bacteremia R78.81    Dyspnea R06.00          55-year-old female with uncontrolled type 2 diabetes mellitus, severe peripheral arterial disease, and tobacco use who has been noncompliant with wound care and hyperbaric oxygen treatment due to recent fall is admitted for severe gangrene of both feet. RRT called for SOB, hypoxia. ABG with metabolic acidosis  Patient somnolent, confused.    Will transfer to ICU.     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 Disposition : TBD    Review of systems  General: No fevers or chills. Cardiovascular: No chest pain or pressure. No palpitations. Pulmonary: see above. Gastrointestinal: No nausea, vomiting. Physical Exam:  General: As above   HEENT: NC, Atraumatic. PERRLA, anicteric sclerae. Lungs: CTA Bilaterally. No Wheezing/Rhonchi/Rales. Heart:  S1 S2,  No murmur, No Rubs, No Gallops  Abdomen: Soft, Non distended, Non tender.  +Bowel sounds,   Extremities: Right BKA, left foot with dressing  Psych:   Not anxious or agitated. Neurologic:  No acute neurological deficit. Vital signs/Intake and Output:  Visit Vitals  BP (!) 159/98   Pulse 93   Temp 98.1 °F (36.7 °C)   Resp 13   Ht 5' 8\" (1.727 m)   Wt 75.3 kg (166 lb 0.1 oz)   SpO2 97%   BMI 25.24 kg/m²     Current Shift:  No intake/output data recorded. Last three shifts:  08/14 1901 - 08/16 0700  In: 240 [P.O.:240]  Out: -             Labs: Results:       Chemistry Recent Labs     08/16/21  0530 08/15/21  0316 08/14/21  0342   * 126* 151*    139 139   K 3.7 3.7 3.6   * 115* 110   CO2 16* 18* 18*   BUN 24* 34* 45*   CREA 0.92 1.18 1.32*   CA 8.0* 7.3* 7.1*   AGAP 12 6 11   BUCR 26* 29* 34*    109 104   TP 5.6* 5.4* 4.9*   ALB 3.0* 2.4* 2.0*   GLOB 2.6 3.0 2.9   AGRAT 1.2 0.8 0.7*      CBC w/Diff Recent Labs     08/16/21  0530 08/15/21  0316 08/14/21  0342   WBC 8.4 10.4 9.5   RBC 2.85* 2.87* 2.35*   HGB 8.2* 8.3* 6.8*   HCT 25.5* 25.3* 20.8*    292 267   GRANS 93* 91* 93*   LYMPH 3* 4* 4*   EOS 0 0 0      Cardiac Enzymes No results for input(s): CPK, CKND1, CONSTANTINO in the last 72 hours. No lab exists for component: CKRMB, TROIP   Coagulation No results for input(s): PTP, INR, APTT, INREXT in the last 72 hours.     Lipid Panel No results found for: CHOL, CHOLPOCT, CHOLX, CHLST, CHOLV, 054527, HDL, HDLP, LDL, LDLC, DLDLP, 773762, VLDLC, VLDL, TGLX, TRIGL, TRIGP, TGLPOCT, CHHD, CHHDX   BNP No results for input(s): BNPP in the last 72 hours.    Liver Enzymes Recent Labs     08/16/21  0530   TP 5.6*   ALB 3.0*         Thyroid Studies No results found for: T4, T3U, TSH, TSHEXT     Procedures/imaging: see electronic medical records for all procedures/Xrays and details which were not copied into this note but were reviewed prior to creation of Plan

## 2021-08-16 NOTE — PROGRESS NOTES
0919-Asked nurse supervisor to deliver warm blanket. 0920-Paged MD Gris Paige pt has mews of 6. Pt pulling out oxygen nasal cannula as well. 0927-MD said order ABGs give warm blanket. 9461-VPL called pt has warm blanket on and oxygen is 97% .    1016-Paged MD Amer to assess pt unable to give medication now pt is sleep blood gas results are up. 1029-Paged MD Amer stat to assess pt. 1030-MD Amer informed of pt condition asked to see her and look at ABGs. 1558-Pt in ICU now bed 1.

## 2021-08-16 NOTE — PROGRESS NOTES
[facility]  Pulmonary, Critical Care, and Sleep Medicine    Pulmonary/critical care note    Name: Blanka Manzo MRN: [id number]   : 1970 Hospital: [facility] [place]   Date: 2021  Room: [facility]     Subjective: This patient has been seen and evaluated at the request of Dr. Edwin Eng initially for hypotension on 2021. Patient is a 46 y.o. female with history of diabetic foot ulcers. She underwent right below-knee guillotine amputation for nonhealing ulcer yesterday by vascular surgery team.  Patient became hypotensive today and I was consulted. Patient blood pressure is being recorded from the wrist.  Due to lack of ICU beds, and subsequent stability and patient condition, patient did not come to the ICU.    2021  Patient seen in ICU. Patient has been sent to ICU after RRT this morning for hypoxemia and dyspnea. Patient currently somnolents/p morphine; arousable. She again says that she is being disturbed when she is trying to sleep. No reports of chest pain or palpitations or vomiting. Patient currently on nasal cannula oxygen at 2 L. Past medical historysignificant for bilateral foot gangrene; medical noncompliance with treatment; poorly controlled diabetes; smoking; anemia; peripheral arterial disease; renal failure.       Past Medical History:   Diagnosis Date    PAD (peripheral artery disease) ([facility])       Past Surgical History:   Procedure Laterality Date    HX ORTHOPAEDIC      L TMT amputation        No Known Allergies   Social History     Tobacco Use    Smoking status: Current Every Day Smoker     Packs/day: 1.50    Smokeless tobacco: Never Used   Substance Use Topics    Alcohol use: Not Currently     Comment: on rare occasion      Family History   Problem Relation Age of Onset    Diabetes Mother         Current Facility-Administered Medications   Medication Dose Route Frequency    piperacillin-tazobactam (ZOSYN) 3.375 g in 0.9% sodium chloride (MBP/ADV) 100 mL MBP  3.375 g IntraVENous Q8H    0.45% sodium chloride infusion  100 mL/hr IntraVENous CONTINUOUS    sodium chloride 0.9 % bolus infusion 250 mL  250 mL IntraVENous ONCE    sodium bicarbonate tablet 1,300 mg  1,300 mg Oral TID    famotidine (PEPCID) tablet 20 mg  20 mg Oral BID    DULoxetine (CYMBALTA) capsule 60 mg  60 mg Oral DAILY    albumin human 25% (BUMINATE) solution 25 g  25 g IntraVENous Q6H    insulin glargine (LANTUS) injection 5 Units  5 Units SubCUTAneous DAILY    Vancomycin-Pharmacy to Dose  1 Each Other Rx Dosing/Monitoring    buPROPion SR (WELLBUTRIN SR) tablet 150 mg  150 mg Oral DAILY    COVID-19 vac,Ad26-PF (JERONIMO) injection 1 Dose  1 Dose IntraMUSCular PRIOR TO DISCHARGE    aspirin chewable tablet 81 mg  81 mg Oral DAILY    heparin (porcine) injection 5,000 Units  5,000 Units SubCUTAneous Q8H    insulin lispro (HUMALOG) injection   SubCUTAneous Q6H    atorvastatin (LIPITOR) tablet 40 mg  40 mg Oral QHS       Review of Systems:  Limited due to patient condition; not keen on providing history. Repeatedly complaining that she is being disturbed frequently by nurses and doctors. Objective:   Vital Signs:    Visit Vitals  BP (!) 146/78   Pulse 100   Temp 97.6 °F (36.4 °C)   Resp 22   Ht 5' 8\" (1.727 m)   Wt 77.5 kg (170 lb 14.4 oz)   LMP 2018 (Within Years)   SpO2 98%   BMI 25.99 kg/m²       O2 Device: Nasal cannula   O2 Flow Rate (L/min): 2 l/min   Temp (24hrs), Av.7 °F (35.9 °C), Min:94 °F (34.4 °C), Max:97.6 °F (36.4 °C)       Intake/Output:   Last shift:      No intake/output data recorded.   Last 3 shifts:  1901 -  0700  In: 240 [P.O.:240]  Out: -   No intake or output data in the 24 hours ending 21 1555      Physical Exam:   Comfortable; on oxygen via nasal cannula2 L; acyanotic  HEENT: pupils not dilated, no scleral jaundice  Neck: No adenopathy or thyroid swelling  CVS: S1-S2; no murmur; JVD not elevated   RS: Moderate air entry bilateral; no wheezing; mild bibasal crackles; not tachypneic  Abd: Soft, not distended, nontender, no guarding or rigidity; no hepatosplenomegaly  Neuro: Somnolent; arousable; answering simple questions  Extrm: no leg edema or swelling or clubbing; right below-knee amputation with dressingno bleeding or hematoma; left foot dressingno bleeding or hematoma discharge. Skin: no rash  Lymphatic: no cervical or supraclavicular adenopathy      Data review:     Recent Results (from the past 24 hour(s))   GLUCOSE, POC    Collection Time: 08/16/21 12:15 AM   Result Value Ref Range    Glucose (POC) 114 (H) 70 - 110 mg/dL   MAGNESIUM    Collection Time: 08/16/21  5:30 AM   Result Value Ref Range    Magnesium 2.0 1.6 - 2.6 mg/dL   CBC WITH AUTOMATED DIFF    Collection Time: 08/16/21  5:30 AM   Result Value Ref Range    WBC 8.4 4.6 - 13.2 K/uL    RBC 2.85 (L) 4.20 - 5.30 M/uL    HGB 8.2 (L) 12.0 - 16.0 g/dL    HCT 25.5 (L) 35.0 - 45.0 %    MCV 89.5 74.0 - 97.0 FL    MCH 28.8 24.0 - 34.0 PG    MCHC 32.2 31.0 - 37.0 g/dL    RDW 16.0 (H) 11.6 - 14.5 %    PLATELET 242 422 - 042 K/uL    MPV 9.9 9.2 - 11.8 FL    NEUTROPHILS 93 (H) 40 - 73 %    LYMPHOCYTES 3 (L) 21 - 52 %    MONOCYTES 4 3 - 10 %    EOSINOPHILS 0 0 - 5 %    BASOPHILS 0 0 - 2 %    ABS. NEUTROPHILS 7.8 1.8 - 8.0 K/UL    ABS. LYMPHOCYTES 0.3 (L) 0.9 - 3.6 K/UL    ABS. MONOCYTES 0.3 0.05 - 1.2 K/UL    ABS. EOSINOPHILS 0.0 0.0 - 0.4 K/UL    ABS.  BASOPHILS 0.0 0.0 - 0.1 K/UL    DF MANUAL      RBC COMMENTS ANISOCYTOSIS  1+        RBC COMMENTS OVALOCYTES  1+        RBC COMMENTS TEARDROP CELLS  OCCASIONAL       METABOLIC PANEL, COMPREHENSIVE    Collection Time: 08/16/21  5:30 AM   Result Value Ref Range    Sodium 144 136 - 145 mmol/L    Potassium 3.7 3.5 - 5.5 mmol/L    Chloride 116 (H) 100 - 111 mmol/L    CO2 16 (L) 21 - 32 mmol/L    Anion gap 12 3.0 - 18 mmol/L    Glucose 127 (H) 74 - 99 mg/dL    BUN 24 (H) 7.0 - 18 MG/DL    Creatinine 0.92 0.6 - 1.3 MG/DL BUN/Creatinine ratio 26 (H) 12 - 20      GFR est AA >60 >60 ml/min/1.73m2    GFR est non-AA >60 >60 ml/min/1.73m2    Calcium 8.0 (L) 8.5 - 10.1 MG/DL    Bilirubin, total 0.5 0.2 - 1.0 MG/DL    ALT (SGPT) 8 (L) 13 - 56 U/L    AST (SGOT) 12 10 - 38 U/L    Alk.  phosphatase 100 45 - 117 U/L    Protein, total 5.6 (L) 6.4 - 8.2 g/dL    Albumin 3.0 (L) 3.4 - 5.0 g/dL    Globulin 2.6 2.0 - 4.0 g/dL    A-G Ratio 1.2 0.8 - 1.7     GLUCOSE, POC    Collection Time: 08/16/21  6:00 AM   Result Value Ref Range    Glucose (POC) 122 (H) 70 - 110 mg/dL   BLOOD GAS,LACTIC ACID, POC    Collection Time: 08/16/21 10:07 AM   Result Value Ref Range    Device: NASAL CANNULA      FIO2 (POC) 28 %    pH (POC) 7.27 (L) 7.35 - 7.45      pCO2 (POC) 30.0 (L) 35.0 - 45.0 MMHG    pO2 (POC) 74 (L) 80 - 100 MMHG    HCO3 (POC) 13.9 (L) 22 - 26 MMOL/L    sO2 (POC) 92.8 92 - 97 %    Base deficit (POC) 11.8 mmol/L    Site RIGHT RADIAL      Specimen type (POC) ARTERIAL      Performed by Girma Enrique)     Lactic Acid (POC) 1.14 0.40 - 2.00 mmol/L   GLUCOSE, POC    Collection Time: 08/16/21 10:21 AM   Result Value Ref Range    Glucose (POC) 183 (H) 70 - 110 mg/dL   GLUCOSE, POC    Collection Time: 08/16/21  1:03 PM   Result Value Ref Range    Glucose (POC) 155 (H) 70 - 110 mg/dL           Recent Labs     08/16/21  1007   FIO2I 28   HCO3I 13.9*   PCO2I 30.0*   PHI 7.27*   PO2I 74*       All Micro Results     Procedure Component Value Units Date/Time    CULTURE, BLOOD [[id number]] Collected: 08/16/21 0530    Order Status: Completed Specimen: Blood Updated: 08/16/21 0807    CULTURE, BLOOD [[id number]] Collected: 08/16/21 0530    Order Status: Completed Specimen: Blood Updated: 08/16/21 0807    CULTURE, [place] Bhumika STAIN [[id number]]  (Abnormal)  (Susceptibility) Collected: 08/11/21 2123    Order Status: Completed Specimen: Wound from Foot Updated: 08/16/21 3557     Special Requests: NO SPECIAL REQUESTS        GRAM STAIN NO WBC'S SEEN         4+ GRAM NEGATIVE RODS               1+ GRAM POSITIVE COCCI IN PAIRS           Culture result: HEAVY GRAM NEGATIVE RODS               HEAVY ALCALIGENES FAECALIS                  HEAVY ENTEROCOCCUS FAECALIS                  HEAVY STAPHYLOCOCCUS AUREUS          CULTURE, BLOOD [[id number]] Collected: 08/15/21 0316    Order Status: Completed Specimen: Blood Updated: 08/16/21 0728     Special Requests: NO SPECIAL REQUESTS        Culture result: NO GROWTH 1 DAY       CULTURE, BLOOD [[id number]] Collected: 08/15/21 0316    Order Status: Completed Specimen: Blood Updated: 08/16/21 0728     Special Requests: NO SPECIAL REQUESTS        Culture result: NO GROWTH 1 DAY       CULTURE, BLOOD [[id number]] Collected: 08/14/21 0342    Order Status: Completed Specimen: Blood Updated: 08/16/21 0728     Special Requests: NO SPECIAL REQUESTS        Culture result: NO GROWTH 2 DAYS       CULTURE, BLOOD [[id number]] Collected: 08/14/21 0342    Order Status: Completed Specimen: Blood Updated: 08/16/21 0728     Special Requests: NO SPECIAL REQUESTS        Culture result: NO GROWTH 2 DAYS       CULTURE, BLOOD [[id number]] Collected: 08/13/21 1150    Order Status: Completed Specimen: Blood Updated: 08/16/21 0728     Special Requests: NO SPECIAL REQUESTS        Culture result: NO GROWTH 3 DAYS       CULTURE, WOUND Daleen Sleight STAIN [[id number]]  (Abnormal)  (Susceptibility) Collected: 08/11/21 2145    Order Status: Completed Specimen: Wound from Foot Updated: 08/15/21 1013     Special Requests: NO SPECIAL REQUESTS        GRAM STAIN NO WBC'S SEEN         1+ GRAM NEGATIVE RODS               FEW GRAM POSITIVE COCCI IN PAIRS           Culture result:       MODERATE PROVIDENCIA STUARTII                  MODERATE STAPHYLOCOCCUS AUREUS          CULTURE, BLOOD [[id number]] Collected: 08/11/21 2225    Order Status: Completed Specimen: Blood Updated: 08/14/21 1502     Special Requests: NO SPECIAL REQUESTS        GRAM STAIN       GRAM POSITIVE COCCI IN CLUSTERS ANAEROBIC BOTTLE                  SMEAR CALLED TO AND CORRECTLY REPEATED BY: Jad Ramsey RN 3S TO [facility] [id number] AT [id number]           Culture result:       Finegoldia magna GROWING IN THE ANAEROBIC BOTTLE          CULTURE, BLOOD [[id number]]  (Abnormal) Collected: 08/11/21 2215    Order Status: Completed Specimen: Blood Updated: 08/14/21 1500     Special Requests: NO SPECIAL REQUESTS        GRAM STAIN       GRAM POSITIVE COCCI IN CLUSTERS ANAEROBIC BOTTLE                  SMEAR CALLED TO AND CORRECTLY REPEATED BY: Brendon Maria RN 3S TO [facility] [id number] AT [id number]           Culture result:       Peptoniphilus asaccharolyticus GROWING IN THE ANAEROBIC BOTTLE          CULTURE, URINE [[id number]] Collected: 08/11/21 2230    Order Status: Canceled Specimen: Urine from Clean catch             Imaging:  [x]I have personally reviewed the patients chest radiographs images and report     Results from Hospital Encounter encounter on 08/11/21    XR CHEST PORT    Narrative  EXAM: PORTABLE CHEST    HISTORY: Cough. COMPARISON: 8/11/2021    TECHNIQUE: Single portable view. _______________    FINDINGS:    SUPPORT DEVICES: None    HEART AND MEDIASTINUM: Cardiac size is normal. Mediastinal contours are normal.  Normal pulmonary vasculature. LUNGS AND PLEURAL SPACES: Patchy airspace disease right base likely developing  pneumonia. Small atelectatic streak left base. BONES AND SOFT TISSUES: Bony structures are normal.    _______________    Impression  Patchy airspace disease right base very likely developing pneumonia. Small  atelectatic streak left base. Results from Hospital Encounter encounter on 04/04/21    CT FOOT LT W CONT    Narrative  Exam:  Extremity CT    CLINICAL INDICATION/HISTORY: Great toe wound. Assess for abscess or  osteomyelitis. COMPARISONS: Plain radiographs, 12/10/2015. TECHNIQUE: CT of the left foot was performed, following intravenous contrast  administration.   Coronal and sagittal reconstructions were generated. One or more dose reduction techniques were used on this CT: automated exposure  control, adjustment of the mAs and/or kVp according to patient size, and  iterative reconstruction techniques. The specific techniques used on this CT  exam have been documented in the patient's electronic medical record.    --------------------------------------------------------------  FINDINGS:    BONES: Advanced osteopenia. Plantar and Achilles calcaneal spurs. Numerous small foci of air within the marrow space of the distal first  metatarsal and proximal aspect of first proximal phalanx, and in both hallux  sesamoids. There are areas of cortical ill-definition along both the medial and  lateral aspect of the proximal phalangeal base, as well as the dorsal medial  aspect of the first metatarsal head. No large areas of bony destruction  identified. There is slight dorsal subluxation at the first MTP articulation and volar  subluxation at the first IP articulation, with advanced articular cartilage loss  at both joints. No evidence of bony erosion or fracture elsewhere in the foot. SOFT TISSUES: Mild generalized soft tissue edema around the foot. Relatively large area of superficial soft tissue irregularity compatible with  ulcer along the medial and plantar aspects of the distal forefoot at the level  of the first MTP and proximal toe. Ulcer extends very close to the depth of the  bone at the first MTP and tibial hallux sesamoid. Mottled air around the base of first toe, extending along the plantar and medial  aspects of the distal first metatarsal. Areas of ill-defined platelike fluid  around the base of the first proximal phalanx and the plantar aspect of the  first metatarsal head. First MTP joint effusion.      --------------------------------------------------------------    Impression  1. Ulcer along the plantar and medial aspect of distal forefoot extends very  close to the bone.  Soft tissue edema with extensive mottled soft tissue air and  areas of platelike fluid around the base of the first toe and plantar aspect of  first metatarsal head, likely combination of soft tissue infection and ischemia. 2. Mottled air within the bone at the distal first metatarsal and proximal  phalangeal base with areas of bony irregularity. While extent of air may in part  reflect ischemia, strongly suspect osteomyelitis and first MTP septic arthritis. Echocardiogram 8/13/2021  Interpretation Summary    Result status: Final result   · Image quality for this study was suboptimal. Contrast used: DEFINITY. · LV: Estimated LVEF is 45 - 50%. Normal cavity size and diastolic function. Mild concentric hypertrophy. E/E' ratio is 13.34.  · LA: Mildly dilated left atrium. · RV: Mildly dilated right ventricle. Normal global systolic function. Assessment of RV function: TAPSE=16mm. · AV: Moderate aortic valve leaflet calcification present. · MV: Mitral valve non-specific thickening. Mitral valve leaflet calcification without reduced excursion. Mild mitral valve regurgitation is present. · TV: Mild tricuspid valve regurgitation is present. · PA: Mild pulmonary hypertension. Pulmonary arterial systolic pressure is 35 mmHg.            IMPRESSION:   · Dyspnea  · Sepsis  · Type 2 diabetes mellitus with diabetic foot ulcer  · Osteomyelitis  · Peripheral arterial disease  · Anemia  · Acute renal failureresolved  ·   Patient Active Problem List   Diagnosis Code    Osteomyelitis ([address] M86.9    Type 2 diabetes mellitus with left diabetic foot ulcer ([address] E11.621, L97.529    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 ([address] E11.52    YUNIEL (acute kidney injury) ([address] N17.9    PAD (peripheral artery disease) ([facility]) I73.9    Bilateral carotid artery stenosis I65.23    Gangrene of left foot ([facility]) I96    Non compliance with medical treatment Z91.19    Gangrene of both feet [facility]) I96    Fall F46.YUXI    Tobacco use Z72.0    Preoperative evaluation to rule out surgical contraindication Z01.818    Anemia D64.9    Sepsis (HCC) A41.9    Status post below-knee amputation of right lower extremity (HCC) Z89.511    Bacteremia R78.81    Dyspnea R06.00     ·       RECOMMENDATIONS:   · RS: Patient transferred to ICU for closer respiratory monitoring; dyspnea and mild hypoxemia; check D-dimerif elevated, would need to consider chest CT to rule out PEs; patient declined chest x-ray earlier today. ABG shows mild metabolic acidosis. · CVS: Monitor hemodynamics; blood pressure seems to be good now. Baseline echocardiogramEF 45-50% consistent with mild cardiomyopathy; patient likely has coronary artery disease. · ID: Patient has GPC and GNR sepsiswound culture showing Staphylococcus, Enterococcus, procidentia; history of MSSA in the past; s/p amputation of the right foot. ID on case; patient on IV Zosyn now. Lactic acid not elevated1. 14.  · Renal: Creatinine normal now; nephrology on case. Patient may need CTA chest to rule out PEs. Check D-dimer first.  Discussed with nephrology Dr Palmira Shin to hydrate patient. Patient has been declining oral bicarb tablets. Bicarb fluid ordered. · Hem: Mild anemia; s/p 1 unit PRBC on Saturday. Hemoglobin 8.2 this morning. · Vasc: Vascular surgery team on case. · GI: Oral diet as tolerated. Pepcid for prophylaxis. · Neuro: Stable mentation. · Endo: Patient on Lantus and sliding scale insulin; continue to monitor blood sugars. · Fluids: Bicarb 75 mL/hr  · Proph:  DVt and GI prophHeparin and famotidine. · Discussed with patient and updated. · Prognosis overall poor due to medical noncompliance.     ADVANCE DIRECTIVE: Full Code    CC time 36 mins, excludes d/w family or procedures      High complexity decision making was performed in this consultation and evaluation of this patient who is at high risk for decompensation with multiple organ involvement         Dickie Crigler, MD

## 2021-08-16 NOTE — ROUTINE PROCESS
Bedside and Verbal shift change report given to Alyson Patton RN (oncoming nurse) by Sheldon South RN (offgoing nurse).  Report included the following information SBAR, Kardex, Intake/Output, MAR, Recent Results, and Cardiac Rhythm: SR.

## 2021-08-16 NOTE — PROGRESS NOTES
Hospitalist Progress Note    Patient: Blanca Boudreaux MRN: [id number]  CSN: [id number]    YOB: 1970  Age: 46 y.o.   Sex: female    DOA: 8/11/2021 LOS:  LOS: 5 days          I responded to bedside RRT as I was in location    Patient pale and awake, anxious appearing, dropped sats off 02 to 80's range, c/o SOB    Recent foot surgery    Labs reviewed, she is anemia    Ordered neb, lasix, hold albumin, and IV NS at 100 cc/hr    ABG, CXR ordered    One dose moprhine    Wheezing on exam, mildly hypertensive    Consider PE evaluation also if does not respond to lasix and neb, f/u CXR report

## 2021-08-16 NOTE — PROGRESS NOTES
[facility]  (A Division of [address] [place] [address])                                                                                                                       Tania Saul MD  Office #:     [phone]   Office Fax: [phone]     Date of Admission: 8/11/2021Date of Note: 8/16/2021  Reason for Referral: I was asked to see patient by Dr Castro 27 for evaluation and management of severe sepsis/ foot infection. Thank you for involving me in the care of this very sick patient. Please do not hesitate to contact me on the above number if question or concern. Current Antimicrobials:    Prior Antimicrobials:  Zosyn 8/11- 8/13m resumed 8/16  TD  Vanco 8/11 TD  Flagyl 8/15 TD    Immunosuppressive drugs: na Clindamycin 8/13 X3 doses  Ctx 8/13 to 8/15  Levofloxacin 8/13 X1       Assessment- ID related:  --------------------------------------------------------------------------    · Right foot wet gangrene with Severe sepsis POA  · R BKA due to above  · Bacteremia with anaerobe( peptoniphilus) 1/4 bottles 8/11  · BCX 8/13-8/15: NGSF  · Hypoxia and hypothermia--while on broad spectrum abx  · Leucocytosis resolved post BKA    WCX L foot-8/11: Providencia and MSSA  WCX R foot-8/11: E.fecalis. Alcaligenes fecalis, MSSA    Other Medical Issues- Mx per respective team:     DM-poorly controlled  PVD  tobaco use  YUNIEL improved    Past ID Issues:  COVID 19 vaccine- 8/12/21  Left foot dry gangrene- post TMA 4/12- Rxed long term ABX Recommendation for ID issues I am following:  --------------------------------------------------------------------------    Cont with Zosyn- will broadly cover MSSA/gram negatives and anaerobes    DC flagyl(no need for double anaerobic coverage) and vancomycin    CRP ordered    Likley fluid congestion for hypoxia-- will tailor abx based on recent blood cultures as needed    Placed wound consult for left foot evaluation HPI:  Zeynep Wilson is 47 YO WHITE/NON- female with PMH as listed below. Known to me from left foot infection/dry gangrene and treated with 6 weeks of IV abx-Unasyn followed by 2 weeks of oral augmentin. She had poorly controlled DM. Admitted on 8/11 with R foot wet gangrene and severe sepsis- high wbc, hypotension. Was placed on Vanco and Zosyn, she was taken to OR for R BKA. WCX taken from both feet reviewed, didn't have MRSA. Bacteremia with anearobe from admission. She had no further bacteremia. Flagyl was added by surgery. She has SOB and hypoxemia this morning. Was given lasix and morphine. She has labs for lactic acid/bmp pending. Still remains on vanco- no MRSA isolated. During interview, is awake, but limited with her answers, wants to rest. No fever, no diarrhea. SHAHRIAR RN- who states she is breathing better since her earlier medications with lasix and morphine during RRT.          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 ([address] 04/04/2021    YUNIEL (acute kidney injury) ([facility] 04/04/2021     Past Medical History:   Diagnosis Date    PAD [facility]peripheral artery disease) ([facility])      Past Surgical History:   Procedure Laterality Date    HX ORTHOPAEDIC      L TMT amputation     Family History   Problem Relation Age of Onset    Diabetes Mother      Social History     Socioeconomic History    Marital status:      Spouse name: Not on file    Number of children: Not on file    Years of education: Not on file  Highest education level: Not on file   Occupational History    Not on file   Tobacco Use    Smoking status: Current Every Day Smoker     Packs/day: 1.50    Smokeless tobacco: Never Used   Substance and Sexual Activity    Alcohol use: Not Currently     Comment: on rare occasion    Drug use: Not Currently    Sexual activity: Not on file   Other Topics Concern     Service Not Asked    Blood Transfusions Not Asked    Caffeine Concern Not Asked    Occupational Exposure Not Asked    Hobby Hazards Not Asked    Sleep Concern Not Asked    Stress Concern Not Asked    Weight Concern Not Asked    Special Diet Not Asked    Back Care Not Asked    Exercise Not Asked    Bike Helmet Not Asked   [place] Not Asked    Self-Exams Not Asked   Social History Narrative    Not on file     Social Determinants of Health     Financial Resource Strain:     Difficulty of Paying Living Expenses:    Food Insecurity:     Worried About Running Out of Food in the Last Year:     Ran Out of Food in the Last Year:    Transportation Needs:     Lack of Transportation (Medical):  Lack of Transportation (Non-Medical):    Physical Activity:     Days of Exercise per Week:     Minutes of Exercise per Session:    Stress:     Feeling of Stress :    Social Connections:     Frequency of Communication with Friends and Family:     Frequency of Social Gatherings with Friends and Family:     Attends Yazdanism Services:     Active Member of Clubs or Organizations:     Attends Club or Organization Meetings:     Marital Status:    Intimate Partner Violence:     Fear of Current or Ex-Partner:     Emotionally Abused:     Physically Abused:     Sexually Abused: Allergies:  Patient has no known allergies.      Medications:  Current Facility-Administered Medications   Medication Dose Route Frequency    piperacillin-tazobactam (ZOSYN) 3.375 g in 0.9% sodium chloride (MBP/ADV) 100 mL MBP  3.375 g IntraVENous Q8H  metroNIDAZOLE (FLAGYL) tablet 500 mg  500 mg Oral Q8H    sodium bicarbonate tablet 1,300 mg  1,300 mg Oral TID    famotidine (PEPCID) tablet 20 mg  20 mg Oral BID    DULoxetine (CYMBALTA) capsule 60 mg  60 mg Oral DAILY    albuterol-ipratropium (DUO-NEB) 2.5 MG-0.5 MG/3 ML  3 mL Nebulization Q4H PRN    albumin human 25% (BUMINATE) solution 25 g  25 g IntraVENous Q6H    insulin glargine (LANTUS) injection 5 Units  5 Units SubCUTAneous DAILY    vancomycin (VANCOCIN) 750 mg in 0.9% sodium chloride 250 mL (VIAL-MATE)  750 mg IntraVENous Q18H    cefTRIAXone (ROCEPHIN) 2 g in sterile water (preservative free) 20 mL IV syringe  2 g IntraVENous Q24H    Vancomycin-Pharmacy to Dose  1 Each Other Rx Dosing/Monitoring    ALPRAZolam (XANAX) tablet 0.5 mg  0.5 mg Oral QHS PRN    buPROPion SR (WELLBUTRIN SR) tablet 150 mg  150 mg Oral DAILY    gabapentin (NEURONTIN) capsule 100 mg  100 mg Oral TID PRN    morphine injection 2 mg  2 mg IntraVENous Q4H PRN    COVID-19 vac,Ad26-PF (Moxsie) injection 1 Dose  1 Dose IntraMUSCular PRIOR TO DISCHARGE    aspirin chewable tablet 81 mg  81 mg Oral DAILY    heparin (porcine) injection 5,000 Units  5,000 Units SubCUTAneous Q8H    HYDROcodone-acetaminophen (NORCO) 7.5-325 mg per tablet 1 Tablet  1 Tablet Oral Q4H PRN    HYDROcodone-acetaminophen (NORCO) 7.5-325 mg per tablet 2 Tablet  2 Tablet Oral Q6H PRN    naloxone (NARCAN) injection 0.1 mg  0.1 mg IntraVENous PRN    diphenhydrAMINE (BENADRYL) injection 12.5 mg  12.5 mg IntraVENous Q6H PRN    acetaminophen (TYLENOL) tablet 650 mg  650 mg Oral Q6H PRN    Or    acetaminophen (TYLENOL) suppository 650 mg  650 mg Rectal Q6H PRN    polyethylene glycol (MIRALAX) packet 17 g  17 g Oral DAILY PRN    ondansetron (ZOFRAN ODT) tablet 4 mg  4 mg Oral Q8H PRN    Or    ondansetron (ZOFRAN) injection 4 mg  4 mg IntraVENous Q6H PRN    insulin lispro (HUMALOG) injection   SubCUTAneous Q6H    glucose chewable tablet 16 g  16 g Oral PRN    glucagon (GLUCAGEN) injection 1 mg  1 mg IntraMUSCular PRN    dextrose (D50W) injection syrg 12.5-25 g  25-50 mL IntraVENous PRN    atorvastatin (LIPITOR) tablet 40 mg  40 mg Oral QHS    [Held by provider] 0.9% sodium chloride infusion  100 mL/hr IntraVENous CONTINUOUS        ROS:  Review of systems not obtained due to patient factors. Patient doesn't give detail answers     Physical Exam:    Temp (24hrs), Av.7 °F (35.9 °C), Min:94 °F (34.4 °C), Max:97.6 °F (36.4 °C)    Visit Vitals  BP (!) 146/78   Pulse 100   Temp 97.6 °F (36.4 °C)   Resp 22   Ht 5' 8\" (1.727 m)   Wt 77.5 kg (170 lb 14.4 oz)   LMP 2018 (Within Years)   SpO2 98%   BMI 25.99 kg/m²          GEN: WD chronically sick looking- no severe distress noted  HEENT: Unicteric. EOMI intact  CHEST: Non laboured breathing. B/L wheezes  CVS:RRR, no mur/gallop  ABD: Obese/soft. Non tender. MEETA: Deferred  EXT: No apparent swelling or redness on UE/LE joints. R  BKA site is dressed  Left foot is dressed as well  Skin: Dry and intact. No rash, no redness. CNS: A, OX3. Moves all extremity. CN grossly ok.       Microbiology:  Blood culture: N/A    Urine culture:N/A    Respiratory culture: N/A    Body Fluid/ CSF/drainage culture:N/A    Cath tip/ PICC culture: N/A      Lines / Catheters:  Lab results:    Chemistry  Recent Labs     21  0530 08/15/21  0316 21  0342   * 126* 151*    139 139   K 3.7 3.7 3.6   * 115* 110   CO2 16* 18* 18*   BUN 24* 34* 45*   CREA 0.92 1.18 1.32*   CA 8.0* 7.3* 7.1*   AGAP 12 6 11   BUCR 26* 29* 34*    109 104   TP 5.6* 5.4* 4.9*   ALB 3.0* 2.4* 2.0*   GLOB 2.6 3.0 2.9   AGRAT 1.2 0.8 0.7*       CBC w/ Diff  Recent Labs     21  0530 08/15/21  0316 21  0342   WBC 8.4 10.4 9.5   RBC 2.85* 2.87* 2.35*   HGB 8.2* 8.3* 6.8*   HCT 25.5* 25.3* 20.8*    292 267   GRANS 93* 91* 93*   LYMPH 3* 4* 4*   EOS 0 0 0       Imagin/14- CXR  Patchy airspace disease right base very likely developing pneumonia. Small  atelectatic streak left base.

## 2021-08-16 NOTE — PROGRESS NOTES
Transfer of care patient report received by Jad Acevedo RN via telephone from Vijay Peres RN. Report included the following information SBAR, Kardex, ED Summary, OR Summary, Procedure Summary, Intake/Output, MAR, Recent Results, Med Rec Status, Cardiac Rhythm NSR and Alarm Parameters . Patient transferred to ICU @1600 with ilya and Vijay Peres RN. Patient placed on ICU monitors, VSS,  dual skin assessment completed with Yola Macias RN per facility policy, A&Ox4 and on 2L NC. Patient weaned off 2L NC to rm air with sats >95%. Change of shift bedside patient report given and care resumed by ST JOSE RN.
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
## 2021-08-16 NOTE — PROGRESS NOTES
CXR looks worse to me--volume overload vs bilateral infiltrates. Contacted Dr Sunday Michelle who has kindly agreed to f/u.     [address] [facility]  [facility] Asst Prof Surgery  PG [phone]  Cell [phone]

## 2021-08-16 NOTE — PROGRESS NOTES
OT note: Pt had RRT called this morning with drop in O2 sats. Will hold OT eval for now as pt being rule out for PE as well. Will follow up when pt medically appropriate.  Thank you Javier Easton OTR/L

## 2021-08-16 NOTE — PROGRESS NOTES
Problem: Falls - Risk of  Goal: *Absence of Falls  Description: Document Stuart Martines Fall Risk and appropriate interventions in the flowsheet. Outcome: Progressing Towards Goal  Note: Fall Risk Interventions:  Mobility Interventions: Assess mobility with egress test, Patient to call before getting OOB         Medication Interventions: Patient to call before getting OOB    Elimination Interventions: Call light in reach, Patient to call for help with toileting needs    History of Falls Interventions: Door open when patient unattended         Problem: Patient Education: Go to Patient Education Activity  Goal: Patient/Family Education  Outcome: Progressing Towards Goal     Problem: Diabetes Self-Management  Goal: *Disease process and treatment process  Description: Define diabetes and identify own type of diabetes; list 3 options for treating diabetes. Outcome: Progressing Towards Goal  Goal: *Incorporating nutritional management into lifestyle  Description: Describe effect of type, amount and timing of food on blood glucose; list 3 methods for planning meals. Outcome: Progressing Towards Goal  Goal: *Incorporating physical activity into lifestyle  Description: State effect of exercise on blood glucose levels. Outcome: Progressing Towards Goal  Goal: *Developing strategies to promote health/change behavior  Description: Define the ABC's of diabetes; identify appropriate screenings, schedule and personal plan for screenings. Outcome: Progressing Towards Goal  Goal: *Using medications safely  Description: State effect of diabetes medications on diabetes; name diabetes medication taking, action and side effects. Outcome: Progressing Towards Goal  Goal: *Monitoring blood glucose, interpreting and using results  Description: Identify recommended blood glucose targets  and personal targets.   Outcome: Progressing Towards Goal  Goal: *Prevention, detection, treatment of acute complications  Description: List symptoms of hyper- and hypoglycemia; describe how to treat low blood sugar and actions for lowering  high blood glucose level. Outcome: Progressing Towards Goal  Goal: *Prevention, detection and treatment of chronic complications  Description: Define the natural course of diabetes and describe the relationship of blood glucose levels to long term complications of diabetes. Outcome: Progressing Towards Goal  Goal: *Developing strategies to address psychosocial issues  Description: Describe feelings about living with diabetes; identify support needed and support network  Outcome: Progressing Towards Goal  Goal: *Insulin pump training  Outcome: Progressing Towards Goal  Goal: *Sick day guidelines  Outcome: Progressing Towards Goal  Goal: *Patient Specific Goal (EDIT GOAL, INSERT TEXT)  Outcome: Progressing Towards Goal     Problem: Patient Education: Go to Patient Education Activity  Goal: Patient/Family Education  Outcome: Progressing Towards Goal     Problem: Pressure Injury - Risk of  Goal: *Prevention of pressure injury  Description: Document Flo Scale and appropriate interventions in the flowsheet.   Outcome: Progressing Towards Goal  Note: Pressure Injury Interventions:  Sensory Interventions: Assess changes in LOC    Moisture Interventions: Absorbent underpads    Activity Interventions: Pressure redistribution bed/mattress(bed type)    Mobility Interventions: HOB 30 degrees or less, Pressure redistribution bed/mattress (bed type)    Nutrition Interventions: Offer support with meals,snacks and hydration    Friction and Shear Interventions: HOB 30 degrees or less                Problem: Patient Education: Go to Patient Education Activity  Goal: Patient/Family Education  Outcome: Progressing Towards Goal

## 2021-08-17 ENCOUNTER — APPOINTMENT (OUTPATIENT)
Dept: NON INVASIVE DIAGNOSTICS | Age: 51
DRG: 853 | End: 2021-08-17
Attending: INTERNAL MEDICINE
Payer: COMMERCIAL

## 2021-08-17 ENCOUNTER — APPOINTMENT (OUTPATIENT)
Dept: CT IMAGING | Age: 51
DRG: 853 | End: 2021-08-17
Attending: STUDENT IN AN ORGANIZED HEALTH CARE EDUCATION/TRAINING PROGRAM
Payer: COMMERCIAL

## 2021-08-17 PROBLEM — R91.8 PULMONARY INFILTRATES: Status: ACTIVE | Noted: 2021-08-17

## 2021-08-17 PROBLEM — I50.21 SYSTOLIC CHF, ACUTE (HCC): Status: ACTIVE | Noted: 2021-08-17

## 2021-08-17 LAB
ALBUMIN SERPL-MCNC: 3.3 G/DL (ref 3.4–5)
ANION GAP SERPL CALC-SCNC: 7 MMOL/L (ref 3–18)
BACTERIA SPEC CULT: ABNORMAL
BASOPHILS # BLD: 0 K/UL (ref 0–0.1)
BASOPHILS NFR BLD: 0 % (ref 0–2)
BNP SERPL-MCNC: ABNORMAL PG/ML (ref 0–900)
BUN SERPL-MCNC: 21 MG/DL (ref 7–18)
BUN/CREAT SERPL: 24 (ref 12–20)
CALCIUM SERPL-MCNC: 8 MG/DL (ref 8.5–10.1)
CHLORIDE SERPL-SCNC: 113 MMOL/L (ref 100–111)
CO2 SERPL-SCNC: 23 MMOL/L (ref 21–32)
COVID-19 RAPID TEST, COVR: NOT DETECTED
CREAT SERPL-MCNC: 0.86 MG/DL (ref 0.6–1.3)
CRP SERPL-MCNC: 5.7 MG/DL (ref 0–0.3)
DIFFERENTIAL METHOD BLD: ABNORMAL
ECHO AV ANNULUS DIAM: 2.52 CM
ECHO EST RA PRESSURE: 3 MMHG
ECHO LA VOL 2C: 35.99 ML (ref 22–52)
ECHO LA VOL 4C: 56.81 ML (ref 22–52)
ECHO LA VOL BP: 53.66 ML (ref 22–52)
ECHO LV E' LATERAL VELOCITY: 8 CM/S
ECHO LV E' SEPTAL VELOCITY: 11 CM/S
ECHO LV EDV A2C: 141.17 ML
ECHO LV EDV A4C: 133.49 ML
ECHO LV EDV BP: 137.74 ML (ref 56–104)
ECHO LV EJECTION FRACTION A2C: 39 %
ECHO LV EJECTION FRACTION A4C: 36 %
ECHO LV EJECTION FRACTION BIPLANE: 37.1 % (ref 55–100)
ECHO LV ESV A2C: 85.67 ML
ECHO LV ESV A4C: 84.91 ML
ECHO LV ESV BP: 86.67 ML (ref 19–49)
ECHO LV INTERNAL DIMENSION DIASTOLIC: 4.5 CM (ref 3.9–5.3)
ECHO LV INTERNAL DIMENSION SYSTOLIC: 3.79 CM
ECHO LV IVSD: 1.41 CM (ref 0.6–0.9)
ECHO LV MASS 2D: 206.5 G (ref 67–162)
ECHO LV MASS INDEX 2D: 109.3 G/M2 (ref 43–95)
ECHO LV POSTERIOR WALL DIASTOLIC: 1.06 CM (ref 0.6–0.9)
ECHO LVOT SV: 55.5 ML
ECHO MV A VELOCITY: 3 CM/S
ECHO MV AREA PHT: 5.7 CM2
ECHO MV E DECELERATION TIME (DT): 133.06 MS
ECHO MV E VELOCITY: 128 CM/S
ECHO MV E/A RATIO: 42.67
ECHO MV E/E' LATERAL: 16
ECHO MV E/E' RATIO (AVERAGED): 13.82
ECHO MV E/E' SEPTAL: 11.64
ECHO MV PRESSURE HALF TIME (PHT): 38.59 MS
ECHO RA AREA 4C: 19.34 CM2
ECHO TV REGURGITANT MAX VELOCITY: 287 CM/S
ECHO TV REGURGITANT PEAK GRADIENT: 32.95 MMHG
EOSINOPHIL # BLD: 0.1 K/UL (ref 0–0.4)
EOSINOPHIL NFR BLD: 1 % (ref 0–5)
ERYTHROCYTE [DISTWIDTH] IN BLOOD BY AUTOMATED COUNT: 16.2 % (ref 11.6–14.5)
GLUCOSE BLD STRIP.AUTO-MCNC: 135 MG/DL (ref 70–110)
GLUCOSE BLD STRIP.AUTO-MCNC: 141 MG/DL (ref 70–110)
GLUCOSE BLD STRIP.AUTO-MCNC: 155 MG/DL (ref 70–110)
GLUCOSE SERPL-MCNC: 131 MG/DL (ref 74–99)
GRAM STN SPEC: ABNORMAL
HCT VFR BLD AUTO: 25.5 % (ref 35–45)
HGB BLD-MCNC: 8.3 G/DL (ref 12–16)
LYMPHOCYTES # BLD: 0.4 K/UL (ref 0.9–3.6)
LYMPHOCYTES NFR BLD: 4 % (ref 21–52)
MAGNESIUM SERPL-MCNC: 1.5 MG/DL (ref 1.6–2.6)
MAGNESIUM SERPL-MCNC: 1.7 MG/DL (ref 1.6–2.6)
MCH RBC QN AUTO: 29.1 PG (ref 24–34)
MCHC RBC AUTO-ENTMCNC: 32.5 G/DL (ref 31–37)
MCV RBC AUTO: 89.5 FL (ref 74–97)
MONOCYTES # BLD: 0.6 K/UL (ref 0.05–1.2)
MONOCYTES NFR BLD: 5 % (ref 3–10)
NEUTS SEG # BLD: 9.4 K/UL (ref 1.8–8)
NEUTS SEG NFR BLD: 89 % (ref 40–73)
PHOSPHATE SERPL-MCNC: 2.4 MG/DL (ref 2.5–4.9)
PHOSPHATE SERPL-MCNC: 2.8 MG/DL (ref 2.5–4.9)
PLATELET # BLD AUTO: 273 K/UL (ref 135–420)
PMV BLD AUTO: 10.3 FL (ref 9.2–11.8)
POTASSIUM SERPL-SCNC: 2.7 MMOL/L (ref 3.5–5.5)
POTASSIUM SERPL-SCNC: 3.1 MMOL/L (ref 3.5–5.5)
RBC # BLD AUTO: 2.85 M/UL (ref 4.2–5.3)
SERVICE CMNT-IMP: ABNORMAL
SODIUM SERPL-SCNC: 143 MMOL/L (ref 136–145)
SOURCE, COVRS: NORMAL
WBC # BLD AUTO: 10.6 K/UL (ref 4.6–13.2)

## 2021-08-17 PROCEDURE — P9047 ALBUMIN (HUMAN), 25%, 50ML: HCPCS | Performed by: HOSPITALIST

## 2021-08-17 PROCEDURE — 82962 GLUCOSE BLOOD TEST: CPT

## 2021-08-17 PROCEDURE — 83735 ASSAY OF MAGNESIUM: CPT

## 2021-08-17 PROCEDURE — 86140 C-REACTIVE PROTEIN: CPT

## 2021-08-17 PROCEDURE — 74011250636 HC RX REV CODE- 250/636: Performed by: STUDENT IN AN ORGANIZED HEALTH CARE EDUCATION/TRAINING PROGRAM

## 2021-08-17 PROCEDURE — 74011250636 HC RX REV CODE- 250/636: Performed by: INTERNAL MEDICINE

## 2021-08-17 PROCEDURE — 74011000636 HC RX REV CODE- 636: Performed by: FAMILY MEDICINE

## 2021-08-17 PROCEDURE — 87635 SARS-COV-2 COVID-19 AMP PRB: CPT

## 2021-08-17 PROCEDURE — 74011250636 HC RX REV CODE- 250/636: Performed by: HOSPITALIST

## 2021-08-17 PROCEDURE — 74011250637 HC RX REV CODE- 250/637: Performed by: INTERNAL MEDICINE

## 2021-08-17 PROCEDURE — 74011250637 HC RX REV CODE- 250/637: Performed by: STUDENT IN AN ORGANIZED HEALTH CARE EDUCATION/TRAINING PROGRAM

## 2021-08-17 PROCEDURE — 71275 CT ANGIOGRAPHY CHEST: CPT

## 2021-08-17 PROCEDURE — 74011000258 HC RX REV CODE- 258: Performed by: HOSPITALIST

## 2021-08-17 PROCEDURE — 83880 ASSAY OF NATRIURETIC PEPTIDE: CPT

## 2021-08-17 PROCEDURE — 87040 BLOOD CULTURE FOR BACTERIA: CPT

## 2021-08-17 PROCEDURE — 99221 1ST HOSP IP/OBS SF/LOW 40: CPT | Performed by: NURSE PRACTITIONER

## 2021-08-17 PROCEDURE — 74011000250 HC RX REV CODE- 250: Performed by: INTERNAL MEDICINE

## 2021-08-17 PROCEDURE — 84100 ASSAY OF PHOSPHORUS: CPT

## 2021-08-17 PROCEDURE — 74011250637 HC RX REV CODE- 250/637: Performed by: FAMILY MEDICINE

## 2021-08-17 PROCEDURE — 77010033678 HC OXYGEN DAILY

## 2021-08-17 PROCEDURE — 65610000006 HC RM INTENSIVE CARE

## 2021-08-17 PROCEDURE — 85025 COMPLETE CBC W/AUTO DIFF WBC: CPT

## 2021-08-17 PROCEDURE — 84132 ASSAY OF SERUM POTASSIUM: CPT

## 2021-08-17 PROCEDURE — 36415 COLL VENOUS BLD VENIPUNCTURE: CPT

## 2021-08-17 PROCEDURE — 93321 DOPPLER ECHO F-UP/LMTD STD: CPT

## 2021-08-17 RX ORDER — MAGNESIUM SULFATE 1 G/100ML
1 INJECTION INTRAVENOUS ONCE
Status: COMPLETED | OUTPATIENT
Start: 2021-08-17 | End: 2021-08-17

## 2021-08-17 RX ORDER — MAGNESIUM SULFATE HEPTAHYDRATE 40 MG/ML
2 INJECTION, SOLUTION INTRAVENOUS ONCE
Status: COMPLETED | OUTPATIENT
Start: 2021-08-17 | End: 2021-08-17

## 2021-08-17 RX ORDER — POTASSIUM CHLORIDE 20 MEQ/1
40 TABLET, EXTENDED RELEASE ORAL
Status: COMPLETED | OUTPATIENT
Start: 2021-08-17 | End: 2021-08-17

## 2021-08-17 RX ORDER — METOPROLOL TARTRATE 5 MG/5ML
2.5 INJECTION INTRAVENOUS EVERY 6 HOURS
Status: DISCONTINUED | OUTPATIENT
Start: 2021-08-18 | End: 2021-08-19

## 2021-08-17 RX ORDER — VANCOMYCIN HYDROCHLORIDE
1250 EVERY 24 HOURS
Status: DISCONTINUED | OUTPATIENT
Start: 2021-08-17 | End: 2021-08-17

## 2021-08-17 RX ORDER — POTASSIUM CHLORIDE 20 MEQ/1
20 TABLET, EXTENDED RELEASE ORAL ONCE
Status: COMPLETED | OUTPATIENT
Start: 2021-08-18 | End: 2021-08-18

## 2021-08-17 RX ORDER — FUROSEMIDE 10 MG/ML
40 INJECTION INTRAMUSCULAR; INTRAVENOUS EVERY 12 HOURS
Status: DISCONTINUED | OUTPATIENT
Start: 2021-08-17 | End: 2021-08-19

## 2021-08-17 RX ORDER — POTASSIUM CHLORIDE 7.45 MG/ML
10 INJECTION INTRAVENOUS
Status: DISPENSED | OUTPATIENT
Start: 2021-08-17 | End: 2021-08-17

## 2021-08-17 RX ORDER — FUROSEMIDE 10 MG/ML
40 INJECTION INTRAMUSCULAR; INTRAVENOUS ONCE
Status: COMPLETED | OUTPATIENT
Start: 2021-08-17 | End: 2021-08-17

## 2021-08-17 RX ADMIN — FUROSEMIDE 40 MG: 10 INJECTION, SOLUTION INTRAVENOUS at 10:00

## 2021-08-17 RX ADMIN — SODIUM BICARBONATE: 84 INJECTION, SOLUTION INTRAVENOUS at 11:45

## 2021-08-17 RX ADMIN — PIPERACILLIN AND TAZOBACTAM 3.38 G: 3; .375 INJECTION, POWDER, LYOPHILIZED, FOR SOLUTION INTRAVENOUS at 04:01

## 2021-08-17 RX ADMIN — ASPIRIN 81 MG: 81 TABLET, CHEWABLE ORAL at 09:00

## 2021-08-17 RX ADMIN — ALPRAZOLAM 0.5 MG: 0.5 TABLET ORAL at 12:00

## 2021-08-17 RX ADMIN — POTASSIUM PHOSPHATE, MONOBASIC AND POTASSIUM PHOSPHATE, DIBASIC: 224; 236 INJECTION, SOLUTION, CONCENTRATE INTRAVENOUS at 13:11

## 2021-08-17 RX ADMIN — MAGNESIUM SULFATE HEPTAHYDRATE 1 G: 1 INJECTION, SOLUTION INTRAVENOUS at 19:45

## 2021-08-17 RX ADMIN — DIBASIC SODIUM PHOSPHATE, MONOBASIC POTASSIUM PHOSPHATE AND MONOBASIC SODIUM PHOSPHATE 1 TABLET: 852; 155; 130 TABLET ORAL at 19:45

## 2021-08-17 RX ADMIN — BUPROPION HYDROCHLORIDE 150 MG: 150 TABLET, FILM COATED, EXTENDED RELEASE ORAL at 09:00

## 2021-08-17 RX ADMIN — FAMOTIDINE 20 MG: 20 TABLET, FILM COATED ORAL at 09:00

## 2021-08-17 RX ADMIN — POTASSIUM CHLORIDE 40 MEQ: 20 TABLET, EXTENDED RELEASE ORAL at 13:10

## 2021-08-17 RX ADMIN — PIPERACILLIN AND TAZOBACTAM 3.38 G: 3; .375 INJECTION, POWDER, LYOPHILIZED, FOR SOLUTION INTRAVENOUS at 19:45

## 2021-08-17 RX ADMIN — DULOXETINE HYDROCHLORIDE 60 MG: 60 CAPSULE, DELAYED RELEASE ORAL at 09:00

## 2021-08-17 RX ADMIN — HEPARIN SODIUM 5000 UNITS: 5000 INJECTION INTRAVENOUS; SUBCUTANEOUS at 00:06

## 2021-08-17 RX ADMIN — HEPARIN SODIUM 5000 UNITS: 5000 INJECTION INTRAVENOUS; SUBCUTANEOUS at 08:00

## 2021-08-17 RX ADMIN — IOPAMIDOL 100 ML: 755 INJECTION, SOLUTION INTRAVENOUS at 09:04

## 2021-08-17 RX ADMIN — MAGNESIUM SULFATE HEPTAHYDRATE 2 G: 40 INJECTION, SOLUTION INTRAVENOUS at 13:10

## 2021-08-17 RX ADMIN — PIPERACILLIN AND TAZOBACTAM 3.38 G: 3; .375 INJECTION, POWDER, LYOPHILIZED, FOR SOLUTION INTRAVENOUS at 13:07

## 2021-08-17 RX ADMIN — DIPHENHYDRAMINE HYDROCHLORIDE 12.5 MG: 50 INJECTION, SOLUTION INTRAMUSCULAR; INTRAVENOUS at 19:47

## 2021-08-17 RX ADMIN — ALBUMIN (HUMAN) 25 G: 0.25 INJECTION, SOLUTION INTRAVENOUS at 11:00

## 2021-08-17 RX ADMIN — POTASSIUM CHLORIDE 40 MEQ: 20 TABLET, EXTENDED RELEASE ORAL at 19:45
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
## 2021-08-17 NOTE — PROGRESS NOTES
Problem: Falls - Risk of  Goal: *Absence of Falls  Description: Document Galilea Finley Fall Risk and appropriate interventions in the flowsheet. Outcome: Progressing Towards Goal  Note: Fall Risk Interventions:  Mobility Interventions: Assess mobility with egress test, Bed/chair exit alarm, Communicate number of staff needed for ambulation/transfer, Patient to call before getting OOB         Medication Interventions: Patient to call before getting OOB    Elimination Interventions: Bed/chair exit alarm, Call light in reach, Toileting schedule/hourly rounds    History of Falls Interventions: Bed/chair exit alarm, Door open when patient unattended         Problem: Patient Education: Go to Patient Education Activity  Goal: Patient/Family Education  Outcome: Progressing Towards Goal     Problem: Diabetes Self-Management  Goal: *Disease process and treatment process  Description: Define diabetes and identify own type of diabetes; list 3 options for treating diabetes. Outcome: Progressing Towards Goal  Goal: *Incorporating nutritional management into lifestyle  Description: Describe effect of type, amount and timing of food on blood glucose; list 3 methods for planning meals. Outcome: Progressing Towards Goal  Goal: *Incorporating physical activity into lifestyle  Description: State effect of exercise on blood glucose levels. Outcome: Progressing Towards Goal  Goal: *Developing strategies to promote health/change behavior  Description: Define the ABC's of diabetes; identify appropriate screenings, schedule and personal plan for screenings. Outcome: Progressing Towards Goal  Goal: *Using medications safely  Description: State effect of diabetes medications on diabetes; name diabetes medication taking, action and side effects. Outcome: Progressing Towards Goal  Goal: *Monitoring blood glucose, interpreting and using results  Description: Identify recommended blood glucose targets  and personal targets.   Outcome: Progressing Towards Goal  Goal: *Prevention, detection, treatment of acute complications  Description: List symptoms of hyper- and hypoglycemia; describe how to treat low blood sugar and actions for lowering  high blood glucose level. Outcome: Progressing Towards Goal  Goal: *Prevention, detection and treatment of chronic complications  Description: Define the natural course of diabetes and describe the relationship of blood glucose levels to long term complications of diabetes. Outcome: Progressing Towards Goal  Goal: *Developing strategies to address psychosocial issues  Description: Describe feelings about living with diabetes; identify support needed and support network  Outcome: Progressing Towards Goal  Goal: *Insulin pump training  Outcome: Progressing Towards Goal  Goal: *Sick day guidelines  Outcome: Progressing Towards Goal  Goal: *Patient Specific Goal (EDIT GOAL, INSERT TEXT)  Outcome: Progressing Towards Goal     Problem: Patient Education: Go to Patient Education Activity  Goal: Patient/Family Education  Outcome: Progressing Towards Goal     Problem: Pressure Injury - Risk of  Goal: *Prevention of pressure injury  Description: Document Flo Scale and appropriate interventions in the flowsheet. Outcome: Progressing Towards Goal  Note: Pressure Injury Interventions:  Sensory Interventions: Assess changes in LOC, Keep linens dry and wrinkle-free, Monitor skin under medical devices, Turn and reposition approx. every two hours (pillows and wedges if needed), Minimize linen layers    Moisture Interventions: Absorbent underpads, Apply protective barrier, creams and emollients, Check for incontinence Q2 hours and as needed, Maintain skin hydration (lotion/cream)    Activity Interventions: PT/OT evaluation    Mobility Interventions: Turn and reposition approx.  every two hours(pillow and wedges), HOB 30 degrees or less    Nutrition Interventions: Document food/fluid/supplement intake, Discuss nutritional consult with provider    Friction and Shear Interventions: Apply protective barrier, creams and emollients                Problem: Patient Education: Go to Patient Education Activity  Goal: Patient/Family Education  Outcome: Progressing Towards Goal     Problem: Risk for Spread of Infection  Goal: Prevent transmission of infectious organism to others  Description: Prevent the transmission of infectious organisms to other patients, staff members, and visitors.   Outcome: Progressing Towards Goal     Problem: Patient Education:  Go to Education Activity  Goal: Patient/Family Education  Outcome: Progressing Towards Goal

## 2021-08-17 NOTE — PROGRESS NOTES
Chart reviewed, pt was transferred from floor after RRT to ICU  unit due to hypotension and SOB, pt has been non-compliant with medical management/care, cm will cont to review and monitor for d/c planning.

## 2021-08-17 NOTE — PROGRESS NOTES
Care manager had lengthy conversation with patient's cousin, Jonny Collier ([phone]); earlier in patient's stay she had told this care manager that the cousin was caring for her mother while she was in the hospital and that she (patient) had been the primary care giver to mother despite her profound debility during initial interview. Spoke with Ms. Roxanne Hutchins who identified herself as a nurse; per MsLidia Conner, patient (who she calls Ashley Syed) has been taking care of her mother Olivia Ortega - per Ms. Andrew Jensen is bipolar and has been off of her meds 'for years;' has no PCP and is essentially bedridden herself - per Ms. Andrew Jensen can stand to change her own briefs but is essentially nonambulatory - Ms. Roxanne Hutchins has been checking on her daily and providing her meals. Per Ms Roxanne Hutchins, there are other family members in the area and this care manager stated that they needed to get other assistance and supervision in the house - have emailed Personal Care aide list to her email of [email or web address]; care manager has expressed concern that APS may need to be contacted if the family is unable to supervise patient's mother at home. Ms. Roxanne Hutchins has requested that she be the person who is called regarding patient's information; discussed with palliative care who attempted unsuccessfully to get patient to sign AMD information and they will attempt this again tomorrow. Care manager will continue to follow and assist as needed.

## 2021-08-17 NOTE — PROGRESS NOTES
Vancomycin - Pharmacy to Dose    Consult provided for this 46y.o. year old ,female for indication of HAP 7 day tx. Wt Readings from Last 1 Encounters:   08/17/21 75.3 kg (166 lb)       Ht Readings from Last 1 Encounters:   08/17/21 172.7 cm (68\")     Dosing Weight:  75.3 kg    Additional Antibiotics:  Zosyn    Date:  8/17/21   Lab Results   Component Value Date/Time    Creatinine 0.86 08/17/2021 05:20 AM     creatinine clearance:  78.1 ml/min    white blood cell count:  10.6    Patient previously on Vancomycin. Will continue with Vancomycin 1250 mg IV q24hrs ( x 7 days for HAP per consult )    Est.  mg/l.hr  Tr 12.9mg/l    Pharmacy to follow daily and will make changes to dose and/or frequency based on clinical status.       [id number] [facility], [address] [place] [address]

## 2021-08-17 NOTE — PROGRESS NOTES
Anabella Vela  is a 36 year old   female who  presents today for routine gynecologic exam.  She reports more acne since starting iud but very happy without a menses. Also notes gap in her abdominal wall above umbilicus. No gu gi or gyn complaints. Reviewed new ACOG guidelines and recommend Pap every 3-5 years. Patient agrees.   Pap today.     OB/GYN HISTORY:   OB History    Para Term  AB Living   3 3 3 0 0 3   SAB TAB Ectopic Molar Multiple Live Births   0 0 0 0 0 3    Patient's last menstrual period was 2021.     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     No current facility-administered medications for this visit.        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    Basal cell Carcinoma    • No past surgeries     • Past surgical history      None       SOCIAL HISTORY:   Social History     Socioeconomic History   • Marital status: /Civil Union     Spouse name: Not on file   • Number of children: Not on file   • Years of education: Not on file   • Highest education level: Not on file   Occupational History   • Not on file   Social Needs   • Financial resource strain: Not on file   • Food insecurity     Worry: Not on file     Inability: Not on file   • Transportation needs     Medical: Not on file     Non-medical: Not on file   Tobacco Use   • Smoking status: Never Smoker   • Smokeless  Pt seen and examined at 0830. CTA not done yet. Tachypneic 30's  SpO2 on RA 88-92%. PT takes off pulse ox. PT refuses supplemental O2.   /84  +cough  Pt states \"stop hollering\" and \"stop bothering me\". Nods \"yes\" to are you having trouble breathing? Does not respond to are you having chest pain? RLE dressing clean/intact  LLE dressing clean/intact    Covid testing not done. Pt is not vaccinated. Discussed with CCMD and Hospitalist.  Surg cancelled for today. Will monitor for physiologic improvement.      Aarti Mckeon MD, [address] [place] [address] [facility]  [phone] office  [phone] mobile tobacco: Never Used   Substance and Sexual Activity   • Alcohol use: Yes     Comment: occ   • Drug use: No   • Sexual activity: Yes     Partners: Male   Lifestyle   • Physical activity     Days per week: Not on file     Minutes per session: Not on file   • Stress: Not on file   Relationships   • Social connections     Talks on phone: Not on file     Gets together: Not on file     Attends Protestant service: Not on file     Active member of club or organization: Not on file     Attends meetings of clubs or organizations: Not on file     Relationship status: Not on file   • Intimate partner violence     Fear of current or ex partner: Not on file     Emotionally abused: Not on file     Physically abused: Not on file     Forced sexual activity: Not on file   Other Topics Concern   • Not on file   Social History Narrative    [employer]       FAMILY HISTORY:    Family History   Problem Relation Age of Onset   • High blood pressure Father    • High cholesterol Father    • Cancer Maternal Grandfather    • NEGATIVE FAMILY HX OF Other         no ov colon or breast ca       Last Labs include:  No results found for: CHOLESTEROL    OBJECTIVE:  Blood pressure 100/62, pulse 77, height 5' 9\" (1.753 m), weight 62.4 kg, last menstrual period 02/19/2021, SpO2 97 %.  NECK: No thyromegaly. No cervical adenopathy  LUNGS: Clear to auscultation.  HEART: Regular rate and rhythm.  BREASTS: Symmetric bilaterally without masses or nipple discharge.  No axillary or supraclavicular adenopathy. BSE reviewed and encouraged  ABDOMEN: Soft and nontender without masses or hepatomegaly. Small diastasis above umbilicus. Reviewed with pt. Reviewed exercises.  EXTREMITIES: Nontender.  No edema.  PELVIC EXAM: External genitalia: No lesions or masses notedNormal urethral meatus  Vagina:Normal mucosa and no exudate present.  Cervix:Normal, without  lesions or masses , pap smear obtained and iud seen.  Uterus:  Six  week size, mobile , non-tender, anteverted.  Adnexa:No masses , no tenderness, sidewalls smooth and ligaments smooth.  RECTOVAGINAL:confirmatory.    IMPRESSION:  > Normal GYN exam  >   PLAN:  > Pap smear and pelvic done today. BSE taught and encouraged  > skin issue reviewed. ocp's offered but declined  > Mammogram: every 1-2 years in the 40's.    > Return to clinic 1 year or as needed  >

## 2021-08-17 NOTE — PROGRESS NOTES
[facility]  (A Division of [address] [place] [address])                                                                                                                       Tania Saul MD  Office #:     [phone]   Office Fax: [phone]     Date of Admission: 8/11/2021Date of Note: 8/17/2021  Reason for FU: severe sepsis      Current Antimicrobials:    Prior Antimicrobials:  Zosyn 8/11- 8/13m resumed 8/16  TD  Vanco 8/11 TD  Flagyl 8/15 TD    Immunosuppressive drugs: na Clindamycin 8/13 X3 doses  Ctx 8/13 to 8/15  Levofloxacin 8/13 X1       Assessment- ID related:  --------------------------------------------------------------------------    · Probable acute resp failure 2/2 pul edema with diffuse alveolar infiltrate, small to mod B/L pleural effusion on CXR--reasonable to wait for COVID pcr result for dc isolation given rise in pandemic. Doubt progressive bacterial pna. Lactic acid normal  · Right foot wet gangrene with Severe sepsis POA  · R BKA due to above  · Bacteremia with anaerobe( peptoniphilus) 1/4 bottles 8/11  · BCX 8/13-8/15: NGSF  · Hypoxia and hypothermia--while on broad spectrum abx  · Leucocytosis resolved post BKA  CRP 6.2-> 5.7    WCX L foot-8/11: Providencia and MSSA  WCX R foot-8/11: E.fecalis. Alcaligenes fecalis, MSSA    Other Medical Issues- Mx per respective team:     DM-poorly controlled  PVD  tobaco use  YUNIEL improved    Past ID Issues:  COVID 19 vaccine- 8/12/21  Left foot dry gangrene- post TMA 4/12- Rxed long term ABX Recommendation for ID issues I am following:  --------------------------------------------------------------------------    Cont with Zosyn- will broadly cover MSSA/gram negatives and anaerobes      Diuresis, I/O monitor per ICU team    Will follow her clinically and her cultures closely. Wouldn't add Vanco or other abx at this time as broadly covered by Zosyn.  DC'ed added vanco after dw Dr Hough Self team exam to left foot- ordered                          Condition:  Acutely sick    Subjective:  Transferred to ICU  Not on pressors, oxygen via NC. Awake, doesn't voice any complaint  Palliative team in room with her  Notes, labs , microbiology data and imaging reports reviewed  Microbiology results reviewed- [facility] called for further clarification as indicated       HPI:  Clementina Villa is 45 YO WHITE/NON- female with PMH as listed below. Known to me from left foot infection/dry gangrene and treated with 6 weeks of IV abx-Unasyn followed by 2 weeks of oral augmentin. She had poorly controlled DM. Admitted on 8/11 with R foot wet gangrene and severe sepsis- high wbc, hypotension. Was placed on Vanco and Zosyn, she was taken to OR for R BKA. WCX taken from both feet reviewed, didn't have MRSA. Bacteremia with anearobe from admission. She had no further bacteremia. Flagyl was added by surgery. She has SOB and hypoxemia this morning. Was given lasix and morphine. She has labs for lactic acid/bmp pending. Still remains on vanco- no MRSA isolated. During interview, is awake, but limited with her answers, wants to rest. No fever, no diarrhea. DW RN- who states she is breathing better since her earlier medications with lasix and morphine during RRT.          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 ([address] 04/04/2021    YUNIEL (acute kidney injury) ([address] 04/04/2021     Past Medical History:   Diagnosis Date    PAD (peripheral artery disease) (HCC)      Past Surgical History:   Procedure Laterality Date    HX ORTHOPAEDIC      L TMT amputation     Family History   Problem Relation Age of Onset    Diabetes Mother      Social History     Socioeconomic History    Marital status:      Spouse name: Not on file    Number of children: Not on file    Years of education: Not on file    Highest education level: Not on file   Occupational History    Not on file   Tobacco Use    Smoking status: Current Every Day Smoker     Packs/day: 1.50    Smokeless tobacco: Never Used   Substance and Sexual Activity    Alcohol use: Not Currently     Comment: on rare occasion    Drug use: Not Currently    Sexual activity: Not on file   Other Topics Concern     Service Not Asked    Blood Transfusions Not Asked    Caffeine Concern Not Asked    Occupational Exposure Not Asked    Hobby Hazards Not Asked    Sleep Concern Not Asked    Stress Concern Not Asked    Weight Concern Not Asked    Special Diet Not Asked    Back Care Not Asked    Exercise Not Asked    Bike Helmet Not Asked    Seat Belt Not Asked    Self-Exams Not Asked   Social History Narrative    Not on file     Social Determinants of Health     Financial Resource Strain:     Difficulty of Paying Living Expenses:    Food Insecurity:     Worried About Running Out of Food in the Last Year:     Ran Out of Food in the Last Year:    Transportation Needs:     Lack of Transportation (Medical):      Lack of Transportation (Non-Medical):    Physical Activity:     Days of Exercise per Week:     Minutes of Exercise per Session:    Stress:     Feeling of Stress :    Social Connections:     Frequency of Communication with Friends and Family:     Frequency of Social Gatherings with Friends and Family:     Attends Yarsanism Services:     Active Member of Clubs or Organizations:     Attends [employer] or Organization Meetings:     Marital Status:    Intimate Partner Violence:     Fear of Current or Ex-Partner:     Emotionally Abused:     Physically Abused:     Sexually Abused: Allergies:  Patient has no known allergies.      Medications:  Current Facility-Administered Medications   Medication Dose Route Frequency    magnesium sulfate 2 g/50 ml IVPB (premix or compounded)  2 g IntraVENous ONCE    potassium phosphate 15 mmol in 0.9% sodium chloride 250 mL infusion   IntraVENous ONCE    sodium bicarbonate (8.4%) 100 mEq in sterile water 1,000 mL infusion   IntraVENous CONTINUOUS    vancomycin (VANCOCIN) 1250 mg in  ml infusion  1,250 mg IntraVENous Q24H    Vancomycin - Pharmacokinetic Dosing  1 Each Other Rx Dosing/Monitoring    piperacillin-tazobactam (ZOSYN) 3.375 g in 0.9% sodium chloride (MBP/ADV) 100 mL MBP  3.375 g IntraVENous Q8H    0.9% sodium chloride infusion 250 mL  250 mL IntraVENous PRN    famotidine (PEPCID) tablet 20 mg  20 mg Oral BID    DULoxetine (CYMBALTA) capsule 60 mg  60 mg Oral DAILY    albuterol-ipratropium (DUO-NEB) 2.5 MG-0.5 MG/3 ML  3 mL Nebulization Q4H PRN    albumin human 25% (BUMINATE) solution 25 g  25 g IntraVENous Q6H    insulin glargine (LANTUS) injection 5 Units  5 Units SubCUTAneous DAILY    ALPRAZolam (XANAX) tablet 0.5 mg  0.5 mg Oral QHS PRN    buPROPion SR (WELLBUTRIN SR) tablet 150 mg  150 mg Oral DAILY    gabapentin (NEURONTIN) capsule 100 mg  100 mg Oral TID PRN    morphine injection 2 mg  2 mg IntraVENous Q4H PRN    COVID-19 vac,Ad26-PF (Alkymos) injection 1 Dose  1 Dose IntraMUSCular PRIOR TO DISCHARGE    aspirin chewable tablet 81 mg  81 mg Oral DAILY    heparin (porcine) injection 5,000 Units  5,000 Units SubCUTAneous Q8H    HYDROcodone-acetaminophen (NORCO) 7.5-325 mg per tablet 1 Tablet  1 Tablet Oral Q4H PRN    HYDROcodone-acetaminophen (NORCO) 7.5-325 mg per tablet 2 Tablet  2 Tablet Oral Q6H PRN    naloxone (NARCAN) injection 0.1 mg  0.1 mg IntraVENous PRN    diphenhydrAMINE (BENADRYL) injection 12.5 mg  12.5 mg IntraVENous Q6H PRN    acetaminophen (TYLENOL) tablet 650 mg  650 mg Oral Q6H PRN    Or    acetaminophen (TYLENOL) suppository 650 mg  650 mg Rectal Q6H PRN    polyethylene glycol (MIRALAX) packet 17 g  17 g Oral DAILY PRN    ondansetron (ZOFRAN ODT) tablet 4 mg  4 mg Oral Q8H PRN    Or    ondansetron (ZOFRAN) injection 4 mg  4 mg IntraVENous Q6H PRN    insulin lispro (HUMALOG) injection   SubCUTAneous Q6H    glucose chewable tablet 16 g  16 g Oral PRN    glucagon (GLUCAGEN) injection 1 mg  1 mg IntraMUSCular PRN    dextrose (D50W) injection syrg 12.5-25 g  25-50 mL IntraVENous PRN    atorvastatin (LIPITOR) tablet 40 mg  40 mg Oral QHS        ROS:  Review of systems not obtained due to patient factors. Patient doesn't give detail answers     Physical Exam:    Temp (24hrs), Av.2 °F (36.8 °C), Min:98.1 °F (36.7 °C), Max:98.4 °F (36.9 °C)    Visit Vitals  BP (!) 151/78   Pulse (!) 101   Temp 98.4 °F (36.9 °C)   Resp 16   Ht 5' 8\" (1.727 m)   Wt 75.3 kg (166 lb)   LMP 2018 (Within Years)   SpO2 (!) 89%   BMI 25.24 kg/m²     Exam done under PPE proocol     GEN: WD chronically sick looking- no severe distress noted  HEENT: Unicteric. EOMI intact  CHEST: Non laboured breathing. B/L wheezes  CVS:RRR, no mur/gallop  ABD: Obese/soft. Non tender. MEETA: Deferred  EXT: No apparent swelling or redness on UE/LE joints. R  BKA site is dressed  Left foot is dressed as well  Skin: Dry and intact. No rash, no redness. CNS: A, OX3. Moves all extremity. CN grossly ok.       Microbiology  All Micro Results     Procedure Component Value Units Date/Time    COVID-19 RAPID TEST [[id number]] Collected: 21 0982    Order Status: Completed Specimen: Nasopharyngeal Updated: 21 1041     Specimen source Nasopharyngeal        COVID-19 rapid test Not detected        Comment: Rapid Abbott ID Now       Rapid NAAT:  The specimen is NEGATIVE for SARS-CoV-2, the novel coronavirus associated with COVID-19. Negative results should be treated as presumptive and, if inconsistent with clinical signs and symptoms or necessary for patient management, should be tested with an alternative molecular assay. Negative results do not preclude SARS-CoV-2 infection and should not be used as the sole basis for patient management decisions. This test has been authorized by the FDA under an Emergency Use Authorization (EUA) for use by authorized laboratories.    Fact sheet for Healthcare Providers: ConventionPandoodledate.co.nz  Fact sheet for Patients: CMP.LYdate.co.nz       Methodology: Isothermal Nucleic Acid Amplification         CULTURE, WOUND [place] STAIN [[id number]]  (Abnormal)  (Susceptibility) Collected: 08/11/21 2145    Order Status: Completed Specimen: Wound from Foot Updated: 08/17/21 0946     Special Requests: NO SPECIAL REQUESTS        GRAM STAIN NO WBC'S SEEN         4+ GRAM NEGATIVE RODS               1+ GRAM POSITIVE COCCI IN PAIRS           Culture result:       HEAVY GRAM NEGATIVE RODS (REFER TO ISOLATE 2)                  HEAVY ALCALIGENES FAECALIS                  HEAVY ENTEROCOCCUS FAECALIS                  HEAVY STAPHYLOCOCCUS AUREUS          CULTURE, BLOOD [[id number]] Collected: 08/17/21 0520    Order Status: Completed Specimen: Blood Updated: 08/17/21 0739     Special Requests: NO SPECIAL REQUESTS        Culture result: NO GROWTH AFTER 1 HOUR       CULTURE, BLOOD [[id number]] Collected: 08/16/21 0530    Order Status: Completed Specimen: Blood Updated: 08/17/21 0739     Special Requests: NO SPECIAL REQUESTS        Culture result: NO GROWTH AFTER 23 HOURS       CULTURE, BLOOD [[id number]] Collected: 08/16/21 0530    Order Status: Completed Specimen: Blood Updated: 08/17/21 0739     Special Requests: NO SPECIAL REQUESTS        Culture result: NO GROWTH AFTER 23 HOURS       CULTURE, BLOOD [[id number]] Collected: 08/15/21 0316    Order Status: Completed Specimen: Blood Updated: 08/17/21 0739     Special Requests: NO SPECIAL REQUESTS        Culture result: NO GROWTH 2 DAYS       CULTURE, BLOOD [[id number]] Collected: 08/15/21 0316    Order Status: Completed Specimen: Blood Updated: 08/17/21 0739     Special Requests: NO SPECIAL REQUESTS        Culture result: NO GROWTH 2 DAYS       CULTURE, BLOOD [[id number]] Collected: 08/14/21 0342    Order Status: Completed Specimen: Blood Updated: 08/17/21 0739     Special Requests: NO SPECIAL REQUESTS        Culture result: NO GROWTH 3 DAYS       CULTURE, BLOOD [[id number]] Collected: 08/14/21 0342    Order Status: Completed Specimen: Blood Updated: 08/17/21 0739     Special Requests: NO SPECIAL REQUESTS        Culture result: NO GROWTH 3 DAYS       CULTURE, BLOOD [[id number]] Collected: 08/13/21 1150    Order Status: Completed Specimen: Blood Updated: 08/17/21 0739     Special Requests: NO SPECIAL REQUESTS        Culture result: NO GROWTH 4 DAYS       CULTURE, WOUND [place]sworth [place] STAIN [[id number]]  (Abnormal)  (Susceptibility) Collected: 08/11/21 2145    Order Status: Completed Specimen: Wound from Foot Updated: 08/15/21 1013     Special Requests: NO SPECIAL REQUESTS        GRAM STAIN NO WBC'S SEEN         1+ GRAM NEGATIVE RODS               FEW GRAM POSITIVE COCCI IN PAIRS           Culture result:       MODERATE PROVIDENCIA STUARTII                  MODERATE STAPHYLOCOCCUS AUREUS          CULTURE, BLOOD [[id number]] Collected: 08/11/21 2225    Order Status: Completed Specimen: Blood Updated: 08/14/21 1502     Special Requests: NO SPECIAL REQUESTS        GRAM STAIN       GRAM POSITIVE COCCI IN CLUSTERS ANAEROBIC BOTTLE                  SMEAR CALLED TO AND CORRECTLY REPEATED BY: Laura Taylor RN 3S TO [facility] [id number] AT [id number]           Culture result:       Finegoldia magna GROWING IN THE ANAEROBIC BOTTLE          CULTURE, BLOOD [[id number]]  (Abnormal) Collected: 08/11/21 2730    Order Status: Completed Specimen: Blood Updated: 21 1500     Special Requests: NO SPECIAL REQUESTS        GRAM STAIN       GRAM POSITIVE COCCI IN CLUSTERS ANAEROBIC BOTTLE                  SMEAR CALLED TO AND CORRECTLY REPEATED BY: Lesia Ybarra RN 3S TO [facility] [id number] AT 0502           Culture result:       Peptoniphilus asaccharolyticus GROWING IN THE ANAEROBIC BOTTLE          CULTURE, URINE [[id number]] Collected: 21 2230    Order Status: Canceled Specimen: Urine from Clean catch                Lines / Catheters:  Lab results:    Chemistry  Recent Labs     21  0530 08/15/21  031   * 127* 126*    144 139   K 3.1* 3.7 3.7   * 116* 115*   CO2 23 16* 18*   BUN 21* 24* 34*   CREA 0.86 0.92 1.18   CA 8.0* 8.0* 7.3*   AGAP 7 12 6   BUCR 24* 26* 29*   AP  --  100 109   TP  --  5.6* 5.4*   ALB 3.3* 3.0* 2.4*   GLOB  --  2.6 3.0   AGRAT  --  1.2 0.8       CBC w/ Diff  Recent Labs     21  0530 08/15/21  0316   WBC 10.6 8.4 10.4   RBC 2.85* 2.85* 2.87*   HGB 8.3* 8.2* 8.3*   HCT 25.5* 25.5* 25.3*    273 292   GRANS 89* 93* 91*   LYMPH 4* 3* 4*   EOS 1 0 0       Imagin/14- CXR  Patchy airspace disease right base very likely developing pneumonia. Small  atelectatic streak left base.

## 2021-08-17 NOTE — CONSULTS
Formerly named [facility]: [phone])  Formerly [facility]: [phone]     Patient Name: Blanka Manzo  YOB: 1970    Date of Initial Consult : 8/17/2021  Reason for Consult: Care decisions  Requesting Provider: Dr. Jeffry Gottron  Primary Care Physician: None      SUMMARY:   Blanka Manzo is a 46 y.o. female with a past history of uncontrolled type 2 diabetes mellitus, severe peripheral arterial disease, previous left transmetatarsal amputation and tobacco use, who was admitted on 8/11/2021 from home with a diagnosis of gangrene of both feet. Current medical issues leading to Palliative Medicine involvement include: Debilitated 43-year-old female with multiple comorbid conditions including noncompliance with wound care treatment due to recent fall presented to the ER with complaints of fever and gangrene in both feet. Palliative medicine is consulted for care decisions. CHIEF COMPLAINT: Fever, gangrene of her feet    HPI/SUBJECTIVE:    Past history as above. Ms. Melchor Pang is a very debilitated 43-year-old female with multiple comorbid conditions including noncompliance with wound care treatment after a recent fall. She has presented to the ER with complaints of fever and gangrene in both feet. She fell approximately 3 weeks ago and has been mostly bedbound ever since. Patient has been unable to attend wound care appointments or hyperbaric appointments due to bedbound status at home. The patient is:   [x] Verbal and participatory -limited as patient does not like to answer questions  [] Non-participatory due to:     GOALS OF CARE: Full code with full interventions  Patient/Health Care Proxy Stated Goals: Prolong life  TREATMENT PREFERENCES:   Code Status: Full Code         PALLIATIVE DIAGNOSES:   1. Encounter with palliative care/goals of care  2. Gangrene of both feet  3. Noncompliance with medical treatment  4. Debility          PLAN:   1.  Encounter with palliative care/goals of care -seen at bedside along with Ms. Yue Sandoval. Ms. Ashley Flores is awake, alert and oriented x4. Patient became very frustrated when being asked orientation questions and replied \"do you think I am stupid? \". Attempted to explain to patient the reason we ask orientation questions however patient rolled her eyes and repeated \"do you think I am stupid? \". Nasal oxygen is off the patient was agreeable to have it reapplied. Pulse oximeter was also off and patient agreed to have it replaced and oxygen saturations ranged from 94 to 97%. Patient seem to get frustrated with any questions asked saying Ayo Plane do you keep asking me these questions? \". Asked patient what she wanted from this hospitalization and she said \"I just want to live. Don't you all get it?\". Attempted to explain to patient that she had been declining recommended treatments and she said \"I just want to breathe\". Attempted to introduce and explain goals of care, including benefits and burdens of resuscitation, intubation and ventilation. Asked patient about her thoughts on resuscitation in the event of cardiac arrest and intubation in the event of worsening respiratory status and patient declined to respond or answer or our questions. Patient had her eyes closed and did not  wish to discuss this with us. Asked patient if she had ever thought about who she would want to make her medical decisions for her in the event she cannot speak for herself and she stated it would be her cousin, Rashida Hatch. Introduced and explained the [facility] and offered to complete with patient today to formalize her wish for her cousin Rashida Hatch to be her MPOA. Patient declined to do any paperwork with us. Explained to patient that in the absence of any paperwork and if she was unable to speak for herself that legal decision making would default to her parents who are still living.   Patient expressed understanding; however shared that her mother is not able to have a conversation about patient's health. Patient declined to elaborate on what this meant. Patient seemed annoyed throughout our visit and declined to engage in further conversation. Call placed to patient's cousin, Leyla Lees, to try and gather additional information about patient. Lauren Sahu shared with us that Barrera Hudson prefers to be called Curtis Gaspar. Shared with Lauren Sahu our conversation and lack of patient's engagement in the conversation. Shared with Lauren Sahu that patient verbally chose her to be surrogate decision-maker however patient was not willing to sign AMD paperwork to formalize this nor was she willing to have an in-depth discussion about goals of care. Lauren Sahu shared with us that patient's mother is essentially bedbound at home and that patient is normally the primary caregiver for her mother. It is unclear who is caring for patient's mother while patient is hospitalized. At this time, goals of care remain full code with full interventions. 2. Gangrene of both feet - primary team managing, vascular surgery consult, underwent right below-knee amputation on 8/12/2021, wound care consult    3. Noncompliance with medical treatment -patient fell approximately 3 weeks prior to admission and has been mainly bedbound at home and has been unable to participate with hyperbaric treatment appointments or wound care appointments. 4. Debility - PPS 40 which indicates an overall poor prognosis, patient lives at home with her mother who is mainly bedbound and patient is the primary caregiver for her mother. Since patient's fall approximately 3 weeks ago, patient has been mainly bedbound as well. Per chart review, patient has been using a bucket to go to the bathroom. 5. Initial consult note routed to primary continuity provider    6.  Communicated plan of care with: Palliative IDT, patient      Advance Care Planning:  [] [facility] Interdisciplinary Team has updated the ACP Navigator with Health Care Agent and Patient Capacity    Primary Decision [facility] ([address]):   Primary Decision Maker: Leanna Loyd (cousin) - Other Relative - [phone]    Secondary Decision Maker: Royce Espinosa - Mother - [phone]              As far as possible, the palliative care team has discussed with patient / health care proxy about goals of care / treatment preferences for patient. HISTORY:     History obtained from: Chart, medical team and cousinAmalia Ubaldo    Principal Problem:    Gangrene of both feet ([address] (8/11/2021)    Active Problems:    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 ([address] (4/4/2021)      YUNIEL (acute kidney injury) ([address] (4/4/2021)      Non compliance with medical treatment (4/12/2021)      Fall (8/12/2021)      Tobacco use (8/12/2021)      Preoperative evaluation to rule out surgical contraindication (8/12/2021)      Anemia (8/12/2021)      Sepsis ([address] (8/13/2021)      Status post below-knee amputation of right lower extremity ([address] (8/13/2021)      Bacteremia (8/13/2021)      Dyspnea (8/16/2021)      Pulmonary infiltrates (4/83/0075)      Systolic CHF, acute ([address] (8/17/2021)      Past Medical History:   Diagnosis Date    PAD (peripheral artery disease) (HCC)       Past Surgical History:   Procedure Laterality Date    HX ORTHOPAEDIC      L TMT amputation      Family History   Problem Relation Age of Onset    Diabetes Mother      History reviewed, no pertinent family history.   Social History     Tobacco Use    Smoking status: Current Every Day Smoker     Packs/day: 1.50    Smokeless tobacco: Never Used   Substance Use Topics    Alcohol use: Not Currently     Comment: on rare occasion     No Known Allergies   Current Facility-Administered Medications   Medication Dose Route Frequency    0.9% sodium chloride with KCl 40 mEq/L infusion   IntraVENous CONTINUOUS    ELECTROLYTE REPLACEMENT PROTOCOL - Potassium Standard Dosing   1 Each Other PRN    ELECTROLYTE REPLACEMENT PROTOCOL - Magnesium   1 Each Other PRN    ELECTROLYTE REPLACEMENT PROTOCOL - Phosphorus  Standard Dosing  1 Each Other PRN    ELECTROLYTE REPLACEMENT PROTOCOL - Calcium   1 Each Other PRN    furosemide (LASIX) injection 40 mg  40 mg IntraVENous Q12H    metoprolol (LOPRESSOR) injection 2.5 mg  2.5 mg IntraVENous Q6H    piperacillin-tazobactam (ZOSYN) 3.375 g in 0.9% sodium chloride (MBP/ADV) 100 mL MBP  3.375 g IntraVENous Q8H    0.9% sodium chloride infusion 250 mL  250 mL IntraVENous PRN    famotidine (PEPCID) tablet 20 mg  20 mg Oral BID    DULoxetine (CYMBALTA) capsule 60 mg  60 mg Oral DAILY    albuterol-ipratropium (DUO-NEB) 2.5 MG-0.5 MG/3 ML  3 mL Nebulization Q4H PRN    albumin human 25% (BUMINATE) solution 25 g  25 g IntraVENous Q6H    insulin glargine (LANTUS) injection 5 Units  5 Units SubCUTAneous DAILY    ALPRAZolam (XANAX) tablet 0.5 mg  0.5 mg Oral QHS PRN    buPROPion SR (WELLBUTRIN SR) tablet 150 mg  150 mg Oral DAILY    gabapentin (NEURONTIN) capsule 100 mg  100 mg Oral TID PRN    morphine injection 2 mg  2 mg IntraVENous Q4H PRN    COVID-19 vac,Ad26-PF ([employer]) injection 1 Dose  1 Dose IntraMUSCular PRIOR TO DISCHARGE    aspirin chewable tablet 81 mg  81 mg Oral DAILY    heparin (porcine) injection 5,000 Units  5,000 Units SubCUTAneous Q8H    HYDROcodone-acetaminophen (NORCO) 7.5-325 mg per tablet 1 Tablet  1 Tablet Oral Q4H PRN    HYDROcodone-acetaminophen (NORCO) 7.5-325 mg per tablet 2 Tablet  2 Tablet Oral Q6H PRN    naloxone (NARCAN) injection 0.1 mg  0.1 mg IntraVENous PRN    diphenhydrAMINE (BENADRYL) injection 12.5 mg  12.5 mg IntraVENous Q6H PRN    acetaminophen (TYLENOL) tablet 650 mg  650 mg Oral Q6H PRN    Or    acetaminophen (TYLENOL) suppository 650 mg  650 mg Rectal Q6H PRN    polyethylene glycol (MIRALAX) packet 17 g  17 g Oral DAILY PRN    ondansetron (ZOFRAN ODT) tablet 4 mg  4 mg Oral Q8H PRN    Or    ondansetron (ZOFRAN) injection 4 mg  4 mg IntraVENous Q6H PRN    insulin lispro (HUMALOG) injection   SubCUTAneous Q6H    glucose chewable tablet 16 g  16 g Oral PRN    glucagon (GLUCAGEN) injection 1 mg  1 mg IntraMUSCular PRN    dextrose (D50W) injection syrg 12.5-25 g  25-50 mL IntraVENous PRN    atorvastatin (LIPITOR) tablet 40 mg  40 mg Oral QHS          Clinical Pain Assessment (nonverbal scale for nonverbal patients): Clinical Pain Assessment  Severity: 0          Duration: for how long has pt been experiencing pain (e.g., 2 days, 1 month, years)  Frequency: how often pain is an issue (e.g., several times per day, once every few days, constant)     FUNCTIONAL ASSESSMENT:     Palliative Performance Scale (PPS):  PPS: 40    ECOG  ECOG Status : Limited self-care     PSYCHOSOCIAL/SPIRITUAL SCREENING:      Any spiritual / Restorationism concerns:  [] Yes /  [x] No    Caregiver Burnout:  [] Yes /  [] No /  [x] No Caregiver Present      Anticipatory grief assessment:   [x] Normal  / [] Maladaptive        REVIEW OF SYSTEMS:     Systems: constitutional, ears/nose/mouth/throat, respiratory, gastrointestinal, genitourinary, musculoskeletal, integumentary, neurologic, psychiatric, endocrine. Positive findings noted below. Modified ESAS Completed by: provider           Pain: 0           Dyspnea: 0               Positive and pertinent negative findings in ROS are noted above in HPI. The following systems were [] reviewed / [x] unable to be reviewed as noted in HPI  Other findings are noted below. PHYSICAL EXAM:     Constitutional: lying in bed, awake, alert, frustrated with being asked orientation questions and said \"Do you think I\"m stupid? \"  Eyes: pupils equal, anicteric  ENMT: no nasal discharge, dry  mucous membranes  Cardiovascular: regular rhythm  Respiratory: breathing not labored, symmetric, on room air  Gastrointestinal: soft   Last bowel movement: None recorded  Musculoskeletal: Right below-knee amputation, left transmetatarsal amputation, dressings dry and intact  Skin: warm, dry  Neurologic: following commands, moving all extremities  Psychiatric: Appears depressed    Other: Wt Readings from Last 3 Encounters:   08/17/21 75.3 kg (166 lb)   04/09/21 79 kg (174 lb 1.6 oz)   04/06/21 70.9 kg (156 lb 4.8 oz)     Blood pressure 134/73, pulse 71, temperature 97.6 °F (36.4 °C), resp. rate 13, height 5' 8\" (1.727 m), weight 75.3 kg (166 lb), last menstrual period 04/06/2018, SpO2 100 %. Pain:  Pain Scale 1: Numeric (0 - 10)  Pain Intensity 1: 0     Pain Location 1: Generalized  Pain Orientation 1: Right  Pain Description 1: Aching  Pain Intervention(s) 1: Medication (see MAR)       LAB AND IMAGING FINDINGS:     Lab Results   Component Value Date/Time    WBC 9.3 08/18/2021 12:47 AM    HGB 9.1 (L) 08/18/2021 12:47 AM    PLATELET 317 02/67/7318 12:47 AM     Lab Results   Component Value Date/Time    Sodium 143 08/18/2021 12:47 AM    Potassium 3.0 (L) 08/18/2021 08:15 AM    Chloride 110 08/18/2021 12:47 AM    CO2 24 08/18/2021 12:47 AM    BUN 16 08/18/2021 12:47 AM    Creatinine 0.86 08/18/2021 12:47 AM    Calcium 7.8 (L) 08/18/2021 12:47 AM    Magnesium 1.9 08/18/2021 12:47 AM    Phosphorus 2.5 08/18/2021 12:47 AM      Lab Results   Component Value Date/Time    Alk.  phosphatase 100 08/16/2021 05:30 AM    Protein, total 5.6 (L) 08/16/2021 05:30 AM    Albumin 3.3 (L) 08/17/2021 05:20 AM    Globulin 2.6 08/16/2021 05:30 AM     Lab Results   Component Value Date/Time    INR 1.2 04/06/2021 03:55 AM    Prothrombin time 15.4 (H) 04/06/2021 03:55 AM    aPTT 46.1 (H) 04/06/2021 03:55 AM      Lab Results   Component Value Date/Time    Iron 10 (L) 08/12/2021 12:15 PM    TIBC 130 (L) 08/12/2021 12:15 PM    Iron % saturation 8 (L) 08/12/2021 12:15 PM      No results found for: PH, PCO2, PO2  No components found for: [place]   Lab Results   Component Value Date/Time    CK 23 (L) 08/11/2021 09:35 PM    CK - MB <1.0 08/11/2021 09:35 PM              Total time: 30 minutes     > 50% counseling / coordination:  Time spent in direct consultation with the patient, medical team, and family     Prolonged service was provided for  []30 min   []75 min in face to face time in the presence of the patient, spent as noted above. Time Start:   Time End:     Disclaimer: Sections of this note are dictated using utilizing voice recognition software, which may have resulted in some phonetic based errors in grammar and contents. Even though attempts were made to correct all the mistakes, some may have been missed, and remained in the body of the document. If questions arise, please contact our department.

## 2021-08-17 NOTE — ACP (ADVANCE CARE PLANNING)
Advance Care Planning     General Advance Care Planning (ACP) Conversation  Date of Conversation: 8/17/2021  Conducted with: Patient with Decision Making Capacity    Healthcare Decision Maker:     Primary Decision Maker: Leanna Loyd - Other Relative - [phone] (cousin)    Secondary Decision Maker: Marcia Cuenca - Mother - [phone]    Today we documented Decision Maker(s) consistent with verbal request of patient. Patient states her mother is not able to have a coherent conversation and so she (patient) chooses her cousin, Roman Díaz, as her medical surrogate decision maker. Content/Action Overview:   Has NO ACP documents/care preferences. Attempted to have in depth conversation about surrogate decision makers and intubation/code status. She did not want to engage in these conversations so no formal decisions were made  Reviewed DNR/DNI and patient elects Full Code (Attempt Resuscitation) by not choosing to be DNR or DNI. 8/17/2021 1425 Seen today in room ICU 7 along with Elsie Estrella NP. Awake, alert. Oriented x 4. Became perturbed with orientation questions \"Do you think I'm stupid? \" Explained why we seek to assure orientation before people make medical decisions and she repeated \"Do you think I'm stupid? \"     Respirations were unlabored. Oxygen was off most of visit but she did allow it to be placed on saying \"Just don't keep it on too long\". When we could get her to agree to place SaO2 monitor on her saturations were 94-97. Asked her what she wanted out of this hospitalization and she said \"I just want to live. Don't you all get it? \" Discussed that she had declined many recommended treatments and she said \"I just want to breathe\". Asked if she would be accepting of intubation if necessary and declined to answer. Asked if she would accept CPR/shocks/ACLS meds in the event of cardiac arrest. Declined to answer. Discussed her choice for medical decision maker.  She said that she would want her cousin, Deana Tobias, to be her medical decision surrogate but declined to formalize with AMD.     PMH significant for bilateral PAD and has had various level amputations to both feet. She was admitted 8/11 for bilateral lower extremity gangrene. She was scheduled for right BKA today but this was cancelled after she was transferred to the ICU yesterday for hypoxia and possible COVID pneumonia per scan. Her rapid COVID was (-), PCR pending. She lives with her mother in [place]. She said that her mother is not able to have conversations about pt's health but she did not elaborate. The conversation ended when she chose to stop answering questions. 1530: message left for Deana Tobias requesting call back    1550: Sue Burciaga called back. She did say that [place] prefers to be called Mariya Roquecaylae. Brief medical update given. Described our conversation and her lack of engagement in the conversation. Explained that Ms Terri Chopra was not willing to sign AMD paperwork or have an indepth discussion about intubation or code status. Sue Burciaga stated that Deloris's mother was essentially bedbound at home and that Mariya Daniel is her primary caregiver. With Deloris hospitalized, it is unsure who will take care of the mother. CODE STATUS:  FULL CODE    Disposition plan: to be determined once patient can assist with establishing goals of care    Palliative care will continue to follow Natalie Uribe  and her family during her hospitalization and support them as they make healthcare decisions and define goals of care.       Mike River RN, MSN  Palliative Medicine  P: [phone]

## 2021-08-17 NOTE — CONSULTS
History & Physical      Patient: Zeynep Wilson               Sex: female          DOA: 8/11/2021       YOB: 1970      Age:  46 y.o.        LOS:  LOS: 6 days               Subjective:   Zeynep Wilson is a 46 y.o. female well known to me following L TMA and recently she had R preeti BKA. She is sick and not feeling well. Depressed.     Medications:     Current Facility-Administered Medications:     potassium phosphate 15 mmol in 0.9% sodium chloride 250 mL infusion, , IntraVENous, ONCE, Laith Marley MD, Last Rate: 63.8 mL/hr at 08/17/21 1311, New Bag at 08/17/21 1311    sodium bicarbonate (8.4%) 100 mEq in sterile water 1,000 mL infusion, , IntraVENous, CONTINUOUS, Laith Marley MD, Last Rate: 40 mL/hr at 08/17/21 1306, New Bag at 08/17/21 1306    piperacillin-tazobactam (ZOSYN) 3.375 g in 0.9% sodium chloride (MBP/ADV) 100 mL MBP, 3.375 g, IntraVENous, Q8H, Turner Christian MD, Last Rate: 200 mL/hr at 08/17/21 1307, 3.375 g at 08/17/21 1307    0.9% sodium chloride infusion 250 mL, 250 mL, IntraVENous, PRN, Gris Hdez MD    famotidine (PEPCID) tablet 20 mg, 20 mg, Oral, BID, Key MEDINA MD, 20 mg at 08/17/21 0900    DULoxetine (CYMBALTA) capsule 60 mg, 60 mg, Oral, DAILY, Key MEDINA MD, 60 mg at 08/17/21 0900    albuterol-ipratropium (DUO-NEB) 2.5 MG-0.5 MG/3 ML, 3 mL, Nebulization, Q4H PRN, Puja Lewis MD, 3 mL at 08/16/21 1014    albumin human 25% (BUMINATE) solution 25 g, 25 g, IntraVENous, Q6H, Christiano Holman MD, 25 g at 08/17/21 1100    insulin glargine (LANTUS) injection 5 Units, 5 Units, SubCUTAneous, DAILY, Christiano Holman MD, 5 Units at 08/16/21 1017    ALPRAZolam Georganna Glory) tablet 0.5 mg, 0.5 mg, Oral, QHS PRN, Kimberly Siegel MD, 0.5 mg at 08/15/21 2306    buPROPion SR (WELLBUTRIN SR) tablet 150 mg, 150 mg, Oral, DAILY, Blossom Berman MD, 150 mg at 08/17/21 0900    gabapentin (NEURONTIN) capsule 100 mg, 100 mg, Oral, TID PRN, Jimmy Max MD    morphine injection 2 mg, 2 mg, IntraVENous, Q4H PRN, Jimmy Max MD, 2 mg at 08/13/21 0453    COVID-19 vac,Ad26-PF (JERONIMO) injection 1 Dose, 1 Dose, IntraMUSCular, PRIOR TO DISCHARGE, Blank Berman MD    aspirin chewable tablet 81 mg, 81 mg, Oral, DAILY, Blank Berman MD, 81 mg at 08/17/21 0900    heparin (porcine) injection 5,000 Units, 5,000 Units, SubCUTAneous, Q8H, Blank Berman MD, 5,000 Units at 08/17/21 0800    HYDROcodone-acetaminophen (NORCO) 7.5-325 mg per tablet 1 Tablet, 1 Tablet, Oral, Q4H PRN, Jimmy Max MD, 1 Tablet at 08/15/21 0921    HYDROcodone-acetaminophen (NORCO) 7.5-325 mg per tablet 2 Tablet, 2 Tablet, Oral, Q6H PRN, Jimmy Max MD, 2 Tablet at 08/13/21 0902    naloxone [facility]) injection 0.1 mg, 0.1 mg, IntraVENous, PRN, Jimmy Max MD, 0.1 mg at 08/13/21 0049    diphenhydrAMINE (BENADRYL) injection 12.5 mg, 12.5 mg, IntraVENous, Q6H PRN, [facility]    acetaminophen (TYLENOL) tablet 650 mg, 650 mg, Oral, Q6H PRN, 650 mg at 08/12/21 1045 **OR** acetaminophen (TYLENOL) suppository 650 mg, 650 mg, Rectal, Q6H PRN, Blank Berman MD    polyethylene glycol (MIRALAX) packet 17 g, 17 g, Oral, DAILY PRN, Jimmy Max MD    ondansetron (ZOFRAN ODT) tablet 4 mg, 4 mg, Oral, Q8H PRN, 4 mg at 08/14/21 0934 **OR** ondansetron (ZOFRAN) injection 4 mg, 4 mg, IntraVENous, Q6H PRN, Jimmy Max MD, 4 mg at 08/15/21 1950    insulin lispro (HUMALOG) injection, , SubCUTAneous, Q6H, Blank Berman MD, 2 Units at 08/16/21 1331    glucose chewable tablet 16 g, 16 g, Oral, PRN, Blank Berman MD    glucagon [facility]) injection 1 mg, 1 mg, IntraMUSCular, PRN, Blank Berman MD    dextrose (D50W) injection syrg 12.5-25 g, 25-50 mL, IntraVENous, PRN, Oliver Moles Tsering Monreal MD    atorvastatin (LIPITOR) tablet 40 mg, 40 mg, Oral, QHS, Solomon Grant MD, 40 mg at 08/13/21 2105    Review of Systems  SOB (Covid? ? )        Objective:      Visit Vitals  BP (!) 151/78   Pulse (!) 101   Temp 98.4 °F (36.9 °C)   Resp 16   Ht 5' 8\" (1.727 m)   Wt 75.3 kg (166 lb)   SpO2 (!) 89%   BMI 25.24 kg/m²       Physical Exam:  TMA flap holding up but exposed tendon and necrosis dorsally  The Amp site on the right side does not appear to be well perfused.      Labs:  Recent Results (from the past 24 hour(s))   GLUCOSE, POC    Collection Time: 08/16/21  5:19 PM   Result Value Ref Range    Glucose (POC) 145 (H) 70 - 110 mg/dL   LACTIC ACID    Collection Time: 08/16/21  5:30 PM   Result Value Ref Range    Lactic acid 0.9 0.4 - 2.0 MMOL/L   C REACTIVE PROTEIN, QT    Collection Time: 08/16/21  5:30 PM   Result Value Ref Range    C-Reactive protein 6.2 (H) 0 - 0.3 mg/dL   D DIMER    Collection Time: 08/16/21  5:30 PM   Result Value Ref Range    D DIMER 3.64 (H) <0.46 ug/ml(FEU)   TYPE & SCREEN    Collection Time: 08/16/21  5:30 PM   Result Value Ref Range    Crossmatch Expiration 08/19/2021,2359     ABO/Rh(D) Tea Cristopher NEGATIVE     Antibody screen NEG    GLUCOSE, POC    Collection Time: 08/16/21 11:32 PM   Result Value Ref Range    Glucose (POC) 123 (H) 70 - 110 mg/dL   MAGNESIUM    Collection Time: 08/17/21  5:20 AM   Result Value Ref Range    Magnesium 1.5 (L) 1.6 - 2.6 mg/dL   RENAL FUNCTION PANEL    Collection Time: 08/17/21  5:20 AM   Result Value Ref Range    Sodium 143 136 - 145 mmol/L    Potassium 3.1 (L) 3.5 - 5.5 mmol/L    Chloride 113 (H) 100 - 111 mmol/L    CO2 23 21 - 32 mmol/L    Anion gap 7 3.0 - 18 mmol/L    Glucose 131 (H) 74 - 99 mg/dL    BUN 21 (H) 7.0 - 18 MG/DL    Creatinine 0.86 0.6 - 1.3 MG/DL    BUN/Creatinine ratio 24 (H) 12 - 20      GFR est AA >60 >60 ml/min/1.73m2    GFR est non-AA >60 >60 ml/min/1.73m2    Calcium 8.0 (L) 8.5 - 10.1 MG/DL    Phosphorus 2.8 2.5 - 4.9 MG/DL Albumin 3.3 (L) 3.4 - 5.0 g/dL   C REACTIVE PROTEIN, QT    Collection Time: 08/17/21  5:20 AM   Result Value Ref Range    C-Reactive protein 5.7 (H) 0 - 0.3 mg/dL   NT-PRO BNP    Collection Time: 08/17/21  5:20 AM   Result Value Ref Range    NT pro-BNP 68,463 (H) 0 - 900 PG/ML   CULTURE, BLOOD    Collection Time: 08/17/21  5:20 AM    Specimen: Blood   Result Value Ref Range    Special Requests: NO SPECIAL REQUESTS      Culture result: NO GROWTH AFTER 1 HOUR     CBC WITH AUTOMATED DIFF    Collection Time: 08/17/21  5:20 AM   Result Value Ref Range    WBC 10.6 4.6 - 13.2 K/uL    RBC 2.85 (L) 4.20 - 5.30 M/uL    HGB 8.3 (L) 12.0 - 16.0 g/dL    HCT 25.5 (L) 35.0 - 45.0 %    MCV 89.5 74.0 - 97.0 FL    MCH 29.1 24.0 - 34.0 PG    MCHC 32.5 31.0 - 37.0 g/dL    RDW 16.2 (H) 11.6 - 14.5 %    PLATELET 106 865 - 440 K/uL    MPV 10.3 9.2 - 11.8 FL    NEUTROPHILS 89 (H) 40 - 73 %    LYMPHOCYTES 4 (L) 21 - 52 %    MONOCYTES 5 3 - 10 %    EOSINOPHILS 1 0 - 5 %    BASOPHILS 0 0 - 2 %    ABS. NEUTROPHILS 9.4 (H) 1.8 - 8.0 K/UL    ABS. LYMPHOCYTES 0.4 (L) 0.9 - 3.6 K/UL    ABS. MONOCYTES 0.6 0.05 - 1.2 K/UL    ABS. EOSINOPHILS 0.1 0.0 - 0.4 K/UL    ABS.  BASOPHILS 0.0 0.0 - 0.1 K/UL    DF AUTOMATED     GLUCOSE, POC    Collection Time: 08/17/21  5:24 AM   Result Value Ref Range    Glucose (POC) 135 (H) 70 - 110 mg/dL   COVID-19 RAPID TEST    Collection Time: 08/17/21  9:55 AM   Result Value Ref Range    Specimen source Nasopharyngeal      COVID-19 rapid test Not detected NOTD     ECHO ADULT FOLLOW-UP OR LIMITED    Collection Time: 08/17/21 10:36 AM   Result Value Ref Range    IVSd 1.41 (A) 0.60 - 0.90 cm    LVIDd 4.50 3.90 - 5.30 cm    LVIDs 3.79 cm    LVPWd 1.06 (A) 0.60 - 0.90 cm    BP EF 37.1 (A) 55.0 - 100.0 %    LV Ejection Fraction MOD 2C 39 %    LV Ejection Fraction MOD 4C 36 %    LV ED Vol A2C 141.17 ml    LV ED Vol A4C 133.49 ml    LV ED Vol .74 (A) 56.0 - 104.0 ml    LV ES Vol A2C 85.67 ml    LV ES Vol A4C 84.91 ml LV ES Vol BP 86.67 (A) 19.0 - 49.0 ml    LVOT SV 55.5 ml    LA Volume 53.66 22.0 - 52.0 ml    LA Vol 2C 35.99 22.00 - 52.00 ml    LA Vol 4C 56.81 (A) 22.00 - 52.00 ml    Right Atrial Area 4C 19.34 cm2    AV Annulus 2.52 cm    MV A Zain 3.00 cm/s    Mitral Valve E Wave Deceleration Time 133.06 ms    MV E Zain 128.00 cm/s    Mitral Valve Pressure Half-time 38.59 ms    MVA (PHT) 5.70 cm2    Triscuspid Valve Regurgitation Peak Gradient 32.95 mmHg    TR Max Velocity 287.00 cm/s    LV E' Septal Velocity 11.00 cm/s    LV E' Lateral Velocity 8.00 cm/s    MV E/A 42.67     LV Mass .5 67.0 - 162.0 g    LV Mass AL Index 109.3 43.0 - 95.0 g/m2    E/E' lateral 16.00     E/E' septal 11.64     E/E' ratio (averaged) 13.82     Est. 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 ([address] (4/4/2021)      YUNIEL (acute kidney injury) ([address] (4/4/2021)      Non compliance with medical treatment (4/12/2021)      Fall (8/12/2021)      Tobacco use (8/12/2021)      Preoperative evaluation to rule out surgical contraindication (8/12/2021)      Anemia (8/12/2021)      Sepsis ([address] (8/13/2021)      Status post below-knee amputation of right lower extremity ([address] (8/13/2021)      Bacteremia (8/13/2021)      Dyspnea (8/16/2021)      Pulmonary infiltrates (4/97/0202)      Systolic CHF, acute ([address] (8/17/2021)        Patient seen and evaluated today. Recommended Santyl wet to dry dressing on the left. No compression.  She is very depressed and mostly wants to be left alone    Nell Valencia DPM  August 17, 2021

## 2021-08-17 NOTE — PROGRESS NOTES
OT orders received, pt chart reviewed. Pt now in ICU after RRT due to drop in O2 sats. Pt noted to be refusing treatments and taking off supplemental O2 and pulse ox monitoring. Will d/c OT orders at this time as several attempts have been made to see patient. Please consider re-ordering Occupational Therapy when pt is medically appropriate and willing to participate. Thank you for this referral, will sign off at this time.   Cipriano Samayoa, JAYLONR/L

## 2021-08-17 NOTE — PROGRESS NOTES
Physician Progress Note      Warren Hoff  [facility] #:                  [id number]  :                       1970  ADMIT DATE:       2021 9:27 PM  [address] [place] DATE:  RESPONDING  PROVIDER #:        Justina Meek MD          QUERY TEXT:    Pt admitted with gangrene of both feet, sepsis and has Sepsis vs septic shock documented If possible, please document in progress notes and discharge summary further specificity regarding the type of shock: The medical record reflects the following:    Risk Factors: Sepsis, Gangrene bilat feet, DM, Anemia    Clinical Indicators:  > Per nephrology consult:  -Acute Kidney Injury: Likely sec to shock + sepsis  -Hypotension/?Septic shock  > Per ID consult: -clindamycin as an adjunct until she clears her shock/hypotension  > Per Dr. Miroslava Carroll PN - 8/15- Sepsis vs septic shock  > H&P- Skin color pale, turgor normal  > Tachycardia 109, 114, 112  > BP- 119/58, 102/48, 90/58, 92/52, 90/43, 98/58, 83/47, 82/48,  > Foot- Soft tissue edema with extensive mottled soft tissue    Treatment: Received Blood transfusion, ID consult, transfer to ICU for levophed, NS infusion, Albumin, Vancomycin, Rocephin, Zosyn    Thank you,  Keagan Soria RN, BSN, [facility], [facility]  Options provided:  -- Hypovalemic shock  -- Hypotension only without shock  -- Other - I will add my own diagnosis  -- Disagree - Not applicable / Not valid  -- Disagree - Clinically unable to determine / Unknown  -- Refer to Clinical Documentation Reviewer    PROVIDER RESPONSE TEXT:    This patient has hypovalemic shock. Query created by: Delilah Villasenor on 2021 11:35 AM      QUERY TEXT:    Pt admitted with Gangrene bilateral feet. Pt noted to have sepsis. If possible, please document in the progress notes and discharge summary if you are evaluating and /or treating any of the following:     The medical record reflects the following:    Risk Factors: Sepsis, Gangrene bilat feet, DM, Anemia    Clinical Indicators:  > WBC  -8/11 28.0  -8/12 22.4,  -8/13 19.8,  > Temp 100.4  > Per nephrology consult -Acute Kidney Injury: Likely sec to shock + sepsis  > Per Dr. Vanessa WHALEY 8/14- 8/15- Sepsis vs septic shock  > Tachycardia 109, 114, 112  > BP- 119/58, 102/48, 90/58, 92/52, 90/43, 98/58, 83/47, 82/48,    Treatment: Received Blood transfusion, ID consult, transfer to ICU for levophed, NS infusion, Albumin, Vancomycin, Rocephin, Zosyn    Thank you,  Vern Baltazar RN, BSN, [facility], [address] [facility]  Options provided:  -- Sepsis, present on admission  -- Sepsis, not present on admission  -- Other - I will add my own diagnosis  -- Disagree - Not applicable / Not valid  -- Disagree - Clinically unable to determine / Unknown  -- Refer to Clinical Documentation Reviewer    PROVIDER RESPONSE TEXT:    This patient has sepsis which was present on admission.     Query created by: Selvin Kraft on 8/16/2021 11:42 AM      Electronically signed by:  Priscilla Castro MD 8/17/2021 9:45 AM

## 2021-08-17 NOTE — PROGRESS NOTES
1900 - Bedside and Verbal shift change report given to Fermín Lopez RN (oncoming nurse) by Ace Gonzalez RN (offgoing nurse). Report included the following information SBAR, Kardex, ED Summary, Procedure Summary, Intake/Output, MAR, Recent Results, Med Rec Status and Cardiac Rhythm NSR.     2000 - Shift assessment completed. Patient drowsy, easily aroused, oriented to person, place, situation, and time. Patient has no complaints of pain when asked but has frequent grimaces with any movement. Patient appears withdrawn and gives minimal responses during assessment. Lung sounds clear/diminished on room air. Patient refusing PO Lipitor and Pepcid (continuous over past 2/3 days). 2100 - Patient assisted with incontinence care. Patient insists on performing own personal hygiene. Patient is voiding, however urine output remains unmeasured due to patient refusing indwelling as well as external catheters at this time. 0000 - Reassessment completed, patient now NPO for surgery. 0400 - Reassessment completed. Patient appears agitated and refusing incontinence care. 3L NC placed on patient for O2 saturation of 87% which increased to 97% with O2 supplementation. 0600 - Patient removed pulse oximeter and NC O2. Incontinence care provided. 0645 - Oxygen saturation in mid 80's. Patient refusing NC at this time. Patient repositioned and oxygen saturation now 87-92% on room air. 0730 - Bedside and Verbal shift change report given to Bijal Card RN (oncoming nurse) by Fermín Lopez RN (offgoing nurse).  Report included the following information SBAR, Kardex, ED Summary, Procedure Summary, Intake/Output, MAR, Recent Results, Med Rec Status and Cardiac Rhythm NSR/ST. '

## 2021-08-17 NOTE — PROGRESS NOTES
Vascular Surgery    Admitted to ICU for sob, hypotension/RRT called    Significantly elevated BNP, worsening infiltrates on CCR, d-dimer elevated  Tachycardia 105, s, SpO2 89    Will CTA Pe protocol now and get covid test    Cancel surgery today.    Recent Results (from the past 12 hour(s))   GLUCOSE, POC    Collection Time: 08/16/21 11:32 PM   Result Value Ref Range    Glucose (POC) 123 (H) 70 - 110 mg/dL   MAGNESIUM    Collection Time: 08/17/21  5:20 AM   Result Value Ref Range    Magnesium 1.5 (L) 1.6 - 2.6 mg/dL   RENAL FUNCTION PANEL    Collection Time: 08/17/21  5:20 AM   Result Value Ref Range    Sodium 143 136 - 145 mmol/L    Potassium 3.1 (L) 3.5 - 5.5 mmol/L    Chloride 113 (H) 100 - 111 mmol/L    CO2 23 21 - 32 mmol/L    Anion gap 7 3.0 - 18 mmol/L    Glucose 131 (H) 74 - 99 mg/dL    BUN 21 (H) 7.0 - 18 MG/DL    Creatinine 0.86 0.6 - 1.3 MG/DL    BUN/Creatinine ratio 24 (H) 12 - 20      GFR est AA >60 >60 ml/min/1.73m2    GFR est non-AA >60 >60 ml/min/1.73m2    Calcium 8.0 (L) 8.5 - 10.1 MG/DL    Phosphorus 2.8 2.5 - 4.9 MG/DL    Albumin 3.3 (L) 3.4 - 5.0 g/dL   NT-PRO BNP    Collection Time: 08/17/21  5:20 AM   Result Value Ref Range    NT pro-BNP 68,463 (H) 0 - 900 PG/ML   CULTURE, BLOOD    Collection Time: 08/17/21  5:20 AM    Specimen: Blood   Result Value Ref Range    Special Requests: NO SPECIAL REQUESTS      Culture result: NO GROWTH AFTER 1 HOUR     GLUCOSE, POC    Collection Time: 08/17/21  5:24 AM   Result Value Ref Range    Glucose (POC) 135 (H) 70 - 110 mg/dL         [facility]

## 2021-08-17 NOTE — PROGRESS NOTES
[facility]  Pulmonary, Critical Care, and Sleep Medicine    Pulmonary/critical care note    Name: José Miguel Christian MRN: [id number]   : 1970 Hospital: [facility] [place]   Date: 2021  Room: [facility]     Subjective: This patient has been seen and evaluated at the request of Dr. Chris Astudillo initially for hypotension on 2021. Patient is a 46 y.o. female with history of diabetic foot ulcers. She underwent right below-knee guillotine amputation for nonhealing ulcer yesterday by vascular surgery team.  Patient became hypotensive today and I was consulted. Patient blood pressure is being recorded from the wrist.  Due to lack of ICU beds, and subsequent stability and patient condition, patient did not come to the ICU.    2021  Patient seen in ICU. No acute issues overnight. Patient keeps refusing treatments. She keeps taking off nasal cannula oxygen and pulse ox monitoring. She does not want to answer questions; keeps repeating stop bothering and stop hollering. Past medical historysignificant for bilateral foot gangrene; medical noncompliance with treatment; poorly controlled diabetes; smoking; anemia; peripheral arterial disease; renal failure.       Past Medical History:   Diagnosis Date    PAD (peripheral artery disease) ([facility])       Past Surgical History:   Procedure Laterality Date    HX ORTHOPAEDIC      L TMT amputation        No Known Allergies   Social History     Tobacco Use    Smoking status: Current Every Day Smoker     Packs/day: 1.50    Smokeless tobacco: Never Used   Substance Use Topics    Alcohol use: Not Currently     Comment: on rare occasion      Family History   Problem Relation Age of Onset    Diabetes Mother         Current Facility-Administered Medications   Medication Dose Route Frequency    piperacillin-tazobactam (ZOSYN) 3.375 g in 0.9% sodium chloride (MBP/ADV) 100 mL MBP  3.375 g IntraVENous Q8H    sodium bicarbonate (8.4%) 100 mEq in sterile water 1,000 mL infusion   IntraVENous CONTINUOUS    famotidine (PEPCID) tablet 20 mg  20 mg Oral BID    DULoxetine (CYMBALTA) capsule 60 mg  60 mg Oral DAILY    albumin human 25% (BUMINATE) solution 25 g  25 g IntraVENous Q6H    insulin glargine (LANTUS) injection 5 Units  5 Units SubCUTAneous DAILY    Vancomycin-Pharmacy to Dose  1 Each Other Rx Dosing/Monitoring    buPROPion SR (WELLBUTRIN SR) tablet 150 mg  150 mg Oral DAILY    COVID-19 vac,Ad26-PF (JERONIMO) injection 1 Dose  1 Dose IntraMUSCular PRIOR TO DISCHARGE    aspirin chewable tablet 81 mg  81 mg Oral DAILY    heparin (porcine) injection 5,000 Units  5,000 Units SubCUTAneous Q8H    insulin lispro (HUMALOG) injection   SubCUTAneous Q6H    atorvastatin (LIPITOR) tablet 40 mg  40 mg Oral QHS       Review of Systems:  Limited due to patient condition; not keen on providing history. Objective:   Vital Signs:    Visit Vitals  BP (!) 151/78   Pulse (!) 101   Temp 98.4 °F (36.9 °C)   Resp 16   Ht 5' 8\" (1.727 m)   Wt 75.3 kg (166 lb 0.1 oz)   LMP 2018 (Within Years)   SpO2 (!) 89%   BMI 25.24 kg/m²       O2 Device: None (Room air)   O2 Flow Rate (L/min): 2 l/min   Temp (24hrs), Av.6 °F (36.4 °C), Min:94 °F (34.4 °C), Max:98.4 °F (36.9 °C)       Intake/Output:   Last shift:      No intake/output data recorded.   Last 3 shifts: 08/15 1901 -  0700  In: 47301.5 [I.V.:22057.5]  Out: 200 [Urine:200]    Intake/Output Summary (Last 24 hours) at 2021 0948  Last data filed at 2021 2300  Gross per 24 hour   Intake 94359.5 ml   Output 200 ml   Net 58065.5 ml         Physical Exam:   Patient appears generally weak, but comfortable; on room air; acyanotic  HEENT: pupils not dilated, no scleral jaundice  Neck: No adenopathy or thyroid swelling  CVS: Normal heart sounds; S1 and S2 with no murmur; telemetrysinus rhythm; JVD not elevated    RS: Moderate air entry bilaterally; no wheezing; bilateral crackles; mildly tachypneic while sleeping; not in distress  Abd: Soft, no tenderness, no distention, no guarding or rigidity; no obvious hepatosplenomegaly  Neuro: Awake, responsive, moving all extremities   Extrm: no leg edema or swelling or clubbing. Right below-knee amputation with dressingno bleeding or hematoma; left foot dressingno bleeding or hematoma discharge.   Skin: no rash  Lymphatic: no cervical or supraclavicular adenopathy  Psych: Irritable mood, and not interested in answering questions      Data review:     Recent Results (from the past 24 hour(s))   BLOOD GAS,LACTIC ACID, POC    Collection Time: 08/16/21 10:07 AM   Result Value Ref Range    Device: NASAL CANNULA      FIO2 (POC) 28 %    pH (POC) 7.27 (L) 7.35 - 7.45      pCO2 (POC) 30.0 (L) 35.0 - 45.0 MMHG    pO2 (POC) 74 (L) 80 - 100 MMHG    HCO3 (POC) 13.9 (L) 22 - 26 MMOL/L    sO2 (POC) 92.8 92 - 97 %    Base deficit (POC) 11.8 mmol/L    Site RIGHT RADIAL      Specimen type (POC) ARTERIAL      Performed by Wilmer Adan)     Lactic Acid (POC) 1.14 0.40 - 2.00 mmol/L   GLUCOSE, POC    Collection Time: 08/16/21 10:21 AM   Result Value Ref Range    Glucose (POC) 183 (H) 70 - 110 mg/dL   GLUCOSE, POC    Collection Time: 08/16/21  1:03 PM   Result Value Ref Range    Glucose (POC) 155 (H) 70 - 110 mg/dL   GLUCOSE, POC    Collection Time: 08/16/21  5:19 PM   Result Value Ref Range    Glucose (POC) 145 (H) 70 - 110 mg/dL   LACTIC ACID    Collection Time: 08/16/21  5:30 PM   Result Value Ref Range    Lactic acid 0.9 0.4 - 2.0 MMOL/L   C REACTIVE PROTEIN, QT    Collection Time: 08/16/21  5:30 PM   Result Value Ref Range    C-Reactive protein 6.2 (H) 0 - 0.3 mg/dL   D DIMER    Collection Time: 08/16/21  5:30 PM   Result Value Ref Range    D DIMER 3.64 (H) <0.46 ug/ml(FEU)   TYPE & SCREEN    Collection Time: 08/16/21  5:30 PM   Result Value Ref Range    Crossmatch Expiration 08/19/2021,2359     ABO/Rh(D) Yaya Cruise NEGATIVE     Antibody screen NEG    GLUCOSE, POC    Collection Time: 08/16/21 11:32 PM   Result Value Ref Range    Glucose (POC) 123 (H) 70 - 110 mg/dL   MAGNESIUM    Collection Time: 08/17/21  5:20 AM   Result Value Ref Range    Magnesium 1.5 (L) 1.6 - 2.6 mg/dL   RENAL FUNCTION PANEL    Collection Time: 08/17/21  5:20 AM   Result Value Ref Range    Sodium 143 136 - 145 mmol/L    Potassium 3.1 (L) 3.5 - 5.5 mmol/L    Chloride 113 (H) 100 - 111 mmol/L    CO2 23 21 - 32 mmol/L    Anion gap 7 3.0 - 18 mmol/L    Glucose 131 (H) 74 - 99 mg/dL    BUN 21 (H) 7.0 - 18 MG/DL    Creatinine 0.86 0.6 - 1.3 MG/DL    BUN/Creatinine ratio 24 (H) 12 - 20      GFR est AA >60 >60 ml/min/1.73m2    GFR est non-AA >60 >60 ml/min/1.73m2    Calcium 8.0 (L) 8.5 - 10.1 MG/DL    Phosphorus 2.8 2.5 - 4.9 MG/DL    Albumin 3.3 (L) 3.4 - 5.0 g/dL   NT-PRO BNP    Collection Time: 08/17/21  5:20 AM   Result Value Ref Range    NT pro-BNP 68,463 (H) 0 - 900 PG/ML   CULTURE, BLOOD    Collection Time: 08/17/21  5:20 AM    Specimen: Blood   Result Value Ref Range    Special Requests: NO SPECIAL REQUESTS      Culture result: NO GROWTH AFTER 1 HOUR     CBC WITH AUTOMATED DIFF    Collection Time: 08/17/21  5:20 AM   Result Value Ref Range    WBC 10.6 4.6 - 13.2 K/uL    RBC 2.85 (L) 4.20 - 5.30 M/uL    HGB 8.3 (L) 12.0 - 16.0 g/dL    HCT 25.5 (L) 35.0 - 45.0 %    MCV 89.5 74.0 - 97.0 FL    MCH 29.1 24.0 - 34.0 PG    MCHC 32.5 31.0 - 37.0 g/dL    RDW 16.2 (H) 11.6 - 14.5 %    PLATELET 574 511 - 354 K/uL    MPV 10.3 9.2 - 11.8 FL    NEUTROPHILS 89 (H) 40 - 73 %    LYMPHOCYTES 4 (L) 21 - 52 %    MONOCYTES 5 3 - 10 %    EOSINOPHILS 1 0 - 5 %    BASOPHILS 0 0 - 2 %    ABS. NEUTROPHILS 9.4 (H) 1.8 - 8.0 K/UL    ABS. LYMPHOCYTES 0.4 (L) 0.9 - 3.6 K/UL    ABS. MONOCYTES 0.6 0.05 - 1.2 K/UL    ABS. EOSINOPHILS 0.1 0.0 - 0.4 K/UL    ABS.  BASOPHILS 0.0 0.0 - 0.1 K/UL    DF AUTOMATED     GLUCOSE, POC    Collection Time: 08/17/21  5:24 AM   Result Value Ref Range    Glucose (POC) 135 (H) 70 - 110 mg/dL           Recent Labs 08/16/21  1007   FIO2I 28   HCO3I 13.9*   PCO2I 30.0*   PHI 7.27*   PO2I 74*       All Micro Results     Procedure Component Value Units Date/Time    CULTURE, Shantell Bacon STAIN [[id number]]  (Abnormal)  (Susceptibility) Collected: 08/11/21 2145    Order Status: Completed Specimen: Wound from Foot Updated: 08/17/21 0946     Special Requests: NO SPECIAL REQUESTS        GRAM STAIN NO WBC'S SEEN         4+ GRAM NEGATIVE RODS               1+ GRAM POSITIVE COCCI IN PAIRS           Culture result:       HEAVY GRAM NEGATIVE RODS (REFER TO ISOLATE 2)                  HEAVY ALCALIGENES FAECALIS                  HEAVY ENTEROCOCCUS FAECALIS                  HEAVY STAPHYLOCOCCUS AUREUS          CULTURE, BLOOD [[id number]] Collected: 08/17/21 0520    Order Status: Completed Specimen: Blood Updated: 08/17/21 0739     Special Requests: NO SPECIAL REQUESTS        Culture result: NO GROWTH AFTER 1 HOUR       CULTURE, BLOOD [[id number]] Collected: 08/16/21 0530    Order Status: Completed Specimen: Blood Updated: 08/17/21 0739     Special Requests: NO SPECIAL REQUESTS        Culture result: NO GROWTH AFTER 23 HOURS       CULTURE, BLOOD [[id number]] Collected: 08/16/21 0530    Order Status: Completed Specimen: Blood Updated: 08/17/21 0739     Special Requests: NO SPECIAL REQUESTS        Culture result: NO GROWTH AFTER 23 HOURS       CULTURE, BLOOD [[id number]] Collected: 08/15/21 0316    Order Status: Completed Specimen: Blood Updated: 08/17/21 0739     Special Requests: NO SPECIAL REQUESTS        Culture result: NO GROWTH 2 DAYS       CULTURE, BLOOD [[id number]] Collected: 08/15/21 0316    Order Status: Completed Specimen: Blood Updated: 08/17/21 0739     Special Requests: NO SPECIAL REQUESTS        Culture result: NO GROWTH 2 DAYS       CULTURE, BLOOD [[id number]] Collected: 08/14/21 0342    Order Status: Completed Specimen: Blood Updated: 08/17/21 0739     Special Requests: NO SPECIAL REQUESTS        Culture result: NO GROWTH 3 DAYS CULTURE, BLOOD [[id number]] Collected: 08/14/21 0342    Order Status: Completed Specimen: Blood Updated: 08/17/21 0739     Special Requests: NO SPECIAL REQUESTS        Culture result: NO GROWTH 3 DAYS       CULTURE, BLOOD [[id number]] Collected: 08/13/21 1150    Order Status: Completed Specimen: Blood Updated: 08/17/21 0739     Special Requests: NO SPECIAL REQUESTS        Culture result: NO GROWTH 4 DAYS       COVID-19 RAPID TEST [[id number]]     Order Status: Sent     CULTURE, Dustin Pavy STAIN [[id number]]  (Abnormal)  (Susceptibility) Collected: 08/11/21 2145    Order Status: Completed Specimen: Wound from Foot Updated: 08/15/21 1013     Special Requests: NO SPECIAL REQUESTS        GRAM STAIN NO WBC'S SEEN         1+ GRAM NEGATIVE RODS               FEW GRAM POSITIVE COCCI IN PAIRS           Culture result:       MODERATE PROVIDENCIA STUARTII                  MODERATE STAPHYLOCOCCUS AUREUS          CULTURE, BLOOD [[id number]] Collected: 08/11/21 2225    Order Status: Completed Specimen: Blood Updated: 08/14/21 1502     Special Requests: NO SPECIAL REQUESTS        GRAM STAIN       GRAM POSITIVE COCCI IN CLUSTERS ANAEROBIC BOTTLE                  SMEAR CALLED TO AND CORRECTLY REPEATED BY: Nicol Velez RN 3S TO CAF [id number] AT [id number]           Culture result:       Finegoldia magna GROWING IN THE ANAEROBIC BOTTLE          CULTURE, BLOOD [[id number]]  (Abnormal) Collected: 08/11/21 2215    Order Status: Completed Specimen: Blood Updated: 08/14/21 1500     Special Requests: NO SPECIAL REQUESTS        GRAM STAIN       GRAM POSITIVE COCCI IN CLUSTERS ANAEROBIC BOTTLE                  SMEAR CALLED TO AND CORRECTLY REPEATED BY: Crescencio Nunez RN 3S TO CAF [id number] AT 0502           Culture result:       Peptoniphilus asaccharolyticus GROWING IN THE ANAEROBIC BOTTLE          CULTURE, URINE [[id number]] Collected: 08/11/21 2230    Order Status: Canceled Specimen: Urine from Clean catch             Imaging:  [x]I have personally reviewed the patients chest radiographs images and report     Chest x-ray 8/16   Results from Hospital Encounter encounter on 08/11/21    XR CHEST PORT    Narrative  EXAM:  AP Portable Chest X-ray 1 view    INDICATION: Shortness of breath    COMPARISON: August 14, 2021    _______________    FINDINGS:  Heart and mediastinal contours are within normal limits for portable  radiograph. There are diffuse bilateral alveolar and interstitial infiltrates  suggesting pneumonia and/or pulmonary edema. There are small to moderate  bilateral pleural effusions. No acute osseous findings. ________________    Impression  Small to moderate bilateral effusions with bilateral interstitial  and alveolar infiltrates new from prior exam suggesting pneumonia and/or  pulmonary edema. CTA chest 8/17  Results from Hospital Encounter encounter on 08/11/21    CTA CHEST W OR W WO CONT    Narrative  Chest CT/CT pulmonary angiogram:    CLINICAL HISTORY: Shortness of breath, chest pain. COMPARISON EXAMINATIONS: Chest film 8/16/2021. TECHNIQUE: Thin section CT was performed through the chest during pulmonary  arterial enhancement. Orthogonal MIP angiographic reconstructions were  generated to increase sensitivity in the detection of pulmonary emboli. One or  more dose reduction techniques were used on this CT: automated exposure control,  adjustment of the mAs and/or kVp according to patient size, and iterative  reconstruction techniques. The specific techniques used on this CT exam have  been documented in the patient's electronic medical record. Digital Imaging and  Communications in Medicine (DICOM) format image data are available to  nonaffiliated external healthcare facilities or entities on a secure, media  free, reciprocally searchable basis with patient authorization for at least a  12-month period after this study.         ---  PULMONARY CT ANGIOGRAM  ---    PULMONARY VASCULATURE: No convincing evidence of pulmonary arterial filling  defect. Exam quality: Overall exam quality is reasonable. Pulmonary arterial  enhancement is noted with less than optimal breath hold and some mild lower lobe  respiratory motion artifact . THORACIC AORTA: Low density enhancement with some calcified plaque but no  dissection or aneurysm. ---  CT CHEST ---    LUNG PARENCHYMA:   Multifocal areas of groundglass density including a  moderately prominent peripheral left upper lobe area of groundglass density. There is bilateral left greater than right lower lobe likely compressive  atelectasis. [place] PLEURAL SPACES:   Bilateral moderate pleural effusions which appear  free-flowing. MEDIASTINUM:   Moderately prominent coronary arterial calcifications of the LAD  and to a lesser extent of the circumflex and right coronary proximally. .    OSSEOUS STRUCTURES:   Mild thoracic spondylosis. UPPER ABDOMEN:  There is some free fluid in the upper abdomen surrounding the  liver dome.      --------------    Impression  --------------    1. No convincing evidence of pulmonary thromboembolic disease. 2. Areas of multifocal upper lobe groundglass density. This could be focal  pulmonary edema but infection including Covid pneumonia is difficult to exclude. 3. Bilateral pleural effusions. Previous chest film suggested congestive heart  failure. Bilateral lower lobe compressive atelectasis. 4. Coronary artery calcifications are moderately prominent consistent with  coronary artery disease. 5. Free fluid in the upper abdomen. Echocardiogram 8/13/2021  Interpretation Summary    Result status: Final result   · Image quality for this study was suboptimal. Contrast used: DEFINITY. · LV: Estimated LVEF is 45 - 50%. Normal cavity size and diastolic function. Mild concentric hypertrophy. E/E' ratio is 13.34.  · LA: Mildly dilated left atrium. · RV: Mildly dilated right ventricle. Normal global systolic function.  Assessment of RV function: TAPSE=16mm. · AV: Moderate aortic valve leaflet calcification present. · MV: Mitral valve non-specific thickening. Mitral valve leaflet calcification without reduced excursion. Mild mitral valve regurgitation is present. · TV: Mild tricuspid valve regurgitation is present. · PA: Mild pulmonary hypertension. Pulmonary arterial systolic pressure is 35 mmHg. IMPRESSION:   · Dyspnea  · Bilateral pulmonary infiltrates  · Acute CHFsystolic  · Sepsis  · Type 2 diabetes mellitus with diabetic foot ulcer  · Osteomyelitis  · Peripheral arterial disease  · Anemia  · Acute renal failureresolved  ·   Patient Active Problem List   Diagnosis Code    Osteomyelitis ([facility] [address] M86.9    Type 2 diabetes mellitus with left diabetic foot ulcer ([address] E11.621, L97.529    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 ([address] E11.52    YUNIEL (acute kidney injury) ([address] N17.9    PAD (peripheral artery disease) ([facility]) I73.9    Bilateral carotid artery stenosis I65.23    Gangrene of left foot ([facility]) I96    Non compliance with medical treatment Z91.19    Gangrene of both feet ([address] I96    Fall W19. [place] Tobacco use Z72.0    Preoperative evaluation to rule out surgical contraindication Z01.818    Anemia D64.9    Sepsis ([facility]) A41.9    Status post below-knee amputation of right lower extremity ([facility]) Z89.511    Bacteremia R78.81    Dyspnea R06.00    Pulmonary infiltrates H68.3    Systolic CHF, acute ([facility]) I50.21     ·       RECOMMENDATIONS:   · RS: Continue monitoring in ICU; nasal cannula oxygen to maintain O2 sats greater than 91%; D-dimer elevated3.64; CTA chest done to evaluate for PEsCTA chest doneshows no central PE; bilateral small-to-moderate pleural effusions; bilateral pulmonary infiltrates and groundglass densities; bilateral upper lobular interstitial thickeningCHF versus Covid pneumonia. · CVS: Stable hemodynamics;  Lasix 40 mg 1 dose; Baseline echocardiogramEF 45-50%; repeat echocardiogram; coronary calcifications mentioned on chest CT consistent with coronary artery disease. proBNP elevated consistent with acute systolic CHF; will also consult cardiology. · ID: Patient has polymicrobial sepsis; wound culture showing Staphylococcus, Enterococcus, procidentia; history of MSSA in the past; s/p amputation of the right foot. Continue antibioticson IV Zosyn; would add IV vancomycin due to risk of aspiration pneumonia and concerning chest CT findings; check Covid 19 rapid and PCR testing; Place patient on isolation; ID on case. · Renal: Monitor renal function and urine output; nephrology on case. Continue bicarb dripdecreased to 40 mL/h. Patient has been refusing oral bicarb tablets. Replace electrolytespotassium, magnesium and Phos. · Hem: Mild anemia; hemoglobin stable8.3; s/p PRBC transfusion during this admission. Platelets normal.  No active bleeding issues. · Vasc: Vascular surgery team on case. Revision surgery canceled today. · GI: Oral diet as tolerated. Pepcid for prophylaxis. · Neuro: Stable mentation. · Endo: Continue Lantus and sliding scale insulin; stable blood sugarscontinue to monitor. · Fluids: Bicarb drip decreased to 40 mL/hr  · Proph:  DVt and GI prophHeparin and famotidine. · Discussed with patient and updated. · Prognosis overall poor due to medical noncompliance. I have discussed with patient regarding her attitude and declining testing and treatments. She is likely to have poor outcome during this hospitalization with high risk of multiple organ failure, intubation, cardiorespiratory arrest, ventilator support and mortality. I discussed that patient has options of choosing hospice if she does not desire medical treatment. I would consult palliative care as well. · Continue ICU level of monitoring and management.     ADVANCE DIRECTIVE: Full Code    CC time 40 mins, excludes d/w family or procedures       Name Relation Home Work Mobile BangMarcia Mother [phone]     Not reachable through this number.        High complexity decision making was performed in this consultation and evaluation of this patient who is at high risk for decompensation with multiple organ involvement         Tia Staples MD

## 2021-08-17 NOTE — DIABETES MGMT
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     Recommend: continue with current regimen    BG in target range (non-ICU\" < 180 ; -180):  [] Yes  [] No      Steroids: no    Current Insulin regimen: Lantus 5 units + - Humalog Normal Insulin Sensitivity Corrective Coverage    TDD previous day = 7 (5 Lantus + 2 Humalog Normal Insulin Sensitivity Corrective Coverage)    Most recent blood glucose results:   Lab Results   Component Value Date/Time     (H) 08/17/2021 05:20 AM    GLUCPOC 135 (H) 08/17/2021 05:24 AM    GLUCPOC 123 (H) 08/16/2021 11:32 PM    GLUCPOC 145 (H) 08/16/2021 05:19 PM         Hypo: no    HbA1C: equivalent  to ave BGlucose of 150  mg/dl for 2-3 months prior to admission  Lab Results   Component Value Date/Time    Hemoglobin A1c 7.0 (H) 08/11/2021 09:35 PM       Adequate glycemic control PTA:  [x] Yes  [] No      Home diabetes medications:  Key Antihyperglycemic Medications             metFORMIN (GLUCOPHAGE) 1,000 mg tablet (Taking) Take 1,000 mg by mouth two (2) times daily (with meals). Indications: type 2 diabetes mellitus    glipiZIDE SR (GLUCOTROL XL) 5 mg CR tablet (Taking) Take 5 mg by mouth daily. metFORMIN (GLUCOPHAGE) 500 mg tablet Take 1 Tab by mouth two (2) times daily (with meals).         Goals:  Blood glucose will be within target range of 70 - 180 mg/dL by: 8/31    Diet:   Active Orders   Diet    DIET NPO Sips of Water with Meds       Patient Vitals for the past 100 hrs:   % Diet Eaten   08/15/21 1315 76 - 100%        Education:  _____ Refer to Diabetes Education Record                       ___X__ Education not indicated at this time

## 2021-08-17 NOTE — PROGRESS NOTES
0700 Bedside and Verbal shift change report given to LORETA Morelos (oncoming nurse) by Bradley Irvin RN (offgoing nurse). Report included the following information SBAR, Kardex, Intake/Output, Recent Results and Cardiac Rhythm ST.     0800 Shift assessment completed. Patient is alert and very reluctant to answer  orientation questions. States she is in [facility]. Refusing majority of assessment. Patient is tachypneic with 88% O2 sats, refusing to wear oxygen, but insist on yelling out that she is \"smothered and can not breathe\". Will not allow nurse to assess amputation sites. Patient is also saturated in urine and refusing to be cleaned. States, \"leave me alone. \" Continuously removing BP cuff, cardiac monitor, and O2 censor because it is \"bothersome. \" Patient dismisses nurses by rolling over and no longer speaking. 0830 Rapid COVID swab needed for surgery. Patient refused. 0930 Patient transported to CT. Patient became anxious and agitated. Began yelling out. Dr. Cameron Correia accompanied  patient to CT. 1000 Patient agreed for COVID swab, PCR and rapid sent to lab.     1100 Continues to refuse all medications, including IV meds. 1200 Reassessment, no changes. Continues to be non compliant and refusing care. Repeatedly removing BP cuff and monitor. May give dose of prn xanax. 1230 Patient finally agreed to take medications and IV meds. Bicarb  paused to prevent fluid overload. 1300 Received a call from patient's cousin, Ms. Shaji Javed. Stated that patient is mostly bedbound, but is the primary caregiver for her mother who is Bipolar and not medicated. Ms Shaji Javed is requesting that she be the point of contact. Information relayed to Palliative, , Case Management. 1400 Asked pharmacy to retime Vancomycin due to patient refusing meds today. Patient is again, saturated with urine and refusing to be cleaned. 1600 Reassessment, no changes.      1700 IV continuously beeping and saying that it is occluded. Patient is holding her IV in her hand pinching it. When asked why, she states, \"I do not want it. \"     1800 Bed is completely saturated and urine is on the floor. Patient agreed for bed to be changed. However, will not allow for rohith care to be completed. Also, refusing to complete herself. [facility] [place] lab to follow up on PCR results. Per [employer] another swab needs to be sent. Oncoming nurse informed. 1930 Bedside and Verbal shift change report given to Ashley Mcclellan RN (oncoming nurse) by Raimundo Gutiérrez RN (offgoing nurse).  Report included the following information SBAR, Kardex, Intake/Output, Recent Results and Cardiac Rhythm ST.

## 2021-08-17 NOTE — PROGRESS NOTES
Hospitalist Progress Note    Patient: Sudarshan Chu MRN: [id number]  [facility]: [id number]    YOB: 1970  Age: 46 y.o. Sex: female    DOA: 8/11/2021 LOS:  LOS: 6 days            Chief complaint :  Gangrene of both feet. Assessment/Plan     Patient Active Problem List   Diagnosis Code    [facility] M86.9    Type 2 diabetes mellitus with left diabetic foot ulcer ([facility] E11.621, L97.529    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 ([facility] E11.52    YUNIEL (acute kidney injury) ([facility] N17.9    PAD (peripheral artery disease) ([facility]) I73.9    Bilateral carotid artery stenosis I65.23    Gangrene of left foot ([facility]) I96    Non compliance with medical treatment Z91.19    Gangrene of both feet [facility]) I96    Fall W19. Erik Laura Tobacco use Z72.0    Preoperative evaluation to rule out surgical contraindication Z01.818    Anemia D64.9    Sepsis ([facility]) A41.9    Status post below-knee amputation of right lower extremity ([facility]) Z89.511    Bacteremia R78.81    Dyspnea R06.00    Pulmonary infiltrates Y48.1    Systolic CHF, acute ([facility]) I50.21          49-year-old female with uncontrolled type 2 diabetes mellitus, severe peripheral arterial disease, and tobacco use who has been noncompliant with wound care and hyperbaric oxygen treatment due to recent fall is admitted for severe gangrene of both feet. RRT was called on 08/16/2021, patient appeared confused, shortness of breath with hypoxia. ABG showed metabolic acidosis. Patient refused chest x-ray. She has been refusing medications. Given her condition she was transferred to ICU. RN reported that patient has not been taking her medications. Not cooperating with her treatment. I tried to discuss with patient about her critical condition and guarded prognosis, she is not interested in listening to my concerns about her medical condition.   She turned her face on other side and did not want to listen to me and did not answer my questions. CRITICAL CARE PLAN    Resp -   Dyspnea/hypoxia -  CXR with pulmonary edema. Oxygen as needed by NC. ID -   septic shock  -  BP now improved  Continue iv abx,   Blood Cultures growingPeptoniphilus asaccharolyticus. Follow up blood cultures negative  Wound cultures growing providencia, MSSA, E-fecalis, alcaligenes fecalis. ID following. Antibiotics - zosyn       [facility] - Monitor HD. Acute CHF - received lasix  Echo 45 % and mild pulm htn      Gangrene of bilateral feet    S/p right BKA on 8/12   Vascular planning on closure  and debirdement of left foot     Heme/onc - Follow H&H, plts. Anemia  Received blood transfusion   Monitor H/H    Renal - Trend BUN, Cr, follow I/O. Check and replace Mg, K, phos. 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 Prophylaxis - DVT: heparin, GI: pepcid.     Noncompliance with medical treatment   Continued tobacco use          Disposition : TBD    Review of systems  General: No fevers or chills. Cardiovascular: No chest pain or pressure. No palpitations. Pulmonary: see above. Gastrointestinal: No nausea, vomiting. Physical Exam:  General: As above   HEENT: NC, Atraumatic. PERRLA, anicteric sclerae. Lungs: CTA Bilaterally. No Wheezing/Rhonchi/Rales. Heart:  S1 S2,  No murmur, No Rubs, No Gallops  Abdomen: Soft, Non distended, Non tender.  +Bowel sounds,   Extremities: Right BKA, left foot with dressing  Psych:   Not anxious or agitated. Neurologic:  No acute neurological deficit.            Vital signs/Intake and Output:  Visit Vitals  BP (!) 151/78   Pulse (!) 101   Temp 98.4 °F (36.9 °C)   Resp 16   Ht 5' 8\" (1.727 m)   Wt 75.3 kg (166 lb)   SpO2 (!) 89%   BMI 25.24 kg/m²     Current Shift:  No intake/output data recorded. Last three shifts:  08/15 1901 - 08/17 0700  In: 44771.5 [I.V.:39222.5]  Out: 200 [Urine:200]            Labs: Results:       Chemistry Recent Labs     08/17/21  0520 08/16/21  0530 08/15/21  0316   * 127* 126*    144 139   K 3.1* 3.7 3.7   * 116* 115*   CO2 23 16* 18*   BUN 21* 24* 34*   CREA 0.86 0.92 1.18   CA 8.0* 8.0* 7.3*   AGAP 7 12 6   BUCR 24* 26* 29*   AP  --  100 109   TP  --  5.6* 5.4*   ALB 3.3* 3.0* 2.4*   GLOB  --  2.6 3.0   AGRAT  --  1.2 0.8      CBC w/Diff Recent Labs     08/17/21  0520 08/16/21  0530 08/15/21  0316   WBC 10.6 8.4 10.4   RBC 2.85* 2.85* 2.87*   HGB 8.3* 8.2* 8.3*   HCT 25.5* 25.5* 25.3*    273 292   GRANS 89* 93* 91*   LYMPH 4* 3* 4*   EOS 1 0 0      Cardiac Enzymes No results for input(s): CPK, CKND1, CONSTANTINO in the last 72 hours. No lab exists for component: CKRMB, TROIP   Coagulation No results for input(s): PTP, INR, APTT, INREXT, INREXT in the last 72 hours. Lipid Panel No results found for: CHOL, CHOLPOCT, CHOLX, CHLST, CHOLV, 281954, HDL, HDLP, LDL, LDLC, DLDLP, 697643, VLDLC, VLDL, TGLX, TRIGL, TRIGP, TGLPOCT, CHHD, CHHDX   BNP No results for input(s): BNPP in the last 72 hours. Liver Enzymes Recent Labs     08/17/21 0520 08/16/21 0530 08/16/21  0530   TP  --   --  5.6*   ALB 3.3*   < > 3.0*   AP  --   --  100    < > = values in this interval not displayed.       Thyroid Studies No results found for: T4, T3U, TSH, TSHEXT, TSHEXT     Procedures/imaging: see electronic medical records for all procedures/Xrays and details which were not copied into this note but were reviewed prior to creation of Plan

## 2021-08-18 ENCOUNTER — APPOINTMENT (OUTPATIENT)
Dept: GENERAL RADIOLOGY | Age: 51
DRG: 853 | End: 2021-08-18
Attending: INTERNAL MEDICINE
Payer: COMMERCIAL

## 2021-08-18 ENCOUNTER — ANESTHESIA EVENT (OUTPATIENT)
Dept: SURGERY | Age: 51
DRG: 853 | End: 2021-08-18
Payer: COMMERCIAL

## 2021-08-18 ENCOUNTER — APPOINTMENT (OUTPATIENT)
Dept: GENERAL RADIOLOGY | Age: 51
DRG: 853 | End: 2021-08-18
Attending: STUDENT IN AN ORGANIZED HEALTH CARE EDUCATION/TRAINING PROGRAM
Payer: COMMERCIAL

## 2021-08-18 LAB
ANION GAP SERPL CALC-SCNC: 9 MMOL/L (ref 3–18)
BASOPHILS # BLD: 0.1 K/UL (ref 0–0.1)
BASOPHILS NFR BLD: 1 % (ref 0–2)
BNP SERPL-MCNC: ABNORMAL PG/ML (ref 0–900)
BUN SERPL-MCNC: 16 MG/DL (ref 7–18)
BUN/CREAT SERPL: 19 (ref 12–20)
CA-I SERPL-SCNC: 1.16 MMOL/L (ref 1.12–1.32)
CALCIUM SERPL-MCNC: 7.8 MG/DL (ref 8.5–10.1)
CHLORIDE SERPL-SCNC: 110 MMOL/L (ref 100–111)
CO2 SERPL-SCNC: 24 MMOL/L (ref 21–32)
CREAT SERPL-MCNC: 0.86 MG/DL (ref 0.6–1.3)
CRP SERPL-MCNC: 4.3 MG/DL (ref 0–0.3)
DIFFERENTIAL METHOD BLD: ABNORMAL
EOSINOPHIL # BLD: 0.1 K/UL (ref 0–0.4)
EOSINOPHIL NFR BLD: 1 % (ref 0–5)
ERYTHROCYTE [DISTWIDTH] IN BLOOD BY AUTOMATED COUNT: 16.2 % (ref 11.6–14.5)
GLUCOSE BLD STRIP.AUTO-MCNC: 115 MG/DL (ref 70–110)
GLUCOSE BLD STRIP.AUTO-MCNC: 129 MG/DL (ref 70–110)
GLUCOSE BLD STRIP.AUTO-MCNC: 139 MG/DL (ref 70–110)
GLUCOSE BLD STRIP.AUTO-MCNC: 152 MG/DL (ref 70–110)
GLUCOSE SERPL-MCNC: 146 MG/DL (ref 74–99)
HCT VFR BLD AUTO: 28.6 % (ref 35–45)
HGB BLD-MCNC: 9.1 G/DL (ref 12–16)
LYMPHOCYTES # BLD: 0.7 K/UL (ref 0.9–3.6)
LYMPHOCYTES NFR BLD: 8 % (ref 21–52)
MAGNESIUM SERPL-MCNC: 1.9 MG/DL (ref 1.6–2.6)
MCH RBC QN AUTO: 29.2 PG (ref 24–34)
MCHC RBC AUTO-ENTMCNC: 31.8 G/DL (ref 31–37)
MCV RBC AUTO: 91.7 FL (ref 74–97)
MONOCYTES # BLD: 0.7 K/UL (ref 0.05–1.2)
MONOCYTES NFR BLD: 7 % (ref 3–10)
NEUTS SEG # BLD: 7.7 K/UL (ref 1.8–8)
NEUTS SEG NFR BLD: 82 % (ref 40–73)
PHOSPHATE SERPL-MCNC: 2.5 MG/DL (ref 2.5–4.9)
PLATELET # BLD AUTO: 305 K/UL (ref 135–420)
PMV BLD AUTO: 10 FL (ref 9.2–11.8)
POTASSIUM SERPL-SCNC: 2.9 MMOL/L (ref 3.5–5.5)
POTASSIUM SERPL-SCNC: 3 MMOL/L (ref 3.5–5.5)
POTASSIUM SERPL-SCNC: 3.3 MMOL/L (ref 3.5–5.5)
RBC # BLD AUTO: 3.12 M/UL (ref 4.2–5.3)
SODIUM SERPL-SCNC: 143 MMOL/L (ref 136–145)
WBC # BLD AUTO: 9.3 K/UL (ref 4.6–13.2)

## 2021-08-18 PROCEDURE — 84132 ASSAY OF SERUM POTASSIUM: CPT

## 2021-08-18 PROCEDURE — 71045 X-RAY EXAM CHEST 1 VIEW: CPT

## 2021-08-18 PROCEDURE — 74011250636 HC RX REV CODE- 250/636: Performed by: STUDENT IN AN ORGANIZED HEALTH CARE EDUCATION/TRAINING PROGRAM

## 2021-08-18 PROCEDURE — 65660000000 HC RM CCU STEPDOWN

## 2021-08-18 PROCEDURE — 74011250636 HC RX REV CODE- 250/636: Performed by: HOSPITALIST

## 2021-08-18 PROCEDURE — 36415 COLL VENOUS BLD VENIPUNCTURE: CPT

## 2021-08-18 PROCEDURE — 74011250637 HC RX REV CODE- 250/637: Performed by: STUDENT IN AN ORGANIZED HEALTH CARE EDUCATION/TRAINING PROGRAM

## 2021-08-18 PROCEDURE — 85025 COMPLETE CBC W/AUTO DIFF WBC: CPT

## 2021-08-18 PROCEDURE — 76450000000

## 2021-08-18 PROCEDURE — 74011250636 HC RX REV CODE- 250/636: Performed by: INTERNAL MEDICINE

## 2021-08-18 PROCEDURE — 74011000250 HC RX REV CODE- 250: Performed by: INTERNAL MEDICINE

## 2021-08-18 PROCEDURE — 74011250637 HC RX REV CODE- 250/637: Performed by: FAMILY MEDICINE

## 2021-08-18 PROCEDURE — 99231 SBSQ HOSP IP/OBS SF/LOW 25: CPT | Performed by: NURSE PRACTITIONER

## 2021-08-18 PROCEDURE — 82962 GLUCOSE BLOOD TEST: CPT

## 2021-08-18 PROCEDURE — 74011000258 HC RX REV CODE- 258: Performed by: HOSPITALIST

## 2021-08-18 PROCEDURE — 84100 ASSAY OF PHOSPHORUS: CPT

## 2021-08-18 PROCEDURE — 86140 C-REACTIVE PROTEIN: CPT

## 2021-08-18 PROCEDURE — 83735 ASSAY OF MAGNESIUM: CPT

## 2021-08-18 PROCEDURE — 0202U NFCT DS 22 TRGT SARS-COV-2: CPT

## 2021-08-18 PROCEDURE — P9047 ALBUMIN (HUMAN), 25%, 50ML: HCPCS | Performed by: HOSPITALIST

## 2021-08-18 PROCEDURE — 82330 ASSAY OF CALCIUM: CPT

## 2021-08-18 PROCEDURE — 83880 ASSAY OF NATRIURETIC PEPTIDE: CPT

## 2021-08-18 PROCEDURE — 74011250637 HC RX REV CODE- 250/637: Performed by: INTERNAL MEDICINE

## 2021-08-18 PROCEDURE — 74011636637 HC RX REV CODE- 636/637: Performed by: STUDENT IN AN ORGANIZED HEALTH CARE EDUCATION/TRAINING PROGRAM

## 2021-08-18 RX ORDER — POTASSIUM CHLORIDE 20 MEQ/1
20 TABLET, EXTENDED RELEASE ORAL
Status: COMPLETED | OUTPATIENT
Start: 2021-08-18 | End: 2021-08-18

## 2021-08-18 RX ORDER — POTASSIUM CHLORIDE 20 MEQ/1
40 TABLET, EXTENDED RELEASE ORAL
Status: COMPLETED | OUTPATIENT
Start: 2021-08-18 | End: 2021-08-18

## 2021-08-18 RX ORDER — POTASSIUM CHLORIDE AND SODIUM CHLORIDE 900; 300 MG/100ML; MG/100ML
INJECTION, SOLUTION INTRAVENOUS CONTINUOUS
Status: DISCONTINUED | OUTPATIENT
Start: 2021-08-18 | End: 2021-08-18

## 2021-08-18 RX ADMIN — POTASSIUM CHLORIDE 40 MEQ: 20 TABLET, EXTENDED RELEASE ORAL at 21:35

## 2021-08-18 RX ADMIN — PIPERACILLIN AND TAZOBACTAM 3.38 G: 3; .375 INJECTION, POWDER, LYOPHILIZED, FOR SOLUTION INTRAVENOUS at 11:00

## 2021-08-18 RX ADMIN — POTASSIUM CHLORIDE 40 MEQ: 20 TABLET, EXTENDED RELEASE ORAL at 01:57

## 2021-08-18 RX ADMIN — METOPROLOL TARTRATE 2.5 MG: 5 INJECTION INTRAVENOUS at 18:00

## 2021-08-18 RX ADMIN — INSULIN LISPRO 2 UNITS: 100 INJECTION, SOLUTION INTRAVENOUS; SUBCUTANEOUS at 06:35

## 2021-08-18 RX ADMIN — METOPROLOL TARTRATE 2.5 MG: 5 INJECTION INTRAVENOUS at 13:08

## 2021-08-18 RX ADMIN — HEPARIN SODIUM 5000 UNITS: 5000 INJECTION INTRAVENOUS; SUBCUTANEOUS at 16:00

## 2021-08-18 RX ADMIN — ATORVASTATIN CALCIUM 40 MG: 20 TABLET, FILM COATED ORAL at 21:35

## 2021-08-18 RX ADMIN — METOPROLOL TARTRATE 2.5 MG: 5 INJECTION INTRAVENOUS at 06:35

## 2021-08-18 RX ADMIN — POTASSIUM CHLORIDE 20 MEQ: 1500 TABLET, EXTENDED RELEASE ORAL at 14:45

## 2021-08-18 RX ADMIN — POTASSIUM CHLORIDE AND SODIUM CHLORIDE: 900; 300 INJECTION, SOLUTION INTRAVENOUS at 14:00

## 2021-08-18 RX ADMIN — FUROSEMIDE 40 MG: 10 INJECTION, SOLUTION INTRAMUSCULAR; INTRAVENOUS at 21:35

## 2021-08-18 RX ADMIN — FUROSEMIDE 40 MG: 10 INJECTION, SOLUTION INTRAMUSCULAR; INTRAVENOUS at 10:59

## 2021-08-18 RX ADMIN — HEPARIN SODIUM 5000 UNITS: 5000 INJECTION INTRAVENOUS; SUBCUTANEOUS at 23:35

## 2021-08-18 RX ADMIN — ONDANSETRON 4 MG: 2 INJECTION INTRAMUSCULAR; INTRAVENOUS at 01:57

## 2021-08-18 RX ADMIN — METOPROLOL TARTRATE 2.5 MG: 5 INJECTION INTRAVENOUS at 23:34

## 2021-08-18 RX ADMIN — FAMOTIDINE 20 MG: 20 TABLET, FILM COATED ORAL at 21:35

## 2021-08-18 RX ADMIN — PIPERACILLIN AND TAZOBACTAM 3.38 G: 3; .375 INJECTION, POWDER, LYOPHILIZED, FOR SOLUTION INTRAVENOUS at 18:37

## 2021-08-18 RX ADMIN — GABAPENTIN 100 MG: 100 CAPSULE ORAL at 01:57

## 2021-08-18 RX ADMIN — DIBASIC SODIUM PHOSPHATE, MONOBASIC POTASSIUM PHOSPHATE AND MONOBASIC SODIUM PHOSPHATE 1 TABLET: 852; 155; 130 TABLET ORAL at 01:57

## 2021-08-18 RX ADMIN — METOPROLOL TARTRATE 2.5 MG: 5 INJECTION INTRAVENOUS at 00:09

## 2021-08-18 RX ADMIN — ALBUMIN (HUMAN) 25 G: 0.25 INJECTION, SOLUTION INTRAVENOUS at 23:34

## 2021-08-18 RX ADMIN — PIPERACILLIN AND TAZOBACTAM 3.38 G: 3; .375 INJECTION, POWDER, LYOPHILIZED, FOR SOLUTION INTRAVENOUS at 02:08

## 2021-08-18 RX ADMIN — POTASSIUM CHLORIDE 20 MEQ: 20 TABLET, EXTENDED RELEASE ORAL at 00:09

## 2021-08-18 NOTE — WOUND CARE
IP WOUND CONSULT    David Arevalo  MEDICAL RECORD NUMBER:  [id number]  AGE: 46 y.o. GENDER: female  : 1970  TODAY'S DATE:  2021    GENERAL     [] Follow-up   [x] New Consult    David Arevalo is a 46 y.o. female referred by:   [x] Physician  [] Nursing  [] Other:         PAST MEDICAL HISTORY    Past Medical History:   Diagnosis Date    PAD (peripheral artery disease) ([facility]         PAST SURGICAL HISTORY    Past Surgical History:   Procedure Laterality Date    HX ORTHOPAEDIC      L TMT amputation       FAMILY HISTORY    Family History   Problem Relation Age of Onset    Diabetes Mother        SOCIAL HISTORY    Social History     Tobacco Use    Smoking status: Current Every Day Smoker     Packs/day: 1.50    Smokeless tobacco: Never Used   Substance Use Topics    Alcohol use: Not Currently     Comment: on rare occasion    Drug use: Not Currently       ALLERGIES    No Known Allergies    MEDICATIONS    No current facility-administered medications on file prior to encounter. Current Outpatient Medications on File Prior to Encounter   Medication Sig Dispense Refill    collagenase (SantyL) 250 unit/gram ointment Apply  to affected area daily. Indications: bilat feet      ALPRAZolam (XANAX) 0.5 mg tablet Take 0.5 mg by mouth nightly as needed for Sleep.  pregabalin (LYRICA) 300 mg capsule Take 300 mg by mouth two (2) times a day. Indications: diabetic complication causing injury to some body nerves      metFORMIN (GLUCOPHAGE) 1,000 mg tablet Take 1,000 mg by mouth two (2) times daily (with meals). Indications: type 2 diabetes mellitus      buPROPion SR (WELLBUTRIN SR) 150 mg SR tablet Take 150 mg by mouth daily.  DULoxetine (CYMBALTA) 60 mg capsule Take 60 mg by mouth daily.  losartan (COZAAR) 50 mg tablet Take 50 mg by mouth daily.  ferrous sulfate 324 mg (65 mg iron) tablet Take 324 mg by mouth two (2) times daily (with meals).       glipiZIDE SR (GLUCOTROL XL) 5 mg CR tablet Take 5 mg by mouth daily.  aspirin 81 mg chewable tablet Take 1 Tab by mouth daily. 30 Tab 5    atorvastatin (LIPITOR) 40 mg tablet Take 1 Tab by mouth daily. 30 Tab 5    clopidogreL (PLAVIX) 75 mg tab Take 1 Tab by mouth daily. 30 Tab 5    sodium chloride (Normal Saline Flush) 10-40 mL by IntraVENous route daily.  heparin sod,porcine/0.9 % NaCl (HEPARIN FLUSH IV) 10 Units/mL by IntraVENous route daily.  acetaminophen (TYLENOL) 500 mg tablet Take 1,000 mg by mouth every six (6) hours as needed for Fever or Pain.  losartan (COZAAR) 25 mg tablet Take 1 Tab by mouth daily. 30 Tab 5    metFORMIN (GLUCOPHAGE) 500 mg tablet Take 1 Tab by mouth two (2) times daily (with meals). 60 Tab 0       Wt Readings from Last 3 Encounters:   08/17/21 75.3 kg (166 lb)   04/09/21 79 kg (174 lb 1.6 oz)   04/06/21 70.9 kg (156 lb 4.8 oz)       [unfilled]  Visit Vitals  BP (!) 141/81   Pulse 77   Temp 97.8 °F (36.6 °C)   Resp 13   Ht 5' 8\" (1.727 m)   Wt 75.3 kg (166 lb)   LMP 04/06/2018 (Within Years)   SpO2 91%   BMI 25.24 kg/m²       ASSESSMENT     Skin impairment Identification:  Type: diabetic    Contributing Factors: non-adherence    Wound Foot Anterior;Right (Active)   Wound Etiology Surgical 08/16/21 1600   Dressing Status Clean;Dry; Intact 08/18/21 0800   Cleansed Cleansed with saline 08/16/21 1600   Dressing/Treatment Xeroform;Gauze dressing/dressing sponge; Other (Comment) 08/16/21 1600   Offloading for Diabetic Foot Ulcers Yes (type); Other (comment) 08/16/21 1600   Wound Assessment Pink/red; Exposed Structure Bone 08/16/21 1600   Drainage Amount Small 08/16/21 1600   Drainage Description Sanguineous; Serosanguinous 08/16/21 1600   Wound Odor None 08/16/21 1600   Mary-Wound/Incision Assessment Intact 08/16/21 1600   Number of days: 6       Wound Foot Anterior; Left (Active)   Wound Etiology Non-Healing Surgical 08/16/21 1600   Dressing Status Clean;Dry; Intact 08/18/21 0800   Cleansed Cleansed with saline 08/16/21 1600   Dressing/Treatment Xeroform;Gauze dressing/dressing sponge; Ace wrap; Other (Comment) 08/16/21 1600   Offloading for Diabetic Foot Ulcers Yes (type); Other (comment) 08/16/21 1600   Wound Assessment Pink/red;Pale granulation tissue;Slough;Eschar dry 08/16/21 1600   Drainage Amount Small 08/16/21 1600   Drainage Description Serous 08/16/21 1600   Wound Odor None 08/16/21 1600   Mary-Wound/Incision Assessment Intact; Other (Comment) 08/16/21 1600   Edges Undefined edges 08/16/21 1600   Number of days: 6       Incision 04/12/21 Foot Left (Active)   Dressing Status Clean;Dry; Intact 08/18/21 0800   Number of days: 128       [REMOVED] Wound Pretibial Proximal;Right; Anterior ABRASION (Removed)   Number of days: 16       [REMOVED] Wound Ankle Anterior;Right RIGHT ANTERIOR FOOT ABRASION. (Removed)   Number of days: 16       [REMOVED] Wound Foot Medial;Left 04/09/21 (Removed)   Number of days: 11       [REMOVED] Incision 04/06/21 Foot Left (Removed)   Number of days: 14       [REMOVED] Incision 08/12/21 Leg lower Right (Removed)   Dressing Status Clean;Dry; Intact 08/16/21 0524   Dressing/Treatment ABD pad; Ace wrap;Gauze dressing/dressing sponge 08/12/21 2110   Number of days: 4          PLAN     Skin Care & Pressure Relief Recommendations  Minimize layers of linen  Pads under patient to optimize support surface  Turn/reposition approximately every 2 hours  Pillow wedges    Physician/Provider notified: Yes  Recommendations: Patient not assessed due to patient disposition and choice to no engage with staff.      Teaching completed with:   [] Patient           [] Family member       [] Caregiver          [] Nursing  [x] Other    Patient/Caregiver Teaching:  Level of patient/caregiver understanding able to:   [] Indicates understanding       [] Needs reinforcement  [] Unsuccessful      [] Verbal Understanding  [] Demonstrated understanding       [] No evidence of learning  [] Refused teaching         [x] N/A Electronically signed by Carla Campo RN on 8/18/2021 at 1:14 PM

## 2021-08-18 NOTE — CONSULTS
[facility]: [phone])  [facility]: [phone]     Patient Name: Davonte Sweet  YOB: 1970    Date of Follow-up Visit : 8/18/2021  Reason for Consult: Care decisions  Requesting Provider: Dr. Rafa Rodney  Primary Care Physician: None      SUMMARY:   Davonte Sweet is a 46 y.o. female with a past history of uncontrolled type 2 diabetes mellitus, severe peripheral arterial disease, previous left transmetatarsal amputation and tobacco use, who was admitted on 8/11/2021 from home with a diagnosis of gangrene of both feet. Current medical issues leading to Palliative Medicine involvement include: Debilitated 19-year-old female with multiple comorbid conditions including noncompliance with wound care treatment due to recent fall presented to the ER with complaints of fever and gangrene in both feet. Palliative medicine is consulted for care decisions. CHIEF COMPLAINT: Fever, gangrene of her feet    HPI/SUBJECTIVE:    Past history as above. Ms. Mark Anthony Quesada is a very debilitated 19-year-old female with multiple comorbid conditions including noncompliance with wound care treatment after a recent fall. She has presented to the ER with complaints of fever and gangrene in both feet. She fell approximately 3 weeks ago and has been mostly bedbound ever since. Patient has been unable to attend wound care appointments or hyperbaric appointments due to bedbound status at home. 8/18/2021:  Lying in bed on left side with eyes closed. Arousable and became awake, alert and oriented x4. Not talkative or interested in engaging in conversation. Denies pain or shortness of breath. On room air.     The patient is:   [x] Verbal and participatory - limited as patient does not like to answer questions  [] Non-participatory due to:     GOALS OF CARE: Full code with full interventions  Patient/Health Care Proxy Stated Goals: Prolong life  TREATMENT PREFERENCES:   Code Status: Full Code         PALLIATIVE DIAGNOSES:   1. Encounter with palliative care/goals of care  2. Gangrene of both feet  3. Noncompliance with medical treatment  4. Debility          PLAN:   8/18/2021: Seen at bedside along with Ms. Alexa Laureano, RN in full PPE for droplet plus isolation and the COVID-19 pandemic. Ms. Joneen Severin is lying in bed with eyes closed laying on her left side. Arousable to verbal stimuli and became awake, alert and oriented x4. Patient was not interested in talking with us and did not engage in conversation. Follow-up from our visit yesterday regarding patient's choice of surrogate decision-maker and possible completion of an AMD.  Patient again stated that she wants her cousin, Roman Díaz, to be her surrogate decision maker in the event patient is unable to speak for herself. Explained again to patient the importance of completing the AMD document to formalize her wishes for surrogate decision maker. Offered to call her cousin, Roman Díaz, so they could discuss this decision and form completion. Patient declined wanting to call [facility] during our visit and told us \"you keep trying to tell me I am dying\". Offered reassurance to patient that we were interested in supporting her choices for surrogate decision making and were looking for ways to help formalize these choices. Patient closed her eyes and did not engage in further conversation. Palliative medicine will follow up with patient again tomorrow in hopes of completing an AMD to formalize patient's choice for surrogate decision maker. Goals of care remain full code with full interventions. Please see below for previous conversations with the palliative medicine team:    1. Encounter with palliative care/goals of care -seen at bedside along with Ms. Candida Lancaster. Ms. Joneen Severin is awake, alert and oriented x4.   Patient became very frustrated when being asked orientation questions and replied \"do you think I am stupid? \". Attempted to explain to patient the reason we ask orientation questions however patient rolled her eyes and repeated \"do you think I am stupid? \". Nasal oxygen is off the patient was agreeable to have it reapplied. Pulse oximeter was also off and patient agreed to have it replaced and oxygen saturations ranged from 94 to 97%. Patient seem to get frustrated with any questions asked saying Clyde Martinesyane do you keep asking me these questions? \". Asked patient what she wanted from this hospitalization and she said \"I just want to live. Don't you all get it?\". Attempted to explain to patient that she had been declining recommended treatments and she said \"I just want to breathe\". Attempted to introduce and explain goals of care, including benefits and burdens of resuscitation, intubation and ventilation. Asked patient about her thoughts on resuscitation in the event of cardiac arrest and intubation in the event of worsening respiratory status and patient declined to respond or answer or our questions. Patient had her eyes closed and did not  wish to discuss this with us. Asked patient if she had ever thought about who she would want to make her medical decisions for her in the event she cannot speak for herself and she stated it would be her cousin, Veronique Ch. Introduced and explained the [facility] and offered to complete with patient today to formalize her wish for her cousin Veronique Ch to be her MPOA. Patient declined to do any paperwork with us. Explained to patient that in the absence of any paperwork and if she was unable to speak for herself that legal decision making would default to her parents who are still living. Patient expressed understanding; however shared that her mother is not able to have a conversation about patient's health. Patient declined to elaborate on what this meant. Patient seemed annoyed throughout our visit and declined to engage in further conversation. Call placed to patient's cousin, Jeovanny Johns, to try and gather additional information about patient. Otis Montemayor shared with us that Aj Leroy prefers to be called Kalyn Yee. Shared with Otis Montemayor our conversation and lack of patient's engagement in the conversation. Shared with Otis Montemayor that patient verbally chose her to be surrogate decision-maker however patient was not willing to sign AMD paperwork to formalize this nor was she willing to have an in-depth discussion about goals of care. Otis Montemayor shared with us that patient's mother is essentially bedbound at home and that patient is normally the primary caregiver for her mother. It is unclear who is caring for patient's mother while patient is hospitalized. At this time, goals of care remain full code with full interventions. 2. Gangrene of both feet - primary team managing, vascular surgery consult, underwent right below-knee amputation on 8/12/2021, wound care consult    3. Noncompliance with medical treatment -patient fell approximately 3 weeks prior to admission and has been mainly bedbound at home and has been unable to participate with hyperbaric treatment appointments or wound care appointments. 4. Debility - PPS 40 which indicates an overall poor prognosis, patient lives at home with her mother who is mainly bedbound and patient is the primary caregiver for her mother. Since patient's fall approximately 3 weeks ago, patient has been mainly bedbound as well. Per chart review, patient has been using a bucket to go to the bathroom. 5. Initial consult note routed to primary continuity provider    6.  Communicated plan of care with: Palliative IDT, patient      Advance Care Planning:  [] The [facility] Interdisciplinary Team has updated the ACP Navigator with [address] and Patient Capacity    Primary Decision [address] ([address]):   Primary Decision Maker: Eddie Bazzi (cousin) - Other Relative - [phone]    Secondary Decision Maker: Marcia Cuenca - Mother - [phone]              As far as possible, the palliative care team has discussed with patient / health care proxy about goals of care / treatment preferences for patient. HISTORY:     History obtained from: Chart, medical team and cousinAmalia Ubaldo    Principal Problem:    Gangrene of both feet ([address] (8/11/2021)    Active Problems:    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 ([address] (4/4/2021)      YUNIEL (acute kidney injury) ([address] (4/4/2021)      Non compliance with medical treatment (4/12/2021)      Fall (8/12/2021)      Tobacco use (8/12/2021)      Preoperative evaluation to rule out surgical contraindication (8/12/2021)      Anemia (8/12/2021)      Sepsis ([address] (8/13/2021)      Status post below-knee amputation of right lower extremity ([address] (8/13/2021)      Bacteremia (8/13/2021)      Dyspnea (8/16/2021)      Pulmonary infiltrates (1/14/4599)      Systolic CHF, acute ([address] (8/17/2021)      Debility ()      Past Medical History:   Diagnosis Date    PAD (peripheral artery disease) (HCC)       Past Surgical History:   Procedure Laterality Date    HX ORTHOPAEDIC      L TMT amputation      Family History   Problem Relation Age of Onset    Diabetes Mother      History reviewed, no pertinent family history.   Social History     Tobacco Use    Smoking status: Current Every Day Smoker     Packs/day: 1.50    Smokeless tobacco: Never Used   Substance Use Topics    Alcohol use: Not Currently     Comment: on rare occasion     No Known Allergies   Current Facility-Administered Medications   Medication Dose Route Frequency    0.9% sodium chloride with KCl 40 mEq/L infusion   IntraVENous CONTINUOUS    ELECTROLYTE REPLACEMENT PROTOCOL - Potassium Standard Dosing   1 Each Other PRN    ELECTROLYTE REPLACEMENT PROTOCOL - Magnesium   1 Each Other PRN    ELECTROLYTE REPLACEMENT PROTOCOL - Phosphorus  Standard Dosing  1 Each Other PRN    ELECTROLYTE REPLACEMENT PROTOCOL - Calcium   1 Each Other PRN    furosemide (LASIX) injection 40 mg  40 mg IntraVENous Q12H    metoprolol (LOPRESSOR) injection 2.5 mg  2.5 mg IntraVENous Q6H    piperacillin-tazobactam (ZOSYN) 3.375 g in 0.9% sodium chloride (MBP/ADV) 100 mL MBP  3.375 g IntraVENous Q8H    0.9% sodium chloride infusion 250 mL  250 mL IntraVENous PRN    famotidine (PEPCID) tablet 20 mg  20 mg Oral BID    DULoxetine (CYMBALTA) capsule 60 mg  60 mg Oral DAILY    albuterol-ipratropium (DUO-NEB) 2.5 MG-0.5 MG/3 ML  3 mL Nebulization Q4H PRN    albumin human 25% (BUMINATE) solution 25 g  25 g IntraVENous Q6H    insulin glargine (LANTUS) injection 5 Units  5 Units SubCUTAneous DAILY    ALPRAZolam (XANAX) tablet 0.5 mg  0.5 mg Oral QHS PRN    buPROPion SR (WELLBUTRIN SR) tablet 150 mg  150 mg Oral DAILY    gabapentin (NEURONTIN) capsule 100 mg  100 mg Oral TID PRN    morphine injection 2 mg  2 mg IntraVENous Q4H PRN    COVID-19 vac,Ad26-PF ([employer]) injection 1 Dose  1 Dose IntraMUSCular PRIOR TO DISCHARGE    aspirin chewable tablet 81 mg  81 mg Oral DAILY    heparin (porcine) injection 5,000 Units  5,000 Units SubCUTAneous Q8H    HYDROcodone-acetaminophen (NORCO) 7.5-325 mg per tablet 1 Tablet  1 Tablet Oral Q4H PRN    HYDROcodone-acetaminophen (NORCO) 7.5-325 mg per tablet 2 Tablet  2 Tablet Oral Q6H PRN    naloxone (NARCAN) injection 0.1 mg  0.1 mg IntraVENous PRN    diphenhydrAMINE (BENADRYL) injection 12.5 mg  12.5 mg IntraVENous Q6H PRN    acetaminophen (TYLENOL) tablet 650 mg  650 mg Oral Q6H PRN    Or    acetaminophen (TYLENOL) suppository 650 mg  650 mg Rectal Q6H PRN    polyethylene glycol (MIRALAX) packet 17 g  17 g Oral DAILY PRN    ondansetron (ZOFRAN ODT) tablet 4 mg  4 mg Oral Q8H PRN    Or    ondansetron (ZOFRAN) injection 4 mg  4 mg IntraVENous Q6H PRN    insulin lispro (HUMALOG) injection   SubCUTAneous Q6H    glucose chewable tablet 16 g  16 g Oral PRN    glucagon (GLUCAGEN) injection 1 mg  1 mg IntraMUSCular PRN    dextrose (D50W) injection syrg 12.5-25 g  25-50 mL IntraVENous PRN    atorvastatin (LIPITOR) tablet 40 mg  40 mg Oral QHS          Clinical Pain Assessment (nonverbal scale for nonverbal patients): Clinical Pain Assessment  Severity: 0          Duration: for how long has pt been experiencing pain (e.g., 2 days, 1 month, years)  Frequency: how often pain is an issue (e.g., several times per day, once every few days, constant)     FUNCTIONAL ASSESSMENT:     Palliative Performance Scale (PPS):  PPS: 40    ECOG  ECOG Status : Limited self-care     PSYCHOSOCIAL/SPIRITUAL SCREENING:      Any spiritual / Religion concerns:  [] Yes /  [x] No    Caregiver Burnout:  [] Yes /  [] No /  [x] No Caregiver Present      Anticipatory grief assessment:   [x] Normal  / [] Maladaptive        REVIEW OF SYSTEMS:     Systems: constitutional, ears/nose/mouth/throat, respiratory, gastrointestinal, genitourinary, musculoskeletal, integumentary, neurologic, psychiatric, endocrine. Positive findings noted below. Modified ESAS Completed by: provider           Pain: 0           Dyspnea: 0               Positive and pertinent negative findings in ROS are noted above in HPI. The following systems were [] reviewed / [x] unable to be reviewed as noted in HPI  Other findings are noted below. PHYSICAL EXAM:     Constitutional: lying in bed with eyes closed, arousable, awake, alert, oriented x4, not engaged in conversation  Eyes: pupils equal, anicteric  ENMT: no nasal discharge, dry  mucous membranes  Cardiovascular: regular rhythm  Respiratory: breathing not labored, symmetric, on room air  Gastrointestinal: soft   Last bowel movement: None recorded  Musculoskeletal: Right below-knee amputation, left transmetatarsal amputation, dressings dry and intact  Skin: warm, dry  Neurologic: following commands, moving all extremities  Psychiatric: Appears depressed    Other:   Wt Readings from Last 3 Encounters:   08/17/21 75.3 kg (166 lb)   04/09/21 79 kg (174 lb 1.6 oz)   04/06/21 70.9 kg (156 lb 4.8 oz)     Blood pressure 134/73, pulse 71, temperature 97.6 °F (36.4 °C), resp. rate 13, height 5' 8\" (1.727 m), weight 75.3 kg (166 lb), last menstrual period 04/06/2018, SpO2 100 %. Pain:  Pain Scale 1: Numeric (0 - 10)  Pain Intensity 1: 0     Pain Location 1: Generalized  Pain Orientation 1: Right  Pain Description 1: Aching  Pain Intervention(s) 1: Medication (see MAR)       LAB AND IMAGING FINDINGS:     Lab Results   Component Value Date/Time    WBC 9.3 08/18/2021 12:47 AM    HGB 9.1 (L) 08/18/2021 12:47 AM    PLATELET 645 46/52/7574 12:47 AM     Lab Results   Component Value Date/Time    Sodium 143 08/18/2021 12:47 AM    Potassium 3.0 (L) 08/18/2021 08:15 AM    Chloride 110 08/18/2021 12:47 AM    CO2 24 08/18/2021 12:47 AM    BUN 16 08/18/2021 12:47 AM    Creatinine 0.86 08/18/2021 12:47 AM    Calcium 7.8 (L) 08/18/2021 12:47 AM    Magnesium 1.9 08/18/2021 12:47 AM    Phosphorus 2.5 08/18/2021 12:47 AM      Lab Results   Component Value Date/Time    Alk.  phosphatase 100 08/16/2021 05:30 AM    Protein, total 5.6 (L) 08/16/2021 05:30 AM    Albumin 3.3 (L) 08/17/2021 05:20 AM    Globulin 2.6 08/16/2021 05:30 AM     Lab Results   Component Value Date/Time    INR 1.2 04/06/2021 03:55 AM    Prothrombin time 15.4 (H) 04/06/2021 03:55 AM    aPTT 46.1 (H) 04/06/2021 03:55 AM      Lab Results   Component Value Date/Time    Iron 10 (L) 08/12/2021 12:15 PM    TIBC 130 (L) 08/12/2021 12:15 PM    Iron % saturation 8 (L) 08/12/2021 12:15 PM      No results found for: PH, PCO2, PO2  No components found for: [place]   Lab Results   Component Value Date/Time    CK 23 (L) 08/11/2021 09:35 PM    CK - MB <1.0 08/11/2021 09:35 PM              Total time: 15 minutes     > 50% counseling / coordination:  Time spent in direct consultation with the patient, medical team, and family     Prolonged service was provided for  []30 min []75 min in face to face time in the presence of the patient, spent as noted above. Time Start:   Time End:     Disclaimer: Sections of this note are dictated using utilizing voice recognition software, which may have resulted in some phonetic based errors in grammar and contents. Even though attempts were made to correct all the mistakes, some may have been missed, and remained in the body of the document. If questions arise, please contact our department.

## 2021-08-18 NOTE — PROGRESS NOTES
Care manager rounded and discussed with primary care nurse patient's refusal to take meds or have dressing changes; possible BKA revision/closure tomorrow; will continue to follow for placement when appropriate.

## 2021-08-18 NOTE — PROGRESS NOTES
Assess patient chart due to transfer from ICU to [address]    Attempted to call ICU to get report on patient, advised nurse unavailable. Will return call. Awaiting call back at this time.

## 2021-08-18 NOTE — PROGRESS NOTES
Palliative Medicine    CODE STATUS: FULL CODE     AMD Status: No AMD on file. Patient has given verbal authorization for her cousin, Roge Chicas to be her medical decision surrogate but has declined to formalize her choices. Per pt and Meron Doreen Cuenca(pt's mother) is not able to act in the capacity of surrogate due to mental illness. 8/18/2021 [id number] Seen today in room ICU 7 along with Taylor oCrrigan, NP. Awake, alert. Oriented  X 4. Did not choose to engage in conversation. Denies pain or dyspnea. Was lying on let side. Respirations unlabored on room air. Asked if she spoke with [facility] and she said no. Offered to place the call to [facility] for her and she declined. \"You keep trying to tell me I'm dying\". Attempted to reassure her and that we were looking for ways to support her choices. No further conversations. Disposition plan: to be determined after medical plan is determined. Palliative care will continue to follow Xin Mullen  and her family during her hospitalization and support them as they make healthcare decisions and define goals of care.       Naren Krause, RN, MSN  Palliative Medicine  P: [phone]

## 2021-08-18 NOTE — PROGRESS NOTES
Hospitalist Progress Note    Patient: Jose Manuel Torres MRN: [id number]  [facility]: [id number]    YOB: 1970  Age: 46 y.o. Sex: female    DOA: 8/11/2021 LOS:  LOS: 7 days            Chief complaint :  Gangrene of both feet. Assessment/Plan     Patient Active Problem List   Diagnosis Code    Osteomyelitis ([facility] M86.9    Type 2 diabetes mellitus with left diabetic foot ulcer ([facility] E11.621, L97.529    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 ([facility] E11.52    YUNIEL (acute kidney injury) ([facility] N17.9    PAD (peripheral artery disease) ([facility]) I73.9    Bilateral carotid artery stenosis I65.23    Gangrene of left foot ([facility]) I96    Non compliance with medical treatment Z91.19    Gangrene of both feet [facility]) I96    Fall W19. [place] Tobacco use Z72.0    Preoperative evaluation to rule out surgical contraindication Z01.818    Anemia D64.9    Sepsis ([facility]) A41.9    Status post below-knee amputation of right lower extremity ([facility] Z89.511    Bacteremia R78.81    Dyspnea R06.00    Pulmonary infiltrates U23.2    Systolic CHF, acute ([facility]) I50.21    Debility R53.81          59-year-old female with uncontrolled type 2 diabetes mellitus, severe peripheral arterial disease, and tobacco use who has been noncompliant with wound care and hyperbaric oxygen treatment due to recent fall is admitted for severe gangrene of both feet. RRT was called on 08/16/2021, patient appeared confused, shortness of breath with hypoxia. ABG showed metabolic acidosis. Patient refused chest x-ray. She has been refusing medications. Given her condition she was transferred to ICU.    08/17/2021 - I tried to discuss with patient about her critical condition and guarded prognosis, she is not interested in listening to my concerns about her medical condition. She turned her face on other side and did not want to listen to me and did not answer my questions.       RN reports patient is not taking her medications    CRITICAL CARE PLAN    Resp -   Dyspnea/hypoxia -  CXR with pulmonary edema. Oxygen as needed by NC. ID -   septic shock  -  BP now improved  Continue iv abx,   Blood Cultures growing Peptoniphilus asaccharolyticus. Follow up blood cultures negative  Wound cultures growing providencia, MSSA, E-fecalis, alcaligenes fecalis. ID following. Antibiotics - zosyn       CVS - Monitor HD. Acute CHF - received lasix  Echo 45 % and mild pulm htn      Gangrene of bilateral feet    S/p right BKA on 8/12   Vascular planning on closure  and debirdement of left foot     Heme/onc - Follow H&H, plts. Anemia  Received blood transfusion   Monitor H/H    Renal - Trend BUN, Cr, follow I/O. Check and replace Mg, K, phos. 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 Prophylaxis - DVT: heparin, GI: pepcid.     Noncompliance with medical treatment   Continued tobacco use          Disposition : TBD    Review of systems  General: No fevers or chills. Cardiovascular: No chest pain or pressure. No palpitations. Pulmonary: see above. Gastrointestinal: No nausea, vomiting. Physical Exam:  General: As above   HEENT: NC, Atraumatic. PERRLA, anicteric sclerae. Lungs: CTA Bilaterally. No Wheezing/Rhonchi/Rales. Heart:  S1 S2,  No murmur, No Rubs, No Gallops  Abdomen: Soft, Non distended, Non tender.  +Bowel sounds,   Extremities: Right BKA, left foot with dressing  Psych:   Not anxious or agitated. Neurologic:  No acute neurological deficit.            Vital signs/Intake and Output:  Visit Vitals  /73   Pulse 71   Temp 97.6 °F (36.4 °C)   Resp 13   Ht 5' 8\" (1.727 m)   Wt 75.3 kg (166 lb)   SpO2 100%   BMI 25.24 kg/m²     Current Shift:  No intake/output data recorded. Last three shifts:  08/17 0701 - 08/18 1900  In: 1600.5 [P.O.:120; I.V.:1480.5]  Out: -             Labs: Results:       Chemistry Recent Labs     08/18/21  1633 08/18/21  0815 08/18/21 0047 08/17/21  1821 08/17/21  0520 08/16/21  0530 08/16/21  0530   GLU  --   --  146*  --  131*  --  127*   NA  --   --  143  --  143  --  144   K 3.3* 3.0* 2.9*   < > 3.1*   < > 3.7   CL  --   --  110  --  113*  --  116*   CO2  --   --  24  --  23  --  16*   BUN  --   --  16  --  21*  --  24*   CREA  --   --  0.86  --  0.86  --  0.92   CA  --   --  7.8*  --  8.0*  --  8.0*   AGAP  --   --  9  --  7  --  12   BUCR  --   --  19  --  24*  --  26*   AP  --   --   --   --   --   --  100   TP  --   --   --   --   --   --  5.6*   ALB  --   --   --   --  3.3*  --  3.0*   GLOB  --   --   --   --   --   --  2.6   AGRAT  --   --   --   --   --   --  1.2    < > = values in this interval not displayed. CBC w/Diff Recent Labs     08/18/21 0047 08/17/21 0520 08/16/21  0530   WBC 9.3 10.6 8.4   RBC 3.12* 2.85* 2.85*   HGB 9.1* 8.3* 8.2*   HCT 28.6* 25.5* 25.5*    273 273   GRANS 82* 89* 93*   LYMPH 8* 4* 3*   EOS 1 1 0      Cardiac Enzymes No results for input(s): CPK, CKND1, CONSTANTINO in the last 72 hours. No lab exists for component: CKRMB, TROIP   Coagulation No results for input(s): PTP, INR, APTT, INREXT, INREXT in the last 72 hours. Lipid Panel No results found for: CHOL, CHOLPOCT, CHOLX, CHLST, CHOLV, 656286, HDL, HDLP, LDL, LDLC, DLDLP, 726464, VLDLC, VLDL, TGLX, TRIGL, TRIGP, TGLPOCT, CHHD, CHHDX   BNP No results for input(s): BNPP in the last 72 hours. Liver Enzymes Recent Labs     08/17/21  0520 08/16/21  0530 08/16/21  0530   TP  --   --  5.6*   ALB 3.3*   < > 3.0*   AP  --   --  100    < > = values in this interval not displayed.       Thyroid Studies No results found for: T4, T3U, TSH, TSHEXT, TSHEXT     Procedures/imaging: see electronic medical records for all procedures/Xrays and details which were not copied into this note but were reviewed prior to creation of Plan

## 2021-08-18 NOTE — DIABETES MGMT
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     Recommend: continue with current regimen    BG in target range (non-ICU\" < 180 ; -180):  [] Yes  [] No      Steroids: no    Current Insulin regimen: Lantus 5 units + - Humalog Normal Insulin Sensitivity Corrective Coverage    TDD previous day = 0    Most recent blood glucose results:   Lab Results   Component Value Date/Time     (H) 08/18/2021 12:47 AM    GLUCPOC 152 (H) 08/18/2021 05:44 AM    GLUCPOC 155 (H) 08/17/2021 05:10 PM    GLUCPOC 141 (H) 08/17/2021 11:29 AM         Hypo: no    HbA1C: equivalent  to ave BGlucose of 150  mg/dl for 2-3 months prior to admission  Lab Results   Component Value Date/Time    Hemoglobin A1c 7.0 (H) 08/11/2021 09:35 PM       Adequate glycemic control PTA:  [x] Yes  [] No      Home diabetes medications:  Key Antihyperglycemic Medications             metFORMIN (GLUCOPHAGE) 1,000 mg tablet (Taking) Take 1,000 mg by mouth two (2) times daily (with meals). Indications: type 2 diabetes mellitus    glipiZIDE SR (GLUCOTROL XL) 5 mg CR tablet (Taking) Take 5 mg by mouth daily. metFORMIN (GLUCOPHAGE) 500 mg tablet Take 1 Tab by mouth two (2) times daily (with meals).         Goals:  Blood glucose will be within target range of 70 - 180 mg/dL by: 8/31    Diet:   Active Orders   Diet    DIET NPO Sips of Water with Meds       Patient Vitals for the past 100 hrs:   % Diet Eaten   08/15/21 1315 76 - 100%        Education:  _____ Refer to Diabetes Education Record                       ___X__ Education not indicated at this time
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
## 2021-08-18 NOTE — PROGRESS NOTES
Problem: Falls - Risk of  Goal: *Absence of Falls  Description: Document Magdiel Landa Fall Risk and appropriate interventions in the flowsheet. Outcome: Progressing Towards Goal  Note: Fall Risk Interventions:  Mobility Interventions: Bed/chair exit alarm, Communicate number of staff needed for ambulation/transfer, OT consult for ADLs, PT Consult for mobility concerns         Medication Interventions: Bed/chair exit alarm, Evaluate medications/consider consulting pharmacy    Elimination Interventions: Bed/chair exit alarm, Call light in reach, Patient to call for help with toileting needs, Toileting schedule/hourly rounds    History of Falls Interventions: Bed/chair exit alarm, Consult care management for discharge planning, Door open when patient unattended, Evaluate medications/consider consulting pharmacy, Room close to nurse's station         Problem: Patient Education: Go to Patient Education Activity  Goal: Patient/Family Education  Outcome: Progressing Towards Goal     Problem: Diabetes Self-Management  Goal: *Disease process and treatment process  Description: Define diabetes and identify own type of diabetes; list 3 options for treating diabetes. Outcome: Progressing Towards Goal  Goal: *Incorporating nutritional management into lifestyle  Description: Describe effect of type, amount and timing of food on blood glucose; list 3 methods for planning meals. Outcome: Progressing Towards Goal  Goal: *Incorporating physical activity into lifestyle  Description: State effect of exercise on blood glucose levels. Outcome: Progressing Towards Goal  Goal: *Developing strategies to promote health/change behavior  Description: Define the ABC's of diabetes; identify appropriate screenings, schedule and personal plan for screenings.   Outcome: Progressing Towards Goal  Goal: *Using medications safely  Description: State effect of diabetes medications on diabetes; name diabetes medication taking, action and side effects. Outcome: Progressing Towards Goal  Goal: *Monitoring blood glucose, interpreting and using results  Description: Identify recommended blood glucose targets  and personal targets. Outcome: Progressing Towards Goal  Goal: *Prevention, detection, treatment of acute complications  Description: List symptoms of hyper- and hypoglycemia; describe how to treat low blood sugar and actions for lowering  high blood glucose level. Outcome: Progressing Towards Goal  Goal: *Prevention, detection and treatment of chronic complications  Description: Define the natural course of diabetes and describe the relationship of blood glucose levels to long term complications of diabetes. Outcome: Progressing Towards Goal  Goal: *Developing strategies to address psychosocial issues  Description: Describe feelings about living with diabetes; identify support needed and support network  Outcome: Progressing Towards Goal  Goal: *Insulin pump training  Outcome: Progressing Towards Goal  Goal: *Sick day guidelines  Outcome: Progressing Towards Goal  Goal: *Patient Specific Goal (EDIT GOAL, INSERT TEXT)  Outcome: Progressing Towards Goal     Problem: Patient Education: Go to Patient Education Activity  Goal: Patient/Family Education  Outcome: Progressing Towards Goal     Problem: Pressure Injury - Risk of  Goal: *Prevention of pressure injury  Description: Document Flo Scale and appropriate interventions in the flowsheet. Outcome: Progressing Towards Goal  Note: Pressure Injury Interventions:  Sensory Interventions: Assess changes in LOC, Avoid rigorous massage over bony prominences, Check visual cues for pain, Discuss PT/OT consult with provider, Keep linens dry and wrinkle-free, Maintain/enhance activity level, Minimize linen layers, Monitor skin under medical devices, Pressure redistribution bed/mattress (bed type), Turn and reposition approx.  every two hours (pillows and wedges if needed)    Moisture Interventions: Absorbent underpads, Apply protective barrier, creams and emollients, Check for incontinence Q2 hours and as needed, Maintain skin hydration (lotion/cream), Minimize layers    Activity Interventions: Pressure redistribution bed/mattress(bed type), PT/OT evaluation    Mobility Interventions: HOB 30 degrees or less, Pressure redistribution bed/mattress (bed type), PT/OT evaluation, Turn and reposition approx. every two hours(pillow and wedges)    Nutrition Interventions: Document food/fluid/supplement intake, Discuss nutritional consult with provider    Friction and Shear Interventions: Apply protective barrier, creams and emollients, HOB 30 degrees or less, Minimize layers, Transferring/repositioning devices                Problem: Patient Education: Go to Patient Education Activity  Goal: Patient/Family Education  Outcome: Progressing Towards Goal     Problem: Risk for Spread of Infection  Goal: Prevent transmission of infectious organism to others  Description: Prevent the transmission of infectious organisms to other patients, staff members, and visitors.   Outcome: Progressing Towards Goal     Problem: Patient Education:  Go to Education Activity  Goal: Patient/Family Education  Outcome: Progressing Towards Goal

## 2021-08-18 NOTE — PROGRESS NOTES
0700 Bedside and Verbal shift change report given to LORETA Valentin RN (oncoming nurse) by Annamaria Moreno RN (offgoing nurse). Report included the following information SBAR, Kardex, Intake/Output, Recent Results and Cardiac Rhythm ST.     0800 Shift assessment completed, see flowsheet. Patient is alert, but withdrawn. Appears irritable and not wanting talk. CNA assisted with hygiene needs, but patient refused to wear gown. O2 sats 91%, continues to refuse O2.     0900 Continues to refuse repeat COVID swap. Yelled, \"get out lady. \" Refusing medications, will reattempt. Potassium 3.0, will add K to IVF. 1100 Continues to refuse meds. 1200 Reassessment,no changes. [phone] Patient accepted medication. Polite and cooperative calling for bedpan and to be cleaned up.     1600 Spoke with Dr. Rebel Scott. Patient may have a diabetic diet. NPO after MN for potential surgery tomorrow. May stop IVF since patient is accepting PO meds. 1700 Patient stated that she wishes for her cousin, Judith Mortensen to be her point of contact. Requests that her father RECEIVED NO INFORMATION about her. 1826 TRANSFER - OUT REPORT:    Verbal report given to JOSEPHINE Aleman RN(name) on [facility]  being transferred to Telemetry(unit) for routine progression of care       Report consisted of patients Situation, Background, Assessment and   Recommendations(SBAR). Information from the following report(s) SBAR, Kardex, Intake/Output, Recent Results and Cardiac Rhythm SR was reviewed with the receiving nurse. Lines:   Peripheral IV 08/13/21 Right;Proximal Cephalic (Active)   Site Assessment Clean, dry, & intact 08/18/21 0800   Phlebitis Assessment 0 08/18/21 0800   Infiltration Assessment 0 08/18/21 0800   Dressing Status Clean, dry, & intact 08/18/21 0800   Dressing Type Disk with Chlorhexadine gluconate (CHG); Transparent 08/18/21 0800   Hub Color/Line Status Infusing 08/18/21 0800   Action Taken Open ports on tubing capped 08/18/21 0800 Alcohol Cap Used Yes 08/18/21 0800       Peripheral IV 08/13/21 Left;Proximal Cephalic (Active)   Site Assessment Clean, dry, & intact 08/18/21 0800   Phlebitis Assessment 0 08/18/21 0800   Infiltration Assessment 0 08/18/21 0800   Dressing Status Clean, dry, & intact 08/18/21 0800   Dressing Type Transparent;Disk with Chlorhexadine gluconate (CHG) 08/18/21 0800   Hub Color/Line Status Infusing 08/18/21 0800   Action Taken Open ports on tubing capped 08/18/21 0800   Alcohol Cap Used Yes 08/18/21 0800        Opportunity for questions and clarification was provided.       Patient transported with:   Monitor  Registered Nurse

## 2021-08-18 NOTE — PROGRESS NOTES
[facility]  (A Division of [address] [place] [address])                                                                                                                       Tania Aiken MD  [facility] #:     [phone]   Office Fax: [phone]     Date of Admission: 8/11/2021Date of Note: 8/18/2021  Reason for FU: severe sepsis      Current Antimicrobials:    Prior Antimicrobials:  Zosyn 8/11- 8/13m resumed 8/16  TD      Immunosuppressive drugs: na Clindamycin 8/13 X3 doses  Ctx 8/13 to 8/15  Levofloxacin 8/13 X1  Vanco 8/11 to 8/16  Flagyl 8/15 8/16       Assessment- ID related:  --------------------------------------------------------------------------    · Probable acute resp failure 2/2 pul edema with diffuse alveolar infiltrate, small to mod B/L pleural effusion on CXR--reasonable to wait for COVID pcr result for dc isolation given rise in pandemic. Doubt progressive bacterial pna. Lactic acid normal. DDX asp PNA- covered with Zosyn         Left lung infiltrate   · Right foot wet gangrene with Severe sepsis POA  · R BKA due to above  · Bacteremia with anaerobe( peptoniphilus) 1/4 bottles 8/11  · BCX 8/13-8/15: NGSF  · Hypoxia and hypothermia--while on broad spectrum abx  · Leucocytosis resolved post BKA  CRP 6.2-> 5.7    WCX L foot-8/11: Providencia and MSSA  WCX R foot-8/11: E.fecalis. Alcaligenes fecalis, MSSA    Other Medical Issues- Mx per respective team:   Acute pul edema  DM-poorly controlled  PVD  tobaco use  YUNIEL improved    Past ID Issues:  COVID 19 vaccine- 8/12/21  Left post TMA 4/12- Rxed long term ABX  8/14  X-ray: Broad deep ulceration along dorsal hindfoot. No bony destructive process. Transmetatarsal amputation.  Plantar calcaneal enthesopathy.           Recommendation for ID issues I am following:  --------------------------------------------------------------------------    Cont Zosyn- will switch over to oral Augmentin to complete 14 days   All bcx remain negative    Palliative following    Wound care to L foot- Dr Bettie Andrade following. High risk for recurrent sepsis                        Condition:  Acutely sick    Subjective:  Awake, not interactive, flat affect  Not on pressors, not on NC at time of exam.   Palliative team following  Notes, labs , microbiology data and imaging reports reviewed. SHAHRIAR ICU MD, RN, palliative  Microbiology results reviewed- [facility] called for further clarification as indicated       HPI:  Tong Meeks is 45 YO WHITE/NON- female with PMH as listed below. Known to me from left foot infection/dry gangrene and treated with 6 weeks of IV abx-Unasyn followed by 2 weeks of oral augmentin. She had poorly controlled DM. Admitted on 8/11 with R foot wet gangrene and severe sepsis- high wbc, hypotension. Was placed on Vanco and Zosyn, she was taken to OR for R BKA. WCX taken from both feet reviewed, didn't have MRSA. Bacteremia with anearobe from admission. She had no further bacteremia. Flagyl was added by surgery. She has SOB and hypoxemia this morning. Was given lasix and morphine. She has labs for lactic acid/bmp pending. Still remains on vanco- no MRSA isolated. During interview, is awake, but limited with her answers, wants to rest. No fever, no diarrhea. SHAHRIAR RN- who states she is breathing better since her earlier medications with lasix and morphine during RRT.          Active Hospital Problems    Diagnosis Date Noted    Pulmonary infiltrates 12/47/5869    Systolic CHF, acute ([address] 08/17/2021    Dyspnea 08/16/2021    Sepsis ([address] 08/13/2021    Status post below-knee amputation of right lower extremity ([address] 08/13/2021    Bacteremia 08/13/2021    Fall 08/12/2021    Tobacco use 08/12/2021    Preoperative evaluation to rule out surgical contraindication 08/12/2021    Anemia 08/12/2021    Gangrene of both feet ([address] 08/11/2021    Non compliance with medical treatment 04/12/2021    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 ([facility] 04/04/2021    YUNIEL (acute kidney injury) ([facility] 04/04/2021     Past Medical History:   Diagnosis Date    PAD (peripheral artery disease) ([facility])      Past Surgical History:   Procedure Laterality Date    HX ORTHOPAEDIC      L TMT amputation     Family History   Problem Relation Age of Onset    Diabetes Mother      Social History     Socioeconomic History    Marital status:      Spouse name: Not on file    Number of children: Not on file    Years of education: Not on file    Highest education level: Not on file   Occupational History    Not on file   Tobacco Use    Smoking status: Current Every Day Smoker     Packs/day: 1.50    Smokeless tobacco: Never Used   Substance and Sexual Activity    Alcohol use: Not Currently     Comment: on rare occasion    Drug use: Not Currently    Sexual activity: Not on file   Other Topics Concern     Service Not Asked    Blood Transfusions Not Asked    Caffeine Concern Not Asked    Occupational Exposure Not Asked    Hobby Hazards Not Asked    Sleep Concern Not Asked    Stress Concern Not Asked    Weight Concern Not Asked    Special Diet Not Asked    Back Care Not Asked    Exercise Not Asked    Bike Helmet Not Asked    Seat Belt Not Asked    Self-Exams Not Asked   Social History Narrative    Not on file     Social Determinants of Health     Financial Resource Strain:     Difficulty of Paying Living Expenses:    Food Insecurity:     Worried About Running Out of Food in the Last Year:     Ran Out of Food in the Last Year:    Transportation Needs:     Lack of Transportation (Medical):      Lack of Transportation (Non-Medical):    Physical Activity:     Days of Exercise per Week:     Minutes of Exercise per Session:    Stress:     Feeling of Stress :    Social Connections:     Frequency of Communication with Friends and Family:     Frequency of Social Gatherings with Friends and Family:     Attends Temple Services:     Active Member of Clubs or Organizations:     Attends Club or Organization Meetings:     Marital Status:    Intimate Partner Violence:     Fear of Current or Ex-Partner:     Emotionally Abused:     Physically Abused:     Sexually Abused: Allergies:  Patient has no known allergies.      Medications:  Current Facility-Administered Medications   Medication Dose Route Frequency    0.9% sodium chloride with KCl 40 mEq/L infusion   IntraVENous CONTINUOUS    ELECTROLYTE REPLACEMENT PROTOCOL - Potassium Standard Dosing   1 Each Other PRN    ELECTROLYTE REPLACEMENT PROTOCOL - Magnesium   1 Each Other PRN    ELECTROLYTE REPLACEMENT PROTOCOL - Phosphorus  Standard Dosing  1 Each Other PRN    ELECTROLYTE REPLACEMENT PROTOCOL - Calcium   1 Each Other PRN    furosemide (LASIX) injection 40 mg  40 mg IntraVENous Q12H    metoprolol (LOPRESSOR) injection 2.5 mg  2.5 mg IntraVENous Q6H    piperacillin-tazobactam (ZOSYN) 3.375 g in 0.9% sodium chloride (MBP/ADV) 100 mL MBP  3.375 g IntraVENous Q8H    0.9% sodium chloride infusion 250 mL  250 mL IntraVENous PRN    famotidine (PEPCID) tablet 20 mg  20 mg Oral BID    DULoxetine (CYMBALTA) capsule 60 mg  60 mg Oral DAILY    albuterol-ipratropium (DUO-NEB) 2.5 MG-0.5 MG/3 ML  3 mL Nebulization Q4H PRN    albumin human 25% (BUMINATE) solution 25 g  25 g IntraVENous Q6H    insulin glargine (LANTUS) injection 5 Units  5 Units SubCUTAneous DAILY    ALPRAZolam (XANAX) tablet 0.5 mg  0.5 mg Oral QHS PRN    buPROPion SR (WELLBUTRIN SR) tablet 150 mg  150 mg Oral DAILY    gabapentin (NEURONTIN) capsule 100 mg  100 mg Oral TID PRN    morphine injection 2 mg  2 mg IntraVENous Q4H PRN    COVID-19 vac,Ad26-PF (Shareaholic) injection 1 Dose  1 Dose IntraMUSCular PRIOR TO DISCHARGE    aspirin chewable tablet 81 mg  81 mg Oral DAILY    heparin (porcine) injection 5,000 Units  5,000 Units SubCUTAneous Q8H    HYDROcodone-acetaminophen (NORCO) 7.5-325 mg per tablet 1 Tablet  1 Tablet Oral Q4H PRN    HYDROcodone-acetaminophen (NORCO) 7.5-325 mg per tablet 2 Tablet  2 Tablet Oral Q6H PRN    naloxone (NARCAN) injection 0.1 mg  0.1 mg IntraVENous PRN    diphenhydrAMINE (BENADRYL) injection 12.5 mg  12.5 mg IntraVENous Q6H PRN    acetaminophen (TYLENOL) tablet 650 mg  650 mg Oral Q6H PRN    Or    acetaminophen (TYLENOL) suppository 650 mg  650 mg Rectal Q6H PRN    polyethylene glycol (MIRALAX) packet 17 g  17 g Oral DAILY PRN    ondansetron (ZOFRAN ODT) tablet 4 mg  4 mg Oral Q8H PRN    Or    ondansetron (ZOFRAN) injection 4 mg  4 mg IntraVENous Q6H PRN    insulin lispro (HUMALOG) injection   SubCUTAneous Q6H    glucose chewable tablet 16 g  16 g Oral PRN    glucagon (GLUCAGEN) injection 1 mg  1 mg IntraMUSCular PRN    dextrose (D50W) injection syrg 12.5-25 g  25-50 mL IntraVENous PRN    atorvastatin (LIPITOR) tablet 40 mg  40 mg Oral QHS        ROS:  Review of systems not obtained due to patient factors. Patient doesn't give detail answers     Physical Exam:    Temp (24hrs), Av °F (36.7 °C), Min:97.4 °F (36.3 °C), Max:98.4 °F (36.9 °C)    Visit Vitals  /70   Pulse 72   Temp 97.9 °F (36.6 °C)   Resp 14   Ht 5' 8\" (1.727 m)   Wt 75.3 kg (166 lb)   LMP 2018 (Within Years)   SpO2 91%   BMI 25.24 kg/m²     Exam done under PPE proocol     GEN: WD chronically sick looking- no severe distress noted  HEENT: Unicteric. EOMI intact  CHEST: Non laboured breathing. No wheezes  CVS:RRR, no mur/gallop  ABD: Obese/soft. Non tender. MEETA: Deferred  EXT: No apparent swelling or redness on UE/LE joints. R  BKA site is dressed  Left foot is dressed as well  Skin: Dry and intact. No rash, no redness. CNS: A, Flat affect. Moves all extremity. CN grossly ok.       Microbiology  All Micro Results     Procedure Component Value Units Date/Time    RESPIRATORY VIRUS PANEL W/COVID-19, PCR [[id number]]     Order Status: Sent Specimen: NASOPHARYNGEAL SWAB     CULTURE, BLOOD [[id number]] Collected: 08/17/21 0520    Order Status: Completed Specimen: Blood Updated: 08/18/21 0739     Special Requests: NO SPECIAL REQUESTS        Culture result: NO GROWTH 1 DAY       CULTURE, BLOOD [[id number]] Collected: 08/16/21 0530    Order Status: Completed Specimen: Blood Updated: 08/18/21 0738     Special Requests: NO SPECIAL REQUESTS        Culture result: NO GROWTH 2 DAYS       CULTURE, BLOOD [[id number]] Collected: 08/16/21 0530    Order Status: Completed Specimen: Blood Updated: 08/18/21 0738     Special Requests: NO SPECIAL REQUESTS        Culture result: NO GROWTH 2 DAYS       CULTURE, BLOOD [[id number]] Collected: 08/15/21 0316    Order Status: Completed Specimen: Blood Updated: 08/18/21 0738     Special Requests: NO SPECIAL REQUESTS        Culture result: NO GROWTH 3 DAYS       CULTURE, BLOOD [[id number]] Collected: 08/15/21 0316    Order Status: Completed Specimen: Blood Updated: 08/18/21 0738     Special Requests: NO SPECIAL REQUESTS        Culture result: NO GROWTH 3 DAYS       CULTURE, BLOOD [[id number]] Collected: 08/14/21 0342    Order Status: Completed Specimen: Blood Updated: 08/18/21 0738     Special Requests: NO SPECIAL REQUESTS        Culture result: NO GROWTH 4 DAYS       CULTURE, BLOOD [[id number]] Collected: 08/14/21 0342    Order Status: Completed Specimen: Blood Updated: 08/18/21 0738     Special Requests: NO SPECIAL REQUESTS        Culture result: NO GROWTH 4 DAYS       CULTURE, BLOOD [[id number]] Collected: 08/13/21 1150    Order Status: Completed Specimen: Blood Updated: 08/18/21 0738     Special Requests: NO SPECIAL REQUESTS        Culture result: NO GROWTH 5 DAYS       COVID-19 RAPID TEST [[id number]] Collected: 08/17/21 0955    Order Status: Completed Specimen: Nasopharyngeal Updated: 08/17/21 1041     Specimen source Nasopharyngeal        COVID-19 rapid test Not detected        Comment: Rapid Abbott ID Now       Rapid NAAT:  The specimen is NEGATIVE for SARS-CoV-2, the novel coronavirus associated with COVID-19. Negative results should be treated as presumptive and, if inconsistent with clinical signs and symptoms or necessary for patient management, should be tested with an alternative molecular assay. Negative results do not preclude SARS-CoV-2 infection and should not be used as the sole basis for patient management decisions. This test has been authorized by the FDA under an Emergency Use Authorization (EUA) for use by authorized laboratories.    Fact sheet for Healthcare Providers: ConventionNuAxdate.co.nz  Fact sheet for Patients: eduplanet [employer]daEruptive Games.co.nz       Methodology: Isothermal Nucleic Acid Amplification         CULTURE, WOUND Marichantellean Mo STAIN [[id number]]  (Abnormal)  (Susceptibility) Collected: 08/11/21 2145    Order Status: Completed Specimen: Wound from Foot Updated: 08/17/21 0946     Special Requests: NO SPECIAL REQUESTS        GRAM STAIN NO WBC'S SEEN         4+ GRAM NEGATIVE RODS               1+ GRAM POSITIVE COCCI IN PAIRS           Culture result:       HEAVY GRAM NEGATIVE RODS (REFER TO ISOLATE 2)                  HEAVY ALCALIGENES FAECALIS                  HEAVY ENTEROCOCCUS FAECALIS                  HEAVY STAPHYLOCOCCUS AUREUS          CULTURE, WOUND Marijean Mo STAIN [[id number]]  (Abnormal)  (Susceptibility) Collected: 08/11/21 2145    Order Status: Completed Specimen: Wound from Foot Updated: 08/15/21 1013     Special Requests: NO SPECIAL REQUESTS        GRAM STAIN NO WBC'S SEEN         1+ GRAM NEGATIVE RODS               FEW GRAM POSITIVE COCCI IN PAIRS           Culture result:       MODERATE PROVIDENCIA STUARTII                  MODERATE STAPHYLOCOCCUS AUREUS          CULTURE, BLOOD [[id number]] Collected: 08/11/21 2225    Order Status: Completed Specimen: Blood Updated: 08/14/21 1502     Special Requests: NO SPECIAL REQUESTS        GRAM STAIN       GRAM POSITIVE COCCI IN CLUSTERS ANAEROBIC BOTTLE                  SMEAR CALLED TO AND CORRECTLY REPEATED BY: Jad Ramsey RN 3S TO [facility] [id number] AT [id number]           Culture result:       Finegoldia magna GROWING IN THE ANAEROBIC BOTTLE          CULTURE, BLOOD [[id number]]  (Abnormal) Collected: 215    Order Status: Completed Specimen: Blood Updated: 21 1500     Special Requests: NO SPECIAL REQUESTS        GRAM STAIN       GRAM POSITIVE COCCI IN CLUSTERS ANAEROBIC BOTTLE                  SMEAR CALLED TO AND CORRECTLY REPEATED BY: Brendon Maria RN 3S TO [facility] [id number] AT 0502           Culture result:       Peptoniphilus asaccharolyticus GROWING IN THE ANAEROBIC BOTTLE          CULTURE, URINE [[id number]] Collected: 21    Order Status: Canceled Specimen: Urine from Clean catch                Lines / Catheters:  Lab results:    Chemistry  Recent Labs     21  18221   GLU  --  146*  --   --  131*  --  127*   NA  --  143  --   --  143  --  144   K 3.0* 2.9* 2.7*   < > 3.1*   < > 3.7   CL  --  110  --   --  113*  --  116*   CO2  --  24  --   --  23  --  16*   BUN  --  16  --   --  21*  --  24*   CREA  --  0.86  --   --  0.86  --  0.92   CA  --  7.8*  --   --  8.0*  --  8.0*   AGAP  --  9  --   --  7  --  12   BUCR  --  19  --   --  24*  --  26*   AP  --   --   --   --   --   --  100   TP  --   --   --   --   --   --  5.6*   ALB  --   --   --   --  3.3*  --  3.0*   GLOB  --   --   --   --   --   --  2.6   AGRAT  --   --   --   --   --   --  1.2    < > = values in this interval not displayed. CBC w/ Diff  Recent Labs     08/18/21  0047 08/17/21  0520 08/16/21  0530   WBC 9.3 10.6 8.4   RBC 3.12* 2.85* 2.85*   HGB 9.1* 8.3* 8.2*   HCT 28.6* 25.5* 25.5*    273 273   GRANS 82* 89* 93*   LYMPH 8* 4* 3*   EOS 1 1 0       Imagin/14- CXR  Patchy airspace disease right base very likely developing pneumonia. Small  atelectatic streak left base. 8/18-CXR  Right pleural effusion. Bilateral parenchymal opacities, predominantly within  the left upper lobe.

## 2021-08-18 NOTE — PROGRESS NOTES
Cardiology Progress Note        Patient: Davonte Sweet        Sex: female          DOA: 8/11/2021  YOB: 1970      Age:  46 y.o.        LOS:  LOS: 7 days    Patient seen and examined, chart reviewed. Assessment/Plan     Patient Active Problem List   Diagnosis Code    Osteomyelitis ([facility] M86.9    Type 2 diabetes mellitus with left diabetic foot ulcer ([facility] E11.621, L97.529    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 ([facility] E11.52    YUNIEL (acute kidney injury) ([facility] N17.9    PAD (peripheral artery disease) ([facility]) I73.9    Bilateral carotid artery stenosis I65.23    Gangrene of left foot ([facility]) I96    Non compliance with medical treatment Z91.19    Gangrene of both feet [facility]) I96    Fall W19. Ezzard Burows Tobacco use Z72.0    Preoperative evaluation to rule out surgical contraindication Z01.818    Anemia D64.9    Sepsis ([facility]) A41.9    Status post below-knee amputation of right lower extremity ([facility]) Z89.511    Bacteremia R78.81    Dyspnea R06.00    Pulmonary infiltrates G38.6    Systolic CHF, acute ([facility]) I50.21    Debility R53.81      Acute systolic heart failure  Hypokalemia      Echocardiogram revealed     · Test was terminated early due to patient becoming combative. · Left Ventricle: Normal cavity size. Mild concentric hypertrophy. The estimated EF is 40 - 45%. Mildly reduced systolic function. There is inconclusive left ventricular diastolic function E'E= 13.21. Wall Scoring: The left ventricular wall motion is globally hypokinetic. · Pulmonary Artery: Pulmonary arteries not well visualized. Pulmonary arterial systolic pressure (PASP) is 35 mmHg. Pulmonary hypertension found to be mild. Plan:      Continue IV Lasix 40 mg twice a day. Continue IV metoprolol 2.5 mg every 6 her. Strict input output. Salt restriction up to 2 g per day and fluid restriction up to 1.2 L per   day.          Plan discussed with patient's nurse    Subjective:    cc: My breathing is better today      REVIEW OF SYSTEMS:     General: No fevers or chills. Cardiovascular: No chest pain,No palpitations, No orthopnea, No PND, No leg swelling, No claudication  Pulmonary: No dyspnea. Gastrointestinal: No nausea, vomiting, bleeding  Neurology: No Dizziness    Objective:      Visit Vitals  /73   Pulse 71   Temp 97.6 °F (36.4 °C)   Resp 13   Ht 5' 8\" (1.727 m)   Wt 75.3 kg (166 lb)   SpO2 100%   BMI 25.24 kg/m²     Body mass index is 25.24 kg/m². Physical Exam:  General Appearance: Comfortable, not using accessory muscles of respiration. HEENT: ROSA. HEAD: Atraumatic  NECK: No JVD, no thyroidomeglay. CAROTIDS: No bruit  LUNGS: Clear bilaterally. HEART: S1+S2 audible, no murmur, no pericardial rub.      ABD: Non-tender, BS Audible    EXT: Right below knee amputation  NEUROLOGICAL: Alert, follows verbal commands    Medication:  Current Facility-Administered Medications   Medication Dose Route Frequency    potassium chloride (K-DUR, KLOR-CON) SR tablet 40 mEq  40 mEq Oral NOW    ELECTROLYTE REPLACEMENT PROTOCOL - Potassium Standard Dosing   1 Each Other PRN    ELECTROLYTE REPLACEMENT PROTOCOL - Magnesium   1 Each Other PRN    ELECTROLYTE REPLACEMENT PROTOCOL - Phosphorus  Standard Dosing  1 Each Other PRN    ELECTROLYTE REPLACEMENT PROTOCOL - Calcium   1 Each Other PRN    furosemide (LASIX) injection 40 mg  40 mg IntraVENous Q12H    metoprolol (LOPRESSOR) injection 2.5 mg  2.5 mg IntraVENous Q6H    piperacillin-tazobactam (ZOSYN) 3.375 g in 0.9% sodium chloride (MBP/ADV) 100 mL MBP  3.375 g IntraVENous Q8H    0.9% sodium chloride infusion 250 mL  250 mL IntraVENous PRN    famotidine (PEPCID) tablet 20 mg  20 mg Oral BID    DULoxetine (CYMBALTA) capsule 60 mg  60 mg Oral DAILY    albuterol-ipratropium (DUO-NEB) 2.5 MG-0.5 MG/3 ML  3 mL Nebulization Q4H PRN    albumin human 25% (BUMINATE) solution 25 g  25 g IntraVENous Q6H    insulin [General Appearance - Alert] : alert glargine (LANTUS) injection 5 Units  5 Units SubCUTAneous DAILY    ALPRAZolam (XANAX) tablet 0.5 mg  0.5 mg Oral QHS PRN    buPROPion SR (WELLBUTRIN SR) tablet 150 mg  150 mg Oral DAILY    gabapentin (NEURONTIN) capsule 100 mg  100 mg Oral TID PRN    morphine injection 2 mg  2 mg IntraVENous Q4H PRN    COVID-19 vac,Ad26-PF (JERONIMO) injection 1 Dose  1 Dose IntraMUSCular PRIOR TO DISCHARGE    aspirin chewable tablet 81 mg  81 mg Oral DAILY    heparin (porcine) injection 5,000 Units  5,000 Units SubCUTAneous Q8H    HYDROcodone-acetaminophen (NORCO) 7.5-325 mg per tablet 1 Tablet  1 Tablet Oral Q4H PRN    HYDROcodone-acetaminophen (NORCO) 7.5-325 mg per tablet 2 Tablet  2 Tablet Oral Q6H PRN    naloxone (NARCAN) injection 0.1 mg  0.1 mg IntraVENous PRN    diphenhydrAMINE (BENADRYL) injection 12.5 mg  12.5 mg IntraVENous Q6H PRN    acetaminophen (TYLENOL) tablet 650 mg  650 mg Oral Q6H PRN    Or    acetaminophen (TYLENOL) suppository 650 mg  650 mg Rectal Q6H PRN    polyethylene glycol (MIRALAX) packet 17 g  17 g Oral DAILY PRN    ondansetron (ZOFRAN ODT) tablet 4 mg  4 mg Oral Q8H PRN    Or    ondansetron (ZOFRAN) injection 4 mg  4 mg IntraVENous Q6H PRN    insulin lispro (HUMALOG) injection   SubCUTAneous Q6H    glucose chewable tablet 16 g  16 g Oral PRN    glucagon (GLUCAGEN) injection 1 mg  1 mg IntraMUSCular PRN    dextrose (D50W) injection syrg 12.5-25 g  25-50 mL IntraVENous PRN    atorvastatin (LIPITOR) tablet 40 mg  40 mg Oral QHS               Lab/Data Reviewed:       Recent Labs     08/18/21  0047 08/17/21  0520 08/16/21  0530   WBC 9.3 10.6 8.4   HGB 9.1* 8.3* 8.2*   HCT 28.6* 25.5* 25.5*    273 273     Recent Labs     08/18/21  1633 08/18/21  0815 08/18/21  0047 08/17/21  1821 08/17/21  0520 08/16/21 0530 08/16/21 0530   NA  --   --  143  --  143  --  144   K 3.3* 3.0* 2.9*   < > 3.1*   < > 3.7   CL  --   --  110  --  113*  --  116*   CO2  --   --  24  --  23  --  16*   GLU --   --  146*  --  131*  --  127*   BUN  --   --  16  --  21*  --  24*   CREA  --   --  0.86  --  0.86  --  0.92   CA  --   --  7.8*  --  8.0*  --  8.0*    < > = values in this interval not displayed. 32 minutes of critical care time spent in the direct evaluation and treatment of this high risk patient. The reason for providing this level of medical care for this critically ill patient was due a critical illness that impaired one or more vital organ systems such that there was a high probability of imminent or life threatening deterioration in the patients condition. This care involved high complexity decision making to assess, manipulate, and support vital system functions, to treat this degree vital organ system failure and to prevent further life threatening deterioration of the patients condition.     Signed By: Evelina Manjarrez MD     August 18, 2021 [General Appearance - In No Acute Distress] : in no acute distress [General Appearance - Well Nourished] : well nourished [General Appearance - Well Developed] : well developed [General Appearance - Well-Appearing] : healthy appearing [Sclera] : the sclera and conjunctiva were normal [Neck Cervical Mass (___cm)] : no neck mass was observed [Neck Appearance] : the appearance of the neck was normal [Jugular Venous Distention Increased] : there was no jugular-venous distention [Auscultation Breath Sounds / Voice Sounds] : lungs were clear to auscultation bilaterally [Apical Impulse] : the apical impulse was normal [Edema] : there was no peripheral edema [Heart Rate And Rhythm] : heart rate was normal and rhythm regular [Full Pulse] : the pedal pulses are present [Bowel Sounds] : normal bowel sounds [Abdomen Soft] : soft [Abdomen Tenderness] : non-tender [Abdomen Mass (___ Cm)] : no abdominal mass palpated [Cervical Lymph Nodes Enlarged Anterior Bilaterally] : anterior cervical [Cervical Lymph Nodes Enlarged Posterior Bilaterally] : posterior cervical [Patient Refused] : rectal exam was refused by the patient [Axillary Lymph Nodes Enlarged Bilaterally] : axillary [Supraclavicular Lymph Nodes Enlarged Bilaterally] : supraclavicular [Inguinal Lymph Nodes Enlarged Bilaterally] : inguinal [Femoral Lymph Nodes Enlarged Bilaterally] : femoral [No CVA Tenderness] : no ~M costovertebral angle tenderness [No Spinal Tenderness] : no spinal tenderness [Nail Clubbing] : no clubbing  or cyanosis of the fingernails [Abnormal Walk] : normal gait [Musculoskeletal - Swelling] : no joint swelling seen [Motor Tone] : muscle strength and tone were normal [Skin Turgor] : normal skin turgor [Skin Color & Pigmentation] : normal skin color and pigmentation [] : no rash [No Focal Deficits] : no focal deficits [Oriented To Time, Place, And Person] : oriented to person, place, and time [Impaired Insight] : insight and judgment were intact [Affect] : the affect was normal

## 2021-08-18 NOTE — PROGRESS NOTES
1900 - Bedside and Verbal shift change report given to Alvia Paget, RN (oncoming nurse) by Yohannes Clarke RN (offgoing nurse). Report included the following information SBAR, Kardex, ED Summary, OR Summary, Procedure Summary, Intake/Output, MAR, Recent Results, Med Rec Status and Cardiac Rhythm NSR.     1930 - Informed by lab that an additional PCR COVID specimen would need to be obtained. 2000 - Shift assessment completed. Patient alert and oriented. Patient agreeable to received electrolyte replacements only at this time. Refused PCR COVID swab.     0000 - Reassessment completed. No changes to previous assessment. Patient allowed incontinence care at this time. 0124 - Critical potassium of 2.9 (increased from 2.7) reported. 0200 - Potassium replaced per replacement protocol. 0400 - Reassessment completed. No changes to previous assessment. 0730 - Bedside and Verbal shift change report given to Yohannes Clarke RN (oncoming nurse) by Alvia Paget, RN (offgoing nurse). Report included the following information SBAR, Kardex, ED Summary, OR Summary, Procedure Summary, Intake/Output, MAR, Recent Results, Med Rec Status and Cardiac Rhythm NSR.

## 2021-08-18 NOTE — PROGRESS NOTES
Verbal report recieved from [facility]  given to Ash Aleman RN.  Report included the following information SBAR, Kardex, Intake/Output, MAR and Recent Results

## 2021-08-18 NOTE — CONSULTS
[facility] Consult Note      Patient: Guadalupe Boston MRN: [id number]  SSN: [id number]    YOB: 1970  Age: 46 y.o. Sex: female    Date of Consultation: 08/17/2021  Referring Physician:    Reason for Consultation: CHF    Chief complain: Post amputation hypotension     HPI:  80-year-old female with diabetic foot ulcer underwent right below-knee amputation for nonhealing ulcer post surgery she developed hypotension and she was in ICU. Cardiology consult called for evaluation of CHF. Patient is not good historian. She does not want to communicate at this point and do not want to provide any history.      Past Medical History:   Diagnosis Date    PAD (peripheral artery disease) (HCC)      Past Surgical History:   Procedure Laterality Date    HX ORTHOPAEDIC      L TMT amputation     Current Facility-Administered Medications   Medication Dose Route Frequency    sodium bicarbonate (8.4%) 100 mEq in sterile water 1,000 mL infusion   IntraVENous CONTINUOUS    ELECTROLYTE REPLACEMENT PROTOCOL - Potassium Standard Dosing   1 Each Other PRN    ELECTROLYTE REPLACEMENT PROTOCOL - Magnesium   1 Each Other PRN    ELECTROLYTE REPLACEMENT PROTOCOL - Phosphorus  Standard Dosing  1 Each Other PRN    ELECTROLYTE REPLACEMENT PROTOCOL - Calcium   1 Each Other PRN    [START ON 8/18/2021] potassium chloride (K-DUR, KLOR-CON) SR tablet 20 mEq  20 mEq Oral ONCE    furosemide (LASIX) injection 40 mg  40 mg IntraVENous Q12H    [START ON 8/18/2021] metoprolol (LOPRESSOR) injection 2.5 mg  2.5 mg IntraVENous Q6H    piperacillin-tazobactam (ZOSYN) 3.375 g in 0.9% sodium chloride (MBP/ADV) 100 mL MBP  3.375 g IntraVENous Q8H    0.9% sodium chloride infusion 250 mL  250 mL IntraVENous PRN    famotidine (PEPCID) tablet 20 mg  20 mg Oral BID    DULoxetine (CYMBALTA) capsule 60 mg  60 mg Oral DAILY    albuterol-ipratropium (DUO-NEB) 2.5 MG-0.5 MG/3 ML  3 mL Nebulization Q4H PRN    albumin human 25% (BUMINATE) solution 25 g  25 g IntraVENous Q6H    insulin glargine (LANTUS) injection 5 Units  5 Units SubCUTAneous DAILY    ALPRAZolam (XANAX) tablet 0.5 mg  0.5 mg Oral QHS PRN    buPROPion SR (WELLBUTRIN SR) tablet 150 mg  150 mg Oral DAILY    gabapentin (NEURONTIN) capsule 100 mg  100 mg Oral TID PRN    morphine injection 2 mg  2 mg IntraVENous Q4H PRN    COVID-19 vac,Ad26-PF (JERONIMO) injection 1 Dose  1 Dose IntraMUSCular PRIOR TO DISCHARGE    aspirin chewable tablet 81 mg  81 mg Oral DAILY    heparin (porcine) injection 5,000 Units  5,000 Units SubCUTAneous Q8H    HYDROcodone-acetaminophen (NORCO) 7.5-325 mg per tablet 1 Tablet  1 Tablet Oral Q4H PRN    HYDROcodone-acetaminophen (NORCO) 7.5-325 mg per tablet 2 Tablet  2 Tablet Oral Q6H PRN    naloxone (NARCAN) injection 0.1 mg  0.1 mg IntraVENous PRN    diphenhydrAMINE (BENADRYL) injection 12.5 mg  12.5 mg IntraVENous Q6H PRN    acetaminophen (TYLENOL) tablet 650 mg  650 mg Oral Q6H PRN    Or    acetaminophen (TYLENOL) suppository 650 mg  650 mg Rectal Q6H PRN    polyethylene glycol (MIRALAX) packet 17 g  17 g Oral DAILY PRN    ondansetron (ZOFRAN ODT) tablet 4 mg  4 mg Oral Q8H PRN    Or    ondansetron (ZOFRAN) injection 4 mg  4 mg IntraVENous Q6H PRN    insulin lispro (HUMALOG) injection   SubCUTAneous Q6H    glucose chewable tablet 16 g  16 g Oral PRN    glucagon (GLUCAGEN) injection 1 mg  1 mg IntraMUSCular PRN    dextrose (D50W) injection syrg 12.5-25 g  25-50 mL IntraVENous PRN    atorvastatin (LIPITOR) tablet 40 mg  40 mg Oral QHS       Allergies and Intolerances:   No Known Allergies    Family History:   Family History   Problem Relation Age of Onset    Diabetes Mother        Social History:   She  reports that she has been smoking. She has been smoking about 1.50 packs per day. She has never used smokeless tobacco.  She  reports previous alcohol use.       Review of Systems:     Can not obtain due to patient's condition       Physical: Patient Vitals for the past 6 hrs:   Temp Pulse Resp BP SpO2   08/17/21 2000 98 °F (36.7 °C) 89 20 (!) 155/75 96 %   08/17/21 1900  91 14 (!) 161/83 93 %   08/17/21 1800  91 16 (!) 157/67 93 %         Exam:   General Appearance: Comfortable, not using accessory muscles of respiration. HEENT: ROSA. HEAD: Atraumatic  NECK: No JVD, no thyroidomeglay. CAROTIDS: No bruit  LUNGS: Clear bilaterally. HEART: S1+S2 audible, no murmur, no pericardial rub. ABD: Non-tender, BS Audible    EXT: Right below knee amputation  VASCULAR EXAM: Pulses are intact. PSYCHIATRIC EXAM: Mood is appropriate. MUSCULOSKELETAL: Grossly no joint deformity.   NEUROLOGICAL: Alert  Review of Data:   LABS:   Lab Results   Component Value Date/Time    WBC 10.6 08/17/2021 05:20 AM    HGB 8.3 (L) 08/17/2021 05:20 AM    HCT 25.5 (L) 08/17/2021 05:20 AM    PLATELET 372 66/52/4764 05:20 AM     Lab Results   Component Value Date/Time    Sodium 143 08/17/2021 05:20 AM    Potassium 2.7 (LL) 08/17/2021 06:21 PM    Chloride 113 (H) 08/17/2021 05:20 AM    CO2 23 08/17/2021 05:20 AM    Glucose 131 (H) 08/17/2021 05:20 AM    BUN 21 (H) 08/17/2021 05:20 AM    Creatinine 0.86 08/17/2021 05:20 AM     No results found for: CHOL, CHOLX, CHLST, CHOLV, HDL, HDLP, LDL, LDLC, DLDLP, TGLX, TRIGL, TRIGP  No components found for: GPT  Lab Results   Component Value Date/Time    Hemoglobin A1c 7.0 (H) 08/11/2021 09:35 PM         Cardiology Procedures:   Results for orders placed or performed during the hospital encounter of 08/11/21   EKG, 12 LEAD, INITIAL   Result Value Ref Range    Ventricular Rate 91 BPM    Atrial Rate 91 BPM    P-R Interval 158 ms    QRS Duration 104 ms    Q-T Interval 368 ms    QTC Calculation (Bezet) 452 ms    Calculated P Axis 33 degrees    Calculated R Axis 44 degrees    Calculated T Axis 35 degrees    Diagnosis       Normal sinus rhythm  Normal ECG               Impression / Plan:    Patient Active Problem List   Diagnosis Code    [facility] M86.9    Type 2 diabetes mellitus with left diabetic foot ulcer ([facility] E11.621, L97.529    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 ([facility] E11.52    YUNIEL (acute kidney injury) ([facility] N17.9    PAD (peripheral artery disease) ([facility]) I73.9    Bilateral carotid artery stenosis I65.23    Gangrene of left foot ([facility]) I96    Non compliance with medical treatment Z91.19    Gangrene of both feet [facility]) I96    Fall W19. Carole Wan Tobacco use Z72.0    Preoperative evaluation to rule out surgical contraindication Z01.818    Anemia D64.9    Sepsis ([facility]) A41.9    Status post below-knee amputation of right lower extremity ([facility]) Z89.511    Bacteremia R78.81    Dyspnea R06.00    Pulmonary infiltrates F04.0    Systolic CHF, acute ([facility]) I50.21       Acute systolic heart failure    Echocardiogram done today revealed    · Test was terminated early due to patient becoming combative. · Left Ventricle: Normal cavity size. Mild concentric hypertrophy. The estimated EF is 40 - 45%. Mildly reduced systolic function. There is inconclusive left ventricular diastolic function E'E= 04.92. Wall Scoring: The left ventricular wall motion is globally hypokinetic. · Pulmonary Artery: Pulmonary arteries not well visualized. Pulmonary arterial systolic pressure (PASP) is 35 mmHg. Pulmonary hypertension found to be mild. Start IV Lasix 40 mg twice a day. Start IV metoprolol 2.5 mg every 6 her. Strict input output. Salt restriction up to 2 g per day and fluid restriction up to 1.2 L per day. Plan discussed with patient's nurse    37 minutes of critical care time spent in the direct evaluation and treatment of this high risk patient.  The reason for providing this level of medical care for this critically ill patient was due a critical illness that impaired one or more vital organ systems such that there was a high probability of imminent or life threatening deterioration in the patients condition. This care involved high complexity decision making to assess, manipulate, and support vital system functions, to treat this degree vital organ system failure and to prevent further life threatening deterioration of the patients condition.     Signed By: Real Lopez MD     August 17, 2021

## 2021-08-18 NOTE — PROGRESS NOTES
Vascular Surgery    Patient Vitals for the past 24 hrs:   BP Temp Pulse Resp SpO2 Height Weight   08/18/21 0816  97.9 °F (36.6 °C)        08/18/21 0800 111/70         08/18/21 0700 127/79  72 14 91 %     08/18/21 0600 138/70  80 14 (!) 89 %     08/18/21 0500 133/83  81 15 (!) 88 %     08/18/21 0400 117/68 97.4 °F (36.3 °C) 80 14 92 %     08/18/21 0200 138/75  79 24 94 %     08/18/21 0100 133/78  86 28 95 %     08/18/21 0000 133/76 98.3 °F (36.8 °C) 88 14 91 %     08/17/21 2000 (!) 155/75 98 °F (36.7 °C) 89 20 96 %     08/17/21 1900 (!) 161/83  91 14 93 %     08/17/21 1800 (!) 157/67  91 16 93 %     08/17/21 1601  97.7 °F (36.5 °C)        08/17/21 1400 (!) 158/97  95 15      08/17/21 1300 (!) 164/89  95 16      08/17/21 1200 (!) 155/84  100 18 96 %     08/17/21 1100  98.4 °F (36.9 °C)        08/17/21 1037 (!) 151/78     5' 8\" (1.727 m) 75.3 kg (166 lb)   08/17/21 0900 (!) 166/95  (!) 104 21 91 %       Lab Results   Component Value Date/Time    Sodium 143 08/18/2021 12:47 AM    Potassium 2.9 (LL) 08/18/2021 12:47 AM    Chloride 110 08/18/2021 12:47 AM    CO2 24 08/18/2021 12:47 AM    Anion gap 9 08/18/2021 12:47 AM    Glucose 146 (H) 08/18/2021 12:47 AM    BUN 16 08/18/2021 12:47 AM    Creatinine 0.86 08/18/2021 12:47 AM    BUN/Creatinine ratio 19 08/18/2021 12:47 AM    GFR est AA >60 08/18/2021 12:47 AM    GFR est non-AA >60 08/18/2021 12:47 AM    Calcium 7.8 (L) 08/18/2021 12:47 AM     Lab Results   Component Value Date/Time    WBC 10.6 08/17/2021 05:20 AM    HGB 8.3 (L) 08/17/2021 05:20 AM    HCT 25.5 (L) 08/17/2021 05:20 AM    PLATELET 634 70/48/7491 05:20 AM    MCV 89.5 08/17/2021 05:20 AM       Hypokalemia - 2.7 and 2.9  Covid PCR pending, rapid negative  Noted Cardiology assessment, lasix/volume restriction/salt restriction  Noted Palliative Care and Case Mgt efforts  Hypoxemic 88-92% on RA, pt intermittently agreeable to NC  Pt not allowing dressing changes to LE or help with incontinence care    Will attempt to schedule for BKA revision/closure Thurs, as long as she agrees to be more medically optimized by then.   Kanu Carlton MD, [address] [place] [address] [facility]  [phone] office  [phone] mobile

## 2021-08-19 ENCOUNTER — ANESTHESIA (OUTPATIENT)
Dept: SURGERY | Age: 51
DRG: 853 | End: 2021-08-19
Payer: COMMERCIAL

## 2021-08-19 LAB
ANION GAP SERPL CALC-SCNC: 12 MMOL/L (ref 3–18)
B PERT DNA SPEC QL NAA+PROBE: NOT DETECTED
BACTERIA SPEC CULT: NORMAL
BORDETELLA PARAPERTUSSIS PCR, BORPAR: NOT DETECTED
BUN SERPL-MCNC: 16 MG/DL (ref 7–18)
BUN/CREAT SERPL: 20 (ref 12–20)
C PNEUM DNA SPEC QL NAA+PROBE: NOT DETECTED
CALCIUM SERPL-MCNC: 7.7 MG/DL (ref 8.5–10.1)
CHLORIDE SERPL-SCNC: 107 MMOL/L (ref 100–111)
CO2 SERPL-SCNC: 22 MMOL/L (ref 21–32)
CREAT SERPL-MCNC: 0.82 MG/DL (ref 0.6–1.3)
FLUAV SUBTYP SPEC NAA+PROBE: NOT DETECTED
FLUBV RNA SPEC QL NAA+PROBE: NOT DETECTED
GLUCOSE BLD STRIP.AUTO-MCNC: 108 MG/DL (ref 70–110)
GLUCOSE BLD STRIP.AUTO-MCNC: 134 MG/DL (ref 70–110)
GLUCOSE BLD STRIP.AUTO-MCNC: 139 MG/DL (ref 70–110)
GLUCOSE BLD STRIP.AUTO-MCNC: 142 MG/DL (ref 70–110)
GLUCOSE SERPL-MCNC: 115 MG/DL (ref 74–99)
HADV DNA SPEC QL NAA+PROBE: NOT DETECTED
HCG UR QL: NEGATIVE
HCOV 229E RNA SPEC QL NAA+PROBE: NOT DETECTED
HCOV HKU1 RNA SPEC QL NAA+PROBE: NOT DETECTED
HCOV NL63 RNA SPEC QL NAA+PROBE: NOT DETECTED
HCOV OC43 RNA SPEC QL NAA+PROBE: NOT DETECTED
HMPV RNA SPEC QL NAA+PROBE: NOT DETECTED
HPIV1 RNA SPEC QL NAA+PROBE: NOT DETECTED
HPIV2 RNA SPEC QL NAA+PROBE: NOT DETECTED
HPIV3 RNA SPEC QL NAA+PROBE: NOT DETECTED
HPIV4 RNA SPEC QL NAA+PROBE: NOT DETECTED
M PNEUMO DNA SPEC QL NAA+PROBE: NOT DETECTED
MAGNESIUM SERPL-MCNC: 1.5 MG/DL (ref 1.6–2.6)
POTASSIUM SERPL-SCNC: 3.2 MMOL/L (ref 3.5–5.5)
RSV RNA SPEC QL NAA+PROBE: NOT DETECTED
RV+EV RNA SPEC QL NAA+PROBE: NOT DETECTED
SARS-COV-2 PCR, COVPCR: NOT DETECTED
SERVICE CMNT-IMP: NORMAL
SODIUM SERPL-SCNC: 141 MMOL/L (ref 136–145)

## 2021-08-19 PROCEDURE — 74011250636 HC RX REV CODE- 250/636: Performed by: INTERNAL MEDICINE

## 2021-08-19 PROCEDURE — 0KQS0ZZ REPAIR RIGHT LOWER LEG MUSCLE, OPEN APPROACH: ICD-10-PCS | Performed by: STUDENT IN AN ORGANIZED HEALTH CARE EDUCATION/TRAINING PROGRAM

## 2021-08-19 PROCEDURE — 74011250636 HC RX REV CODE- 250/636: Performed by: NURSE ANESTHETIST, CERTIFIED REGISTERED

## 2021-08-19 PROCEDURE — 74011250637 HC RX REV CODE- 250/637: Performed by: INTERNAL MEDICINE

## 2021-08-19 PROCEDURE — L1830 KO IMMOB CANVAS LONG PRE OTS: HCPCS | Performed by: STUDENT IN AN ORGANIZED HEALTH CARE EDUCATION/TRAINING PROGRAM

## 2021-08-19 PROCEDURE — 77030008462 HC STPLR SKN PROX J&J -A: Performed by: STUDENT IN AN ORGANIZED HEALTH CARE EDUCATION/TRAINING PROGRAM

## 2021-08-19 PROCEDURE — 74011000250 HC RX REV CODE- 250: Performed by: NURSE ANESTHETIST, CERTIFIED REGISTERED

## 2021-08-19 PROCEDURE — 74011636637 HC RX REV CODE- 636/637: Performed by: HOSPITALIST

## 2021-08-19 PROCEDURE — 77030020782 HC GWN BAIR PAWS FLX 3M -B: Performed by: STUDENT IN AN ORGANIZED HEALTH CARE EDUCATION/TRAINING PROGRAM

## 2021-08-19 PROCEDURE — 77030040361 HC SLV COMPR DVT MDII -B: Performed by: STUDENT IN AN ORGANIZED HEALTH CARE EDUCATION/TRAINING PROGRAM

## 2021-08-19 PROCEDURE — 74011250637 HC RX REV CODE- 250/637: Performed by: STUDENT IN AN ORGANIZED HEALTH CARE EDUCATION/TRAINING PROGRAM

## 2021-08-19 PROCEDURE — 76060000035 HC ANESTHESIA 2 TO 2.5 HR: Performed by: STUDENT IN AN ORGANIZED HEALTH CARE EDUCATION/TRAINING PROGRAM

## 2021-08-19 PROCEDURE — 83735 ASSAY OF MAGNESIUM: CPT

## 2021-08-19 PROCEDURE — 77030003666 HC NDL SPINAL BD -A: Performed by: STUDENT IN AN ORGANIZED HEALTH CARE EDUCATION/TRAINING PROGRAM

## 2021-08-19 PROCEDURE — 77030038692 HC WND DEB SYS IRMX -B: Performed by: STUDENT IN AN ORGANIZED HEALTH CARE EDUCATION/TRAINING PROGRAM

## 2021-08-19 PROCEDURE — 77030002986 HC SUT PROL J&J -A: Performed by: STUDENT IN AN ORGANIZED HEALTH CARE EDUCATION/TRAINING PROGRAM

## 2021-08-19 PROCEDURE — 65660000000 HC RM CCU STEPDOWN

## 2021-08-19 PROCEDURE — 74011250636 HC RX REV CODE- 250/636: Performed by: HOSPITALIST

## 2021-08-19 PROCEDURE — P9047 ALBUMIN (HUMAN), 25%, 50ML: HCPCS | Performed by: STUDENT IN AN ORGANIZED HEALTH CARE EDUCATION/TRAINING PROGRAM

## 2021-08-19 PROCEDURE — 77030006807 HC BLD SAW RECIP KMET -B: Performed by: STUDENT IN AN ORGANIZED HEALTH CARE EDUCATION/TRAINING PROGRAM

## 2021-08-19 PROCEDURE — 74011000258 HC RX REV CODE- 258: Performed by: STUDENT IN AN ORGANIZED HEALTH CARE EDUCATION/TRAINING PROGRAM

## 2021-08-19 PROCEDURE — 88307 TISSUE EXAM BY PATHOLOGIST: CPT

## 2021-08-19 PROCEDURE — 77030033649 HC DRSG INCIS MGMT KT KCON -F: Performed by: STUDENT IN AN ORGANIZED HEALTH CARE EDUCATION/TRAINING PROGRAM

## 2021-08-19 PROCEDURE — 80048 BASIC METABOLIC PNL TOTAL CA: CPT

## 2021-08-19 PROCEDURE — 74011000258 HC RX REV CODE- 258: Performed by: HOSPITALIST

## 2021-08-19 PROCEDURE — 74011250636 HC RX REV CODE- 250/636: Performed by: ANESTHESIOLOGY

## 2021-08-19 PROCEDURE — 77030031139 HC SUT VCRL2 J&J -A: Performed by: STUDENT IN AN ORGANIZED HEALTH CARE EDUCATION/TRAINING PROGRAM

## 2021-08-19 PROCEDURE — 74011250637 HC RX REV CODE- 250/637: Performed by: FAMILY MEDICINE

## 2021-08-19 PROCEDURE — 77030010512 HC APPL CLP LIG J&J -C: Performed by: STUDENT IN AN ORGANIZED HEALTH CARE EDUCATION/TRAINING PROGRAM

## 2021-08-19 PROCEDURE — 74011000250 HC RX REV CODE- 250: Performed by: INTERNAL MEDICINE

## 2021-08-19 PROCEDURE — 74011250636 HC RX REV CODE- 250/636: Performed by: STUDENT IN AN ORGANIZED HEALTH CARE EDUCATION/TRAINING PROGRAM

## 2021-08-19 PROCEDURE — 76210000006 HC OR PH I REC 0.5 TO 1 HR: Performed by: STUDENT IN AN ORGANIZED HEALTH CARE EDUCATION/TRAINING PROGRAM

## 2021-08-19 PROCEDURE — 74011000272 HC RX REV CODE- 272: Performed by: STUDENT IN AN ORGANIZED HEALTH CARE EDUCATION/TRAINING PROGRAM

## 2021-08-19 PROCEDURE — 2709999900 HC NON-CHARGEABLE SUPPLY: Performed by: STUDENT IN AN ORGANIZED HEALTH CARE EDUCATION/TRAINING PROGRAM

## 2021-08-19 PROCEDURE — 76010000131 HC OR TIME 2 TO 2.5 HR: Performed by: STUDENT IN AN ORGANIZED HEALTH CARE EDUCATION/TRAINING PROGRAM

## 2021-08-19 PROCEDURE — P9047 ALBUMIN (HUMAN), 25%, 50ML: HCPCS | Performed by: HOSPITALIST

## 2021-08-19 PROCEDURE — 77030018673: Performed by: STUDENT IN AN ORGANIZED HEALTH CARE EDUCATION/TRAINING PROGRAM

## 2021-08-19 PROCEDURE — C9290 INJ, BUPIVACAINE LIPOSOME: HCPCS | Performed by: STUDENT IN AN ORGANIZED HEALTH CARE EDUCATION/TRAINING PROGRAM

## 2021-08-19 PROCEDURE — 77030003028 HC SUT VCRL J&J -A: Performed by: STUDENT IN AN ORGANIZED HEALTH CARE EDUCATION/TRAINING PROGRAM

## 2021-08-19 PROCEDURE — 81025 URINE PREGNANCY TEST: CPT

## 2021-08-19 PROCEDURE — 77030003029 HC SUT VCRL J&J -B: Performed by: STUDENT IN AN ORGANIZED HEALTH CARE EDUCATION/TRAINING PROGRAM

## 2021-08-19 PROCEDURE — 77030002996 HC SUT SLK J&J -A: Performed by: STUDENT IN AN ORGANIZED HEALTH CARE EDUCATION/TRAINING PROGRAM

## 2021-08-19 PROCEDURE — 82962 GLUCOSE BLOOD TEST: CPT

## 2021-08-19 PROCEDURE — 36415 COLL VENOUS BLD VENIPUNCTURE: CPT

## 2021-08-19 RX ORDER — METOPROLOL SUCCINATE 25 MG/1
25 TABLET, EXTENDED RELEASE ORAL DAILY
Status: DISCONTINUED | OUTPATIENT
Start: 2021-08-19 | End: 2021-09-03 | Stop reason: HOSPADM

## 2021-08-19 RX ORDER — MAGNESIUM SULFATE 100 %
4 CRYSTALS MISCELLANEOUS AS NEEDED
Status: DISCONTINUED | OUTPATIENT
Start: 2021-08-19 | End: 2021-08-19 | Stop reason: HOSPADM

## 2021-08-19 RX ORDER — FLUMAZENIL 0.1 MG/ML
0.2 INJECTION INTRAVENOUS
Status: DISCONTINUED | OUTPATIENT
Start: 2021-08-19 | End: 2021-08-19 | Stop reason: HOSPADM

## 2021-08-19 RX ORDER — SODIUM CHLORIDE, SODIUM LACTATE, POTASSIUM CHLORIDE, CALCIUM CHLORIDE 600; 310; 30; 20 MG/100ML; MG/100ML; MG/100ML; MG/100ML
1000 INJECTION, SOLUTION INTRAVENOUS CONTINUOUS
Status: DISCONTINUED | OUTPATIENT
Start: 2021-08-19 | End: 2021-08-19 | Stop reason: HOSPADM

## 2021-08-19 RX ORDER — MIDAZOLAM HYDROCHLORIDE 1 MG/ML
INJECTION, SOLUTION INTRAMUSCULAR; INTRAVENOUS AS NEEDED
Status: DISCONTINUED | OUTPATIENT
Start: 2021-08-19 | End: 2021-08-19 | Stop reason: HOSPADM

## 2021-08-19 RX ORDER — FUROSEMIDE 10 MG/ML
40 INJECTION INTRAMUSCULAR; INTRAVENOUS DAILY
Status: DISCONTINUED | OUTPATIENT
Start: 2021-08-20 | End: 2021-08-24

## 2021-08-19 RX ORDER — ROCURONIUM BROMIDE 10 MG/ML
INJECTION, SOLUTION INTRAVENOUS AS NEEDED
Status: DISCONTINUED | OUTPATIENT
Start: 2021-08-19 | End: 2021-08-19 | Stop reason: HOSPADM

## 2021-08-19 RX ORDER — SODIUM CHLORIDE 0.9 % (FLUSH) 0.9 %
5-40 SYRINGE (ML) INJECTION EVERY 8 HOURS
Status: DISCONTINUED | OUTPATIENT
Start: 2021-08-19 | End: 2021-08-19 | Stop reason: HOSPADM

## 2021-08-19 RX ORDER — PROPOFOL 10 MG/ML
INJECTION, EMULSION INTRAVENOUS AS NEEDED
Status: DISCONTINUED | OUTPATIENT
Start: 2021-08-19 | End: 2021-08-19 | Stop reason: HOSPADM

## 2021-08-19 RX ORDER — GLYCOPYRROLATE 0.2 MG/ML
INJECTION INTRAMUSCULAR; INTRAVENOUS AS NEEDED
Status: DISCONTINUED | OUTPATIENT
Start: 2021-08-19 | End: 2021-08-19 | Stop reason: HOSPADM

## 2021-08-19 RX ORDER — SODIUM CHLORIDE 0.9 % (FLUSH) 0.9 %
5-40 SYRINGE (ML) INJECTION AS NEEDED
Status: DISCONTINUED | OUTPATIENT
Start: 2021-08-19 | End: 2021-08-19 | Stop reason: HOSPADM

## 2021-08-19 RX ORDER — DEXTROSE 50 % IN WATER (D50W) INTRAVENOUS SYRINGE
25-50 AS NEEDED
Status: DISCONTINUED | OUTPATIENT
Start: 2021-08-19 | End: 2021-08-19 | Stop reason: HOSPADM

## 2021-08-19 RX ORDER — FENTANYL CITRATE 50 UG/ML
25 INJECTION, SOLUTION INTRAMUSCULAR; INTRAVENOUS AS NEEDED
Status: DISCONTINUED | OUTPATIENT
Start: 2021-08-19 | End: 2021-08-19 | Stop reason: HOSPADM

## 2021-08-19 RX ORDER — NEOSTIGMINE METHYLSULFATE 1 MG/ML
INJECTION INTRAVENOUS AS NEEDED
Status: DISCONTINUED | OUTPATIENT
Start: 2021-08-19 | End: 2021-08-19 | Stop reason: HOSPADM

## 2021-08-19 RX ORDER — POTASSIUM CHLORIDE 20 MEQ/1
40 TABLET, EXTENDED RELEASE ORAL
Status: ACTIVE | OUTPATIENT
Start: 2021-08-19 | End: 2021-08-19

## 2021-08-19 RX ORDER — INSULIN LISPRO 100 [IU]/ML
INJECTION, SOLUTION INTRAVENOUS; SUBCUTANEOUS ONCE
Status: DISCONTINUED | OUTPATIENT
Start: 2021-08-19 | End: 2021-08-19 | Stop reason: HOSPADM

## 2021-08-19 RX ORDER — HYDROMORPHONE HYDROCHLORIDE 1 MG/ML
0.5 INJECTION, SOLUTION INTRAMUSCULAR; INTRAVENOUS; SUBCUTANEOUS
Status: DISCONTINUED | OUTPATIENT
Start: 2021-08-19 | End: 2021-08-19 | Stop reason: HOSPADM

## 2021-08-19 RX ORDER — NALOXONE HYDROCHLORIDE 0.4 MG/ML
0.1 INJECTION, SOLUTION INTRAMUSCULAR; INTRAVENOUS; SUBCUTANEOUS AS NEEDED
Status: DISCONTINUED | OUTPATIENT
Start: 2021-08-19 | End: 2021-08-19 | Stop reason: HOSPADM

## 2021-08-19 RX ORDER — FENTANYL CITRATE 50 UG/ML
INJECTION, SOLUTION INTRAMUSCULAR; INTRAVENOUS AS NEEDED
Status: DISCONTINUED | OUTPATIENT
Start: 2021-08-19 | End: 2021-08-19 | Stop reason: HOSPADM

## 2021-08-19 RX ORDER — SODIUM CHLORIDE, SODIUM LACTATE, POTASSIUM CHLORIDE, CALCIUM CHLORIDE 600; 310; 30; 20 MG/100ML; MG/100ML; MG/100ML; MG/100ML
125 INJECTION, SOLUTION INTRAVENOUS CONTINUOUS
Status: DISCONTINUED | OUTPATIENT
Start: 2021-08-19 | End: 2021-08-20

## 2021-08-19 RX ORDER — LIDOCAINE HYDROCHLORIDE 20 MG/ML
INJECTION, SOLUTION EPIDURAL; INFILTRATION; INTRACAUDAL; PERINEURAL AS NEEDED
Status: DISCONTINUED | OUTPATIENT
Start: 2021-08-19 | End: 2021-08-19 | Stop reason: HOSPADM

## 2021-08-19 RX ORDER — ONDANSETRON 2 MG/ML
INJECTION INTRAMUSCULAR; INTRAVENOUS AS NEEDED
Status: DISCONTINUED | OUTPATIENT
Start: 2021-08-19 | End: 2021-08-19 | Stop reason: HOSPADM

## 2021-08-19 RX ORDER — EPHEDRINE SULFATE/0.9% NACL/PF 50 MG/5 ML
SYRINGE (ML) INTRAVENOUS AS NEEDED
Status: DISCONTINUED | OUTPATIENT
Start: 2021-08-19 | End: 2021-08-19 | Stop reason: HOSPADM

## 2021-08-19 RX ORDER — SODIUM CHLORIDE, SODIUM LACTATE, POTASSIUM CHLORIDE, CALCIUM CHLORIDE 600; 310; 30; 20 MG/100ML; MG/100ML; MG/100ML; MG/100ML
INJECTION, SOLUTION INTRAVENOUS
Status: DISCONTINUED | OUTPATIENT
Start: 2021-08-19 | End: 2021-08-19 | Stop reason: HOSPADM

## 2021-08-19 RX ADMIN — FAMOTIDINE 20 MG: 20 TABLET, FILM COATED ORAL at 09:09

## 2021-08-19 RX ADMIN — HYDROMORPHONE HYDROCHLORIDE 0.5 MG: 1 INJECTION, SOLUTION INTRAMUSCULAR; INTRAVENOUS; SUBCUTANEOUS at 17:33

## 2021-08-19 RX ADMIN — FENTANYL CITRATE 50 MCG: 50 INJECTION, SOLUTION INTRAMUSCULAR; INTRAVENOUS at 16:06

## 2021-08-19 RX ADMIN — FAMOTIDINE 20 MG: 20 TABLET, FILM COATED ORAL at 21:06

## 2021-08-19 RX ADMIN — METOPROLOL TARTRATE 2.5 MG: 5 INJECTION INTRAVENOUS at 05:46

## 2021-08-19 RX ADMIN — LIDOCAINE HYDROCHLORIDE 100 MG: 20 INJECTION, SOLUTION INTRAVENOUS at 14:50

## 2021-08-19 RX ADMIN — METOPROLOL SUCCINATE 25 MG: 25 TABLET, EXTENDED RELEASE ORAL at 14:15

## 2021-08-19 RX ADMIN — FUROSEMIDE 40 MG: 10 INJECTION, SOLUTION INTRAMUSCULAR; INTRAVENOUS at 09:07

## 2021-08-19 RX ADMIN — ONDANSETRON HYDROCHLORIDE 4 MG: 2 INJECTION INTRAMUSCULAR; INTRAVENOUS at 15:59

## 2021-08-19 RX ADMIN — PIPERACILLIN AND TAZOBACTAM 3.38 G: 3; .375 INJECTION, POWDER, LYOPHILIZED, FOR SOLUTION INTRAVENOUS at 11:00

## 2021-08-19 RX ADMIN — FENTANYL CITRATE 50 MCG: 50 INJECTION, SOLUTION INTRAMUSCULAR; INTRAVENOUS at 15:27

## 2021-08-19 RX ADMIN — ATORVASTATIN CALCIUM 40 MG: 20 TABLET, FILM COATED ORAL at 21:06

## 2021-08-19 RX ADMIN — PROPOFOL 150 MG: 10 INJECTION, EMULSION INTRAVENOUS at 14:50

## 2021-08-19 RX ADMIN — Medication 10 MG: at 15:14

## 2021-08-19 RX ADMIN — HYDROCODONE BITARTRATE AND ACETAMINOPHEN 2 TABLET: 7.5; 325 TABLET ORAL at 21:06

## 2021-08-19 RX ADMIN — ALBUMIN (HUMAN) 25 G: 0.25 INJECTION, SOLUTION INTRAVENOUS at 05:46

## 2021-08-19 RX ADMIN — SODIUM CHLORIDE, SODIUM LACTATE, POTASSIUM CHLORIDE, AND CALCIUM CHLORIDE: 600; 310; 30; 20 INJECTION, SOLUTION INTRAVENOUS at 14:38

## 2021-08-19 RX ADMIN — HEPARIN SODIUM 5000 UNITS: 5000 INJECTION INTRAVENOUS; SUBCUTANEOUS at 23:54

## 2021-08-19 RX ADMIN — PROPOFOL 50 MG: 10 INJECTION, EMULSION INTRAVENOUS at 14:58

## 2021-08-19 RX ADMIN — GLYCOPYRROLATE 0.4 MG: 0.2 INJECTION INTRAMUSCULAR; INTRAVENOUS at 16:06

## 2021-08-19 RX ADMIN — MIDAZOLAM 2 MG: 1 INJECTION INTRAMUSCULAR; INTRAVENOUS at 14:43

## 2021-08-19 RX ADMIN — ASPIRIN 81 MG: 81 TABLET, CHEWABLE ORAL at 09:08

## 2021-08-19 RX ADMIN — MORPHINE SULFATE 2 MG: 2 INJECTION, SOLUTION INTRAMUSCULAR; INTRAVENOUS at 22:35

## 2021-08-19 RX ADMIN — NEOSTIGMINE METHYLSULFATE 3 MG: 1 INJECTION, SOLUTION INTRAVENOUS at 16:06

## 2021-08-19 RX ADMIN — INSULIN GLARGINE 5 UNITS: 100 INJECTION, SOLUTION SUBCUTANEOUS at 09:11

## 2021-08-19 RX ADMIN — ALBUMIN (HUMAN) 25 G: 0.25 INJECTION, SOLUTION INTRAVENOUS at 22:42

## 2021-08-19 RX ADMIN — DULOXETINE HYDROCHLORIDE 60 MG: 60 CAPSULE, DELAYED RELEASE ORAL at 09:08

## 2021-08-19 RX ADMIN — ROCURONIUM BROMIDE 30 MG: 10 INJECTION, SOLUTION INTRAVENOUS at 14:58

## 2021-08-19 RX ADMIN — ALPRAZOLAM 0.5 MG: 0.5 TABLET ORAL at 23:54

## 2021-08-19 RX ADMIN — PIPERACILLIN AND TAZOBACTAM 3.38 G: 3; .375 INJECTION, POWDER, LYOPHILIZED, FOR SOLUTION INTRAVENOUS at 14:58

## 2021-08-19 RX ADMIN — HYDROMORPHONE HYDROCHLORIDE 0.5 MG: 1 INJECTION, SOLUTION INTRAMUSCULAR; INTRAVENOUS; SUBCUTANEOUS at 17:23

## 2021-08-19 RX ADMIN — PIPERACILLIN AND TAZOBACTAM 3.38 G: 3; .375 INJECTION, POWDER, LYOPHILIZED, FOR SOLUTION INTRAVENOUS at 18:17

## 2021-08-19 RX ADMIN — PIPERACILLIN AND TAZOBACTAM 3.38 G: 3; .375 INJECTION, POWDER, LYOPHILIZED, FOR SOLUTION INTRAVENOUS at 03:05

## 2021-08-19 RX ADMIN — BUPROPION HYDROCHLORIDE 150 MG: 150 TABLET, FILM COATED, EXTENDED RELEASE ORAL at 09:09

## 2021-08-19 NOTE — PROGRESS NOTES
Physician Progress Note      Radha Zimmer  CSN #:                  [id number]  :                       1970  ADMIT DATE:       2021 9:27 PM  [address] [place] DATE:  RESPONDING  PROVIDER #:        Tracy Anthony MD          QUERY TEXT:    Pt admitted with sepsis and has \"Probable acute resp failure 2/2 pul edema with diffuse alveolar infiltrate, small to mod B/L pleural effusion on CXR documented\" per ID. If possible, please document in progress notes and discharge summary further specificity regarding the type and acuity of respiratory failure: The medical record reflects the following:    Risk Factors: Sepsis, CHF, DM with peripheral angiopathy with gangrene, nicotine dependence, HTN    Clinical Indicators:  > Per Dr. Rayna Beaver PN - \"RRT was called on 2021, patient appeared confused, shortness of breath with hypoxia. ABG showed metabolic acidosis\"  > ABG 2021 10:07  pH (POC): 7.27 (L)  pCO2 (POC): 30.0 (L)  pO2 (POC): 74 (L)  HCO3 (POC): 13.9 (L)  sO2 (POC): 92.8  > RR 24, 28, 30, 22, 23  > O2 sats dropped to 88 %, 89 %    Treatment: Received ABGs, O2 via n/c  Options provided:  -- Acute respiratory failure with hypoxia  -- Acute respiratory failure ruled out  -- Other - I will add my own diagnosis  -- Disagree - Not applicable / Not valid  -- Disagree - Clinically unable to determine / Unknown  -- Refer to Clinical Documentation Reviewer    PROVIDER RESPONSE TEXT:    This patient is in acute respiratory failure with hypoxia.     Query created by: Lorrie Workman on 2021 4:11 PM      Electronically signed by:  Tracy Anthony MD 2021 10:00 AM

## 2021-08-19 NOTE — PROGRESS NOTES
1720-Bedside and Verbal shift change report given to Keith Workman (oncoming nurse) by Leoncio Ward (offgoing nurse). Report included the following information SBAR, Kardex, Intake/Output, and MAR.     1800-Patient arrived back on the unit. Patient no complaints of pain. 1930-Bedside and Verbal shift change report given to Blaze Babb and Lory Alpers, RN (oncoming nurse) by Keith Workman (offgoing nurse). Report included the following information SBAR, Kardex, Intake/Output, and MAR.

## 2021-08-19 NOTE — PROGRESS NOTES
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 Patient was transfered from ICU to [address] [place] [address]. Noted possible closure and debirdement of left foot. Estimated Daily Nutrient Needs:  Energy (kcal):  6781-7880  Protein (g):  71-85       Fluid (ml/day):  7132-6933    Nutrition Related Findings:  Lab: K 3.2, mag 1.5. Med: humalog, lantus, lasix, pepcid. Patient remains NPO. Current Nutrition Therapies:  DIET NPO    Anthropometric Measures:  · Height:  5' 8\" (172.7 cm)  · Current Body Wt:  70.7 kg (155 lb 13.8 oz)  · BMI: 23.7    Nutrition Diagnosis:   · Inadequate oral intake related to acute injury/trauma as evidenced by NPO or clear liquid status due to medical condition    Nutrition Intervention:  Food and/or Nutrient Delivery: Start oral diet  Nutrition Education and Counseling: No recommendations at this time  Coordination of Nutrition Care: Continue to monitor while inpatient    Goals:  Start PO diet when meet estimated nutritional needs within the next 2-4 days       Nutrition Monitoring and Evaluation:   Behavioral-Environmental Outcomes: None identified  Food/Nutrient Intake Outcomes: Diet advancement/tolerance  Physical Signs/Symptoms Outcomes: Biochemical data, Skin, Weight, GI status, Nausea/vomiting    Discharge Planning:     Too soon to determine     Electronically signed by Kishan Granados RD on 8/19/2021 at 10:08 AM

## 2021-08-19 NOTE — PROGRESS NOTES
Transferred patient to [facility] via bed.  VSS     08/19/21 1722   Vital Signs   Temp 97.8 °F (36.6 °C)   Temp Source Oral   Pulse (Heart Rate) 70   Resp Rate 12   BP (!) 160/77   Oxygen Therapy   O2 Sat (%) 96 %   O2 Device Nasal cannula   O2 Flow Rate (L/min) 2 l/min

## 2021-08-19 NOTE — OP NOTES
[address] [place] [address], [address] [facility]  Tel: [phone]  Fax: [phone]    Aubrey Batres MD, [facility]    VASCULAR SURGERY OPERATIVE REPORT    PATIENT NAME:  Naila Orellana  [id number]  1970    DATE OF PROCEDURE:  8/19/2021    PREOP DIAGNOSIS:  Gangrene RLE    POSTOP DIAGNOSIS:   Gangrene RLE    PROCEDURE:  Procedure(s):  RIGHT BELOW KNEE AMPUTATION, DRESSING CHANGE TO LEFT FOOT WOUND      SURGEON:  Surgeon(s):  Solomon Grant MD    ANESTHESIA:  General  general    SPECIMEN:  [unfilled]    IMPLANTS:  * No implants in log *    ESTIMATED BLOOD LOSS:  100mL    INDICATIONS:  Ms. Ebony Jones is a 46 y.o. female who presented with wet gangrene RLE and sepsis. She underwent guillotine amputation. She returns to OR today for formal BKA and closure. Benefits, alternatives, and risks of the procedure were discussed. All questions were answered. The patient understands and is willing to proceed. DESCRIPTION OF PROCEDURE:  The patient was properly identified, and after ensuring the appropriately signed documents were secured, the patient was brought to the operating room and placed supine on the operative table. general anesthesia was induced by the anesthesia service who monitored the patient throughout the procedure. The right leg prepped and draped in the usual sterile fashion. An appropriate timeout procedure was completed where in the patient, procedure, site, positioning, allergies, equipment, and administration of antibiotics were all verified prior to beginning. Tourniquet was placed. Skin incision was performed approximately 15 cm below the knee at the mid-calf to initiate creation of a posterior myocutaneofascial flap, and incision was extended deep through subcutaneous tissue, fascia and muscle using electrocautery. The soft tissue was removed from the tibia and fibula using a lap pad and the tibia was divided with a Gigli saw and fibula was divided using an oscillating saw. Posterior musculature was divided with an amputation knife obliquely. The anterior tibial, posterior tibial, and peroneal vascular bundles were controlled with suture ligatures. Bleeding soleal veins were controlled with suture ligatures. The wound was inspected for hemostasis and was clean and dry at the end of the case. The fascia was reapproximated using interrupted 2-0 Vicryl suture. Subcutaneous tissue was reapproximated using running 2-0 Vicryl suture. All instrument and sponge counts were confirmed, and skin was reapproximated using interrupted sutures of prolene and staples. The skin was cleaned and dried, and a negative pressure wound dressing <50 cm2 was placed over the wound to Prevena suction. This ended the surgical procedure. All counts of needles, sponges, and instruments were correct at the end the case. The dressing was removed from the left foot. There was no necrosis. There was fibrinous slough over the dorsal wound. The eschar at the first metatarsal area was soft. The foot was scrubbed with betadine, dried, and xeroform, 4x4's, kerlix roll, and ace bandage were applied. Sedation was stopped, the patient was relieved of anesthesia, and taken to the postanesthesia care unit in good condition after having tolerated the procedure well. No intraoperative complications were noted. I was present and scrubbed for the procedure as dictated.     Edis Medina MD, [address]  [facility]  [phone] office  [phone] paging service  [phone] mobile

## 2021-08-19 NOTE — PROGRESS NOTES
Patient isnot in room, chart revewed    SARS COV2 PCR neg-- can dc isolaton  Will see tomorrow  Tania Perkins MD  [facility]  Office #:     [phone]  [id number]   Office Fax: [phone]

## 2021-08-19 NOTE — PROGRESS NOTES
Problem: Falls - Risk of  Goal: *Absence of Falls  Description: Document Galilea Finley Fall Risk and appropriate interventions in the flowsheet. Outcome: Progressing Towards Goal  Note: Fall Risk Interventions:  Mobility Interventions: Communicate number of staff needed for ambulation/transfer         Medication Interventions: Evaluate medications/consider consulting pharmacy    Elimination Interventions: Call light in reach    History of Falls Interventions: Evaluate medications/consider consulting pharmacy         Problem: Patient Education: Go to Patient Education Activity  Goal: Patient/Family Education  Outcome: Progressing Towards Goal     Problem: Diabetes Self-Management  Goal: *Disease process and treatment process  Description: Define diabetes and identify own type of diabetes; list 3 options for treating diabetes. Outcome: Progressing Towards Goal  Goal: *Monitoring blood glucose, interpreting and using results  Description: Identify recommended blood glucose targets  and personal targets.   Outcome: Progressing Towards Goal

## 2021-08-19 NOTE — PALLIATIVE CARE
Palliative team member, Ms. Bhavani Vick, RN, in room in full PPE For droplet plus isolation to assist patient with AMD completion. Assisted with document completion from [facility] due to droplet plus isolation. Patient stated that she wanted to designate her cousin, Derek Reid, as her surrogate medical decision maker and she formalized this decision by completing a [place] AMD.  Palliative team re-discussed goals of care with patient who stated that she wished to remain a full code with full aggressive measures. Thank you for the opportunity to participate in the care of Ms. Lidya Kent.     Maggie Kelley, RICHIE-BC  Palliative Medicine

## 2021-08-19 NOTE — ANESTHESIA PREPROCEDURE EVALUATION
Relevant Problems   RESPIRATORY SYSTEM   (+) Dyspnea      CARDIOVASCULAR   (+) PAD (peripheral artery disease) (HCC)      RENAL FAILURE   (+) YUNIEL (acute kidney injury) ([facility] [address]      ENDOCRINE   (+) 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 (HCC)   (+) Type 2 diabetes mellitus with left diabetic foot ulcer (HCC)      HEMATOLOGY   (+) Anemia   (+) Osteomyelitis (HCC)       Anesthetic History   No history of anesthetic complications            Review of Systems / Medical History  Patient summary reviewed, nursing notes reviewed and pertinent labs reviewed    Pulmonary          Smoker    Pertinent negatives: No sleep apnea     Neuro/Psych   Within defined limits           Cardiovascular    Hypertension: well controlled              Exercise tolerance: >4 METS     GI/Hepatic/Renal     GERD: well controlled        Pertinent negatives: No hiatal hernia   Endo/Other    Diabetes: poorly controlled, type 2, using insulin  Hyperthyroidism: well controlled       Other Findings            Physical Exam    Airway  Mallampati: I  TM Distance: > 6 cm  Neck ROM: normal range of motion   Mouth opening: Normal     Cardiovascular    Rhythm: regular  Rate: normal         Dental      Comments: Multiple chipped and cracked   Pulmonary  Breath sounds clear to auscultation               Abdominal  GI exam deferred       Other Findings            Anesthetic Plan    ASA: 3  Anesthesia type: general          Induction: Intravenous  Anesthetic plan and risks discussed with: Patient      ga/lma

## 2021-08-19 NOTE — ROUTINE PROCESS
1945-Received patient as a transfer from ICU. Patient was relocated to a different room due to TV malfunction. No distress noted. Patient denied pain or discomfort. 2135-Patient tolerated all meds well. 0055-Patient preferred brief over purwick for patient is receiving lasix IV. Incontinent care done and linen changed. Patient perform own rohith care.

## 2021-08-19 NOTE — PERIOP NOTES
TRANSFER - IN REPORT:    Verbal report received from [facility] on Ether Michael  being received from [facility] for ordered procedure      Report consisted of patients Situation, Background, Assessment and   Recommendations(SBAR). Information from the following report(s) SBAR was reviewed with the receiving nurse. Opportunity for questions and clarification was provided. Assessment will be completed upon patients arrival to unit and care assumed.

## 2021-08-19 NOTE — ACP (ADVANCE CARE PLANNING)
[facility]    Patient completed an advance medical directive today. Copy placed on chart for scanning into EMR and copy given to patient for personal records. Primary Decision Maker (Health Care Agent): Leola more  Relationship to patient: cousin  Phone number:  [phone]  [x] Named in a scanned document (AMD completed 8/19/2021)  [] Legal Next of Kin  [] Guardian    CODE STATUS: FULL CODE (confirmed per patient today)     8/19/2021 0910 Seen today in room 348 along with Jacques Castorena NP. Awake, alert. Oriented x 4. Respirations unlabored on room air. States she is having surgery today. Not having pain currently. Had requested to complete AMD as she believes that her cousin is in the best position to be a medical decision surrogate. She confirmed that her cousin could only act if she (patient) could not speak for herself. This was confirmed. She also confirmed that she is accepting of aggressive resuscitation in the event of cardiac arrest and  Intubation in the event of respiratory decline. Disposition plan: to be determined after surgery and rehab evaluation complete    After meeting with patient, the goals of care have been defined. Code status remains: FULL CODE  AMD status: completed 8/19/2021 naming her cousin as her MPOA. Will sign off but remain available for reconsult as needed/if appropriate.      Thank you for the Palliative Medicine consult and allowing us to participate in the care of Sally Jone Salazar, RN, MSN  Palliative Medicine  P: [phone]

## 2021-08-19 NOTE — PERIOP NOTES
TRANSFER - OUT REPORT:    Verbal report given to Saqib Lopez RN (name) on Anam Quiñones  being transferred to [facility](unit) for routine post - op       Report consisted of patients Situation, Background, Assessment and   Recommendations(SBAR). Information from the following report(s) SBAR, Kardex, OR Summary, MAR and Cardiac Rhythm NSR was reviewed with the receiving nurse. Lines:   Peripheral IV 08/13/21 Right;Proximal Cephalic (Active)   Site Assessment Clean, dry, & intact 08/19/21 1400   Phlebitis Assessment 0 08/19/21 1400   Infiltration Assessment 0 08/19/21 1400   Dressing Status Clean, dry, & intact; Occlusive 08/19/21 1400   Dressing Type Tape;Transparent 08/19/21 1400   Hub Color/Line Status Green;Flushed;Patent; Infusing 08/19/21 1400   Action Taken Open ports on tubing capped 08/18/21 2135   Alcohol Cap Used Yes 08/18/21 2135       Peripheral IV 08/13/21 Left;Proximal Cephalic (Active)   Site Assessment Clean, dry, & intact 08/19/21 1655   Phlebitis Assessment 0 08/19/21 1655   Infiltration Assessment 0 08/19/21 1655   Dressing Status Clean, dry, & intact 08/19/21 1655   Dressing Type Transparent;Disk with Chlorhexadine gluconate (CHG) 08/19/21 1655   Hub Color/Line Status Infusing 08/19/21 1655   Action Taken Open ports on tubing capped 08/18/21 2135   Alcohol Cap Used Yes 08/18/21 2135        Opportunity for questions and clarification was provided.       Patient transported with:   O2 @ 2 liters  Registered Nurse

## 2021-08-19 NOTE — PROGRESS NOTES
[address] [place] [address] AlcidesDenver Afton [address] [place] [address], [address] [facility]  Tel: [phone]  Fax: [phone]    Ofelia Marie MD, RPVI    PROGRESS NOTE    Patient: Guadalupe Boston MRN: [id number]  SSN: [id number]    YOB: 1970  Age: 46 y.o. Sex: female      Date: 8/19/2021    Plan:   Right BKA formalization in OR. The planned procedure was explained. We discussed the benefits, alternatives, and risks of surgery, including but not limited to: bleeding, infection, risk of damage to adjacent structures including neurovascular structures, failure of the operative intervention including failure of wound healing, and the possible need for future procedures. Specifically risk of wound healing requiring more proximal amputation above knee, knee contracture if immobilizer is not worn correctly, and chronic pain were discussed. We also briefly discussed the tentative anesthetic plan, which will likely be general; final plan to be determined by the anesthesiologist.  Generally, the risks of anesthesia are minimal, however, these can include: heart attack, stroke, respiratory failure, and death. Further details of anesthetic risks will be discussed by the anesthesiologist.  All questions regarding the surgical procedure were answered. The patient expressed clear verbal understanding and desired that we proceed with surgery. Ofelia Marie MD, [address] [place] [address] [facility]  [phone] office  [phone] mobile      Assessment:   S/p RLE guillotine amputation for wet gangrene   Septic shock due to anaerobic bacteremia  [place] 6 chronic limb threatening ischemia    Subjective:   Doing well. Typical mood/affect, she had altered mental status/toxic metabolic encephalopathy previously. Pt easily conversant, asks appropriate questions, participating in conversation. dyspnea is better. No chest pain.     Objective:   Admit weight: Weight: 41.1 kg (90 lb 9.7 oz)  Last recorded weight: Weight: 70.7 kg (155 lb 13.8 oz)    Visit Vitals  BP (!) 162/94   Pulse 86   Temp 97.3 °F (36.3 °C)   Resp 16   Ht 5' 8\" (1.727 m)   Wt 70.7 kg (155 lb 13.8 oz)   SpO2 94%   BMI 23.70 kg/m²       Intake/Output Summary (Last 24 hours) at 8/19/2021 1420  Last data filed at 8/18/2021 2135  Gross per 24 hour   Intake 240 ml   Output    Net 240 ml       Physical Exam:   General:  Alert,oriented x 4, no distress, appears stated age. Eyes:  Conjunctivae/clear. PERRL, EOMs intact. Nose: Nares normal. Septum midline. No drainage    Mouth/Throat: Lips, mucosa, and tongue normal. Teeth poor dentition   Neck: Supple, symmetrical, trachea midline       Lungs:   Coarse   Heart:  Regular rate and rhythm   Abdomen:   Soft, non-tender. Extremities: Dressing over LLE TMA, RLE ankle guillotine dressing intact. Neurologic: No focal deficit. Mood/affect congruent. Judgement normal.  Cooperative. Labs:     Recent Labs     08/18/21  0047 08/17/21  0520   WBC 9.3 10.6   HGB 9.1* 8.3*   HCT 28.6* 25.5*    273   RDW 16.2* 16.2*     Recent Labs     08/19/21  0015 08/18/21  1633 08/18/21  0815 08/18/21  0047 08/18/21  0047 08/17/21  1821 08/17/21  1821 08/17/21  0520 08/17/21  0520     --   --   --  143  --   --   --  143   K 3.2* 3.3* 3.0*   < > 2.9*   < > 2.7*   < > 3.1*     --   --   --  110  --   --   --  113*   CO2 22  --   --   --  24  --   --   --  23   *  --   --   --  146*  --   --   --  131*   BUN 16  --   --   --  16  --   --   --  21*   CREA 0.82  --   --   --  0.86  --   --   --  0.86   CA 7.7*  --   --   --  7.8*  --   --   --  8.0*   MG 1.5*  --   --   --  1.9  --  1.7   < > 1.5*   PHOS  --   --   --   --  2.5  --  2.4*  --  2.8   ALB  --   --   --   --   --   --   --   --  3.3*    < > = values in this interval not displayed. No results for input(s): PH, PCO2, PO2, HCO3, FIO2 in the last 72 hours.     XRAY: Chest xray is XR FOOT LT AP/LAT    Result Date: 8/14/2021  Broad deep ulceration along dorsal hindfoot. No bony destructive process. Transmetatarsal amputation. Plantar calcaneal enthesopathy. CTA CHEST W OR W WO CONT    Result Date: 8/17/2021  -------------- 1. No convincing evidence of pulmonary thromboembolic disease. 2. Areas of multifocal upper lobe groundglass density. This could be focal pulmonary edema but infection including Covid pneumonia is difficult to exclude. 3. Bilateral pleural effusions. Previous chest film suggested congestive heart failure. Bilateral lower lobe compressive atelectasis. 4. Coronary artery calcifications are moderately prominent consistent with coronary artery disease. 5. Free fluid in the upper abdomen. XR CHEST PORT    Result Date: 8/18/2021  Right pleural effusion. Bilateral parenchymal opacities, predominantly within the left upper lobe. XR CHEST PORT    Result Date: 8/16/2021  Small to moderate bilateral effusions with bilateral interstitial and alveolar infiltrates new from prior exam suggesting pneumonia and/or pulmonary edema. XR CHEST PORT    Result Date: 8/14/2021  Patchy airspace disease right base very likely developing pneumonia. Small atelectatic streak left base. XR CHEST PORT    Result Date: 8/12/2021  No active cardiopulmonary disease.           Swapna Saha MD

## 2021-08-19 NOTE — PROGRESS NOTES
Hospitalist Progress Note    Patient: Alexandre Smith MRN: [id number]  [facility]: [id number]    YOB: 1970  Age: 46 y.o. Sex: female    DOA: 8/11/2021 LOS:  LOS: 8 days            Chief complaint :  Gangrene of both feet. Assessment/Plan     Patient Active Problem List   Diagnosis Code    Osteomyelitis ([facility] M86.9    Type 2 diabetes mellitus with left diabetic foot ulcer ([facility] E11.621, L97.529    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 ([facility] E11.52    [facility]) [facility] N17.9    PAD (peripheral artery disease) ([facility]) I73.9    Bilateral carotid artery stenosis I65.23    Gangrene of left foot ([facility]) I96    Non compliance with medical treatment Z91.19    Gangrene of both feet [facility]) I96    Fall W19. Lerry Mallory Tobacco use Z72.0    Preoperative evaluation to rule out surgical contraindication Z01.818    Anemia D64.9    Sepsis ([facility]) A41.9    Status post below-knee amputation of right lower extremity ([facility] Z89.511    Bacteremia R78.81    Dyspnea R06.00    Pulmonary infiltrates D41.3    Systolic CHF, acute ([facility]) I50.21    Debility R53.81          51-year-old female with uncontrolled type 2 diabetes mellitus, severe peripheral arterial disease, and tobacco use who has been noncompliant with wound care and hyperbaric oxygen treatment due to recent fall is admitted for severe gangrene of both feet. RRT was called on 08/16/2021, patient appeared confused, shortness of breath with hypoxia. ABG showed metabolic acidosis. Patient refused chest x-ray. She has been refusing medications. Given her condition she was transferred to ICU.    08/17/2021 - I tried to discuss with patient about her critical condition and guarded prognosis, she is not interested in listening to my concerns about her medical condition. She turned her face on other side and did not want to listen to me and did not answer my questions. Psych consulted.     Resp - Dyspnea/hypoxia -  Responded to lasix  CXR with pulmonary edema. Oxygen as needed by NC. ID -   septic shock  -  BP now improved  Continue iv abx,   Blood Cultures growing Peptoniphilus asaccharolyticus. Follow up blood cultures negative  Wound cultures growing providencia, MSSA, E-fecalis, alcaligenes fecalis. ID following. Antibiotics - zosyn       CVS - Monitor HD. Acute CHF - on lasix  Echo 45 % and mild pulm htn      Gangrene of bilateral feet    S/p right BKA on 8/12   Vascular planning on closure  and debirdement of left foot     Heme/onc - Follow H&H, plts. Anemia  Received blood transfusion   Monitor H/H    Renal - Trend BUN, Cr, follow I/O. Check and replace Mg, K, phos. 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 Prophylaxis - DVT: heparin, GI: pepcid.     Noncompliance with medical treatment   Continued tobacco use       Disposition : TBD    Review of systems  General: No fevers or chills. Cardiovascular: No chest pain or pressure. No palpitations. Pulmonary: see above. Gastrointestinal: No nausea, vomiting. Physical Exam:  General: As above   HEENT: NC, Atraumatic. PERRLA, anicteric sclerae. Lungs: CTA Bilaterally. No Wheezing/Rhonchi/Rales. Heart:  S1 S2,  No murmur, No Rubs, No Gallops  Abdomen: Soft, Non distended, Non tender.  +Bowel sounds,   Extremities: Right BKA, left foot with dressing  Psych:   Not anxious or agitated. Neurologic:  No acute neurological deficit. Vital signs/Intake and Output:  Visit Vitals  /66   Pulse 74   Temp 97.8 °F (36.6 °C)   Resp 16   Ht 5' 8\" (1.727 m)   Wt 70.7 kg (155 lb 13.8 oz)   SpO2 95%   BMI 23.70 kg/m²     Current Shift:  No intake/output data recorded.   Last three shifts:  08/17 1901 - 08/19 0700  In: 1026.7 [P.O.:360; I.V.:666.7]  Out: -             Labs: Results:       Chemistry Recent Labs     08/19/21  0015 08/18/21  1633 08/18/21  0815 08/18/21  0047 08/18/21  0047 08/17/21  1821 08/17/21  0520   *  --   --   --  146*  --  131*     --   --   --  143  --  143   K 3.2* 3.3* 3.0*   < > 2.9*   < > 3.1*     --   --   --  110  --  113*   CO2 22  --   --   --  24  --  23   BUN 16  --   --   --  16  --  21*   CREA 0.82  --   --   --  0.86  --  0.86   CA 7.7*  --   --   --  7.8*  --  8.0*   AGAP 12  --   --   --  9  --  7   BUCR 20  --   --   --  19  --  24*   ALB  --   --   --   --   --   --  3.3*    < > = values in this interval not displayed. CBC w/Diff Recent Labs     08/18/21 0047 08/17/21  0520   WBC 9.3 10.6   RBC 3.12* 2.85*   HGB 9.1* 8.3*   HCT 28.6* 25.5*    273   GRANS 82* 89*   LYMPH 8* 4*   EOS 1 1      Cardiac Enzymes No results for input(s): CPK, CKND1, CONSTANTINO in the last 72 hours. No lab exists for component: CKRMB, TROIP   Coagulation No results for input(s): PTP, INR, APTT, INREXT, INREXT in the last 72 hours. Lipid Panel No results found for: CHOL, CHOLPOCT, CHOLX, CHLST, CHOLV, 954728, HDL, HDLP, LDL, LDLC, DLDLP, 289405, VLDLC, VLDL, TGLX, TRIGL, TRIGP, TGLPOCT, CHHD, CHHDX   BNP No results for input(s): BNPP in the last 72 hours.    Liver Enzymes Recent Labs     08/17/21  0520   ALB 3.3*      Thyroid Studies No results found for: T4, T3U, TSH, TSHEXT, TSHEXT     Procedures/imaging: see electronic medical records for all procedures/Xrays and details which were not copied into this note but were reviewed prior to creation of Plan

## 2021-08-19 NOTE — PROGRESS NOTES
Cardiology Progress Note        Patient: Zelda Mixon        Sex: female          DOA: 8/11/2021  YOB: 1970      Age:  46 y.o.        LOS:  LOS: 8 days    Patient seen and examined, chart reviewed. Assessment/Plan     Patient Active Problem List   Diagnosis Code    Osteomyelitis ([facility] M86.9    Type 2 diabetes mellitus with left diabetic foot ulcer [facility] E11.621, L97.529    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 ([facility] E11.52    YUNIEL (acute kidney injury) ([facility] N17.9    PAD (peripheral artery disease) ([facility]) I73.9    Bilateral carotid artery stenosis I65.23    Gangrene of left foot ([facility]) I96    Non compliance with medical treatment Z91.19    Gangrene of both feet [facility]) I96    Fall W19. Vonzell Pleas Tobacco use Z72.0    Preoperative evaluation to rule out surgical contraindication Z01.818    Anemia D64.9    Sepsis ([facility]) A41.9    Status post below-knee amputation of right lower extremity ([facility]) Z89.511    Bacteremia R78.81    Dyspnea R06.00    Pulmonary infiltrates F19.4    Systolic CHF, acute ([facility]) I50.21    Debility R53.81      Acute systolic heart failure  Hypokalemia      Echocardiogram revealed     · Test was terminated early due to patient becoming combative. · Left Ventricle: Normal cavity size. Mild concentric hypertrophy. The estimated EF is 40 - 45%. Mildly reduced systolic function. There is inconclusive left ventricular diastolic function E'E= 51.19. Wall Scoring: The left ventricular wall motion is globally hypokinetic. · Pulmonary Artery: Pulmonary arteries not well visualized. Pulmonary arterial systolic pressure (PASP) is 35 mmHg. Pulmonary hypertension found to be mild. Plan:      Change IV Lasix 40 mg to once a day. Discontinue Metoprolol IV  Start Metoprolol succinate 25 mg once a day  Add ACEI/ARB and aldactone as per BP, renal function and pottasium  Strict input output.     Salt restriction up to 2 g per day and fluid restriction up to 1.2 L per   day. Subjective:    cc: My breathing is better today      REVIEW OF SYSTEMS:     General: No fevers or chills. Cardiovascular: No chest pain,No palpitations, No orthopnea, No PND, No leg swelling, No claudication  Pulmonary: No dyspnea. Gastrointestinal: No nausea, vomiting, bleeding  Neurology: No Dizziness    Objective:      Visit Vitals  BP (!) 162/94   Pulse 86   Temp 97.3 °F (36.3 °C)   Resp 16   Ht 5' 8\" (1.727 m)   Wt 70.7 kg (155 lb 13.8 oz)   SpO2 94%   BMI 23.70 kg/m²     Body mass index is 23.7 kg/m². Physical Exam:  General Appearance: Comfortable, not using accessory muscles of respiration. HEENT: ROSA. HEAD: Atraumatic  NECK: No JVD, no thyroidomeglay. CAROTIDS: No bruit  LUNGS: Clear bilaterally. HEART: S1+S2 audible, no murmur, no pericardial rub.      ABD: Non-tender, BS Audible    EXT: Right below knee amputation  NEUROLOGICAL: Alert, follows verbal commands    Medication:  Current Facility-Administered Medications   Medication Dose Route Frequency    potassium chloride (K-DUR, KLOR-CON) SR tablet 40 mEq  40 mEq Oral NOW    [START ON 8/20/2021] furosemide (LASIX) injection 40 mg  40 mg IntraVENous DAILY    metoprolol succinate (TOPROL-XL) XL tablet 25 mg  25 mg Oral DAILY    lactated Ringers infusion  125 mL/hr IntraVENous CONTINUOUS    piperacillin-tazobactam (ZOSYN) 3.375 g in 0.9% sodium chloride (MBP/ADV) 100 mL MBP  3.375 g IntraVENous NOW    ELECTROLYTE REPLACEMENT PROTOCOL - Potassium Standard Dosing   1 Each Other PRN    ELECTROLYTE REPLACEMENT PROTOCOL - Magnesium   1 Each Other PRN    ELECTROLYTE REPLACEMENT PROTOCOL - Phosphorus  Standard Dosing  1 Each Other PRN    ELECTROLYTE REPLACEMENT PROTOCOL - Calcium   1 Each Other PRN    piperacillin-tazobactam (ZOSYN) 3.375 g in 0.9% sodium chloride (MBP/ADV) 100 mL MBP  3.375 g IntraVENous Q8H    0.9% sodium chloride infusion 250 mL  250 mL IntraVENous PRN    famotidine (PEPCID) tablet 20 mg  20 mg Oral BID    DULoxetine (CYMBALTA) capsule 60 mg  60 mg Oral DAILY    albuterol-ipratropium (DUO-NEB) 2.5 MG-0.5 MG/3 ML  3 mL Nebulization Q4H PRN    albumin human 25% (BUMINATE) solution 25 g  25 g IntraVENous Q6H    insulin glargine (LANTUS) injection 5 Units  5 Units SubCUTAneous DAILY    ALPRAZolam (XANAX) tablet 0.5 mg  0.5 mg Oral QHS PRN    buPROPion SR (WELLBUTRIN SR) tablet 150 mg  150 mg Oral DAILY    gabapentin (NEURONTIN) capsule 100 mg  100 mg Oral TID PRN    morphine injection 2 mg  2 mg IntraVENous Q4H PRN    COVID-19 vac,Ad26-PF (TalentSky) injection 1 Dose  1 Dose IntraMUSCular PRIOR TO DISCHARGE    aspirin chewable tablet 81 mg  81 mg Oral DAILY    heparin (porcine) injection 5,000 Units  5,000 Units SubCUTAneous Q8H    HYDROcodone-acetaminophen (NORCO) 7.5-325 mg per tablet 1 Tablet  1 Tablet Oral Q4H PRN    HYDROcodone-acetaminophen (NORCO) 7.5-325 mg per tablet 2 Tablet  2 Tablet Oral Q6H PRN    naloxone (NARCAN) injection 0.1 mg  0.1 mg IntraVENous PRN    diphenhydrAMINE (BENADRYL) injection 12.5 mg  12.5 mg IntraVENous Q6H PRN    acetaminophen (TYLENOL) tablet 650 mg  650 mg Oral Q6H PRN    Or    acetaminophen (TYLENOL) suppository 650 mg  650 mg Rectal Q6H PRN    polyethylene glycol (MIRALAX) packet 17 g  17 g Oral DAILY PRN    ondansetron (ZOFRAN ODT) tablet 4 mg  4 mg Oral Q8H PRN    Or    ondansetron (ZOFRAN) injection 4 mg  4 mg IntraVENous Q6H PRN    insulin lispro (HUMALOG) injection   SubCUTAneous Q6H    glucose chewable tablet 16 g  16 g Oral PRN    glucagon (GLUCAGEN) injection 1 mg  1 mg IntraMUSCular PRN    dextrose (D50W) injection syrg 12.5-25 g  25-50 mL IntraVENous PRN    atorvastatin (LIPITOR) tablet 40 mg  40 mg Oral QHS               Lab/Data Reviewed:       Recent Labs     08/18/21  0047 08/17/21  0520   WBC 9.3 10.6   HGB 9.1* 8.3*   HCT 28.6* 25.5*    273     Recent Labs     08/19/21  0015 08/18/21  1633 08/18/21  0815 08/18/21  0047 08/18/21  0047 08/17/21  1821 08/17/21  0520     --   --   --  143  --  143   K 3.2* 3.3* 3.0*   < > 2.9*   < > 3.1*     --   --   --  110  --  113*   CO2 22  --   --   --  24  --  23   *  --   --   --  146*  --  131*   BUN 16  --   --   --  16  --  21*   CREA 0.82  --   --   --  0.86  --  0.86   CA 7.7*  --   --   --  7.8*  --  8.0*    < > = values in this interval not displayed.          Signed By: Obi Valencia MD     August 19, 2021

## 2021-08-19 NOTE — ANESTHESIA POSTPROCEDURE EVALUATION
Post-Anesthesia Evaluation and Assessment    Cardiovascular Function/Vital Signs  Visit Vitals  /73   Pulse 69   Temp 36.6 °C (97.9 °F)   Resp 9   Ht 5' 8\" (1.727 m)   Wt 70.7 kg (155 lb 13.8 oz)   SpO2 96%   BMI 23.70 kg/m²       Patient is status post Procedure(s):  FORMAL CLOSURE OF RIGHT BELOW KNEE AMPUTATION, DRESSING CHANGE TO LEFT FOOT WOUND. Nausea/Vomiting: Controlled. Postoperative hydration reviewed and adequate. Pain:  Pain Scale 1: Numeric (0 - 10) (08/19/21 1736)  Pain Intensity 1: 4 (08/19/21 1736)   Managed. Neurological Status:   Neuro (WDL): Exceptions to WDL (08/19/21 1034)   At baseline. Mental Status and Level of Consciousness: Baseline and stable. Pulmonary Status:   O2 Device: Nasal cannula (08/19/21 1709)   Adequate oxygenation and airway patent. Complications related to anesthesia: None    Post-anesthesia assessment completed. No concerns. Patient has met all discharge requirements.     Signed By: Bernarda Kiran MD

## 2021-08-20 LAB
BUN SERPL-MCNC: 17 MG/DL (ref 7–18)
GLUCOSE BLD STRIP.AUTO-MCNC: 112 MG/DL (ref 70–110)
GLUCOSE BLD STRIP.AUTO-MCNC: 131 MG/DL (ref 70–110)
GLUCOSE BLD STRIP.AUTO-MCNC: 184 MG/DL (ref 70–110)
GLUCOSE BLD STRIP.AUTO-MCNC: 87 MG/DL (ref 70–110)
MAGNESIUM SERPL-MCNC: 1.3 MG/DL (ref 1.6–2.6)

## 2021-08-20 PROCEDURE — 74011000258 HC RX REV CODE- 258: Performed by: STUDENT IN AN ORGANIZED HEALTH CARE EDUCATION/TRAINING PROGRAM

## 2021-08-20 PROCEDURE — P9047 ALBUMIN (HUMAN), 25%, 50ML: HCPCS | Performed by: STUDENT IN AN ORGANIZED HEALTH CARE EDUCATION/TRAINING PROGRAM

## 2021-08-20 PROCEDURE — 74011250636 HC RX REV CODE- 250/636: Performed by: STUDENT IN AN ORGANIZED HEALTH CARE EDUCATION/TRAINING PROGRAM

## 2021-08-20 PROCEDURE — 74011636637 HC RX REV CODE- 636/637: Performed by: STUDENT IN AN ORGANIZED HEALTH CARE EDUCATION/TRAINING PROGRAM

## 2021-08-20 PROCEDURE — 82962 GLUCOSE BLOOD TEST: CPT

## 2021-08-20 PROCEDURE — 97530 THERAPEUTIC ACTIVITIES: CPT

## 2021-08-20 PROCEDURE — 74011000258 HC RX REV CODE- 258: Performed by: INTERNAL MEDICINE

## 2021-08-20 PROCEDURE — 97166 OT EVAL MOD COMPLEX 45 MIN: CPT

## 2021-08-20 PROCEDURE — 74011250637 HC RX REV CODE- 250/637: Performed by: STUDENT IN AN ORGANIZED HEALTH CARE EDUCATION/TRAINING PROGRAM

## 2021-08-20 PROCEDURE — 74011250636 HC RX REV CODE- 250/636: Performed by: INTERNAL MEDICINE

## 2021-08-20 PROCEDURE — 83735 ASSAY OF MAGNESIUM: CPT

## 2021-08-20 PROCEDURE — 84520 ASSAY OF UREA NITROGEN: CPT

## 2021-08-20 PROCEDURE — 74011000250 HC RX REV CODE- 250: Performed by: STUDENT IN AN ORGANIZED HEALTH CARE EDUCATION/TRAINING PROGRAM

## 2021-08-20 PROCEDURE — 65660000000 HC RM CCU STEPDOWN

## 2021-08-20 PROCEDURE — 74011250636 HC RX REV CODE- 250/636: Performed by: FAMILY MEDICINE

## 2021-08-20 PROCEDURE — 36415 COLL VENOUS BLD VENIPUNCTURE: CPT

## 2021-08-20 PROCEDURE — 97162 PT EVAL MOD COMPLEX 30 MIN: CPT

## 2021-08-20 RX ORDER — MORPHINE SULFATE 4 MG/ML
4 INJECTION INTRAVENOUS
Status: DISCONTINUED | OUTPATIENT
Start: 2021-08-20 | End: 2021-08-20

## 2021-08-20 RX ORDER — LOSARTAN POTASSIUM 25 MG/1
25 TABLET ORAL DAILY
Status: DISCONTINUED | OUTPATIENT
Start: 2021-08-20 | End: 2021-08-20

## 2021-08-20 RX ORDER — MORPHINE SULFATE 4 MG/ML
4 INJECTION INTRAVENOUS
Status: DISCONTINUED | OUTPATIENT
Start: 2021-08-20 | End: 2021-08-21

## 2021-08-20 RX ORDER — SODIUM CHLORIDE 0.9 % (FLUSH) 0.9 %
5-40 SYRINGE (ML) INJECTION EVERY 8 HOURS
Status: DISCONTINUED | OUTPATIENT
Start: 2021-08-20 | End: 2021-08-20

## 2021-08-20 RX ORDER — SODIUM CHLORIDE 0.9 % (FLUSH) 0.9 %
5-40 SYRINGE (ML) INJECTION AS NEEDED
Status: DISCONTINUED | OUTPATIENT
Start: 2021-08-20 | End: 2021-08-20

## 2021-08-20 RX ORDER — MORPHINE SULFATE 2 MG/ML
2 INJECTION, SOLUTION INTRAMUSCULAR; INTRAVENOUS ONCE
Status: COMPLETED | OUTPATIENT
Start: 2021-08-20 | End: 2021-08-20

## 2021-08-20 RX ORDER — POLYETHYLENE GLYCOL 3350 17 G/17G
17 POWDER, FOR SOLUTION ORAL DAILY
Status: DISCONTINUED | OUTPATIENT
Start: 2021-08-20 | End: 2021-09-03 | Stop reason: HOSPADM

## 2021-08-20 RX ORDER — LOSARTAN POTASSIUM 25 MG/1
25 TABLET ORAL DAILY
Status: DISCONTINUED | OUTPATIENT
Start: 2021-08-21 | End: 2021-09-03 | Stop reason: HOSPADM

## 2021-08-20 RX ORDER — HYDROCODONE BITARTRATE AND ACETAMINOPHEN 7.5; 325 MG/1; MG/1
2 TABLET ORAL
Status: DISCONTINUED | OUTPATIENT
Start: 2021-08-20 | End: 2021-08-21

## 2021-08-20 RX ORDER — POVIDONE-IODINE 10 %
SOLUTION, NON-ORAL TOPICAL
Status: COMPLETED | OUTPATIENT
Start: 2021-08-20 | End: 2021-08-20

## 2021-08-20 RX ORDER — HYDROCODONE BITARTRATE AND ACETAMINOPHEN 7.5; 325 MG/1; MG/1
1 TABLET ORAL
Status: DISCONTINUED | OUTPATIENT
Start: 2021-08-20 | End: 2021-08-21

## 2021-08-20 RX ADMIN — ALBUMIN (HUMAN) 25 G: 0.25 INJECTION, SOLUTION INTRAVENOUS at 23:16

## 2021-08-20 RX ADMIN — POLYETHYLENE GLYCOL 3350 17 G: 17 POWDER, FOR SOLUTION ORAL at 11:21

## 2021-08-20 RX ADMIN — FAMOTIDINE 20 MG: 20 TABLET, FILM COATED ORAL at 21:05

## 2021-08-20 RX ADMIN — MORPHINE SULFATE 4 MG: 4 INJECTION INTRAVENOUS at 23:23

## 2021-08-20 RX ADMIN — ONDANSETRON 4 MG: 2 INJECTION INTRAMUSCULAR; INTRAVENOUS at 16:19

## 2021-08-20 RX ADMIN — HEPARIN SODIUM 5000 UNITS: 5000 INJECTION INTRAVENOUS; SUBCUTANEOUS at 19:39

## 2021-08-20 RX ADMIN — INSULIN GLARGINE 5 UNITS: 100 INJECTION, SOLUTION SUBCUTANEOUS at 11:42

## 2021-08-20 RX ADMIN — INSULIN LISPRO 2 UNITS: 100 INJECTION, SOLUTION INTRAVENOUS; SUBCUTANEOUS at 14:04

## 2021-08-20 RX ADMIN — FAMOTIDINE 20 MG: 20 TABLET, FILM COATED ORAL at 11:19

## 2021-08-20 RX ADMIN — SODIUM CHLORIDE, POTASSIUM CHLORIDE, SODIUM LACTATE AND CALCIUM CHLORIDE 125 ML/HR: 600; 310; 30; 20 INJECTION, SOLUTION INTRAVENOUS at 05:54

## 2021-08-20 RX ADMIN — PIPERACILLIN AND TAZOBACTAM 3.38 G: 3; .375 INJECTION, POWDER, LYOPHILIZED, FOR SOLUTION INTRAVENOUS at 19:04

## 2021-08-20 RX ADMIN — Medication 10 ML: at 14:05

## 2021-08-20 RX ADMIN — MORPHINE SULFATE 2 MG: 2 INJECTION, SOLUTION INTRAMUSCULAR; INTRAVENOUS at 03:32

## 2021-08-20 RX ADMIN — BUPROPION HYDROCHLORIDE 150 MG: 150 TABLET, FILM COATED, EXTENDED RELEASE ORAL at 11:19

## 2021-08-20 RX ADMIN — MORPHINE SULFATE 4 MG: 4 INJECTION INTRAVENOUS at 21:05

## 2021-08-20 RX ADMIN — FUROSEMIDE 40 MG: 10 INJECTION, SOLUTION INTRAMUSCULAR; INTRAVENOUS at 11:25

## 2021-08-20 RX ADMIN — ALBUMIN (HUMAN) 25 G: 0.25 INJECTION, SOLUTION INTRAVENOUS at 05:54

## 2021-08-20 RX ADMIN — MORPHINE SULFATE 4 MG: 4 INJECTION INTRAVENOUS at 16:15

## 2021-08-20 RX ADMIN — MORPHINE SULFATE 4 MG: 4 INJECTION INTRAVENOUS at 11:25

## 2021-08-20 RX ADMIN — ALBUMIN (HUMAN) 25 G: 0.25 INJECTION, SOLUTION INTRAVENOUS at 16:35

## 2021-08-20 RX ADMIN — METOPROLOL SUCCINATE 25 MG: 25 TABLET, EXTENDED RELEASE ORAL at 11:20

## 2021-08-20 RX ADMIN — PIPERACILLIN AND TAZOBACTAM 3.38 G: 3; .375 INJECTION, POWDER, LYOPHILIZED, FOR SOLUTION INTRAVENOUS at 02:23

## 2021-08-20 RX ADMIN — MORPHINE SULFATE 4 MG: 4 INJECTION INTRAVENOUS at 18:35

## 2021-08-20 RX ADMIN — PIPERACILLIN AND TAZOBACTAM 3.38 G: 3; .375 INJECTION, POWDER, LYOPHILIZED, FOR SOLUTION INTRAVENOUS at 11:35

## 2021-08-20 RX ADMIN — GABAPENTIN 100 MG: 100 CAPSULE ORAL at 00:45

## 2021-08-20 RX ADMIN — HYDROCODONE BITARTRATE AND ACETAMINOPHEN 2 TABLET: 7.5; 325 TABLET ORAL at 03:32

## 2021-08-20 RX ADMIN — POVIDONE-IODINE: 10 SOLUTION TOPICAL at 16:00

## 2021-08-20 RX ADMIN — ALBUMIN (HUMAN) 25 G: 0.25 INJECTION, SOLUTION INTRAVENOUS at 11:28

## 2021-08-20 RX ADMIN — MORPHINE SULFATE 4 MG: 4 INJECTION INTRAVENOUS at 07:11

## 2021-08-20 RX ADMIN — ASPIRIN 81 MG: 81 TABLET, CHEWABLE ORAL at 11:19

## 2021-08-20 RX ADMIN — DULOXETINE HYDROCHLORIDE 60 MG: 60 CAPSULE, DELAYED RELEASE ORAL at 11:19

## 2021-08-20 RX ADMIN — MORPHINE SULFATE 2 MG: 2 INJECTION, SOLUTION INTRAMUSCULAR; INTRAVENOUS at 02:23

## 2021-08-20 RX ADMIN — HEPARIN SODIUM 5000 UNITS: 5000 INJECTION INTRAVENOUS; SUBCUTANEOUS at 11:43

## 2021-08-20 RX ADMIN — ATORVASTATIN CALCIUM 40 MG: 20 TABLET, FILM COATED ORAL at 21:05

## 2021-08-20 NOTE — PROGRESS NOTES
Problem: Falls - Risk of  Goal: *Absence of Falls  Description: Document Nati Pugh Fall Risk and appropriate interventions in the flowsheet. Outcome: Progressing Towards Goal  Note: Fall Risk Interventions:  Mobility Interventions: Bed/chair exit alarm         Medication Interventions: Bed/chair exit alarm    Elimination Interventions: Bed/chair exit alarm, Call light in reach    History of Falls Interventions: Bed/chair exit alarm         Problem: Patient Education: Go to Patient Education Activity  Goal: Patient/Family Education  Outcome: Progressing Towards Goal     Problem: Diabetes Self-Management  Goal: *Disease process and treatment process  Description: Define diabetes and identify own type of diabetes; list 3 options for treating diabetes. Outcome: Progressing Towards Goal  Goal: *Incorporating nutritional management into lifestyle  Description: Describe effect of type, amount and timing of food on blood glucose; list 3 methods for planning meals. Outcome: Progressing Towards Goal  Goal: *Incorporating physical activity into lifestyle  Description: State effect of exercise on blood glucose levels. Outcome: Progressing Towards Goal  Goal: *Developing strategies to promote health/change behavior  Description: Define the ABC's of diabetes; identify appropriate screenings, schedule and personal plan for screenings. Outcome: Progressing Towards Goal  Goal: *Using medications safely  Description: State effect of diabetes medications on diabetes; name diabetes medication taking, action and side effects. Outcome: Progressing Towards Goal  Goal: *Monitoring blood glucose, interpreting and using results  Description: Identify recommended blood glucose targets  and personal targets.   Outcome: Progressing Towards Goal  Goal: *Prevention, detection, treatment of acute complications  Description: List symptoms of hyper- and hypoglycemia; describe how to treat low blood sugar and actions for lowering  high blood glucose level. Outcome: Progressing Towards Goal  Goal: *Prevention, detection and treatment of chronic complications  Description: Define the natural course of diabetes and describe the relationship of blood glucose levels to long term complications of diabetes. Outcome: Progressing Towards Goal  Goal: *Developing strategies to address psychosocial issues  Description: Describe feelings about living with diabetes; identify support needed and support network  Outcome: Progressing Towards Goal  Goal: *Insulin pump training  Outcome: Progressing Towards Goal  Goal: *Sick day guidelines  Outcome: Progressing Towards Goal  Goal: *Patient Specific Goal (EDIT GOAL, INSERT TEXT)  Outcome: Progressing Towards Goal     Problem: Patient Education: Go to Patient Education Activity  Goal: Patient/Family Education  Outcome: Progressing Towards Goal     Problem: Pressure Injury - Risk of  Goal: *Prevention of pressure injury  Description: Document Flo Scale and appropriate interventions in the flowsheet. Outcome: Progressing Towards Goal  Note: Pressure Injury Interventions:  Sensory Interventions: Pressure redistribution bed/mattress (bed type)    Moisture Interventions: Absorbent underpads    Activity Interventions: Pressure redistribution bed/mattress(bed type)    Mobility Interventions: Pressure redistribution bed/mattress (bed type)    Nutrition Interventions: Offer support with meals,snacks and hydration    Friction and Shear Interventions: HOB 30 degrees or less                Problem: Patient Education: Go to Patient Education Activity  Goal: Patient/Family Education  Outcome: Progressing Towards Goal     Problem: Risk for Spread of Infection  Goal: Prevent transmission of infectious organism to others  Description: Prevent the transmission of infectious organisms to other patients, staff members, and visitors.   Outcome: Progressing Towards Goal     Problem: Patient Education:  Go to Education Activity  Goal: Patient/Family Education  Outcome: Progressing Towards Goal

## 2021-08-20 NOTE — PROGRESS NOTES
[address] [place] [address] Sarita Conte [address] [place] [address], [id number] [facility]  Tel: [phone]  Fax: [phone]    Sameul Frye MD, RPVI    PROGRESS NOTE    Patient: Beatrice Pham MRN: [id number]  SSN: [id number]    YOB: 1970  Age: 46 y.o. Sex: female      Date: 8/20/2021    Post-Op Day: 1 Right BKA and LLE dressing change under anesthesia    Plan:   increase activity and out of bed   PT/OT  Pain control  CM for IPR  Wound care for LLE/Podiatry for wound care. Assessment:   good progress, wounds fine, neuro status nml and need better pain control    Subjective:   Hiccups, pain control 10 yesterday 8 right now. breathing better. No chest pain    Objective:   Admit weight: Weight: 41.1 kg (90 lb 9.7 oz)  Last recorded weight: Weight: 70.7 kg (155 lb 13.8 oz)    Visit Vitals  /69 (BP 1 Location: Right arm, BP Patient Position: At rest)   Pulse 69   Temp 97 °F (36.1 °C)   Resp 13   Ht 5' 8\" (1.727 m)   Wt 70.7 kg (155 lb 13.8 oz)   SpO2 99%   BMI 23.70 kg/m²       Intake/Output Summary (Last 24 hours) at 8/20/2021 1046  Last data filed at 8/19/2021 2334  Gross per 24 hour   Intake 940 ml   Output 300 ml   Net 640 ml     PE  Awake, alert, NAD, resp nonlabored  RLE:  clean  dry  no drainage  no erythema  KI in place. Renata Base in place. LLE: bandage clean, needs daiily dressing changes.     Labs:     Recent Labs     08/18/21  0047   WBC 9.3   HGB 9.1*   HCT 28.6*      RDW 16.2*     Recent Labs     08/20/21  0444 08/19/21  0015 08/18/21  1633 08/18/21  0815 08/18/21  0047 08/18/21  0047 08/17/21  1821 08/17/21  1821   NA  --  141  --   --   --  143  --   --    K  --  3.2* 3.3* 3.0*   < > 2.9*   < > 2.7*   CL  --  107  --   --   --  110  --   --    CO2  --  22  --   --   --  24  --   --    GLU  --  115*  --   --   --  146*  --   --    BUN  --  16  --   --   --  16  --   --    CREA  --  0.82  --   --   --  0.86  --   --    CA  --  7.7*  --   --   --  7.8*  --   --    MG 1.3* 1.5*  --   --   --  1.9   < > 1. 7   PHOS  --   --   --   --   --  2.5  --  2.4*    < > = values in this interval not displayed. No results for input(s): PH, PCO2, PO2, HCO3, FIO2 in the last 72 hours.   Johnna Deras MD
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
## 2021-08-20 NOTE — PROGRESS NOTES
Problem: Self Care Deficits Care Plan (Adult)  Goal: *Acute Goals and Plan of Care (Insert Text)  Description: Occupational Therapy Goals  Initiated 8/20/2021 within 7 day(s). 1.  Patient will perform grooming with minimal assistance standing at sink for 5 minutes or more. 2.  Patient will perform upper body dressing with supervision/set-up. 3.  Patient will perform lower body dressing with minimal assistance/contact guard assist.  4.  Patient will perform toilet transfers with minimal assistance/contact guard assist.  5.  Patient will perform all aspects of toileting with minimal assistance/contact guard assist.  6.  Patient will participate in upper extremity therapeutic exercise/activities with supervision/set-up for 10 minutes. 7.  Patient will utilize energy conservation techniques during functional activities with verbal, visual, and tactile cues. OCCUPATIONAL THERAPY EVALUATION    Patient: Mac Rebollar (59 y.o. female)  Date: 8/20/2021  Primary Diagnosis: Gangrene of both feet ([address] [I96]  Diabetes ([address] [E11.9]  Procedure(s) (LRB):  FORMAL CLOSURE OF RIGHT BELOW KNEE AMPUTATION, DRESSING CHANGE TO LEFT FOOT WOUND (Right) 1 Day Post-Op   Precautions:   Fall, NWB (RLE; offloading showe for L LE)  PLOF: mod I for ADLs and transfers     ASSESSMENT :  Based on the objective data described below, the patient presents with decreased strength, endurance and balance for carryover of ADLs and transfers following above mentioned surgical procedure. Pt presented supine in bed at the beginning of session and agrees to participate with therapy. Pt co-treat with PT for the need of another set of skilled hands and safety with transfers/ADLs. Pt participate with bed mobility, supine<>sit transfers with mod A x2; tolerated sitting EOB for ~8 minutes with occasional support and fair dynamic sitting balance.  Pt education provided on importance of early mobilization to improve overall strength, endurance and balance for safe return to PLOF. Pt verbalized understanding for the same and was returned to bed at the end of session in NAD. Patient will benefit from skilled intervention to address the above impairments. Patient's rehabilitation potential is considered to be Good  Factors which may influence rehabilitation potential include:   []             None noted  []             Mental ability/status  [x]             Medical condition  []             Home/family situation and support systems  []             Safety awareness  [x]             Pain tolerance/management  []             Other:      PLAN :  Recommendations and Planned Interventions:   [x]               Self Care Training                  [x]      Therapeutic Activities  [x]               Functional Mobility Training   []      Cognitive Retraining  [x]               Therapeutic Exercises           [x]      Endurance Activities  [x]               Balance Training                    []      Neuromuscular Re-Education  []               Visual/Perceptual Training     [x]      Home Safety Training  [x]               Patient Education                   [x]      Family Training/Education  []               Other (comment):    Frequency/Duration: Patient will be followed by occupational therapy 1-2 times per day/4-7 days per week to address goals. Discharge Recommendations: Inpatient Rehab vs Skilled Nursing Facility  Further Equipment Recommendations for Discharge: TBD      SUBJECTIVE:   Patient stated  I am in so much pain.     OBJECTIVE DATA SUMMARY:     Past Medical History:   Diagnosis Date    PAD (peripheral artery disease) [facility]      Past Surgical History:   Procedure Laterality Date    HX ORTHOPAEDIC      L TMT amputation     Barriers to Learning/Limitations: None  Compensate with: visual, verbal, tactile, kinesthetic cues/model    Home Situation:   Home Situation  Home Environment: Apartment (has elevator )  One/Two Story Residence: One story  Living Alone: No  Support Systems: Parent  Patient Expects to be Discharged to[de-identified] Apartment  Current DME Used/Available at Home: Cane, straight, Walker, rollator, Walker, rolling  Tub or Shower Type: Tub/Shower combination  []  Right hand dominant   []  Left hand dominant    Cognitive/Behavioral Status:  Neurologic State: Alert  Orientation Level: Oriented X4  Cognition: Appropriate for age attention/concentration; Follows commands  Safety/Judgement: Fall prevention    Skin: intact  Edema: none    Vision/Perceptual:    Tracking: Able to track stimulus in all quadrants w/o difficulty    Corrective Lenses: Reading glasses  Coordination: BUE  Coordination: Within functional limits  Fine Motor Skills-Upper: Left Intact; Right Intact    Gross Motor Skills-Upper: Left Intact; Right Intact  Balance:  Sitting: Intact; With support  Sitting - Static: Good (unsupported)  Sitting - Dynamic: Fair (occasional)  Strength: BUE  Strength: Generally decreased, functional  Tone & Sensation: BUE  Sensation: Intact  Range of Motion: BUE  AROM: Generally decreased, functional  Functional Mobility and Transfers for ADLs:  Bed Mobility:  Rolling: Moderate assistance; Additional time;Assist x2  Supine to Sit: Additional time; Moderate assistance;Assist x2  Sit to Supine: Additional time;Minimum assistance;Assist x2  Scooting: Moderate assistance; Additional time  Transfers:  ADL Assessment:   Feeding: Independent    Oral Facial Hygiene/Grooming: Setup    Upper Body Dressing: Minimum assistance    Lower Body Dressing: Maximum assistance    Toileting: Maximum assistance  ADL Intervention:  Cognitive Retraining  Safety/Judgement: Fall prevention  Pain:  Pain level pre-treatment: 8/10   Pain level post-treatment: 8/10   Pain Intervention(s): Medication (see MAR); Rest, Ice, Repositioning   Response to intervention: Nurse notified, See doc flow    Activity Tolerance:   Fair     Please refer to the flowsheet for vital signs taken during this treatment.   After treatment:   [] Patient left in no apparent distress sitting up in chair  [x] Patient left in no apparent distress in bed  [x] Call bell left within reach  [x] Nursing notified  [] Caregiver present  [] Bed alarm activated    COMMUNICATION/EDUCATION:   [x] Role of Occupational Therapy in the acute care setting  [x] Home safety education was provided and the patient/caregiver indicated understanding. [x] Patient/family have participated as able in goal setting and plan of care. [x] Patient/family agree to work toward stated goals and plan of care. [] Patient understands intent and goals of therapy, but is neutral about his/her participation. [] Patient is unable to participate in goal setting and plan of care. Thank you for this referral.  Susannah Clements, OTR/L  Time Calculation: 26 mins    Eval Complexity: History: MEDIUM Complexity : Expanded review of history including physical, cognitive and psychosocial  history ; Examination: MEDIUM Complexity : 3-5 performance deficits relating to physical, cognitive , or psychosocial skils that result in activity limitations and / or participation restrictions; Decision Making:MEDIUM Complexity : Patient may present with comorbidities that affect occupational performnce.  Miniml to moderate modification of tasks or assistance (eg, physical or verbal ) with assesment(s) is necessary to enable patient to complete evaluation

## 2021-08-20 NOTE — PROGRESS NOTES
Care Management    Discharge Planning: In progress, rehab recommended    CM notes that therapy evaluations are pending. CM awaiting said recommendations and will begin rehab search once they are available. CM spoke with the patient and informed her that once PT/OT evaluations are available CM will begin rehab search. The patient states that she is agreeable to short term inpatient rehab upon discharge. CM to follow the patient's progress and be available to assist with discharge planning as needed. CMS referral placed. Care Management Interventions  PCP Verified by CM:  Yes  Mode of Transport at Discharge: Self  Transition of Care Consult (CM Consult): Discharge Planning  Current Support Network: Own Home (takes care of mother at home)  The Plan for Transition of Care is Related to the Following Treatment Goals : gangrene of both feet, diabetes  The Patient and/or Patient Representative was Provided with a Choice of Provider and Agrees with the Discharge Plan?: Yes  Name of the Patient Representative Who was Provided with a Choice of Provider and Agrees with the Discharge Plan: Naila Orellana, patient  Discharge Location  Discharge Placement:  (TBD)

## 2021-08-20 NOTE — PROGRESS NOTES
[facility]  (A Division of [address] [place] [address])                                                                                                                       Tania Beaver MD  Office #:     [phone]   Office Fax: [phone]     Date of Admission: 8/11/2021Date of Note: 8/20/2021  Reason for FU: severe sepsis      Current Antimicrobials:    Prior Antimicrobials:  Zosyn 8/11- 8/13m resumed 8/16  TD      Immunosuppressive drugs: na Clindamycin 8/13 X3 doses  Ctx 8/13 to 8/15  Levofloxacin 8/13 X1  Vanco 8/11 to 8/16  Flagyl 8/15 8/16       Assessment- ID related:  --------------------------------------------------------------------------    · Probable acute resp failure 2/2 pul edema with diffuse alveolar infiltrate, small to mod B/L pleural effusion on CXR        Left lung infiltrate --FU CXR at a later time        COVID 19- ruled out- neg rapid test/PCR  · Right foot wet gangrene with Severe sepsis POA  · R BKA due to above  · Bacteremia with anaerobe( peptoniphilus) 1/4 bottles 8/11  · BCX 8/13-8/15: NGSF  · Hypoxia and hypothermia--while on broad spectrum abx  · Leucocytosis resolved post BKA  CRP 6.2-> 5.7    WCX L foot-8/11: Providencia and MSSA  WCX R foot-8/11: E.fecalis. Alcaligenes fecalis, MSSA    Other Medical Issues- Mx per respective team:   Acute pul edema  DM-poorly controlled  PVD  tobaco use  YUNIEL improved    Past ID Issues:  COVID 19 vaccine- 8/12/21  Left post TMA 4/12- Rxed long term ABX  8/14  X-ray: Broad deep ulceration along dorsal hindfoot. No bony destructive process. Transmetatarsal amputation.  Plantar calcaneal enthesopathy.           Recommendation for ID issues I am following:  --------------------------------------------------------------------------    Cont Zosyn- will DC after last dose tomorrow- 8/21  All bcx remain negative    Wound care to L foot- per Podiatry  High risk for recurrent sepsis Condition:  Acutely sick    Subjective:  \"can I have pain med? \"   No other complaints  Notes, labs , microbiology data and imaging reports reviewed. SHAHRIAR ICU MD, RN, palliative  Microbiology results reviewed- [facility] called for further clarification as indicated       HPI:  Brian Su is 45 YO WHITE/NON- female with PMH as listed below. Known to me from left foot infection/dry gangrene and treated with 6 weeks of IV abx-Unasyn followed by 2 weeks of oral augmentin. She had poorly controlled DM. Admitted on 8/11 with R foot wet gangrene and severe sepsis- high wbc, hypotension. Was placed on Vanco and Zosyn, she was taken to OR for R BKA. WCX taken from both feet reviewed, didn't have MRSA. Bacteremia with anearobe from admission. She had no further bacteremia. Flagyl was added by surgery. She has SOB and hypoxemia this morning. Was given lasix and morphine. She has labs for lactic acid/bmp pending. Still remains on vanco- no MRSA isolated. During interview, is awake, but limited with her answers, wants to rest. No fever, no diarrhea. SHAHRIAR RN- who states she is breathing better since her earlier medications with lasix and morphine during RRT.          Active Hospital Problems    Diagnosis Date Noted    Debility     Pulmonary infiltrates 57/81/1590    Systolic CHF, acute ([address] 08/17/2021    Dyspnea 08/16/2021    Sepsis ([address] 08/13/2021    Status post below-knee amputation of right lower extremity ([address] 08/13/2021    Bacteremia 08/13/2021    Fall 08/12/2021    Tobacco use 08/12/2021    Preoperative evaluation to rule out surgical contraindication 08/12/2021    Anemia 08/12/2021    Gangrene of both feet ([address] 08/11/2021    Non compliance with medical treatment 04/12/2021    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 ([address] 04/04/2021    YUNIEL (acute kidney injury) ([address] 04/04/2021     Past Medical History:   Diagnosis Date    PAD (peripheral artery disease) (HCC)      Past Surgical History:   Procedure Laterality Date    HX ORTHOPAEDIC      L TMT amputation     Family History   Problem Relation Age of Onset    Diabetes Mother      Social History     Socioeconomic History    Marital status:      Spouse name: Not on file    Number of children: Not on file    Years of education: Not on file    Highest education level: Not on file   Occupational History    Not on file   Tobacco Use    Smoking status: Current Every Day Smoker     Packs/day: 1.50    Smokeless tobacco: Never Used   Substance and Sexual Activity    Alcohol use: Not Currently     Comment: on rare occasion    Drug use: Not Currently    Sexual activity: Not on file   Other Topics Concern     Service Not Asked    Blood Transfusions Not Asked    Caffeine Concern Not Asked    Occupational Exposure Not Asked    Hobby Hazards Not Asked    Sleep Concern Not Asked    Stress Concern Not Asked    Weight Concern Not Asked    Special Diet Not Asked    Back Care Not Asked    Exercise Not Asked    Bike Helmet Not Asked   [address] Not Asked    Self-Exams Not Asked   Social History Narrative    Not on file     Social Determinants of Health     Financial Resource Strain:     Difficulty of Paying Living Expenses:    Food Insecurity:     Worried About Running Out of Food in the Last Year:     Ran Out of Food in the Last Year:    Transportation Needs:     Lack of Transportation (Medical):      Lack of Transportation (Non-Medical):    Physical Activity:     Days of Exercise per Week:     Minutes of Exercise per Session:    Stress:     Feeling of Stress :    Social Connections:     Frequency of Communication with Friends and Family:     Frequency of Social Gatherings with Friends and Family:     Attends Evangelical Services:     Active Member of Clubs or Organizations:     Attends Club or Organization Meetings:     Marital Status:    Intimate Partner Violence:     Fear of Current or Ex-Partner:     Emotionally Abused:     Physically Abused:     Sexually Abused: Allergies:  Patient has no known allergies.      Medications:  Current Facility-Administered Medications   Medication Dose Route Frequency    sodium chloride (NS) flush 5-40 mL  5-40 mL IntraVENous Q8H    sodium chloride (NS) flush 5-40 mL  5-40 mL IntraVENous PRN    HYDROcodone-acetaminophen (NORCO) 7.5-325 mg per tablet 1 Tablet  1 Tablet Oral Q4H PRN    HYDROcodone-acetaminophen (NORCO) 7.5-325 mg per tablet 2 Tablet  2 Tablet Oral Q4H PRN    morphine injection 4 mg  4 mg IntraVENous Q2H PRN    polyethylene glycol (MIRALAX) packet 17 g  17 g Oral DAILY    furosemide (LASIX) injection 40 mg  40 mg IntraVENous DAILY    metoprolol succinate (TOPROL-XL) XL tablet 25 mg  25 mg Oral DAILY    piperacillin-tazobactam (ZOSYN) 3.375 g in 0.9% sodium chloride (MBP/ADV) 100 mL MBP  3.375 g IntraVENous Q8H    0.9% sodium chloride infusion 250 mL  250 mL IntraVENous PRN    famotidine (PEPCID) tablet 20 mg  20 mg Oral BID    DULoxetine (CYMBALTA) capsule 60 mg  60 mg Oral DAILY    albuterol-ipratropium (DUO-NEB) 2.5 MG-0.5 MG/3 ML  3 mL Nebulization Q4H PRN    albumin human 25% (BUMINATE) solution 25 g  25 g IntraVENous Q6H    insulin glargine (LANTUS) injection 5 Units  5 Units SubCUTAneous DAILY    ALPRAZolam (XANAX) tablet 0.5 mg  0.5 mg Oral QHS PRN    buPROPion SR (WELLBUTRIN SR) tablet 150 mg  150 mg Oral DAILY    gabapentin (NEURONTIN) capsule 100 mg  100 mg Oral TID PRN    COVID-19 vac,Ad26-PF (IMANIN) injection 1 Dose  1 Dose IntraMUSCular PRIOR TO DISCHARGE    aspirin chewable tablet 81 mg  81 mg Oral DAILY    heparin (porcine) injection 5,000 Units  5,000 Units SubCUTAneous Q8H    naloxone (NARCAN) injection 0.1 mg  0.1 mg IntraVENous PRN    diphenhydrAMINE (BENADRYL) injection 12.5 mg  12.5 mg IntraVENous Q6H PRN    acetaminophen (TYLENOL) tablet 650 mg  650 mg Oral Q6H PRN    Or    acetaminophen (TYLENOL) suppository 650 mg  650 mg Rectal Q6H PRN    ondansetron (ZOFRAN ODT) tablet 4 mg  4 mg Oral Q8H PRN    Or    ondansetron (ZOFRAN) injection 4 mg  4 mg IntraVENous Q6H PRN    insulin lispro (HUMALOG) injection   SubCUTAneous Q6H    glucose chewable tablet 16 g  16 g Oral PRN    glucagon (GLUCAGEN) injection 1 mg  1 mg IntraMUSCular PRN    dextrose (D50W) injection syrg 12.5-25 g  25-50 mL IntraVENous PRN    atorvastatin (LIPITOR) tablet 40 mg  40 mg Oral QHS        ROS:  Review of systems not obtained due to patient factors. Patient doesn't give detail answers     Physical Exam:    Temp (24hrs), Av.5 °F (36.4 °C), Min:97 °F (36.1 °C), Max:97.9 °F (36.6 °C)    Visit Vitals  /66 (BP 1 Location: Left leg, BP Patient Position: At rest)   Pulse 70   Temp 97.4 °F (36.3 °C)   Resp 12   Ht 5' 8\" (1.727 m)   Wt 70.7 kg (155 lb 13.8 oz)   LMP 2018 (Within Years)   SpO2 97%   BMI 23.70 kg/m²     Exam done under PPE proocol     GEN: WD chronically sick looking- no severe distress noted  HEENT: Unicteric. EOMI intact  CHEST: Non laboured breathing. EXT: No apparent swelling or redness on UE/LE joints. R  BKA site is dressed  Left foot is dressed as well  Skin: Dry and intact. No rash, no redness. CNS: A, Flat affect. Moves all extremity. CN grossly ok.       Microbiology  All Micro Results     Procedure Component Value Units Date/Time    CULTURE, BLOOD [[id number]] Collected: 21    Order Status: Completed Specimen: Blood Updated: 21     Special Requests: NO SPECIAL REQUESTS        Culture result: NO GROWTH 2 DAYS       CULTURE, BLOOD [[id number]] Collected: 21    Order Status: Completed Specimen: Blood Updated: 21     Special Requests: NO SPECIAL REQUESTS        Culture result: NO GROWTH 3 DAYS       CULTURE, BLOOD [[id number]] Collected: 21    Order Status: Completed Specimen: Blood Updated: 21 1687 Special Requests: NO SPECIAL REQUESTS        Culture result: NO GROWTH 3 DAYS       CULTURE, BLOOD [[id number]] Collected: 08/15/21 0316    Order Status: Completed Specimen: Blood Updated: 08/19/21 1230     Special Requests: NO SPECIAL REQUESTS        Culture result: NO GROWTH 4 DAYS       CULTURE, BLOOD [[id number]] Collected: 08/15/21 0316    Order Status: Completed Specimen: Blood Updated: 08/19/21 1230     Special Requests: NO SPECIAL REQUESTS        Culture result: NO GROWTH 4 DAYS       CULTURE, BLOOD [[id number]] Collected: 08/14/21 0342    Order Status: Completed Specimen: Blood Updated: 08/19/21 1230     Special Requests: NO SPECIAL REQUESTS        Culture result: NO GROWTH 5 DAYS       CULTURE, BLOOD [[id number]] Collected: 08/14/21 0342    Order Status: Completed Specimen: Blood Updated: 08/19/21 1230     Special Requests: NO SPECIAL REQUESTS        Culture result: NO GROWTH 5 DAYS       CULTURE, BLOOD [[id number]] Collected: 08/13/21 1150    Order Status: Completed Specimen: Blood Updated: 08/19/21 1230     Special Requests: NO SPECIAL REQUESTS        Culture result: NO GROWTH 6 DAYS       RESPIRATORY VIRUS PANEL W/COVID-19, PCR [[id number]] Collected: 08/18/21 1630    Order Status: Completed Specimen: Nasopharyngeal Updated: 08/19/21 0815     Adenovirus Not detected        Coronavirus 229E Not detected        Coronavirus HKU1 Not detected        Coronavirus CVNL63 Not detected        Coronavirus OC43 Not detected        SARS-CoV-2, PCR Not detected        Metapneumovirus Not detected        Rhinovirus and Enterovirus Not detected        Influenza A Not detected        Influenza B Not detected        Parainfluenza 1 Not detected        Parainfluenza 2 Not detected        Parainfluenza 3 Not detected        Parainfluenza virus 4 Not detected        RSV by PCR Not detected        B. parapertussis, PCR Not detected        Bordetella pertussis - PCR Not detected        Chlamydophila pneumoniae DNA, QL, PCR Not detected        Mycoplasma pneumoniae DNA, QL, PCR Not detected       COVID-19 RAPID TEST [[id number]] Collected: 08/17/21 0955    Order Status: Completed Specimen: Nasopharyngeal Updated: 08/17/21 1041     Specimen source Nasopharyngeal        COVID-19 rapid test Not detected        Comment: Rapid Abbott ID Now       Rapid NAAT:  The specimen is NEGATIVE for SARS-CoV-2, the novel coronavirus associated with COVID-19. Negative results should be treated as presumptive and, if inconsistent with clinical signs and symptoms or necessary for patient management, should be tested with an alternative molecular assay. Negative results do not preclude SARS-CoV-2 infection and should not be used as the sole basis for patient management decisions. This test has been authorized by the FDA under an Emergency Use Authorization (EUA) for use by authorized laboratories.    Fact sheet for Healthcare Providers: ConventionUpdate.co.nz  Fact sheet for Patients: ConventionUpdate.co.nz       Methodology: Isothermal Nucleic Acid Amplification         CULTURE, WOUND Sharron Quince STAIN [[id number]]  (Abnormal)  (Susceptibility) Collected: 08/11/21 2145    Order Status: Completed Specimen: Wound from Foot Updated: 08/17/21 0946     Special Requests: NO SPECIAL REQUESTS        GRAM STAIN NO WBC'S SEEN         4+ GRAM NEGATIVE RODS               1+ GRAM POSITIVE COCCI IN PAIRS           Culture result:       HEAVY GRAM NEGATIVE RODS (REFER TO ISOLATE 2)                  HEAVY ALCALIGENES FAECALIS                  HEAVY ENTEROCOCCUS FAECALIS                  HEAVY STAPHYLOCOCCUS AUREUS          CULTURE, WOUND Sharron Quince STAIN [[id number]]  (Abnormal)  (Susceptibility) Collected: 08/11/21 2145    Order Status: Completed Specimen: Wound from Foot Updated: 08/15/21 1013     Special Requests: NO SPECIAL REQUESTS        GRAM STAIN NO WBC'S SEEN         1+ GRAM NEGATIVE RODS               FEW GRAM POSITIVE COCCI IN PAIRS           Culture result:       MODERATE PROVIDENCIA STUARTII                  MODERATE STAPHYLOCOCCUS AUREUS          CULTURE, BLOOD [[id number]] Collected: 215    Order Status: Completed Specimen: Blood Updated: 21 1502     Special Requests: NO SPECIAL REQUESTS        GRAM STAIN       GRAM POSITIVE COCCI IN CLUSTERS ANAEROBIC BOTTLE                  SMEAR CALLED TO AND CORRECTLY REPEATED BY: Jori Homans RN 3S TO [facility] [id number] AT 3140           Culture result:       Finegoldia magna GROWING IN THE ANAEROBIC BOTTLE          CULTURE, BLOOD [[id number]]  (Abnormal) Collected: 21    Order Status: Completed Specimen: Blood Updated: 21 1500     Special Requests: NO SPECIAL REQUESTS        GRAM STAIN       GRAM POSITIVE COCCI IN CLUSTERS ANAEROBIC BOTTLE                  SMEAR CALLED TO AND CORRECTLY REPEATED BY: Rojelio Jeff RN 3S TO [facility] [id number] AT 0502           Culture result:       Peptoniphilus asaccharolyticus GROWING IN THE ANAEROBIC BOTTLE          CULTURE, URINE [[id number]] Collected: 21 2230    Order Status: Canceled Specimen: Urine from Clean catch                Lines / Catheters:  Lab results:    Chemistry  Recent Labs     21  0015 21  1633 21  0815 21  0047 21  0047   *  --   --   --  146*     --   --   --  143   K 3.2* 3.3* 3.0*   < > 2.9*     --   --   --  110   CO2 22  --   --   --  24   BUN 16  --   --   --  16   CREA 0.82  --   --   --  0.86   CA 7.7*  --   --   --  7.8*   AGAP 12  --   --   --  9   BUCR 20  --   --   --  19    < > = values in this interval not displayed. CBC w/ Diff  Recent Labs     21   WBC 9.3   RBC 3.12*   HGB 9.1*   HCT 28.6*      GRANS 82*   LYMPH 8*   EOS 1       Imagin/14- CXR  Patchy airspace disease right base very likely developing pneumonia. Small  atelectatic streak left base. -CXR  Right pleural effusion.  Bilateral parenchymal opacities, predominantly within  the left upper lobe.

## 2021-08-20 NOTE — PROGRESS NOTES
Cardiology Progress Note        Patient: Donovan Marcus        Sex: female          DOA: 8/11/2021  YOB: 1970      Age:  46 y.o.        LOS:  LOS: 9 days    Patient seen and examined, chart reviewed. Assessment/Plan     Patient Active Problem List   Diagnosis Code    Osteomyelitis ([facility] M86.9    Type 2 diabetes mellitus with left diabetic foot ulcer ([facility] E11.621, L97.529    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 ([facility] E11.52    YUNIEL (acute kidney injury) ([facility] N17.9    PAD (peripheral artery disease) ([facility] I73.9    Bilateral carotid artery stenosis I65.23    Gangrene of left foot ([facility]) I96    Non compliance with medical treatment Z91.19    Gangrene of both feet [facility]) I96    Fall W19. Johanna Mcconnell Tobacco use Z72.0    Preoperative evaluation to rule out surgical contraindication Z01.818    Anemia D64.9    Sepsis ([facility]) A41.9    Status post below-knee amputation of right lower extremity ([facility]) Z89.511    Bacteremia R78.81    Dyspnea R06.00    Pulmonary infiltrates F56.8    Systolic CHF, acute ([facility]) I50.21    Debility R53.81   ·         Assessment and plan 8/20/2021  Patient have episode of vomiting after morphine, denied any chest pain or shortness of breath. Cardiac telemetry reviewed sinus rhythm with a heart rate of 60s  Patient blood pressure is stable cannot increase metoprolol due to low normal heart rate  I will add low-dose losartan 25 mg daily and titrate with blood pressure  Continue with Lasix 40 mg  Discussed with patient. Dr. Laura Arce will be covering during the weekend.             8/19/2021  Change IV Lasix 40 mg to once a day. Discontinue Metoprolol IV  Start Metoprolol succinate 25 mg once a day  Add ACEI/ARB and aldactone as per BP, renal function and pottasium  Strict input output. Salt restriction up to 2 g per day and fluid restriction up to 1.2 L per   day. Subjective:    cc:   My breathing is better today      REVIEW OF SYSTEMS:     General: No fevers or chills. Cardiovascular: No chest pain,No palpitations, No orthopnea, No PND, No leg swelling, No claudication  Pulmonary: No dyspnea. Gastrointestinal: No nausea, vomiting, bleeding  Neurology: No Dizziness    Objective:      Visit Vitals  /66 (BP 1 Location: Left leg, BP Patient Position: At rest)   Pulse 70   Temp 97.4 °F (36.3 °C)   Resp 12   Ht 5' 8\" (1.727 m)   Wt 70.7 kg (155 lb 13.8 oz)   SpO2 97%   BMI 23.70 kg/m²     Body mass index is 23.7 kg/m². Physical Exam:  General Appearance: Comfortable, not using accessory muscles of respiration. HEENT: ROSA. HEAD: Atraumatic  NECK: No JVD, no thyroidomeglay. CAROTIDS: No bruit  LUNGS: Clear bilaterally. HEART: S1+S2 audible, no murmur, no pericardial rub.      ABD: Non-tender, BS Audible    EXT: Right below knee amputation  NEUROLOGICAL: Alert, follows verbal commands    Medication:  Current Facility-Administered Medications   Medication Dose Route Frequency    sodium chloride (NS) flush 5-40 mL  5-40 mL IntraVENous Q8H    sodium chloride (NS) flush 5-40 mL  5-40 mL IntraVENous PRN    HYDROcodone-acetaminophen (NORCO) 7.5-325 mg per tablet 1 Tablet  1 Tablet Oral Q4H PRN    HYDROcodone-acetaminophen (NORCO) 7.5-325 mg per tablet 2 Tablet  2 Tablet Oral Q4H PRN    morphine injection 4 mg  4 mg IntraVENous Q2H PRN    polyethylene glycol (MIRALAX) packet 17 g  17 g Oral DAILY    furosemide (LASIX) injection 40 mg  40 mg IntraVENous DAILY    metoprolol succinate (TOPROL-XL) XL tablet 25 mg  25 mg Oral DAILY    piperacillin-tazobactam (ZOSYN) 3.375 g in 0.9% sodium chloride (MBP/ADV) 100 mL MBP  3.375 g IntraVENous Q8H    0.9% sodium chloride infusion 250 mL  250 mL IntraVENous PRN    famotidine (PEPCID) tablet 20 mg  20 mg Oral BID    DULoxetine (CYMBALTA) capsule 60 mg  60 mg Oral DAILY    albuterol-ipratropium (DUO-NEB) 2.5 MG-0.5 MG/3 ML  3 mL Nebulization Q4H PRN    albumin human 25% (BUMINATE) solution 25 g  25 g IntraVENous Q6H    insulin glargine (LANTUS) injection 5 Units  5 Units SubCUTAneous DAILY    ALPRAZolam (XANAX) tablet 0.5 mg  0.5 mg Oral QHS PRN    buPROPion SR (WELLBUTRIN SR) tablet 150 mg  150 mg Oral DAILY    gabapentin (NEURONTIN) capsule 100 mg  100 mg Oral TID PRN    COVID-19 vac,Ad26-PF (JERONIMO) injection 1 Dose  1 Dose IntraMUSCular PRIOR TO DISCHARGE    aspirin chewable tablet 81 mg  81 mg Oral DAILY    heparin (porcine) injection 5,000 Units  5,000 Units SubCUTAneous Q8H    naloxone (NARCAN) injection 0.1 mg  0.1 mg IntraVENous PRN    diphenhydrAMINE (BENADRYL) injection 12.5 mg  12.5 mg IntraVENous Q6H PRN    acetaminophen (TYLENOL) tablet 650 mg  650 mg Oral Q6H PRN    Or    acetaminophen (TYLENOL) suppository 650 mg  650 mg Rectal Q6H PRN    ondansetron (ZOFRAN ODT) tablet 4 mg  4 mg Oral Q8H PRN    Or    ondansetron (ZOFRAN) injection 4 mg  4 mg IntraVENous Q6H PRN    insulin lispro (HUMALOG) injection   SubCUTAneous Q6H    glucose chewable tablet 16 g  16 g Oral PRN    glucagon (GLUCAGEN) injection 1 mg  1 mg IntraMUSCular PRN    dextrose (D50W) injection syrg 12.5-25 g  25-50 mL IntraVENous PRN    atorvastatin (LIPITOR) tablet 40 mg  40 mg Oral QHS               Lab/Data Reviewed:       Recent Labs     08/18/21  0047   WBC 9.3   HGB 9.1*   HCT 28.6*        Recent Labs     08/19/21  0015 08/18/21  1633 08/18/21  0815 08/18/21  0047 08/18/21  0047     --   --   --  143   K 3.2* 3.3* 3.0*   < > 2.9*     --   --   --  110   CO2 22  --   --   --  24   *  --   --   --  146*   BUN 16  --   --   --  16   CREA 0.82  --   --   --  0.86   CA 7.7*  --   --   --  7.8*    < > = values in this interval not displayed.          Signed By: Augustus Kroner, MD     August 20, 2021

## 2021-08-20 NOTE — PROGRESS NOTES
Hospitalist Progress Note    Patient: Miguel Angel Meyer MRN: [id number]  CSN: [id number]    YOB: 1970  Age: 46 y.o. Sex: female    DOA: 8/11/2021 LOS:  LOS: 9 days            Chief complaint :  Gangrene of both feet. Assessment/Plan     Patient Active Problem List   Diagnosis Code    Osteomyelitis ([facility] M86.9    Type 2 diabetes mellitus with left diabetic foot ulcer ([facility] E11.621, L97.529    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 ([facility] E11.52    YUNIEL (acute kidney injury) ([facility] N17.9    PAD (peripheral artery disease) ([facility]) I73.9    Bilateral carotid artery stenosis I65.23    Gangrene of left foot ([facility]) I96    Non compliance with medical treatment Z91.19    Gangrene of both feet [facility]) I96    Fall W19. Valerio Boogie Tobacco use Z72.0    Preoperative evaluation to rule out surgical contraindication Z01.818    Anemia D64.9    Sepsis ([facility]) A41.9    Status post below-knee amputation of right lower extremity ([facility] Z89.511    Bacteremia R78.81    Dyspnea R06.00    Pulmonary infiltrates E33.8    Systolic CHF, acute ([facility]) I50.21    Debility R53.81          44-year-old female with uncontrolled type 2 diabetes mellitus, severe peripheral arterial disease, and tobacco use who has been noncompliant with wound care and hyperbaric oxygen treatment due to recent fall is admitted for severe gangrene of both feet. RRT was called on 08/16/2021, patient appeared confused, shortness of breath with hypoxia. ABG showed metabolic acidosis. Patient refused chest x-ray. She has been refusing medications. Given her condition she was transferred to ICU.    08/17/2021 - I tried to discuss with patient about her critical condition and guarded prognosis, she is not interested in listening to my concerns about her medical condition. She turned her face on other side and did not want to listen to me and did not answer my questions. Psych consulted.     Resp - Dyspnea/hypoxia -  Responded to lasix  CXR with pulmonary edema. Oxygen as needed by NC. ID -   septic shock  -  BP now improved  Continue iv abx,   Blood Cultures growing Peptoniphilus asaccharolyticus. Follow up blood cultures negative  Wound cultures growing providencia, MSSA, E-fecalis, alcaligenes fecalis. ID following. Antibiotics - zosyn       [facility] - Monitor HD. Acute CHF - on lasix  Echo 45 % and mild pulm htn      Gangrene of bilateral feet    S/p right BKA on 8/12   S/P RIGHT BELOW KNEE AMPUTATION, DRESSING CHANGE TO LEFT FOOT WOUND 08/20     Heme/onc - Follow H&H, plts. Anemia  Received blood transfusion   Monitor H/H    Renal - Trend BUN, Cr, follow I/O. Check and replace Mg, K, phos. 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 Prophylaxis - DVT: heparin, GI: pepcid.     Noncompliance with medical treatment   Continued tobacco use     PT/OT, discharge planning. Disposition : TBD    Review of systems  General: No fevers or chills. Cardiovascular: No chest pain or pressure. No palpitations. Pulmonary: see above. Gastrointestinal: No nausea, vomiting. Physical Exam:  General: As above   HEENT: NC, Atraumatic. PERRLA, anicteric sclerae. Lungs: CTA Bilaterally. No Wheezing/Rhonchi/Rales. Heart:  S1 S2,  No murmur, No Rubs, No Gallops  Abdomen: Soft, Non distended, Non tender.  +Bowel sounds,   Extremities: Right BKA, left foot with dressing  Psych:   Not anxious or agitated. Neurologic:  No acute neurological deficit.            Vital signs/Intake and Output:  Visit Vitals  /66 (BP 1 Location: Left leg, BP Patient Position: At rest)   Pulse 70   Temp 97.4 °F (36.3 °C)   Resp 12   Ht 5' 8\" (1.727 m)   Wt 70.7 kg (155 lb 13.8 oz)   SpO2 97%   BMI 23.70 kg/m²     Current Shift:  No intake/output data recorded. Last three shifts:  08/18 1901 - 08/20 0700  In: 1180 [P.O.:480; I.V.:700]  Out: 300 [Urine:300]            Labs: Results:       Chemistry Recent Labs     08/19/21  0015 08/18/21  1633 08/18/21  0815 08/18/21  0047 08/18/21  0047   *  --   --   --  146*     --   --   --  143   K 3.2* 3.3* 3.0*   < > 2.9*     --   --   --  110   CO2 22  --   --   --  24   BUN 16  --   --   --  16   CREA 0.82  --   --   --  0.86   CA 7.7*  --   --   --  7.8*   AGAP 12  --   --   --  9   BUCR 20  --   --   --  19    < > = values in this interval not displayed. CBC w/Diff Recent Labs     08/18/21 0047   WBC 9.3   RBC 3.12*   HGB 9.1*   HCT 28.6*      GRANS 82*   LYMPH 8*   EOS 1      Cardiac Enzymes No results for input(s): CPK, CKND1, CONSTANTINO in the last 72 hours. No lab exists for component: CKRMB, TROIP   Coagulation No results for input(s): PTP, INR, APTT, INREXT, INREXT in the last 72 hours. Lipid Panel No results found for: CHOL, CHOLPOCT, CHOLX, CHLST, CHOLV, 541708, HDL, HDLP, LDL, LDLC, DLDLP, 000016, VLDLC, VLDL, TGLX, TRIGL, TRIGP, TGLPOCT, CHHD, CHHDX   BNP No results for input(s): BNPP in the last 72 hours. Liver Enzymes No results for input(s): TP, ALB, TBIL, AP in the last 72 hours.     No lab exists for component: SGOT, GPT, DBIL   Thyroid Studies No results found for: T4, T3U, TSH, TSHEXT, TSHEXT     Procedures/imaging: see electronic medical records for all procedures/Xrays and details which were not copied into this note but were reviewed prior to creation of Plan

## 2021-08-20 NOTE — ROUTINE PROCESS
Bedside and Verbal shift change report given to Zahra Goddard RN (oncoming nurse) by Edilberto Webb RN (offgoing nurse). Report included the following information SBAR, Kardex, Intake/Output, MAR, Recent Results, and Cardiac Rhythm:SR.

## 2021-08-20 NOTE — PROGRESS NOTES
Problem: Mobility Impaired (Adult and Pediatric)  Goal: *Acute Goals and Plan of Care (Insert Text)  Description: Physical Therapy Goals   Initiated 8/20/2021 and to be accomplished within 7 day(s) with further goals TBD as appropriate  1. Patient will move from supine <> sit with min assist for change of position. 2.  Patient will demonstrate intact/unsupported dynamic sitting balance for performance of ADL's with use of UE's.  3.  Patient will transfer bed to chair w/o armrests with min/mod assist via scooting/slide board for time OOB. 4.  Patient will demonstrate ROM/LE strengthening Ex program with accurately for increase of functional mobility as noted above. Outcome: Progressing Towards Goal  PHYSICAL THERAPY EVALUATION    Patient: Kathleen Vinson (27 y.o. female)  Date: 8/20/2021  Primary Diagnosis: Gangrene of both feet ([address] [I96]  Diabetes ([facility] [E11.9]  Procedure(s) (LRB):  FORMAL CLOSURE OF RIGHT BELOW KNEE AMPUTATION, DRESSING CHANGE TO LEFT FOOT WOUND (Right) 1 Day Post-Op   Precautions:   Fall, NWB (R LE; forefoot offloading shoe L LE)  PLOF: amb with [employer] PTA    ASSESSMENT :  Based on the objective data described below, the patient is seen on telemetry unit. Pt seen with OT for second set of skilled hands. Pt presents s/p R BKA, found supine in bed with KI and wound vac in place R LE and dressing L foot. Pt with decreased ROM/motor performance BLE's, with decr'd sitting balance, and decr'd indepence in functional mobility with regard to bed mobility/ transfers, gait and with decreased activity tolerance. Patient reports pain 8/10 pre and 8/10 post session R LE. Transitions to sit EOB with mod Assist of 2/additional time. Sitting balance fair with pt able to scoot along side of bed with UE's support and clearing hips adequately. Pt returned to supine with min assist, able to pull self up to [facility] with bed in trendelenburg and left sitting up with all needs in reach thereafter. Nurse Mao Pugh notified of above. Answered pt questions regarding PT and mobility. Strongly recommend Acute IP Rehab vs SNF for follow up physical therapy upon discharge to reach maximal level of independence/safety with functional mobility. Patient will benefit from skilled intervention to address the above impairments. Pt Education: Role of physical therapy in acute care setting, fall prevention and safety/technique during functional mobility tasks    Patient's rehabilitation potential is considered to be Good  Factors which may influence rehabilitation potential include:   []         None noted  []         Mental ability/status  [x]         Medical condition  [x]         Home/family situation and support systems  []         Safety awareness  [x]         Pain tolerance/management  []         Other:      PLAN :  Recommendations and Planned Interventions:   [x]           Bed Mobility Training             [x]    Neuromuscular Re-Education  [x]           Transfer Training                   []    Orthotic/Prosthetic Training  [x]           Gait Training  once orthosis available       []    Modalities  [x]           Therapeutic Exercises           []    Edema Management/Control  [x]           Therapeutic Activities            []    Family Training/Education  [x]           Patient Education  []           Other (comment):    Frequency/Duration: Patient will be followed by physical therapy 1-2 times per day/4-7 days per week to address goals. Discharge Recommendations: Inpatient Rehab and [facility]  Further Equipment Recommendations for Discharge: N/A     SUBJECTIVE:   Patient stated This is the first time I got and pain.  (had pain meds)    OBJECTIVE DATA SUMMARY:     Past Medical History:   Diagnosis Date    PAD (peripheral artery disease) ([facility]      Past Surgical History:   Procedure Laterality Date    HX ORTHOPAEDIC      L TMT amputation     Barriers to Learning/Limitations: yes; physical  Compensate with: Visual Cues, Verbal Cues, and Tactile Cues  Home Situation:  Home Situation  Home Environment: Apartment (3rd floor with elevator)  One/Two Story Residence: One story  Living Alone: No  Support Systems: Parent  Patient Expects to be Discharged to[de-identified] Unknown  Current DME Used/Available at Home: [employer] , straight, Walker, rollator, Walker, rolling  Tub or Shower Type: Tub/Shower combination  Critical Behavior:  Neurologic State: Alert  Orientation Level: Oriented X4  Cognition: Follows commands; Appropriate for age attention/concentration  Safety/Judgement: Awareness of environment; Fall prevention  Psychosocial  Patient Behaviors: Calm; Cooperative  Purposeful Interaction: Yes  Pt Identified Daily Priority: Clinical issues (comment)  Caritas Process: Teaching/learning  Caring Interventions: Reassure; Therapeutic modalities  Reassure: Therapeutic listening; Informing  Therapeutic Modalities: Intentional therapeutic touch;Humor  Skin Condition/Temp: Dry;Warm  Skin Integrity: Incision (comment)  Skin Integumentary  Skin Color: Appropriate for ethnicity  Skin Condition/Temp: Dry;Warm  Skin Integrity: Incision (comment)  Turgor: Epidermis thin w/ loss of subcut tissue  Hair Growth: Present  Varicosities: Absent  Nails: Within Defined Limits  Strength:    Strength: Generally decreased, functional    Tone & Sensation:   Sensation: Intact  Range Of Motion:  AROM: Generally decreased, functional  Functional Mobility:  Bed Mobility:  Rolling: Moderate assistance; Additional time  Supine to Sit: Moderate assistance;Assist x2; Additional time  Sit to Supine: Minimum assistance; Additional time  Scooting: Moderate assistance; Additional time  Balance:   Sitting: Intact; With support  Sitting - Static: Good (unsupported)  Sitting - Dynamic: Fair (occasional)  Ambulation/Gait Training:  Right Side Weight Bearing: Non-weight bearing  Left Side Weight Bearing: Heel (use forefoot offloading shoe )  Pain:  Pain level pre-treatment: 8/10   Pain level post-treatment: 8/10   Pain Intervention(s) : Medication (see MAR); Rest, Ice, Repositioning  Response to intervention: Nurse notified, See doc flow    Activity Tolerance:   Fair  Please refer to the flowsheet for vital signs taken during this treatment. After treatment:   []         Patient left in no apparent distress sitting up in chair  [x]         Patient left in no apparent distress in bed  [x]         Call bell left within reach  [x]         Nursing notified  [x]         Caregiver present  []         Bed alarm activated  []         SCDs applied    COMMUNICATION/EDUCATION:   [x]         Role of Physical Therapy in the acute care setting. [x]         Fall prevention education was provided and the patient/caregiver indicated understanding. [x]         Patient/family have participated as able in goal setting and plan of care. [x]         Patient/family agree to work toward stated goals and plan of care. []         Patient understands intent and goals of therapy, but is neutral about his/her participation. []         Patient is unable to participate in goal setting/plan of care: ongoing with therapy staff.  []         Other:     Thank you for this referral.  Liza Orr, PT   Time Calculation: 26 mins      Eval Complexity: History: Eval Complexity: History: MEDIUM  Complexity : 1-2 comorbidities / personal factors will impact the outcome/ POCExam:HIGH Complexity : 4+ Standardized tests and measures addressing body structure, function, activity limitation and / or participation in recreation  Presentation: MEDIUM Complexity : Evolving with changing characteristics  Clinical Decision Making:High Complexity    Overall Complexity:HIGH

## 2021-08-20 NOTE — WOUND CARE
Consult received for daily dressing changes, discussed with bedside staff that unfortunately wound care does not perform daily dressing changes, these are the responsibility of the bedside staff. When wound care attempted to see patient previously she became agitated and was not responsive or allowing of care.

## 2021-08-20 NOTE — PROGRESS NOTES
Shift summary: Patient experienced uncontrolled pain due to surgical closure of R BKA, despite medication as ordered. 0530 patient stated her pain was still 8/10, however she was groggy; her speech was slurred and she was falling asleep while talking so I did not administer any more pain medication. 0700 Patient was awake, speaking clearly and requesting pain medication; gave morphine as ordered.

## 2021-08-21 LAB
ANION GAP SERPL CALC-SCNC: 9 MMOL/L (ref 3–18)
BACTERIA SPEC CULT: NORMAL
BASOPHILS # BLD: 0.1 K/UL (ref 0–0.1)
BASOPHILS NFR BLD: 1 % (ref 0–2)
BUN SERPL-MCNC: 18 MG/DL (ref 7–18)
BUN/CREAT SERPL: 16 (ref 12–20)
CALCIUM SERPL-MCNC: 7.6 MG/DL (ref 8.5–10.1)
CHLORIDE SERPL-SCNC: 103 MMOL/L (ref 100–111)
CO2 SERPL-SCNC: 27 MMOL/L (ref 21–32)
CREAT SERPL-MCNC: 1.15 MG/DL (ref 0.6–1.3)
DIFFERENTIAL METHOD BLD: ABNORMAL
EOSINOPHIL # BLD: 0.2 K/UL (ref 0–0.4)
EOSINOPHIL NFR BLD: 3 % (ref 0–5)
ERYTHROCYTE [DISTWIDTH] IN BLOOD BY AUTOMATED COUNT: 16.5 % (ref 11.6–14.5)
GLUCOSE BLD STRIP.AUTO-MCNC: 100 MG/DL (ref 70–110)
GLUCOSE BLD STRIP.AUTO-MCNC: 73 MG/DL (ref 70–110)
GLUCOSE BLD STRIP.AUTO-MCNC: 73 MG/DL (ref 70–110)
GLUCOSE BLD STRIP.AUTO-MCNC: 93 MG/DL (ref 70–110)
GLUCOSE BLD STRIP.AUTO-MCNC: 94 MG/DL (ref 70–110)
GLUCOSE SERPL-MCNC: 73 MG/DL (ref 74–99)
HCT VFR BLD AUTO: 22.6 % (ref 35–45)
HGB BLD-MCNC: 7.1 G/DL (ref 12–16)
LYMPHOCYTES # BLD: 0.6 K/UL (ref 0.9–3.6)
LYMPHOCYTES NFR BLD: 7 % (ref 21–52)
MAGNESIUM SERPL-MCNC: 1.3 MG/DL (ref 1.6–2.6)
MCH RBC QN AUTO: 29 PG (ref 24–34)
MCHC RBC AUTO-ENTMCNC: 31.4 G/DL (ref 31–37)
MCV RBC AUTO: 92.2 FL (ref 74–97)
MONOCYTES # BLD: 0.7 K/UL (ref 0.05–1.2)
MONOCYTES NFR BLD: 8 % (ref 3–10)
NEUTS SEG # BLD: 6.2 K/UL (ref 1.8–8)
NEUTS SEG NFR BLD: 80 % (ref 40–73)
PLATELET # BLD AUTO: 143 K/UL (ref 135–420)
PMV BLD AUTO: 10.8 FL (ref 9.2–11.8)
POTASSIUM SERPL-SCNC: 3 MMOL/L (ref 3.5–5.5)
RBC # BLD AUTO: 2.45 M/UL (ref 4.2–5.3)
SERVICE CMNT-IMP: NORMAL
SODIUM SERPL-SCNC: 139 MMOL/L (ref 136–145)
WBC # BLD AUTO: 7.7 K/UL (ref 4.6–13.2)

## 2021-08-21 PROCEDURE — 83735 ASSAY OF MAGNESIUM: CPT

## 2021-08-21 PROCEDURE — 97535 SELF CARE MNGMENT TRAINING: CPT

## 2021-08-21 PROCEDURE — 74011250636 HC RX REV CODE- 250/636: Performed by: STUDENT IN AN ORGANIZED HEALTH CARE EDUCATION/TRAINING PROGRAM

## 2021-08-21 PROCEDURE — 74011250636 HC RX REV CODE- 250/636: Performed by: INTERNAL MEDICINE

## 2021-08-21 PROCEDURE — 74011250637 HC RX REV CODE- 250/637: Performed by: NURSE PRACTITIONER

## 2021-08-21 PROCEDURE — 74011250637 HC RX REV CODE- 250/637: Performed by: STUDENT IN AN ORGANIZED HEALTH CARE EDUCATION/TRAINING PROGRAM

## 2021-08-21 PROCEDURE — 82962 GLUCOSE BLOOD TEST: CPT

## 2021-08-21 PROCEDURE — 80048 BASIC METABOLIC PNL TOTAL CA: CPT

## 2021-08-21 PROCEDURE — 74011000258 HC RX REV CODE- 258: Performed by: FAMILY MEDICINE

## 2021-08-21 PROCEDURE — 65660000000 HC RM CCU STEPDOWN

## 2021-08-21 PROCEDURE — 97530 THERAPEUTIC ACTIVITIES: CPT

## 2021-08-21 PROCEDURE — 85025 COMPLETE CBC W/AUTO DIFF WBC: CPT

## 2021-08-21 PROCEDURE — 74011250637 HC RX REV CODE- 250/637: Performed by: HOSPITALIST

## 2021-08-21 PROCEDURE — 74011250637 HC RX REV CODE- 250/637: Performed by: INTERNAL MEDICINE

## 2021-08-21 PROCEDURE — 77010033678 HC OXYGEN DAILY

## 2021-08-21 PROCEDURE — P9047 ALBUMIN (HUMAN), 25%, 50ML: HCPCS | Performed by: STUDENT IN AN ORGANIZED HEALTH CARE EDUCATION/TRAINING PROGRAM

## 2021-08-21 PROCEDURE — 36415 COLL VENOUS BLD VENIPUNCTURE: CPT

## 2021-08-21 PROCEDURE — 74011000258 HC RX REV CODE- 258: Performed by: INTERNAL MEDICINE

## 2021-08-21 PROCEDURE — 74011250636 HC RX REV CODE- 250/636: Performed by: FAMILY MEDICINE

## 2021-08-21 PROCEDURE — 74011250636 HC RX REV CODE- 250/636: Performed by: HOSPITALIST

## 2021-08-21 PROCEDURE — 99232 SBSQ HOSP IP/OBS MODERATE 35: CPT | Performed by: INTERNAL MEDICINE

## 2021-08-21 PROCEDURE — 74011636637 HC RX REV CODE- 636/637: Performed by: STUDENT IN AN ORGANIZED HEALTH CARE EDUCATION/TRAINING PROGRAM

## 2021-08-21 RX ORDER — OXYCODONE HYDROCHLORIDE 5 MG/1
10 TABLET ORAL
Status: DISCONTINUED | OUTPATIENT
Start: 2021-08-21 | End: 2021-08-23

## 2021-08-21 RX ORDER — ACETAMINOPHEN 325 MG/1
975 TABLET ORAL 3 TIMES DAILY
Status: DISCONTINUED | OUTPATIENT
Start: 2021-08-21 | End: 2021-09-03 | Stop reason: HOSPADM

## 2021-08-21 RX ORDER — OXYCODONE HYDROCHLORIDE 5 MG/1
5 TABLET ORAL
Status: DISCONTINUED | OUTPATIENT
Start: 2021-08-21 | End: 2021-08-24

## 2021-08-21 RX ORDER — LANOLIN ALCOHOL/MO/W.PET/CERES
400 CREAM (GRAM) TOPICAL 2 TIMES DAILY
Status: DISCONTINUED | OUTPATIENT
Start: 2021-08-21 | End: 2021-09-03 | Stop reason: HOSPADM

## 2021-08-21 RX ORDER — OXYCODONE HYDROCHLORIDE 5 MG/1
5 TABLET ORAL EVERY 6 HOURS
Status: DISCONTINUED | OUTPATIENT
Start: 2021-08-21 | End: 2021-08-23

## 2021-08-21 RX ORDER — MAGNESIUM SULFATE HEPTAHYDRATE 40 MG/ML
2 INJECTION, SOLUTION INTRAVENOUS ONCE
Status: COMPLETED | OUTPATIENT
Start: 2021-08-21 | End: 2021-08-21

## 2021-08-21 RX ORDER — POTASSIUM CHLORIDE 20 MEQ/1
40 TABLET, EXTENDED RELEASE ORAL 2 TIMES DAILY
Status: DISCONTINUED | OUTPATIENT
Start: 2021-08-21 | End: 2021-08-25 | Stop reason: ALTCHOICE

## 2021-08-21 RX ORDER — MORPHINE SULFATE 4 MG/ML
4 INJECTION INTRAVENOUS
Status: DISCONTINUED | OUTPATIENT
Start: 2021-08-21 | End: 2021-08-24

## 2021-08-21 RX ORDER — AMOXICILLIN 250 MG
1 CAPSULE ORAL DAILY
Status: DISCONTINUED | OUTPATIENT
Start: 2021-08-21 | End: 2021-09-03 | Stop reason: HOSPADM

## 2021-08-21 RX ORDER — METHOCARBAMOL 500 MG/1
1000 TABLET, FILM COATED ORAL 3 TIMES DAILY
Status: DISCONTINUED | OUTPATIENT
Start: 2021-08-21 | End: 2021-09-03 | Stop reason: HOSPADM

## 2021-08-21 RX ADMIN — ONDANSETRON 4 MG: 2 INJECTION INTRAMUSCULAR; INTRAVENOUS at 20:39

## 2021-08-21 RX ADMIN — ATORVASTATIN CALCIUM 40 MG: 20 TABLET, FILM COATED ORAL at 23:08

## 2021-08-21 RX ADMIN — ALBUMIN (HUMAN) 25 G: 0.25 INJECTION, SOLUTION INTRAVENOUS at 05:09

## 2021-08-21 RX ADMIN — ALBUMIN (HUMAN) 25 G: 0.25 INJECTION, SOLUTION INTRAVENOUS at 16:47

## 2021-08-21 RX ADMIN — ONDANSETRON 4 MG: 2 INJECTION INTRAMUSCULAR; INTRAVENOUS at 13:26

## 2021-08-21 RX ADMIN — METHOCARBAMOL TABLETS 1000 MG: 500 TABLET, COATED ORAL at 23:08

## 2021-08-21 RX ADMIN — FAMOTIDINE 20 MG: 20 TABLET, FILM COATED ORAL at 08:24

## 2021-08-21 RX ADMIN — PIPERACILLIN AND TAZOBACTAM 3.38 G: 3; .375 INJECTION, POWDER, LYOPHILIZED, FOR SOLUTION INTRAVENOUS at 11:19

## 2021-08-21 RX ADMIN — ALBUMIN (HUMAN) 25 G: 0.25 INJECTION, SOLUTION INTRAVENOUS at 23:26

## 2021-08-21 RX ADMIN — HYDROCODONE BITARTRATE AND ACETAMINOPHEN 1 TABLET: 7.5; 325 TABLET ORAL at 05:25

## 2021-08-21 RX ADMIN — ONDANSETRON 4 MG: 2 INJECTION INTRAMUSCULAR; INTRAVENOUS at 08:11

## 2021-08-21 RX ADMIN — DULOXETINE HYDROCHLORIDE 60 MG: 60 CAPSULE, DELAYED RELEASE ORAL at 08:24

## 2021-08-21 RX ADMIN — HYDROCODONE BITARTRATE AND ACETAMINOPHEN 2 TABLET: 7.5; 325 TABLET ORAL at 11:20

## 2021-08-21 RX ADMIN — LOSARTAN POTASSIUM 25 MG: 25 TABLET, FILM COATED ORAL at 08:27

## 2021-08-21 RX ADMIN — HEPARIN SODIUM 5000 UNITS: 5000 INJECTION INTRAVENOUS; SUBCUTANEOUS at 05:09

## 2021-08-21 RX ADMIN — MAGNESIUM OXIDE TAB 400 MG (241.3 MG ELEMENTAL MG) 400 MG: 400 (241.3 MG) TAB at 20:30

## 2021-08-21 RX ADMIN — BUPROPION HYDROCHLORIDE 150 MG: 150 TABLET, FILM COATED, EXTENDED RELEASE ORAL at 08:24

## 2021-08-21 RX ADMIN — FAMOTIDINE 20 MG: 20 TABLET, FILM COATED ORAL at 20:30

## 2021-08-21 RX ADMIN — OXYCODONE 5 MG: 5 TABLET ORAL at 20:30

## 2021-08-21 RX ADMIN — ACETAMINOPHEN 975 MG: 325 TABLET ORAL at 16:49

## 2021-08-21 RX ADMIN — POLYETHYLENE GLYCOL 3350 17 G: 17 POWDER, FOR SOLUTION ORAL at 08:24

## 2021-08-21 RX ADMIN — ALBUMIN (HUMAN) 25 G: 0.25 INJECTION, SOLUTION INTRAVENOUS at 11:15

## 2021-08-21 RX ADMIN — METHOCARBAMOL TABLETS 1000 MG: 500 TABLET, COATED ORAL at 16:49

## 2021-08-21 RX ADMIN — ACETAMINOPHEN 975 MG: 325 TABLET ORAL at 23:08

## 2021-08-21 RX ADMIN — ASPIRIN 81 MG: 81 TABLET, CHEWABLE ORAL at 08:24

## 2021-08-21 RX ADMIN — HEPARIN SODIUM 5000 UNITS: 5000 INJECTION INTRAVENOUS; SUBCUTANEOUS at 11:16

## 2021-08-21 RX ADMIN — POTASSIUM CHLORIDE 40 MEQ: 20 TABLET, EXTENDED RELEASE ORAL at 20:30

## 2021-08-21 RX ADMIN — OXYCODONE 5 MG: 5 TABLET ORAL at 15:05

## 2021-08-21 RX ADMIN — HEPARIN SODIUM 5000 UNITS: 5000 INJECTION INTRAVENOUS; SUBCUTANEOUS at 20:08

## 2021-08-21 RX ADMIN — MORPHINE SULFATE 4 MG: 4 INJECTION INTRAVENOUS at 08:12

## 2021-08-21 RX ADMIN — POTASSIUM CHLORIDE 40 MEQ: 20 TABLET, EXTENDED RELEASE ORAL at 11:20

## 2021-08-21 RX ADMIN — INSULIN GLARGINE 5 UNITS: 100 INJECTION, SOLUTION SUBCUTANEOUS at 08:27

## 2021-08-21 RX ADMIN — DOCUSATE SODIUM 50 MG AND SENNOSIDES 8.6 MG 1 TABLET: 8.6; 5 TABLET, FILM COATED ORAL at 20:30

## 2021-08-21 RX ADMIN — MAGNESIUM OXIDE TAB 400 MG (241.3 MG ELEMENTAL MG) 400 MG: 400 (241.3 MG) TAB at 11:21

## 2021-08-21 RX ADMIN — PIPERACILLIN AND TAZOBACTAM 3.38 G: 3; .375 INJECTION, POWDER, LYOPHILIZED, FOR SOLUTION INTRAVENOUS at 02:43

## 2021-08-21 RX ADMIN — METOPROLOL SUCCINATE 25 MG: 25 TABLET, EXTENDED RELEASE ORAL at 08:24

## 2021-08-21 RX ADMIN — MORPHINE SULFATE 4 MG: 4 INJECTION INTRAVENOUS at 13:26

## 2021-08-21 RX ADMIN — PROMETHAZINE HYDROCHLORIDE 25 MG: 25 INJECTION INTRAMUSCULAR; INTRAVENOUS at 23:05

## 2021-08-21 RX ADMIN — MAGNESIUM SULFATE HEPTAHYDRATE 2 G: 40 INJECTION, SOLUTION INTRAVENOUS at 13:35

## 2021-08-21 NOTE — PROGRESS NOTES
0719:Received verbal bedside report from off going nurse ELVIS Arvizu Patient care received. Patient alert and oriented x 4. Patient resting in bed. Patient stable. Call light with in reach bed in lowest position. 1541:Informed  patient vomited twice today. Informed him patient is receiving daily Miralax and her last charted bowel movement was prior to admission. No orders received will continue to monitor patient.

## 2021-08-21 NOTE — PROGRESS NOTES
Problem: Self Care Deficits Care Plan (Adult)  Goal: *Acute Goals and Plan of Care (Insert Text)  Description: Occupational Therapy Goals  Initiated 8/20/2021 within 7 day(s). 1.  Patient will perform grooming with minimal assistance standing at sink for 5 minutes or more. 2.  Patient will perform upper body dressing with supervision/set-up. 3.  Patient will perform lower body dressing with minimal assistance/contact guard assist.  4.  Patient will perform toilet transfers with minimal assistance/contact guard assist.  5.  Patient will perform all aspects of toileting with minimal assistance/contact guard assist.  6.  Patient will participate in upper extremity therapeutic exercise/activities with supervision/set-up for 10 minutes. 7.  Patient will utilize energy conservation techniques during functional activities with verbal, visual, and tactile cues. Outcome: Progressing Towards Goal    OCCUPATIONAL THERAPY TREATMENT    Patient: Carmen Ramos (84 y.o. female)  Date: 8/21/2021  Diagnosis: Gangrene of both feet ([address] [I96]  Diabetes ([address] [E11.9] Gangrene of both feet ([address]  Procedure(s) (LRB):  FORMAL CLOSURE OF RIGHT BELOW KNEE AMPUTATION, DRESSING CHANGE TO LEFT FOOT WOUND (Right) 2 Days Post-Op  Precautions: Fall, NWB (R LE; forefoot offloading shoe L LE)  PLOF:     Chart, occupational therapy assessment, plan of care, and goals were reviewed. ASSESSMENT:  Pt presented side lying in bed at the beginning of session. VS WNL, pain 9/10 at LEs. Pt agrees to participate with therapy. Noted pt with soiled underpad, diaper and gown. Pt participate with bed mobility and roll R<>L multiple times with mod-min A for rohith care, UB and LB bathing and UB dressing during this session. Pt refused further participation with therapy at the end of session due to increased pain. Pt ws left supine in bed with HOB elevated to 20* at the end of session.      Progression toward goals:  []          Improving appropriately and progressing toward goals  [x]          Improving slowly and progressing toward goals  []          Not making progress toward goals and plan of care will be adjusted     PLAN:  Patient continues to benefit from skilled intervention to address the above impairments. Continue treatment per established plan of care. Discharge Recommendations:  Inpatient Rehab  Further Equipment Recommendations for Discharge:  N/A     SUBJECTIVE:   Patient stated Robel Ye will be done after all this. Please don't ask me to get up today.     OBJECTIVE DATA SUMMARY:   Cognitive/Behavioral Status:  Neurologic State: Alert  Orientation Level: Oriented X4  Cognition: Appropriate for age attention/concentration, Follows commands  Safety/Judgement: Fall prevention    Functional Mobility and Transfers for ADLs:   Bed Mobility:  Rolling: Minimum assistance; Moderate assistance  Scooting: Minimum assistance  ADL Intervention:  Upper Body Bathing  Bathing Assistance: Minimum assistance  Position Performed:  (supine)    Lower Body Bathing  Bathing Assistance: Moderate assistance  Perineal  : Moderate assistance  Position Performed: Supine  Lower Body : Minimum assistance  Position Performed: Supine    Upper Body Dressing Assistance  Dressing Assistance: Minimum assistance  Hospital Gown: Minimum  assistance    Lower Body Dressing Assistance  Dressing Assistance: Maximum assistance  Protective Undergarmet: Maximum assistance  Leg Crossed Method Used: No  Position Performed: Supine  Cues: [address] [facility],  [address]: Moderate assistance  Bladder Hygiene: Moderate assistance  Clothing Management: Moderate assistance    Cognitive Retraining  Safety/Judgement: Fall prevention  Pain:  Pain level pre-treatment: 9/10   Pain level post-treatment: 9/10  Pain Intervention(s): Medication (see MAR);  Rest, Ice, Repositioning   Response to intervention: Nurse notified, See doc flow    Activity Tolerance:    Fair     Please refer to the flowsheet for vital signs taken during this treatment. After treatment:   []  Patient left in no apparent distress sitting up in chair  [x]  Patient left in no apparent distress in bed  [x]  Call bell left within reach  [x]  Nursing notified  []  Caregiver present  []  Bed alarm activated    COMMUNICATION/EDUCATION:   [x] Role of Occupational Therapy in the acute care setting  [x] Home safety education was provided and the patient/caregiver indicated understanding. [x] Patient/family have participated as able in working towards goals and plan of care. [x] Patient/family agree to work toward stated goals and plan of care. [] Patient understands intent and goals of therapy, but is neutral about his/her participation. [] Patient is unable to participate in goal setting and plan of care.       Thank you for this referral.  Shannan Guo OTR/L  Time Calculation: 29 mins

## 2021-08-21 NOTE — ROUTINE PROCESS
Bedside and Verbal shift change report given to Cameron Lozano RN (oncoming nurse) by Taj Casillas RN (offgoing nurse).  Report included the following information SBAR, Kardex, Intake/Output, MAR, Recent Results, and Cardiac Rhythm: SR.

## 2021-08-21 NOTE — ROUTINE PROCESS
Bedside and Verbal shift change report given to MAEGAN Salazar R.N. (oncoming nurse) by LEXI Steinberg R.N. (offgoing nurse). Report included the following information SBAR, Kardex, Intake/Output, MAR, Accordion, Recent Results, and Med Rec Status.

## 2021-08-21 NOTE — PROGRESS NOTES
[facility]       PROGRESS NOTE    Date: 2021    ASSESSMENT/PLAN: 46year old woman POD #2 from a Right BKA    · DC PRN percocet and start scheduled APAP and oxycodone 5 mg q 6 hrs. Start scheduled Robaxin 1000 mg TID and oxycodone 5-10 mg PRN  · Continue Prevena dressing x 7 days and knee immobilizer  · DC gabapentin as pt says this does not work  · OOB, PT/OT     Subjective:     Complains of severe RLE pain.     Objective:   Admit weight: Weight: 41.1 kg (90 lb 9.7 oz)  Last recorded weight: Weight: 75.1 kg (165 lb 9.6 oz)    Temp (24hrs), Av.1 °F (36.7 °C), Min:97.9 °F (36.6 °C), Max:98.4 °F (36.9 °C)      [unfilled]  No intake or output data in the 24 hours ending 21 1214    Exam:    Neuro: Alert and oriented  CV: Regular rate  Resp: normal effort  GI: soft, non-tender  MSK: KI in place and Prevena dressing intact, visible portions of the flap appear well perfused  Skin: warm, dry, dressing in place to Left foot    Labs:     BMP: Recent Labs     21  0244 21  0444 21  0015   BUN 18 17 16     --  141   CO2 27  --  22     CBC:    Recent Labs     21  0244   WBC 7.7   HCT 22.6*   RDW 16.5*       Jordyn Claire NP-C [address] [place] [address] [facility]

## 2021-08-21 NOTE — PROGRESS NOTES
Hospitalist Progress Note    Patient: Donovan Marcus MRN: [id number]  CSN: [id number]    YOB: 1970  Age: 46 y.o. Sex: female    DOA: 8/11/2021 LOS:  LOS: 10 days            Chief complaint :  Gangrene of both feet. Assessment/Plan     Patient Active Problem List   Diagnosis Code    Osteomyelitis ([facility] M86.9    Type 2 diabetes mellitus with left diabetic foot ulcer ([facility] E11.621, L97.529    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 ([facility] E11.52    [facility]UNIEL (acute kidney injury) ([facility] N17.9    PAD (peripheral artery disease) ([facility]) I73.9    Bilateral carotid artery stenosis I65.23    Gangrene of left foot ([facility]) I96    Non compliance with medical treatment Z91.19    Gangrene of both feet [facility]) I96    Fall W19. Johanna Mcconnell Tobacco use Z72.0    Preoperative evaluation to rule out surgical contraindication Z01.818    Anemia D64.9    Sepsis ([facility]) A41.9    Status post below-knee amputation of right lower extremity ([facility] Z89.511    Bacteremia R78.81    Dyspnea R06.00    Pulmonary infiltrates D57.7    Systolic CHF, acute ([facility]) I50.21    Debility R53.81          49-year-old female with uncontrolled type 2 diabetes mellitus, severe peripheral arterial disease, and tobacco use who has been noncompliant with wound care and hyperbaric oxygen treatment due to recent fall is admitted for severe gangrene of both feet. RRT was called on 08/16/2021, patient appeared confused, shortness of breath with hypoxia. ABG showed metabolic acidosis. Patient refused chest x-ray. She has been refusing medications. Given her condition she was transferred to ICU.    08/17/2021 - I tried to discuss with patient about her critical condition and guarded prognosis, she is not interested in listening to my concerns about her medical condition. She turned her face on other side and did not want to listen to me and did not answer my questions. Psych consulted.     Resp - Dyspnea/hypoxia -  Responded to lasix  CXR with pulmonary edema. Oxygen as needed by NC. ID -   septic shock  -  BP now improved  Continue iv abx,   Blood Cultures growing Peptoniphilus asaccharolyticus. Follow up blood cultures negative  Wound cultures growing providencia, MSSA, E-fecalis, alcaligenes fecalis. ID following. Antibiotics - zosyn       [facility] - Monitor HD. Acute CHF - on lasix  Echo 45 % and mild pulm htn      Gangrene of bilateral feet    S/p right BKA on 8/12   S/P RIGHT BELOW KNEE AMPUTATION, DRESSING CHANGE TO LEFT FOOT WOUND 08/20     Heme/onc - Follow H&H, plts. Anemia  Received blood transfusion   Monitor H/H    Renal - Trend BUN, Cr, follow I/O. Check and replace Mg, K, phos. 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 Constipation - on pericolace and miralax    Prophylaxis - DVT: heparin, GI: pepcid.     Noncompliance with medical treatment   Continued tobacco use     PT/OT, discharge planning. Disposition : TBD    Review of systems  General: No fevers or chills. Cardiovascular: No chest pain or pressure. No palpitations. Pulmonary: see above. Gastrointestinal: No nausea, vomiting. Physical Exam:  General: As above   HEENT: NC, Atraumatic. PERRLA, anicteric sclerae. Lungs: CTA Bilaterally. No Wheezing/Rhonchi/Rales. Heart:  S1 S2,  No murmur, No Rubs, No Gallops  Abdomen: Soft, Non distended, Non tender.  +Bowel sounds,   Extremities: Right BKA, left foot with dressing  Psych:   Not anxious or agitated. Neurologic:  No acute neurological deficit.            Vital signs/Intake and Output:  Visit Vitals  BP (!) 146/63 (BP 1 Location: Left lower arm, BP Patient Position: At rest)   Pulse 70   Temp 98.5 °F (36.9 °C)   Resp 16   Ht 5' 8\" (1.727 m)   Wt 75.1 kg (165 lb 9.6 oz)   SpO2 97%   BMI 25.18 kg/m²     Current Shift:  No intake/output data recorded. Last three shifts:  08/19 1901 - 08/21 0700  In: 240 [P.O.:240]  Out: -             Labs: Results:       Chemistry Recent Labs     08/21/21  0244 08/20/21  0444 08/19/21  0015   GLU 73*  --  115*     --  141   K 3.0*  --  3.2*     --  107   CO2 27  --  22   BUN 18 17 16   CREA 1.15  --  0.82   CA 7.6*  --  7.7*   AGAP 9  --  12   BUCR 16  --  20      CBC w/Diff Recent Labs     08/21/21  0244   WBC 7.7   RBC 2.45*   HGB 7.1*   HCT 22.6*      GRANS 80*   LYMPH 7*   EOS 3      Cardiac Enzymes No results for input(s): CPK, CKND1, CONSTANTINO in the last 72 hours. No lab exists for component: CKRMB, TROIP   Coagulation No results for input(s): PTP, INR, APTT, INREXT, INREXT in the last 72 hours. Lipid Panel No results found for: CHOL, CHOLPOCT, CHOLX, CHLST, CHOLV, 625610, HDL, HDLP, LDL, LDLC, DLDLP, 152311, VLDLC, VLDL, TGLX, TRIGL, TRIGP, TGLPOCT, CHHD, CHHDX   BNP No results for input(s): BNPP in the last 72 hours. Liver Enzymes No results for input(s): TP, ALB, TBIL, AP in the last 72 hours.     No lab exists for component: SGOT, GPT, DBIL   Thyroid Studies No results found for: T4, T3U, TSH, TSHEXT, TSHEXT     Procedures/imaging: see electronic medical records for all procedures/Xrays and details which were not copied into this note but were reviewed prior to creation of Plan

## 2021-08-21 NOTE — ROUTINE PROCESS
Bedside and Verbal shift change report given to ELVIS Arvizu (oncoming nurse) by LEXI Steinberg R.N. (offgoing nurse). Report included the following information SBAR, Kardex, Intake/Output, MAR, Accordion, Recent Results, and Med Rec Status.

## 2021-08-21 NOTE — PROGRESS NOTES
Problem: Falls - Risk of  Goal: *Absence of Falls  Description: Document Helen Maurice Fall Risk and appropriate interventions in the flowsheet. 8/21/2021 0948 by Alcira Pedraza  Outcome: Progressing Towards Goal  Note: Fall Risk Interventions:  Mobility Interventions: Communicate number of staff needed for ambulation/transfer, OT consult for ADLs, Patient to call before getting OOB, PT Consult for mobility concerns, PT Consult for assist device competence         Medication Interventions: Patient to call before getting OOB, Teach patient to arise slowly    Elimination Interventions: Call light in reach, Patient to call for help with toileting needs    History of Falls Interventions: Door open when patient unattended      8/20/2021 2040 by Paul GARCIA  Outcome: Progressing Towards Goal  Note: Fall Risk Interventions:  Mobility Interventions: Bed/chair exit alarm         Medication Interventions: Bed/chair exit alarm    Elimination Interventions: Bed/chair exit alarm, Call light in reach    History of Falls Interventions: Bed/chair exit alarm         Problem: Patient Education: Go to Patient Education Activity  Goal: Patient/Family Education  8/21/2021 0948 by Paul GARCIA  Outcome: Progressing Towards Goal  8/20/2021 2040 by Paul GARCIA  Outcome: Progressing Towards Goal     Problem: Diabetes Self-Management  Goal: *Disease process and treatment process  Description: Define diabetes and identify own type of diabetes; list 3 options for treating diabetes. 8/21/2021 0948 by Alcira Pedraza  Outcome: Progressing Towards Goal  8/20/2021 2040 by Paul GARCIA  Outcome: Progressing Towards Goal  Goal: *Incorporating nutritional management into lifestyle  Description: Describe effect of type, amount and timing of food on blood glucose; list 3 methods for planning meals.   8/21/2021 0948 by Alcira Pedraza  Outcome: Progressing Towards Goal  8/20/2021 2040 by Alcira Pedraza  Outcome: Progressing Towards Goal  Goal: *Incorporating physical activity into lifestyle  Description: State effect of exercise on blood glucose levels. 8/21/2021 0948 by David Narvaez  Outcome: Progressing Towards Goal  8/20/2021 2040 by Simone GARCIA  Outcome: Progressing Towards Goal  Goal: *Developing strategies to promote health/change behavior  Description: Define the ABC's of diabetes; identify appropriate screenings, schedule and personal plan for screenings. 8/21/2021 0948 by David Narvaez  Outcome: Progressing Towards Goal  8/20/2021 2040 by Simone GARCIA  Outcome: Progressing Towards Goal  Goal: *Using medications safely  Description: State effect of diabetes medications on diabetes; name diabetes medication taking, action and side effects. 8/21/2021 0948 by David Narvaez  Outcome: Progressing Towards Goal  8/20/2021 2040 by Simone GARCIA  Outcome: Progressing Towards Goal  Goal: *Monitoring blood glucose, interpreting and using results  Description: Identify recommended blood glucose targets  and personal targets. 8/21/2021 0948 by David Narvaez  Outcome: Progressing Towards Goal  8/20/2021 2040 by Simone GARCIA  Outcome: Progressing Towards Goal  Goal: *Prevention, detection, treatment of acute complications  Description: List symptoms of hyper- and hypoglycemia; describe how to treat low blood sugar and actions for lowering  high blood glucose level. 8/21/2021 0948 by David Narvaez  Outcome: Progressing Towards Goal  8/20/2021 2040 by Simone GARCIA  Outcome: Progressing Towards Goal  Goal: *Prevention, detection and treatment of chronic complications  Description: Define the natural course of diabetes and describe the relationship of blood glucose levels to long term complications of diabetes.   8/21/2021 0948 by David Narvaez  Outcome: Progressing Towards Goal  8/20/2021 2040 by Simone GARCIA  Outcome: Progressing Towards Goal  Goal: *Developing strategies to address psychosocial issues  Description: Describe feelings about living with diabetes; identify support needed and support network  8/21/2021 0948 by Elizabeth Mai  Outcome: Progressing Towards Goal  8/20/2021 2040 by Elizabeth Mai  Outcome: Progressing Towards Goal  Goal: *Insulin pump training  8/21/2021 0948 by Elizabeth Mai  Outcome: Progressing Towards Goal  8/20/2021 2040 by Elizabeth Mai  Outcome: Progressing Towards Goal  Goal: *Sick day guidelines  8/21/2021 0948 by Mason GARCIA  Outcome: Progressing Towards Goal  8/20/2021 2040 by Elizabeth Mai  Outcome: Progressing Towards Goal  Goal: *Patient Specific Goal (EDIT GOAL, INSERT TEXT)  8/21/2021 0948 by Elizabeth Mai  Outcome: Progressing Towards Goal  8/20/2021 2040 by Elizabeth Mai  Outcome: Progressing Towards Goal     Problem: Patient Education: Go to Patient Education Activity  Goal: Patient/Family Education  8/21/2021 0948 by Elizabeth Mai  Outcome: Progressing Towards Goal  8/20/2021 2040 by Mason GARCIA  Outcome: Progressing Towards Goal     Problem: Pressure Injury - Risk of  Goal: *Prevention of pressure injury  Description: Document Flo Scale and appropriate interventions in the flowsheet.   8/21/2021 0948 by Elizabeth Mai  Outcome: Progressing Towards Goal  Note: Pressure Injury Interventions:  Sensory Interventions: Assess changes in LOC, Pressure redistribution bed/mattress (bed type)    Moisture Interventions: Absorbent underpads, Minimize layers    Activity Interventions: Pressure redistribution bed/mattress(bed type), PT/OT evaluation    Mobility Interventions: Pressure redistribution bed/mattress (bed type), PT/OT evaluation    Nutrition Interventions: Document food/fluid/supplement intake    Friction and Shear Interventions: Apply protective barrier, creams and emollients, Minimize layers             8/20/2021 2040 by Mason GARCIA  Outcome: Progressing Towards Goal  Note: Pressure Injury Interventions:  Sensory Interventions: Pressure redistribution bed/mattress (bed type)    Moisture Interventions: Absorbent underpads    Activity Interventions: Pressure redistribution bed/mattress(bed type)    Mobility Interventions: Pressure redistribution bed/mattress (bed type)    Nutrition Interventions: Offer support with meals,snacks and hydration    Friction and Shear Interventions: HOB 30 degrees or less                Problem: Patient Education: Go to Patient Education Activity  Goal: Patient/Family Education  8/21/2021 0948 by Elizabeth Mai  Outcome: Progressing Towards Goal  8/20/2021 2040 by Mason GARCIA  Outcome: Progressing Towards Goal     Problem: Risk for Spread of Infection  Goal: Prevent transmission of infectious organism to others  Description: Prevent the transmission of infectious organisms to other patients, staff members, and visitors.   Outcome: Progressing Towards Goal     Problem: Patient Education:  Go to Education Activity  Goal: Patient/Family Education  Outcome: Progressing Towards Goal

## 2021-08-21 NOTE — PROGRESS NOTES
Problem: Falls - Risk of  Goal: *Absence of Falls  Description: Document Galilea Finley Fall Risk and appropriate interventions in the flowsheet. Outcome: Progressing Towards Goal  Note: Fall Risk Interventions:  Mobility Interventions: Bed/chair exit alarm         Medication Interventions: Bed/chair exit alarm    Elimination Interventions: Bed/chair exit alarm, Call light in reach    History of Falls Interventions: Bed/chair exit alarm         Problem: Patient Education: Go to Patient Education Activity  Goal: Patient/Family Education  Outcome: Progressing Towards Goal     Problem: Diabetes Self-Management  Goal: *Disease process and treatment process  Description: Define diabetes and identify own type of diabetes; list 3 options for treating diabetes. Outcome: Progressing Towards Goal  Goal: *Incorporating nutritional management into lifestyle  Description: Describe effect of type, amount and timing of food on blood glucose; list 3 methods for planning meals. Outcome: Progressing Towards Goal  Goal: *Incorporating physical activity into lifestyle  Description: State effect of exercise on blood glucose levels. Outcome: Progressing Towards Goal  Goal: *Developing strategies to promote health/change behavior  Description: Define the ABC's of diabetes; identify appropriate screenings, schedule and personal plan for screenings. Outcome: Progressing Towards Goal  Goal: *Using medications safely  Description: State effect of diabetes medications on diabetes; name diabetes medication taking, action and side effects. Outcome: Progressing Towards Goal  Goal: *Monitoring blood glucose, interpreting and using results  Description: Identify recommended blood glucose targets  and personal targets.   Outcome: Progressing Towards Goal  Goal: *Prevention, detection, treatment of acute complications  Description: List symptoms of hyper- and hypoglycemia; describe how to treat low blood sugar and actions for lowering  high blood glucose level. Outcome: Progressing Towards Goal  Goal: *Prevention, detection and treatment of chronic complications  Description: Define the natural course of diabetes and describe the relationship of blood glucose levels to long term complications of diabetes. Outcome: Progressing Towards Goal  Goal: *Developing strategies to address psychosocial issues  Description: Describe feelings about living with diabetes; identify support needed and support network  Outcome: Progressing Towards Goal  Goal: *Insulin pump training  Outcome: Progressing Towards Goal  Goal: *Sick day guidelines  Outcome: Progressing Towards Goal  Goal: *Patient Specific Goal (EDIT GOAL, INSERT TEXT)  Outcome: Progressing Towards Goal     Problem: Patient Education: Go to Patient Education Activity  Goal: Patient/Family Education  Outcome: Progressing Towards Goal     Problem: Pressure Injury - Risk of  Goal: *Prevention of pressure injury  Description: Document Flo Scale and appropriate interventions in the flowsheet. Outcome: Progressing Towards Goal  Note: Pressure Injury Interventions:  Sensory Interventions: Pressure redistribution bed/mattress (bed type)    Moisture Interventions: Absorbent underpads    Activity Interventions: Pressure redistribution bed/mattress(bed type)    Mobility Interventions: Pressure redistribution bed/mattress (bed type)    Nutrition Interventions: Offer support with meals,snacks and hydration    Friction and Shear Interventions: HOB 30 degrees or less                Problem: Patient Education: Go to Patient Education Activity  Goal: Patient/Family Education  Outcome: Progressing Towards Goal     Problem: Risk for Spread of Infection  Goal: Prevent transmission of infectious organism to others  Description: Prevent the transmission of infectious organisms to other patients, staff members, and visitors.   Outcome: Progressing Towards Goal     Problem: Patient Education:  Go to Education Activity  Goal: Patient/Family Education  Outcome: Progressing Towards Goal

## 2021-08-21 NOTE — PROGRESS NOTES
Problem: Falls - Risk of  Goal: *Absence of Falls  Description: Document Jn Davenport Fall Risk and appropriate interventions in the flowsheet. Outcome: Progressing Towards Goal  Note: Fall Risk Interventions:  Mobility Interventions: OT consult for ADLs, PT Consult for mobility concerns, PT Consult for assist device competence         Medication Interventions: Patient to call before getting OOB, Teach patient to arise slowly    Elimination Interventions: Call light in reach, Patient to call for help with toileting needs    History of Falls Interventions: Door open when patient unattended         Problem: Patient Education: Go to Patient Education Activity  Goal: Patient/Family Education  Outcome: Progressing Towards Goal     Problem: Diabetes Self-Management  Goal: *Disease process and treatment process  Description: Define diabetes and identify own type of diabetes; list 3 options for treating diabetes. Outcome: Progressing Towards Goal  Goal: *Incorporating nutritional management into lifestyle  Description: Describe effect of type, amount and timing of food on blood glucose; list 3 methods for planning meals. Outcome: Progressing Towards Goal  Goal: *Incorporating physical activity into lifestyle  Description: State effect of exercise on blood glucose levels. Outcome: Progressing Towards Goal  Goal: *Developing strategies to promote health/change behavior  Description: Define the ABC's of diabetes; identify appropriate screenings, schedule and personal plan for screenings. Outcome: Progressing Towards Goal  Goal: *Using medications safely  Description: State effect of diabetes medications on diabetes; name diabetes medication taking, action and side effects. Outcome: Progressing Towards Goal  Goal: *Monitoring blood glucose, interpreting and using results  Description: Identify recommended blood glucose targets  and personal targets.   Outcome: Progressing Towards Goal  Goal: *Prevention, detection, treatment of acute complications  Description: List symptoms of hyper- and hypoglycemia; describe how to treat low blood sugar and actions for lowering  high blood glucose level. Outcome: Progressing Towards Goal  Goal: *Prevention, detection and treatment of chronic complications  Description: Define the natural course of diabetes and describe the relationship of blood glucose levels to long term complications of diabetes. Outcome: Progressing Towards Goal  Goal: *Developing strategies to address psychosocial issues  Description: Describe feelings about living with diabetes; identify support needed and support network  Outcome: Progressing Towards Goal  Goal: *Insulin pump training  Outcome: Progressing Towards Goal  Goal: *Sick day guidelines  Outcome: Progressing Towards Goal  Goal: *Patient Specific Goal (EDIT GOAL, INSERT TEXT)  Outcome: Progressing Towards Goal     Problem: Patient Education: Go to Patient Education Activity  Goal: Patient/Family Education  Outcome: Progressing Towards Goal     Problem: Pressure Injury - Risk of  Goal: *Prevention of pressure injury  Description: Document Flo Scale and appropriate interventions in the flowsheet.   Outcome: Progressing Towards Goal  Note: Pressure Injury Interventions:  Sensory Interventions: Assess changes in LOC    Moisture Interventions: Absorbent underpads    Activity Interventions: Pressure redistribution bed/mattress(bed type)    Mobility Interventions: Pressure redistribution bed/mattress (bed type)    Nutrition Interventions: Document food/fluid/supplement intake    Friction and Shear Interventions: Apply protective barrier, creams and emollients                Problem: Patient Education: Go to Patient Education Activity  Goal: Patient/Family Education  Outcome: Progressing Towards Goal     Problem: Risk for Spread of Infection  Goal: Prevent transmission of infectious organism to others  Description: Prevent the transmission of infectious organisms to other patients, staff members, and visitors.   Outcome: Progressing Towards Goal     Problem: Patient Education:  Go to Education Activity  Goal: Patient/Family Education  Outcome: Progressing Towards Goal     Problem: Patient Education: Go to Patient Education Activity  Goal: Patient/Family Education  Outcome: Progressing Towards Goal     Problem: Patient Education: Go to Patient Education Activity  Goal: Patient/Family Education  Outcome: Progressing Towards Goal

## 2021-08-21 NOTE — PROGRESS NOTES
Cardiology Progress Note      8/21/2021 1:49 PM    Admit Date: 8/11/2021    Admit Diagnosis: Gangrene of both feet ([facility] [address] [I96]  Diabetes ([facility] [address] [E11.9]      Subjective:     Kathleen Vinson denies chest pain.     Visit Vitals  /70 (BP 1 Location: Left lower arm, BP Patient Position: At rest)   Pulse 72   Temp 98.4 °F (36.9 °C)   Resp 16   Ht 5' 8\" (1.727 m)   Wt 75.1 kg (165 lb 9.6 oz)   LMP 04/06/2018 (Within Years)   SpO2 95%   BMI 25.18 kg/m²     Current Facility-Administered Medications   Medication Dose Route Frequency    morphine injection 4 mg  4 mg IntraVENous Q4H PRN    potassium chloride (K-DUR, KLOR-CON) SR tablet 40 mEq  40 mEq Oral BID    magnesium sulfate 2 g/50 ml IVPB (premix or compounded)  2 g IntraVENous ONCE    magnesium oxide (MAG-OX) tablet 400 mg  400 mg Oral BID    acetaminophen (TYLENOL) tablet 975 mg  975 mg Oral TID    oxyCODONE IR (ROXICODONE) tablet 5 mg  5 mg Oral Q4H PRN    oxyCODONE IR (ROXICODONE) tablet 10 mg  10 mg Oral Q4H PRN    oxyCODONE IR (ROXICODONE) tablet 5 mg  5 mg Oral Q6H    methocarbamoL (ROBAXIN) tablet 1,000 mg  1,000 mg Oral TID    polyethylene glycol (MIRALAX) packet 17 g  17 g Oral DAILY    losartan (COZAAR) tablet 25 mg  25 mg Oral DAILY    furosemide (LASIX) injection 40 mg  40 mg IntraVENous DAILY    metoprolol succinate (TOPROL-XL) XL tablet 25 mg  25 mg Oral DAILY    0.9% sodium chloride infusion 250 mL  250 mL IntraVENous PRN    famotidine (PEPCID) tablet 20 mg  20 mg Oral BID    DULoxetine (CYMBALTA) capsule 60 mg  60 mg Oral DAILY    albuterol-ipratropium (DUO-NEB) 2.5 MG-0.5 MG/3 ML  3 mL Nebulization Q4H PRN    albumin human 25% (BUMINATE) solution 25 g  25 g IntraVENous Q6H    insulin glargine (LANTUS) injection 5 Units  5 Units SubCUTAneous DAILY    ALPRAZolam (XANAX) tablet 0.5 mg  0.5 mg Oral QHS PRN    buPROPion SR (WELLBUTRIN SR) tablet 150 mg  150 mg Oral DAILY    COVID-19 vac,Ad26-PF ([facility]) injection 1 Dose  1 Dose IntraMUSCular PRIOR TO DISCHARGE    aspirin chewable tablet 81 mg  81 mg Oral DAILY    heparin (porcine) injection 5,000 Units  5,000 Units SubCUTAneous Q8H    naloxone (NARCAN) injection 0.1 mg  0.1 mg IntraVENous PRN    diphenhydrAMINE (BENADRYL) injection 12.5 mg  12.5 mg IntraVENous Q6H PRN    ondansetron (ZOFRAN ODT) tablet 4 mg  4 mg Oral Q8H PRN    Or    ondansetron (ZOFRAN) injection 4 mg  4 mg IntraVENous Q6H PRN    insulin lispro (HUMALOG) injection   SubCUTAneous Q6H    glucose chewable tablet 16 g  16 g Oral PRN    glucagon (GLUCAGEN) injection 1 mg  1 mg IntraMUSCular PRN    dextrose (D50W) injection syrg 12.5-25 g  25-50 mL IntraVENous PRN    atorvastatin (LIPITOR) tablet 40 mg  40 mg Oral QHS         Objective:      Physical Exam:      Data Review:   Labs:    Recent Results (from the past 24 hour(s))   GLUCOSE, POC    Collection Time: 08/20/21  6:34 PM   Result Value Ref Range    Glucose (POC) 112 (H) 70 - 110 mg/dL   GLUCOSE, POC    Collection Time: 08/20/21 11:27 PM   Result Value Ref Range    Glucose (POC) 87 70 - 110 mg/dL   CBC WITH AUTOMATED DIFF    Collection Time: 08/21/21  2:44 AM   Result Value Ref Range    WBC 7.7 4.6 - 13.2 K/uL    RBC 2.45 (L) 4.20 - 5.30 M/uL    HGB 7.1 (L) 12.0 - 16.0 g/dL    HCT 22.6 (L) 35.0 - 45.0 %    MCV 92.2 74.0 - 97.0 FL    MCH 29.0 24.0 - 34.0 PG    MCHC 31.4 31.0 - 37.0 g/dL    RDW 16.5 (H) 11.6 - 14.5 %    PLATELET 089 955 - 848 K/uL    MPV 10.8 9.2 - 11.8 FL    NEUTROPHILS 80 (H) 40 - 73 %    LYMPHOCYTES 7 (L) 21 - 52 %    MONOCYTES 8 3 - 10 %    EOSINOPHILS 3 0 - 5 %    BASOPHILS 1 0 - 2 %    ABS. NEUTROPHILS 6.2 1.8 - 8.0 K/UL    ABS. LYMPHOCYTES 0.6 (L) 0.9 - 3.6 K/UL    ABS. MONOCYTES 0.7 0.05 - 1.2 K/UL    ABS. EOSINOPHILS 0.2 0.0 - 0.4 K/UL    ABS.  BASOPHILS 0.1 0.0 - 0.1 K/UL    DF AUTOMATED     METABOLIC PANEL, BASIC    Collection Time: 08/21/21  2:44 AM   Result Value Ref Range    Sodium 139 136 - 145 mmol/L    Potassium 3.0 (L) 3.5 - 5.5 mmol/L    Chloride 103 100 - 111 mmol/L    CO2 27 21 - 32 mmol/L    Anion gap 9 3.0 - 18 mmol/L    Glucose 73 (L) 74 - 99 mg/dL    BUN 18 7.0 - 18 MG/DL    Creatinine 1.15 0.6 - 1.3 MG/DL    BUN/Creatinine ratio 16 12 - 20      GFR est AA >60 >60 ml/min/1.73m2    GFR est non-AA 50 (L) >60 ml/min/1.73m2    Calcium 7.6 (L) 8.5 - 10.1 MG/DL   MAGNESIUM    Collection Time: 08/21/21  2:44 AM   Result Value Ref Range    Magnesium 1.3 (L) 1.6 - 2.6 mg/dL   GLUCOSE, POC    Collection Time: 08/21/21  5:19 AM   Result Value Ref Range    Glucose (POC) 73 70 - 110 mg/dL   GLUCOSE, POC    Collection Time: 08/21/21  6:14 AM   Result Value Ref Range    Glucose (POC) 73 70 - 110 mg/dL   GLUCOSE, POC    Collection Time: 08/21/21  1:15 PM   Result Value Ref Range    Glucose (POC) 100 70 - 110 mg/dL       Telemetry: Sinus      Assessment:     Principal Problem:    Gangrene of both feet ([address] (8/11/2021)    Active Problems:    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 ([address] (4/4/2021)      YUNIEL (acute kidney injury) ([address] (4/4/2021)      Non compliance with medical treatment (4/12/2021)      Fall (8/12/2021)      Tobacco use (8/12/2021)      Preoperative evaluation to rule out surgical contraindication (8/12/2021)      Anemia (8/12/2021)      Sepsis ([address] (8/13/2021)      Status post below-knee amputation of right lower extremity ([address] (8/13/2021)      Bacteremia (8/13/2021)      Dyspnea (8/16/2021)      Pulmonary infiltrates (6/11/6322)      Systolic CHF, acute ([address] (8/17/2021)      Debility ()        Plan:     Remains in sinus rhythm. Continue Current measures. Follow.     Patricia Montero MD

## 2021-08-21 NOTE — PROGRESS NOTES
At  gave patient morphine as ordered. Patient was awake and answering questions appropriately. Her pain was 7/10. Within 4 minutes her respirations became shallow and irregular with occasional gasps - 8 bpm. O2 sat was 97% and patient awoke to voice. Stayed with patient for 20 min to observe and wake as needed. 2345 Put continuous pulse ox monitor on and called MD on call to discuss plan of care. Will continue to monitor O2 sat. Dr. Samanta Gan said as long as O2 stays above 92% to will hold narcan. I will hold  Morphine tonight. 0400 notified Dr. Samanta Gan of patient's potassium (3.0) and mag (1.3). Awaiting further instructions.

## 2021-08-21 NOTE — PROGRESS NOTES
Problem: Mobility Impaired (Adult and Pediatric)  Goal: *Acute Goals and Plan of Care (Insert Text)  Description: Physical Therapy Goals   Initiated 8/20/2021 and to be accomplished within 7 day(s) with further goals TBD as appropriate  1. Patient will move from supine <> sit with min assist for change of position. 2.  Patient will demonstrate intact/unsupported dynamic sitting balance for performance of ADL's with use of UE's.  3.  Patient will transfer bed to chair w/o armrests with min/mod assist via scooting/slide board for time OOB. 4.  Patient will demonstrate ROM/LE strengthening Ex program with accurately for increase of functional mobility as noted above. Outcome: Progressing Towards Goal   physical Therapy TREATMENT    Patient: Yadira Ellison (32 y.o. female)  Date: 8/21/2021  Diagnosis: Gangrene of both feet ([facility] [address] [I96]  Diabetes ([facility] [address] [E11.9] Gangrene of both feet ([facility])  Procedure(s) (LRB):  FORMAL CLOSURE OF RIGHT BELOW KNEE AMPUTATION, DRESSING CHANGE TO LEFT FOOT WOUND (Right) 2 Days Post-Op  Precautions: Fall, NWB (R LE; forefoot offloading shoe L LE)   Chart, physical therapy assessment, plan of care and goals were reviewed. ASSESSMENT:  Pt seen in supine prior to session. Pt seen with OT for an additional set of skilled hands. Pt has constant c/o BL LE pain during session, nurse made aware. Pt soiled the bed prior to session. Pt able to roll left<>right with use of bed rail and assist x 2. Pt demonstrates tenderness to palpate the the L LE. After pt was cleaned and sheets changed, pt reported she did not want to continue session d/t her BL LE pain. Upon exiting pt was left in supine after session, nurse made aware of pain, call bell and tray in reach upon exiting.    Progression toward goals:  []      Improving appropriately and progressing toward goals  [x]      Improving slowly and progressing toward goals  []      Not making progress toward goals and plan of care will be adjusted PLAN:  Patient continues to benefit from skilled intervention to address the above impairments. Continue treatment per established plan of care. Discharge Recommendations:  Inpatient Rehab vs Skilled Nursing Facility  Further Equipment Recommendations for Discharge:  TBD by next level of care     SUBJECTIVE:   Patient stated Be careful of this leg.     OBJECTIVE DATA SUMMARY:   Critical Behavior:  Neurologic State: Alert  Orientation Level: Oriented X4  Cognition: Appropriate for age attention/concentration, Follows commands  Safety/Judgement: Fall prevention  Functional Mobility Training:  Bed Mobility:  Rolling: Minimum assistance; Moderate assistance  Scooting: Minimum assistance  Pain:  Pain Scale 1: Numeric (0 - 10)  Pain Intensity 1: 8  Pain Location 1: Leg;Knee  Pain Orientation 1: Right  Pain Description 1: Aching  Pain Intervention(s) 1: Medication (see MAR)  Activity Tolerance:   Fair-  Please refer to the flowsheet for vital signs taken during this treatment.   After treatment:   [] Patient left in no apparent distress sitting up in chair  [x] Patient left in no apparent distress in bed  [x] Call bell left within reach  [x] Nursing notified  [] Caregiver present  [] Bed alarm activated      Bj Mead PT   Time Calculation: 24 mins

## 2021-08-21 NOTE — PROGRESS NOTES
0728:Received verbal bedside report from off going nurse ELVIS Arvizu Patient care received. Patient alert and oriented x 4. Patient resting in bed. Patient stable. Call light with in reach bed in lowest position. 1525::Informed  that Wound care said that they would not be coming to the floor to see patient because floor nurses do daily wound care and she refused them yesterday. Darrell Peters said that she did want wound cares input on patient's wound because she has exposed tendons. 1530:Called and found out wound care no longer in building and back on Monday. 1539: Informed  that wound care is no longer in the building. She gave orders for daily dressing changes. 1837: Informed  that patient was bladder scanned and the highest value was 840 ml. Informed her patient is refusing to be straight cath or to have a stack placed. Patient said Sahyy Riley is still going\". Educated patient while on phone with Jakob Sullivan of the risks of not being cath and releasing retained urine. Patient still refusing to be straight cath. Will continue to monitor patient.

## 2021-08-22 LAB
ANION GAP SERPL CALC-SCNC: 10 MMOL/L (ref 3–18)
BACTERIA SPEC CULT: NORMAL
BACTERIA SPEC CULT: NORMAL
BASOPHILS # BLD: 0.1 K/UL (ref 0–0.1)
BASOPHILS NFR BLD: 1 % (ref 0–2)
BUN SERPL-MCNC: 20 MG/DL (ref 7–18)
BUN/CREAT SERPL: 15 (ref 12–20)
CALCIUM SERPL-MCNC: 7.9 MG/DL (ref 8.5–10.1)
CHLORIDE SERPL-SCNC: 102 MMOL/L (ref 100–111)
CO2 SERPL-SCNC: 26 MMOL/L (ref 21–32)
CREAT SERPL-MCNC: 1.3 MG/DL (ref 0.6–1.3)
DIFFERENTIAL METHOD BLD: ABNORMAL
EOSINOPHIL # BLD: 0.1 K/UL (ref 0–0.4)
EOSINOPHIL NFR BLD: 1 % (ref 0–5)
ERYTHROCYTE [DISTWIDTH] IN BLOOD BY AUTOMATED COUNT: 16.6 % (ref 11.6–14.5)
GLUCOSE BLD STRIP.AUTO-MCNC: 121 MG/DL (ref 70–110)
GLUCOSE BLD STRIP.AUTO-MCNC: 136 MG/DL (ref 70–110)
GLUCOSE BLD STRIP.AUTO-MCNC: 74 MG/DL (ref 70–110)
GLUCOSE BLD STRIP.AUTO-MCNC: 77 MG/DL (ref 70–110)
GLUCOSE SERPL-MCNC: 83 MG/DL (ref 74–99)
HCT VFR BLD AUTO: 22.2 % (ref 35–45)
HGB BLD-MCNC: 7.2 G/DL (ref 12–16)
LYMPHOCYTES # BLD: 0.4 K/UL (ref 0.9–3.6)
LYMPHOCYTES NFR BLD: 6 % (ref 21–52)
MAGNESIUM SERPL-MCNC: 2 MG/DL (ref 1.6–2.6)
MCH RBC QN AUTO: 29.6 PG (ref 24–34)
MCHC RBC AUTO-ENTMCNC: 32.4 G/DL (ref 31–37)
MCV RBC AUTO: 91.4 FL (ref 74–97)
MONOCYTES # BLD: 0.5 K/UL (ref 0.05–1.2)
MONOCYTES NFR BLD: 6 % (ref 3–10)
NEUTS SEG # BLD: 6.1 K/UL (ref 1.8–8)
NEUTS SEG NFR BLD: 85 % (ref 40–73)
PLATELET # BLD AUTO: 107 K/UL (ref 135–420)
PMV BLD AUTO: 11.6 FL (ref 9.2–11.8)
POTASSIUM SERPL-SCNC: 3.3 MMOL/L (ref 3.5–5.5)
RBC # BLD AUTO: 2.43 M/UL (ref 4.2–5.3)
SERVICE CMNT-IMP: NORMAL
SERVICE CMNT-IMP: NORMAL
SODIUM SERPL-SCNC: 138 MMOL/L (ref 136–145)
WBC # BLD AUTO: 7.2 K/UL (ref 4.6–13.2)

## 2021-08-22 PROCEDURE — 74011250637 HC RX REV CODE- 250/637: Performed by: NURSE PRACTITIONER

## 2021-08-22 PROCEDURE — 74011636637 HC RX REV CODE- 636/637: Performed by: STUDENT IN AN ORGANIZED HEALTH CARE EDUCATION/TRAINING PROGRAM

## 2021-08-22 PROCEDURE — 82962 GLUCOSE BLOOD TEST: CPT

## 2021-08-22 PROCEDURE — 74011250637 HC RX REV CODE- 250/637: Performed by: STUDENT IN AN ORGANIZED HEALTH CARE EDUCATION/TRAINING PROGRAM

## 2021-08-22 PROCEDURE — 83735 ASSAY OF MAGNESIUM: CPT

## 2021-08-22 PROCEDURE — 65660000000 HC RM CCU STEPDOWN

## 2021-08-22 PROCEDURE — 97530 THERAPEUTIC ACTIVITIES: CPT

## 2021-08-22 PROCEDURE — P9047 ALBUMIN (HUMAN), 25%, 50ML: HCPCS | Performed by: STUDENT IN AN ORGANIZED HEALTH CARE EDUCATION/TRAINING PROGRAM

## 2021-08-22 PROCEDURE — 74011250637 HC RX REV CODE- 250/637: Performed by: HOSPITALIST

## 2021-08-22 PROCEDURE — 74011250636 HC RX REV CODE- 250/636: Performed by: STUDENT IN AN ORGANIZED HEALTH CARE EDUCATION/TRAINING PROGRAM

## 2021-08-22 PROCEDURE — 74011250637 HC RX REV CODE- 250/637: Performed by: INTERNAL MEDICINE

## 2021-08-22 PROCEDURE — 80048 BASIC METABOLIC PNL TOTAL CA: CPT

## 2021-08-22 PROCEDURE — 36415 COLL VENOUS BLD VENIPUNCTURE: CPT

## 2021-08-22 PROCEDURE — 99232 SBSQ HOSP IP/OBS MODERATE 35: CPT | Performed by: INTERNAL MEDICINE

## 2021-08-22 PROCEDURE — 85025 COMPLETE CBC W/AUTO DIFF WBC: CPT

## 2021-08-22 RX ORDER — POTASSIUM CHLORIDE 20 MEQ/1
40 TABLET, EXTENDED RELEASE ORAL 2 TIMES DAILY
Status: DISPENSED | OUTPATIENT
Start: 2021-08-22 | End: 2021-08-24

## 2021-08-22 RX ADMIN — ACETAMINOPHEN 975 MG: 325 TABLET ORAL at 15:13

## 2021-08-22 RX ADMIN — HEPARIN SODIUM 5000 UNITS: 5000 INJECTION INTRAVENOUS; SUBCUTANEOUS at 22:11

## 2021-08-22 RX ADMIN — ACETAMINOPHEN 975 MG: 325 TABLET ORAL at 22:12

## 2021-08-22 RX ADMIN — POTASSIUM CHLORIDE 40 MEQ: 20 TABLET, EXTENDED RELEASE ORAL at 10:10

## 2021-08-22 RX ADMIN — ALBUMIN (HUMAN) 25 G: 0.25 INJECTION, SOLUTION INTRAVENOUS at 18:27

## 2021-08-22 RX ADMIN — ACETAMINOPHEN 975 MG: 325 TABLET ORAL at 10:10

## 2021-08-22 RX ADMIN — LOSARTAN POTASSIUM 25 MG: 25 TABLET, FILM COATED ORAL at 10:11

## 2021-08-22 RX ADMIN — HEPARIN SODIUM 5000 UNITS: 5000 INJECTION INTRAVENOUS; SUBCUTANEOUS at 04:08

## 2021-08-22 RX ADMIN — FUROSEMIDE 40 MG: 10 INJECTION, SOLUTION INTRAMUSCULAR; INTRAVENOUS at 10:11

## 2021-08-22 RX ADMIN — INSULIN GLARGINE 5 UNITS: 100 INJECTION, SOLUTION SUBCUTANEOUS at 10:12

## 2021-08-22 RX ADMIN — FAMOTIDINE 20 MG: 20 TABLET, FILM COATED ORAL at 10:12

## 2021-08-22 RX ADMIN — ATORVASTATIN CALCIUM 40 MG: 20 TABLET, FILM COATED ORAL at 22:11

## 2021-08-22 RX ADMIN — ONDANSETRON 4 MG: 2 INJECTION INTRAMUSCULAR; INTRAVENOUS at 13:40

## 2021-08-22 RX ADMIN — FAMOTIDINE 20 MG: 20 TABLET, FILM COATED ORAL at 22:12

## 2021-08-22 RX ADMIN — ALBUMIN (HUMAN) 25 G: 0.25 INJECTION, SOLUTION INTRAVENOUS at 13:39

## 2021-08-22 RX ADMIN — POTASSIUM CHLORIDE 40 MEQ: 20 TABLET, EXTENDED RELEASE ORAL at 22:11

## 2021-08-22 RX ADMIN — OXYCODONE 5 MG: 5 TABLET ORAL at 15:13

## 2021-08-22 RX ADMIN — HEPARIN SODIUM 5000 UNITS: 5000 INJECTION INTRAVENOUS; SUBCUTANEOUS at 13:40

## 2021-08-22 RX ADMIN — POTASSIUM CHLORIDE 40 MEQ: 20 TABLET, EXTENDED RELEASE ORAL at 14:18

## 2021-08-22 RX ADMIN — ASPIRIN 81 MG: 81 TABLET, CHEWABLE ORAL at 10:10

## 2021-08-22 RX ADMIN — METHOCARBAMOL TABLETS 1000 MG: 500 TABLET, COATED ORAL at 15:13

## 2021-08-22 RX ADMIN — METOPROLOL SUCCINATE 25 MG: 25 TABLET, EXTENDED RELEASE ORAL at 10:11

## 2021-08-22 RX ADMIN — DOCUSATE SODIUM 50 MG AND SENNOSIDES 8.6 MG 1 TABLET: 8.6; 5 TABLET, FILM COATED ORAL at 10:11

## 2021-08-22 RX ADMIN — ALBUMIN (HUMAN) 25 G: 0.25 INJECTION, SOLUTION INTRAVENOUS at 04:47

## 2021-08-22 RX ADMIN — BUPROPION HYDROCHLORIDE 150 MG: 150 TABLET, FILM COATED, EXTENDED RELEASE ORAL at 10:10

## 2021-08-22 RX ADMIN — ALBUMIN (HUMAN) 25 G: 0.25 INJECTION, SOLUTION INTRAVENOUS at 22:13

## 2021-08-22 RX ADMIN — METHOCARBAMOL TABLETS 1000 MG: 500 TABLET, COATED ORAL at 22:11

## 2021-08-22 RX ADMIN — OXYCODONE 5 MG: 5 TABLET ORAL at 22:12

## 2021-08-22 RX ADMIN — POLYETHYLENE GLYCOL 3350 17 G: 17 POWDER, FOR SOLUTION ORAL at 10:12

## 2021-08-22 RX ADMIN — MAGNESIUM OXIDE TAB 400 MG (241.3 MG ELEMENTAL MG) 400 MG: 400 (241.3 MG) TAB at 22:12

## 2021-08-22 RX ADMIN — METHOCARBAMOL TABLETS 1000 MG: 500 TABLET, COATED ORAL at 10:11

## 2021-08-22 RX ADMIN — DULOXETINE HYDROCHLORIDE 60 MG: 60 CAPSULE, DELAYED RELEASE ORAL at 10:11

## 2021-08-22 RX ADMIN — OXYCODONE 5 MG: 5 TABLET ORAL at 10:11

## 2021-08-22 RX ADMIN — MAGNESIUM OXIDE TAB 400 MG (241.3 MG ELEMENTAL MG) 400 MG: 400 (241.3 MG) TAB at 10:10

## 2021-08-22 NOTE — ROUTINE PROCESS
Bedside and Verbal shift change report given to Christina Lazaro RN (oncoming nurse) by Santo Lagos RN (offgoing nurse). Report included the following information SBAR and Kardex.

## 2021-08-22 NOTE — PROGRESS NOTES
Pt given orange juice for low glucose per pt  0704- BS 77, pt alert and oriented , pt asymptomatic, pt instructed to drink juice. Pt voiced understanding.

## 2021-08-22 NOTE — PROGRESS NOTES
Vascular Surgery    POD 3 R BKA    Amputation site pain now improving. Posterior flap warm and viable. Minimal drainage from prevena. Increase activity. Plan for Prevena dressing change Tuesday. D/C planning. Hopefully will be a candidate for IPR.     Savannah Conner MD [address] [place] [address] [facility]

## 2021-08-22 NOTE — PROGRESS NOTES
Bedside and Verbal shift change report given to Mitra Francisco RN (oncoming nurse) by Katia Bhakta RN (offgoing nurse). Report included the following information SBAR, Kardex, Intake/Output, MAR and Recent Results.

## 2021-08-22 NOTE — PROGRESS NOTES
0715-Bedside shift change report given to Alda Weinberg (oncoming nurse) by Cat Salazar RN (offgoing nurse). Report included the following information SBAR, Kardex and Recent Results.

## 2021-08-22 NOTE — PROGRESS NOTES
Problem: Mobility Impaired (Adult and Pediatric)  Goal: *Acute Goals and Plan of Care (Insert Text)  Description: Physical Therapy Goals   Initiated 8/20/2021 and to be accomplished within 7 day(s) with further goals TBD as appropriate  1. Patient will move from supine <> sit with min assist for change of position. 2.  Patient will demonstrate intact/unsupported dynamic sitting balance for performance of ADL's with use of UE's.  3.  Patient will transfer bed to chair w/o armrests with min/mod assist via scooting/slide board for time OOB. 4.  Patient will demonstrate ROM/LE strengthening Ex program with accurately for increase of functional mobility as noted above. Note:     PHYSICAL THERAPY TREATMENT    Patient: Jose Manuel Torres (65 y.o. female)  Date: 8/22/2021  Diagnosis: Gangrene of both feet ([facility] [address] [I96]  Diabetes ([facility] [address] [E11.9] Gangrene of both feet ([facility])  Procedure(s) (LRB):  FORMAL CLOSURE OF RIGHT BELOW KNEE AMPUTATION, DRESSING CHANGE TO LEFT FOOT WOUND (Right) 3 Days Post-Op  Precautions: Fall, NWB (R LE; forefoot offloading shoe L LE)    ASSESSMENT:  Pt supine in bed on PT entry, KI donned, reporting 7/10 pain in R LE, but agreeable to attempt sitting EOB. Pt begins to transition to sitting, required modA. During transition, pt suddenly stops moving and says, \"I'm done. \" With further questioning pt reports she had a sharp pain in her R knee with transition to sitting, requesting to return to bed. Bed in trendelenburg to assist with scooting up in bed. Pt reporting nausea, bed placed flat, HOB elevated, once upright, pt vomited. Pt left in bed with all needs met and all questions answered. RN updated with response to interventions.     Progression toward goals:   []      Improving appropriately and progressing toward goals  [x]      Improving slowly and progressing toward goals  []      Not making progress toward goals and plan of care will be adjusted     PLAN:  Patient continues to benefit from skilled intervention to address the above impairments. Continue treatment per established plan of care. Discharge Recommendations:  Skilled Nursing Facility  Further Equipment Recommendations for Discharge:  TBD     SUBJECTIVE:   Patient stated I can't do this.     OBJECTIVE DATA SUMMARY:   Critical Behavior:  Neurologic State: Alert, Eyes open spontaneously  Orientation Level: Oriented X4  Cognition: Appropriate decision making, Appropriate for age attention/concentration  Safety/Judgement: Fall prevention  Functional Mobility Training:  Bed Mobility:  Supine to Sit: Moderate assistance  Sit to Supine: Minimum assistance  Scooting: Moderate assistance   Interventions: Safety awareness training; Tactile cues; Verbal cues  Balance:  Sitting: Impaired  Sitting - Static: Poor (constant support)  Pain:  Pain level pre-treatment: 7/10  Pain level post-treatment: 7/10   Pain Intervention(s): Medication (see MAR); Rest, Ice, Repositioning   Response to intervention: Nurse notified, See doc flow  Activity Tolerance:   Pt with N/V and LE pain that limited tolerance to mobility tasks. Please refer to the flowsheet for vital signs taken during this treatment. After treatment:   [] Patient left in no apparent distress sitting up in chair  [x] Patient left in no apparent distress in bed  [x] Call bell left within reach  [x] Nursing notified  [] Caregiver present  [] Bed alarm activated  [] SCDs applied      COMMUNICATION/EDUCATION:   [x]         Role of Physical Therapy in the acute care setting. [x]         Fall prevention education was provided and the patient/caregiver indicated understanding. [x]         Patient/family have participated as able in working toward goals and plan of care. [x]         Patient/family agree to work toward stated goals and plan of care. []         Patient understands intent and goals of therapy, but is neutral about his/her participation.   []         Patient is unable to participate in stated goals/plan of care: ongoing with therapy staff.  []         Other:         Anibal Lopez   Time Calculation: 15 mins

## 2021-08-23 LAB
BACTERIA SPEC CULT: NORMAL
GLUCOSE BLD STRIP.AUTO-MCNC: 120 MG/DL (ref 70–110)
GLUCOSE BLD STRIP.AUTO-MCNC: 129 MG/DL (ref 70–110)
GLUCOSE BLD STRIP.AUTO-MCNC: 145 MG/DL (ref 70–110)
GLUCOSE BLD STRIP.AUTO-MCNC: 168 MG/DL (ref 70–110)
MAGNESIUM SERPL-MCNC: 2.2 MG/DL (ref 1.6–2.6)
SERVICE CMNT-IMP: NORMAL

## 2021-08-23 PROCEDURE — 74011250636 HC RX REV CODE- 250/636: Performed by: FAMILY MEDICINE

## 2021-08-23 PROCEDURE — 74011000258 HC RX REV CODE- 258: Performed by: FAMILY MEDICINE

## 2021-08-23 PROCEDURE — P9047 ALBUMIN (HUMAN), 25%, 50ML: HCPCS | Performed by: STUDENT IN AN ORGANIZED HEALTH CARE EDUCATION/TRAINING PROGRAM

## 2021-08-23 PROCEDURE — 65660000000 HC RM CCU STEPDOWN

## 2021-08-23 PROCEDURE — 74011250636 HC RX REV CODE- 250/636: Performed by: STUDENT IN AN ORGANIZED HEALTH CARE EDUCATION/TRAINING PROGRAM

## 2021-08-23 PROCEDURE — 74011636637 HC RX REV CODE- 636/637: Performed by: STUDENT IN AN ORGANIZED HEALTH CARE EDUCATION/TRAINING PROGRAM

## 2021-08-23 PROCEDURE — 83735 ASSAY OF MAGNESIUM: CPT

## 2021-08-23 PROCEDURE — 82962 GLUCOSE BLOOD TEST: CPT

## 2021-08-23 PROCEDURE — 36415 COLL VENOUS BLD VENIPUNCTURE: CPT

## 2021-08-23 RX ADMIN — ALBUMIN (HUMAN) 25 G: 0.25 INJECTION, SOLUTION INTRAVENOUS at 18:32

## 2021-08-23 RX ADMIN — INSULIN LISPRO 2 UNITS: 100 INJECTION, SOLUTION INTRAVENOUS; SUBCUTANEOUS at 18:43

## 2021-08-23 RX ADMIN — PROMETHAZINE HYDROCHLORIDE 25 MG: 25 INJECTION INTRAMUSCULAR; INTRAVENOUS at 12:51

## 2021-08-23 RX ADMIN — ALBUMIN (HUMAN) 25 G: 0.25 INJECTION, SOLUTION INTRAVENOUS at 13:05

## 2021-08-23 RX ADMIN — HEPARIN SODIUM 5000 UNITS: 5000 INJECTION INTRAVENOUS; SUBCUTANEOUS at 20:39

## 2021-08-23 RX ADMIN — HEPARIN SODIUM 5000 UNITS: 5000 INJECTION INTRAVENOUS; SUBCUTANEOUS at 04:26

## 2021-08-23 RX ADMIN — ALBUMIN (HUMAN) 25 G: 0.25 INJECTION, SOLUTION INTRAVENOUS at 04:26

## 2021-08-23 RX ADMIN — HEPARIN SODIUM 5000 UNITS: 5000 INJECTION INTRAVENOUS; SUBCUTANEOUS at 11:59

## 2021-08-23 NOTE — ROUTINE PROCESS
1939  Bedside and Verbal shift change report given to Mert Lazaro RN (oncoming nurse) by Aiyana Ryder RN (offgoing nurse). Report included the following information SBAR, Kardex and MAR.

## 2021-08-23 NOTE — PROGRESS NOTES
Problem: Falls - Risk of  Goal: *Absence of Falls  Description: Document Chinedu Rust Fall Risk and appropriate interventions in the flowsheet. 8/23/2021 1416 by Wandy Ken  Outcome: Progressing Towards Goal  Note: Fall Risk Interventions:  Mobility Interventions: Patient to call before getting OOB         Medication Interventions: Patient to call before getting OOB    Elimination Interventions: Patient to call for help with toileting needs    History of Falls Interventions: Investigate reason for fall, Room close to nurse's station      8/23/2021 0903 by Wandy Ken  Outcome: Progressing Towards Goal  Note: Fall Risk Interventions:  Mobility Interventions: Patient to call before getting OOB         Medication Interventions: Patient to call before getting OOB    Elimination Interventions: Patient to call for help with toileting needs    History of Falls Interventions: Door open when patient unattended         Problem: Patient Education: Go to Patient Education Activity  Goal: Patient/Family Education  8/23/2021 1416 by Yvon GARCIA  Outcome: Progressing Towards Goal  8/23/2021 0903 by Yvon GARCIA  Outcome: Progressing Towards Goal     Problem: Diabetes Self-Management  Goal: *Disease process and treatment process  Description: Define diabetes and identify own type of diabetes; list 3 options for treating diabetes. Outcome: Progressing Towards Goal  Goal: *Incorporating nutritional management into lifestyle  Description: Describe effect of type, amount and timing of food on blood glucose; list 3 methods for planning meals. Outcome: Progressing Towards Goal  Goal: *Incorporating physical activity into lifestyle  Description: State effect of exercise on blood glucose levels. Outcome: Progressing Towards Goal  Goal: *Developing strategies to promote health/change behavior  Description: Define the ABC's of diabetes; identify appropriate screenings, schedule and personal plan for screenings.   Outcome: Progressing Towards Goal  Goal: *Using medications safely  Description: State effect of diabetes medications on diabetes; name diabetes medication taking, action and side effects. Outcome: Progressing Towards Goal  Goal: *Monitoring blood glucose, interpreting and using results  Description: Identify recommended blood glucose targets  and personal targets. Outcome: Progressing Towards Goal  Goal: *Prevention, detection, treatment of acute complications  Description: List symptoms of hyper- and hypoglycemia; describe how to treat low blood sugar and actions for lowering  high blood glucose level. Outcome: Progressing Towards Goal  Goal: *Prevention, detection and treatment of chronic complications  Description: Define the natural course of diabetes and describe the relationship of blood glucose levels to long term complications of diabetes. Outcome: Progressing Towards Goal  Goal: *Developing strategies to address psychosocial issues  Description: Describe feelings about living with diabetes; identify support needed and support network  Outcome: Progressing Towards Goal  Goal: *Insulin pump training  Outcome: Progressing Towards Goal  Goal: *Sick day guidelines  Outcome: Progressing Towards Goal  Goal: *Patient Specific Goal (EDIT GOAL, INSERT TEXT)  Outcome: Progressing Towards Goal     Problem: Patient Education: Go to Patient Education Activity  Goal: Patient/Family Education  Outcome: Progressing Towards Goal     Problem: Pressure Injury - Risk of  Goal: *Prevention of pressure injury  Description: Document Flo Scale and appropriate interventions in the flowsheet.   Outcome: Progressing Towards Goal  Note: Pressure Injury Interventions:  Sensory Interventions: Assess changes in LOC    Moisture Interventions: Absorbent underpads    Activity Interventions: Pressure redistribution bed/mattress(bed type)    Mobility Interventions: Pressure redistribution bed/mattress (bed type)    Nutrition Interventions: Document food/fluid/supplement intake    Friction and Shear Interventions: HOB 30 degrees or less                Problem: Patient Education: Go to Patient Education Activity  Goal: Patient/Family Education  Outcome: Progressing Towards Goal

## 2021-08-23 NOTE — PROGRESS NOTES
Patient refused oxycodone at 0300 due to nausea, also refused zofran, states she just wants to sleep. Shift summary: patient rested well with no new clinical complaint.

## 2021-08-23 NOTE — PROGRESS NOTES
Cardiology Progress Note        Patient: Cal Nam        Sex: female          DOA: 8/11/2021  YOB: 1970      Age:  46 y.o.        LOS:  LOS: 12 days    Patient seen and examined, chart reviewed. Assessment/Plan     Patient Active Problem List   Diagnosis Code    Osteomyelitis ([facility] M86.9    Type 2 diabetes mellitus with left diabetic foot ulcer ([facility] E11.621, L97.529    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 ([facility] E11.52    YUNIEL (acute kidney injury) ([facility] N17.9    PAD (peripheral artery disease) ([facility]) I73.9    Bilateral carotid artery stenosis I65.23    Gangrene of left foot ([facility]) I96    Non compliance with medical treatment Z91.19    Gangrene of both feet [facility]) I96    Fall W19. [facility] Tobacco use Z72.0    Preoperative evaluation to rule out surgical contraindication Z01.818    Anemia D64.9    Sepsis ([facility]) A41.9    Status post below-knee amputation of right lower extremity ([facility]) Z89.511    Bacteremia R78.81    Dyspnea R06.00    Pulmonary infiltrates Z20.3    Systolic CHF, acute ([facility] I50.21    Debility R53.81           Assessment and plan     8/23/2021  Patient denied any cardiac symptoms of chest pain or shortness of breath   cardiac telemetry stable  Blood pressure is trending up  Increase losartan to 50 mg daily  Monitor BMP and H&H  Dr. Jefferson Ernandez will be seeing patient tomorrow. Discussed with patient. Strict input output. Salt restriction up to 2 g per day and fluid restriction up to 1.2 L per   day. Subjective:    cc: My breathing is better today      REVIEW OF SYSTEMS:     General: No fevers or chills. Cardiovascular: No chest pain,No palpitations, No orthopnea, No PND, No leg swelling, No claudication  Pulmonary: No dyspnea.    Gastrointestinal: No nausea, vomiting, bleeding  Neurology: No Dizziness    Objective:      Visit Vitals  BP (!) 163/94   Pulse (!) 102   Temp 97.4 °F (36.3 °C) Resp 18   Ht 5' 8\" (1.727 m)   Wt 73.9 kg (163 lb)   SpO2 91%   BMI 24.78 kg/m²     Body mass index is 24.78 kg/m². Physical Exam:  General Appearance: Comfortable, not using accessory muscles of respiration. HEENT: ROSA. HEAD: Atraumatic  NECK: No JVD, no thyroidomeglay. CAROTIDS: No bruit  LUNGS: Clear bilaterally. HEART: S1+S2 audible, no murmur, no pericardial rub.      ABD: Non-tender, BS Audible    EXT: Right below knee amputation  NEUROLOGICAL: Alert, follows verbal commands    Medication:  Current Facility-Administered Medications   Medication Dose Route Frequency    potassium chloride (K-DUR, KLOR-CON) SR tablet 40 mEq  40 mEq Oral BID    morphine injection 4 mg  4 mg IntraVENous Q4H PRN    potassium chloride (K-DUR, KLOR-CON) SR tablet 40 mEq  40 mEq Oral BID    magnesium oxide (MAG-OX) tablet 400 mg  400 mg Oral BID    acetaminophen (TYLENOL) tablet 975 mg  975 mg Oral TID    oxyCODONE IR (ROXICODONE) tablet 5 mg  5 mg Oral Q4H PRN    methocarbamoL (ROBAXIN) tablet 1,000 mg  1,000 mg Oral TID    senna-docusate (PERICOLACE) 8.6-50 mg per tablet 1 Tablet  1 Tablet Oral DAILY    promethazine (PHENERGAN) 25 mg in 0.9% sodium chloride 50 mL IVPB  25 mg IntraVENous Q4H PRN    polyethylene glycol (MIRALAX) packet 17 g  17 g Oral DAILY    losartan (COZAAR) tablet 25 mg  25 mg Oral DAILY    furosemide (LASIX) injection 40 mg  40 mg IntraVENous DAILY    metoprolol succinate (TOPROL-XL) XL tablet 25 mg  25 mg Oral DAILY    0.9% sodium chloride infusion 250 mL  250 mL IntraVENous PRN    famotidine (PEPCID) tablet 20 mg  20 mg Oral BID    DULoxetine (CYMBALTA) capsule 60 mg  60 mg Oral DAILY    albuterol-ipratropium (DUO-NEB) 2.5 MG-0.5 MG/3 ML  3 mL Nebulization Q4H PRN    albumin human 25% (BUMINATE) solution 25 g  25 g IntraVENous Q6H    insulin glargine (LANTUS) injection 5 Units  5 Units SubCUTAneous DAILY    ALPRAZolam (XANAX) tablet 0.5 mg  0.5 mg Oral QHS PRN    buPROPion SR [facility]) tablet 150 mg  150 mg Oral DAILY    COVID-19 vac,Ad26-PF (JERONIMO) injection 1 Dose  1 Dose IntraMUSCular PRIOR TO DISCHARGE    aspirin chewable tablet 81 mg  81 mg Oral DAILY    heparin (porcine) injection 5,000 Units  5,000 Units SubCUTAneous Q8H    naloxone (NARCAN) injection 0.1 mg  0.1 mg IntraVENous PRN    diphenhydrAMINE (BENADRYL) injection 12.5 mg  12.5 mg IntraVENous Q6H PRN    ondansetron (ZOFRAN ODT) tablet 4 mg  4 mg Oral Q8H PRN    Or    ondansetron (ZOFRAN) injection 4 mg  4 mg IntraVENous Q6H PRN    insulin lispro (HUMALOG) injection   SubCUTAneous Q6H    glucose chewable tablet 16 g  16 g Oral PRN    glucagon (GLUCAGEN) injection 1 mg  1 mg IntraMUSCular PRN    dextrose (D50W) injection syrg 12.5-25 g  25-50 mL IntraVENous PRN    atorvastatin (LIPITOR) tablet 40 mg  40 mg Oral QHS               Lab/Data Reviewed:       Recent Labs     08/22/21  0100 08/21/21  0244   WBC 7.2 7.7   HGB 7.2* 7.1*   HCT 22.2* 22.6*   * 143     Recent Labs     08/22/21  0100 08/21/21  0244    139   K 3.3* 3.0*    103   CO2 26 27   GLU 83 73*   BUN 20* 18   CREA 1.30 1.15   CA 7.9* 7.6*         Signed By: Harvey Iyer MD     August 23, 2021

## 2021-08-23 NOTE — PROGRESS NOTES
1130   Assumed care for this pt. Pt is awake lert, oriented  to person and place. Pt was refusing  Her po meds this AM.  Pt also refused her food. Pt has stump on right BKA  with Prevena wound vac. Pt has Kerlix ndAce wrap on left foot. Pt is incontinent of urine. Pt was kept dry. Pt was pulled up in bed.    1200   Pt has 2 midlines on each AC. Right midline INT was leaking  And L midline AC was burning as per pt. RRT medic in to see pt  1257   Left AC g18 midline now working. Pt medicated with Phenergan 25 m,g IV given for nausea. New Midline 18g LAC was also  placed . 1325   Dressing to left foot was changed. Dorsum  of foot wound with yeloowish area. Amputated great toe with blackish area. Cleansed with betadine. Dorsum of foot applied with xeroform  Then gauze then Kerlix wrap done on left foot. Seen by Dr Aristides Jacob. MD aware pt not eating and not taking po meds. Pt drowsy after Phenergan. 1456    Pt is sleeping but wakes up to verbal and tactile stimuli. Pt not verbalizing  But answers Yes -no questions with head nods. 1   Pt still refusing her medications. Pt also refusing to eat. Verbalizing occasionally.

## 2021-08-23 NOTE — PROGRESS NOTES
Hospitalist Progress Note    Patient: Jose Manuel Torres MRN: [id number]  [facility]: [id number]    YOB: 1970  Age: 46 y.o. Sex: female    DOA: 8/11/2021 LOS:  LOS: 11 days            Chief complaint :  Gangrene of both feet. Assessment/Plan     Patient Active Problem List   Diagnosis Code    Osteomyelitis ([facility] M86.9    Type 2 diabetes mellitus with left diabetic foot ulcer [facility] E11.621, L97.529    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 ([facility] E11.52    YUNIEL (acute kidney injury) ([facility] N17.9    PAD (peripheral artery disease) ([facility]) I73.9    Bilateral carotid artery stenosis I65.23    Gangrene of left foot ([facility]) I96    Non compliance with medical treatment Z91.19    Gangrene of both feet [facility]) I96    Fall W19. [facility] Tobacco use Z72.0    Preoperative evaluation to rule out surgical contraindication Z01.818    Anemia D64.9    Sepsis ([facility]) A41.9    Status post below-knee amputation of right lower extremity ([facility] Z89.511    Bacteremia R78.81    Dyspnea R06.00    Pulmonary infiltrates I15.8    Systolic CHF, acute ([facility]) I50.21    Debility R53.81          55-year-old female with uncontrolled type 2 diabetes mellitus, severe peripheral arterial disease, and tobacco use who has been noncompliant with wound care and hyperbaric oxygen treatment due to recent fall is admitted for severe gangrene of both feet. RRT was called on 08/16/2021, patient appeared confused, shortness of breath with hypoxia. ABG showed metabolic acidosis. Patient refused chest x-ray. She has been refusing medications. Given her condition she was transferred to ICU.    08/17/2021 - I tried to discuss with patient about her critical condition and guarded prognosis, she is not interested in listening to my concerns about her medical condition. She turned her face on other side and did not want to listen to me and did not answer my questions. Psych consulted.     Resp - Dyspnea/hypoxia -  Responded to lasix  CXR with pulmonary edema. Oxygen as needed by NC. ID -   septic shock  -  BP now improved  Continue iv abx,   Blood Cultures growing Peptoniphilus asaccharolyticus. Follow up blood cultures negative  Wound cultures growing providencia, MSSA, E-fecalis, alcaligenes fecalis. ID following. Antibiotics - zosyn       [facility] - Monitor HD. Acute CHF - on lasix  Echo 45 % and mild pulm htn      Gangrene of bilateral feet    S/p right BKA on 8/12   S/P RIGHT BELOW KNEE AMPUTATION, DRESSING CHANGE TO LEFT FOOT WOUND 08/20     Heme/onc - Follow H&H, plts. Anemia  Received blood transfusion   Monitor H/H    Renal - Trend BUN, Cr, follow I/O. Check and replace Mg, K, phos. 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 Constipation - on pericolace and miralax    Prophylaxis - DVT: heparin, GI: pepcid.     Noncompliance with medical treatment   Continued tobacco use     PT/OT, discharge planning. Disposition : TBD    Review of systems  General: No fevers or chills. Cardiovascular: No chest pain or pressure. No palpitations. Pulmonary: see above. Gastrointestinal: No nausea, vomiting. Physical Exam:  General: As above   HEENT: NC, Atraumatic. PERRLA, anicteric sclerae. Lungs: CTA Bilaterally. No Wheezing/Rhonchi/Rales. Heart:  S1 S2,  No murmur, No Rubs, No Gallops  Abdomen: Soft, Non distended, Non tender.  +Bowel sounds,   Extremities: Right BKA, left foot with dressing  Psych:   Not anxious or agitated. Neurologic:  No acute neurological deficit.            Vital signs/Intake and Output:  Visit Vitals  BP (!) 152/92   Pulse 83   Temp 98.2 °F (36.8 °C)   Resp 16   Ht 5' 8\" (1.727 m)   Wt 76.8 kg (169 lb 4.8 oz)   SpO2 93%   BMI 25.74 kg/m²     Current Shift:  No intake/output data recorded. Last three shifts:  08/21 0701 - 08/22 1900  In: 100 [I.V.:100]  Out: 0             Labs: Results:       Chemistry Recent Labs     08/22/21  0100 08/21/21  0244 08/20/21  0444   GLU 83 73*  --     139  --    K 3.3* 3.0*  --     103  --    CO2 26 27  --    BUN 20* 18 17   CREA 1.30 1.15  --    CA 7.9* 7.6*  --    AGAP 10 9  --    BUCR 15 16  --       CBC w/Diff Recent Labs     08/22/21 0100 08/21/21  0244   WBC 7.2 7.7   RBC 2.43* 2.45*   HGB 7.2* 7.1*   HCT 22.2* 22.6*   * 143   GRANS 85* 80*   LYMPH 6* 7*   EOS 1 3      Cardiac Enzymes No results for input(s): CPK, CKND1, CONSTANTINO in the last 72 hours. No lab exists for component: CKRMB, TROIP   Coagulation No results for input(s): PTP, INR, APTT, INREXT, INREXT in the last 72 hours. Lipid Panel No results found for: CHOL, CHOLPOCT, CHOLX, CHLST, CHOLV, 280754, HDL, HDLP, LDL, LDLC, DLDLP, 635256, VLDLC, VLDL, TGLX, TRIGL, TRIGP, TGLPOCT, CHHD, CHHDX   BNP No results for input(s): BNPP in the last 72 hours. Liver Enzymes No results for input(s): TP, ALB, TBIL, AP in the last 72 hours.     No lab exists for component: SGOT, GPT, DBIL   Thyroid Studies No results found for: T4, T3U, TSH, TSHEXT, TSHEXT     Procedures/imaging: see electronic medical records for all procedures/Xrays and details which were not copied into this note but were reviewed prior to creation of Plan

## 2021-08-23 NOTE — PROGRESS NOTES
Problem: Falls - Risk of  Goal: *Absence of Falls  Description: Document Gela Garcia Fall Risk and appropriate interventions in the flowsheet. Outcome: Progressing Towards Goal  Note: Fall Risk Interventions:  Mobility Interventions: Patient to call before getting OOB         Medication Interventions: Patient to call before getting OOB    Elimination Interventions: Patient to call for help with toileting needs    History of Falls Interventions: Door open when patient unattended         Problem: Patient Education: Go to Patient Education Activity  Goal: Patient/Family Education  Outcome: Progressing Towards Goal     Problem: Diabetes Self-Management  Goal: *Disease process and treatment process  Description: Define diabetes and identify own type of diabetes; list 3 options for treating diabetes. Outcome: Progressing Towards Goal  Goal: *Incorporating nutritional management into lifestyle  Description: Describe effect of type, amount and timing of food on blood glucose; list 3 methods for planning meals. Outcome: Progressing Towards Goal  Goal: *Incorporating physical activity into lifestyle  Description: State effect of exercise on blood glucose levels. Outcome: Progressing Towards Goal  Goal: *Developing strategies to promote health/change behavior  Description: Define the ABC's of diabetes; identify appropriate screenings, schedule and personal plan for screenings. Outcome: Progressing Towards Goal  Goal: *Using medications safely  Description: State effect of diabetes medications on diabetes; name diabetes medication taking, action and side effects. Outcome: Progressing Towards Goal  Goal: *Monitoring blood glucose, interpreting and using results  Description: Identify recommended blood glucose targets  and personal targets.   Outcome: Progressing Towards Goal  Goal: *Prevention, detection, treatment of acute complications  Description: List symptoms of hyper- and hypoglycemia; describe how to treat low blood sugar and actions for lowering  high blood glucose level. Outcome: Progressing Towards Goal  Goal: *Prevention, detection and treatment of chronic complications  Description: Define the natural course of diabetes and describe the relationship of blood glucose levels to long term complications of diabetes. Outcome: Progressing Towards Goal  Goal: *Developing strategies to address psychosocial issues  Description: Describe feelings about living with diabetes; identify support needed and support network  Outcome: Progressing Towards Goal  Goal: *Insulin pump training  Outcome: Progressing Towards Goal  Goal: *Sick day guidelines  Outcome: Progressing Towards Goal  Goal: *Patient Specific Goal (EDIT GOAL, INSERT TEXT)  Outcome: Progressing Towards Goal     Problem: Patient Education: Go to Patient Education Activity  Goal: Patient/Family Education  Outcome: Progressing Towards Goal     Problem: Pressure Injury - Risk of  Goal: *Prevention of pressure injury  Description: Document Flo Scale and appropriate interventions in the flowsheet. Outcome: Progressing Towards Goal  Note: Pressure Injury Interventions:  Sensory Interventions: Assess changes in LOC    Moisture Interventions: Absorbent underpads    Activity Interventions: Pressure redistribution bed/mattress(bed type)    Mobility Interventions: Pressure redistribution bed/mattress (bed type)    Nutrition Interventions: Document food/fluid/supplement intake    Friction and Shear Interventions: HOB 30 degrees or less                Problem: Patient Education: Go to Patient Education Activity  Goal: Patient/Family Education  Outcome: Progressing Towards Goal     Problem: Risk for Spread of Infection  Goal: Prevent transmission of infectious organism to others  Description: Prevent the transmission of infectious organisms to other patients, staff members, and visitors.   Outcome: Progressing Towards Goal     Problem: Patient Education:  Go to Education Activity  Goal: Patient/Family Education  Outcome: Progressing Towards Goal     Problem: Patient Education: Go to Patient Education Activity  Goal: Patient/Family Education  Outcome: Progressing Towards Goal     Problem: Patient Education: Go to Patient Education Activity  Goal: Patient/Family Education  Outcome: Progressing Towards Goal

## 2021-08-23 NOTE — PROGRESS NOTES
1940 - Assumed care at this time. 2023 - Pt gives the appearance of being asleep keeping eyes closed and not responding to questions then will answer angrily. Pt continues to refuse all po meds despite teaching. Pt did allow for Heparin to be administered after informing that nurse would take all other medications back to pyxis. Pt declined full assessment stating, \"I'm fine! \". Pt did allow for nurse to listen to lung sounds while she was already turned on her side. Lung sounds clear and unlabored. Dressing to LLE c/d/i. Vacuum dressing to RLE patent, immobilizer in place. Pt does not appear to be in any acute distress. No concerns voiced. Will continue to monitor. Per CNA pt refusing 2300 vitals. Pt refusing incontinence care. 3428 - Leads and linen changed. Pt appeared very upset. Pt tugging sheets to prevent staff from accessing her. Pt educated that leads and linen required changing. Pt allowed but appeared to remain upset.

## 2021-08-23 NOTE — ROUTINE PROCESS
Bedside and Verbal shift change report given to Caryle Constant, RN (oncoming nurse) by Diane Abraham RN (offgoing nurse).  Report included the following information SBAR, Kardex, Intake/Output, MAR, Recent Results, and Cardiac Rhythm: SR.

## 2021-08-23 NOTE — PROGRESS NOTES
Cardiology Progress Note      8/22/2021 10:59 PM    Admit Date: 8/11/2021    Admit Diagnosis: Gangrene of both feet ([facility] [address] [I96]  Di[facility] ([facility] [E11.9]      Subjective:     Carlee Torres denies chest pain, chest pressure/discomfort.     Visit Vitals  BP (!) 152/92   Pulse 83   Temp 98.2 °F (36.8 °C)   Resp 16   Ht 5' 8\" (1.727 m)   Wt 76.8 kg (169 lb 4.8 oz)   LMP 04/06/2018 (Within Years)   SpO2 93%   BMI 25.74 kg/m²     Current Facility-Administered Medications   Medication Dose Route Frequency    potassium chloride (K-DUR, KLOR-CON) SR tablet 40 mEq  40 mEq Oral BID    morphine injection 4 mg  4 mg IntraVENous Q4H PRN    potassium chloride (K-DUR, KLOR-CON) SR tablet 40 mEq  40 mEq Oral BID    magnesium oxide (MAG-OX) tablet 400 mg  400 mg Oral BID    acetaminophen (TYLENOL) tablet 975 mg  975 mg Oral TID    oxyCODONE IR (ROXICODONE) tablet 5 mg  5 mg Oral Q4H PRN    oxyCODONE IR (ROXICODONE) tablet 10 mg  10 mg Oral Q4H PRN    oxyCODONE IR (ROXICODONE) tablet 5 mg  5 mg Oral Q6H    methocarbamoL (ROBAXIN) tablet 1,000 mg  1,000 mg Oral TID    senna-docusate (PERICOLACE) 8.6-50 mg per tablet 1 Tablet  1 Tablet Oral DAILY    promethazine (PHENERGAN) 25 mg in 0.9% sodium chloride 50 mL IVPB  25 mg IntraVENous Q4H PRN    polyethylene glycol (MIRALAX) packet 17 g  17 g Oral DAILY    losartan (COZAAR) tablet 25 mg  25 mg Oral DAILY    furosemide (LASIX) injection 40 mg  40 mg IntraVENous DAILY    metoprolol succinate (TOPROL-XL) XL tablet 25 mg  25 mg Oral DAILY    0.9% sodium chloride infusion 250 mL  250 mL IntraVENous PRN    famotidine (PEPCID) tablet 20 mg  20 mg Oral BID    DULoxetine (CYMBALTA) capsule 60 mg  60 mg Oral DAILY    albuterol-ipratropium (DUO-NEB) 2.5 MG-0.5 MG/3 ML  3 mL Nebulization Q4H PRN    albumin human 25% (BUMINATE) solution 25 g  25 g IntraVENous Q6H    insulin glargine (LANTUS) injection 5 Units  5 Units SubCUTAneous DAILY    ALPRAZolam (XANAX) tablet 0.5 mg  0.5 mg Oral QHS PRN    buPROPion SR (WELLBUTRIN SR) tablet 150 mg  150 mg Oral DAILY    COVID-19 vac,Ad26-PF ([employer]) injection 1 Dose  1 Dose IntraMUSCular PRIOR TO DISCHARGE    aspirin chewable tablet 81 mg  81 mg Oral DAILY    heparin (porcine) injection 5,000 Units  5,000 Units SubCUTAneous Q8H    naloxone (NARCAN) injection 0.1 mg  0.1 mg IntraVENous PRN    diphenhydrAMINE (BENADRYL) injection 12.5 mg  12.5 mg IntraVENous Q6H PRN    ondansetron (ZOFRAN ODT) tablet 4 mg  4 mg Oral Q8H PRN    Or    ondansetron (ZOFRAN) injection 4 mg  4 mg IntraVENous Q6H PRN    insulin lispro (HUMALOG) injection   SubCUTAneous Q6H    glucose chewable tablet 16 g  16 g Oral PRN    glucagon (GLUCAGEN) injection 1 mg  1 mg IntraMUSCular PRN    dextrose (D50W) injection syrg 12.5-25 g  25-50 mL IntraVENous PRN    atorvastatin (LIPITOR) tablet 40 mg  40 mg Oral QHS         Objective:      Physical Exam:  General:         As above   HEENT:           NC, Atraumatic. PERRLA, anicteric sclerae. Lungs:            CTA Bilaterally. No Wheezing/Rhonchi/Rales. Heart:              S1 S2,  No murmur, No Rubs, No Gallops  Abdomen:      Soft, Non distended, Non tender.  +Bowel sounds,   Extremities:   Right BKA, left foot with dressing  Psych:            Not anxious or agitated.   Neurologic:    No acute neurological deficit.          Data Review:   Labs:    Recent Results (from the past 24 hour(s))   GLUCOSE, POC    Collection Time: 08/21/21 11:33 PM   Result Value Ref Range    Glucose (POC) 94 70 - 110 mg/dL   MAGNESIUM    Collection Time: 08/22/21  1:00 AM   Result Value Ref Range    Magnesium 2.0 1.6 - 2.6 mg/dL   CBC WITH AUTOMATED DIFF    Collection Time: 08/22/21  1:00 AM   Result Value Ref Range    WBC 7.2 4.6 - 13.2 K/uL    RBC 2.43 (L) 4.20 - 5.30 M/uL    HGB 7.2 (L) 12.0 - 16.0 g/dL    HCT 22.2 (L) 35.0 - 45.0 %    MCV 91.4 74.0 - 97.0 FL    MCH 29.6 24.0 - 34.0 PG    MCHC 32.4 31.0 - 37.0 g/dL    RDW 16.6 (H) 11.6 - 14.5 % PLATELET 047 (L) 857 - 420 K/uL    MPV 11.6 9.2 - 11.8 FL    NEUTROPHILS 85 (H) 40 - 73 %    LYMPHOCYTES 6 (L) 21 - 52 %    MONOCYTES 6 3 - 10 %    EOSINOPHILS 1 0 - 5 %    BASOPHILS 1 0 - 2 %    ABS. NEUTROPHILS 6.1 1.8 - 8.0 K/UL    ABS. LYMPHOCYTES 0.4 (L) 0.9 - 3.6 K/UL    ABS. MONOCYTES 0.5 0.05 - 1.2 K/UL    ABS. EOSINOPHILS 0.1 0.0 - 0.4 K/UL    ABS.  BASOPHILS 0.1 0.0 - 0.1 K/UL    DF AUTOMATED     METABOLIC PANEL, BASIC    Collection Time: 08/22/21  1:00 AM   Result Value Ref Range    Sodium 138 136 - 145 mmol/L    Potassium 3.3 (L) 3.5 - 5.5 mmol/L    Chloride 102 100 - 111 mmol/L    CO2 26 21 - 32 mmol/L    Anion gap 10 3.0 - 18 mmol/L    Glucose 83 74 - 99 mg/dL    BUN 20 (H) 7.0 - 18 MG/DL    Creatinine 1.30 0.6 - 1.3 MG/DL    BUN/Creatinine ratio 15 12 - 20      GFR est AA 52 (L) >60 ml/min/1.73m2    GFR est non-AA 43 (L) >60 ml/min/1.73m2    Calcium 7.9 (L) 8.5 - 10.1 MG/DL   GLUCOSE, POC    Collection Time: 08/22/21  6:10 AM   Result Value Ref Range    Glucose (POC) 74 70 - 110 mg/dL   GLUCOSE, POC    Collection Time: 08/22/21  7:03 AM   Result Value Ref Range    Glucose (POC) 77 70 - 110 mg/dL   GLUCOSE, POC    Collection Time: 08/22/21 11:47 AM   Result Value Ref Range    Glucose (POC) 136 (H) 70 - 110 mg/dL   GLUCOSE, POC    Collection Time: 08/22/21  5:57 PM   Result Value Ref Range    Glucose (POC) 121 (H) 70 - 110 mg/dL       Telemetry: normal sinus rhythm      Assessment:     Principal Problem:    Gangrene of both feet (HCC) (8/11/2021)    Active Problems:    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 ([facility] [address] (4/4/2021)      YUNIEL (acute kidney injury) ([facility] [address] (4/4/2021)      Non compliance with medical treatment (4/12/2021)      Fall (8/12/2021)      Tobacco use (8/12/2021)      Preoperative evaluation to rule out surgical contraindication (8/12/2021)      Anemia (8/12/2021)      [facility] (8/13/2021)      Status post below-knee amputation of right lower extremity ([facility] (8/13/2021)      Bacteremia (8/13/2021)      Dyspnea (8/16/2021)      Pulmonary infiltrates (2/07/9147)      Systolic CHF, acute ([facility] (8/17/2021)      Debility ()        Plan:     Continue current measures. Follow.        Patricia Montero MD

## 2021-08-23 NOTE — PROGRESS NOTES
In chart to update pt's cousin Jordan Villasenor. Primary RN unavailable. This RN attempted to answer questions.

## 2021-08-23 NOTE — PROGRESS NOTES
Hospitalist Progress Note    Patient: Naila Orellana MRN: [id number]  [facility]: [id number]    YOB: 1970  Age: 46 y.o. Sex: female    DOA: 8/11/2021 LOS:  LOS: 12 days            Chief complaint :  Gangrene of both feet. 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 Sunny Eduardo Tobacco use Z72.0    Preoperative evaluation to rule out surgical contraindication Z01.818    Anemia D64.9    Sepsis ([facility]) A41.9    Status post below-knee amputation of right lower extremity ([facility] [address] Z89.511    Bacteremia R78.81    Dyspnea R06.00    Pulmonary infiltrates I68.3    Systolic CHF, acute ([facility]) I50.21    Debility R53.81          49-year-old female with uncontrolled type 2 diabetes mellitus, severe peripheral arterial disease, and tobacco use who has been noncompliant with wound care and hyperbaric oxygen treatment due to recent fall is admitted for severe gangrene of both feet. RRT was called on 08/16/2021, patient appeared confused, shortness of breath with hypoxia. ABG showed metabolic acidosis. Patient refused chest x-ray. She has been refusing medications. Given her condition she was transferred to ICU.    08/17/2021 - I tried to discuss with patient about her critical condition and guarded prognosis, she is not interested in listening to my concerns about her medical condition. She turned her face on other side and did not want to listen to me and did not answer my questions.       08/23/2021 Patient again refusing medications today per RN    Resp -   Dyspnea/hypoxia -  Responded to lasix  CXR with pulmonary edema. Oxygen as needed by NC. ID -   septic shock  -  BP now improved  Continue iv abx,   Blood Cultures growing Peptoniphilus asaccharolyticus. Follow up blood cultures negative  Wound cultures growing providencia, MSSA, E-fecalis, alcaligenes fecalis. ID following. Antibiotics - zosyn       [facility] - Monitor HD. Acute CHF - on lasix  Echo 45 % and mild pulm htn      Gangrene of bilateral feet    S/p right BKA on 8/12   S/P RIGHT BELOW KNEE AMPUTATION, DRESSING CHANGE TO LEFT FOOT WOUND 08/20     Heme/onc - Follow H&H, plts. Anemia  Received blood transfusion   Monitor H/H    Renal - Trend BUN, Cr, follow I/O. Check and replace Mg, K, phos. 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 Constipation - on pericolace and miralax    Prophylaxis - DVT: heparin, GI: pepcid.     Noncompliance with medical treatment   Continued tobacco use     PT/OT, discharge planning. Disposition : TBD    Review of systems  General: No fevers or chills. Cardiovascular: No chest pain or pressure. No palpitations. Pulmonary: see above. Gastrointestinal: No nausea, vomiting. Physical Exam:  General: As above   HEENT: NC, Atraumatic. PERRLA, anicteric sclerae. Lungs: CTA Bilaterally. No Wheezing/Rhonchi/Rales. Heart:  S1 S2,  No murmur, No Rubs, No Gallops  Abdomen: Soft, Non distended, Non tender.  +Bowel sounds,   Extremities: Right BKA, left foot with dressing  Psych:   Not anxious or agitated. Neurologic:  No acute neurological deficit.            Vital signs/Intake and Output:  Visit Vitals  BP (!) 148/78   Pulse (!) 101   Temp 97.4 °F (36.3 °C)   Resp 18   Ht 5' 8\" (1.727 m)   Wt 73.9 kg (163 lb)   SpO2 93%   BMI 24.78 kg/m²     Current Shift:  No intake/output data recorded. Last three shifts:  08/22 0701 - 08/23 1900  In: -   Out: 500 [Urine:500]            Labs: Results:       Chemistry Recent Labs     08/22/21  0100 08/21/21  0244   GLU 83 73*    139   K 3.3* 3.0*    103   CO2 26 27   BUN 20* 18   CREA 1.30 1.15   CA 7.9* 7.6*   AGAP 10 9   BUCR 15 16      CBC w/Diff Recent Labs     08/22/21  0100 08/21/21  0244   WBC 7.2 7.7   RBC 2.43* 2.45*   HGB 7.2* 7.1*   HCT 22.2* 22.6*   * 143   GRANS 85* 80*   LYMPH 6* 7*   EOS 1 3      Cardiac Enzymes No results for input(s): CPK, CKND1, CONSTANTINO in the last 72 hours. No lab exists for component: CKRMB, TROIP   Coagulation No results for input(s): PTP, INR, APTT, INREXT, INREXT in the last 72 hours. Lipid Panel No results found for: CHOL, CHOLPOCT, CHOLX, CHLST, CHOLV, 535945, HDL, HDLP, LDL, LDLC, DLDLP, 359836, VLDLC, VLDL, TGLX, TRIGL, TRIGP, TGLPOCT, CHHD, CHHDX   BNP No results for input(s): BNPP in the last 72 hours. Liver Enzymes No results for input(s): TP, ALB, TBIL, AP in the last 72 hours.     No lab exists for component: SGOT, GPT, DBIL   Thyroid Studies No results found for: T4, T3U, TSH, TSHEXT, TSHEXT     Procedures/imaging: see electronic medical records for all procedures/Xrays and details which were not copied into this note but were reviewed prior to creation of Plan

## 2021-08-23 NOTE — PROGRESS NOTES
8/23/2021  PT treatment not completed due to:  [] Off Unit for testing/procedure  [] Pain  [] Eating  [] Patient too lethargic  [] Nausea/vomiting  [] Dialysis treatment in progress   [x] Other: Shahrzad Anger in with pt beginning IV access search. Will f/up at later time as pt schedule allows. Thank you.    Liliam Minaya, PT

## 2021-08-23 NOTE — PROGRESS NOTES
0728:Received verbal bedside report from off going nurse ELVIS Arvizu Patient care received. Patient alert and oriented x 4. Patient resting in bed denies pain. Patient stable. Call light with in reach bed in lowest position. 0822:Pt refusing oral medication because of nausea. Will order phenergan. Patient reports having bowel movement this morning. 0938:Pt nauseated right  midline leaking and complaining of burning when flushing left midline. Ana Eugene said he would come and check midlines. 1010: Pt refusing to lay on her back to have midline assessed because she said she can not breath on her back. 4 liters of oxygen applied to patient. Patient oxygen saturation 92%. Will continue to monitor. 1022:Paged Hospitalist.    1042:Informed  that patient refusing oral medication because she is nauseas. Informed him patient had to be placed on 4 liters of oxygen to maintain oxygen saturation of 92%. Made him aware patient refusing to lay on her back because she states she can not breathe while on her back so Ana Eugene EMT could not properly assess midlines.  said to document patient's refusal.     1046:Bedside and Verbal shift change report given to CAROL Bergeron R.N. (oncoming nurse) by LEXI Steinberg R.N. (offgoing nurse). Report included the following information SBAR, Kardex, Intake/Output, MAR, Accordion, Recent Results, and Med Rec Status.

## 2021-08-23 NOTE — PROGRESS NOTES
[facility]  (A Division of [address] [place] [address])                                                                                                                       Rahel T. Severa Cornwall, MD  Office #:     [phone]  [id number] [phone]   Office Fax: [phone]     Date of Admission: 8/11/2021Date of Note: 8/23/2021  Reason for FU: severe sepsis      Current Antimicrobials:    Prior Antimicrobials:  ABX X10 days- Zosyn. DC'ed 8/22      Immunosuppressive drugs: na Clindamycin 8/13 X3 doses  Ctx 8/13 to 8/15  Levofloxacin 8/13 X1  Vanco 8/11 to 8/16  Flagyl 8/15 8/16       Assessment- ID related:  --------------------------------------------------------------------------    · Probable acute resp failure 2/2 pul edema with diffuse alveolar infiltrate, small to mod B/L pleural effusion on CXR        Left lung infiltrate --FU CXR at a later time        COVID 19- ruled out- neg rapid test/PCR  · Right foot wet gangrene with Severe sepsis POA  · R BKA due to above  · Bacteremia with anaerobe( peptoniphilus) 1/4 bottles 8/11  · BCX 8/13-8/15: NGSF  · Leucocytosis resolved post BKA    WCX L foot-8/11: Providencia and MSSA  WCX R foot-8/11: E.fecalis. Alcaligenes fecalis, MSSA    Other Medical Issues- Mx per respective team:   Acute pul edema  DM-poorly controlled  PVD  tobaco use  YUNIEL improved    Past ID Issues:  COVID 19 vaccine- 8/12/21  Left post TMA 4/12- Rxed long term ABX  8/14  X-ray: Broad deep ulceration along dorsal hindfoot. No bony destructive process. Transmetatarsal amputation. Plantar calcaneal enthesopathy.           Recommendation for ID issues I am following:  --------------------------------------------------------------------------    Off ABX-D2    Wound care to L foot and FU - per Podiatry  High risk for recurrent sepsis    Not much to add from ID side. Will sign off.  Please call as needed                       FU CXR/ supportive care per primary       Condition: Stable    Subjective:  Afebrile  Last ABX 8/21- doing ok off abx  Wound exam on BKA- ok per vascular note  Notes, labs , microbiology data and imaging reports reviewed. Microbiology results reviewed- [facility] called for further clarification as indicated       HPI:  Tong Hebert is 45 YO WHITE/NON- female with PMH as listed below. Known to me from left foot infection/dry gangrene and treated with 6 weeks of IV abx-Unasyn followed by 2 weeks of oral augmentin. She had poorly controlled DM. Admitted on 8/11 with R foot wet gangrene and severe sepsis- high wbc, hypotension. Was placed on Vanco and Zosyn, she was taken to OR for R BKA. WCX taken from both feet reviewed, didn't have MRSA. Bacteremia with anearobe from admission. She had no further bacteremia. Flagyl was added by surgery. She has SOB and hypoxemia this morning. Was given lasix and morphine. She has labs for lactic acid/bmp pending. Still remains on vanco- no MRSA isolated. During interview, is awake, but limited with her answers, wants to rest. No fever, no diarrhea. DW RN- who states she is breathing better since her earlier medications with lasix and morphine during RRT.          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 ([address] 04/04/2021    YUNIEL (acute kidney injury) ([address] 04/04/2021     Past Medical History:   Diagnosis Date    PAD (peripheral artery disease) ([facility]      Past Surgical History:   Procedure Laterality Date    HX ORTHOPAEDIC      L TMT amputation     Family History   Problem Relation Age of Onset    Diabetes Mother      Social History     Socioeconomic History    Marital status:      Spouse name: Not on file    Number of children: Not on file    Years of education: Not on file    Highest education level: Not on file   Occupational History    Not on file   Tobacco Use    Smoking status: Current Every Day Smoker     Packs/day: 1.50    Smokeless tobacco: Never Used   Substance and Sexual Activity    Alcohol use: Not Currently     Comment: on rare occasion    Drug use: Not Currently    Sexual activity: Not on file   Other Topics Concern     Service Not Asked    Blood Transfusions Not Asked    Caffeine Concern Not Asked    Occupational Exposure Not Asked    Hobby Hazards Not Asked    Sleep Concern Not Asked    Stress Concern Not Asked    Weight Concern Not Asked    Special Diet Not Asked    Back Care Not Asked    Exercise Not Asked    Bike Helmet Not Asked   [address] Not Asked    Self-Exams Not Asked   Social History Narrative    Not on file     Social Determinants of Health     Financial Resource Strain:     Difficulty of Paying Living Expenses:    Food Insecurity:     Worried About Running Out of Food in the Last Year:     Ran Out of Food in the Last Year:    Transportation Needs:     Lack of Transportation (Medical):      Lack of Transportation (Non-Medical):    Physical Activity:     Days of Exercise per Week:     Minutes of Exercise per Session:    Stress:     Feeling of Stress :    Social Connections:     Frequency of Communication with Friends and Family:     Frequency of Social Gatherings with Friends and Family:     Attends Hinduism Services:     Active Member of Clubs or Organizations:     Attends Club or Organization Meetings:     Marital Status:    Intimate Partner Violence:     Fear of Current or Ex-Partner:     Emotionally Abused:     Physically Abused:     Sexually Abused: Allergies:  Patient has no known allergies.      Medications:  Current Facility-Administered Medications   Medication Dose Route Frequency    potassium chloride (K-DUR, KLOR-CON) SR tablet 40 mEq  40 mEq Oral BID    morphine injection 4 mg  4 mg IntraVENous Q4H PRN    potassium chloride (K-DUR, KLOR-CON) SR tablet 40 mEq  40 mEq Oral BID    magnesium oxide (MAG-OX) tablet 400 mg  400 mg Oral BID    acetaminophen (TYLENOL) tablet 975 mg  975 mg Oral TID    oxyCODONE IR (ROXICODONE) tablet 5 mg  5 mg Oral Q4H PRN    oxyCODONE IR (ROXICODONE) tablet 10 mg  10 mg Oral Q4H PRN    oxyCODONE IR (ROXICODONE) tablet 5 mg  5 mg Oral Q6H    methocarbamoL (ROBAXIN) tablet 1,000 mg  1,000 mg Oral TID    senna-docusate (PERICOLACE) 8.6-50 mg per tablet 1 Tablet  1 Tablet Oral DAILY    promethazine (PHENERGAN) 25 mg in 0.9% sodium chloride 50 mL IVPB  25 mg IntraVENous Q4H PRN    polyethylene glycol (MIRALAX) packet 17 g  17 g Oral DAILY    losartan (COZAAR) tablet 25 mg  25 mg Oral DAILY    furosemide (LASIX) injection 40 mg  40 mg IntraVENous DAILY    metoprolol succinate (TOPROL-XL) XL tablet 25 mg  25 mg Oral DAILY    0.9% sodium chloride infusion 250 mL  250 mL IntraVENous PRN    famotidine (PEPCID) tablet 20 mg  20 mg Oral BID    DULoxetine (CYMBALTA) capsule 60 mg  60 mg Oral DAILY    albuterol-ipratropium (DUO-NEB) 2.5 MG-0.5 MG/3 ML  3 mL Nebulization Q4H PRN    albumin human 25% (BUMINATE) solution 25 g  25 g IntraVENous Q6H    insulin glargine (LANTUS) injection 5 Units  5 Units SubCUTAneous DAILY    ALPRAZolam (XANAX) tablet 0.5 mg  0.5 mg Oral QHS PRN    buPROPion SR (WELLBUTRIN SR) tablet 150 mg  150 mg Oral DAILY    COVID-19 vac,Ad26-PF (Tweetflow) injection 1 Dose  1 Dose IntraMUSCular PRIOR TO DISCHARGE    aspirin chewable tablet 81 mg  81 mg Oral DAILY    heparin (porcine) injection 5,000 Units  5,000 Units SubCUTAneous Q8H    naloxone (NARCAN) injection 0.1 mg  0.1 mg IntraVENous PRN    diphenhydrAMINE (BENADRYL) injection 12.5 mg  12.5 mg IntraVENous Q6H PRN    ondansetron (ZOFRAN ODT) tablet 4 mg  4 mg Oral Q8H PRN    Or    ondansetron (ZOFRAN) injection 4 mg  4 mg IntraVENous Q6H PRN    insulin lispro (HUMALOG) injection   SubCUTAneous Q6H    glucose chewable tablet 16 g  16 g Oral PRN    glucagon (GLUCAGEN) injection 1 mg  1 mg IntraMUSCular PRN    dextrose (D50W) injection syrg 12.5-25 g  25-50 mL IntraVENous PRN    atorvastatin (LIPITOR) tablet 40 mg  40 mg Oral QHS        ROS:  Review of systems not obtained due to patient factors. Patient doesn't give detail answers     Physical Exam:    Temp (24hrs), Av.1 °F (36.7 °C), Min:97.4 °F (36.3 °C), Max:98.9 °F (37.2 °C)    Visit Vitals  /80   Pulse 100   Temp 97.4 °F (36.3 °C)   Resp 20   Ht 5' 8\" (1.727 m)   Wt 73.9 kg (163 lb)   LMP 2018 (Within Years)   SpO2 90% Comment: nurse notified   BMI 24.78 kg/m²        GEN: WD chronically sick looking- no severe distress noted. On RA  HEENT: Unicteric. EOMI intact  CHEST: Non laboured breathing. CTA  CVS: RRR  EXT: No apparent swelling or redness on UE/LE joints. R  BKA site is dressed-- wound appears viable/clean per vascular note. ( didn't examine)  Left foot is dressed as well  Skin: Dry and intact. No rash, no redness. CNS: A, Flat affect. Moves all extremity. CN grossly ok.       Microbiology  All Micro Results     Procedure Component Value Units Date/Time    CULTURE, BLOOD [[id number]] Collected: 21    Order Status: Completed Specimen: Blood Updated: 2162     Special Requests: NO SPECIAL REQUESTS        Culture result: NO GROWTH 6 DAYS       CULTURE, BLOOD [[id number]] Collected: 21    Order Status: Completed Specimen: Blood Updated: 21 0723     Special Requests: NO SPECIAL REQUESTS        Culture result: NO GROWTH 6 DAYS       CULTURE, BLOOD [[id number]] Collected: 08/16/21 0530    Order Status: Completed Specimen: Blood Updated: 08/22/21 0723     Special Requests: NO SPECIAL REQUESTS        Culture result: NO GROWTH 6 DAYS       CULTURE, BLOOD [[id number]] Collected: 08/15/21 0316    Order Status: Completed Specimen: Blood Updated: 08/21/21 0730     Special Requests: NO SPECIAL REQUESTS        Culture result: NO GROWTH 6 DAYS       CULTURE, BLOOD [[id number]] Collected: 08/15/21 0316    Order Status: Completed Specimen: Blood Updated: 08/21/21 0730     Special Requests: NO SPECIAL REQUESTS        Culture result: NO GROWTH 6 DAYS       CULTURE, BLOOD [[id number]] Collected: 08/14/21 0342    Order Status: Completed Specimen: Blood Updated: 08/21/21 0730     Special Requests: NO SPECIAL REQUESTS        Culture result: NO GROWTH 7 DAYS       CULTURE, BLOOD [[id number]] Collected: 08/14/21 0342    Order Status: Completed Specimen: Blood Updated: 08/21/21 0730     Special Requests: NO SPECIAL REQUESTS        Culture result: NO GROWTH 7 DAYS       CULTURE, BLOOD [[id number]] Collected: 08/13/21 1150    Order Status: Completed Specimen: Blood Updated: 08/19/21 1230     Special Requests: NO SPECIAL REQUESTS        Culture result: NO GROWTH 6 DAYS       RESPIRATORY VIRUS PANEL W/COVID-19, PCR [[id number]] Collected: 08/18/21 1630    Order Status: Completed Specimen: Nasopharyngeal Updated: 08/19/21 0815     Adenovirus Not detected        Coronavirus 229E Not detected        Coronavirus HKU1 Not detected        Coronavirus CVNL63 Not detected        Coronavirus OC43 Not detected        SARS-CoV-2, PCR Not detected        Metapneumovirus Not detected        Rhinovirus and Enterovirus Not detected        Influenza A Not detected        Influenza B Not detected        Parainfluenza 1 Not detected        Parainfluenza 2 Not detected        Parainfluenza 3 Not detected        Parainfluenza virus 4 Not detected        RSV by PCR Not detected        B. parapertussis, PCR Not detected        Bordetella pertussis - PCR Not detected        Chlamydophila pneumoniae DNA, QL, PCR Not detected        Mycoplasma pneumoniae DNA, QL, PCR Not detected       COVID-19 RAPID TEST [[id number]] Collected: 08/17/21 0955    Order Status: Completed Specimen: Nasopharyngeal Updated: 08/17/21 1041     Specimen source Nasopharyngeal        COVID-19 rapid test Not detected        Comment: Rapid Abbott ID Now       Rapid NAAT:  The specimen is NEGATIVE for SARS-CoV-2, the novel coronavirus associated with COVID-19. Negative results should be treated as presumptive and, if inconsistent with clinical signs and symptoms or necessary for patient management, should be tested with an alternative molecular assay. Negative results do not preclude SARS-CoV-2 infection and should not be used as the sole basis for patient management decisions. This test has been authorized by the FDA under an Emergency Use Authorization (EUA) for use by authorized laboratories.    Fact sheet for Healthcare Providers: ConventionTrackaPhonedate.co.nz  Fact sheet for Patients: PointBurstdate.co.nz       Methodology: Isothermal Nucleic Acid Amplification         CULTURE, WOUND Lima Men STAIN [[id number]]  (Abnormal)  (Susceptibility) Collected: 08/11/21 2145    Order Status: Completed Specimen: Wound from Foot Updated: 08/17/21 0946     Special Requests: NO SPECIAL REQUESTS        GRAM STAIN NO WBC'S SEEN         4+ GRAM NEGATIVE RODS               1+ GRAM POSITIVE COCCI IN PAIRS           Culture result:       HEAVY GRAM NEGATIVE RODS (REFER TO ISOLATE 2)                  HEAVY ALCALIGENES FAECALIS                  HEAVY ENTEROCOCCUS FAECALIS                  HEAVY STAPHYLOCOCCUS AUREUS          CULTURE, Ximena Sharron STAIN [[id number]]  (Abnormal)  (Susceptibility) Collected: 08/11/21 2145    Order Status: Completed Specimen: Wound from Foot Updated: 08/15/21 1013     Special Requests: NO SPECIAL REQUESTS        GRAM STAIN NO WBC'S SEEN         1+ GRAM NEGATIVE RODS               FEW GRAM POSITIVE COCCI IN PAIRS           Culture result:       MODERATE PROVIDENCIA STUARTII                  MODERATE STAPHYLOCOCCUS AUREUS          CULTURE, BLOOD [[id number]] Collected: 21    Order Status: Completed Specimen: Blood Updated: 21 1502     Special Requests: NO SPECIAL REQUESTS        GRAM STAIN       GRAM POSITIVE COCCI IN CLUSTERS ANAEROBIC BOTTLE                  SMEAR CALLED TO AND CORRECTLY REPEATED BY: Marques Saldaña RN 3S TO [facility] [id number] AT [id number]           Culture result:       Finegoldia magna GROWING IN THE ANAEROBIC BOTTLE          CULTURE, BLOOD [[id number]]  (Abnormal) Collected: 21    Order Status: Completed Specimen: Blood Updated: 21 1500     Special Requests: NO SPECIAL REQUESTS        GRAM STAIN       GRAM POSITIVE COCCI IN CLUSTERS ANAEROBIC BOTTLE                  SMEAR CALLED TO AND CORRECTLY REPEATED BY: Yisel Anthony RN 3S TO [facility] [id number] AT [id number]           Culture result:       Peptoniphilus asaccharolyticus GROWING IN THE ANAEROBIC BOTTLE          CULTURE, URINE [[id number]] Collected: 21    Order Status: Canceled Specimen: Urine from Clean catch                Lines / Catheters:  Lab results:    Chemistry  Recent Labs     21  0100 21  0244   GLU 83 73*    139   K 3.3* 3.0*    103   CO2 26 27   BUN 20* 18   CREA 1.30 1.15   CA 7.9* 7.6*   AGAP 10 9   BUCR 15 16       CBC w/ Diff  Recent Labs     21  0100 21  0244   WBC 7.2 7.7   RBC 2.43* 2.45*   HGB 7.2* 7.1*   HCT 22.2* 22.6*   * 143   GRANS 85* 80*   LYMPH 6* 7*   EOS 1 3       Imagin/14- CXR  Patchy airspace disease right base very likely developing pneumonia. Small  atelectatic streak left base. -CXR  Right pleural effusion. Bilateral parenchymal opacities, predominantly within  the left upper lobe.

## 2021-08-23 NOTE — PROGRESS NOTES
Comprehensive Nutrition Assessment    Type and Reason for Visit: Reassess    Nutrition Recommendations/Plan: Supplement: will order glucerna shake BID. Nutrition Assessment:  Patient admitted for gangrene of both foot and bacteremia, s/p right BKA on 8/12, T2DM, tobacco use, anemia- received blood transfusion, YUNIEL- resolved. Diet advanced from NPO 8/19. Estimated Daily Nutrient Needs:  Energy (kcal): 8928-8181; Weight Used for Energy Requirements: Current  Protein (g): 74-81; Weight Used for Protein Requirements: Current (1-1.1g)  Fluid (ml/day): 9877-4763; Method Used for Fluid Requirements: 1 ml/kcal    Nutrition Related Findings:  Med: pericolace, KCl, miralax, toprol xl, mag-ox, humalog, lantus, lasix, pepcid. Minimal PO documentation. Patient refused lunch yesterday per flowsheet documentation. Attempted to call patient- no answer. Will continue to monitor PO intake. No recent BM noted. Wounds:    Surgical incision       Current Nutrition Therapies:  ADULT DIET Regular; 4 carb choices (60 gm/meal); Low Fat/Low Chol/High Fiber/2 gm Na    Anthropometric Measures:  · Height:  5' 8\" (172.7 cm)  · Current Body Wt:  73.9 kg (162 lb 14.7 oz)   · Admission Body Wt:  160 lb    · Usual Body Wt:  78.9 kg (174 lb) (4/2021)     · Ideal Body Wt:  140 lbs:  116.4 %   · Adjusted Body Weight:  172.5; Weight Adjustment for: Amputation   · Adjusted BMI:  26.3    · BMI Category:  Normal weight (BMI 18.5-24. 9)       Nutrition Diagnosis:   · In context of acute illness or injury related to altered GI function as evidenced by constipation (No recent BM noted)    Nutrition Interventions:   Food and/or Nutrient Delivery: Continue current diet  Nutrition Education and Counseling: No recommendations at this time  Coordination of Nutrition Care: Continue to monitor while inpatient    Goals:  PO intake >75% of estimated nutritional needs throughout the next 2-4 days       Nutrition Monitoring and Evaluation: Behavioral-Environmental Outcomes: None identified  Food/Nutrient Intake Outcomes: Food and nutrient intake  Physical Signs/Symptoms Outcomes: Biochemical data, Meal time behavior, Skin, Weight, GI status    Discharge Planning:    Continue current diet     Electronically signed by Kishan Granados RD on 8/23/2021 at 11:20 AM

## 2021-08-23 NOTE — PROGRESS NOTES
VASCULAR AND TRANSPLANT       PROGRESS NOTE    Date: 2021    Post-Op Day: 11:  S/p right below knee guillotine amputation. POD:  4   S/p closure right below knee amputation. Plan:   Continue prevena dressing right bka. Continue knee immobilizer. Continue pt/ot. Assessment:   47 y/o female with gangrene right foot. S/p amputation. Subjective:   Pt somnolent today. Denies pain right bka stump. Objective:   Admit weight: Weight: 41.1 kg (90 lb 9.7 oz)  Last recorded weight: Weight: 73.9 kg (163 lb)    Temp (24hrs), Av.1 °F (36.7 °C), Min:97.4 °F (36.3 °C), Max:98.9 °F (37.2 °C)          [unfilled]    Intake/Output Summary (Last 24 hours) at 2021 1118  Last data filed at 2021 0659  Gross per 24 hour   Intake    Output 500 ml   Net -500 ml     Right leg with knee immobilizer in place. Stump is warm. Labs:     BMP:   Recent Labs     21  0100 21  0244   BUN 20* 18    139   CO2 26 27     CBC:    Recent Labs     21  0100 21  0244   WBC 7.2 7.7   HCT 22.2* 22.6*   RDW 16.6* 16.5*     PT/INR: No results for input(s): INR, INREXT in the last 72 hours.     No lab exists for component: PT    Arterial Blood Gases   No results found for: LPMO2, FIO2, PEEP, PH1, PCO2, PO2, HCO3, MODE     MICRO:  Results     Procedure Component Value Units Date/Time    RESPIRATORY VIRUS PANEL W/COVID-19, PCR [[id number]] Collected: 21 1630    Order Status: Completed Specimen: Nasopharyngeal Updated: 21 0815     Adenovirus Not detected        Coronavirus 229E Not detected        Coronavirus HKU1 Not detected        Coronavirus CVNL63 Not detected        Coronavirus OC43 Not detected        SARS-CoV-2, PCR Not detected        Metapneumovirus Not detected        Rhinovirus and Enterovirus Not detected        Influenza A Not detected        Influenza B Not detected        Parainfluenza 1 Not detected        Parainfluenza 2 Not detected        Parainfluenza 3 Not detected        Parainfluenza virus 4 Not detected        RSV by PCR Not detected        B. parapertussis, PCR Not detected        Bordetella pertussis - PCR Not detected        Chlamydophila pneumoniae DNA, QL, PCR Not detected        Mycoplasma pneumoniae DNA, QL, PCR Not detected       COVID-19 RAPID TEST [[id number]] Collected: 08/17/21 0955    Order Status: Completed Specimen: Nasopharyngeal Updated: 08/17/21 1041     Specimen source Nasopharyngeal        COVID-19 rapid test Not detected        Comment: Rapid Abbott ID Now       Rapid NAAT:  The specimen is NEGATIVE for SARS-CoV-2, the novel coronavirus associated with COVID-19. Negative results should be treated as presumptive and, if inconsistent with clinical signs and symptoms or necessary for patient management, should be tested with an alternative molecular assay. Negative results do not preclude SARS-CoV-2 infection and should not be used as the sole basis for patient management decisions. This test has been authorized by the FDA under an Emergency Use Authorization (EUA) for use by authorized laboratories.    Fact sheet for Healthcare Providers: ConventionUpdate.co.nz  Fact sheet for Patients: ConventionUpdate.co.nz       Methodology: Isothermal Nucleic Acid Amplification         CULTURE, BLOOD [[id number]] Collected: 08/17/21 0520    Order Status: Completed Specimen: Blood Updated: 08/23/21 0723     Special Requests: NO SPECIAL REQUESTS        Culture result: NO GROWTH 6 DAYS       CULTURE, BLOOD [[id number]] Collected: 08/16/21 0530    Order Status: Completed Specimen: Blood Updated: 08/22/21 0723     Special Requests: NO SPECIAL REQUESTS        Culture result: NO GROWTH 6 DAYS       CULTURE, BLOOD [[id number]] Collected: 08/16/21 0530    Order Status: Completed Specimen: Blood Updated: 08/22/21 0723     Special Requests: NO SPECIAL REQUESTS        Culture result: NO GROWTH 6 DAYS       CULTURE, BLOOD [[id number]] Collected: 08/15/21 0316    Order Status: Completed Specimen: Blood Updated: 08/21/21 0730     Special Requests: NO SPECIAL REQUESTS        Culture result: NO GROWTH 6 DAYS       CULTURE, BLOOD [[id number]] Collected: 08/15/21 0316    Order Status: Completed Specimen: Blood Updated: 08/21/21 0730     Special Requests: NO SPECIAL REQUESTS        Culture result: NO GROWTH 6 DAYS       CULTURE, BLOOD [[id number]] Collected: 08/14/21 0342    Order Status: Completed Specimen: Blood Updated: 08/21/21 0730     Special Requests: NO SPECIAL REQUESTS        Culture result: NO GROWTH 7 DAYS       CULTURE, BLOOD [[id number]] Collected: 08/14/21 0342    Order Status: Completed Specimen: Blood Updated: 08/21/21 0730     Special Requests: NO SPECIAL REQUESTS        Culture result: NO GROWTH 7 DAYS       CULTURE, BLOOD [[id number]] Collected: 08/13/21 1150    Order Status: Completed Specimen: Blood Updated: 08/19/21 1230     Special Requests: NO SPECIAL REQUESTS        Culture result: NO GROWTH 6 DAYS       CULTURE, URINE [[id number]] Collected: 08/11/21 2230    Order Status: Canceled Specimen: Urine from Clean catch     CULTURE, BLOOD [[id number]] Collected: 08/11/21 2225    Order Status: Completed Specimen: Blood Updated: 08/14/21 1502     Special Requests: NO SPECIAL REQUESTS        GRAM STAIN       GRAM POSITIVE COCCI IN CLUSTERS ANAEROBIC BOTTLE                  SMEAR CALLED TO AND CORRECTLY REPEATED BY: Alessandra Loera RN 3S TO [facility] [id number] AT [id number]           Culture result:       Finegoldia magna GROWING IN THE ANAEROBIC BOTTLE          CULTURE, BLOOD [[id number]]  (Abnormal) Collected: 08/11/21 2215    Order Status: Completed Specimen: Blood Updated: 08/14/21 1500     Special Requests: NO SPECIAL REQUESTS        GRAM STAIN       GRAM POSITIVE COCCI IN CLUSTERS ANAEROBIC BOTTLE                  SMEAR CALLED TO AND CORRECTLY REPEATED BY: Carol Jean RN 3S TO [facility] [id number] AT 0502           Culture result:       Peptoniphilus asaccharolyticus GROWING IN THE ANAEROBIC BOTTLE          CULTURE, WOUND [facility] STAIN [[id number]]  (Abnormal)  (Susceptibility) Collected: 08/11/21 2145    Order Status: Completed Specimen: Wound from Foot Updated: 08/15/21 1013     Special Requests: NO SPECIAL REQUESTS        GRAM STAIN NO WBC'S SEEN         1+ GRAM NEGATIVE RODS               FEW GRAM POSITIVE COCCI IN PAIRS           Culture result:       MODERATE PROVIDENCIA STUARTII                  MODERATE STAPHYLOCOCCUS AUREUS          Susceptibility      Providencia stuartii Staphylococcus aureus      REX REX      Amikacin ($) Susceptible       Ampicillin ($) Resistant       Ampicillin/sulbactam ($) Intermediate       Cefazolin ($) Resistant       Cefepime ($$) Susceptible       Cefoxitin Susceptible       Ceftazidime ($) Susceptible       Ceftriaxone ($) Susceptible       Ciprofloxacin ($) Susceptible Susceptible      Clindamycin ($)  Susceptible      Daptomycin ($$$$$)  Susceptible      Doxycycline ($$)  Susceptible      Erythromycin ($$$$)  Susceptible      Gentamicin ($) Resistant Susceptible      Levofloxacin ($) Susceptible Susceptible      Linezolid ($$$$$)  Susceptible      Meropenem ($$) Susceptible       Moxifloxacin ($$$$)  Susceptible      Oxacillin  Susceptible      Piperacillin/Tazobac ($) Susceptible       Rifampin ($$$$)  Susceptible      Tetracycline  Susceptible      Tobramycin ($) Resistant       Trimeth/Sulfa  Susceptible      Vancomycin ($)  Susceptible                 Linear View                   CULTURE, [facility] STAIN [[id number]]  (Abnormal)  (Susceptibility) Collected: 08/11/21 2145    Order Status: Completed Specimen: Wound from Foot Updated: 08/17/21 0946     Special Requests: NO SPECIAL REQUESTS        GRAM STAIN NO WBC'S SEEN         4+ GRAM NEGATIVE RODS               1+ GRAM POSITIVE COCCI IN PAIRS           Culture result:       HEAVY GRAM NEGATIVE RODS (REFER TO ISOLATE 2)                  HEAVY ALCALIGENES FAECALIS                  HEAVY ENTEROCOCCUS FAECALIS                  HEAVY STAPHYLOCOCCUS AUREUS          Susceptibility      Alcaligenes faecalis Enterococcus faecalis      REX REX      Amikacin ($) Susceptible       Ampicillin ($)  Susceptible      Cefazolin ($) Resistant       Ceftazidime ($) Susceptible       Ceftriaxone ($) Susceptible       Ciprofloxacin ($) Susceptible       Daptomycin ($$$$$)  Susceptible      Gentamicin ($) Susceptible       Levofloxacin ($) Susceptible       Linezolid ($$$$$)  Susceptible      Meropenem ($$) Susceptible       Piperacillin/Tazobac ($) Intermediate       Tobramycin ($) Susceptible       Trimeth/Sulfa Resistant       Vancomycin ($)  Susceptible                 Linear View               Susceptibility      Staphylococcus aureus      REX      Ciprofloxacin ($) Susceptible      Clindamycin ($) Susceptible      Daptomycin ($$$$$) Susceptible      Doxycycline ($$) Susceptible      Erythromycin ($$$$) Susceptible      Gentamicin ($) Susceptible      Levofloxacin ($) Susceptible      Linezolid ($$$$$) Susceptible      Moxifloxacin ($$$$) Susceptible      Oxacillin Susceptible      Rifampin ($$$$) Susceptible      Tetracycline Susceptible      Trimeth/Sulfa Susceptible      Vancomycin ($) Susceptible               Linear View                             M Williams [facility]   [phone].

## 2021-08-24 LAB
BUN SERPL-MCNC: 33 MG/DL (ref 7–18)
GLUCOSE BLD STRIP.AUTO-MCNC: 183 MG/DL (ref 70–110)
GLUCOSE BLD STRIP.AUTO-MCNC: 216 MG/DL (ref 70–110)
GLUCOSE BLD STRIP.AUTO-MCNC: 227 MG/DL (ref 70–110)
GLUCOSE BLD STRIP.AUTO-MCNC: 270 MG/DL (ref 70–110)
MAGNESIUM SERPL-MCNC: 2.7 MG/DL (ref 1.6–2.6)

## 2021-08-24 PROCEDURE — 82962 GLUCOSE BLOOD TEST: CPT

## 2021-08-24 PROCEDURE — 74011250637 HC RX REV CODE- 250/637: Performed by: NURSE PRACTITIONER

## 2021-08-24 PROCEDURE — 74011250637 HC RX REV CODE- 250/637: Performed by: STUDENT IN AN ORGANIZED HEALTH CARE EDUCATION/TRAINING PROGRAM

## 2021-08-24 PROCEDURE — 36415 COLL VENOUS BLD VENIPUNCTURE: CPT

## 2021-08-24 PROCEDURE — 74011250636 HC RX REV CODE- 250/636: Performed by: FAMILY MEDICINE

## 2021-08-24 PROCEDURE — 84520 ASSAY OF UREA NITROGEN: CPT

## 2021-08-24 PROCEDURE — P9047 ALBUMIN (HUMAN), 25%, 50ML: HCPCS | Performed by: STUDENT IN AN ORGANIZED HEALTH CARE EDUCATION/TRAINING PROGRAM

## 2021-08-24 PROCEDURE — 74011636637 HC RX REV CODE- 636/637: Performed by: STUDENT IN AN ORGANIZED HEALTH CARE EDUCATION/TRAINING PROGRAM

## 2021-08-24 PROCEDURE — 74011250636 HC RX REV CODE- 250/636: Performed by: STUDENT IN AN ORGANIZED HEALTH CARE EDUCATION/TRAINING PROGRAM

## 2021-08-24 PROCEDURE — 83735 ASSAY OF MAGNESIUM: CPT

## 2021-08-24 PROCEDURE — 65660000000 HC RM CCU STEPDOWN

## 2021-08-24 PROCEDURE — 74011000258 HC RX REV CODE- 258: Performed by: FAMILY MEDICINE

## 2021-08-24 PROCEDURE — 74011250637 HC RX REV CODE- 250/637: Performed by: HOSPITALIST

## 2021-08-24 RX ORDER — SPIRONOLACTONE 25 MG/1
25 TABLET ORAL DAILY
Status: DISCONTINUED | OUTPATIENT
Start: 2021-08-25 | End: 2021-09-03 | Stop reason: HOSPADM

## 2021-08-24 RX ORDER — FUROSEMIDE 10 MG/ML
20 INJECTION INTRAMUSCULAR; INTRAVENOUS DAILY
Status: DISCONTINUED | OUTPATIENT
Start: 2021-08-25 | End: 2021-09-03

## 2021-08-24 RX ADMIN — INSULIN LISPRO 2 UNITS: 100 INJECTION, SOLUTION INTRAVENOUS; SUBCUTANEOUS at 00:27

## 2021-08-24 RX ADMIN — INSULIN GLARGINE 5 UNITS: 100 INJECTION, SOLUTION SUBCUTANEOUS at 08:45

## 2021-08-24 RX ADMIN — FUROSEMIDE 40 MG: 10 INJECTION, SOLUTION INTRAMUSCULAR; INTRAVENOUS at 08:45

## 2021-08-24 RX ADMIN — PROMETHAZINE HYDROCHLORIDE 25 MG: 25 INJECTION INTRAMUSCULAR; INTRAVENOUS at 02:42

## 2021-08-24 RX ADMIN — INSULIN LISPRO 4 UNITS: 100 INJECTION, SOLUTION INTRAVENOUS; SUBCUTANEOUS at 06:30

## 2021-08-24 RX ADMIN — ONDANSETRON 4 MG: 2 INJECTION INTRAMUSCULAR; INTRAVENOUS at 23:50

## 2021-08-24 RX ADMIN — ALBUMIN (HUMAN) 25 G: 0.25 INJECTION, SOLUTION INTRAVENOUS at 00:27

## 2021-08-24 RX ADMIN — HEPARIN SODIUM 5000 UNITS: 5000 INJECTION INTRAVENOUS; SUBCUTANEOUS at 05:01

## 2021-08-24 RX ADMIN — ALBUMIN (HUMAN) 25 G: 0.25 INJECTION, SOLUTION INTRAVENOUS at 06:30

## 2021-08-24 RX ADMIN — METOPROLOL SUCCINATE 25 MG: 25 TABLET, EXTENDED RELEASE ORAL at 08:45

## 2021-08-24 NOTE — PROGRESS NOTES
VASCULAR AND TRANSPLANT       PROGRESS NOTE    Date: 2021    Post-Op Day:  12  S/p right below knee guillotine amputation.    5  S/p closure right below knee amputation.      Plan:   Continue prevena dressing to right bka. Hold sedative medications for now. Pt/ot    Assessment:   45 y/o female with gangrene right foot. S/p amputation    Subjective:   Pt still quite somnolent today. Has been refusing all medications per nursing staff. Will not answer my questions, but asks why I keep waking her. Objective:   Admit weight: Weight: 41.1 kg (90 lb 9.7 oz)  Last recorded weight: Weight: 73.9 kg (163 lb)    Temp (24hrs), Av.1 °F (36.7 °C), Min:97.4 °F (36.3 °C), Max:98.7 °F (37.1 °C)          [unfilled]  No intake or output data in the 24 hours ending 21 1608    Right bka with knee immobilizer in place. Stump with prevena dressing. Stump is soft, minimally tender. Labs:     BMP: Recent Labs     21  0100   BUN 20*      CO2 26     CBC:    Recent Labs     21  0100   WBC 7.2   HCT 22.2*   RDW 16.6*     PT/INR: No results for input(s): INR, INREXT in the last 72 hours.     No lab exists for component: PT    Arterial Blood Gases   No results found for: LPMO2, FIO2, PEEP, PH1, PCO2, PO2, HCO3, MODE     MICRO:  Results     Procedure Component Value Units Date/Time    RESPIRATORY VIRUS PANEL W/COVID-19, PCR [[id number]] Collected: 21 1630    Order Status: Completed Specimen: Nasopharyngeal Updated: 21 0815     Adenovirus Not detected        Coronavirus 229E Not detected        Coronavirus HKU1 Not detected        Coronavirus CVNL63 Not detected        Coronavirus OC43 Not detected        SARS-CoV-2, PCR Not detected        Metapneumovirus Not detected        Rhinovirus and Enterovirus Not detected        Influenza A Not detected        Influenza B Not detected        Parainfluenza 1 Not detected Parainfluenza 2 Not detected        Parainfluenza 3 Not detected        Parainfluenza virus 4 Not detected        RSV by PCR Not detected        B. parapertussis, PCR Not detected        Bordetella pertussis - PCR Not detected        Chlamydophila pneumoniae DNA, QL, PCR Not detected        Mycoplasma pneumoniae DNA, QL, PCR Not detected       COVID-19 RAPID TEST [[id number]] Collected: 08/17/21 0955    Order Status: Completed Specimen: Nasopharyngeal Updated: 08/17/21 1041     Specimen source Nasopharyngeal        COVID-19 rapid test Not detected        Comment: Rapid Abbott ID Now       Rapid NAAT:  The specimen is NEGATIVE for SARS-CoV-2, the novel coronavirus associated with COVID-19. Negative results should be treated as presumptive and, if inconsistent with clinical signs and symptoms or necessary for patient management, should be tested with an alternative molecular assay. Negative results do not preclude SARS-CoV-2 infection and should not be used as the sole basis for patient management decisions. This test has been authorized by the FDA under an Emergency Use Authorization (EUA) for use by authorized laboratories.    Fact sheet for Healthcare Providers: ConventionUpdate.co.nz  Fact sheet for Patients: ConventionUpdate.co.nz       Methodology: Isothermal Nucleic Acid Amplification         CULTURE, BLOOD [[id number]] Collected: 08/17/21 0520    Order Status: Completed Specimen: Blood Updated: 08/23/21 0723     Special Requests: NO SPECIAL REQUESTS        Culture result: NO GROWTH 6 DAYS       CULTURE, BLOOD [[id number]] Collected: 08/16/21 0530    Order Status: Completed Specimen: Blood Updated: 08/22/21 0723     Special Requests: NO SPECIAL REQUESTS        Culture result: NO GROWTH 6 DAYS       CULTURE, BLOOD [[id number]] Collected: 08/16/21 0530    Order Status: Completed Specimen: Blood Updated: 08/22/21 0723     Special Requests: NO SPECIAL REQUESTS Culture result: NO GROWTH 6 DAYS       CULTURE, BLOOD [[id number]] Collected: 08/15/21 0316    Order Status: Completed Specimen: Blood Updated: 08/21/21 0730     Special Requests: NO SPECIAL REQUESTS        Culture result: NO GROWTH 6 DAYS       CULTURE, BLOOD [[id number]] Collected: 08/15/21 0316    Order Status: Completed Specimen: Blood Updated: 08/21/21 0730     Special Requests: NO SPECIAL REQUESTS        Culture result: NO GROWTH 6 DAYS       CULTURE, BLOOD [[id number]] Collected: 08/14/21 0342    Order Status: Completed Specimen: Blood Updated: 08/21/21 0730     Special Requests: NO SPECIAL REQUESTS        Culture result: NO GROWTH 7 DAYS       CULTURE, BLOOD [[id number]] Collected: 08/14/21 0342    Order Status: Completed Specimen: Blood Updated: 08/21/21 0730     Special Requests: NO SPECIAL REQUESTS        Culture result: NO GROWTH 7 DAYS       CULTURE, BLOOD [[id number]] Collected: 08/13/21 1150    Order Status: Completed Specimen: Blood Updated: 08/19/21 1230     Special Requests: NO SPECIAL REQUESTS        Culture result: NO GROWTH 6 DAYS       CULTURE, URINE [[id number]] Collected: 08/11/21 2230    Order Status: Canceled Specimen: Urine from Clean catch     CULTURE, BLOOD [[id number]] Collected: 08/11/21 2225    Order Status: Completed Specimen: Blood Updated: 08/14/21 1502     Special Requests: NO SPECIAL REQUESTS        GRAM STAIN       GRAM POSITIVE COCCI IN CLUSTERS ANAEROBIC BOTTLE                  SMEAR CALLED TO AND CORRECTLY REPEATED BY: Virgen Mayfield RN 3S TO [facility] [id number] AT 1458           Culture result:       Finegoldia magna GROWING IN THE ANAEROBIC BOTTLE          CULTURE, BLOOD [[id number]]  (Abnormal) Collected: 08/11/21 2215    Order Status: Completed Specimen: Blood Updated: 08/14/21 1500     Special Requests: NO SPECIAL REQUESTS        GRAM STAIN       GRAM POSITIVE COCCI IN CLUSTERS ANAEROBIC BOTTLE                  SMEAR CALLED TO AND CORRECTLY REPEATED BY: [id number] Darrell RN 3S TO [facility] [id number] AT 0502           Culture result:       Peptoniphilus asaccharolyticus GROWING IN THE ANAEROBIC BOTTLE          CULTURE, WOUND Bernardino Lucas STAIN [[id number]]  (Abnormal)  (Susceptibility) Collected: 08/11/21 2145    Order Status: Completed Specimen: Wound from Foot Updated: 08/15/21 1013     Special Requests: NO SPECIAL REQUESTS        GRAM STAIN NO WBC'S SEEN         1+ GRAM NEGATIVE RODS               FEW GRAM POSITIVE COCCI IN PAIRS           Culture result:       MODERATE PROVIDENCIA STUARTII                  MODERATE STAPHYLOCOCCUS AUREUS          Susceptibility      Providencia stuartii Staphylococcus aureus      REX REX      Amikacin ($) Susceptible       Ampicillin ($) Resistant       Ampicillin/sulbactam ($) Intermediate       Cefazolin ($) Resistant       Cefepime ($$) Susceptible       Cefoxitin Susceptible       Ceftazidime ($) Susceptible       Ceftriaxone ($) Susceptible       Ciprofloxacin ($) Susceptible Susceptible      Clindamycin ($)  Susceptible      Daptomycin ($$$$$)  Susceptible      Doxycycline ($$)  Susceptible      Erythromycin ($$$$)  Susceptible      Gentamicin ($) Resistant Susceptible      Levofloxacin ($) Susceptible Susceptible      Linezolid ($$$$$)  Susceptible      Meropenem ($$) Susceptible       Moxifloxacin ($$$$)  Susceptible      Oxacillin  Susceptible      Piperacillin/Tazobac ($) Susceptible       Rifampin ($$$$)  Susceptible      Tetracycline  Susceptible      Tobramycin ($) Resistant       Trimeth/Sulfa  Susceptible      Vancomycin ($)  Susceptible                 Linear View                   CULTURE, Paulina Hipps STAIN [[id number]]  (Abnormal)  (Susceptibility) Collected: 08/11/21 2145    Order Status: Completed Specimen: Wound from Foot Updated: 08/17/21 0946     Special Requests: NO SPECIAL REQUESTS        GRAM STAIN NO WBC'S SEEN         4+ GRAM NEGATIVE RODS               1+ GRAM POSITIVE COCCI IN PAIRS           Culture result:       HEAVY GRAM NEGATIVE RODS (REFER TO ISOLATE 2)                  HEAVY ALCALIGENES FAECALIS                  HEAVY ENTEROCOCCUS FAECALIS                  HEAVY STAPHYLOCOCCUS AUREUS          Susceptibility      Alcaligenes faecalis Enterococcus faecalis      REX REX      Amikacin ($) Susceptible       Ampicillin ($)  Susceptible      Cefazolin ($) Resistant       Ceftazidime ($) Susceptible       Ceftriaxone ($) Susceptible       Ciprofloxacin ($) Susceptible       Daptomycin ($$$$$)  Susceptible      Gentamicin ($) Susceptible       Levofloxacin ($) Susceptible       Linezolid ($$$$$)  Susceptible      Meropenem ($$) Susceptible       Piperacillin/Tazobac ($) Intermediate       Tobramycin ($) Susceptible       Trimeth/Sulfa Resistant       Vancomycin ($)  Susceptible                 Linear View               Susceptibility      Staphylococcus aureus      REX      Ciprofloxacin ($) Susceptible      Clindamycin ($) Susceptible      Daptomycin ($$$$$) Susceptible      Doxycycline ($$) Susceptible      Erythromycin ($$$$) Susceptible      Gentamicin ($) Susceptible      Levofloxacin ($) Susceptible      Linezolid ($$$$$) Susceptible      Moxifloxacin ($$$$) Susceptible      Oxacillin Susceptible      Rifampin ($$$$) Susceptible      Tetracycline Susceptible      Trimeth/Sulfa Susceptible      Vancomycin ($) Susceptible               Linear View                                Carole Yamila PA-C   [phone].

## 2021-08-24 NOTE — ROUTINE PROCESS
1300: Assumed patient care from off going nurse DANIELLE Hernadez RN   2625: Assessment completed at this time. Patient refusing medication at this time. Resting in bed, bed locked in lowest position call bell in reach.

## 2021-08-24 NOTE — PROGRESS NOTES
Occupational Therapy Treatment Attempt     Chart reviewed. Attempted Occupational Therapy Treatment, however, patient unable to be seen due to:  []  Nausea/vomiting  []  Eating  []  Pain  []  Patient too lethargic  []  Off Unit for testing/procedure  []  Dialysis treatment in progress  []  Telemetry Results  [x]  Other: Pt refusing participation with therapy at this time. Will f/u later as patient's schedule allows.   Dianna Jolly, OTR/L

## 2021-08-24 NOTE — DIABETES MGMT
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     Recommend: continue with current regimen    BG in target range (non-ICU\" < 180 ; -180):  [] Yes  [x] No      Steroids: no    Current Insulin regimen: Lantus 5 units + - Humalog Normal Insulin Sensitivity Corrective Coverage    TDD previous day = 2    Most recent blood glucose results:   Lab Results   Component Value Date/Time    GLUCPOC 216 (H) 08/24/2021 06:17 AM    GLUCPOC 183 (H) 08/24/2021 12:10 AM    GLUCPOC 168 (H) 08/23/2021 05:05 PM         Hypo: no    HbA1C: equivalent  to ave BGlucose of 150  mg/dl for 2-3 months prior to admission  Lab Results   Component Value Date/Time    Hemoglobin A1c 7.0 (H) 08/11/2021 09:35 PM       Adequate glycemic control PTA:  [x] Yes  [] No      Home diabetes medications:  Key Antihyperglycemic Medications             metFORMIN (GLUCOPHAGE) 1,000 mg tablet (Taking) Take 1,000 mg by mouth two (2) times daily (with meals). Indications: type 2 diabetes mellitus    glipiZIDE SR (GLUCOTROL XL) 5 mg CR tablet (Taking) Take 5 mg by mouth daily. metFORMIN (GLUCOPHAGE) 500 mg tablet Take 1 Tab by mouth two (2) times daily (with meals). Goals:  Blood glucose will be within target range of 70 - 180 mg/dL by: 8/31    Diet:   Active Orders   Diet    ADULT DIET Regular; 4 carb choices (60 gm/meal);  Low Fat/Low Chol/High Fiber/2 gm Na       Patient Vitals for the past 100 hrs:   % Diet Eaten   08/22/21 1348 0%        Education:  _____ Refer to Diabetes Education Record                       ___X__ Education not indicated at this time

## 2021-08-24 NOTE — PROGRESS NOTES
Problem: Falls - Risk of  Goal: *Absence of Falls  Description: Document Tyree Mohan Fall Risk and appropriate interventions in the flowsheet. Outcome: Progressing Towards Goal  Note: Fall Risk Interventions:  Mobility Interventions: Communicate number of staff needed for ambulation/transfer         Medication Interventions: Evaluate medications/consider consulting pharmacy    Elimination Interventions: Call light in reach    History of Falls Interventions:  Investigate reason for fall, Room close to nurse's station

## 2021-08-24 NOTE — ROUTINE PROCESS
Bedside and Verbal shift change report given to JOHNSON Woo rn (oncoming nurse) by Rajesh Hays RN (offgoing nurse). Report included the following information SBAR, Kardex, MAR and Recent Results.

## 2021-08-24 NOTE — PROGRESS NOTES
5392  Assumed care of pt at this time. Assessment complete. Pt alert and oriented x 4 non-cooperative and withdrawn. Shows no sign of distress. Fall risk arm band in place. Denies SOB and chest pain. Pt lungs clear bilaterally. Skin dry and warm to touch to BLE. Pt does no trespond or state any pain number to nurse when asked. Pt has 18 G IV to L and right arm. Pt has prevena wound vac dressing to RLE with immobilizer in place and kerlex to left foot CDI . On heparin for VTE. Incentive spirometer at bedside. Pt encouraged to continue use of IS. Pt verbalized understanding. Call light and possessions within reach. Bed locked and in low position. Will continue to monitor. Pt very agitated and uncooperative refuses to take oral meds.  Nurse got pt to agree to lasix IV, lantus and metoprolol PO     1200  Nurse informed pt brief was changed at this time

## 2021-08-24 NOTE — ROUTINE PROCESS
Bedside and Verbal shift change report given to LEXI Sanchez RN by Lauri Olmos RN. Report included the following information SBAR, Kardex, Intake/Output and MAR.

## 2021-08-24 NOTE — PROGRESS NOTES
DC Plan: SNF vs LTC    Care manager rounded on patient who at room entry had slid down in bed, right leg in black immobilizer was off right side of bed and patient slow to respond to CM questioning. With assistance of CNA, repositioned patient with max assist x 2 to eventually end up on left side with pillow supports. During repositioning, patient said, \"please no pills now! \"  Patient appears to minimally assist with own care and has depressed affect. Care Management Interventions  PCP Verified by CM:  Yes  Mode of Transport at Discharge: Self  Transition of Care Consult (CM Consult): Discharge Planning  Current Support Network: Own Home (takes care of mother at home)  The Plan for Transition of Care is Related to the Following Treatment Goals : gangrene of both feet, diabetes  The Patient and/or Patient Representative was Provided with a Choice of Provider and Agrees with the Discharge Plan?: Yes  Name of the Patient Representative Who was Provided with a Choice of Provider and Agrees with the Discharge Plan: Alexandre Smith, patient  Discharge Location  Discharge Placement:  (TBD)

## 2021-08-24 NOTE — ROUTINE PROCESS
Bedside and Verbal shift change report given to Nathaniel Ren RN  (oncoming nurse) by Basia Perez RN  (offgoing nurse). Report included the following information SBAR, Kardex, MAR and Recent Results.

## 2021-08-24 NOTE — PROGRESS NOTES
8/24/2021  Chart reviewed. PT treatment not completed due to:  [] Off Unit for testing/procedure  [] Pain  [] Eating  [] Patient too lethargic  [] Nausea/vomiting  [] Dialysis treatment in progress   [x] Other:Pt refuses participation with therapy session. Found sleeping but easily aroused. Pt supine with brief partially removed. Brief re-taped on L side. Pillow beneath R residual limb reposition and R LE placed in neutral position (pt promptly abducted and ER'd limb thereafter). Nurse 9 [facility] notified of above  Will f/u at later time as pt schedule allows.     Ani Carranza, PT

## 2021-08-25 LAB
ALBUMIN SERPL-MCNC: 4.7 G/DL (ref 3.4–5)
ALBUMIN/GLOB SERPL: 2.1 {RATIO} (ref 0.8–1.7)
ALP SERPL-CCNC: 117 U/L (ref 45–117)
ALT SERPL-CCNC: 39 U/L (ref 13–56)
ANION GAP SERPL CALC-SCNC: 13 MMOL/L (ref 3–18)
AST SERPL-CCNC: 103 U/L (ref 10–38)
BASOPHILS # BLD: 0 K/UL (ref 0–0.1)
BASOPHILS NFR BLD: 0 % (ref 0–2)
BILIRUB SERPL-MCNC: 1.2 MG/DL (ref 0.2–1)
BUN SERPL-MCNC: 64 MG/DL (ref 7–18)
BUN/CREAT SERPL: 33 (ref 12–20)
CALCIUM SERPL-MCNC: 9.2 MG/DL (ref 8.5–10.1)
CHLORIDE SERPL-SCNC: 104 MMOL/L (ref 100–111)
CO2 SERPL-SCNC: 25 MMOL/L (ref 21–32)
CREAT SERPL-MCNC: 1.95 MG/DL (ref 0.6–1.3)
DIFFERENTIAL METHOD BLD: ABNORMAL
EOSINOPHIL # BLD: 0 K/UL (ref 0–0.4)
EOSINOPHIL NFR BLD: 0 % (ref 0–5)
ERYTHROCYTE [DISTWIDTH] IN BLOOD BY AUTOMATED COUNT: 18.1 % (ref 11.6–14.5)
GLOBULIN SER CALC-MCNC: 2.2 G/DL (ref 2–4)
GLUCOSE BLD STRIP.AUTO-MCNC: 302 MG/DL (ref 70–110)
GLUCOSE BLD STRIP.AUTO-MCNC: 311 MG/DL (ref 70–110)
GLUCOSE BLD STRIP.AUTO-MCNC: 333 MG/DL (ref 70–110)
GLUCOSE SERPL-MCNC: 281 MG/DL (ref 74–99)
HCT VFR BLD AUTO: 24.3 % (ref 35–45)
HGB BLD-MCNC: 7.5 G/DL (ref 12–16)
INR PPP: 1.7 (ref 0.8–1.2)
LYMPHOCYTES # BLD: 0.5 K/UL (ref 0.9–3.6)
LYMPHOCYTES NFR BLD: 4 % (ref 21–52)
MCH RBC QN AUTO: 29.1 PG (ref 24–34)
MCHC RBC AUTO-ENTMCNC: 30.9 G/DL (ref 31–37)
MCV RBC AUTO: 94.2 FL (ref 78–100)
MONOCYTES # BLD: 0.6 K/UL (ref 0.05–1.2)
MONOCYTES NFR BLD: 6 % (ref 3–10)
NEUTS SEG # BLD: 9.8 K/UL (ref 1.8–8)
NEUTS SEG NFR BLD: 89 % (ref 40–73)
PLATELET # BLD AUTO: 270 K/UL (ref 135–420)
PMV BLD AUTO: 10.5 FL (ref 9.2–11.8)
POTASSIUM SERPL-SCNC: 5.2 MMOL/L (ref 3.5–5.5)
PROT SERPL-MCNC: 6.9 G/DL (ref 6.4–8.2)
PROTHROMBIN TIME: 19.1 SEC (ref 11.5–15.2)
RBC # BLD AUTO: 2.58 M/UL (ref 4.2–5.3)
SODIUM SERPL-SCNC: 142 MMOL/L (ref 136–145)
WBC # BLD AUTO: 11 K/UL (ref 4.6–13.2)

## 2021-08-25 PROCEDURE — P9047 ALBUMIN (HUMAN), 25%, 50ML: HCPCS | Performed by: STUDENT IN AN ORGANIZED HEALTH CARE EDUCATION/TRAINING PROGRAM

## 2021-08-25 PROCEDURE — 74011250637 HC RX REV CODE- 250/637: Performed by: HOSPITALIST

## 2021-08-25 PROCEDURE — 74011636637 HC RX REV CODE- 636/637: Performed by: STUDENT IN AN ORGANIZED HEALTH CARE EDUCATION/TRAINING PROGRAM

## 2021-08-25 PROCEDURE — 85610 PROTHROMBIN TIME: CPT

## 2021-08-25 PROCEDURE — 74011250636 HC RX REV CODE- 250/636: Performed by: STUDENT IN AN ORGANIZED HEALTH CARE EDUCATION/TRAINING PROGRAM

## 2021-08-25 PROCEDURE — 65660000000 HC RM CCU STEPDOWN

## 2021-08-25 PROCEDURE — 99232 SBSQ HOSP IP/OBS MODERATE 35: CPT | Performed by: NURSE PRACTITIONER

## 2021-08-25 PROCEDURE — 74011250636 HC RX REV CODE- 250/636: Performed by: INTERNAL MEDICINE

## 2021-08-25 PROCEDURE — C9113 INJ PANTOPRAZOLE SODIUM, VIA: HCPCS | Performed by: INTERNAL MEDICINE

## 2021-08-25 PROCEDURE — 85025 COMPLETE CBC W/AUTO DIFF WBC: CPT

## 2021-08-25 PROCEDURE — 74011250637 HC RX REV CODE- 250/637: Performed by: STUDENT IN AN ORGANIZED HEALTH CARE EDUCATION/TRAINING PROGRAM

## 2021-08-25 PROCEDURE — 82962 GLUCOSE BLOOD TEST: CPT

## 2021-08-25 PROCEDURE — 80053 COMPREHEN METABOLIC PANEL: CPT

## 2021-08-25 PROCEDURE — 74011250637 HC RX REV CODE- 250/637: Performed by: NURSE PRACTITIONER

## 2021-08-25 RX ORDER — SODIUM CHLORIDE 9 MG/ML
50 INJECTION, SOLUTION INTRAVENOUS CONTINUOUS
Status: DISCONTINUED | OUTPATIENT
Start: 2021-08-25 | End: 2021-08-28

## 2021-08-25 RX ORDER — FENTANYL CITRATE 50 UG/ML
100 INJECTION, SOLUTION INTRAMUSCULAR; INTRAVENOUS
Status: CANCELLED | OUTPATIENT
Start: 2021-08-25 | End: 2021-08-25

## 2021-08-25 RX ORDER — EPINEPHRINE 0.1 MG/ML
1 INJECTION INTRACARDIAC; INTRAVENOUS
Status: CANCELLED | OUTPATIENT
Start: 2021-08-25 | End: 2021-08-26

## 2021-08-25 RX ORDER — SODIUM CHLORIDE 0.9 % (FLUSH) 0.9 %
5-40 SYRINGE (ML) INJECTION EVERY 8 HOURS
Status: CANCELLED | OUTPATIENT
Start: 2021-08-25

## 2021-08-25 RX ORDER — NALOXONE HYDROCHLORIDE 0.4 MG/ML
0.4 INJECTION, SOLUTION INTRAMUSCULAR; INTRAVENOUS; SUBCUTANEOUS
Status: CANCELLED | OUTPATIENT
Start: 2021-08-25 | End: 2021-08-25

## 2021-08-25 RX ORDER — ATROPINE SULFATE 0.1 MG/ML
0.5 INJECTION INTRAVENOUS
Status: CANCELLED | OUTPATIENT
Start: 2021-08-25 | End: 2021-08-26

## 2021-08-25 RX ORDER — MIDAZOLAM HYDROCHLORIDE 1 MG/ML
.25-5 INJECTION, SOLUTION INTRAMUSCULAR; INTRAVENOUS
Status: CANCELLED | OUTPATIENT
Start: 2021-08-25 | End: 2021-08-25

## 2021-08-25 RX ORDER — DEXTROMETHORPHAN/PSEUDOEPHED 2.5-7.5/.8
1.2 DROPS ORAL
Status: CANCELLED | OUTPATIENT
Start: 2021-08-25

## 2021-08-25 RX ORDER — SODIUM CHLORIDE 0.9 % (FLUSH) 0.9 %
5-40 SYRINGE (ML) INJECTION AS NEEDED
Status: CANCELLED | OUTPATIENT
Start: 2021-08-25

## 2021-08-25 RX ORDER — PANTOPRAZOLE SODIUM 40 MG/10ML
40 INJECTION, POWDER, LYOPHILIZED, FOR SOLUTION INTRAVENOUS EVERY 12 HOURS
Status: DISCONTINUED | OUTPATIENT
Start: 2021-08-25 | End: 2021-09-01 | Stop reason: ALTCHOICE

## 2021-08-25 RX ORDER — SODIUM CHLORIDE 9 MG/ML
1000 INJECTION, SOLUTION INTRAVENOUS CONTINUOUS
Status: CANCELLED | OUTPATIENT
Start: 2021-08-25

## 2021-08-25 RX ORDER — FLUMAZENIL 0.1 MG/ML
0.2 INJECTION INTRAVENOUS
Status: CANCELLED | OUTPATIENT
Start: 2021-08-25 | End: 2021-08-25

## 2021-08-25 RX ORDER — DIPHENHYDRAMINE HYDROCHLORIDE 50 MG/ML
50 INJECTION, SOLUTION INTRAMUSCULAR; INTRAVENOUS ONCE
Status: CANCELLED | OUTPATIENT
Start: 2021-08-25 | End: 2021-08-25

## 2021-08-25 RX ORDER — SODIUM CHLORIDE 9 MG/ML
1000 INJECTION, SOLUTION INTRAVENOUS CONTINUOUS
Status: CANCELLED | OUTPATIENT
Start: 2021-08-25 | End: 2021-08-25

## 2021-08-25 RX ADMIN — INSULIN LISPRO 8 UNITS: 100 INJECTION, SOLUTION INTRAVENOUS; SUBCUTANEOUS at 14:19

## 2021-08-25 RX ADMIN — METHOCARBAMOL TABLETS 1000 MG: 500 TABLET, COATED ORAL at 22:37

## 2021-08-25 RX ADMIN — HEPARIN SODIUM 5000 UNITS: 5000 INJECTION INTRAVENOUS; SUBCUTANEOUS at 14:19

## 2021-08-25 RX ADMIN — ALBUMIN (HUMAN) 25 G: 0.25 INJECTION, SOLUTION INTRAVENOUS at 00:33

## 2021-08-25 RX ADMIN — PANTOPRAZOLE SODIUM 40 MG: 40 INJECTION, POWDER, FOR SOLUTION INTRAVENOUS at 00:32

## 2021-08-25 RX ADMIN — ACETAMINOPHEN 975 MG: 325 TABLET ORAL at 22:37

## 2021-08-25 RX ADMIN — INSULIN GLARGINE 5 UNITS: 100 INJECTION, SOLUTION SUBCUTANEOUS at 10:17

## 2021-08-25 RX ADMIN — ALBUMIN (HUMAN) 25 G: 0.25 INJECTION, SOLUTION INTRAVENOUS at 14:18

## 2021-08-25 RX ADMIN — INSULIN LISPRO 8 UNITS: 100 INJECTION, SOLUTION INTRAVENOUS; SUBCUTANEOUS at 20:52

## 2021-08-25 RX ADMIN — MAGNESIUM OXIDE TAB 400 MG (241.3 MG ELEMENTAL MG) 400 MG: 400 (241.3 MG) TAB at 20:10

## 2021-08-25 RX ADMIN — ALBUMIN (HUMAN) 25 G: 0.25 INJECTION, SOLUTION INTRAVENOUS at 06:00

## 2021-08-25 RX ADMIN — ATORVASTATIN CALCIUM 40 MG: 20 TABLET, FILM COATED ORAL at 22:37

## 2021-08-25 RX ADMIN — ONDANSETRON 4 MG: 2 INJECTION INTRAMUSCULAR; INTRAVENOUS at 20:09

## 2021-08-25 RX ADMIN — PANTOPRAZOLE SODIUM 40 MG: 40 INJECTION, POWDER, FOR SOLUTION INTRAVENOUS at 20:09

## 2021-08-25 NOTE — PROGRESS NOTES
Physical Therapy Treatment Attempt     Chart reviewed. Attempted Physical Therapy Treatment, however, patient unable to be seen due to:  []  Nausea/vomiting  []  Eating  []  Pain  []  Patient too lethargic  []  Off Unit for testing/procedure  []  Dialysis treatment in progress   []  Telemetry Results  [x]  Other: Pt awake on PT entry, covering herself with her blanket. PT attempting to converse with pt, pt turning head away, ignoring PT, closing her eyes and pretending to sleep. Provided pt education of importance of mobility. Will follow up tomorrow as patient's schedule allows.    Thank you for this referral.    Lulu Matias, PT, DPT

## 2021-08-25 NOTE — PROGRESS NOTES
Called for refusal to take med earlier and consideration for psych referall  Discussed leaving that to primary team    Rapid response called for   Upper GI bleed and nausea   Family member at bedside  Will start PPI   Agree wth holding heparin and aspirin  Due to bleed  Check labs  Consider GI consult if she wants further treatment     Discussed with patient possible comfort measures   Since she is refusing treatment  Will obtain pallative consult patient does not want psych consult states she is not crazy  Family will discuss with patient

## 2021-08-25 NOTE — PROGRESS NOTES
Cardiology Progress Note        Patient: Chandler Anthony        Sex: female          DOA: 8/11/2021  YOB: 1970      Age:  46 y.o.        LOS:  LOS: 13 days    Patient seen and examined, chart reviewed. Assessment/Plan     Patient Active Problem List   Diagnosis Code    Osteomyelitis ([facility] M86.9    Type 2 diabetes mellitus with left diabetic foot ulcer ([facility] E11.621, L97.529    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 ([facility] E11.52    YUNIEL (acute kidney injury) ([facility] N17.9    PAD (peripheral artery disease) ([facility]) I73.9    Bilateral carotid artery stenosis I65.23    Gangrene of left foot ([facility]) I96    Non compliance with medical treatment Z91.19    Gangrene of both feet [facility]) I96    Fall W19. Susan Dominguez Tobacco use Z72.0    Preoperative evaluation to rule out surgical contraindication Z01.818    Anemia D64.9    Sepsis ([facility]) A41.9    Status post below-knee amputation of right lower extremity ([facility]) Z89.511    Bacteremia R78.81    Dyspnea R06.00    Pulmonary infiltrates Q65.5    Systolic CHF, acute ([facility]) I50.21    Debility R53.81      Acute systolic heart failure  Hypokalemia   Thrombocytopenia      Echocardiogram revealed     · Test was terminated early due to patient becoming combative. · Left Ventricle: Normal cavity size. Mild concentric hypertrophy. The estimated EF is 40 - 45%. Mildly reduced systolic function. There is inconclusive left ventricular diastolic function E'E= 71.63. Wall Scoring: The left ventricular wall motion is globally hypokinetic. · Pulmonary Artery: Pulmonary arteries not well visualized. Pulmonary arterial systolic pressure (PASP) is 35 mmHg. Pulmonary hypertension found to be mild.     Plan:      Continue Metoprolol succinate and losartan  Change IV Lasix to 20 mg once a day  Start Aldactone 25 mg once a day  Consider discontinuing albumin if ok with hospitalist   CBC and CMP tomorrow   Strict input output. Salt restriction up to 2 g per day and fluid restriction up to 1.2 L per   day. Subjective:    cc:  I am ok       REVIEW OF SYSTEMS:     General: No fevers or chills. Cardiovascular: No chest pain,No palpitations, No orthopnea, No PND, No leg swelling, No claudication  Pulmonary: No dyspnea. Gastrointestinal: No nausea, vomiting, bleeding  Neurology: No Dizziness    Objective:      Visit Vitals  BP (!) 150/82   Pulse 91   Temp 98.2 °F (36.8 °C)   Resp 18   Ht 5' 8\" (1.727 m)   Wt 73.9 kg (163 lb)   SpO2 95%   BMI 24.78 kg/m²     Body mass index is 24.78 kg/m². Physical Exam:  General Appearance: Comfortable, not using accessory muscles of respiration. HEENT: ROSA. HEAD: Atraumatic  NECK: No JVD, no thyroidomeglay. CAROTIDS: No bruit  LUNGS: Clear bilaterally. HEART: S1+S2 audible, no murmur, no pericardial rub.      ABD: Non-tender, BS Audible    EXT: Right below knee amputation  NEUROLOGICAL: Responds to verbal commands and fall back to sleep    Medication:  Current Facility-Administered Medications   Medication Dose Route Frequency    [START ON 8/25/2021] spironolactone (ALDACTONE) tablet 25 mg  25 mg Oral DAILY    potassium chloride (K-DUR, KLOR-CON) SR tablet 40 mEq  40 mEq Oral BID    magnesium oxide (MAG-OX) tablet 400 mg  400 mg Oral BID    acetaminophen (TYLENOL) tablet 975 mg  975 mg Oral TID    methocarbamoL (ROBAXIN) tablet 1,000 mg  1,000 mg Oral TID    senna-docusate (PERICOLACE) 8.6-50 mg per tablet 1 Tablet  1 Tablet Oral DAILY    promethazine (PHENERGAN) 25 mg in 0.9% sodium chloride 50 mL IVPB  25 mg IntraVENous Q4H PRN    polyethylene glycol (MIRALAX) packet 17 g  17 g Oral DAILY    losartan (COZAAR) tablet 25 mg  25 mg Oral DAILY    furosemide (LASIX) injection 40 mg  40 mg IntraVENous DAILY    metoprolol succinate (TOPROL-XL) XL tablet 25 mg  25 mg Oral DAILY    0.9% sodium chloride infusion 250 mL  250 mL IntraVENous PRN    famotidine (PEPCID) tablet 20 mg  20 mg Oral BID    [Held by provider] DULoxetine (CYMBALTA) capsule 60 mg  60 mg Oral DAILY    albuterol-ipratropium (DUO-NEB) 2.5 MG-0.5 MG/3 ML  3 mL Nebulization Q4H PRN    albumin human 25% (BUMINATE) solution 25 g  25 g IntraVENous Q6H    insulin glargine (LANTUS) injection 5 Units  5 Units SubCUTAneous DAILY    buPROPion SR (WELLBUTRIN SR) tablet 150 mg  150 mg Oral DAILY    COVID-19 vac,Ad26-PF (Scentbird) injection 1 Dose  1 Dose IntraMUSCular PRIOR TO DISCHARGE    aspirin chewable tablet 81 mg  81 mg Oral DAILY    heparin (porcine) injection 5,000 Units  5,000 Units SubCUTAneous Q8H    naloxone (NARCAN) injection 0.1 mg  0.1 mg IntraVENous PRN    ondansetron (ZOFRAN ODT) tablet 4 mg  4 mg Oral Q8H PRN    Or    ondansetron (ZOFRAN) injection 4 mg  4 mg IntraVENous Q6H PRN    insulin lispro (HUMALOG) injection   SubCUTAneous Q6H    glucose chewable tablet 16 g  16 g Oral PRN    glucagon (GLUCAGEN) injection 1 mg  1 mg IntraMUSCular PRN    dextrose (D50W) injection syrg 12.5-25 g  25-50 mL IntraVENous PRN    atorvastatin (LIPITOR) tablet 40 mg  40 mg Oral QHS               Lab/Data Reviewed:       Recent Labs     08/22/21  0100   WBC 7.2   HGB 7.2*   HCT 22.2*   *     Recent Labs     08/24/21  0213 08/22/21  0100   NA  --  138   K  --  3.3*   CL  --  102   CO2  --  26   GLU  --  83   BUN 33* 20*   CREA  --  1.30   CA  --  7.9*         Signed By: Elfego Soni MD     August 24, 2021

## 2021-08-25 NOTE — PROGRESS NOTES
Patient refusing all of her medications, gown at bottom of bed and patient with just a blanket over herself. Patient not responding to questions except for \"No\" and \"leave me alone\".

## 2021-08-25 NOTE — ROUTINE PROCESS
Family member (kelley) at bedside attempting to persuade patient in accepting treatment. Patient rarely respond verbally back to Ms Peggy Hernandez. Patient did finally allow her vitals to be taken only with her family member assistant. 2325-Patient finally finally agree to take med but held the cups with the medications in her hand for a long period of time. Water was given per request.    2345-As patient was consuming her meds very slowly, patient had coffee ground projectile vomiting. Head was elevate, suctioned performed. RRT called. Vitals within limit. MD in to evaluate.

## 2021-08-25 NOTE — PROGRESS NOTES
Hospitalist Progress Note    Patient: Tatum Braxton MRN: [id number]  CSN: [id number]    YOB: 1970  Age: 46 y.o. Sex: female    DOA: 8/11/2021 LOS:  LOS: 14 days            Chief complaint :  Gangrene of both feet. Assessment/Plan     Patient Active Problem List   Diagnosis Code    [facility]omyel[facility] ([facility] M86.9    Type 2 diabetes mellitus with left diabetic foot ulcer ([facility] E11.621, L97.529    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 ([facility] E11.52    [facility]) [facility] N17.9    PAD (peripheral artery disease) ([facility]) I73.9    Bilateral carotid artery stenosis I65.23    Gangrene of left foot ([facility]) I96    Non compliance with medical treatment Z91.19    Gangrene of both feet [facility]) I96    Fall W19. Berdie Gauze Tobacco use Z72.0    Preoperative evaluation to rule out surgical contraindication Z01.818    Anemia D64.9    Sepsis ([facility]) A41.9    Status post below-knee amputation of right lower extremity ([facility] Z89.511    Bacteremia R78.81    Dyspnea R06.00    Pulmonary infiltrates C84.2    Systolic CHF, acute ([facility]) I50.21    Debility R53.81          51-year-old female with uncontrolled type 2 diabetes mellitus, severe peripheral arterial disease, and tobacco use who has been noncompliant with wound care and hyperbaric oxygen treatment due to recent fall is admitted for severe gangrene of both feet. RRT was called on 08/16/2021, patient appeared confused, shortness of breath with hypoxia. ABG showed metabolic acidosis. Patient refused chest x-ray. She has been refusing medications. Given her condition she was transferred to ICU.    08/17/2021 - I tried to discuss with patient about her critical condition and guarded prognosis, she is not interested in listening to my concerns about her medical condition. She turned her face on other side and did not want to listen to me and did not answer my questions.       Again Patient refusing medications today, tried to talk to patient however she does not answer to questions, wants me to leave her alone. Events of last night noted  Patient was seen by psych on 08/20/2021. Called and discussed with MPOA Ms. Chas Gray. Discussed very poor prognosis. Discussed transitioning to comfort care. Resp -   Dyspnea/hypoxia -  Responded to lasix  CXR with pulmonary edema. Oxygen as needed by NC. ID -   septic shock  -  BP now improved  Continue iv abx,   Blood Cultures growing Peptoniphilus asaccharolyticus. Follow up blood cultures negative  Wound cultures growing providencia, MSSA, E-fecalis, alcaligenes fecalis. ID following. Antibiotics - received zosyn       [facility] - Monitor HD. Acute CHF - on lasix  Echo 45 % and mild pulm htn      Gangrene of bilateral feet    S/p right BKA on 8/12   S/P RIGHT BELOW KNEE AMPUTATION, DRESSING CHANGE TO LEFT FOOT WOUND 08/20     Heme/onc - Follow H&H, plts. Anemia  Received blood transfusion   Monitor H/H    Renal - Trend BUN, Cr, follow I/O. Check and replace Mg, K, phos. YUNIEL - now BUN/Cr worse. Secondary to not taking any meds, not taking any   Metabolic acidosis resolved with bicarb drip    Endocrine -    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 of both feet  continueSliding scale and  lantus      Neuro/ Pain/ Sedation -   Stopped pain meds    GI -   Diabetic diet. Constipation - on pericolace and miralax    Prophylaxis - DVT: heparin, GI: pepcid.     Noncompliance with medical treatment   Continued tobacco use      PT/OT, she is not cooperating with PT/OT. Disposition : TBD    Review of systems  General: No fevers or chills. Cardiovascular: No chest pain or pressure. No palpitations. Pulmonary: see above. Gastrointestinal: No nausea, vomiting. Physical Exam:  General: As above   HEENT: NC, Atraumatic. PERRLA, anicteric sclerae. Lungs: CTA Bilaterally. No Wheezing/Rhonchi/Rales.   Heart:  S1 S2,  No murmur, No Rubs, No Gallops  Abdomen: Soft, Non distended, Non tender.  +Bowel sounds,   Extremities: Right BKA, left foot with dressing  Psych:   Not anxious or agitated. Neurologic:  No acute neurological deficit. Vital signs/Intake and Output:  Visit Vitals  /84 (BP 1 Location: Right lower arm, BP Patient Position: At rest)   Pulse 82   Temp 97.5 °F (36.4 °C)   Resp 18   Ht 5' 8\" (1.727 m)   Wt 73.3 kg (161 lb 9.6 oz)   SpO2 100%   BMI 24.57 kg/m²     Current Shift:  No intake/output data recorded. Last three shifts:  No intake/output data recorded. Labs: Results:       Chemistry Recent Labs     08/25/21  0001 08/24/21  0213   *  --      --    K 5.2  --      --    CO2 25  --    BUN 64* 33*   CREA 1.95*  --    CA 9.2  --    AGAP 13  --    BUCR 33*  --      --    TP 6.9  --    ALB 4.7  --    GLOB 2.2  --    AGRAT 2.1*  --       CBC w/Diff Recent Labs     08/25/21  0001   WBC 11.0   RBC 2.58*   HGB 7.5*   HCT 24.3*      GRANS 89*   LYMPH 4*   EOS 0      Cardiac Enzymes No results for input(s): CPK, CKND1, CONSTANTINO in the last 72 hours. No lab exists for component: CKRMB, TROIP   Coagulation Recent Labs     08/25/21  0030   PTP 19.1*   INR 1.7*       Lipid Panel No results found for: CHOL, CHOLPOCT, CHOLX, CHLST, CHOLV, 919036, HDL, HDLP, LDL, LDLC, DLDLP, 190734, VLDLC, VLDL, TGLX, TRIGL, TRIGP, TGLPOCT, CHHD, CHHDX   BNP No results for input(s): BNPP in the last 72 hours.    Liver Enzymes Recent Labs     08/25/21  0001   TP 6.9   ALB 4.7         Thyroid Studies No results found for: T4, T3U, TSH, TSHEXT, TSHEXT     Procedures/imaging: see electronic medical records for all procedures/Xrays and details which were not copied into this note but were reviewed prior to creation of Plan

## 2021-08-25 NOTE — PROGRESS NOTES
[facility]: [phone] ([phone])  [facility]: [phone]     Patient Name: Carmen Ramos  YOB: 1970    Date of Re-Consult : 8/25/2021  Reason for Consult: End stage disease/care decisions  Requesting Provider: Dr. Addie Hood  Primary Care Physician: Devin Pierre MD      SUMMARY:   Carmen Ramos is a 46 y.o. female with a past history of uncontrolled type 2 diabetes mellitus, severe peripheral arterial disease, previous left transmetatarsal amputation and tobacco use, who was admitted on 8/11/2021 from home with a diagnosis of gangrene of both feet. Current medical issues leading to Palliative Medicine involvement include: Debilitated 59-year-old female with multiple comorbid conditions including noncompliance with wound care treatment due to recent fall presented to the ER with complaints of fever and gangrene in both feet. Palliative medicine is consulted for care decisions. CHIEF COMPLAINT: Fever, gangrene of her feet    HPI/SUBJECTIVE:    Past history as above. Ms. Vania Hummel is a very debilitated 59-year-old female with multiple comorbid conditions including noncompliance with wound care treatment after a recent fall. She has presented to the ER with complaints of fever and gangrene in both feet. She fell approximately 3 weeks ago and has been mostly bedbound ever since. Patient has been unable to attend wound care appointments or hyperbaric appointments due to bedbound status at home. 8/18/2021:  Lying in bed on left side with eyes closed. Arousable and became awake, alert and oriented x4. Not talkative or interested in engaging in conversation. Denies pain or shortness of breath. On room air.    8/25/2021:  Lying in bed with eyes closed. Not talkative or interested in conversation or answering orientation questions. Asked for water once during visit, occasional deep sighing, pulled covers off then back on. Lorena Schulenburg is pulled off. On room air. The patient is:   [x] Verbal and participatory - limited as patient does not like to answer questions  [] Non-participatory due to:     GOALS OF CARE: Full code with full interventions  Patient/Health Care Proxy Stated Goals: Prolong life  TREATMENT PREFERENCES:   Code Status: Full Code         PALLIATIVE DIAGNOSES:   1. Encounter with palliative care/goals of care  2. Gangrene of both feet  3. Noncompliance with medical treatment  4. Debility          PLAN:   8/25/2021:  Seen at bedside along with Ms. Trinidad Beatty RN. Ms. Romario Barfieldjose is well known to the palliative medicine team and is re-consulted to see patient to discuss possible comfort measures. Palliative medicine worked with patient earlier in this hospitalization and helped patient to complete a [place] on 8/19/2021 naming Yue Perez, patient's cousin, as her MPOA. At that time, goals of care were also discussed with patient who affirmed her decision for full code with full aggressive measures. Patient seen this morning and was lying in bed with eyes closed, not verbally responding to questions or engaged in conversation. Patient moved her head from side to side at times, pulled bedcovers off and then back on. Lorena Schulenburg was pulled off. At times, patient would demonstrate deep sighing respirations. No respiratory distress noted. Patient indicated she had nausea by nodding her head but denied any pain. Attempted to engage patient in conversation; however, she kept her eyes closed and simply asked for water. Discussed with bedside RN. Patient may benefit from psychiatry consultation as she appears depressed and is not communicative. Palliative medicine will attempt to follow-up with patient at a later time for further goals of care conversations. Goals of care are full code with full interventions.     Please see below for previous conversations with the palliative medicine team:    8/18/2021: Seen at bedside along with Ms. Celso Sierra, RN in full PPE for droplet plus isolation and the COVID-19 pandemic. Ms. Jayce Palomo is lying in bed with eyes closed laying on her left side. Arousable to verbal stimuli and became awake, alert and oriented x4. Patient was not interested in talking with us and did not engage in conversation. Follow-up from our visit yesterday regarding patient's choice of surrogate decision-maker and possible completion of an AMD.  Patient again stated that she wants her cousin, Antwan Dugan, to be her surrogate decision maker in the event patient is unable to speak for herself. Explained again to patient the importance of completing the AMD document to formalize her wishes for surrogate decision maker. Offered to call her cousin, Antwan Dugan, so they could discuss this decision and form completion. Patient declined wanting to call Akira Geno during our visit and told us \"you keep trying to tell me I am dying\". Offered reassurance to patient that we were interested in supporting her choices for surrogate decision making and were looking for ways to help formalize these choices. Patient closed her eyes and did not engage in further conversation. Palliative medicine will follow up with patient again tomorrow in hopes of completing an AMD to formalize patient's choice for surrogate decision maker. Goals of care remain full code with full interventions. Please see below for previous conversations with the palliative medicine team:    1. Encounter with palliative care/goals of care -seen at bedside along with MsLidia Zamora. Ms. Jayce Palomo is awake, alert and oriented x4. Patient became very frustrated when being asked orientation questions and replied \"do you think I am stupid? \". Attempted to explain to patient the reason we ask orientation questions however patient rolled her eyes and repeated \"do you think I am stupid? \".   Nasal oxygen is off the patient was agreeable to have it reapplied. Pulse oximeter was also off and patient agreed to have it replaced and oxygen saturations ranged from 94 to 97%. Patient seem to get frustrated with any questions asked saying Joseette Speaker do you keep asking me these questions? \". Asked patient what she wanted from this hospitalization and she said \"I just want to live. Don't you all get it?\". Attempted to explain to patient that she had been declining recommended treatments and she said \"I just want to breathe\". Attempted to introduce and explain goals of care, including benefits and burdens of resuscitation, intubation and ventilation. Asked patient about her thoughts on resuscitation in the event of cardiac arrest and intubation in the event of worsening respiratory status and patient declined to respond or answer or our questions. Patient had her eyes closed and did not  wish to discuss this with us. Asked patient if she had ever thought about who she would want to make her medical decisions for her in the event she cannot speak for herself and she stated it would be her cousin, Jonny Collier. Introduced and explained the [facility] and offered to complete with patient today to formalize her wish for her cousin Jonny Collier to be her MPOA. Patient declined to do any paperwork with us. Explained to patient that in the absence of any paperwork and if she was unable to speak for herself that legal decision making would default to her parents who are still living. Patient expressed understanding; however shared that her mother is not able to have a conversation about patient's health. Patient declined to elaborate on what this meant. Patient seemed annoyed throughout our visit and declined to engage in further conversation. Call placed to patient's cousin, Jonny Collier, to try and gather additional information about patient. Luiza Simons shared with us that Lillain Chase prefers to be called Ashley Syed.   Shared with Luiza Simons our conversation and lack of patient's engagement in the conversation. Shared with Katharine Pereira that patient verbally chose her to be surrogate decision-maker however patient was not willing to sign AMD paperwork to formalize this nor was she willing to have an in-depth discussion about goals of care. Katharine Pereira shared with us that patient's mother is essentially bedbound at home and that patient is normally the primary caregiver for her mother. It is unclear who is caring for patient's mother while patient is hospitalized. At this time, goals of care remain full code with full interventions. 2. Gangrene of both feet - primary team managing, vascular surgery consult, underwent right below-knee amputation on 8/12/2021, wound care consult    3. Noncompliance with medical treatment -patient fell approximately 3 weeks prior to admission and has been mainly bedbound at home and has been unable to participate with hyperbaric treatment appointments or wound care appointments. 4. Debility - PPS 40 which indicates an overall poor prognosis, patient lives at home with her mother who is mainly bedbound and patient is the primary caregiver for her mother. Since patient's fall approximately 3 weeks ago, patient has been mainly bedbound as well. Per chart review, patient has been using a bucket to go to the bathroom. 5. Initial consult note routed to primary continuity provider    6. Communicated plan of care with: Palliative IDT, patient      Advance Care Planning:  [] [facility] Interdisciplinary Team has updated the ACP Navigator with [address] and Patient Capacity    Primary Decision [address] ([address]):   Primary Decision Maker: Ren Quinteros (cousin) - Other Relative - [phone]    Secondary Decision Maker: Moises Wilde - Mother - [phone]              As far as possible, the palliative care team has discussed with patient / health care proxy about goals of care / treatment preferences for patient.          HISTORY: History obtained from: Chart, medical team and cousinAmaliamark Conner    Principal Problem:    Gangrene of both feet ([facility] [address] (8/11/2021)    Active Problems:    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 ([address] (4/4/2021)      YUNIEL (acute kidney injury) ([address] (4/4/2021)      Non compliance with medical treatment (4/12/2021)      Fall (8/12/2021)      Tobacco use (8/12/2021)      Preoperative evaluation to rule out surgical contraindication (8/12/2021)      Anemia (8/12/2021)      Sepsis ([address] (8/13/2021)      Status post below-knee amputation of right lower extremity ([address] (8/13/2021)      Bacteremia (8/13/2021)      Dyspnea (8/16/2021)      Pulmonary infiltrates (4/27/3304)      Systolic CHF, acute ([address] (8/17/2021)      Debility ()      Past Medical History:   Diagnosis Date    PAD (peripheral artery disease) (HCC)       Past Surgical History:   Procedure Laterality Date    HX ORTHOPAEDIC      L TMT amputation      Family History   Problem Relation Age of Onset    Diabetes Mother      History reviewed, no pertinent family history.   Social History     Tobacco Use    Smoking status: Current Every Day Smoker     Packs/day: 1.50    Smokeless tobacco: Never Used   Substance Use Topics    Alcohol use: Not Currently     Comment: on rare occasion     No Known Allergies   Current Facility-Administered Medications   Medication Dose Route Frequency    pantoprazole (PROTONIX) injection 40 mg  40 mg IntraVENous Q12H    spironolactone (ALDACTONE) tablet 25 mg  25 mg Oral DAILY    furosemide (LASIX) injection 20 mg  20 mg IntraVENous DAILY    potassium chloride (K-DUR, KLOR-CON) SR tablet 40 mEq  40 mEq Oral BID    magnesium oxide (MAG-OX) tablet 400 mg  400 mg Oral BID    acetaminophen (TYLENOL) tablet 975 mg  975 mg Oral TID    methocarbamoL (ROBAXIN) tablet 1,000 mg  1,000 mg Oral TID    senna-docusate (PERICOLACE) 8.6-50 mg per tablet 1 Tablet  1 Tablet Oral DAILY    promethazine (PHENERGAN) 25 mg in 0.9% sodium chloride 50 mL IVPB  25 mg IntraVENous Q4H PRN    polyethylene glycol (MIRALAX) packet 17 g  17 g Oral DAILY    losartan (COZAAR) tablet 25 mg  25 mg Oral DAILY    metoprolol succinate (TOPROL-XL) XL tablet 25 mg  25 mg Oral DAILY    0.9% sodium chloride infusion 250 mL  250 mL IntraVENous PRN    famotidine (PEPCID) tablet 20 mg  20 mg Oral BID    [Held by provider] DULoxetine (CYMBALTA) capsule 60 mg  60 mg Oral DAILY    albuterol-ipratropium (DUO-NEB) 2.5 MG-0.5 MG/3 ML  3 mL Nebulization Q4H PRN    albumin human 25% (BUMINATE) solution 25 g  25 g IntraVENous Q6H    insulin glargine (LANTUS) injection 5 Units  5 Units SubCUTAneous DAILY    buPROPion SR (WELLBUTRIN SR) tablet 150 mg  150 mg Oral DAILY    COVID-19 vac,Ad26-PF (TabUp) injection 1 Dose  1 Dose IntraMUSCular PRIOR TO DISCHARGE    aspirin chewable tablet 81 mg  81 mg Oral DAILY    heparin (porcine) injection 5,000 Units  5,000 Units SubCUTAneous Q8H    naloxone (NARCAN) injection 0.1 mg  0.1 mg IntraVENous PRN    ondansetron (ZOFRAN ODT) tablet 4 mg  4 mg Oral Q8H PRN    Or    ondansetron (ZOFRAN) injection 4 mg  4 mg IntraVENous Q6H PRN    insulin lispro (HUMALOG) injection   SubCUTAneous Q6H    glucose chewable tablet 16 g  16 g Oral PRN    glucagon (GLUCAGEN) injection 1 mg  1 mg IntraMUSCular PRN    dextrose (D50W) injection syrg 12.5-25 g  25-50 mL IntraVENous PRN    atorvastatin (LIPITOR) tablet 40 mg  40 mg Oral QHS          Clinical Pain Assessment (nonverbal scale for nonverbal patients): Clinical Pain Assessment  Severity: 0          Duration: for how long has pt been experiencing pain (e.g., 2 days, 1 month, years)  Frequency: how often pain is an issue (e.g., several times per day, once every few days, constant)     FUNCTIONAL ASSESSMENT:     Palliative Performance Scale (PPS):  PPS: 40    ECOG  ECOG Status : Completely disabled     PSYCHOSOCIAL/SPIRITUAL SCREENING:      Any spiritual / Sikh concerns: 8/25/2021 - unable to assess  [] Yes /  [] No    Caregiver Burnout:  [] Yes /  [] No /  [x] No Caregiver Present      Anticipatory grief assessment:  8/25/2021 - unable to assess  [] Normal  / [] Maladaptive        REVIEW OF SYSTEMS:     Systems: constitutional, ears/nose/mouth/throat, respiratory, gastrointestinal, genitourinary, musculoskeletal, integumentary, neurologic, psychiatric, endocrine. Positive findings noted below. Modified ESAS Completed by: provider           Pain: 0     Nausea: 1     Dyspnea: 0           Stool Occurrence(s): 0   Positive and pertinent negative findings in ROS are noted above in HPI. The following systems were [] reviewed / [x] unable to be reviewed as noted in HPI  Other findings are noted below. PHYSICAL EXAM:     Constitutional: lying in bed with eyes closed, arousable but does not keep eyes open, not responding to questions, not engaged in conversation  Eyes: closed  ENMT: no nasal discharge, dry  mucous membranes  Cardiovascular: regular rhythm  Respiratory: breathing not labored, symmetric, on room air  Gastrointestinal: soft   Last bowel movement: None recorded  Musculoskeletal: Right BKA, left transmetatarsal amputation, dressings dry and intact  Skin: warm, dry  Neurologic: not following commands, moving all extremities  Psychiatric: Appears depressed, not engaged in conversation    Other: Wt Readings from Last 3 Encounters:   08/25/21 73.3 kg (161 lb 9.6 oz)   04/09/21 79 kg (174 lb 1.6 oz)   04/06/21 70.9 kg (156 lb 4.8 oz)     Blood pressure 122/84, pulse 82, temperature 97.5 °F (36.4 °C), resp. rate 18, height 5' 8\" (1.727 m), weight 73.3 kg (161 lb 9.6 oz), last menstrual period 04/06/2018, SpO2 100 %.   Pain:  Pain Scale 1: Numeric (0 - 10)  Pain Intensity 1: 0     Pain Location 1: Leg  Pain Orientation 1: Right, Left  Pain Description 1: Aching  Pain Intervention(s) 1: Other (comment), Distraction, Emotional support (pt on scheduled pain medications)       LAB AND IMAGING FINDINGS:     Lab Results   Component Value Date/Time    WBC 11.0 08/25/2021 12:01 AM    HGB 7.5 (L) 08/25/2021 12:01 AM    PLATELET 202 67/97/7343 12:01 AM     Lab Results   Component Value Date/Time    Sodium 142 08/25/2021 12:01 AM    Potassium 5.2 08/25/2021 12:01 AM    Chloride 104 08/25/2021 12:01 AM    CO2 25 08/25/2021 12:01 AM    BUN 64 (H) 08/25/2021 12:01 AM    Creatinine 1.95 (H) 08/25/2021 12:01 AM    Calcium 9.2 08/25/2021 12:01 AM    Magnesium 2.7 (H) 08/24/2021 02:13 AM    Phosphorus 2.5 08/18/2021 12:47 AM      Lab Results   Component Value Date/Time    Alk. phosphatase 117 08/25/2021 12:01 AM    Protein, total 6.9 08/25/2021 12:01 AM    Albumin 4.7 08/25/2021 12:01 AM    Globulin 2.2 08/25/2021 12:01 AM     Lab Results   Component Value Date/Time    INR 1.7 (H) 08/25/2021 12:30 AM    Prothrombin time 19.1 (H) 08/25/2021 12:30 AM    aPTT 46.1 (H) 04/06/2021 03:55 AM      Lab Results   Component Value Date/Time    Iron 10 (L) 08/12/2021 12:15 PM    TIBC 130 (L) 08/12/2021 12:15 PM    Iron % saturation 8 (L) 08/12/2021 12:15 PM      No results found for: PH, PCO2, PO2  No components found for: [place]   Lab Results   Component Value Date/Time    CK 23 (L) 08/11/2021 09:35 PM    CK - MB <1.0 08/11/2021 09:35 PM              Total time: 25 minutes     > 50% counseling / coordination:  Time spent in direct consultation with the patient, medical team, and family     Prolonged service was provided for  []30 min   []75 min in face to face time in the presence of the patient, spent as noted above. Time Start:   Time End:     Disclaimer: Sections of this note are dictated using utilizing voice recognition software, which may have resulted in some phonetic based errors in grammar and contents. Even though attempts were made to correct all the mistakes, some may have been missed, and remained in the body of the document. If questions arise, please contact our department.

## 2021-08-25 NOTE — ROUTINE PROCESS
Spoke to [employer], patient's cousin who's very involve in her care. Updates where given and Ms Rita Izquierdo was requesting to be able to visit to speak with patient to help persuade patient in being more compliant for patient is refusing care and medications. Visitation was approved by supervisor. Request for a psych eval was made for patient has hx of mental illness.

## 2021-08-25 NOTE — PROGRESS NOTES
Hospitalist Progress Note    Patient: Ellis Perdue MRN: [id number]  CSN: [id number]    YOB: 1970  Age: 46 y.o. Sex: female    DOA: 8/11/2021 LOS:  LOS: 13 days            Chief complaint :  Gangrene of both feet. Assessment/Plan     Patient Active Problem List   Diagnosis Code    Osteomyelitis ([facility] [address] M86.9    Type 2 diabetes mellitus with left diabetic foot ulcer ([facility] [address] E11.621, L97.529    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 ([facility] [address] E11.52    [facility]UNIEL (acute kidney [facility]) [facility] N17.9    PAD (peripheral artery disease) ([facility]) I73.9    Bilateral carotid artery stenosis I65.23    Gangrene of left foot ([facility]) I96    Non compliance with medical treatment Z91.19    Gangrene of both feet [facility]) I96    Fall W19. Carole Savage Tobacco use Z72.0    Preoperative evaluation to rule out surgical contraindication Z01.818    Anemia D64.9    Sepsis ([facility]) A41.9    Status post below-knee amputation of right lower extremity ([facility] [address] Z89.511    Bacteremia R78.81    Dyspnea R06.00    Pulmonary infiltrates K19.0    Systolic CHF, acute ([facility]) I50.21    Debility R53.81          59-year-old female with uncontrolled type 2 diabetes mellitus, severe peripheral arterial disease, and tobacco use who has been noncompliant with wound care and hyperbaric oxygen treatment due to recent fall is admitted for severe gangrene of both feet. RRT was called on 08/16/2021, patient appeared confused, shortness of breath with hypoxia. ABG showed metabolic acidosis. Patient refused chest x-ray. She has been refusing medications. Given her condition she was transferred to ICU.    08/17/2021 - I tried to discuss with patient about her critical condition and guarded prognosis, she is not interested in listening to my concerns about her medical condition. She turned her face on other side and did not want to listen to me and did not answer my questions.       Again Patient again refusing medications today per RN    Resp -   Dyspnea/hypoxia -  Responded to lasix  CXR with pulmonary edema. Oxygen as needed by NC. ID -   septic shock  -  BP now improved  Continue iv abx,   Blood Cultures growing Peptoniphilus asaccharolyticus. Follow up blood cultures negative  Wound cultures growing providencia, MSSA, E-fecalis, alcaligenes fecalis. ID following. Antibiotics - zosyn       [facility] - Monitor HD. Acute CHF - on lasix  Echo 45 % and mild pulm htn      Gangrene of bilateral feet    S/p right BKA on 8/12   S/P RIGHT BELOW KNEE AMPUTATION, DRESSING CHANGE TO LEFT FOOT WOUND 08/20     Heme/onc - Follow H&H, plts. Anemia  Received blood transfusion   Monitor H/H    Renal - Trend BUN, Cr, follow I/O. Check and replace Mg, K, phos. 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 Constipation - on pericolace and miralax    Prophylaxis - DVT: heparin, GI: pepcid.     Noncompliance with medical treatment   Continued tobacco use     PT/OT, discharge planning. Disposition : TBD    Review of systems  General: No fevers or chills. Cardiovascular: No chest pain or pressure. No palpitations. Pulmonary: see above. Gastrointestinal: No nausea, vomiting. Physical Exam:  General: As above   HEENT: NC, Atraumatic. PERRLA, anicteric sclerae. Lungs: CTA Bilaterally. No Wheezing/Rhonchi/Rales. Heart:  S1 S2,  No murmur, No Rubs, No Gallops  Abdomen: Soft, Non distended, Non tender.  +Bowel sounds,   Extremities: Right BKA, left foot with dressing  Psych:   Not anxious or agitated. Neurologic:  No acute neurological deficit.            Vital signs/Intake and Output:  Visit Vitals  BP (!) 150/82   Pulse 91   Temp 98.2 °F (36.8 °C)   Resp 18   Ht 5' 8\" (1.727 m)   Wt 73.9 kg (163 lb)   SpO2 95%   BMI 24.78 kg/m²     Current Shift:  No intake/output data recorded. Last three shifts:  No intake/output data recorded. Labs: Results:       Chemistry Recent Labs     08/22/21  0100   GLU 83      K 3.3*      CO2 26   BUN 20*   CREA 1.30   CA 7.9*   AGAP 10   BUCR 15      CBC w/Diff Recent Labs     08/22/21  0100   WBC 7.2   RBC 2.43*   HGB 7.2*   HCT 22.2*   *   GRANS 85*   LYMPH 6*   EOS 1      Cardiac Enzymes No results for input(s): CPK, CKND1, CONSTANTINO in the last 72 hours. No lab exists for component: CKRMB, TROIP   Coagulation No results for input(s): PTP, INR, APTT, INREXT, INREXT in the last 72 hours. Lipid Panel No results found for: CHOL, CHOLPOCT, CHOLX, CHLST, CHOLV, 868636, HDL, HDLP, LDL, LDLC, DLDLP, 994762, VLDLC, VLDL, TGLX, TRIGL, TRIGP, TGLPOCT, CHHD, CHHDX   BNP No results for input(s): BNPP in the last 72 hours. Liver Enzymes No results for input(s): TP, ALB, TBIL, AP in the last 72 hours.     No lab exists for component: SGOT, GPT, DBIL   Thyroid Studies No results found for: T4, T3U, TSH, TSHEXT, TSHEXT     Procedures/imaging: see electronic medical records for all procedures/Xrays and details which were not copied into this note but were reviewed prior to creation of Plan

## 2021-08-25 NOTE — PROGRESS NOTES
Care Management    Discharge Planning: SNF/Rehab    CM met with the patient at the bedside to discuss plans for discharge. CM notes that the patient has not been participating with therapy and encouraged the patient to work with therapy as much as possible in order to help facilitate transition to rehab. The patient did not engage very much with this CM for the entirety of the visit. CM asked the patient if she would like this CM to have her screened for Medicaid. The patient informed this CM that she had been denied for Medicaid in the past but would not answer this CM when asked if she would like to be screened again. CM to follow the patient's progress and be available to assist with discharge planning as needed. CMS referral placed. Care Management Interventions  PCP Verified by CM:  Yes  Mode of Transport at Discharge: Self  Transition of Care Consult (CM Consult): Discharge Planning  Current Support Network: Own Home (takes care of mother at home)  The Plan for Transition of Care is Related to the Following Treatment Goals : gangrene of both feet, diabetes  The Patient and/or Patient Representative was Provided with a Choice of Provider and Agrees with the Discharge Plan?: Yes  Name of the Patient Representative Who was Provided with a Choice of Provider and Agrees with the Discharge Plan: Blanka Manzo, patient  Discharge Location  Discharge Placement:  (TBD)

## 2021-08-25 NOTE — DIABETES MGMT
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     Recommend: NPO w/ sips of water with meds, clear liquids, continue with current regimen  - pt might benefit from adjustments to regimen once PO intake resumes    BG in target range (non-ICU\" < 180 ; -180):  [] Yes  [x] No      Current Insulin regimen: Lantus 5 units + - Humalog Normal Insulin Sensitivity Corrective Coverage    TDD previous day = 11 (5 Lantus + 6 - Humalog Normal Insulin Sensitivity Corrective Coverage)    Most recent blood glucose results:   Lab Results   Component Value Date/Time     (H) 08/25/2021 12:01 AM    GLUCPOC 311 (H) 08/25/2021 06:53 AM    GLUCPOC 270 (H) 08/24/2021 11:42 PM    GLUCPOC 227 (H) 08/24/2021 11:50 AM         Hypo: no    HbA1C: equivalent  to ave BGlucose of 150  mg/dl for 2-3 months prior to admission  Lab Results   Component Value Date/Time    Hemoglobin A1c 7.0 (H) 08/11/2021 09:35 PM       Adequate glycemic control PTA:  [x] Yes  [] No      Home diabetes medications:  Key Antihyperglycemic Medications             metFORMIN (GLUCOPHAGE) 1,000 mg tablet (Taking) Take 1,000 mg by mouth two (2) times daily (with meals). Indications: type 2 diabetes mellitus    glipiZIDE SR (GLUCOTROL XL) 5 mg CR tablet (Taking) Take 5 mg by mouth daily. metFORMIN (GLUCOPHAGE) 500 mg tablet Take 1 Tab by mouth two (2) times daily (with meals).         Goals:  Blood glucose will be within target range of 70 - 180 mg/dL by: 8/31    Diet:   Active Orders   Diet    DIET NPO Ice Chips, Sips of Water with Meds, Sips of Clear Liquids       Patient Vitals for the past 100 hrs:   % Diet Eaten   08/22/21 1348 0%        Education:  _____ Refer to Diabetes Education Record                       ___X__ Education not indicated at this time

## 2021-08-25 NOTE — PROGRESS NOTES
VASCULAR AND TRANSPLANT       PROGRESS NOTE    Date: 2021    Post-Op Day: 13   S/p right below knee guillotine amputation.    6  S/p closure right below knee amputation. Plan:   Continue prevena dressing to right bka 1 more day. prevena can be removed tomorrow by nursing staff and 4x4's placed over suture line and wrap with kerlex and ace bandage daily. Will see in office in 2-3 weeks. disposition per medical service. Assessment:   47 yo male with atherosclerosis of native arteries with gangrene. S/p amputation. Still somnolent. Gi bleed. Subjective:   Pt without complaints         Objective:   Admit weight: Weight: 41.1 kg (90 lb 9.7 oz)  Last recorded weight: Weight: 73.3 kg (161 lb 9.6 oz)    Temp (24hrs), Av °F (36.7 °C), Min:97.5 °F (36.4 °C), Max:98.2 °F (36.8 °C)          [unfilled]  No intake or output data in the 24 hours ending 21 1341    Right bka with prevena dressing. Stump is soft, minimally tender.       Labs:     BMP: Recent Labs     21  0001 21  0213   BUN 64* 33*     --    CO2 25  --      CBC:    Recent Labs     21  0001   WBC 11.0   HCT 24.3*   RDW 18.1*     PT/INR:   Recent Labs     21  0030   INR 1.7*       Arterial Blood Gases   No results found for: LPMO2, FIO2, PEEP, PH1, PCO2, PO2, HCO3, MODE     MICRO:  Results     Procedure Component Value Units Date/Time    RESPIRATORY VIRUS PANEL W/COVID-19, PCR [[id number]] Collected: 21 1630    Order Status: Completed Specimen: Nasopharyngeal Updated: 21 0815     Adenovirus Not detected        Coronavirus 229E Not detected        Coronavirus HKU1 Not detected        Coronavirus CVNL63 Not detected        Coronavirus OC43 Not detected        SARS-CoV-2, PCR Not detected        Metapneumovirus Not detected        Rhinovirus and Enterovirus Not detected        Influenza A Not detected        Influenza B Not detected Parainfluenza 1 Not detected        Parainfluenza 2 Not detected        Parainfluenza 3 Not detected        Parainfluenza virus 4 Not detected        RSV by PCR Not detected        B. parapertussis, PCR Not detected        Bordetella pertussis - PCR Not detected        Chlamydophila pneumoniae DNA, QL, PCR Not detected        Mycoplasma pneumoniae DNA, QL, PCR Not detected       COVID-19 RAPID TEST [[id number]] Collected: 08/17/21 0955    Order Status: Completed Specimen: Nasopharyngeal Updated: 08/17/21 1041     Specimen source Nasopharyngeal        COVID-19 rapid test Not detected        Comment: Rapid Abbott ID Now       Rapid NAAT:  The specimen is NEGATIVE for SARS-CoV-2, the novel coronavirus associated with COVID-19. Negative results should be treated as presumptive and, if inconsistent with clinical signs and symptoms or necessary for patient management, should be tested with an alternative molecular assay. Negative results do not preclude SARS-CoV-2 infection and should not be used as the sole basis for patient management decisions. This test has been authorized by the FDA under an Emergency Use Authorization (EUA) for use by authorized laboratories.    Fact sheet for Healthcare Providers: ConventionUpdate.co.nz  Fact sheet for Patients: ConventionUpdate.co.nz       Methodology: Isothermal Nucleic Acid Amplification         CULTURE, BLOOD [[id number]] Collected: 08/17/21 0520    Order Status: Completed Specimen: Blood Updated: 08/23/21 0723     Special Requests: NO SPECIAL REQUESTS        Culture result: NO GROWTH 6 DAYS       CULTURE, BLOOD [[id number]] Collected: 08/16/21 0530    Order Status: Completed Specimen: Blood Updated: 08/22/21 0723     Special Requests: NO SPECIAL REQUESTS        Culture result: NO GROWTH 6 DAYS       CULTURE, BLOOD [[id number]] Collected: 08/16/21 0530    Order Status: Completed Specimen: Blood Updated: 08/22/21 5651 Special Requests: NO SPECIAL REQUESTS        Culture result: NO GROWTH 6 DAYS       CULTURE, BLOOD [[id number]] Collected: 08/15/21 0316    Order Status: Completed Specimen: Blood Updated: 08/21/21 0730     Special Requests: NO SPECIAL REQUESTS        Culture result: NO GROWTH 6 DAYS       CULTURE, BLOOD [[id number]] Collected: 08/15/21 0316    Order Status: Completed Specimen: Blood Updated: 08/21/21 0730     Special Requests: NO SPECIAL REQUESTS        Culture result: NO GROWTH 6 DAYS       CULTURE, BLOOD [[id number]] Collected: 08/14/21 0342    Order Status: Completed Specimen: Blood Updated: 08/21/21 0730     Special Requests: NO SPECIAL REQUESTS        Culture result: NO GROWTH 7 DAYS       CULTURE, BLOOD [[id number]] Collected: 08/14/21 0342    Order Status: Completed Specimen: Blood Updated: 08/21/21 0730     Special Requests: NO SPECIAL REQUESTS        Culture result: NO GROWTH 7 DAYS       CULTURE, BLOOD [[id number]] Collected: 08/13/21 1150    Order Status: Completed Specimen: Blood Updated: 08/19/21 1230     Special Requests: NO SPECIAL REQUESTS        Culture result: NO GROWTH 6 DAYS       CULTURE, URINE [[id number]] Collected: 08/11/21 2230    Order Status: Canceled Specimen: Urine from Clean catch     CULTURE, BLOOD [[id number]] Collected: 08/11/21 2225    Order Status: Completed Specimen: Blood Updated: 08/14/21 1502     Special Requests: NO SPECIAL REQUESTS        GRAM STAIN       GRAM POSITIVE COCCI IN CLUSTERS ANAEROBIC BOTTLE                  SMEAR CALLED TO AND CORRECTLY REPEATED BY: Shanelle Aguiar RN 3S TO [facility] [id number] AT [id number]           Culture result:       Finegoldia magna GROWING IN THE ANAEROBIC BOTTLE          CULTURE, BLOOD [[id number]]  (Abnormal) Collected: 08/11/21 2215    Order Status: Completed Specimen: Blood Updated: 08/14/21 1500     Special Requests: NO SPECIAL REQUESTS        GRAM STAIN       GRAM POSITIVE COCCI IN CLUSTERS ANAEROBIC BOTTLE                  SMEAR CALLED TO AND CORRECTLY REPEATED BY: Alice Smith RN 3S TO [facility] [id number] AT [id number]           Culture result:       Peptoniphilus asaccharolyticus GROWING IN THE ANAEROBIC BOTTLE          CULTURE, WOUND Oleta Aminata STAIN [[id number]]  (Abnormal)  (Susceptibility) Collected: 08/11/21 2145    Order Status: Completed Specimen: Wound from Foot Updated: 08/15/21 1013     Special Requests: NO SPECIAL REQUESTS        GRAM STAIN NO WBC'S SEEN         1+ GRAM NEGATIVE RODS               FEW GRAM POSITIVE COCCI IN PAIRS           Culture result:       MODERATE PROVIDENCIA STUARTII                  MODERATE STAPHYLOCOCCUS AUREUS          Susceptibility      Providencia stuartii Staphylococcus aureus      REX REX      Amikacin ($) Susceptible       Ampicillin ($) Resistant       Ampicillin/sulbactam ($) Intermediate       Cefazolin ($) Resistant       Cefepime ($$) Susceptible       Cefoxitin Susceptible       Ceftazidime ($) Susceptible       Ceftriaxone ($) Susceptible       Ciprofloxacin ($) Susceptible Susceptible      Clindamycin ($)  Susceptible      Daptomycin ($$$$$)  Susceptible      Doxycycline ($$)  Susceptible      Erythromycin ($$$$)  Susceptible      Gentamicin ($) Resistant Susceptible      Levofloxacin ($) Susceptible Susceptible      Linezolid ($$$$$)  Susceptible      Meropenem ($$) Susceptible       Moxifloxacin ($$$$)  Susceptible      Oxacillin  Susceptible      Piperacillin/Tazobac ($) Susceptible       Rifampin ($$$$)  Susceptible      Tetracycline  Susceptible      Tobramycin ($) Resistant       Trimeth/Sulfa  Susceptible      Vancomycin ($)  Susceptible                 Linear View                   CULTURE, Cleavon Valente STAIN [[id number]]  (Abnormal)  (Susceptibility) Collected: 08/11/21 2145    Order Status: Completed Specimen: Wound from Foot Updated: 08/17/21 0946     Special Requests: NO SPECIAL REQUESTS        GRAM STAIN NO WBC'S SEEN         4+ GRAM NEGATIVE RODS               1+ GRAM POSITIVE COCCI IN PAIRS           Culture result:       HEAVY GRAM NEGATIVE RODS (REFER TO ISOLATE 2)                  HEAVY ALCALIGENES FAECALIS                  HEAVY ENTEROCOCCUS FAECALIS                  HEAVY STAPHYLOCOCCUS AUREUS          Susceptibility      Alcaligenes faecalis Enterococcus faecalis      REX REX      Amikacin ($) Susceptible       Ampicillin ($)  Susceptible      Cefazolin ($) Resistant       Ceftazidime ($) Susceptible       Ceftriaxone ($) Susceptible       Ciprofloxacin ($) Susceptible       Daptomycin ($$$$$)  Susceptible      Gentamicin ($) Susceptible       Levofloxacin ($) Susceptible       Linezolid ($$$$$)  Susceptible      Meropenem ($$) Susceptible       Piperacillin/Tazobac ($) Intermediate       Tobramycin ($) Susceptible       Trimeth/Sulfa Resistant       Vancomycin ($)  Susceptible                 Linear View               Susceptibility      Staphylococcus aureus      REX      Ciprofloxacin ($) Susceptible      Clindamycin ($) Susceptible      Daptomycin ($$$$$) Susceptible      Doxycycline ($$) Susceptible      Erythromycin ($$$$) Susceptible      Gentamicin ($) Susceptible      Levofloxacin ($) Susceptible      Linezolid ($$$$$) Susceptible      Moxifloxacin ($$$$) Susceptible      Oxacillin Susceptible      Rifampin ($$$$) Susceptible      Tetracycline Susceptible      Trimeth/Sulfa Susceptible      Vancomycin ($) Susceptible               Linear View                               M Venecia Terry PA-C   [phone].

## 2021-08-26 LAB
ANION GAP SERPL CALC-SCNC: 9 MMOL/L (ref 3–18)
BUN SERPL-MCNC: 85 MG/DL (ref 7–18)
BUN/CREAT SERPL: 43 (ref 12–20)
CALCIUM SERPL-MCNC: 9.6 MG/DL (ref 8.5–10.1)
CHLORIDE SERPL-SCNC: 109 MMOL/L (ref 100–111)
CO2 SERPL-SCNC: 25 MMOL/L (ref 21–32)
CREAT SERPL-MCNC: 1.97 MG/DL (ref 0.6–1.3)
GLUCOSE BLD STRIP.AUTO-MCNC: 168 MG/DL (ref 70–110)
GLUCOSE BLD STRIP.AUTO-MCNC: 190 MG/DL (ref 70–110)
GLUCOSE BLD STRIP.AUTO-MCNC: 211 MG/DL (ref 70–110)
GLUCOSE BLD STRIP.AUTO-MCNC: 216 MG/DL (ref 70–110)
GLUCOSE BLD STRIP.AUTO-MCNC: 219 MG/DL (ref 70–110)
GLUCOSE BLD STRIP.AUTO-MCNC: 236 MG/DL (ref 70–110)
GLUCOSE SERPL-MCNC: 194 MG/DL (ref 74–99)
POTASSIUM SERPL-SCNC: 4.4 MMOL/L (ref 3.5–5.5)
SODIUM SERPL-SCNC: 143 MMOL/L (ref 136–145)

## 2021-08-26 PROCEDURE — 99356 PR PROLONGED SVC I/P OR OBS SETTING 1ST HOUR: CPT | Performed by: NURSE PRACTITIONER

## 2021-08-26 PROCEDURE — 74011636637 HC RX REV CODE- 636/637: Performed by: STUDENT IN AN ORGANIZED HEALTH CARE EDUCATION/TRAINING PROGRAM

## 2021-08-26 PROCEDURE — 74011250637 HC RX REV CODE- 250/637: Performed by: HOSPITALIST

## 2021-08-26 PROCEDURE — 80048 BASIC METABOLIC PNL TOTAL CA: CPT

## 2021-08-26 PROCEDURE — C9113 INJ PANTOPRAZOLE SODIUM, VIA: HCPCS | Performed by: INTERNAL MEDICINE

## 2021-08-26 PROCEDURE — 36415 COLL VENOUS BLD VENIPUNCTURE: CPT

## 2021-08-26 PROCEDURE — 74011250637 HC RX REV CODE- 250/637: Performed by: STUDENT IN AN ORGANIZED HEALTH CARE EDUCATION/TRAINING PROGRAM

## 2021-08-26 PROCEDURE — 74011250637 HC RX REV CODE- 250/637: Performed by: INTERNAL MEDICINE

## 2021-08-26 PROCEDURE — 82962 GLUCOSE BLOOD TEST: CPT

## 2021-08-26 PROCEDURE — 99233 SBSQ HOSP IP/OBS HIGH 50: CPT | Performed by: NURSE PRACTITIONER

## 2021-08-26 PROCEDURE — 65660000000 HC RM CCU STEPDOWN

## 2021-08-26 PROCEDURE — 74011250636 HC RX REV CODE- 250/636: Performed by: INTERNAL MEDICINE

## 2021-08-26 PROCEDURE — 74011250637 HC RX REV CODE- 250/637: Performed by: NURSE PRACTITIONER

## 2021-08-26 RX ADMIN — ACETAMINOPHEN 975 MG: 325 TABLET ORAL at 17:00

## 2021-08-26 RX ADMIN — BUPROPION HYDROCHLORIDE 150 MG: 150 TABLET, FILM COATED, EXTENDED RELEASE ORAL at 09:59

## 2021-08-26 RX ADMIN — INSULIN LISPRO 4 UNITS: 100 INJECTION, SOLUTION INTRAVENOUS; SUBCUTANEOUS at 13:37

## 2021-08-26 RX ADMIN — INSULIN LISPRO 4 UNITS: 100 INJECTION, SOLUTION INTRAVENOUS; SUBCUTANEOUS at 00:50

## 2021-08-26 RX ADMIN — ATORVASTATIN CALCIUM 40 MG: 20 TABLET, FILM COATED ORAL at 22:49

## 2021-08-26 RX ADMIN — PANTOPRAZOLE SODIUM 40 MG: 40 INJECTION, POWDER, FOR SOLUTION INTRAVENOUS at 10:01

## 2021-08-26 RX ADMIN — METHOCARBAMOL TABLETS 1000 MG: 500 TABLET, COATED ORAL at 16:00

## 2021-08-26 RX ADMIN — INSULIN LISPRO 2 UNITS: 100 INJECTION, SOLUTION INTRAVENOUS; SUBCUTANEOUS at 16:00

## 2021-08-26 RX ADMIN — MAGNESIUM OXIDE TAB 400 MG (241.3 MG ELEMENTAL MG) 400 MG: 400 (241.3 MG) TAB at 09:59

## 2021-08-26 RX ADMIN — ACETAMINOPHEN 975 MG: 325 TABLET ORAL at 09:59

## 2021-08-26 RX ADMIN — METHOCARBAMOL TABLETS 1000 MG: 500 TABLET, COATED ORAL at 10:01

## 2021-08-26 RX ADMIN — LOSARTAN POTASSIUM 25 MG: 25 TABLET, FILM COATED ORAL at 09:58

## 2021-08-26 RX ADMIN — ASPIRIN 81 MG: 81 TABLET, CHEWABLE ORAL at 10:00

## 2021-08-26 RX ADMIN — DOCUSATE SODIUM 50 MG AND SENNOSIDES 8.6 MG 1 TABLET: 8.6; 5 TABLET, FILM COATED ORAL at 09:58

## 2021-08-26 RX ADMIN — PANTOPRAZOLE SODIUM 40 MG: 40 INJECTION, POWDER, FOR SOLUTION INTRAVENOUS at 22:48

## 2021-08-26 RX ADMIN — MAGNESIUM OXIDE TAB 400 MG (241.3 MG ELEMENTAL MG) 400 MG: 400 (241.3 MG) TAB at 22:49

## 2021-08-26 RX ADMIN — INSULIN GLARGINE 5 UNITS: 100 INJECTION, SOLUTION SUBCUTANEOUS at 10:05

## 2021-08-26 RX ADMIN — INSULIN LISPRO 4 UNITS: 100 INJECTION, SOLUTION INTRAVENOUS; SUBCUTANEOUS at 06:56

## 2021-08-26 RX ADMIN — METHOCARBAMOL TABLETS 1000 MG: 500 TABLET, COATED ORAL at 22:49

## 2021-08-26 RX ADMIN — ACETAMINOPHEN 975 MG: 325 TABLET ORAL at 22:50

## 2021-08-26 RX ADMIN — METOPROLOL SUCCINATE 25 MG: 25 TABLET, EXTENDED RELEASE ORAL at 10:01

## 2021-08-26 NOTE — PROGRESS NOTES
Physical Therapy Treatment Attempt     Chart reviewed. Attempted Physical Therapy Treatment, however, patient unable to be seen due to:  []  Nausea/vomiting  []  Eating  []  Pain  []  Patient too lethargic  []  Off Unit for testing/procedure  []  Dialysis treatment in progress   []  Telemetry Results  [x]  Other: Pt not responding to therapist, is awake, eyes opening during conversation, but does not verbally respond, turning face away. Reinforced education on importance of mobility. Will follow up later as patient's schedule allows.    Thank you for this referral.    Niall Zhao, PT, DPT

## 2021-08-26 NOTE — DIABETES MGMT
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     Recommend: NPO w/ sips of water with meds, clear liquids, continue with current regimen  - pt might benefit from adjustments to regimen once PO intake resumes    BG in target range (non-ICU\" < 180 ; -180):  [] Yes  [x] No      Current Insulin regimen: Lantus 5 units + Humalog Normal Insulin Sensitivity Corrective Coverage    TDD previous day = 21 (5 Lantus + 16 Humalog Normal Insulin Sensitivity Corrective Coverage)    Most recent blood glucose results:   Lab Results   Component Value Date/Time     (H) 08/26/2021 05:32 AM    GLUCPOC 219 (H) 08/26/2021 06:43 AM    GLUCPOC 236 (H) 08/26/2021 12:05 AM    GLUCPOC 302 (H) 08/25/2021 08:36 PM         Hypo: no    HbA1C: equivalent  to ave BGlucose of 150 mg/dl for 2-3 months prior to admission  Lab Results   Component Value Date/Time    Hemoglobin A1c 7.0 (H) 08/11/2021 09:35 PM     Adequate glycemic control PTA:  [x] Yes  [] No      Home diabetes medications:  Key Antihyperglycemic Medications             metFORMIN (GLUCOPHAGE) 1,000 mg tablet (Taking) Take 1,000 mg by mouth two (2) times daily (with meals). Indications: type 2 diabetes mellitus    glipiZIDE SR (GLUCOTROL XL) 5 mg CR tablet (Taking) Take 5 mg by mouth daily. metFORMIN (GLUCOPHAGE) 500 mg tablet Take 1 Tab by mouth two (2) times daily (with meals).         Goals:  Blood glucose will be within target range of 70 - 180 mg/dL by: 9/15    Diet:   Active Orders   Diet    DIET NPO Ice Chips, Sips of Water with Meds, Sips of Clear Liquids       Patient Vitals for the past 100 hrs:   % Diet Eaten   08/22/21 1348 0%        Education:  _____ Refer to Diabetes Education Record                       ___X__ Education not indicated at this time    Mikayla Abraham MS, RN, CDE  Glycemic Control Team  [phone]  Pager [phone] (M-TH 8:00-4:30P)  *After Hours pager [phone]

## 2021-08-26 NOTE — PROGRESS NOTES
Cardiology Progress Note        Patient: Shemar Gonzalez        Sex: female          DOA: 8/11/2021  YOB: 1970      Age:  46 y.o.        LOS:  LOS: 14 days    Patient seen and examined, chart reviewed. Assessment/Plan     Patient Active Problem List   Diagnosis Code    Osteomyelitis ([facility] M86.9    Type 2 diabetes mellitus with left diabetic foot ulcer ([facility] E11.621, L97.529    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 ([facility] E11.52    YUNIEL (acute kidney injury) ([facility] N17.9    PAD (peripheral artery disease) ([facility]) I73.9    Bilateral carotid artery stenosis I65.23    Gangrene of left foot ([facility]) I96    Non compliance with medical treatment Z91.19    Gangrene of both feet [facility]) I96    Fall W19. Yoni Hives Tobacco use Z72.0    Preoperative evaluation to rule out surgical contraindication Z01.818    Anemia D64.9    Sepsis ([facility]) A41.9    Status post below-knee amputation of right lower extremity ([facility]) Z89.511    Bacteremia R78.81    Dyspnea R06.00    Pulmonary infiltrates O65.7    Systolic CHF, acute ([facility]) I50.21    Debility R53.81      Acute systolic heart failure  Hypokalemia   Anemia   Acute renal insufficiency      Echocardiogram revealed     · Test was terminated early due to patient becoming combative. · Left Ventricle: Normal cavity size. Mild concentric hypertrophy. The estimated EF is 40 - 45%. Mildly reduced systolic function. There is inconclusive left ventricular diastolic function E'E= 34.24. Wall Scoring: The left ventricular wall motion is globally hypokinetic. · Pulmonary Artery: Pulmonary arteries not well visualized. Pulmonary arterial systolic pressure (PASP) is 35 mmHg. Pulmonary hypertension found to be mild. Plan:      Continue Metoprolol succinate and losartan  Hold lasix and aldactone  Strict input output. Salt restriction up to 2 g per day and fluid restriction up to 1.2 L per   day.          Plan discussed with      Subjective:    cc:   does not want to communicate, does not want to open her eye on verbal command    REVIEW OF SYSTEMS:     Can not obtain due to patient's condition     Objective:      Visit Vitals  BP (!) 145/85   Pulse 85   Temp 98.9 °F (37.2 °C)   Resp 18   Ht 5' 8\" (1.727 m)   Wt 73.3 kg (161 lb 9.6 oz)   SpO2 98%   BMI 24.57 kg/m²     Body mass index is 24.57 kg/m². Physical Exam:  General Appearance: Comfortable, not using accessory muscles of respiration. Looks chronically ill   HEENT: ROSA. HEAD: Atraumatic  NECK: No JVD, no thyroidomeglay. CAROTIDS: No bruit  LUNGS: Clear bilaterally. HEART: S1+S2 audible, no murmur, no pericardial rub.      ABD: Non-tender, BS Audible    EXT: Right below knee amputation      Medication:  Current Facility-Administered Medications   Medication Dose Route Frequency    pantoprazole (PROTONIX) injection 40 mg  40 mg IntraVENous Q12H    0.9% sodium chloride infusion  50 mL/hr IntraVENous CONTINUOUS    [Held by provider] spironolactone (ALDACTONE) tablet 25 mg  25 mg Oral DAILY    [Held by provider] furosemide (LASIX) injection 20 mg  20 mg IntraVENous DAILY    magnesium oxide (MAG-OX) tablet 400 mg  400 mg Oral BID    acetaminophen (TYLENOL) tablet 975 mg  975 mg Oral TID    methocarbamoL (ROBAXIN) tablet 1,000 mg  1,000 mg Oral TID    senna-docusate (PERICOLACE) 8.6-50 mg per tablet 1 Tablet  1 Tablet Oral DAILY    promethazine (PHENERGAN) 25 mg in 0.9% sodium chloride 50 mL IVPB  25 mg IntraVENous Q4H PRN    polyethylene glycol (MIRALAX) packet 17 g  17 g Oral DAILY    losartan (COZAAR) tablet 25 mg  25 mg Oral DAILY    metoprolol succinate (TOPROL-XL) XL tablet 25 mg  25 mg Oral DAILY    0.9% sodium chloride infusion 250 mL  250 mL IntraVENous PRN    [Held by provider] DULoxetine (CYMBALTA) capsule 60 mg  60 mg Oral DAILY    albuterol-ipratropium (DUO-NEB) 2.5 MG-0.5 MG/3 ML  3 mL Nebulization Q4H PRN    insulin glargine (LANTUS) injection 5 Units  5 Units SubCUTAneous DAILY    buPROPion SR (WELLBUTRIN SR) tablet 150 mg  150 mg Oral DAILY    COVID-19 vac,Ad26-PF (JERONIMO) injection 1 Dose  1 Dose IntraMUSCular PRIOR TO DISCHARGE    aspirin chewable tablet 81 mg  81 mg Oral DAILY    [Held by provider] heparin (porcine) injection 5,000 Units  5,000 Units SubCUTAneous Q8H    naloxone (NARCAN) injection 0.1 mg  0.1 mg IntraVENous PRN    ondansetron (ZOFRAN ODT) tablet 4 mg  4 mg Oral Q8H PRN    Or    ondansetron (ZOFRAN) injection 4 mg  4 mg IntraVENous Q6H PRN    insulin lispro (HUMALOG) injection   SubCUTAneous Q6H    glucose chewable tablet 16 g  16 g Oral PRN    glucagon (GLUCAGEN) injection 1 mg  1 mg IntraMUSCular PRN    dextrose (D50W) injection syrg 12.5-25 g  25-50 mL IntraVENous PRN    atorvastatin (LIPITOR) tablet 40 mg  40 mg Oral QHS               Lab/Data Reviewed:       Recent Labs     08/25/21  0001   WBC 11.0   HGB 7.5*   HCT 24.3*        Recent Labs     08/25/21  0001 08/24/21  0213     --    K 5.2  --      --    CO2 25  --    *  --    BUN 64* 33*   CREA 1.95*  --    CA 9.2  --          Signed By: Gautam Beltre MD     August 25, 2021

## 2021-08-26 NOTE — PROGRESS NOTES
[facility]: [phone]  [facility]: [phone]     Patient Name: Ry Patton  YOB: 1970    Date of Follow-up Visit : 8/26/2021  Reason for Consult: End stage disease/care decisions  Requesting Provider: Dr. Jody Hernandez  Primary Care Physician: Feliberto Olmos MD      SUMMARY:   Ry Patton is a 46 y.o. female with a past history of uncontrolled type 2 diabetes mellitus, severe peripheral arterial disease, previous left transmetatarsal amputation and tobacco use, who was admitted on 8/11/2021 from home with a diagnosis of gangrene of both feet. Current medical issues leading to Palliative Medicine involvement include: Debilitated 51-year-old female with multiple comorbid conditions including noncompliance with wound care treatment due to recent fall presented to the ER with complaints of fever and gangrene in both feet. Palliative medicine is consulted for care decisions. CHIEF COMPLAINT: Fever, gangrene of her feet    HPI/SUBJECTIVE:    Past history as above. Ms. Mel Chery is a very debilitated 51-year-old female with multiple comorbid conditions including noncompliance with wound care treatment after a recent fall. She has presented to the ER with complaints of fever and gangrene in both feet. She fell approximately 3 weeks ago and has been mostly bedbound ever since. Patient has been unable to attend wound care appointments or hyperbaric appointments due to bedbound status at home. 8/18/2021:  Lying in bed on left side with eyes closed. Arousable and became awake, alert and oriented x4. Not talkative or interested in engaging in conversation. Denies pain or shortness of breath. On room air.    8/25/2021:  Lying in bed with eyes closed. Not talkative or interested in conversation or answering orientation questions.   Asked for water once during visit, occasional deep sighing, pulled covers off then back on.  Lupe Lucio is pulled off. On room air.    8/26/2021:  Lying in bed with eyes open. Not talkative or engaged in conversation or answering questions. Asking for ice water. Occasional deep sighs. The patient is:   [x] Verbal and participatory - limited as patient does not like to answer questions  [] Non-participatory due to:     GOALS OF CARE: Full code with full interventions  Patient/Health Care Proxy Stated Goals: Prolong life  TREATMENT PREFERENCES:   Code Status: Full Code         PALLIATIVE DIAGNOSES:   1. Encounter with palliative care/goals of care  2. Gangrene of both feet  3. Noncompliance with medical treatment  4. Debility          PLAN:   8/26/2021:  Seen at bedside along with Mr. Derek Camara Ms. Judee Baumgarten is lying in bed, awake and alert. Not talkative or engaged in conversation or answering questions. Became frustrated when talking about her refusal of medications and not working with therapy. Patient stated \"I think you all are overdosing me\". Multiple attempts to engage patient and assess her goals and patient stated \"I want to get better and get out of here\". Encouraged patient to take her medications and to work with therapy to increase her functional ability. Patient stated \"I'm taking my medications\". Eventually, patient stopped responding to questions from palliative team and while she was still awake and alert, she just did not respond. Bedside RN in room to administer morning medications which patient did take. Call placed to patient's cousin and MPOA, Elfego Candelario, to discuss patient's condition and her ongoing non-compliance with medical treatment including medications and PT/OT. Lengthy conversation with Gloria Wise who shared her frustration and worry about patient who she said is \"acting different\" and \"talking weird\". Gloria Wise verbalized that patient cannot return to her home as it is not safe for her with her wounds and recent amputation.   Gloria Wise is willing and engaged in helping to look out for her cousin but is concerned that her cousin is not complying with medical treatments. Discussed having a family meeting to include patient, Claudy Snyder, Care manager, Hospitalist and Palliative team to discuss patient's overall condition and planning for transitions of care and Parviz Calero was agreeable. Family meeting scheduled for tomorrow, 8/27/2021 at 10:00 AM in patient's room. At this time, goals of care remain full code with full interventions. Please see below for previous conversations with the palliative medicine team:     8/25/2021:  Seen at bedside along with Ms. Ellen Mazariegos, AMANDEEP. Ms. Vania Hummel is well known to the palliative medicine team and is re-consulted to see patient to discuss possible comfort measures. Palliative medicine worked with patient earlier in this hospitalization and helped patient to complete a [place] on 8/19/2021 naming Claudy Snyder, patient's cousin, as her MPOA. At that time, goals of care were also discussed with patient who affirmed her decision for full code with full aggressive measures. Patient seen this morning and was lying in bed with eyes closed, not verbally responding to questions or engaged in conversation. Patient moved her head from side to side at times, pulled bedcovers off and then back on. Mosie Manisha was pulled off. At times, patient would demonstrate deep sighing respirations. No respiratory distress noted. Patient indicated she had nausea by nodding her head but denied any pain. Attempted to engage patient in conversation; however, she kept her eyes closed and simply asked for water. Discussed with bedside RN. Patient may benefit from psychiatry consultation as she appears depressed and is not communicative. Palliative medicine will attempt to follow-up with patient at a later time for further goals of care conversations. Goals of care are full code with full interventions.     Please see below for previous conversations with the palliative medicine team:    8/18/2021: Seen at bedside along with Ms. Bushra Villatoro in full PPE for droplet plus isolation and the COVID-19 pandemic. Ms. Elfego Schroeder is lying in bed with eyes closed laying on her left side. Arousable to verbal stimuli and became awake, alert and oriented x4. Patient was not interested in talking with us and did not engage in conversation. Follow-up from our visit yesterday regarding patient's choice of surrogate decision-maker and possible completion of an AMD.  Patient again stated that she wants her cousin, Ruperto Dunlap, to be her surrogate decision maker in the event patient is unable to speak for herself. Explained again to patient the importance of completing the AMD document to formalize her wishes for surrogate decision maker. Offered to call her cousin, Ruperto Dunlap, so they could discuss this decision and form completion. Patient declined wanting to call Jocelyn Silva during our visit and told us \"you keep trying to tell me I am dying\". Offered reassurance to patient that we were interested in supporting her choices for surrogate decision making and were looking for ways to help formalize these choices. Patient closed her eyes and did not engage in further conversation. Palliative medicine will follow up with patient again tomorrow in hopes of completing an AMD to formalize patient's choice for surrogate decision maker. Goals of care remain full code with full interventions. Please see below for previous conversations with the palliative medicine team:    1. Encounter with palliative care/goals of care -seen at bedside along with Ms. Bushra Villatoro. Ms. Elfego Schroeder is awake, alert and oriented x4. Patient became very frustrated when being asked orientation questions and replied \"do you think I am stupid? \".   Attempted to explain to patient the reason we ask orientation questions however patient rolled her eyes and repeated \"do you think I am stupid? \". Nasal oxygen is off the patient was agreeable to have it reapplied. Pulse oximeter was also off and patient agreed to have it replaced and oxygen saturations ranged from 94 to 97%. Patient seem to get frustrated with any questions asked saying Barry Rojas do you keep asking me these questions? \". Asked patient what she wanted from this hospitalization and she said \"I just want to live. Don't you all get it?\". Attempted to explain to patient that she had been declining recommended treatments and she said \"I just want to breathe\". Attempted to introduce and explain goals of care, including benefits and burdens of resuscitation, intubation and ventilation. Asked patient about her thoughts on resuscitation in the event of cardiac arrest and intubation in the event of worsening respiratory status and patient declined to respond or answer or our questions. Patient had her eyes closed and did not  wish to discuss this with us. Asked patient if she had ever thought about who she would want to make her medical decisions for her in the event she cannot speak for herself and she stated it would be her cousin, Shayan Lee. Introduced and explained the [facility] and offered to complete with patient today to formalize her wish for her cousin Shayan Lee to be her MPOA. Patient declined to do any paperwork with us. Explained to patient that in the absence of any paperwork and if she was unable to speak for herself that legal decision making would default to her parents who are still living. Patient expressed understanding; however shared that her mother is not able to have a conversation about patient's health. Patient declined to elaborate on what this meant. Patient seemed annoyed throughout our visit and declined to engage in further conversation. Call placed to patient's cousin, Shayan Lee, to try and gather additional information about patient.   Martha Jimenez shared with us that Godwinvlad Rehman prefers to be called Stefano Harris.  Shared with Luz Grandet our conversation and lack of patient's engagement in the conversation. Shared with Luz Moreno that patient verbally chose her to be surrogate decision-maker however patient was not willing to sign AMD paperwork to formalize this nor was she willing to have an in-depth discussion about goals of care. Luz Moreno shared with us that patient's mother is essentially bedbound at home and that patient is normally the primary caregiver for her mother. It is unclear who is caring for patient's mother while patient is hospitalized. At this time, goals of care remain full code with full interventions. 2. Gangrene of both feet - primary team managing, vascular surgery consult, underwent right below-knee amputation on 8/12/2021, wound care consult    3. Noncompliance with medical treatment -patient fell approximately 3 weeks prior to admission and has been mainly bedbound at home and has been unable to participate with hyperbaric treatment appointments or wound care appointments. 4. Debility - PPS 40 which indicates an overall poor prognosis, patient lives at home with her mother who is mainly bedbound and patient is the primary caregiver for her mother. Since patient's fall approximately 3 weeks ago, patient has been mainly bedbound as well. Per chart review, patient has been using a bucket to go to the bathroom. 5. Initial consult note routed to primary continuity provider    6.  Communicated plan of care with: Palliative IDT, patient      Advance Care Planning:  [] The [facility] Interdisciplinary Team has updated the ACP Navigator with [address] and Patient Capacity    Primary Decision [facility] ([address]):   Primary Decision Maker: Emerald Oates (cousin) - Other Relative - [phone]    Secondary Decision Maker: Maik Sexton - Mother - [phone]              As far as possible, the palliative care team has discussed with patient / health care proxy about goals of care / treatment preferences for patient. HISTORY:     History obtained from: Chart, medical team and cousinAmalia Ubaldo    Principal Problem:    Gangrene of both feet ([facility] [address] (8/11/2021)    Active Problems:    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 ([address] (4/4/2021)      YUINEL (acute kidney injury) ([address] (4/4/2021)      Non compliance with medical treatment (4/12/2021)      Fall (8/12/2021)      Tobacco use (8/12/2021)      Preoperative evaluation to rule out surgical contraindication (8/12/2021)      Anemia (8/12/2021)      Sepsis ([facility] [address] (8/13/2021)      Status post below-knee amputation of right lower extremity ([address] (8/13/2021)      Bacteremia (8/13/2021)      Dyspnea (8/16/2021)      Pulmonary infiltrates (8/38/7376)      Systolic CHF, acute ([facility] [address] (8/17/2021)      Debility ()      Past Medical History:   Diagnosis Date    PAD (peripheral artery disease) (HCC)       Past Surgical History:   Procedure Laterality Date    HX ORTHOPAEDIC      L TMT amputation      Family History   Problem Relation Age of Onset    Diabetes Mother      History reviewed, no pertinent family history.   Social History     Tobacco Use    Smoking status: Current Every Day Smoker     Packs/day: 1.50    Smokeless tobacco: Never Used   Substance Use Topics    Alcohol use: Not Currently     Comment: on rare occasion     No Known Allergies   Current Facility-Administered Medications   Medication Dose Route Frequency    pantoprazole (PROTONIX) injection 40 mg  40 mg IntraVENous Q12H    0.9% sodium chloride infusion  50 mL/hr IntraVENous CONTINUOUS    [Held by provider] spironolactone (ALDACTONE) tablet 25 mg  25 mg Oral DAILY    [Held by provider] furosemide (LASIX) injection 20 mg  20 mg IntraVENous DAILY    magnesium oxide (MAG-OX) tablet 400 mg  400 mg Oral BID    acetaminophen (TYLENOL) tablet 975 mg  975 mg Oral TID    methocarbamoL (ROBAXIN) tablet 1,000 mg  1,000 mg Oral TID    senna-docusate (PERICOLACE) 8.6-50 mg per tablet 1 Tablet  1 Tablet Oral DAILY    promethazine (PHENERGAN) 25 mg in 0.9% sodium chloride 50 mL IVPB  25 mg IntraVENous Q4H PRN    polyethylene glycol (MIRALAX) packet 17 g  17 g Oral DAILY    losartan (COZAAR) tablet 25 mg  25 mg Oral DAILY    metoprolol succinate (TOPROL-XL) XL tablet 25 mg  25 mg Oral DAILY    0.9% sodium chloride infusion 250 mL  250 mL IntraVENous PRN    [Held by provider] DULoxetine (CYMBALTA) capsule 60 mg  60 mg Oral DAILY    albuterol-ipratropium (DUO-NEB) 2.5 MG-0.5 MG/3 ML  3 mL Nebulization Q4H PRN    insulin glargine (LANTUS) injection 5 Units  5 Units SubCUTAneous DAILY    buPROPion SR (WELLBUTRIN SR) tablet 150 mg  150 mg Oral DAILY    COVID-19 vac,Ad26-PF (5by) injection 1 Dose  1 Dose IntraMUSCular PRIOR TO DISCHARGE    aspirin chewable tablet 81 mg  81 mg Oral DAILY    [Held by provider] heparin (porcine) injection 5,000 Units  5,000 Units SubCUTAneous Q8H    naloxone (NARCAN) injection 0.1 mg  0.1 mg IntraVENous PRN    ondansetron (ZOFRAN ODT) tablet 4 mg  4 mg Oral Q8H PRN    Or    ondansetron (ZOFRAN) injection 4 mg  4 mg IntraVENous Q6H PRN    insulin lispro (HUMALOG) injection   SubCUTAneous Q6H    glucose chewable tablet 16 g  16 g Oral PRN    glucagon (GLUCAGEN) injection 1 mg  1 mg IntraMUSCular PRN    dextrose (D50W) injection syrg 12.5-25 g  25-50 mL IntraVENous PRN    atorvastatin (LIPITOR) tablet 40 mg  40 mg Oral QHS          Clinical Pain Assessment (nonverbal scale for nonverbal patients): Clinical Pain Assessment  Severity: 0          Duration: for how long has pt been experiencing pain (e.g., 2 days, 1 month, years)  Frequency: how often pain is an issue (e.g., several times per day, once every few days, constant)     FUNCTIONAL ASSESSMENT:     Palliative Performance Scale (PPS):  PPS: 40    ECOG  ECOG Status : Completely disabled     PSYCHOSOCIAL/SPIRITUAL SCREENING:      Any spiritual / Yarsani concerns: 8/26/2021 - unable to assess  [] Yes /  [] No    Caregiver Burnout:  [] Yes /  [] No /  [x] No Caregiver Present      Anticipatory grief assessment:  8/26/2021 - unable to assess  [] Normal  / [] Maladaptive        REVIEW OF SYSTEMS:     Systems: constitutional, ears/nose/mouth/throat, respiratory, gastrointestinal, genitourinary, musculoskeletal, integumentary, neurologic, psychiatric, endocrine. Positive findings noted below. Modified ESAS Completed by: provider           Pain: 0     Nausea: 1     Dyspnea: 0           Stool Occurrence(s): 0   Positive and pertinent negative findings in ROS are noted above in HPI. The following systems were [] reviewed / [x] unable to be reviewed as noted in HPI  Other findings are noted below. PHYSICAL EXAM:     Constitutional: lying in bed with eyes open, not responding to questions, not engaged in conversation  Eyes: open, pupils equal  ENMT: no nasal discharge, dry  mucous membranes  Cardiovascular: regular rhythm  Respiratory: breathing not labored, symmetric, on room air  Gastrointestinal: soft   Last bowel movement: None recorded  Musculoskeletal: Right BKA, left transmetatarsal amputation, dressings dry and intact  Skin: warm, dry  Neurologic: following simple commands, moving all extremities  Psychiatric: Appears depressed, not engaged in conversation    Other: Wt Readings from Last 3 Encounters:   08/25/21 73.3 kg (161 lb 9.6 oz)   04/09/21 79 kg (174 lb 1.6 oz)   04/06/21 70.9 kg (156 lb 4.8 oz)     Blood pressure 114/70, pulse 70, temperature 98.9 °F (37.2 °C), resp. rate 16, height 5' 8\" (1.727 m), weight 73.3 kg (161 lb 9.6 oz), last menstrual period 04/06/2018, SpO2 100 %.   Pain:  Pain Scale 1: Numeric (0 - 10)  Pain Intensity 1: 0     Pain Location 1: Leg  Pain Orientation 1: Right, Left  Pain Description 1: Aching  Pain Intervention(s) 1: Other (comment), Distraction, Emotional support (pt on scheduled pain medications)       LAB AND IMAGING FINDINGS: Lab Results   Component Value Date/Time    WBC 11.0 08/25/2021 12:01 AM    HGB 7.5 (L) 08/25/2021 12:01 AM    PLATELET 935 09/22/0045 12:01 AM     Lab Results   Component Value Date/Time    Sodium 143 08/26/2021 05:32 AM    Potassium 4.4 08/26/2021 05:32 AM    Chloride 109 08/26/2021 05:32 AM    CO2 25 08/26/2021 05:32 AM    BUN 85 (H) 08/26/2021 05:32 AM    Creatinine 1.97 (H) 08/26/2021 05:32 AM    Calcium 9.6 08/26/2021 05:32 AM    Magnesium 2.7 (H) 08/24/2021 02:13 AM    Phosphorus 2.5 08/18/2021 12:47 AM      Lab Results   Component Value Date/Time    Alk. phosphatase 117 08/25/2021 12:01 AM    Protein, total 6.9 08/25/2021 12:01 AM    Albumin 4.7 08/25/2021 12:01 AM    Globulin 2.2 08/25/2021 12:01 AM     Lab Results   Component Value Date/Time    INR 1.7 (H) 08/25/2021 12:30 AM    Prothrombin time 19.1 (H) 08/25/2021 12:30 AM    aPTT 46.1 (H) 04/06/2021 03:55 AM      Lab Results   Component Value Date/Time    Iron 10 (L) 08/12/2021 12:15 PM    TIBC 130 (L) 08/12/2021 12:15 PM    Iron % saturation 8 (L) 08/12/2021 12:15 PM      No results found for: PH, PCO2, PO2  No components found for: [place]   Lab Results   Component Value Date/Time    CK 23 (L) 08/11/2021 09:35 PM    CK - MB <1.0 08/11/2021 09:35 PM              Total time: 65 minutes     > 50% counseling / coordination:  Time spent in direct consultation with the patient, medical team, and family     Prolonged service was provided for  [x]30 min   []75 min in face to face time in the presence of the patient, spent as noted above. Time Start: 1000  Time End: 0001    Disclaimer: Sections of this note are dictated using utilizing voice recognition software, which may have resulted in some phonetic based errors in grammar and contents. Even though attempts were made to correct all the mistakes, some may have been missed, and remained in the body of the document. If questions arise, please contact our department.

## 2021-08-26 NOTE — PROGRESS NOTES
[address] [facility] [phone]  [facility] [phone]    Palliative Care Support: This writer, along with Marcela Waite NP, with Palliative Care team; met with patient to offer support and attempt to discuss goals of care. Patient was alert in her bed, at the time of this visit. Patient was not very engaging in the conversation. She only responded a couple of time and her responses were not meaningful. Patient started to get irritated with being asked questions. She focused on getting some ice water; which she received. At some point, patient just stopped responding to the Palliative Team. She was still alert, just chose not to respond. The Palliative Care team then phoned patient's cousin/ISIDRO Jiménez REGINALDO Brown#[phone]) to discuss patient's current condition and non-compliance with medications and working with PT/OT. Debora Patel stated that she is worried about patient and how this non-compliance is not her cousin. She stated that her cousin is \"acting different\" and \"talking weird\". Sophiasin stated that she is in the process of moving patient's mother out of the home and closer to her. Cousin stated that patient really needs long term care, in a nursing facility. This writer brought up the fact that patient does not have Medicaid. Gia stated that patient had applied for Medicaid; however, she never turned in the verifications that the [place] worker needed. This writer suggested that cousin phone the Medicaid worker to determine if she can assist with getting the necessary verifications and to also ask if the patient has to start a whole new Medicaid application. It was suggested that cousin come in for a family meeting with the doctor, care manager and the Palliative team. Family meeting is scheduled for tomorrow (8/27/2021) at 10:00AM. At this time, patient will remain a FULL CODE WITH FULL INTERVENTIONS. Recommendations:  The Palliative Care team will continue to offer support to patient and her family, at this time. The Palliative Care team will follow up with patient and her cousin at the family meeting, regarding goals of care and discharge planning; with Care Manager. Kash Wetzel., [facility]  Palliative Care   [place]#[phone]

## 2021-08-26 NOTE — PROGRESS NOTES
Cardiology Progress Note        Patient: Giana Alcala        Sex: female          DOA: 8/11/2021  YOB: 1970      Age:  46 y.o.        LOS:  LOS: 15 days    Patient seen and examined, chart reviewed. Assessment/Plan     Patient Active Problem List   Diagnosis Code    Osteomyelitis ([facility] M86.9    Type 2 diabetes mellitus with left diabetic foot ulcer ([facility] E11.621, L97.529    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 ([facility] E11.52    YUNIEL (acute kidney injury) ([facility] N17.9    PAD (peripheral artery disease) ([facility]) I73.9    Bilateral carotid artery stenosis I65.23    Gangrene of left foot ([facility]) I96    Non compliance with medical treatment Z91.19    Gangrene of both feet [facility]) I96    Fall W19. [place] Tobacco use Z72.0    Preoperative evaluation to rule out surgical contraindication Z01.818    Anemia D64.9    Sepsis ([facility]) A41.9    Status post below-knee amputation of right lower extremity ([facility]) Z89.511    Bacteremia R78.81    Dyspnea R06.00    Pulmonary infiltrates T71.8    Systolic CHF, acute ([facility]) I50.21    Debility R53.81      Acute systolic heart failure  Hypokalemia   Anemia   Acute renal insufficiency      Echocardiogram revealed     · Test was terminated early due to patient becoming combative. · Left Ventricle: Normal cavity size. Mild concentric hypertrophy. The estimated EF is 40 - 45%. Mildly reduced systolic function. There is inconclusive left ventricular diastolic function E'E= 66.33. Wall Scoring: The left ventricular wall motion is globally hypokinetic. · Pulmonary Artery: Pulmonary arteries not well visualized. Pulmonary arterial systolic pressure (PASP) is 35 mmHg. Pulmonary hypertension found to be mild. Plan:      Continue Metoprolol succinate  Hold losartan, lasix and aldactone  Strict input output.     Monitor renal function   Salt restriction up to 2 g per day and fluid restriction up to 1.2 L per day.    Plan discussed with patient's nurse    Subjective:    cc:   does not want to communicate    REVIEW OF SYSTEMS:     Can not obtain due to patient's condition     Objective:      Visit Vitals  /70 (BP 1 Location: Left upper arm, BP Patient Position: At rest)   Pulse 70   Temp 98.9 °F (37.2 °C)   Resp 16   Ht 5' 8\" (1.727 m)   Wt 73.3 kg (161 lb 9.6 oz)   SpO2 100%   BMI 24.57 kg/m²     Body mass index is 24.57 kg/m². Physical Exam:  General Appearance: Comfortable, not using accessory muscles of respiration. Looks chronically ill   HEENT: ROSA. HEAD: Atraumatic  NECK: No JVD, no thyroidomeglay. CAROTIDS: No bruit  LUNGS: Clear bilaterally. HEART: S1+S2 audible, no murmur, no pericardial rub.      ABD: Non-tender, BS Audible    EXT: Right below knee amputation      Medication:  Current Facility-Administered Medications   Medication Dose Route Frequency    pantoprazole (PROTONIX) injection 40 mg  40 mg IntraVENous Q12H    0.9% sodium chloride infusion  50 mL/hr IntraVENous CONTINUOUS    [Held by provider] spironolactone (ALDACTONE) tablet 25 mg  25 mg Oral DAILY    [Held by provider] furosemide (LASIX) injection 20 mg  20 mg IntraVENous DAILY    magnesium oxide (MAG-OX) tablet 400 mg  400 mg Oral BID    acetaminophen (TYLENOL) tablet 975 mg  975 mg Oral TID    methocarbamoL (ROBAXIN) tablet 1,000 mg  1,000 mg Oral TID    senna-docusate (PERICOLACE) 8.6-50 mg per tablet 1 Tablet  1 Tablet Oral DAILY    promethazine (PHENERGAN) 25 mg in 0.9% sodium chloride 50 mL IVPB  25 mg IntraVENous Q4H PRN    polyethylene glycol (MIRALAX) packet 17 g  17 g Oral DAILY    [Held by provider] losartan (COZAAR) tablet 25 mg  25 mg Oral DAILY    metoprolol succinate (TOPROL-XL) XL tablet 25 mg  25 mg Oral DAILY    0.9% sodium chloride infusion 250 mL  250 mL IntraVENous PRN    [Held by provider] DULoxetine (CYMBALTA) capsule 60 mg  60 mg Oral DAILY    albuterol-ipratropium (DUO-NEB) 2.5 MG-0.5 MG/3 ML  3 mL Nebulization Q4H PRN    insulin glargine (LANTUS) injection 5 Units  5 Units SubCUTAneous DAILY    buPROPion SR (WELLBUTRIN SR) tablet 150 mg  150 mg Oral DAILY    COVID-19 vac,Ad26-PF ([facility]) injection 1 Dose  1 Dose IntraMUSCular PRIOR TO DISCHARGE    aspirin chewable tablet 81 mg  81 mg Oral DAILY    [Held by provider] heparin (porcine) injection 5,000 Units  5,000 Units SubCUTAneous Q8H    naloxone (NARCAN) injection 0.1 mg  0.1 mg IntraVENous PRN    ondansetron (ZOFRAN ODT) tablet 4 mg  4 mg Oral Q8H PRN    Or    ondansetron (ZOFRAN) injection 4 mg  4 mg IntraVENous Q6H PRN    insulin lispro (HUMALOG) injection   SubCUTAneous Q6H    glucose chewable tablet 16 g  16 g Oral PRN    glucagon (GLUCAGEN) injection 1 mg  1 mg IntraMUSCular PRN    dextrose (D50W) injection syrg 12.5-25 g  25-50 mL IntraVENous PRN    atorvastatin (LIPITOR) tablet 40 mg  40 mg Oral QHS               Lab/Data Reviewed:       Recent Labs     08/25/21  0001   WBC 11.0   HGB 7.5*   HCT 24.3*        Recent Labs     08/26/21  0532 08/25/21  0001 08/24/21  0213    142  --    K 4.4 5.2  --     104  --    CO2 25 25  --    * 281*  --    BUN 85* 64* 33*   CREA 1.97* 1.95*  --    CA 9.6 9.2  --          Signed By: Carol Ann Walters MD     August 26, 2021

## 2021-08-26 NOTE — PROGRESS NOTES
Occupational Therapy Treatment Attempt     Chart reviewed. Attempted Occupational Therapy Treatment, however, patient unable to be seen due to:  []  Nausea/vomiting  []  Eating  []  Pain  [x]  Patient too lethargic; unable to rouse with max verbal and tactile cues provided  []  Off Unit for testing/procedure  []  Dialysis treatment in progress  []  Telemetry Results  []  Other:      Will f/u later as patient's schedule allows.   Katelyn Gutierrez, OTR/L

## 2021-08-27 LAB
GLUCOSE BLD STRIP.AUTO-MCNC: 161 MG/DL (ref 70–110)
GLUCOSE BLD STRIP.AUTO-MCNC: 170 MG/DL (ref 70–110)
GLUCOSE BLD STRIP.AUTO-MCNC: 179 MG/DL (ref 70–110)
GLUCOSE BLD STRIP.AUTO-MCNC: 185 MG/DL (ref 70–110)

## 2021-08-27 PROCEDURE — 97530 THERAPEUTIC ACTIVITIES: CPT

## 2021-08-27 PROCEDURE — 82962 GLUCOSE BLOOD TEST: CPT

## 2021-08-27 PROCEDURE — 65660000000 HC RM CCU STEPDOWN

## 2021-08-27 PROCEDURE — 74011636637 HC RX REV CODE- 636/637: Performed by: STUDENT IN AN ORGANIZED HEALTH CARE EDUCATION/TRAINING PROGRAM

## 2021-08-27 PROCEDURE — 74011250637 HC RX REV CODE- 250/637: Performed by: NURSE PRACTITIONER

## 2021-08-27 PROCEDURE — 99356 PR PROLONGED SVC I/P OR OBS SETTING 1ST HOUR: CPT | Performed by: NURSE PRACTITIONER

## 2021-08-27 PROCEDURE — 74011636637 HC RX REV CODE- 636/637: Performed by: FAMILY MEDICINE

## 2021-08-27 PROCEDURE — 74011250636 HC RX REV CODE- 250/636: Performed by: STUDENT IN AN ORGANIZED HEALTH CARE EDUCATION/TRAINING PROGRAM

## 2021-08-27 PROCEDURE — 99233 SBSQ HOSP IP/OBS HIGH 50: CPT | Performed by: NURSE PRACTITIONER

## 2021-08-27 RX ADMIN — INSULIN LISPRO 2 UNITS: 100 INJECTION, SOLUTION INTRAVENOUS; SUBCUTANEOUS at 00:11

## 2021-08-27 RX ADMIN — INSULIN LISPRO 2 UNITS: 100 INJECTION, SOLUTION INTRAVENOUS; SUBCUTANEOUS at 06:11

## 2021-08-27 RX ADMIN — INSULIN LISPRO 3 UNITS: 100 INJECTION, SOLUTION INTRAVENOUS; SUBCUTANEOUS at 19:40

## 2021-08-27 RX ADMIN — METHOCARBAMOL TABLETS 1000 MG: 500 TABLET, COATED ORAL at 15:52

## 2021-08-27 RX ADMIN — INSULIN LISPRO 3 UNITS: 100 INJECTION, SOLUTION INTRAVENOUS; SUBCUTANEOUS at 23:50

## 2021-08-27 RX ADMIN — ACETAMINOPHEN 975 MG: 325 TABLET ORAL at 15:52

## 2021-08-27 RX ADMIN — ONDANSETRON 4 MG: 2 INJECTION INTRAMUSCULAR; INTRAVENOUS at 12:27

## 2021-08-27 NOTE — PROGRESS NOTES
Nutrition Assessment     Type and Reason for Visit: Reassess     Nutrition Recommendations/Plan: Diet: advance diet per NPO and clear liquids per MD. Avoid prolong NPO status as it does not meet 100% of nutritional needs. Nutrition Assessment:  Patient admitted for gangrene of both foot and bacteremia, s/p right BKA on 8/12, T2DM, tobacco use, anemia- received blood transfusion, YUNIEL- worse secondary to not taking meds per MD note. Estimated Daily Nutrient Needs:  Energy (kcal):  2705-8231  Protein (g):  74       Fluid (ml/day):  0579-1396    Nutrition Related Findings:  Lab: GFR est AA 32, BUN 58, creatinine 1.97. med: pericolace, miralax, orotinix, toprol xl, mag ox, humalog, lantus, lasix. Patient is NPO day 3. Current Nutrition Therapies:  ADULT ORAL NUTRITION SUPPLEMENT Lunch, Dinner; Diabetic Supplement  DIET NPO Ice Chips, Sips of Water with Meds, Sips of Clear Liquids    Anthropometric Measures:  · Height:  5' 8\" (172.7 cm)  · Current Body Wt:  74.1 kg (163 lb 5.8 oz)  · BMI: 24.8    Nutrition Diagnosis:   · Inadequate oral intake related to acute injury/trauma as evidenced by NPO or clear liquid status due to medical condition    Nutrition Intervention:  Food and/or Nutrient Delivery: Start oral diet  Nutrition Education and Counseling: No recommendations at this time  Coordination of Nutrition Care: Continue to monitor while inpatient    Goals:  Restart PO diet within the next 2-3 days       Nutrition Monitoring and Evaluation:   Behavioral-Environmental Outcomes: None identified  Food/Nutrient Intake Outcomes: Diet advancement/tolerance  Physical Signs/Symptoms Outcomes: Biochemical data, Skin, Weight, GI status    Discharge Planning:     Too soon to determine     Electronically signed by Shara Mao RD on 8/27/2021 at 11:30 AM

## 2021-08-27 NOTE — PROGRESS NOTES
Occupational Therapy Treatment Attempt     Chart reviewed. Attempted Occupational Therapy Treatment, however, patient unable to be seen due to:  []  Nausea/vomiting  []  Eating  []  Pain  []  Patient too lethargic  []  Off Unit for testing/procedure  []  Dialysis treatment in progress  []  Telemetry Results  [x]  Other: pt adamantly refusing participation with therapy. Provided max verbal encouragement and benefits of OOB mobility. Pt adamantly refusing at this time. Reports 7/10 pain at lower back and agrees to requests for pain medication if it will not make her nauseous again. AMANDEEP Adams notified of the same. Will f/u later as patient's schedule allows.   Davidson Shoulders, OTR/L

## 2021-08-27 NOTE — PROGRESS NOTES
Hospitalist Progress Note    Patient: Miguel Angel Meyer MRN: [id number]  CSN: [id number]    YOB: 1970  Age: 46 y.o. Sex: female    DOA: 8/11/2021 LOS:  LOS: 16 days            Chief complaint :  Gangrene of both feet. Assessment/Plan     Patient Active Problem List   Diagnosis Code    Osteomyelitis ([facility] M86.9    Type 2 diabetes mellitus with left diabetic foot ulcer ([facility] E11.621, L97.529    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 ([facility] E11.52    YUNIEL (acute kidney injury) ([facility] N17.9    PAD (peripheral artery disease) ([facility]) I73.9    Bilateral carotid artery stenosis I65.23    Gangrene of left foot ([facility]) I96    Non compliance with medical treatment Z91.19    Gangrene of both feet [facility]) I96    Fall W19. [place] Tobacco use Z72.0    Preoperative evaluation to rule out surgical contraindication Z01.818    Anemia D64.9    Sepsis ([facility]) A41.9    Status post below-knee amputation of right lower extremity ([facility] Z89.511    Bacteremia R78.81    Dyspnea R06.00    Pulmonary infiltrates U81.8    Systolic CHF, acute ([facility]) I50.21    Debility R53.81          59-year-old female with uncontrolled type 2 diabetes mellitus, severe peripheral arterial disease, and tobacco use who has been noncompliant with wound care and hyperbaric oxygen treatment due to recent fall is admitted for severe gangrene of both feet. RRT was called on 08/16/2021, patient appeared confused, shortness of breath with hypoxia. ABG showed metabolic acidosis. Patient refused chest x-ray. She has been refusing medications. Given her condition she was transferred to ICU.    08/17/2021 - I tried to discuss with patient about her critical condition and guarded prognosis, she is not interested in listening to my concerns about her medical condition. She turned her face on other side and did not want to listen to me and did not answer my questions.       Again Patient refusing medications today, tried to talk to patient however she does not answer to questions, wants me to leave her alone. Events of last night noted  Patient was seen by psych on 08/20/2021.    08/27/2021Met with MPOA Ms. Dany Key along with palliative care. Discussed very poor prognosis, as she continues to refuse treatment. Discussed options of continuing to treat and not sure if she will improve with treatment vs comfort measures. Resp -   Dyspnea/hypoxia -  Responded to lasix  CXR with pulmonary edema. Oxygen as needed by NC. ID -   septic shock  -  BP now improved  Continue iv abx,   Blood Cultures growing Peptoniphilus asaccharolyticus. Follow up blood cultures negative  Wound cultures growing providencia, MSSA, E-fecalis, alcaligenes fecalis. ID following. Antibiotics - received zosyn       [facility] - Monitor HD. Acute CHF - on lasix  Echo 45 % and mild pulm htn      Gangrene of bilateral feet    S/p right BKA on 8/12   S/P RIGHT BELOW KNEE AMPUTATION, DRESSING CHANGE TO LEFT FOOT WOUND 08/20     Heme/onc - Follow H&H, plts. Anemia  Received blood transfusion   Monitor H/H    Renal - Trend BUN, Cr, follow I/O. Check and replace Mg, K, phos. YUNIEL - now BUN/Cr worse. Secondary to not taking any meds, not taking any   Metabolic acidosis resolved with bicarb drip    Endocrine -    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 of both feet  continueSliding scale and  lantus      Neuro/ Pain/ Sedation -   Stopped pain meds    GI -   Diabetic diet. Hematemesis - seen by GI. Refused EGD  Continue with PPI    Prophylaxis - DVT: heparin, GI: protonix.     Noncompliance with medical treatment   Continued tobacco use      PT/OT, she is not cooperating with PT/OT. Disposition : TBD    Review of systems  General: No fevers or chills. Cardiovascular: No chest pain or pressure. No palpitations. Pulmonary: see above. Gastrointestinal: No nausea, vomiting.        Physical Exam:  General: As above   HEENT: NC, Atraumatic. PERRLA, anicteric sclerae. Lungs: CTA Bilaterally. No Wheezing/Rhonchi/Rales. Heart:  S1 S2,  No murmur, No Rubs, No Gallops  Abdomen: Soft, Non distended, Non tender.  +Bowel sounds,   Extremities: Right BKA, left foot with dressing  Psych:   Not anxious or agitated. Neurologic:  No acute neurological deficit. Vital signs/Intake and Output:  Visit Vitals  BP (!) 141/73 (BP 1 Location: Left upper arm, BP Patient Position: At rest)   Pulse 71   Temp 97.8 °F (36.6 °C)   Resp 18   Ht 5' 8\" (1.727 m)   Wt 74.1 kg (163 lb 5.8 oz)   SpO2 97%   BMI 24.84 kg/m²     Current Shift:  No intake/output data recorded. Last three shifts:  08/26 0701 - 08/27 1900  In: 150 [P.O.:150]  Out: -             Labs: Results:       Chemistry Recent Labs     08/26/21  0532 08/25/21  0001   * 281*    142   K 4.4 5.2    104   CO2 25 25   BUN 85* 64*   CREA 1.97* 1.95*   CA 9.6 9.2   AGAP 9 13   BUCR 43* 33*   AP  --  117   TP  --  6.9   ALB  --  4.7   GLOB  --  2.2   AGRAT  --  2.1*      CBC w/Diff Recent Labs     08/25/21  0001   WBC 11.0   RBC 2.58*   HGB 7.5*   HCT 24.3*      GRANS 89*   LYMPH 4*   EOS 0      Cardiac Enzymes No results for input(s): CPK, CKND1, CONSTANTINO in the last 72 hours. No lab exists for component: CKRMB, TROIP   Coagulation Recent Labs     08/25/21  0030   PTP 19.1*   INR 1.7*       Lipid Panel No results found for: CHOL, CHOLPOCT, CHOLX, CHLST, CHOLV, 778180, HDL, HDLP, LDL, LDLC, DLDLP, 556152, VLDLC, VLDL, TGLX, TRIGL, TRIGP, TGLPOCT, CHHD, CHHDX   BNP No results for input(s): BNPP in the last 72 hours.    Liver Enzymes Recent Labs     08/25/21  0001   TP 6.9   ALB 4.7         Thyroid Studies No results found for: T4, T3U, TSH, TSHEXT, TSHEXT     Procedures/imaging: see electronic medical records for all procedures/Xrays and details which were not copied into this note but were reviewed prior to creation of Plan

## 2021-08-27 NOTE — PROGRESS NOTES
Physician Progress Note      Baldo Chandra  CSN #:                  [id number]  :                       1970  ADMIT DATE:       2021 9:27 PM  [address] DATE:  RESPONDING  PROVIDER #:        Ely Guardado MD          QUERY TEXT:    Pt admitted with sepsis. Pt noted to have Patchy airspace disease right base very likely developing pneumonia. If possible, please document in the progress notes and discharge summary if you are evaluating and/or treating any of the following:    Note: CAP and HCAP indicate where the pneumonia was acquired, not a specific type. The medical record reflects the following:    Risk Factors: Sepsis, CHF, DM with peripheral angiopathy with gangrene, nicotine dependence, HTN    Clinical Indicators:  > Chest x-ray 21- Patchy airspace disease right base very likely developing pneumonia. Small atelectatic streak left base>  > Per Dr. Billy Mcdowell PN - \"RRT was called on 2021, patient appeared confused, shortness of breath with hypoxia. ABG showed metabolic acidosis\"  > ABG 2021 10:07  pH (POC): 7.27 (L)  pCO2 (POC): 30.0 (L)  pO2 (POC): 74 (L)  HCO3 (POC): 13.9 (L)  sO2 (POC): 92.8  > RR 24, 28, 30, 22, 23  > O2 sats dropped to 88 %, 89 %    Treatment: Received Chest x-ray, IV Rocephin, Zosyn, Vancomycin, ABGs, O2 via n/c    Thank you,  Vern Baltazar RN, BSN, [facility]  Options provided:  -- Gram negative pneumonia  -- Gram positive pneumonia  -- Bacterial pneumonia  -- Viral pneumonia  -- Aspiration pneumonia  -- Pneumonia ruled out  -- Other - I will add my own diagnosis  -- Disagree - Not applicable / Not valid  -- Disagree - Clinically unable to determine / Unknown  -- Refer to Clinical Documentation Reviewer    PROVIDER RESPONSE TEXT:    Provider is clinically unable to determine a response to this query.     Query created by: Selvin Kraft on 2021 4:15 PM      Electronically signed by:  Ely Guardado MD 8/26/2021 8:32 PM

## 2021-08-27 NOTE — PROGRESS NOTES
Care Management    Discharge Planning: In progress    CM met with the patient, her cousin Anupama Patterson, and palliative care providers at the bedside to discuss goals of care and discharge plans. All in attendance engaged in a lengthy discussion with the patient regarding her goals and how they can be achieved. CM reviewed with the patient that therapy has recommended rehab upon discharge into order regain strength and work towards returning home. CM informed the patient that if she would like to be accepted at a rehab facility she will need to be actively participating with therapy in the hospital as often as possible. The patient's cousin, Tucker Guevara, states that family will not be able to take care of the patient 24/7 at home so discharging home from the hospital is not an option. CM informed the patient that should be not be a candidate for rehab she will need consider going to a nursing home to live upon discharge. The patient verbalized understanding and when asked if she wishes to go to a nursing home replies \"no\". CM discussed with the patient the possibility of being screened for Medicaid and allowing this CM to complete a UAI/LTSS on her behalf. The patient gives this CM permission to have her screened for Medicaid and complete a UAI for her. Patient feeling nauseas at this time and experiencing multiple episodes of vomiting. CM to follow up with the patient next week. Care Management Interventions  PCP Verified by CM:  Yes  Mode of Transport at Discharge: Self  Transition of Care Consult (CM Consult): Discharge Planning  Current Support Network: Own Home (takes care of mother at home)  The Plan for Transition of Care is Related to the Following Treatment Goals : gangrene of both feet, diabetes  The Patient and/or Patient Representative was Provided with a Choice of Provider and Agrees with the Discharge Plan?: Yes  Name of the Patient Representative Who was Provided with a Choice of Provider and Agrees with the Discharge Plan: Clementina Villa, patient  Discharge Location  Discharge Placement:  (TBD)

## 2021-08-27 NOTE — PROGRESS NOTES
2344-White board updated. Resting quietly in bed. Call light within reach. 2355-Assessment complete. Stable, resting quietly in bed. 0010-Ace wrap dressing noted to partial left lower extremity amputation and wound vac in place to right BKA. Brace/Immobilizer noted in bed. No complaints voiced. 0400-Resting in bed, stable, reluctant to engage in conversation. 0620-More interactive now, pleasant. Shift Summary:  Uneventful shift, stable at shift change report.

## 2021-08-27 NOTE — PROGRESS NOTES
55-year-old female with uncontrolled type 2 diabetes mellitus, severe peripheral arterial disease, and tobacco use who has been noncompliant with wound care and hyperbaric oxygen treatment due to recent fall is admitted on 8/11 for severe gangrene of both feet.     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 of both feethad bilateral feet amputation on 8/12  below the knee amputation on the right side and on  the 19 formal BKA and closure. She had previousr left transmetatarsal amputation. I was asked to see her for coffee ground emesis that happened 2 days before without any melena. Her Hgb was 7.2  She is very poor historian and was not interested in answering my questions. She was refusing therapy and also refused having an upper endoscopy to investigate this further. I suspect that she has severe erosive esophagitis caused or aggravated by diabetic gastroparesis. Recommend to keep her on double dose PPI and initially put her on small dose Reglan 5 mg qid before feeding and bed time and then PRN. For now keep her on low fat and low roughage diet to allow her stomach to function easily. Her abdomen appeared benign not distended or tender. I think she has some psychiatric issue of depression and denial. She should stop smoking. ..

## 2021-08-27 NOTE — PROGRESS NOTES
0715:Received verbal bedside report from off going nurse DILLON Cantu R.N. Patient care received. Patient alert and oriented x 4. Patient resting in bed denies pain. Patient stable. Call light with in reach bed in lowest position. 0740:Pt refused vital signs. 1050:Pt refused vitals and blood sugar. 1915: Informed  that the [facility] does not carry acuzyme and that we do have santyl available via the inpatient pharmacy. She said that the [facility] can be substituted for the acuzyme. Will adjust order.

## 2021-08-27 NOTE — ROUTINE PROCESS
Bedside and Verbal shift change report given to Paz Arriaza RN (oncoming nurse) by Kaycee Mendenhall RN (offgoing nurse). Report included the following information SBAR and Kardex.

## 2021-08-27 NOTE — PROGRESS NOTES
Cardiology Progress Note        Patient: Blanca Boudreaux        Sex: female          DOA: 8/11/2021  YOB: 1970      Age:  46 y.o.        LOS:  LOS: 16 days    Patient seen and examined, chart reviewed. Assessment/Plan     Patient Active Problem List   Diagnosis Code    Osteomyelitis ([facility] M86.9    Type 2 diabetes mellitus with left diabetic foot ulcer ([facility] E11.621, L97.529    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 ([facility] E11.52    YUNIEL (acute kidney injury) ([facility] N17.9    PAD (peripheral artery disease) ([facility]) I73.9    Bilateral carotid artery stenosis I65.23    Gangrene of left foot ([facility]) I96    Non compliance with medical treatment Z91.19    Gangrene of both feet [facility]) I96    Fall W19. Blossom Neat Tobacco use Z72.0    Preoperative evaluation to rule out surgical contraindication Z01.818    Anemia D64.9    Sepsis ([facility]) A41.9    Status post below-knee amputation of right lower extremity ([facility]) Z89.511    Bacteremia R78.81    Dyspnea R06.00    Pulmonary infiltrates O20.3    Systolic CHF, acute ([facility]) I50.21    Debility R53.81      Acute systolic heart failure  Hypokalemia   Anemia   Acute renal insufficiency      Echocardiogram revealed     · Test was terminated early due to patient becoming combative. · Left Ventricle: Normal cavity size. Mild concentric hypertrophy. The estimated EF is 40 - 45%. Mildly reduced systolic function. There is inconclusive left ventricular diastolic function E'E= 79.67. Wall Scoring: The left ventricular wall motion is globally hypokinetic. · Pulmonary Artery: Pulmonary arteries not well visualized. Pulmonary arterial systolic pressure (PASP) is 35 mmHg. Pulmonary hypertension found to be mild. Plan:      Continue Metoprolol succinate  Hold losartan, lasix and aldactone  Strict input output.     Monitor renal function    restart losartan and Aldactone as per blood pressure and renal function  Salt restriction up to 2 g per day and fluid restriction up to 1.2 L per   day. Patient will be seen by  on weekend     Subjective:    cc:  I am ok     REVIEW OF SYSTEMS:     General: No fevers or chills. Cardiovascular: No chest pain,No palpitations, No orthopnea, No PND, No leg swelling, No claudication  Pulmonary: No dyspnea. Gastrointestinal: No nausea, vomiting, bleeding  Neurology: No Dizziness    Objective:      Visit Vitals  BP (!) 141/73 (BP 1 Location: Left upper arm, BP Patient Position: At rest)   Pulse 71   Temp 97.8 °F (36.6 °C)   Resp 18   Ht 5' 8\" (1.727 m)   Wt 74.1 kg (163 lb 5.8 oz)   SpO2 97%   BMI 24.84 kg/m²     Body mass index is 24.84 kg/m². Physical Exam:  General Appearance: Comfortable, not using accessory muscles of respiration. Looks chronically ill   HEENT: ROSA. HEAD: Atraumatic  NECK: No JVD, no thyroidomeglay. CAROTIDS: No bruit  LUNGS: Clear bilaterally. HEART: S1+S2 audible, no murmur, no pericardial rub.      ABD: Non-tender, BS Audible    EXT: Right below knee amputation      Medication:  Current Facility-Administered Medications   Medication Dose Route Frequency    pantoprazole (PROTONIX) injection 40 mg  40 mg IntraVENous Q12H    0.9% sodium chloride infusion  50 mL/hr IntraVENous CONTINUOUS    [Held by provider] spironolactone (ALDACTONE) tablet 25 mg  25 mg Oral DAILY    [Held by provider] furosemide (LASIX) injection 20 mg  20 mg IntraVENous DAILY    magnesium oxide (MAG-OX) tablet 400 mg  400 mg Oral BID    acetaminophen (TYLENOL) tablet 975 mg  975 mg Oral TID    methocarbamoL (ROBAXIN) tablet 1,000 mg  1,000 mg Oral TID    senna-docusate (PERICOLACE) 8.6-50 mg per tablet 1 Tablet  1 Tablet Oral DAILY    promethazine (PHENERGAN) 25 mg in 0.9% sodium chloride 50 mL IVPB  25 mg IntraVENous Q4H PRN    polyethylene glycol (MIRALAX) packet 17 g  17 g Oral DAILY    [Held by provider] losartan (COZAAR) tablet 25 mg  25 mg Oral DAILY    metoprolol succinate (TOPROL-XL) XL tablet 25 mg  25 mg Oral DAILY    0.9% sodium chloride infusion 250 mL  250 mL IntraVENous PRN    [Held by provider] DULoxetine (CYMBALTA) capsule 60 mg  60 mg Oral DAILY    albuterol-ipratropium (DUO-NEB) 2.5 MG-0.5 MG/3 ML  3 mL Nebulization Q4H PRN    insulin glargine (LANTUS) injection 5 Units  5 Units SubCUTAneous DAILY    buPROPion SR (WELLBUTRIN SR) tablet 150 mg  150 mg Oral DAILY    COVID-19 vac,Ad26-PF (Stoke) injection 1 Dose  1 Dose IntraMUSCular PRIOR TO DISCHARGE    aspirin chewable tablet 81 mg  81 mg Oral DAILY    [Held by provider] heparin (porcine) injection 5,000 Units  5,000 Units SubCUTAneous Q8H    naloxone (NARCAN) injection 0.1 mg  0.1 mg IntraVENous PRN    ondansetron (ZOFRAN ODT) tablet 4 mg  4 mg Oral Q8H PRN    Or    ondansetron (ZOFRAN) injection 4 mg  4 mg IntraVENous Q6H PRN    insulin lispro (HUMALOG) injection   SubCUTAneous Q6H    glucose chewable tablet 16 g  16 g Oral PRN    glucagon (GLUCAGEN) injection 1 mg  1 mg IntraMUSCular PRN    dextrose (D50W) injection syrg 12.5-25 g  25-50 mL IntraVENous PRN    atorvastatin (LIPITOR) tablet 40 mg  40 mg Oral QHS               Lab/Data Reviewed:       Recent Labs     08/25/21  0001   WBC 11.0   HGB 7.5*   HCT 24.3*        Recent Labs     08/26/21  0532 08/25/21  0001    142   K 4.4 5.2    104   CO2 25 25   * 281*   BUN 85* 64*   CREA 1.97* 1.95*   CA 9.6 9.2         Signed By: Cassi Styles MD     August 27, 2021

## 2021-08-27 NOTE — PROGRESS NOTES
[facility]: [phone])  [facility]: [phone]     Patient Name: Blanca Boudreaux  YOB: 1970    Date of Follow-up Visit : 8/27/2021  Reason for Consult: End stage disease/care decisions  Requesting Provider: Dr. Orly Nicole  Primary Care Physician: Robyn Abernathy MD      SUMMARY:   Blanca Boudreaux is a 46 y.o. female with a past history of uncontrolled type 2 diabetes mellitus, severe peripheral arterial disease, previous left transmetatarsal amputation and tobacco use, who was admitted on 8/11/2021 from home with a diagnosis of gangrene of both feet. Current medical issues leading to Palliative Medicine involvement include: Debilitated 19-year-old female with multiple comorbid conditions including noncompliance with wound care treatment due to recent fall presented to the ER with complaints of fever and gangrene in both feet. Palliative medicine is consulted for care decisions. CHIEF COMPLAINT: Fever, gangrene of her feet    HPI/SUBJECTIVE:    Past history as above. Ms. Maya Bazzi is a very debilitated 19-year-old female with multiple comorbid conditions including noncompliance with wound care treatment after a recent fall. She has presented to the ER with complaints of fever and gangrene in both feet. She fell approximately 3 weeks ago and has been mostly bedbound ever since. Patient has been unable to attend wound care appointments or hyperbaric appointments due to bedbound status at home. 8/18/2021:  Lying in bed on left side with eyes closed. Arousable and became awake, alert and oriented x4. Not talkative or interested in engaging in conversation. Denies pain or shortness of breath. On room air.    8/25/2021:  Lying in bed with eyes closed. Not talkative or interested in conversation or answering orientation questions.   Asked for water once during visit, occasional deep sighing, pulled covers off then back on.  Aria Irving is pulled off. On room air.    8/26/2021:  Lying in bed with eyes open. Not talkative or engaged in conversation or answering questions. Asking for ice water. Occasional deep sighs.    8/27/2021:  Lying in bed with eyes closed. Stated Jorge Comment does everybody keep waking me up? \"  Will answer a few questions but is not very engaged in conversation. Continues asking for ice water. Denies pain or shortness of breath. The patient is:   [x] Verbal and participatory - limited as patient does not like to answer questions  [] Non-participatory due to:     GOALS OF CARE: Full code with full interventions  Patient/Health Care Proxy Stated Goals: Prolong life  TREATMENT PREFERENCES:   Code Status: Full Code         PALLIATIVE DIAGNOSES:   1. Encounter with palliative care/goals of care  2. Gangrene of both feet  3. Noncompliance with medical treatment  4. Debility          PLAN:   8/27/2021:  Seen at bedside along with EVENS Askew. Ms. Katharine Martinez is lying in bed with eyes closed. Arousable and becomes awake and alert. Not answering many questions and stated \"Why does everybody keep waking me up? \"  Often patient will stare off and not engage with staff in the room. Asking for ice water to drink. Denies pain or shortness of breath. Expressed frustration that someone comes in her room \"every 5 minutes\". ~1130:  Family meeting held with patient, patient's cousin/Kennedi Powell (Care manager), Dr. Amanda Ma and palliative team to discuss patient's overall medical situation and discuss options and plans for moving forward to include discharge planning. Ms. Katharine Martinez was lying in bed, awake and alert, responding to a few questions but not very talkative. At times, patient seemed frustrated by the meeting and being asked questions. Brief medical overview provided for patient and Ms. Conner.   Patient has been declining some medications, declining to participate with PT/OT, declining some medical tests and treatments and not wanting to talk to staff at times. Discussed with patient her goals for this hospitalization and discharge. Patient states \"I want to live\"; however, her declination of medical treatments do not align with her previously stated goals. Shared our concerns with patient and Ms. Fabby Evangelista who also expressed her concern to patient about not engaging with the medical management plan. Discussed 3 options with patient for consideration:  1) SNF for rehab short term with the goal of returning home, 2) Long term nursing facility placement or 3) Comfort measures and hospice services at home or in a long term care setting. Patient awake, lying in bed, staring straight ahead and not responding much during the meeting. Provided education to patient that if her goal is to get better and go to a SNF for rehab that she will need to consistently work with PT/OT and stop declining medication and other treatments. At one point, patient agreed to work with therapy as long it wasn't every day. Reinforced education with patient that if the goal is rehab then therapy would be everyday likely 1-2 times per day. With regard to declining her medications, patient replied \"it's too many pills\". Patient reported being nauseated a lot due to the medications but has not reported this to nursing. Educated patient on the importance of notifying her nurse when she is nauseated and asking for medication to manage her nausea. Patient did vomit x1 during the meeting - green fluid. Nurse notified and requested antiemetic. Ms. Fabby Evangelista shared her concerns about patient's medical condition and encouraged patient to participate with therapy and other medical treatments in hopes of being able to go to a rehab facility. Patient was not very communicative as to her thoughts about any of the options presented. Encouraged patient to discuss with Ms. Fabby Evangelista about what the best option would be for her.   Palliative medicine will continue to follow with you. At this time, goals of care remain full code with full interventions. Please see below for previous conversations with the palliative medicine team:    8/26/2021:  Seen at bedside along with Mr. Chloe Flores is lying in bed, awake and alert. Not talkative or engaged in conversation or answering questions. Became frustrated when talking about her refusal of medications and not working with therapy. Patient stated \"I think you all are overdosing me\". Multiple attempts to engage patient and assess her goals and patient stated \"I want to get better and get out of here\". Encouraged patient to take her medications and to work with therapy to increase her functional ability. Patient stated \"I'm taking my medications\". Eventually, patient stopped responding to questions from palliative team and while she was still awake and alert, she just did not respond. Bedside RN in room to administer morning medications which patient did take. Call placed to patient's cousin and MPOA, Rashida Hatch, to discuss patient's condition and her ongoing non-compliance with medical treatment including medications and PT/OT. Lengthy conversation with Roselle Brittle who shared her frustration and worry about patient who she said is \"acting different\" and \"talking weird\". Roselle Brittle verbalized that patient cannot return to her home as it is not safe for her with her wounds and recent amputation. Norwood Mariliasia is willing and engaged in helping to look out for her cousin but is concerned that her cousin is not complying with medical treatments. Discussed having a family meeting to include tristen, Rashida Hatch, Care manager, Hospitalist and Palliative team to discuss patient's overall condition and planning for transitions of care and Roselle Brittle was agreeable. Family meeting scheduled for tomorrow, 8/27/2021 at 10:00 AM in patient's room.   At this time, goals of care remain full code with full interventions. Please see below for previous conversations with the palliative medicine team:     8/25/2021:  Seen at bedside along with Ms. Binh Duran RN. Ms. Kunal Khan is well known to the palliative medicine team and is re-consulted to see patient to discuss possible comfort measures. Palliative medicine worked with patient earlier in this hospitalization and helped patient to complete a [place] on 8/19/2021 naming Mela Davis, patient's cousin, as her MPOA. At that time, goals of care were also discussed with patient who affirmed her decision for full code with full aggressive measures. Patient seen this morning and was lying in bed with eyes closed, not verbally responding to questions or engaged in conversation. Patient moved her head from side to side at times, pulled bedcovers off and then back on. Krissy Devonshire was pulled off. At times, patient would demonstrate deep sighing respirations. No respiratory distress noted. Patient indicated she had nausea by nodding her head but denied any pain. Attempted to engage patient in conversation; however, she kept her eyes closed and simply asked for water. Discussed with bedside RN. Patient may benefit from psychiatry consultation as she appears depressed and is not communicative. Palliative medicine will attempt to follow-up with patient at a later time for further goals of care conversations. Goals of care are full code with full interventions. Please see below for previous conversations with the palliative medicine team:    8/18/2021: Seen at bedside along with Ms. Donovangilmar Sheehan in full PPE for droplet plus isolation and the COVID-19 pandemic. Ms. Kunal Khan is lying in bed with eyes closed laying on her left side. Arousable to verbal stimuli and became awake, alert and oriented x4. Patient was not interested in talking with us and did not engage in conversation.   Follow-up from our visit yesterday regarding patient's choice of surrogate decision-maker and possible completion of an AMD.  Patient again stated that she wants her cousin, Judith Moreno, to be her surrogate decision maker in the event patient is unable to speak for herself. Explained again to patient the importance of completing the AMD document to formalize her wishes for surrogate decision maker. Offered to call her cousin, Judith Moreno, so they could discuss this decision and form completion. Patient declined wanting to call Luz Josh during our visit and told us \"you keep trying to tell me I am dying\". Offered reassurance to patient that we were interested in supporting her choices for surrogate decision making and were looking for ways to help formalize these choices. Patient closed her eyes and did not engage in further conversation. Palliative medicine will follow up with patient again tomorrow in hopes of completing an AMD to formalize patient's choice for surrogate decision maker. Goals of care remain full code with full interventions. Please see below for previous conversations with the palliative medicine team:    1. Encounter with palliative care/goals of care -seen at bedside along with Ms. Melyssa Abraham. Ms. Key [place] is awake, alert and oriented x4. Patient became very frustrated when being asked orientation questions and replied \"do you think I am stupid? \". Attempted to explain to patient the reason we ask orientation questions however patient rolled her eyes and repeated \"do you think I am stupid? \". Nasal oxygen is off the patient was agreeable to have it reapplied. Pulse oximeter was also off and patient agreed to have it replaced and oxygen saturations ranged from 94 to 97%. Patient seem to get frustrated with any questions asked saying Leatha Wilhelm do you keep asking me these questions? \". Asked patient what she wanted from this hospitalization and she said \"I just want to live. Don't you all get it?\".   Attempted to explain to patient that she had been declining recommended treatments and she said \"I just want to breathe\". Attempted to introduce and explain goals of care, including benefits and burdens of resuscitation, intubation and ventilation. Asked patient about her thoughts on resuscitation in the event of cardiac arrest and intubation in the event of worsening respiratory status and patient declined to respond or answer or our questions. Patient had her eyes closed and did not  wish to discuss this with us. Asked patient if she had ever thought about who she would want to make her medical decisions for her in the event she cannot speak for herself and she stated it would be her cousin, Leonora Pryor. Introduced and explained the [facility] and offered to complete with patient today to formalize her wish for her cousin Leonora Pryor to be her MPOA. Patient declined to do any paperwork with us. Explained to patient that in the absence of any paperwork and if she was unable to speak for herself that legal decision making would default to her parents who are still living. Patient expressed understanding; however shared that her mother is not able to have a conversation about patient's health. Patient declined to elaborate on what this meant. Patient seemed annoyed throughout our visit and declined to engage in further conversation. Call placed to patient's cousin, Leonora Pryor, to try and gather additional information about patient. Katharine Pereira shared with us that Tawanna Alvarez prefers to be called Trenton Cart. Shared with Katharine Pereira our conversation and lack of patient's engagement in the conversation. Shared with Katharine Pereira that patient verbally chose her to be surrogate decision-maker however patient was not willing to sign AMD paperwork to formalize this nor was she willing to have an in-depth discussion about goals of care. Katharine Pereira shared with us that patient's mother is essentially bedbound at home and that patient is normally the primary caregiver for her mother.   It is unclear who is caring for patient's mother while patient is hospitalized. At this time, goals of care remain full code with full interventions. 2. Gangrene of both feet - primary team managing, vascular surgery consult, underwent right below-knee amputation on 8/12/2021, wound care consult    3. Noncompliance with medical treatment -patient fell approximately 3 weeks prior to admission and has been mainly bedbound at home and has been unable to participate with hyperbaric treatment appointments or wound care appointments. 4. Debility - PPS 40 which indicates an overall poor prognosis, patient lives at home with her mother who is mainly bedbound and patient is the primary caregiver for her mother. Since patient's fall approximately 3 weeks ago, patient has been mainly bedbound as well. Per chart review, patient has been using a bucket to go to the bathroom. 5. Initial consult note routed to primary continuity provider    6. Communicated plan of care with: Palliative IDT, patient      Advance Care Planning:  [] The [facility] Interdisciplinary Team has updated the ACP Navigator with [address] and Patient Capacity    Primary Decision [facility] ([address]):   Primary Decision Maker: Javier Martin (cousin) - Other Relative - [phone]    Secondary Decision Maker: Arthur Jung - Mother - [phone]              As far as possible, the palliative care team has discussed with patient / health care proxy about goals of care / treatment preferences for patient.          HISTORY:     History obtained from: Chart, medical team and cousinAmalia Ubaldo    Principal Problem:    Gangrene of both feet ([address] (8/11/2021)    Active Problems:    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 ([address] (4/4/2021)      YUNIEL (acute kidney injury) ([address] (4/4/2021)      Non compliance with medical treatment (4/12/2021)      Fall (8/12/2021)      Tobacco use (8/12/2021) Preoperative evaluation to rule out surgical contraindication (8/12/2021)      Anemia (8/12/2021)      Sepsis ([address] (8/13/2021)      Status post below-knee amputation of right lower extremity ([address] (8/13/2021)      Bacteremia (8/13/2021)      Dyspnea (8/16/2021)      Pulmonary infiltrates (1/29/4264)      Systolic CHF, acute ([address] (8/17/2021)      Debility ()      Past Medical History:   Diagnosis Date    PAD (peripheral artery disease) (HCC)       Past Surgical History:   Procedure Laterality Date    HX ORTHOPAEDIC      L TMT amputation      Family History   Problem Relation Age of Onset    Diabetes Mother      History reviewed, no pertinent family history.   Social History     Tobacco Use    Smoking status: Current Every Day Smoker     Packs/day: 1.50    Smokeless tobacco: Never Used   Substance Use Topics    Alcohol use: Not Currently     Comment: on rare occasion     No Known Allergies   Current Facility-Administered Medications   Medication Dose Route Frequency    pantoprazole (PROTONIX) injection 40 mg  40 mg IntraVENous Q12H    0.9% sodium chloride infusion  50 mL/hr IntraVENous CONTINUOUS    [Held by provider] spironolactone (ALDACTONE) tablet 25 mg  25 mg Oral DAILY    [Held by provider] furosemide (LASIX) injection 20 mg  20 mg IntraVENous DAILY    magnesium oxide (MAG-OX) tablet 400 mg  400 mg Oral BID    acetaminophen (TYLENOL) tablet 975 mg  975 mg Oral TID    methocarbamoL (ROBAXIN) tablet 1,000 mg  1,000 mg Oral TID    senna-docusate (PERICOLACE) 8.6-50 mg per tablet 1 Tablet  1 Tablet Oral DAILY    promethazine (PHENERGAN) 25 mg in 0.9% sodium chloride 50 mL IVPB  25 mg IntraVENous Q4H PRN    polyethylene glycol (MIRALAX) packet 17 g  17 g Oral DAILY    [Held by provider] losartan (COZAAR) tablet 25 mg  25 mg Oral DAILY    metoprolol succinate (TOPROL-XL) XL tablet 25 mg  25 mg Oral DAILY    0.9% sodium chloride infusion 250 mL  250 mL IntraVENous PRN    [Held by provider] DULoxetine (CYMBALTA) capsule 60 mg  60 mg Oral DAILY    albuterol-ipratropium (DUO-NEB) 2.5 MG-0.5 MG/3 ML  3 mL Nebulization Q4H PRN    insulin glargine (LANTUS) injection 5 Units  5 Units SubCUTAneous DAILY    buPROPion SR (WELLBUTRIN SR) tablet 150 mg  150 mg Oral DAILY    COVID-19 vac,Ad26-PF (JERONIMO) injection 1 Dose  1 Dose IntraMUSCular PRIOR TO DISCHARGE    aspirin chewable tablet 81 mg  81 mg Oral DAILY    [Held by provider] heparin (porcine) injection 5,000 Units  5,000 Units SubCUTAneous Q8H    naloxone (NARCAN) injection 0.1 mg  0.1 mg IntraVENous PRN    ondansetron (ZOFRAN ODT) tablet 4 mg  4 mg Oral Q8H PRN    Or    ondansetron (ZOFRAN) injection 4 mg  4 mg IntraVENous Q6H PRN    insulin lispro (HUMALOG) injection   SubCUTAneous Q6H    glucose chewable tablet 16 g  16 g Oral PRN    glucagon (GLUCAGEN) injection 1 mg  1 mg IntraMUSCular PRN    dextrose (D50W) injection syrg 12.5-25 g  25-50 mL IntraVENous PRN    atorvastatin (LIPITOR) tablet 40 mg  40 mg Oral QHS          Clinical Pain Assessment (nonverbal scale for nonverbal patients): Clinical Pain Assessment  Severity: 0          Duration: for how long has pt been experiencing pain (e.g., 2 days, 1 month, years)  Frequency: how often pain is an issue (e.g., several times per day, once every few days, constant)     FUNCTIONAL ASSESSMENT:     Palliative Performance Scale (PPS):  PPS: 40    ECOG  ECOG Status : Completely disabled     PSYCHOSOCIAL/SPIRITUAL SCREENING:      Any spiritual / Lutheran concerns: 8/27/2021 - unable to assess, not answering questions  [] Yes /  [] No    Caregiver Burnout:  [] Yes /  [x] No /  [] No Caregiver Present      Anticipatory grief assessment:  8/27/2021 - unable to assess, not answering questions  [] Normal  / [] Maladaptive        REVIEW OF SYSTEMS:     Systems: constitutional, ears/nose/mouth/throat, respiratory, gastrointestinal, genitourinary, musculoskeletal, integumentary, neurologic, psychiatric, endocrine. Positive findings noted below. Modified ESAS Completed by: provider   Fatigue: 2       Pain: 0     Nausea: 1     Dyspnea: 0           Stool Occurrence(s): 0   Positive and pertinent negative findings in ROS are noted above in HPI. The following systems were [] reviewed / [x] unable to be reviewed as noted in HPI  Other findings are noted below. PHYSICAL EXAM:     Constitutional: lying in bed with eyes open, responding to a few questions, not engaged in conversation  Eyes: open, pupils equal  ENMT: no nasal discharge, dry  mucous membranes  Cardiovascular: regular rhythm  Respiratory: breathing not labored, symmetric, on room air  Gastrointestinal: soft   Last bowel movement: None recorded  Musculoskeletal: Right BKA, left transmetatarsal amputation, dressings dry and intact  Skin: warm, dry  Neurologic: following simple commands, moving all extremities  Psychiatric: Appears depressed, not engaged in conversation, staring at times    Other: Wt Readings from Last 3 Encounters:   08/27/21 74.1 kg (163 lb 5.8 oz)   04/09/21 79 kg (174 lb 1.6 oz)   04/06/21 70.9 kg (156 lb 4.8 oz)     Blood pressure (!) 141/73, pulse 71, temperature 97.8 °F (36.6 °C), resp. rate 18, height 5' 8\" (1.727 m), weight 74.1 kg (163 lb 5.8 oz), last menstrual period 04/06/2018, SpO2 97 %.   Pain:  Pain Scale 1: Numeric (0 - 10)  Pain Intensity 1: 7     Pain Location 1: Back  Pain Orientation 1: Posterior  Pain Description 1: Aching  Pain Intervention(s) 1: Medication (see MAR)       LAB AND IMAGING FINDINGS:     Lab Results   Component Value Date/Time    WBC 11.0 08/25/2021 12:01 AM    HGB 7.5 (L) 08/25/2021 12:01 AM    PLATELET 460 17/27/6976 12:01 AM     Lab Results   Component Value Date/Time    Sodium 143 08/26/2021 05:32 AM    Potassium 4.4 08/26/2021 05:32 AM    Chloride 109 08/26/2021 05:32 AM    CO2 25 08/26/2021 05:32 AM    BUN 85 (H) 08/26/2021 05:32 AM    Creatinine 1.97 (H) 08/26/2021 05:32 AM Calcium 9.6 08/26/2021 05:32 AM    Magnesium 2.7 (H) 08/24/2021 02:13 AM    Phosphorus 2.5 08/18/2021 12:47 AM      Lab Results   Component Value Date/Time    Alk. phosphatase 117 08/25/2021 12:01 AM    Protein, total 6.9 08/25/2021 12:01 AM    Albumin 4.7 08/25/2021 12:01 AM    Globulin 2.2 08/25/2021 12:01 AM     Lab Results   Component Value Date/Time    INR 1.7 (H) 08/25/2021 12:30 AM    Prothrombin time 19.1 (H) 08/25/2021 12:30 AM    aPTT 46.1 (H) 04/06/2021 03:55 AM      Lab Results   Component Value Date/Time    Iron 10 (L) 08/12/2021 12:15 PM    TIBC 130 (L) 08/12/2021 12:15 PM    Iron % saturation 8 (L) 08/12/2021 12:15 PM      No results found for: PH, PCO2, PO2  No components found for: [place]   Lab Results   Component Value Date/Time    CK 23 (L) 08/11/2021 09:35 PM    CK - MB <1.0 08/11/2021 09:35 PM              Total time: 65 minutes     > 50% counseling / coordination:  Time spent in direct consultation with the patient, medical team, and family     Prolonged service was provided for  [x]30 min   []75 min in face to face time in the presence of the patient, spent as noted above. Time Start: 1130  Time End: 8166    Disclaimer: Sections of this note are dictated using utilizing voice recognition software, which may have resulted in some phonetic based errors in grammar and contents. Even though attempts were made to correct all the mistakes, some may have been missed, and remained in the body of the document. If questions arise, please contact our department.

## 2021-08-27 NOTE — FAMILY MEETING
[address] [facility] [phone]  DR. HALL'[facility] [phone]      Palliative Care Family Meeting: This writer, along with Theodore Dejesus NP, with the Palliative Care team, Snow Arellano (Care Manager), patient's cousin Romina Yanes), Dr. Donna Conti and the patient; all met for a family meeting. The meeting was called to have everyone on the same page, as it relates to patient's current medical situation and plans moving forward; including discharge planning. Patient was in bed and somewhat responding to questions. Patient appeared to be irritated by the meeting and frustrated that she kept getting asked the same questions. Patient has been refusing some of her medications, refusing PT/OT, refusing medical tests and treatments and refusing to talk to staff, at times. Patient has said, in the past, that she wants to live; however, patient's actions and refusals does not match up with a patient who wants to live. Patient was presented with three discharge options: 1) Skilled nursing facility short term. 2) Long term nursing facility placement and 3) hospice services. Patient just laid in bed and looked straight ahead, not really responding much. She was educated that if she wanted to get better and go to a SNF, she would need to consistently work with PT/OT and stop refusing her medications and other treatments. Patient responded that, \"it's too many pills\". She also stated that she is nauseous a lot, due to the medications. She stated that she has vomited due to this nauseous feeling. She stated that she did not tell anyone. Patient was educated on the importance of communicating what she is experiencing and feeling to the medical team. Patient vomited during the meeting. Dr. Donna Conti gave patient and family a medical update and what could be done, medically. Patient needs to talk with her cousin to determine what option is best for her.  The Palliative Care team will follow up with patient to determine what her ultimate decision is going to be. At this time, patient will remain a FULL CODE WITH FULL INTERVENTIONS. Recommendations: The Palliative Care team will continue to offer support to patient and her family, at this time. The Palliative Care team will also follow up with patient regarding her decision. Care Manager will also consult [employer] to help patient start a Medicaid application. Care Manager will also complete an LTSS screening for long term care. Kash Roberson., [facility]  Palliative Care   #[phone]

## 2021-08-27 NOTE — PROGRESS NOTES
Problem: Mobility Impaired (Adult and Pediatric)  Goal: *Acute Goals and Plan of Care (Insert Text)  Description: Physical Therapy Goals   Initiated 8/20/2021 and to be accomplished within 7 day(s) with further goals TBD as appropriate  1. Patient will move from supine <> sit with min assist for change of position. 2.  Patient will demonstrate intact/unsupported dynamic sitting balance for performance of ADL's with use of UE's.  3.  Patient will transfer bed to chair w/o armrests with min/mod assist via scooting/slide board for time OOB. 4.  Patient will demonstrate ROM/LE strengthening Ex program with accurately for increase of functional mobility as noted above. Outcome: Progressing Towards Goal  physical Therapy TREATMENT    Patient: Blanka Manzo (5 y.o. female)  Date: 8/27/2021  Diagnosis: Gangrene of both feet ([address] [I96]  Diabetes ([address] [E11.9] Gangrene of both feet (HCC)  Procedure(s) (LRB):  FORMAL CLOSURE OF RIGHT BELOW KNEE AMPUTATION, DRESSING CHANGE TO LEFT FOOT WOUND (Right) 8 Days Post-Op  Precautions: Fall, NWB (R LE; forefoot offloading shoe L LE)   Chart, physical therapy assessment, plan of care and goals were reviewed. ASSESSMENT:  Pt is mildly frustrated, but agreeable to transitioning to EOB. Pt is assisted to sitting and performs exercises listed below. 5/10 LBP reported before, during and after session. Pt will benefit from continued PT as tolerated. Progression toward goals:  []      Improving appropriately and progressing toward goals  [x]      Improving slowly and progressing toward goals  []      Not making progress toward goals and plan of care will be adjusted     PLAN:  Patient continues to benefit from skilled intervention to address the above impairments. Continue treatment per established plan of care.   Discharge Recommendations:  Home Health vs SNF  Further Equipment Recommendations for Discharge:  mechanical lift, W/c and sliding board     SUBJECTIVE: Patient stated Serafin Artist is therapy today.     OBJECTIVE DATA SUMMARY:   Critical Behavior:  Neurologic State: Irritable, Alert  Orientation Level: Oriented X4  Cognition: Decreased command following  Safety/Judgement: Fall prevention  Functional Mobility Training:  Bed Mobility:  Supine to Sit: Moderate assistance  Sit to Supine: Moderate assistance  Scooting: Moderate assistance   Interventions: Safety awareness training  Balance:  Sitting: Impaired  Sitting - Static: Poor (constant support)  Therapeutic Exercises:       EXERCISE   Sets   Reps   Active Active Assist   Passive Self ROM   Comments   Ankle Pumps    [] [] [] []    Quad Sets/Glut Sets   [] [] [] []    Hamstring Sets 1 10 [x] [] [] []    Short Arc Quads   [] [] [] []    Heel Slides   [] [] [] []    Straight Leg Raises 1 10 [x] [] [] []    Hip Abd/Add 1 10 [x] [] [] []    Long Arc Quads   [] [] [] []    Seated Marching   [] [] [] []    Standing Marching   [] [] [] []    UE press downs 1 6 [x] [] [] []        Pain:  Pain Scale 1: Numeric (0 - 10)  Pain Intensity 1: 7  Pain Location 1: Back  Pain Orientation 1: Posterior  Pain Description 1: Aching  Pain Intervention(s) 1: Medication (see MAR)  Activity Tolerance:   Fair  Please refer to the flowsheet for vital signs taken during this treatment.   After treatment:   [] Patient left in no apparent distress sitting up in chair  [x] Patient left in no apparent distress in bed  [x] Call bell left within reach  [x] Nursing notified  [] Caregiver present  [] Bed alarm activated      Freddy Juarez PTA   Time Calculation: 14 mins

## 2021-08-27 NOTE — PROGRESS NOTES
Hospitalist Progress Note    Patient: Blanka Manzo MRN: [id number]  [facility]: [id number]    YOB: 1970  Age: 46 y.o. Sex: female    DOA: 8/11/2021 LOS:  LOS: 15 days            Chief complaint :  Gangrene of both feet. Assessment/Plan     Patient Active Problem List   Diagnosis Code    Osteomyelitis ([facility] M86.9    Type 2 diabetes mellitus with left diabetic foot ulcer ([facility] E11.621, L97.529    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 ([facility] E11.52    YUNIEL (acute kidney injury) ([facility] N17.9    PAD (peripheral artery disease) ([facility]) I73.9    Bilateral carotid artery stenosis I65.23    Gangrene of left foot ([facility]) I96    Non compliance with medical treatment Z91.19    Gangrene of both feet [facility]) I96    Fall W19. [place] Tobacco use Z72.0    Preoperative evaluation to rule out surgical contraindication Z01.818    Anemia D64.9    Sepsis ([facility]) A41.9    Status post below-knee amputation of right lower extremity ([facility] Z89.511    Bacteremia R78.81    Dyspnea R06.00    Pulmonary infiltrates D15.6    Systolic CHF, acute ([facility]) I50.21    Debility R53.81          54-year-old female with uncontrolled type 2 diabetes mellitus, severe peripheral arterial disease, and tobacco use who has been noncompliant with wound care and hyperbaric oxygen treatment due to recent fall is admitted for severe gangrene of both feet. RRT was called on 08/16/2021, patient appeared confused, shortness of breath with hypoxia. ABG showed metabolic acidosis. Patient refused chest x-ray. She has been refusing medications. Given her condition she was transferred to ICU.    08/17/2021 - I tried to discuss with patient about her critical condition and guarded prognosis, she is not interested in listening to my concerns about her medical condition. She turned her face on other side and did not want to listen to me and did not answer my questions.       Again Patient refusing medications today, tried to talk to patient however she does not answer to questions, wants me to leave her alone. Events of last night noted  Patient was seen by psych on 08/20/2021. Called and discussed with MPOA Ms. Chas Gray. Discussed very poor prognosis. Discussed transitioning to comfort care if patient is not cooperating . Resp -   Dyspnea/hypoxia -  Responded to lasix  CXR with pulmonary edema. Oxygen as needed by NC. ID -   septic shock  -  BP now improved  Continue iv abx,   Blood Cultures growing Peptoniphilus asaccharolyticus. Follow up blood cultures negative  Wound cultures growing providencia, MSSA, E-fecalis, alcaligenes fecalis. ID following. Antibiotics - received zosyn       [facility] - Monitor HD. Acute CHF - on lasix  Echo 45 % and mild pulm htn      Gangrene of bilateral feet    S/p right BKA on 8/12   S/P RIGHT BELOW KNEE AMPUTATION, DRESSING CHANGE TO LEFT FOOT WOUND 08/20     Heme/onc - Follow H&H, plts. Anemia  Received blood transfusion   Monitor H/H    Renal - Trend BUN, Cr, follow I/O. Check and replace Mg, K, phos. YUNIEL - now BUN/Cr worse. Secondary to not taking any meds, not taking any   Metabolic acidosis resolved with bicarb drip    Endocrine -    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 of both feet  continueSliding scale and  lantus      Neuro/ Pain/ Sedation -   Stopped pain meds    GI -   Diabetic diet. Hematemesis - seen by GI. Refused EGD  Continue with PPI    Prophylaxis - DVT: heparin, GI: protonix.     Noncompliance with medical treatment   Continued tobacco use      PT/OT, she is not cooperating with PT/OT. Disposition : TBD    Review of systems  General: No fevers or chills. Cardiovascular: No chest pain or pressure. No palpitations. Pulmonary: see above. Gastrointestinal: No nausea, vomiting. Physical Exam:  General: As above   HEENT: NC, Atraumatic. PERRLA, anicteric sclerae. Lungs: CTA Bilaterally.  No Wheezing/Rhonchi/Rales. Heart:  S1 S2,  No murmur, No Rubs, No Gallops  Abdomen: Soft, Non distended, Non tender.  +Bowel sounds,   Extremities: Right BKA, left foot with dressing  Psych:   Not anxious or agitated. Neurologic:  No acute neurological deficit. Vital signs/Intake and Output:  Visit Vitals  /70 (BP 1 Location: Left upper arm, BP Patient Position: At rest)   Pulse 70   Temp 98.9 °F (37.2 °C)   Resp 16   Ht 5' 8\" (1.727 m)   Wt 73.3 kg (161 lb 9.6 oz)   SpO2 100%   BMI 24.57 kg/m²     Current Shift:  No intake/output data recorded. Last three shifts:  No intake/output data recorded. Labs: Results:       Chemistry Recent Labs     08/26/21  0532 08/25/21  0001 08/24/21  0213   * 281*  --     142  --    K 4.4 5.2  --     104  --    CO2 25 25  --    BUN 85* 64* 33*   CREA 1.97* 1.95*  --    CA 9.6 9.2  --    AGAP 9 13  --    BUCR 43* 33*  --    AP  --  117  --    TP  --  6.9  --    ALB  --  4.7  --    GLOB  --  2.2  --    AGRAT  --  2.1*  --       CBC w/Diff Recent Labs     08/25/21  0001   WBC 11.0   RBC 2.58*   HGB 7.5*   HCT 24.3*      GRANS 89*   LYMPH 4*   EOS 0      Cardiac Enzymes No results for input(s): CPK, CKND1, CONSTANTINO in the last 72 hours. No lab exists for component: CKRMB, TROIP   Coagulation Recent Labs     08/25/21  0030   PTP 19.1*   INR 1.7*       Lipid Panel No results found for: CHOL, CHOLPOCT, CHOLX, CHLST, CHOLV, 347279, HDL, HDLP, LDL, LDLC, DLDLP, 724591, VLDLC, VLDL, TGLX, TRIGL, TRIGP, TGLPOCT, CHHD, CHHDX   BNP No results for input(s): BNPP in the last 72 hours.    Liver Enzymes Recent Labs     08/25/21  0001   TP 6.9   ALB 4.7         Thyroid Studies No results found for: T4, T3U, TSH, TSHEXT, TSHEXT     Procedures/imaging: see electronic medical records for all procedures/Xrays and details which were not copied into this note but were reviewed prior to creation of Plan

## 2021-08-27 NOTE — PROGRESS NOTES
Pt refused PT due to:    []  Nausea/vomiting  []  Eating  [x]  Pain (LBP)  []  Pt lethargic  []  Off Unit  Will f/u later, as pt's schedule allows. Thank you.   Quinn Hernandez, PTA

## 2021-08-27 NOTE — ROUTINE PROCESS
Bedside and Verbal shift change report given to Rosa M Hudson RN (oncoming nurse) by Daya Moser RN (offgoing nurse). Report included the following information SBAR and Kardex.

## 2021-08-27 NOTE — PROGRESS NOTES
Vascular Surgery Attending  POD 8 right BKA closure  Stump healing well  Local wound care to L. Foot  PT/OT/CM for IPR  Follow up with me in 5-6 weeks. Samuel Frye MD, [address] [facility]  [phone] office  [phone] mobile                                                           VASCULAR AND TRANSPLANT       PROGRESS NOTE    Date: 2021    Post-Op Day: 15   S/p right below knee guillotine amputation. POD:  8   closure right below knee amputation. Plan:   Prevena dressing removed from right bka stump. Begin local wound care of kerlex and ace bandage to right bka stump. Begin acuzyme wound care to left foot dorsal wound. Will not follow actively. Will follow up as an outpatient 5-6 weeks for suture removal    Assessment:   45 yo female with atherosclerosis of native arteries with gangrene right foot. S/p bka. Wound dorsum left foot. Subjective:   Pt is more talkative today. Pt denies pain right bka stump. Objective:   Admit weight: Weight: 41.1 kg (90 lb 9.7 oz)  Last recorded weight: Weight: 74.1 kg (163 lb 5.8 oz)    Temp (24hrs), Av.1 °F (36.7 °C), Min:97.4 °F (36.3 °C), Max:98.8 °F (37.1 °C)          [unfilled]    Intake/Output Summary (Last 24 hours) at 2021 1711  Last data filed at 2021 1350  Gross per 24 hour   Intake 150 ml   Output --   Net 150 ml       Right bka stump is warm and perfused. Suture line is clean, dry, and intact. There is no erythema, no ecchymosis, no drainage, no skin edge necrosis. Left foot wound with exposed tendons. Fibrinous exudate.      Labs:     BMP:   Recent Labs     21  0532 21  0001   BUN 85* 64*    142   CO2  25     CBC:    Recent Labs     21  0001   WBC 11.0   HCT 24.3*   RDW 18.1*     PT/INR:   Recent Labs     21  0030   INR 1.7*       Arterial Blood Gases   No results found for: LPMO2, FIO2, PEEP, PH1, PCO2, PO2, HCO3, MODE     MICRO:  Results Procedure Component Value Units Date/Time    RESPIRATORY VIRUS PANEL W/COVID-19, PCR [[id number]] Collected: 08/18/21 1630    Order Status: Completed Specimen: Nasopharyngeal Updated: 08/19/21 0815     Adenovirus Not detected        Coronavirus 229E Not detected        Coronavirus HKU1 Not detected        Coronavirus CVNL63 Not detected        Coronavirus OC43 Not detected        SARS-CoV-2, PCR Not detected        Metapneumovirus Not detected        Rhinovirus and Enterovirus Not detected        Influenza A Not detected        Influenza B Not detected        Parainfluenza 1 Not detected        Parainfluenza 2 Not detected        Parainfluenza 3 Not detected        Parainfluenza virus 4 Not detected        RSV by PCR Not detected        B. parapertussis, PCR Not detected        Bordetella pertussis - PCR Not detected        Chlamydophila pneumoniae DNA, QL, PCR Not detected        Mycoplasma pneumoniae DNA, QL, PCR Not detected       COVID-19 RAPID TEST [[id number]] Collected: 08/17/21 0955    Order Status: Completed Specimen: Nasopharyngeal Updated: 08/17/21 1041     Specimen source Nasopharyngeal        COVID-19 rapid test Not detected        Comment: Rapid Abbott ID Now       Rapid NAAT:  The specimen is NEGATIVE for SARS-CoV-2, the novel coronavirus associated with COVID-19. Negative results should be treated as presumptive and, if inconsistent with clinical signs and symptoms or necessary for patient management, should be tested with an alternative molecular assay. Negative results do not preclude SARS-CoV-2 infection and should not be used as the sole basis for patient management decisions. This test has been authorized by the FDA under an Emergency Use Authorization (EUA) for use by authorized laboratories.    Fact sheet for Healthcare Providers: ConventionUpdate.co.nz  Fact sheet for Patients: ConventionUpdate.co.nz       Methodology: Isothermal Nucleic Acid Amplification         CULTURE, BLOOD [[id number]] Collected: 08/17/21 0520    Order Status: Completed Specimen: Blood Updated: 08/23/21 0723     Special Requests: NO SPECIAL REQUESTS        Culture result: NO GROWTH 6 DAYS       CULTURE, BLOOD [[id number]] Collected: 08/16/21 0530    Order Status: Completed Specimen: Blood Updated: 08/22/21 0723     Special Requests: NO SPECIAL REQUESTS        Culture result: NO GROWTH 6 DAYS       CULTURE, BLOOD [[id number]] Collected: 08/16/21 0530    Order Status: Completed Specimen: Blood Updated: 08/22/21 0723     Special Requests: NO SPECIAL REQUESTS        Culture result: NO GROWTH 6 DAYS       CULTURE, BLOOD [[id number]] Collected: 08/15/21 0316    Order Status: Completed Specimen: Blood Updated: 08/21/21 0730     Special Requests: NO SPECIAL REQUESTS        Culture result: NO GROWTH 6 DAYS       CULTURE, BLOOD [[id number]] Collected: 08/15/21 0316    Order Status: Completed Specimen: Blood Updated: 08/21/21 0730     Special Requests: NO SPECIAL REQUESTS        Culture result: NO GROWTH 6 DAYS       CULTURE, BLOOD [[id number]] Collected: 08/14/21 0342    Order Status: Completed Specimen: Blood Updated: 08/21/21 0730     Special Requests: NO SPECIAL REQUESTS        Culture result: NO GROWTH 7 DAYS       CULTURE, BLOOD [[id number]] Collected: 08/14/21 0342    Order Status: Completed Specimen: Blood Updated: 08/21/21 0730     Special Requests: NO SPECIAL REQUESTS        Culture result: NO GROWTH 7 DAYS                     Ashley Salguero PA-C   [phone].

## 2021-08-27 NOTE — PROGRESS NOTES
Bedside and Verbal shift change report given to Georgia Dow RN (oncoming nurse) by Sharif Ware (offgoing nurse). Report included the following information SBAR, Kardex, ED Summary, Intake/Output, MAR, Recent Results, Med Rec Status and Cardiac Rhythm NSR.     1932 Shift Assessment completed. Patient is resting quietly with eyes wide open and chest rising and falling evenly. No signs of pain. 2345 Bedside and Verbal shift change report given to Oralia Rabago RN (oncoming nurse) by Ray Borrego (offgoing nurse). Report included the following information SBAR, Kardex, ED Summary, Intake/Output, MAR, Recent Results, Med Rec Status and Cardiac Rhythm NSR.

## 2021-08-27 NOTE — DIABETES MGMT
Diabetes Plan of Care    Assessment:  Follow up - s/p right BKA 8/12 and left foot wound  This morning, she is refusing care and medications - has not received her lantus, refusing BG check  Diet advanced to clear liquids - may need insulin adj as diet advances     Current Insulin regimen: Lantus 5 units + Humalog Normal Insulin Sensitivity Corrective Coverage    TDD previous day = 19 units  14 units humalog  5 units lantus    Most recent blood glucose values: Within target range (non-ICU: <140; ICU<180): No - improving     Current A1c  Lab Results   Component Value Date/Time    Hemoglobin A1c 7.0 (H) 08/11/2021 09:35 PM   .    Adequate glycemic control PTA:  Yes      Home diabetes medications;       Erika Wright MPH RN [address] [employer]  Pager [phone]  Office [phone]

## 2021-08-27 NOTE — PROGRESS NOTES
Problem: Falls - Risk of  Goal: *Absence of Falls  Description: Document Latisha Abbott Fall Risk and appropriate interventions in the flowsheet. Outcome: Progressing Towards Goal  Note: Fall Risk Interventions:  Mobility Interventions: Communicate number of staff needed for ambulation/transfer, OT consult for ADLs, Patient to call before getting OOB, PT Consult for mobility concerns, PT Consult for assist device competence         Medication Interventions: Patient to call before getting OOB, Teach patient to arise slowly    Elimination Interventions: Call light in reach, Patient to call for help with toileting needs    History of Falls Interventions: Door open when patient unattended, Investigate reason for fall, Room close to nurse's station         Problem: Patient Education: Go to Patient Education Activity  Goal: Patient/Family Education  Outcome: Progressing Towards Goal     Problem: Diabetes Self-Management  Goal: *Disease process and treatment process  Description: Define diabetes and identify own type of diabetes; list 3 options for treating diabetes. Outcome: Progressing Towards Goal  Goal: *Incorporating nutritional management into lifestyle  Description: Describe effect of type, amount and timing of food on blood glucose; list 3 methods for planning meals. Outcome: Progressing Towards Goal  Goal: *Incorporating physical activity into lifestyle  Description: State effect of exercise on blood glucose levels. Outcome: Progressing Towards Goal  Goal: *Developing strategies to promote health/change behavior  Description: Define the ABC's of diabetes; identify appropriate screenings, schedule and personal plan for screenings. Outcome: Progressing Towards Goal  Goal: *Using medications safely  Description: State effect of diabetes medications on diabetes; name diabetes medication taking, action and side effects.   Outcome: Progressing Towards Goal  Goal: *Monitoring blood glucose, interpreting and using results  Description: Identify recommended blood glucose targets  and personal targets. Outcome: Progressing Towards Goal  Goal: *Prevention, detection, treatment of acute complications  Description: List symptoms of hyper- and hypoglycemia; describe how to treat low blood sugar and actions for lowering  high blood glucose level. Outcome: Progressing Towards Goal  Goal: *Prevention, detection and treatment of chronic complications  Description: Define the natural course of diabetes and describe the relationship of blood glucose levels to long term complications of diabetes. Outcome: Progressing Towards Goal  Goal: *Developing strategies to address psychosocial issues  Description: Describe feelings about living with diabetes; identify support needed and support network  Outcome: Progressing Towards Goal  Goal: *Insulin pump training  Outcome: Progressing Towards Goal  Goal: *Sick day guidelines  Outcome: Progressing Towards Goal  Goal: *Patient Specific Goal (EDIT GOAL, INSERT TEXT)  Outcome: Progressing Towards Goal     Problem: Patient Education: Go to Patient Education Activity  Goal: Patient/Family Education  Outcome: Progressing Towards Goal     Problem: Pressure Injury - Risk of  Goal: *Prevention of pressure injury  Description: Document Flo Scale and appropriate interventions in the flowsheet.   Outcome: Progressing Towards Goal  Note: Pressure Injury Interventions:  Sensory Interventions: Assess changes in LOC, Maintain/enhance activity level, Pressure redistribution bed/mattress (bed type), Pad between skin to skin    Moisture Interventions: Absorbent underpads, Limit adult briefs, Minimize layers    Activity Interventions: Pressure redistribution bed/mattress(bed type), PT/OT evaluation    Mobility Interventions: PT/OT evaluation, Pressure redistribution bed/mattress (bed type)    Nutrition Interventions: Document food/fluid/supplement intake    Friction and Shear Interventions: Apply protective barrier, creams and emollients, Minimize layers                Problem: Patient Education: Go to Patient Education Activity  Goal: Patient/Family Education  Outcome: Progressing Towards Goal

## 2021-08-28 LAB
ANION GAP SERPL CALC-SCNC: 8 MMOL/L (ref 3–18)
BUN SERPL-MCNC: 69 MG/DL (ref 7–18)
BUN/CREAT SERPL: 53 (ref 12–20)
CALCIUM SERPL-MCNC: 9 MG/DL (ref 8.5–10.1)
CHLORIDE SERPL-SCNC: 107 MMOL/L (ref 100–111)
CO2 SERPL-SCNC: 25 MMOL/L (ref 21–32)
CREAT SERPL-MCNC: 1.29 MG/DL (ref 0.6–1.3)
ERYTHROCYTE [DISTWIDTH] IN BLOOD BY AUTOMATED COUNT: 19 % (ref 11.6–14.5)
GLUCOSE BLD STRIP.AUTO-MCNC: 182 MG/DL (ref 70–110)
GLUCOSE BLD STRIP.AUTO-MCNC: 211 MG/DL (ref 70–110)
GLUCOSE BLD STRIP.AUTO-MCNC: 224 MG/DL (ref 70–110)
GLUCOSE SERPL-MCNC: 167 MG/DL (ref 74–99)
HCT VFR BLD AUTO: 26 % (ref 35–45)
HGB BLD-MCNC: 7.9 G/DL (ref 12–16)
MCH RBC QN AUTO: 28.8 PG (ref 24–34)
MCHC RBC AUTO-ENTMCNC: 30.4 G/DL (ref 31–37)
MCV RBC AUTO: 94.9 FL (ref 78–100)
PLATELET # BLD AUTO: 232 K/UL (ref 135–420)
PMV BLD AUTO: 11.1 FL (ref 9.2–11.8)
POTASSIUM SERPL-SCNC: 4 MMOL/L (ref 3.5–5.5)
RBC # BLD AUTO: 2.74 M/UL (ref 4.2–5.3)
SODIUM SERPL-SCNC: 140 MMOL/L (ref 136–145)
WBC # BLD AUTO: 6.3 K/UL (ref 4.6–13.2)

## 2021-08-28 PROCEDURE — 36415 COLL VENOUS BLD VENIPUNCTURE: CPT

## 2021-08-28 PROCEDURE — 74011636637 HC RX REV CODE- 636/637: Performed by: STUDENT IN AN ORGANIZED HEALTH CARE EDUCATION/TRAINING PROGRAM

## 2021-08-28 PROCEDURE — 74011250637 HC RX REV CODE- 250/637: Performed by: STUDENT IN AN ORGANIZED HEALTH CARE EDUCATION/TRAINING PROGRAM

## 2021-08-28 PROCEDURE — C9113 INJ PANTOPRAZOLE SODIUM, VIA: HCPCS | Performed by: INTERNAL MEDICINE

## 2021-08-28 PROCEDURE — 80048 BASIC METABOLIC PNL TOTAL CA: CPT

## 2021-08-28 PROCEDURE — 74011250636 HC RX REV CODE- 250/636: Performed by: INTERNAL MEDICINE

## 2021-08-28 PROCEDURE — 99232 SBSQ HOSP IP/OBS MODERATE 35: CPT | Performed by: INTERNAL MEDICINE

## 2021-08-28 PROCEDURE — 97110 THERAPEUTIC EXERCISES: CPT

## 2021-08-28 PROCEDURE — 74011250637 HC RX REV CODE- 250/637: Performed by: NURSE PRACTITIONER

## 2021-08-28 PROCEDURE — 85027 COMPLETE CBC AUTOMATED: CPT

## 2021-08-28 PROCEDURE — 74011636637 HC RX REV CODE- 636/637: Performed by: FAMILY MEDICINE

## 2021-08-28 PROCEDURE — 82962 GLUCOSE BLOOD TEST: CPT

## 2021-08-28 PROCEDURE — 65660000000 HC RM CCU STEPDOWN

## 2021-08-28 PROCEDURE — 74011250637 HC RX REV CODE- 250/637: Performed by: HOSPITALIST

## 2021-08-28 PROCEDURE — 74011250637 HC RX REV CODE- 250/637: Performed by: INTERNAL MEDICINE

## 2021-08-28 RX ORDER — TRAMADOL HYDROCHLORIDE 50 MG/1
50 TABLET ORAL
Status: DISCONTINUED | OUTPATIENT
Start: 2021-08-28 | End: 2021-09-03 | Stop reason: HOSPADM

## 2021-08-28 RX ORDER — LOSARTAN POTASSIUM 25 MG/1
25 TABLET ORAL DAILY
Status: DISCONTINUED | OUTPATIENT
Start: 2021-08-29 | End: 2021-08-28

## 2021-08-28 RX ADMIN — METHOCARBAMOL TABLETS 1000 MG: 500 TABLET, COATED ORAL at 10:17

## 2021-08-28 RX ADMIN — TRAMADOL HYDROCHLORIDE 50 MG: 50 TABLET, FILM COATED ORAL at 15:19

## 2021-08-28 RX ADMIN — BUPROPION HYDROCHLORIDE 150 MG: 150 TABLET, FILM COATED, EXTENDED RELEASE ORAL at 10:17

## 2021-08-28 RX ADMIN — METOPROLOL SUCCINATE 25 MG: 25 TABLET, EXTENDED RELEASE ORAL at 10:17

## 2021-08-28 RX ADMIN — METHOCARBAMOL TABLETS 1000 MG: 500 TABLET, COATED ORAL at 15:19

## 2021-08-28 RX ADMIN — METHOCARBAMOL TABLETS 1000 MG: 500 TABLET, COATED ORAL at 21:25

## 2021-08-28 RX ADMIN — ACETAMINOPHEN 975 MG: 325 TABLET ORAL at 21:25

## 2021-08-28 RX ADMIN — INSULIN GLARGINE 5 UNITS: 100 INJECTION, SOLUTION SUBCUTANEOUS at 10:15

## 2021-08-28 RX ADMIN — ATORVASTATIN CALCIUM 40 MG: 20 TABLET, FILM COATED ORAL at 21:25

## 2021-08-28 RX ADMIN — PANTOPRAZOLE SODIUM 40 MG: 40 INJECTION, POWDER, FOR SOLUTION INTRAVENOUS at 10:14

## 2021-08-28 RX ADMIN — MAGNESIUM OXIDE TAB 400 MG (241.3 MG ELEMENTAL MG) 400 MG: 400 (241.3 MG) TAB at 10:17

## 2021-08-28 RX ADMIN — PANTOPRAZOLE SODIUM 40 MG: 40 INJECTION, POWDER, FOR SOLUTION INTRAVENOUS at 21:25

## 2021-08-28 RX ADMIN — INSULIN LISPRO 3 UNITS: 100 INJECTION, SOLUTION INTRAVENOUS; SUBCUTANEOUS at 06:33

## 2021-08-28 RX ADMIN — INSULIN LISPRO 6 UNITS: 100 INJECTION, SOLUTION INTRAVENOUS; SUBCUTANEOUS at 15:30

## 2021-08-28 RX ADMIN — ACETAMINOPHEN 975 MG: 325 TABLET ORAL at 10:16

## 2021-08-28 RX ADMIN — DOCUSATE SODIUM 50 MG AND SENNOSIDES 8.6 MG 1 TABLET: 8.6; 5 TABLET, FILM COATED ORAL at 10:31

## 2021-08-28 RX ADMIN — INSULIN LISPRO 6 UNITS: 100 INJECTION, SOLUTION INTRAVENOUS; SUBCUTANEOUS at 19:05

## 2021-08-28 RX ADMIN — ASPIRIN 81 MG: 81 TABLET, CHEWABLE ORAL at 10:31

## 2021-08-28 RX ADMIN — MAGNESIUM OXIDE TAB 400 MG (241.3 MG ELEMENTAL MG) 400 MG: 400 (241.3 MG) TAB at 21:26

## 2021-08-28 NOTE — PROGRESS NOTES
Occupational Therapy Treatment Attempt     Chart reviewed. Attempted Occupational Therapy Treatment, however, patient unable to be seen due to:  []  Nausea/vomiting  []  Eating  []  Pain  []  Patient too lethargic  []  Off Unit for testing/procedure  []  Dialysis treatment in progress  []  Telemetry Results  [x]  Other:  Pt refusing participation with therapy; states she cannot do therapy sessions in a day and since she already worked with PT earlier today she will not be getting up again. Will f/u later as patient's schedule allows.   Susannah Clements, OTR/L

## 2021-08-28 NOTE — PROGRESS NOTES
Problem: Falls - Risk of  Goal: *Absence of Falls  Description: Document Lilliana Mcelroy Fall Risk and appropriate interventions in the flowsheet. Outcome: Progressing Towards Goal  Note: Fall Risk Interventions:  Mobility Interventions: Bed/chair exit alarm         Medication Interventions: Patient to call before getting OOB    Elimination Interventions: Call light in reach, Bed/chair exit alarm    History of Falls Interventions: Door open when patient unattended         Problem: Diabetes Self-Management  Goal: *Disease process and treatment process  Description: Define diabetes and identify own type of diabetes; list 3 options for treating diabetes.   Outcome: Progressing Towards Goal

## 2021-08-28 NOTE — PROGRESS NOTES
Hospitalist Progress Note    Patient: Giana Alcala MRN: [id number]  CSN: [id number]    YOB: 1970  Age: 46 y.o. 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 ([address] (4/4/2021)      YUNIEL (acute kidney injury) ([address] (4/4/2021)      Non compliance with medical treatment (4/12/2021)      Fall (8/12/2021)      Tobacco use (8/12/2021)      Preoperative evaluation to rule out surgical contraindication (8/12/2021)      Anemia (8/12/2021)      Sepsis ([address] (8/13/2021)      Status post below-knee amputation of right lower extremity ([address] (8/13/2021)      Bacteremia (8/13/2021)      Dyspnea (8/16/2021)      Pulmonary infiltrates (5/83/6724)      Systolic CHF, acute ([address] (8/17/2021)      Debility ()             Resp - Dyspnea/hypoxia -  Responded to lasix  CXR with pulmonary edema. Oxygen as needed by NC.     ID -   septic shock  -  BP now improved and will restart the losartan. Off IV antibiotics    Blood Cultures growing Peptoniphilus asaccharolyticus. Follow up blood cultures negative  Wound cultures growing providencia, MSSA, E-fecalis, alcaligenes fecalis.       [facility] - Monitor HD. Acute CHF - on lasix and metoptrolol. Better now and will restart the losartanEcho 45 % and mild pulm htn       Gangrene of bilateral feet    S/p right BKA on 8/12   S/P RIGHT BELOW KNEE AMPUTATION, DRESSING CHANGE TO LEFT FOOT WOUND 08/20     Heme/onc - Follow H&H, plts. Anemia  Received blood transfusion   Monitor H/H     Renal - Trend BUN, Cr, follow I/O. Check and replace Mg, K, phos.   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 Hematemesis - seen by GI. Refused EGD  Continue with PPI     Prophylaxis - DVT: heparin, GI: protonix.     Noncompliance with medical treatment   Continued tobacco use      DC planning      Chief complaint:  Gangrene of both feet      Subjective:    Leg still a little sore from the debridement yesterday        Review of systems:    General: No fevers or chills. Cardiovascular: No chest pain or pressure. No palpitations. Pulmonary: No shortness of breath. Gastrointestinal: No nausea, vomiting. Objective:    Vital signs/Intake and Output:  Visit Vitals  BP (!) 152/75 (BP 1 Location: Left upper arm, BP Patient Position: At rest)   Pulse 80   Temp 97.5 °F (36.4 °C)   Resp 18   Ht 5' 8\" (1.727 m)   Wt 74.1 kg (163 lb 5.8 oz)   SpO2 99%   BMI 24.84 kg/m²     Current Shift:  No intake/output data recorded. Last three shifts:  08/26 1901 - 08/28 0700  In: 150 [P.O.:150]  Out: -     Physical Exam:  General: NAD, AAOx3. Non-toxic. HEENT: NC/AT. PERRLA, EOMI.  MMM. Lungs: Nml inspection. CTA B/L. No wheezing, rales or rhonchi. Heart:  S1S2 RRR,  PMI mid 5th IC space. No M/RG. Abdomen: Soft, NT/ND.  BS+. No peritoneal signs. Extremities: No C/C/E. Psych:   Nml affect. Neurologic:  2-12 intact. Strength 5/5 throughout. Sensation symmetrical.          Labs: Results:       Chemistry Recent Labs     08/28/21  0505 08/26/21  0532   * 194*    143   K 4.0 4.4    109   CO2 25 25   BUN 69* 85*   CREA 1.29 1.97*   CA 9.0 9.6   AGAP 8 9   BUCR 53* 43*      CBC w/Diff Recent Labs     08/28/21  0505   WBC 6.3   RBC 2.74*   HGB 7.9*   HCT 26.0*         Cardiac Enzymes No results for input(s): CPK, CKND1, CONSTANTINO in the last 72 hours. No lab exists for component: CKRMB, TROIP   Coagulation No results for input(s): PTP, INR, APTT, INREXT in the last 72 hours.     Lipid Panel No results found for: CHOL, CHOLPOCT, CHOLX, CHLST, CHOLV, 170422, HDL, HDLP, LDL, LDLC, DLDLP, 605104, VLDLC, VLDL, TGLX, TRIGL, TRIGP, TGLPOCT, CHHD, CHHDX   BNP No results for input(s): BNPP in the last 72 hours. Liver Enzymes No results for input(s): TP, ALB, TBIL, AP in the last 72 hours.     No lab exists for component: SGOT, GPT, DBIL   Thyroid Studies No results found for: T4, T3U, TSH, TSHEXT     Procedures/imaging: see electronic medical records for all procedures/Xrays and details which were not copied into this note but were reviewed prior to creation of Plan

## 2021-08-28 NOTE — ROUTINE PROCESS
Bedside and Verbal shift change report given to LEOPOLDO Joel R.N. (oncoming nurse) by LEXI Steinberg R.N. (offgoing nurse). Report included the following information SBAR, Kardex, Intake/Output, MAR, Accordion, Recent Results, and Med Rec Status.

## 2021-08-28 NOTE — PROGRESS NOTES
Problem: Mobility Impaired (Adult and Pediatric)  Goal: *Acute Goals and Plan of Care (Insert Text)  Description: Physical Therapy Goals   Initiated 8/20/2021 and to be accomplished within 7 day(s) with further goals TBD as appropriate  1. Patient will move from supine <> sit with min assist for change of position. 2.  Patient will demonstrate intact/unsupported dynamic sitting balance for performance of ADL's with use of UE's.  3.  Patient will transfer bed to chair w/o armrests with min/mod assist via scooting/slide board for time OOB. 4.  Patient will demonstrate ROM/LE strengthening Ex program with accurately for increase of functional mobility as noted above. Outcome: Progressing Towards Goal   PHYSICAL THERAPY TREATMENT    Patient: Naila Orellana (89 y.o. 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 Min/modA to sit up EOB. Requires modA to scoot to EOB. Constant (B)UE support needed for sitting balance. Performed seated (B)LE ROM strengthening exercises. MaxA to scoot up to [facility] before returning to supine. Performed supine ROM strengthening exercises on (B)LEs. Progression toward goals:   []      Improving appropriately and progressing toward goals  [x]      Improving slowly and progressing toward goals  []      Not making progress toward goals and plan of care will be adjusted     PLAN:  Patient continues to benefit from skilled intervention to address the above impairments. Continue treatment per established plan of care.   Discharge Recommendations:  Skilled Nursing Facility/LTC  Further Equipment Recommendations for Discharge:  wheelchair SUBJECTIVE:   Patient stated I guess if you stay here with me.     OBJECTIVE DATA SUMMARY:   Critical Behavior:  Neurologic State: Alert  Orientation Level: Oriented to person, Oriented to place, Oriented to situation, Oriented to time  Cognition: Decreased command following  Safety/Judgement: Fall prevention  Functional Mobility Training:  Bed Mobility:  Supine to Sit: Minimum assistance; Moderate assistance  Sit to Supine: Minimum assistance  Scooting: Minimum assistance (bed in trendelenburg)  Balance:  Sitting: Impaired; With support  Sitting - Static: Fair (occasional) (-)  Sitting - Dynamic: Fair (occasional) (-)  Therapeutic Exercises:   (B)LEs      EXERCISE   Sets   Reps   Active Active Assist   Passive Self ROM   Comments   Ankle Pumps    [] [] [] []    Quad Sets/Glut Sets  10ea  [x] [] [] [] Hold for 5 secs   Hamstring Sets   [] [] [] []    Short Arc Quads   [] [] [] []    Heel Slides  10 [x] [] [] [] L only   Straight Leg Raises  10 [x] [] [] []    Hip Add  10 [x] [] [] [] Hold for 5 secs, w/ pillow squeeze   Long Arc Quads  10 [x] [] [] []    Seated Marching  10 [x] [] [] []    Standing Marching   [] [] [] []       [] [] [] []        Pain:  Pain level pre-treatment: 7/10  Pain level post-treatment: 7/10   Pain Intervention(s): Medication (see MAR); Rest, Ice, Repositioning   Response to intervention: Nurse notified, See doc flow    Activity Tolerance:   Poor motivation  Please refer to the flowsheet for vital signs taken during this treatment. After treatment:   [] Patient left in no apparent distress sitting up in chair  [x] Patient left in no apparent distress in bed  [x] Call bell left within reach  [x] Nursing notified  [] Caregiver present  [] Bed alarm activated  [] SCDs applied      COMMUNICATION/EDUCATION:   [x]         Role of Physical Therapy in the acute care setting. [x]         Fall prevention education was provided and the patient/caregiver indicated understanding.   [x] Patient/family have participated as able in working toward goals and plan of care. [x]         Patient/family agree to work toward stated goals and plan of care. []         Patient understands intent and goals of therapy, but is neutral about his/her participation.   []         Patient is unable to participate in stated goals/plan of care: ongoing with therapy staff.  []         Other:        Terese Kc PTA   Time Calculation: 15 mins

## 2021-08-28 NOTE — PROGRESS NOTES
Cardiology Progress Note      8/28/2021 10:19 AM    Admit Date: 8/11/2021    Admit Diagnosis: Gangrene of both feet ([facility] [I96]  Diabetes ([facility] [E11.9]      Subjective:     Naila Orellana denies chest pain, chest pressure/discomfort, dyspnea.     Visit Vitals  /70   Pulse 69   Temp 98.2 °F (36.8 °C)   Resp 14   Ht 5' 8\" (1.727 m)   Wt 74.1 kg (163 lb 5.8 oz)   LMP 04/06/2018 (Within Years)   SpO2 98%   BMI 24.84 kg/m²     Current Facility-Administered Medications   Medication Dose Route Frequency    collagenase (SANTYL) 250 unit/gram ointment   Topical DAILY    pantoprazole (PROTONIX) injection 40 mg  40 mg IntraVENous Q12H    0.9% sodium chloride infusion  50 mL/hr IntraVENous CONTINUOUS    [Held by provider] spironolactone (ALDACTONE) tablet 25 mg  25 mg Oral DAILY    [Held by provider] furosemide (LASIX) injection 20 mg  20 mg IntraVENous DAILY    magnesium oxide (MAG-OX) tablet 400 mg  400 mg Oral BID    acetaminophen (TYLENOL) tablet 975 mg  975 mg Oral TID    methocarbamoL (ROBAXIN) tablet 1,000 mg  1,000 mg Oral TID    senna-docusate (PERICOLACE) 8.6-50 mg per tablet 1 Tablet  1 Tablet Oral DAILY    promethazine (PHENERGAN) 25 mg in 0.9% sodium chloride 50 mL IVPB  25 mg IntraVENous Q4H PRN    polyethylene glycol (MIRALAX) packet 17 g  17 g Oral DAILY    [Held by provider] losartan (COZAAR) tablet 25 mg  25 mg Oral DAILY    metoprolol succinate (TOPROL-XL) XL tablet 25 mg  25 mg Oral DAILY    0.9% sodium chloride infusion 250 mL  250 mL IntraVENous PRN    [Held by provider] DULoxetine (CYMBALTA) capsule 60 mg  60 mg Oral DAILY    albuterol-ipratropium (DUO-NEB) 2.5 MG-0.5 MG/3 ML  3 mL Nebulization Q4H PRN    insulin glargine (LANTUS) injection 5 Units  5 Units SubCUTAneous DAILY    buPROPion SR (WELLBUTRIN SR) tablet 150 mg  150 mg Oral DAILY    COVID-19 vac,Ad26-PF (Push IO) injection 1 Dose  1 Dose IntraMUSCular PRIOR TO DISCHARGE    aspirin chewable tablet 81 mg  81 mg Oral DAILY  [Held by provider] heparin (porcine) injection 5,000 Units  5,000 Units SubCUTAneous Q8H    naloxone (NARCAN) injection 0.1 mg  0.1 mg IntraVENous PRN    ondansetron (ZOFRAN ODT) tablet 4 mg  4 mg Oral Q8H PRN    Or    ondansetron (ZOFRAN) injection 4 mg  4 mg IntraVENous Q6H PRN    insulin lispro (HUMALOG) injection   SubCUTAneous Q6H    glucose chewable tablet 16 g  16 g Oral PRN    glucagon (GLUCAGEN) injection 1 mg  1 mg IntraMUSCular PRN    dextrose (D50W) injection syrg 12.5-25 g  25-50 mL IntraVENous PRN    atorvastatin (LIPITOR) tablet 40 mg  40 mg Oral QHS         Objective:      Physical Exam:  General Appearance: Comfortable, not using accessory muscles of respiration. Looks chronically ill   HEENT: ROSA. HEAD: Atraumatic  NECK: No JVD, no thyroidomeglay. CAROTIDS: No bruit  LUNGS: Clear bilaterally. HEART: S1+S2 audible, no murmur, no pericardial rub.                      ABD: Non-tender, BS Audible                          EXT: Right below knee amputation    Data Review:   Labs:    Recent Results (from the past 24 hour(s))   GLUCOSE, POC    Collection Time: 08/27/21  5:48 PM   Result Value Ref Range    Glucose (POC) 179 (H) 70 - 110 mg/dL   GLUCOSE, POC    Collection Time: 08/27/21 11:46 PM   Result Value Ref Range    Glucose (POC) 185 (H) 70 - 110 mg/dL   CBC W/O DIFF    Collection Time: 08/28/21  5:05 AM   Result Value Ref Range    WBC 6.3 4.6 - 13.2 K/uL    RBC 2.74 (L) 4.20 - 5.30 M/uL    HGB 7.9 (L) 12.0 - 16.0 g/dL    HCT 26.0 (L) 35.0 - 45.0 %    MCV 94.9 78.0 - 100.0 FL    MCH 28.8 24.0 - 34.0 PG    MCHC 30.4 (L) 31.0 - 37.0 g/dL    RDW 19.0 (H) 11.6 - 14.5 %    PLATELET 371 871 - 227 K/uL    MPV 11.1 9.2 - 91.3 FL   METABOLIC PANEL, BASIC    Collection Time: 08/28/21  5:05 AM   Result Value Ref Range    Sodium 140 136 - 145 mmol/L    Potassium 4.0 3.5 - 5.5 mmol/L    Chloride 107 100 - 111 mmol/L    CO2 25 21 - 32 mmol/L    Anion gap 8 3.0 - 18 mmol/L    Glucose 167 (H) 74 - 99 mg/dL    BUN 69 (H) 7.0 - 18 MG/DL    Creatinine 1.29 0.6 - 1.3 MG/DL    BUN/Creatinine ratio 53 (H) 12 - 20      GFR est AA 53 (L) >60 ml/min/1.73m2    GFR est non-AA 44 (L) >60 ml/min/1.73m2    Calcium 9.0 8.5 - 10.1 MG/DL   GLUCOSE, POC    Collection Time: 08/28/21  6:18 AM   Result Value Ref Range    Glucose (POC) 182 (H) 70 - 110 mg/dL       Telemetry: normal sinus rhythm      Assessment:     Principal Problem:    Gangrene of both feet (HCC) (8/11/2021)    Active Problems:    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 ([address] (4/4/2021)      YUNIEL (acute kidney injury) ([address] (4/4/2021)      Non compliance with medical treatment (4/12/2021)      Fall (8/12/2021)      Tobacco use (8/12/2021)      Preoperative evaluation to rule out surgical contraindication (8/12/2021)      Anemia (8/12/2021)      Sepsis ([address] (8/13/2021)      Status post below-knee amputation of right lower extremity ([address] (8/13/2021)      Bacteremia (8/13/2021)      Dyspnea (8/16/2021)      Pulmonary infiltrates (8/56/8038)      Systolic CHF, acute ([address] (8/17/2021)      Debility ()        Plan:     Continue metoprolol succinate. Holding losartan, Lasix and Aldactone. Follow.        Eh Esquivel MD

## 2021-08-29 LAB
ALBUMIN SERPL-MCNC: 3.5 G/DL (ref 3.4–5)
ALBUMIN/GLOB SERPL: 1.3 {RATIO} (ref 0.8–1.7)
ALP SERPL-CCNC: 152 U/L (ref 45–117)
ALT SERPL-CCNC: 255 U/L (ref 13–56)
ANION GAP SERPL CALC-SCNC: 6 MMOL/L (ref 3–18)
AST SERPL-CCNC: 260 U/L (ref 10–38)
BASOPHILS # BLD: 0 K/UL (ref 0–0.1)
BASOPHILS NFR BLD: 0 % (ref 0–2)
BILIRUB SERPL-MCNC: 1.2 MG/DL (ref 0.2–1)
BUN SERPL-MCNC: 48 MG/DL (ref 7–18)
BUN/CREAT SERPL: 38 (ref 12–20)
CALCIUM SERPL-MCNC: 8.5 MG/DL (ref 8.5–10.1)
CHLORIDE SERPL-SCNC: 106 MMOL/L (ref 100–111)
CO2 SERPL-SCNC: 27 MMOL/L (ref 21–32)
CREAT SERPL-MCNC: 1.28 MG/DL (ref 0.6–1.3)
DIFFERENTIAL METHOD BLD: ABNORMAL
EOSINOPHIL # BLD: 0.2 K/UL (ref 0–0.4)
EOSINOPHIL NFR BLD: 3 % (ref 0–5)
ERYTHROCYTE [DISTWIDTH] IN BLOOD BY AUTOMATED COUNT: 19.5 % (ref 11.6–14.5)
GLOBULIN SER CALC-MCNC: 2.6 G/DL (ref 2–4)
GLUCOSE BLD STRIP.AUTO-MCNC: 124 MG/DL (ref 70–110)
GLUCOSE BLD STRIP.AUTO-MCNC: 130 MG/DL (ref 70–110)
GLUCOSE BLD STRIP.AUTO-MCNC: 134 MG/DL (ref 70–110)
GLUCOSE BLD STRIP.AUTO-MCNC: 142 MG/DL (ref 70–110)
GLUCOSE BLD STRIP.AUTO-MCNC: 151 MG/DL (ref 70–110)
GLUCOSE BLD STRIP.AUTO-MCNC: 164 MG/DL (ref 70–110)
GLUCOSE SERPL-MCNC: 163 MG/DL (ref 74–99)
HCT VFR BLD AUTO: 27.9 % (ref 35–45)
HGB BLD-MCNC: 8.6 G/DL (ref 12–16)
LYMPHOCYTES # BLD: 0.6 K/UL (ref 0.9–3.6)
LYMPHOCYTES NFR BLD: 8 % (ref 21–52)
MCH RBC QN AUTO: 29.5 PG (ref 24–34)
MCHC RBC AUTO-ENTMCNC: 30.8 G/DL (ref 31–37)
MCV RBC AUTO: 95.5 FL (ref 78–100)
MONOCYTES # BLD: 0.7 K/UL (ref 0.05–1.2)
MONOCYTES NFR BLD: 10 % (ref 3–10)
NEUTS SEG # BLD: 5.7 K/UL (ref 1.8–8)
NEUTS SEG NFR BLD: 78 % (ref 40–73)
PLATELET # BLD AUTO: 289 K/UL (ref 135–420)
PMV BLD AUTO: 10.5 FL (ref 9.2–11.8)
POTASSIUM SERPL-SCNC: 4 MMOL/L (ref 3.5–5.5)
PROT SERPL-MCNC: 6.1 G/DL (ref 6.4–8.2)
RBC # BLD AUTO: 2.92 M/UL (ref 4.2–5.3)
SODIUM SERPL-SCNC: 139 MMOL/L (ref 136–145)
WBC # BLD AUTO: 7.3 K/UL (ref 4.6–13.2)

## 2021-08-29 PROCEDURE — 36415 COLL VENOUS BLD VENIPUNCTURE: CPT

## 2021-08-29 PROCEDURE — 74011250636 HC RX REV CODE- 250/636: Performed by: INTERNAL MEDICINE

## 2021-08-29 PROCEDURE — 99232 SBSQ HOSP IP/OBS MODERATE 35: CPT | Performed by: INTERNAL MEDICINE

## 2021-08-29 PROCEDURE — 74011250637 HC RX REV CODE- 250/637: Performed by: INTERNAL MEDICINE

## 2021-08-29 PROCEDURE — 97530 THERAPEUTIC ACTIVITIES: CPT

## 2021-08-29 PROCEDURE — 82962 GLUCOSE BLOOD TEST: CPT

## 2021-08-29 PROCEDURE — 74011250637 HC RX REV CODE- 250/637: Performed by: NURSE PRACTITIONER

## 2021-08-29 PROCEDURE — 97110 THERAPEUTIC EXERCISES: CPT

## 2021-08-29 PROCEDURE — 74011250637 HC RX REV CODE- 250/637: Performed by: STUDENT IN AN ORGANIZED HEALTH CARE EDUCATION/TRAINING PROGRAM

## 2021-08-29 PROCEDURE — 74011636637 HC RX REV CODE- 636/637: Performed by: STUDENT IN AN ORGANIZED HEALTH CARE EDUCATION/TRAINING PROGRAM

## 2021-08-29 PROCEDURE — C9113 INJ PANTOPRAZOLE SODIUM, VIA: HCPCS | Performed by: INTERNAL MEDICINE

## 2021-08-29 PROCEDURE — 80053 COMPREHEN METABOLIC PANEL: CPT

## 2021-08-29 PROCEDURE — 65660000000 HC RM CCU STEPDOWN

## 2021-08-29 PROCEDURE — 74011636637 HC RX REV CODE- 636/637: Performed by: FAMILY MEDICINE

## 2021-08-29 PROCEDURE — 74011250637 HC RX REV CODE- 250/637: Performed by: HOSPITALIST

## 2021-08-29 PROCEDURE — 85025 COMPLETE CBC W/AUTO DIFF WBC: CPT

## 2021-08-29 RX ADMIN — METHOCARBAMOL TABLETS 1000 MG: 500 TABLET, COATED ORAL at 21:44

## 2021-08-29 RX ADMIN — MAGNESIUM OXIDE TAB 400 MG (241.3 MG ELEMENTAL MG) 400 MG: 400 (241.3 MG) TAB at 21:44

## 2021-08-29 RX ADMIN — INSULIN LISPRO 3 UNITS: 100 INJECTION, SOLUTION INTRAVENOUS; SUBCUTANEOUS at 13:03

## 2021-08-29 RX ADMIN — PANTOPRAZOLE SODIUM 40 MG: 40 INJECTION, POWDER, FOR SOLUTION INTRAVENOUS at 21:45

## 2021-08-29 RX ADMIN — PANTOPRAZOLE SODIUM 40 MG: 40 INJECTION, POWDER, FOR SOLUTION INTRAVENOUS at 08:00

## 2021-08-29 RX ADMIN — INSULIN GLARGINE 5 UNITS: 100 INJECTION, SOLUTION SUBCUTANEOUS at 08:01

## 2021-08-29 RX ADMIN — INSULIN LISPRO 3 UNITS: 100 INJECTION, SOLUTION INTRAVENOUS; SUBCUTANEOUS at 17:45

## 2021-08-29 RX ADMIN — ATORVASTATIN CALCIUM 40 MG: 20 TABLET, FILM COATED ORAL at 21:44

## 2021-08-29 RX ADMIN — METHOCARBAMOL TABLETS 1000 MG: 500 TABLET, COATED ORAL at 08:01

## 2021-08-29 RX ADMIN — METHOCARBAMOL TABLETS 1000 MG: 500 TABLET, COATED ORAL at 15:04

## 2021-08-29 RX ADMIN — BUPROPION HYDROCHLORIDE 150 MG: 150 TABLET, FILM COATED, EXTENDED RELEASE ORAL at 08:00

## 2021-08-29 RX ADMIN — TRAMADOL HYDROCHLORIDE 50 MG: 50 TABLET, FILM COATED ORAL at 15:04

## 2021-08-29 RX ADMIN — METOPROLOL SUCCINATE 25 MG: 25 TABLET, EXTENDED RELEASE ORAL at 08:01

## 2021-08-29 RX ADMIN — ACETAMINOPHEN 975 MG: 325 TABLET ORAL at 21:44

## 2021-08-29 RX ADMIN — TRAMADOL HYDROCHLORIDE 50 MG: 50 TABLET, FILM COATED ORAL at 08:00

## 2021-08-29 NOTE — PROGRESS NOTES
Cardiology Progress Note      8/29/2021 11:18 AM    Admit Date: 8/11/2021    Admit Diagnosis: Gangrene of both feet ([facility] [I96]  Diabetes ([facility] [E11.9]      Subjective:     Kristin Ahuja denies chest pain, chest pressure/discomfort, dyspnea, palpitations.     Visit Vitals  /66 (BP 1 Location: Left upper arm)   Pulse 78   Temp 97.6 °F (36.4 °C)   Resp 14   Ht 5' 8\" (1.727 m)   Wt 74.1 kg (163 lb 5.8 oz)   LMP 04/06/2018 (Within Years)   SpO2 96%   BMI 24.84 kg/m²     Current Facility-Administered Medications   Medication Dose Route Frequency    traMADoL (ULTRAM) tablet 50 mg  50 mg Oral Q6H PRN    collagenase (SANTYL) 250 unit/gram ointment   Topical DAILY    pantoprazole (PROTONIX) injection 40 mg  40 mg IntraVENous Q12H    [Held by provider] spironolactone (ALDACTONE) tablet 25 mg  25 mg Oral DAILY    [Held by provider] furosemide (LASIX) injection 20 mg  20 mg IntraVENous DAILY    magnesium oxide (MAG-OX) tablet 400 mg  400 mg Oral BID    acetaminophen (TYLENOL) tablet 975 mg  975 mg Oral TID    methocarbamoL (ROBAXIN) tablet 1,000 mg  1,000 mg Oral TID    senna-docusate (PERICOLACE) 8.6-50 mg per tablet 1 Tablet  1 Tablet Oral DAILY    promethazine (PHENERGAN) 25 mg in 0.9% sodium chloride 50 mL IVPB  25 mg IntraVENous Q4H PRN    polyethylene glycol (MIRALAX) packet 17 g  17 g Oral DAILY    losartan (COZAAR) tablet 25 mg  25 mg Oral DAILY    metoprolol succinate (TOPROL-XL) XL tablet 25 mg  25 mg Oral DAILY    0.9% sodium chloride infusion 250 mL  250 mL IntraVENous PRN    [Held by provider] DULoxetine (CYMBALTA) capsule 60 mg  60 mg Oral DAILY    albuterol-ipratropium (DUO-NEB) 2.5 MG-0.5 MG/3 ML  3 mL Nebulization Q4H PRN    insulin glargine (LANTUS) injection 5 Units  5 Units SubCUTAneous DAILY    buPROPion SR (WELLBUTRIN SR) tablet 150 mg  150 mg Oral DAILY    COVID-19 vac,Ad26-PF (JERONIMO) injection 1 Dose  1 Dose IntraMUSCular PRIOR TO DISCHARGE    aspirin chewable tablet 81 mg  81 mg Oral DAILY    [Held by provider] heparin (porcine) injection 5,000 Units  5,000 Units SubCUTAneous Q8H    naloxone (NARCAN) injection 0.1 mg  0.1 mg IntraVENous PRN    ondansetron (ZOFRAN ODT) tablet 4 mg  4 mg Oral Q8H PRN    Or    ondansetron (ZOFRAN) injection 4 mg  4 mg IntraVENous Q6H PRN    insulin lispro (HUMALOG) injection   SubCUTAneous Q6H    glucose chewable tablet 16 g  16 g Oral PRN    glucagon (GLUCAGEN) injection 1 mg  1 mg IntraMUSCular PRN    dextrose (D50W) injection syrg 12.5-25 g  25-50 mL IntraVENous PRN    atorvastatin (LIPITOR) tablet 40 mg  40 mg Oral QHS         Objective:      Physical Exam:  General Appearance: Comfortable, not using accessory muscles of respiration. Looks chronically ill   HEENT: ROSA. HEAD: Atraumatic  NECK: No JVD, no thyroidomeglay.  CAROTIDS: No bruit  LUNGS: Clear bilaterally.         HEART: S1+S2 audible, no murmur, no pericardial rub.                     ABD: Non-tender, BS Audible                          EXT: Right below knee amputation       Data Review:   Labs:    Recent Results (from the past 24 hour(s))   GLUCOSE, POC    Collection Time: 08/28/21  3:22 PM   Result Value Ref Range    Glucose (POC) 224 (H) 70 - 110 mg/dL   GLUCOSE, POC    Collection Time: 08/28/21  6:58 PM   Result Value Ref Range    Glucose (POC) 211 (H) 70 - 110 mg/dL   GLUCOSE, POC    Collection Time: 08/29/21 12:12 AM   Result Value Ref Range    Glucose (POC) 130 (H) 70 - 110 mg/dL   GLUCOSE, POC    Collection Time: 08/29/21  6:07 AM   Result Value Ref Range    Glucose (POC) 142 (H) 70 - 110 mg/dL   GLUCOSE, POC    Collection Time: 08/29/21  7:48 AM   Result Value Ref Range    Glucose (POC) 134 (H) 70 - 110 mg/dL       Telemetry: normal sinus rhythm      Assessment:     Principal Problem:    Gangrene of both feet ([facility] (8/11/2021)    Active Problems:    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 ([facility] (4/4/2021)      YUNIEL (acute kidney injury) ([address] (4/4/2021)      Non compliance with medical treatment (4/12/2021)      Fall (8/12/2021)      Tobacco use (8/12/2021)      Preoperative evaluation to rule out surgical contraindication (8/12/2021)      Anemia (8/12/2021)      Sepsis ([address] (8/13/2021)      Status post below-knee amputation of right lower extremity ([address] (8/13/2021)      Bacteremia (8/13/2021)      Dyspnea (8/16/2021)      Pulmonary infiltrates (8/88/2875)      Systolic CHF, acute ([address] (8/17/2021)      Debility ()        Plan:     Awake and alert today. Complaining of leg pain. C-V sable. Follow.     Sara Gan MD

## 2021-08-29 NOTE — PROGRESS NOTES
OCCUPATIONAL THERAPY TREATMENT    Problem: Self Care Deficits Care Plan (Adult)  Goal: *Acute Goals and Plan of Care (Insert Text)  Description: Occupational Therapy Goals  Initiated 8/20/2021 within 7 day(s). 1.  Patient will perform grooming with minimal assistance standing at sink for 5 minutes or more. 2.  Patient will perform upper body dressing with supervision/set-up. 3.  Patient will perform lower body dressing with minimal assistance/contact guard assist.  4.  Patient will perform toilet transfers with minimal assistance/contact guard assist.  5.  Patient will perform all aspects of toileting with minimal assistance/contact guard assist.  6.  Patient will participate in upper extremity therapeutic exercise/activities with supervision/set-up for 10 minutes. 7.  Patient will utilize energy conservation techniques during functional activities with verbal, visual, and tactile cues. Outcome: Progressing Towards Goal     Patient: Donovan Marcus (64 y.o. female)  Date: 8/29/2021  Diagnosis: Gangrene of both feet ([address] Nixon Sinning  Diabetes ([address] [E11.9] Gangrene of both feet ([address]  Procedure(s) (LRB):  FORMAL CLOSURE OF RIGHT BELOW KNEE AMPUTATION, DRESSING CHANGE TO LEFT FOOT WOUND (Right) 10 Days Post-Op  Precautions: Fall, NWB      Chart, occupational therapy assessment, plan of care, and goals were reviewed. ASSESSMENT:  Pt received supine in bed, agreeable to sit EOB to work on functional reach and balance exercises. Min A to achieve sitting EOB. Pt is able to maintain balance with resistance work both unilateral and bilatearl in different planes of motion with no sliding noted, no LOB noted. Pt reports fatigue following these activities and required min A to return supine. All needs left in reach.    Progression toward goals:  []          Improving appropriately and progressing toward goals  [x]          Improving slowly and progressing toward goals  []          Not making progress toward goals and plan of care will be adjusted     PLAN:  Patient continues to benefit from skilled intervention to address the above impairments. Continue treatment per established plan of care. Discharge Recommendations:  Skilled Nursing Facility  Further Equipment Recommendations for Discharge:  wheelchair and N/A     SUBJECTIVE:   Patient stated I can do that.     OBJECTIVE DATA SUMMARY:   Cognitive/Behavioral Status:  Neurologic State: Alert, Appropriate for age  Orientation Level: Appropriate for age  Cognition: Appropriate decision making  Safety/Judgement: Awareness of environment    Functional Mobility and Transfers for ADLs:   Bed Mobility:     Supine to Sit: Minimum assistance  Sit to Supine: Minimum assistance  Scooting: Moderate assistance;Bed Modified (bed in trendelenburg postion)     Balance:  Sitting: Intact; With support  Sitting - Static: Good (unsupported)  Sitting - Dynamic: Fair (occasional)    ADL Intervention:    Cognitive Retraining  Problem Solving: Identifying the problem  Executive Functions: Managing time;Regulating behavior  Safety/Judgement: Awareness of environment    Pain:  Pain level pre-treatment: 5/10   Pain level post-treatment: 5/10  Pain Intervention(s): Medication administered by RN (see MAR); Rest, Repositionin   Response to intervention: Nurse notified, See doc flow sheet    Activity Tolerance:    Fair, pt able to perform 10 minutes of sitting EOB with resistive activities with no LOB noted     Please refer to the flowsheet for vital signs taken during this treatment. After treatment:   []  Patient left in no apparent distress sitting up in chair  [x]  Patient left in no apparent distress in bed  [x]  Call bell left within reach  [x]  Nursing notified  []  Caregiver present  []  Bed alarm activated    COMMUNICATION/EDUCATION:   [x] Role of Occupational Therapy in the acute care setting  [x] Home safety education was provided and the patient/caregiver indicated understanding.   [x] Patient/family have participated as able in working towards goals and plan of care. [x] Patient/family agree to work toward stated goals and plan of care. [] Patient understands intent and goals of therapy, but is neutral about his/her participation. [] Patient is unable to participate in goal setting and plan of care.       Thank you for this referral.  Jj Burden OTD, OTR/L   Time Calculation: 17 mins

## 2021-08-29 NOTE — PROGRESS NOTES
Hospitalist Progress Note    Patient: Tatum Braxton MRN: [id number]  CSN: [id number]    YOB: 1970  Age: 46 y.o. 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 ([address] (4/4/2021)      YUNIEL (acute kidney injury) ([address] (4/4/2021)      Non compliance with medical treatment (4/12/2021)      Fall (8/12/2021)      Tobacco use (8/12/2021)      Preoperative evaluation to rule out surgical contraindication (8/12/2021)      Anemia (8/12/2021)      Sepsis ([address] (8/13/2021)      Status post below-knee amputation of right lower extremity ([address] (8/13/2021)      Bacteremia (8/13/2021)      Dyspnea (8/16/2021)      Pulmonary infiltrates (9/86/6353)      Systolic CHF, acute ([address] (8/17/2021)      Debility ()           Resp - Dyspnea/hypoxia -  Responded to lasix  CXR with pulmonary edema. Oxygen as needed by NC.     ID -   septic shock  -    Off IV antibiotics     Blood Cultures growing Peptoniphilus asaccharolyticus. Follow up blood cultures negative  Wound cultures growing providencia, MSSA, E-fecalis, alcaligenes fecalis.       [facility] - Monitor HD. Acute CHF - on lasix and metoptrolol. tolerating restart of losartan        Gangrene of bilateral feet    S/p right BKA on 8/12   S/P RIGHT BELOW KNEE AMPUTATION, DRESSING CHANGE TO LEFT FOOT WOUND 08/20     Heme/onc -will heck tomorrow     Renal - YUNIEL - Seems much better now. 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 Hematemesis - seen by GI.   Refused EGD  Continue with PPI     Prophylaxis - DVT: heparin, GI: protonix.     Noncompliance with medical treatment   Continued tobacco use        DC planning           Chief complaint:  Gangrene of legs      Subjective:  Feels better tramadol helps with pain from surgery          Review of systems:    General: No fevers or chills. Cardiovascular: No chest pain or pressure. No palpitations. Pulmonary: No shortness of breath. Gastrointestinal: No nausea, vomiting. Objective:    Vital signs/Intake and Output:  Visit Vitals  /66 (BP 1 Location: Left upper arm)   Pulse 78   Temp 97.6 °F (36.4 °C)   Resp 14   Ht 5' 8\" (1.727 m)   Wt 74.1 kg (163 lb 5.8 oz)   SpO2 96%   BMI 24.84 kg/m²     Current Shift:  No intake/output data recorded. Last three shifts:  08/27 1901 - 08/29 0700  In: 1500 [P.O.:1500]  Out: -     Physical Exam:  General: NAD, AAOx3. Non-toxic. HEENT: NC/AT. PERRLA, EOMI.  MMM. Lungs: Nml inspection. CTA B/L. No wheezing, rales or rhonchi. Heart:  S1S2 RRR,  PMI mid 5th IC space. No M/RG. Abdomen: Soft, NT/ND.  BS+. No peritoneal signs. Extremities: No C/C/E. Psych:   Nml affect. Neurologic:  2-12 intact. Strength 5/5 throughout. Sensation symmetrical.          Labs: Results:       Chemistry Recent Labs     08/28/21  0505   *      K 4.0      CO2 25   BUN 69*   CREA 1.29   CA 9.0   AGAP 8   BUCR 53*      CBC w/Diff Recent Labs     08/28/21  0505   WBC 6.3   RBC 2.74*   HGB 7.9*   HCT 26.0*         Cardiac Enzymes No results for input(s): CPK, CKND1, CONSTANTINO in the last 72 hours. No lab exists for component: CKRMB, TROIP   Coagulation No results for input(s): PTP, INR, APTT, INREXT in the last 72 hours. Lipid Panel No results found for: CHOL, CHOLPOCT, CHOLX, CHLST, CHOLV, 922571, HDL, HDLP, LDL, LDLC, DLDLP, 448031, VLDLC, VLDL, TGLX, TRIGL, TRIGP, TGLPOCT, CHHD, CHHDX   BNP No results for input(s): BNPP in the last 72 hours. Liver Enzymes No results for input(s): TP, ALB, TBIL, AP in the last 72 hours.     No lab exists for component: SGOT, GPT, DBIL   Thyroid Studies No results found for: T4, T3U, TSH, TSHEXT     Procedures/imaging: see electronic medical records for all procedures/Xrays and details which were not copied into this note but were reviewed prior to creation of Plan

## 2021-08-29 NOTE — PROGRESS NOTES
21:20 Assessment completed. Lungs are clear bilat. Offers 0 c/o CP,pressure, or SOB. Resting quietly in bed. 23:20 Shift assessment completed. See nsg flow sheet for details. 03:10 Reassessed with 0 changed noted. Resting quietly in bed with eyes closed between cares. 07:20 Bedside and Verbal shift change report given to Emmy Werner RN (oncoming nurse) by David Terrazas RN (offgoing nurse). Report included the following information SBAR.

## 2021-08-29 NOTE — PROGRESS NOTES
Problem: Mobility Impaired (Adult and Pediatric)  Goal: *Acute Goals and Plan of Care (Insert Text)  Description: Physical Therapy Goals   Initiated 8/20/2021 and to be accomplished within 7 day(s) with further goals TBD as appropriate  1. Patient will move from supine <> sit with min assist for change of position. 2.  Patient will demonstrate intact/unsupported dynamic sitting balance for performance of ADL's with use of UE's.  3.  Patient will transfer bed to chair w/o armrests with min/mod assist via scooting/slide board for time OOB. 4.  Patient will demonstrate ROM/LE strengthening Ex program with accurately for increase of functional mobility as noted above. Note:     PHYSICAL THERAPY TREATMENT    Patient: Kathleen Vinson (20 y.o. female)  Date: 8/29/2021  Diagnosis: Gangrene of both feet ([facility] [I96]  Diabetes ([facility] [E11.9] Gangrene of both feet ([facility])  Procedure(s) (LRB):  FORMAL CLOSURE OF RIGHT BELOW KNEE AMPUTATION, DRESSING CHANGE TO LEFT FOOT WOUND (Right) 10 Days Post-Op  Precautions: Fall, NWB (R LE; forefoot offloading shoe L LE)    ASSESSMENT:  Pt supine in bed on PT entry, reporting 7/10 pain in R LE. Pt agreeable to bed level activity only, declining to attempt sitting EOB at this time. Pt required modA to scoot up in bed with bed in trendelenburg position. Pt then performed supine therapeutic exercise for LE strength and ROM as described below. Pt left supine in bed with all needs met and all questions answered. Pt encouraged to continue to perform LE therapeutic exercise to tolerance t/o the day. Progression toward goals:   []      Improving appropriately and progressing toward goals  [x]      Improving slowly and progressing toward goals  []      Not making progress toward goals and plan of care will be adjusted     PLAN:  Patient continues to benefit from skilled intervention to address the above impairments. Continue treatment per established plan of care.   Discharge Recommendations:  Skilled Nursing Facility  Further Equipment Recommendations for Discharge:  TBD at next level of care     SUBJECTIVE:   Patient stated i'll do what I can.     OBJECTIVE DATA SUMMARY:   Critical Behavior:  Neurologic State: Alert, Eyes open spontaneously  Orientation Level: Oriented X4  Cognition: Follows commands  Safety/Judgement: Fall prevention  Functional Mobility Training:  Bed Mobility:  Scooting: Moderate assistance;Bed Modified (bed in trendelenburg postion)  Therapeutic Exercises:     EXERCISE   Sets   Reps   Active Active Assist   Passive Self ROM   Comments   Ankle Pumps    [] [] [] []    Quad Sets/Glut Sets 1 10  [x] [] [] [] Hold for 5 secs   Hamstring Sets   [] [] [] []    Short Arc Quads   [] [] [] []    Heel Slides   [] [] [] []    Straight Leg Raises 1 10 [x] [] [] []    Hip Abd 1 10 [x] [] [] []    Long Arc Quads   [] [] [] []    Seated Marching   [] [] [] []    Standing Marching   [] [] [] []       [] [] [] []      Pain:  Pain level pre-treatment: 7/10  Pain level post-treatment: 7/10   Pain Intervention(s): Medication (see MAR); Rest, Ice, Repositioning   Response to intervention: Nurse notified, See doc flow  Activity Tolerance:   Pt tolerated supine LE therapeutic exercise, rated R LE pain 7/10 t/o session. Please refer to the flowsheet for vital signs taken during this treatment. After treatment:   [] Patient left in no apparent distress sitting up in chair  [x] Patient left in no apparent distress in bed  [x] Call bell left within reach  [x] Nursing notified  [] Caregiver present  [] Bed alarm activated  [] SCDs applied      COMMUNICATION/EDUCATION:   [x]         Role of Physical Therapy in the acute care setting. [x]         Fall prevention education was provided and the patient/caregiver indicated understanding. [x]         Patient/family have participated as able in working toward goals and plan of care.   [x]         Patient/family agree to work toward stated goals and plan of care. []         Patient understands intent and goals of therapy, but is neutral about his/her participation. []         Patient is unable to participate in stated goals/plan of care: ongoing with therapy staff.  []         Other:         Silas Ochoa   Time Calculation: 15 mins

## 2021-08-29 NOTE — PROGRESS NOTES
Problem: Falls - Risk of  Goal: *Absence of Falls  Description: Document Luzma Marx Fall Risk and appropriate interventions in the flowsheet.   Outcome: Progressing Towards Goal  Note: Fall Risk Interventions:  Mobility Interventions: Communicate number of staff needed for ambulation/transfer, Patient to call before getting OOB         Medication Interventions: Assess postural VS orthostatic hypotension, Patient to call before getting OOB, Teach patient to arise slowly    Elimination Interventions: Call light in reach, Patient to call for help with toileting needs    History of Falls Interventions: Consult care management for discharge planning

## 2021-08-30 LAB
GLUCOSE BLD STRIP.AUTO-MCNC: 127 MG/DL (ref 70–110)
GLUCOSE BLD STRIP.AUTO-MCNC: 144 MG/DL (ref 70–110)
GLUCOSE BLD STRIP.AUTO-MCNC: 192 MG/DL (ref 70–110)

## 2021-08-30 PROCEDURE — 74011636637 HC RX REV CODE- 636/637: Performed by: STUDENT IN AN ORGANIZED HEALTH CARE EDUCATION/TRAINING PROGRAM

## 2021-08-30 PROCEDURE — 74011250637 HC RX REV CODE- 250/637: Performed by: STUDENT IN AN ORGANIZED HEALTH CARE EDUCATION/TRAINING PROGRAM

## 2021-08-30 PROCEDURE — 74011636637 HC RX REV CODE- 636/637: Performed by: FAMILY MEDICINE

## 2021-08-30 PROCEDURE — C9113 INJ PANTOPRAZOLE SODIUM, VIA: HCPCS | Performed by: INTERNAL MEDICINE

## 2021-08-30 PROCEDURE — 74011250637 HC RX REV CODE- 250/637: Performed by: HOSPITALIST

## 2021-08-30 PROCEDURE — 74011250637 HC RX REV CODE- 250/637: Performed by: NURSE PRACTITIONER

## 2021-08-30 PROCEDURE — 74011250637 HC RX REV CODE- 250/637: Performed by: INTERNAL MEDICINE

## 2021-08-30 PROCEDURE — 82962 GLUCOSE BLOOD TEST: CPT

## 2021-08-30 PROCEDURE — 74011000250 HC RX REV CODE- 250: Performed by: STUDENT IN AN ORGANIZED HEALTH CARE EDUCATION/TRAINING PROGRAM

## 2021-08-30 PROCEDURE — 65660000000 HC RM CCU STEPDOWN

## 2021-08-30 PROCEDURE — 74011250636 HC RX REV CODE- 250/636: Performed by: INTERNAL MEDICINE

## 2021-08-30 RX ADMIN — METHOCARBAMOL TABLETS 1000 MG: 500 TABLET, COATED ORAL at 21:02

## 2021-08-30 RX ADMIN — ACETAMINOPHEN 975 MG: 325 TABLET ORAL at 08:57

## 2021-08-30 RX ADMIN — LOSARTAN POTASSIUM 25 MG: 25 TABLET, FILM COATED ORAL at 08:57

## 2021-08-30 RX ADMIN — INSULIN LISPRO 3 UNITS: 100 INJECTION, SOLUTION INTRAVENOUS; SUBCUTANEOUS at 17:18

## 2021-08-30 RX ADMIN — COLLAGENASE SANTYL: 250 OINTMENT TOPICAL at 21:22

## 2021-08-30 RX ADMIN — PANTOPRAZOLE SODIUM 40 MG: 40 INJECTION, POWDER, FOR SOLUTION INTRAVENOUS at 08:58

## 2021-08-30 RX ADMIN — METOPROLOL SUCCINATE 25 MG: 25 TABLET, EXTENDED RELEASE ORAL at 08:57

## 2021-08-30 RX ADMIN — MAGNESIUM OXIDE TAB 400 MG (241.3 MG ELEMENTAL MG) 400 MG: 400 (241.3 MG) TAB at 20:55

## 2021-08-30 RX ADMIN — ACETAMINOPHEN 975 MG: 325 TABLET ORAL at 21:02

## 2021-08-30 RX ADMIN — MAGNESIUM OXIDE TAB 400 MG (241.3 MG ELEMENTAL MG) 400 MG: 400 (241.3 MG) TAB at 08:56

## 2021-08-30 RX ADMIN — INSULIN GLARGINE 5 UNITS: 100 INJECTION, SOLUTION SUBCUTANEOUS at 08:58

## 2021-08-30 RX ADMIN — BUPROPION HYDROCHLORIDE 150 MG: 150 TABLET, FILM COATED, EXTENDED RELEASE ORAL at 08:57

## 2021-08-30 RX ADMIN — DOCUSATE SODIUM 50 MG AND SENNOSIDES 8.6 MG 1 TABLET: 8.6; 5 TABLET, FILM COATED ORAL at 08:56

## 2021-08-30 RX ADMIN — ASPIRIN 81 MG: 81 TABLET, CHEWABLE ORAL at 08:56

## 2021-08-30 RX ADMIN — ACETAMINOPHEN 975 MG: 325 TABLET ORAL at 16:33

## 2021-08-30 RX ADMIN — PANTOPRAZOLE SODIUM 40 MG: 40 INJECTION, POWDER, FOR SOLUTION INTRAVENOUS at 20:55

## 2021-08-30 RX ADMIN — METHOCARBAMOL TABLETS 1000 MG: 500 TABLET, COATED ORAL at 16:31

## 2021-08-30 RX ADMIN — METHOCARBAMOL TABLETS 1000 MG: 500 TABLET, COATED ORAL at 08:56

## 2021-08-30 NOTE — PROGRESS NOTES
Nutrition Assessment     Type and Reason for Visit: Reassess    Nutrition Recommendations/Plan: Continue w/ POC    Nutrition Assessment:  Patient admitted for gangrene of both foot and bacteremia, s/p right BKA on 8/12, T2DM, tobacco use, anemia- received blood transfusion, YUNIEL- better now. Estimated Daily Nutrient Needs:  Energy (kcal):  3338-1597  Protein (g):  74       Fluid (ml/day):  5574-8507    Nutrition Related Findings:  Lab: glucose 163, GFR est AA 53, BUN 48. Med: pericolace, miralax, protonix, toprol xl, mag ox, humalog, lantus, lasix. Spoke w/ patient- stated food is cold and dry and burgers are hard. Patient stated does not drink Glucerna- will d/c.  Encouraged patient to ask family or friend to bring in food she prefers      Current Nutrition Therapies:  ADULT ORAL NUTRITION SUPPLEMENT Lunch, Dinner; Diabetic Supplement  ADULT DIET Regular; 4 carb choices (60 gm/meal)    Anthropometric Measures:  · Height:  5' 8\" (172.7 cm)  · Current Body Wt:  73.8 kg (162 lb 11.2 oz)  · BMI: 24.7    Nutrition Diagnosis:   · Inadequate energy intake related to other (specify) (disliking food provided) as evidenced by intake 26-50%    Nutrition Intervention:  Food and/or Nutrient Delivery: Continue current diet, Discontinue oral nutrition supplement  Nutrition Education and Counseling: No recommendations at this time  Coordination of Nutrition Care: Continue to monitor while inpatient    Goals:  PO intake >75% at most meals throughout the next 3-7 days       Nutrition Monitoring and Evaluation:   Behavioral-Environmental Outcomes: None identified  Food/Nutrient Intake Outcomes: Food and nutrient intake  Physical Signs/Symptoms Outcomes: Biochemical data, Meal time behavior, Skin, Weight, GI status, Nausea/vomiting    Discharge Planning:    Continue current diet     Electronically signed by Eber Tatum RD on 8/30/2021 at 1:18 PM

## 2021-08-30 NOTE — PROGRESS NOTES
Cardiology Progress Note        Patient: Carmen Ramos        Sex: female          DOA: 8/11/2021  YOB: 1970      Age:  46 y.o.        LOS:  LOS: 19 days    Patient seen and examined, chart reviewed. 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 Annell [place] Tobacco use Z72.0    Preoperative evaluation to rule out surgical contraindication Z01.818    Anemia D64.9    Sepsis ([facility]) A41.9    Status post below-knee amputation of right lower extremity ([facility]) Z89.511    Bacteremia R78.81    Dyspnea R06.00    Pulmonary infiltrates A01.9    Systolic CHF, acute ([facility]) I50.21    Debility R53.81      Acute systolic heart failure  Hypokalemia   Anemia   Acute renal insufficiency improving  Abnormal LFTs     Echocardiogram revealed     · Test was terminated early due to patient becoming combative. · Left Ventricle: Normal cavity size. Mild concentric hypertrophy. The estimated EF is 40 - 45%. Mildly reduced systolic function. There is inconclusive left ventricular diastolic function E'E= 26.63. Wall Scoring: The left ventricular wall motion is globally hypokinetic. · Pulmonary Artery: Pulmonary arteries not well visualized. Pulmonary arterial systolic pressure (PASP) is 35 mmHg. Pulmonary hypertension found to be mild. Plan:      Continue Metoprolol succinate and losartan  Hold lasix and aldactone  Hold statin due to abnormal LFTs  Strict input output.     Monitor renal/liver function   Salt restriction up to 2 g per day and fluid restriction up to 1.2 L per   day. Plan discussed with      Subjective:    cc:   denies any chest pain or shortness of breath    REVIEW OF SYSTEMS:     General: No fevers or chills. Cardiovascular: No chest pain,No palpitations, No orthopnea, No PND, No leg swelling, No claudication  Pulmonary: No dyspnea. Gastrointestinal: No nausea, vomiting, bleeding  Neurology: No Dizziness    Objective:      Visit Vitals  /67 (BP 1 Location: Left upper arm)   Pulse 80   Temp 98.7 °F (37.1 °C)   Resp 16   Ht 5' 8\" (1.727 m)   Wt 73.8 kg (162 lb 12.8 oz)   SpO2 97%   BMI 24.75 kg/m²     Body mass index is 24.75 kg/m². Physical Exam:  General Appearance: Comfortable, not using accessory muscles of respiration. HEENT: ROSA. HEAD: Atraumatic  NECK: No JVD, no thyroidomeglay. CAROTIDS: No bruit  LUNGS: Clear bilaterally. HEART: S1+S2 audible, no murmur, no pericardial rub.      ABD: Non-tender, BS Audible    EXT: Right below knee amputation      Medication:  Current Facility-Administered Medications   Medication Dose Route Frequency    traMADoL (ULTRAM) tablet 50 mg  50 mg Oral Q6H PRN    collagenase (SANTYL) 250 unit/gram ointment   Topical DAILY    pantoprazole (PROTONIX) injection 40 mg  40 mg IntraVENous Q12H    [Held by provider] spironolactone (ALDACTONE) tablet 25 mg  25 mg Oral DAILY    [Held by provider] furosemide (LASIX) injection 20 mg  20 mg IntraVENous DAILY    magnesium oxide (MAG-OX) tablet 400 mg  400 mg Oral BID    acetaminophen (TYLENOL) tablet 975 mg  975 mg Oral TID    methocarbamoL (ROBAXIN) tablet 1,000 mg  1,000 mg Oral TID    senna-docusate (PERICOLACE) 8.6-50 mg per tablet 1 Tablet  1 Tablet Oral DAILY    promethazine (PHENERGAN) 25 mg in 0.9% sodium chloride 50 mL IVPB  25 mg IntraVENous Q4H PRN    polyethylene glycol (MIRALAX) packet 17 g  17 g Oral DAILY    losartan (COZAAR) tablet 25 mg  25 mg Oral DAILY    metoprolol succinate (TOPROL-XL) XL tablet 25 mg  25 mg Oral DAILY    0.9% sodium chloride infusion 250 mL  250 mL IntraVENous PRN    [Held by provider] DULoxetine (CYMBALTA) capsule 60 mg  60 mg Oral DAILY    albuterol-ipratropium (DUO-NEB) 2.5 MG-0.5 MG/3 ML  3 mL Nebulization Q4H PRN    insulin glargine (LANTUS) injection 5 Units  5 Units SubCUTAneous DAILY    buPROPion SR (WELLBUTRIN SR) tablet 150 mg  150 mg Oral DAILY    COVID-19 vac,Ad26-PF ([employer]) injection 1 Dose  1 Dose IntraMUSCular PRIOR TO DISCHARGE    aspirin chewable tablet 81 mg  81 mg Oral DAILY    [Held by provider] heparin (porcine) injection 5,000 Units  5,000 Units SubCUTAneous Q8H    naloxone (NARCAN) injection 0.1 mg  0.1 mg IntraVENous PRN    ondansetron (ZOFRAN ODT) tablet 4 mg  4 mg Oral Q8H PRN    Or    ondansetron (ZOFRAN) injection 4 mg  4 mg IntraVENous Q6H PRN    insulin lispro (HUMALOG) injection   SubCUTAneous Q6H    glucose chewable tablet 16 g  16 g Oral PRN    glucagon (GLUCAGEN) injection 1 mg  1 mg IntraMUSCular PRN    dextrose (D50W) injection syrg 12.5-25 g  25-50 mL IntraVENous PRN    [Held by provider] atorvastatin (LIPITOR) tablet 40 mg  40 mg Oral QHS               Lab/Data Reviewed:       Recent Labs     08/29/21  1350 08/28/21  0505   WBC 7.3 6.3   HGB 8.6* 7.9*   HCT 27.9* 26.0*    232     Recent Labs     08/29/21  1350 08/28/21  0505    140   K 4.0 4.0    107   CO2 27 25   * 167*   BUN 48* 69*   CREA 1.28 1.29   CA 8.5 9.0         Signed By: Lucy Ramos MD     August 30, 2021

## 2021-08-30 NOTE — ROUTINE PROCESS
2145-Patient lying bed awake, watching TV. Patient's presentation is noted to be different then last care for. Patient is talkative, accepted all oral meds well without difficulties or prompting. Patient even allowed vitals to be done. Patient remains with flat affect but is noted to be in good spirit. 0000-Patient is still awake, watching a movie that patient encouraging this nurse to watch. Vitals done, WNL. Blood sugar done, no coverage needed. Monitoring is ongoing.

## 2021-08-30 NOTE — PROGRESS NOTES
Problem: Falls - Risk of  Goal: *Absence of Falls  Description: Document Bob Del Cid Fall Risk and appropriate interventions in the flowsheet. Outcome: Progressing Towards Goal  Note: Fall Risk Interventions:  Mobility Interventions: Communicate number of staff needed for ambulation/transfer         Medication Interventions: Evaluate medications/consider consulting pharmacy    Elimination Interventions: Call light in reach, Patient to call for help with toileting needs    History of Falls Interventions: Consult care management for discharge planning         Problem: Patient Education: Go to Patient Education Activity  Goal: Patient/Family Education  Outcome: Progressing Towards Goal     Problem: Diabetes Self-Management  Goal: *Disease process and treatment process  Description: Define diabetes and identify own type of diabetes; list 3 options for treating diabetes.   Outcome: Progressing Towards Goal

## 2021-08-30 NOTE — ROUTINE PROCESS
Bedside shift change report given to Leticia BERNSTEIN (oncoming nurse) by Shon Magallon RN (offgoing nurse).  Report included the following information SBAR, Kardex, Intake/Output, MAR, Recent Results and Cardiac Rhythm SR.

## 2021-08-30 NOTE — PROGRESS NOTES
Physical Therapy Treatment Attempt     Chart reviewed. Attempted Physical Therapy Treatment, however, patient unable to be seen due to:  []  Nausea/vomiting  []  Eating  [x]  Pain: pt reporting H/A rated 10/10  []  Patient too lethargic  []  Off Unit for testing/procedure  []  Dialysis treatment in progress   []  Telemetry Results  []  Other     Will follow up later as patient's schedule allows.    Thank you for this referral.    Anibal Lopez, PT, DPT

## 2021-08-30 NOTE — PROGRESS NOTES
Assumed care of female pt, pt incontinent of urine, cleaned turned and repositioned. SR on monitor, HR 82.

## 2021-08-31 LAB
ALBUMIN SERPL-MCNC: 3.6 G/DL (ref 3.4–5)
ALBUMIN/GLOB SERPL: 1.5 {RATIO} (ref 0.8–1.7)
ALP SERPL-CCNC: 134 U/L (ref 45–117)
ALT SERPL-CCNC: 145 U/L (ref 13–56)
ANION GAP SERPL CALC-SCNC: 7 MMOL/L (ref 3–18)
AST SERPL-CCNC: 79 U/L (ref 10–38)
BILIRUB SERPL-MCNC: 0.9 MG/DL (ref 0.2–1)
BUN SERPL-MCNC: 37 MG/DL (ref 7–18)
BUN/CREAT SERPL: 36 (ref 12–20)
CALCIUM SERPL-MCNC: 8.4 MG/DL (ref 8.5–10.1)
CHLORIDE SERPL-SCNC: 106 MMOL/L (ref 100–111)
CO2 SERPL-SCNC: 25 MMOL/L (ref 21–32)
CREAT SERPL-MCNC: 1.03 MG/DL (ref 0.6–1.3)
GLOBULIN SER CALC-MCNC: 2.4 G/DL (ref 2–4)
GLUCOSE BLD STRIP.AUTO-MCNC: 129 MG/DL (ref 70–110)
GLUCOSE BLD STRIP.AUTO-MCNC: 146 MG/DL (ref 70–110)
GLUCOSE BLD STRIP.AUTO-MCNC: 171 MG/DL (ref 70–110)
GLUCOSE BLD STRIP.AUTO-MCNC: 202 MG/DL (ref 70–110)
GLUCOSE SERPL-MCNC: 203 MG/DL (ref 74–99)
POTASSIUM SERPL-SCNC: 4.7 MMOL/L (ref 3.5–5.5)
PROT SERPL-MCNC: 6 G/DL (ref 6.4–8.2)
SODIUM SERPL-SCNC: 138 MMOL/L (ref 136–145)

## 2021-08-31 PROCEDURE — 74011250637 HC RX REV CODE- 250/637: Performed by: INTERNAL MEDICINE

## 2021-08-31 PROCEDURE — 65660000000 HC RM CCU STEPDOWN

## 2021-08-31 PROCEDURE — 97530 THERAPEUTIC ACTIVITIES: CPT

## 2021-08-31 PROCEDURE — 80053 COMPREHEN METABOLIC PANEL: CPT

## 2021-08-31 PROCEDURE — 74011250637 HC RX REV CODE- 250/637: Performed by: STUDENT IN AN ORGANIZED HEALTH CARE EDUCATION/TRAINING PROGRAM

## 2021-08-31 PROCEDURE — 74011250636 HC RX REV CODE- 250/636: Performed by: INTERNAL MEDICINE

## 2021-08-31 PROCEDURE — 82962 GLUCOSE BLOOD TEST: CPT

## 2021-08-31 PROCEDURE — 74011250636 HC RX REV CODE- 250/636: Performed by: STUDENT IN AN ORGANIZED HEALTH CARE EDUCATION/TRAINING PROGRAM

## 2021-08-31 PROCEDURE — 74011636637 HC RX REV CODE- 636/637: Performed by: STUDENT IN AN ORGANIZED HEALTH CARE EDUCATION/TRAINING PROGRAM

## 2021-08-31 PROCEDURE — 74011250637 HC RX REV CODE- 250/637: Performed by: FAMILY MEDICINE

## 2021-08-31 PROCEDURE — 97168 OT RE-EVAL EST PLAN CARE: CPT

## 2021-08-31 PROCEDURE — 74011250637 HC RX REV CODE- 250/637: Performed by: NURSE PRACTITIONER

## 2021-08-31 PROCEDURE — 74011636637 HC RX REV CODE- 636/637: Performed by: FAMILY MEDICINE

## 2021-08-31 PROCEDURE — 36415 COLL VENOUS BLD VENIPUNCTURE: CPT

## 2021-08-31 PROCEDURE — 97535 SELF CARE MNGMENT TRAINING: CPT

## 2021-08-31 PROCEDURE — C9113 INJ PANTOPRAZOLE SODIUM, VIA: HCPCS | Performed by: INTERNAL MEDICINE

## 2021-08-31 PROCEDURE — 74011250637 HC RX REV CODE- 250/637: Performed by: HOSPITALIST

## 2021-08-31 PROCEDURE — 97164 PT RE-EVAL EST PLAN CARE: CPT

## 2021-08-31 RX ORDER — ATORVASTATIN CALCIUM 20 MG/1
20 TABLET, FILM COATED ORAL
Status: DISCONTINUED | OUTPATIENT
Start: 2021-08-31 | End: 2021-09-03 | Stop reason: HOSPADM

## 2021-08-31 RX ORDER — DIPHENHYDRAMINE HCL 25 MG
25 CAPSULE ORAL
Status: DISCONTINUED | OUTPATIENT
Start: 2021-08-31 | End: 2021-09-03 | Stop reason: HOSPADM

## 2021-08-31 RX ADMIN — DIPHENHYDRAMINE HYDROCHLORIDE 25 MG: 25 CAPSULE ORAL at 02:49

## 2021-08-31 RX ADMIN — PANTOPRAZOLE SODIUM 40 MG: 40 INJECTION, POWDER, FOR SOLUTION INTRAVENOUS at 08:22

## 2021-08-31 RX ADMIN — ACETAMINOPHEN 975 MG: 325 TABLET ORAL at 15:03

## 2021-08-31 RX ADMIN — PANTOPRAZOLE SODIUM 40 MG: 40 INJECTION, POWDER, FOR SOLUTION INTRAVENOUS at 21:00

## 2021-08-31 RX ADMIN — INSULIN LISPRO 3 UNITS: 100 INJECTION, SOLUTION INTRAVENOUS; SUBCUTANEOUS at 18:02

## 2021-08-31 RX ADMIN — COLLAGENASE SANTYL: 250 OINTMENT TOPICAL at 23:02

## 2021-08-31 RX ADMIN — METHOCARBAMOL TABLETS 1000 MG: 500 TABLET, COATED ORAL at 15:03

## 2021-08-31 RX ADMIN — METHOCARBAMOL TABLETS 1000 MG: 500 TABLET, COATED ORAL at 08:21

## 2021-08-31 RX ADMIN — ACETAMINOPHEN 975 MG: 325 TABLET ORAL at 08:21

## 2021-08-31 RX ADMIN — HEPARIN SODIUM 5000 UNITS: 5000 INJECTION INTRAVENOUS; SUBCUTANEOUS at 23:01

## 2021-08-31 RX ADMIN — ACETAMINOPHEN 975 MG: 325 TABLET ORAL at 23:02

## 2021-08-31 RX ADMIN — METHOCARBAMOL TABLETS 1000 MG: 500 TABLET, COATED ORAL at 23:01

## 2021-08-31 RX ADMIN — ASPIRIN 81 MG: 81 TABLET, CHEWABLE ORAL at 08:21

## 2021-08-31 RX ADMIN — TRAMADOL HYDROCHLORIDE 50 MG: 50 TABLET, FILM COATED ORAL at 23:02

## 2021-08-31 RX ADMIN — INSULIN LISPRO 6 UNITS: 100 INJECTION, SOLUTION INTRAVENOUS; SUBCUTANEOUS at 01:10

## 2021-08-31 RX ADMIN — BUPROPION HYDROCHLORIDE 150 MG: 150 TABLET, FILM COATED, EXTENDED RELEASE ORAL at 08:21

## 2021-08-31 RX ADMIN — MAGNESIUM OXIDE TAB 400 MG (241.3 MG ELEMENTAL MG) 400 MG: 400 (241.3 MG) TAB at 23:02

## 2021-08-31 RX ADMIN — DIPHENHYDRAMINE HYDROCHLORIDE 25 MG: 25 CAPSULE ORAL at 23:02

## 2021-08-31 RX ADMIN — METOPROLOL SUCCINATE 25 MG: 25 TABLET, EXTENDED RELEASE ORAL at 08:21

## 2021-08-31 RX ADMIN — DIPHENHYDRAMINE HYDROCHLORIDE 25 MG: 25 CAPSULE ORAL at 12:24

## 2021-08-31 RX ADMIN — ATORVASTATIN CALCIUM 20 MG: 20 TABLET, FILM COATED ORAL at 23:02

## 2021-08-31 RX ADMIN — INSULIN GLARGINE 5 UNITS: 100 INJECTION, SOLUTION SUBCUTANEOUS at 08:22

## 2021-08-31 RX ADMIN — TRAMADOL HYDROCHLORIDE 50 MG: 50 TABLET, FILM COATED ORAL at 15:03

## 2021-08-31 RX ADMIN — MAGNESIUM OXIDE TAB 400 MG (241.3 MG ELEMENTAL MG) 400 MG: 400 (241.3 MG) TAB at 08:21

## 2021-08-31 RX ADMIN — LOSARTAN POTASSIUM 25 MG: 25 TABLET, FILM COATED ORAL at 08:21

## 2021-08-31 NOTE — PROGRESS NOTES
Hospitalist Progress Note-critical care note     Patient: Kristin Ahuja MRN: [id number]  CSN: [id number]    YOB: 1970  Age: 46 y.o. Sex: female    DOA: 8/11/2021 LOS:  LOS: 20 days            Chief complaint: status bka, bacteremia  Dm chf     Assessment/Plan         Hospital Problems  Date Reviewed: 8/12/2021        Codes Class Noted POA    Debility ICD-10-CM: R53.81  ICD-9-CM: 799.3  Unknown Unknown        Pulmonary infiltrates ICD-10-CM: R91.8  ICD-9-CM: 793.19  8/17/2021 Unknown        Systolic CHF, acute ([facility] ICD-10-CM: I50.21  ICD-9-CM: 428.21, 428.0  8/17/2021 Unknown        Dyspnea ICD-10-CM: R06.00  ICD-9-CM: 786.09  8/16/2021 Unknown        Sepsis ([facility] ICD-10-CM: A41.9  ICD-9-CM: 038.9, 995.91  8/13/2021 Unknown        Status post below-knee amputation of right lower extremity ([facility] ICD-10-CM: Z89.511  ICD-9-CM: V49.75  8/13/2021 Unknown        Bacteremia ICD-10-CM: R78.81  ICD-9-CM: 790.7  8/13/2021 Unknown        Fall ICD-10-CM: Anastacia IbaLidia Rico  ICD-9-CM: E888.9  8/12/2021 Yes        Tobacco use ICD-10-CM: Z72.0  ICD-9-CM: 305.1  8/12/2021 Yes        Preoperative evaluation to rule out surgical contraindication ICD-10-CM: Z01.818  ICD-9-CM: V72.83  8/12/2021 Yes        Anemia ICD-10-CM: D64.9  ICD-9-CM: 285.9  8/12/2021 Unknown        * (Principal) Gangrene of both feet ([facility] ICD-10-CM: I48  ICD-9-CM: 785.4  8/11/2021 Yes        Non compliance with medical treatment ICD-10-CM: Z91.19  ICD-9-CM: V15.81  4/12/2021 Yes        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 [facility]) ICD-10-CM: E11.52  ICD-9-CM: 250.70, 443.81, 785.4  4/4/2021 Yes        YUNIEL (acute kidney injury) ([facility] ICD-10-CM: N17.9  ICD-9-CM: 584.9  4/4/2021 Yes                 27-year-old female with uncontrolled type 2 diabetes mellitus, severe peripheral arterial disease, and tobacco use who has been noncompliant with wound care and hyperbaric oxygen treatment due to recent fall is admitted for severe gangrene of both feet.     RRT was called on 08/16/2021, patient appeared confused, shortness of breath with hypoxia. ABG showed metabolic acidosis. Patient refused chest x-ray. She has been refusing medications. Given her condition she was transferred to ICU.     08/17/2021 - I tried to discuss with patient about her critical condition and guarded prognosis, she is not interested in listening to my concerns about her medical condition. She turned her face on other side and did not want to listen to me and did not answer my questions.       Again Patient refusing medications today, tried to talk to patient however she does not answer to questions, wants me to leave her alone. Events of last night noted  Patient was seen by psych on 08/20/2021.     08/27/2021Met with MPOA Ms. Zuleyka Cuevas along with palliative care. Discussed very poor prognosis, as she continues to refuse treatment. Discussed options of continuing to treat and not sure if she will improve with treatment vs comfort measures.          Dyspnea/hypoxia -  Resolved     septic shock  -resolved     Bacteremia   Blood Cultures growing Peptoniphilus asaccharolyticus. Antibiotics - received zosyn         Acute CHF   Echo 45 % and mild pulm htn   On bbb       Gangrene of bilateral feet  -continue PT/OT   S/p right BKA on 8/12   S/P RIGHT BELOW KNEE AMPUTATION, DRESSING CHANGE TO LEFT FOOT WOUND 08/20     Anemia  Received blood transfusion   Monitor H/H        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 of both feet  continueSliding scale and  lantus        subjective: feel fine, a lot of phone call, I can not sleep   Disposition :tbd,   Review of systems:    General: No fevers or chills. Cardiovascular: No chest pain or pressure. No palpitations. Pulmonary: No shortness of breath. Gastrointestinal: No nausea, vomiting.      Vital signs/Intake and Output:  Visit Vitals  /73 (BP 1 Location: Left upper arm, BP Patient Position: At rest)   Pulse 79   Temp 97.9 °F (36.6 °C)   Resp 17   Ht 5' 8\" (1.727 m)   Wt 74.8 kg (165 lb)   SpO2 97%   BMI 25.09 kg/m²     Current Shift:  08/31 0701 - 08/31 1900  In: 240 [P.O.:240]  Out: -   Last three shifts:  No intake/output data recorded. Physical Exam:  General: WD, WN. Alert, cooperative, no acute distress    HEENT: NC, Atraumatic. PERRLA, anicteric sclerae. Lungs: CTA Bilaterally. No Wheezing/Rhonchi/Rales. Heart:  Regular  rhythm,  No murmur, No Rubs, No Gallops  Abdomen: Soft, Non distended, Non tender. +Bowel sounds,   Extremities: No c/c, rt bka, left foot wrapped with ace bandage   Psych:   Not anxious or agitated. Neurologic:  No acute neurological deficit. Labs: Results:       Chemistry Recent Labs     08/31/21  0150 08/29/21  1350   * 163*    139   K 4.7 4.0    106   CO2 25 27   BUN 37* 48*   CREA 1.03 1.28   CA 8.4* 8.5   AGAP 7 6   BUCR 36* 38*   * 152*   TP 6.0* 6.1*   ALB 3.6 3.5   GLOB 2.4 2.6   AGRAT 1.5 1.3      CBC w/Diff Recent Labs     08/29/21  1350   WBC 7.3   RBC 2.92*   HGB 8.6*   HCT 27.9*      GRANS 78*   LYMPH 8*   EOS 3      Cardiac Enzymes No results for input(s): CPK, CKND1, CONSTANTINO in the last 72 hours. No lab exists for component: CKRMB, TROIP   Coagulation No results for input(s): PTP, INR, APTT, INREXT in the last 72 hours. Lipid Panel No results found for: CHOL, CHOLPOCT, CHOLX, CHLST, CHOLV, 641203, HDL, HDLP, LDL, LDLC, DLDLP, 984639, VLDLC, VLDL, TGLX, TRIGL, TRIGP, TGLPOCT, CHHD, CHHDX   BNP No results for input(s): BNPP in the last 72 hours.    Liver Enzymes Recent Labs     08/31/21  0150   TP 6.0*   ALB 3.6   *      Thyroid Studies No results found for: T4, T3U, TSH, TSHEXT     Procedures/imaging: see electronic medical records for all procedures/Xrays and details which were not copied into this note but were reviewed prior to creation of Plan    XR FOOT LT AP/LAT    Result Date: 8/14/2021  EXAM: LEFT FOOT HISTORY: Infection. COMPARISON: None. TECHNIQUE: 2 views left foot _______________ FINDINGS: No evidence of fracture. Calcaneal enthesopathy. Transmetatarsal amputation. Broad-based the soft tissue ulceration along dorsum of hindfoot. No bony destructive process. _______________     Broad deep ulceration along dorsal hindfoot. No bony destructive process. Transmetatarsal amputation. Plantar calcaneal enthesopathy. CTA CHEST W OR W WO CONT    Result Date: 8/17/2021  Chest CT/CT pulmonary angiogram:  CLINICAL HISTORY: Shortness of breath, chest pain. COMPARISON EXAMINATIONS: Chest film 8/16/2021. TECHNIQUE: Thin section CT was performed through the chest during pulmonary arterial enhancement. Orthogonal MIP angiographic reconstructions were generated to increase sensitivity in the detection of pulmonary emboli. One or more dose reduction techniques were used on this CT: automated exposure control, adjustment of the mAs and/or kVp according to patient size, and iterative reconstruction techniques. The specific techniques used on this CT exam have been documented in the patient's electronic medical record. Digital Imaging and Communications in Medicine (DICOM) format image data are available to nonaffiliated external healthcare facilities or entities on a secure, media free, reciprocally searchable basis with patient authorization for at least a 12-month period after this study. ---  PULMONARY CT ANGIOGRAM  --- PULMONARY VASCULATURE: No convincing evidence of pulmonary arterial filling defect. Exam quality: Overall exam quality is reasonable. Pulmonary arterial enhancement is noted with less than optimal breath hold and some mild lower lobe respiratory motion artifact . THORACIC AORTA: Low density enhancement with some calcified plaque but no dissection or aneurysm.  ---  CT CHEST --- LUNG PARENCHYMA:   Multifocal areas of groundglass density including a moderately prominent peripheral left upper lobe area of groundglass density. There is bilateral left greater than right lower lobe likely compressive atelectasis. Dorena Joey PLEURAL SPACES:   Bilateral moderate pleural effusions which appear free-flowing. MEDIASTINUM:   Moderately prominent coronary arterial calcifications of the LAD and to a lesser extent of the circumflex and right coronary proximally. . OSSEOUS STRUCTURES:   Mild thoracic spondylosis. UPPER ABDOMEN:  There is some free fluid in the upper abdomen surrounding the liver dome. --------------    -------------- 1. No convincing evidence of pulmonary thromboembolic disease. 2. Areas of multifocal upper lobe groundglass density. This could be focal pulmonary edema but infection including Covid pneumonia is difficult to exclude. 3. Bilateral pleural effusions. Previous chest film suggested congestive heart failure. Bilateral lower lobe compressive atelectasis. 4. Coronary artery calcifications are moderately prominent consistent with coronary artery disease. 5. Free fluid in the upper abdomen. XR CHEST PORT    Result Date: 8/18/2021  EXAM: XR CHEST PORT CLINICAL INDICATION/HISTORY: Dyspnea -Additional: None COMPARISON: CT one day prior TECHNIQUE: Portable frontal view of the chest _______________ FINDINGS: SUPPORT DEVICES: None. HEART AND MEDIASTINUM: Cardiomediastinal silhouette within normal limits. LUNGS AND PLEURAL SPACES: Right pleural effusion. Bilateral parenchymal opacities, predominantly within the left upper lobe. No pneumothorax. _______________     Right pleural effusion. Bilateral parenchymal opacities, predominantly within the left upper lobe. XR CHEST PORT    Result Date: 8/16/2021  EXAM:  AP Portable Chest X-ray 1 view INDICATION: Shortness of breath COMPARISON: August 14, 2021 _______________ FINDINGS:  Heart and mediastinal contours are within normal limits for portable radiograph.  There are diffuse bilateral alveolar and interstitial infiltrates suggesting pneumonia and/or pulmonary edema. There are small to moderate bilateral pleural effusions. No acute osseous findings. ________________      Small to moderate bilateral effusions with bilateral interstitial and alveolar infiltrates new from prior exam suggesting pneumonia and/or pulmonary edema. XR CHEST PORT    Result Date: 8/14/2021  EXAM: PORTABLE CHEST HISTORY: Cough. COMPARISON: 8/11/2021 TECHNIQUE: Single portable view. _______________ FINDINGS: SUPPORT DEVICES: None HEART AND MEDIASTINUM: Cardiac size is normal. Mediastinal contours are normal. Normal pulmonary vasculature. LUNGS AND PLEURAL SPACES: Patchy airspace disease right base likely developing pneumonia. Small atelectatic streak left base. BONES AND SOFT TISSUES: Bony structures are normal. _______________     Patchy airspace disease right base very likely developing pneumonia. Small atelectatic streak left base. XR CHEST PORT    Result Date: 8/12/2021  EXAM: XR CHEST PORT CLINICAL INDICATION/HISTORY: Sepsis   > Additional: None. COMPARISON: April 5, 2021 TECHNIQUE: Portable chest _______________ FINDINGS: SUPPORT DEVICES: None. HEART AND MEDIASTINUM: Normal size and contour. Normal pulmonary vasculature. LUNGS AND PLEURAL SPACES: The lungs are well expanded and clear. No focal consolidation, effusion, or pneumothorax. BONY THORAX AND SOFT TISSUES: No acute osseous abnormality. _______________     No active cardiopulmonary disease. ECHO ADULT COMPLETE    Result Date: 8/13/2021  · Image quality for this study was suboptimal. Contrast used: DEFINITY. · LV: Estimated LVEF is 45 - 50%. Normal cavity size and diastolic function. Mild concentric hypertrophy. E/E' ratio is 13.34. · LA: Mildly dilated left atrium. · RV: Mildly dilated right ventricle. Normal global systolic function. Assessment of RV function: TAPSE=16mm. · AV: Moderate aortic valve leaflet calcification present. · MV: Mitral valve non-specific thickening.  Mitral valve leaflet calcification without reduced excursion. Mild mitral valve regurgitation is present. · TV: Mild tricuspid valve regurgitation is present. · PA: Mild pulmonary hypertension. Pulmonary arterial systolic pressure is 35 mmHg. ECHO ADULT FOLLOW-UP OR LIMITED    Result Date: 8/17/2021  · Test was terminated early due to patient becoming combative. · Left Ventricle: Normal cavity size. Mild concentric hypertrophy. The estimated EF is 40 - 45%. Mildly reduced systolic function. There is inconclusive left ventricular diastolic function E'E= 45.31. Wall Scoring: The left ventricular wall motion is globally hypokinetic. · Pulmonary Artery: Pulmonary arteries not well visualized. Pulmonary arterial systolic pressure (PASP) is 35 mmHg. Pulmonary hypertension found to be mild. DUPLEX LOWER EXT ARTERY BILAT    Result Date: 8/13/2021  · Right proximal to mid superficial femoral artery is occluded · Distal superficial femoral artery is reconstituted by the deeper vessel. · Monophasic Doppler waveforms in the left mid to distal superficial femoral, proximal popliteal, anterior tibial and peroneal artery.         Tio Richardson MD

## 2021-08-31 NOTE — PROGRESS NOTES
Problem: Mobility Impaired (Adult and Pediatric)  Goal: *Acute Goals and Plan of Care (Insert Text)  Description: Physical Therapy Goals   Initiated 8/20/2021 and to be accomplished within 7 day(s) with further goals   1. Patient will roll side to side and move from supine <> sit with CGA/min A for change of position and EOB activity. 2.  Patient will demonstrate intact dynamic sitting balance for performance scooting along side of bed and during transfer via slide board bed <>chair. 3.  Patient will demonstrate ROM/LE strengthening Ex program accurately for increase of functional mobility and activity performance as noted above. Physical Therapy Goals   Initiated 8/20/2021 and to be accomplished within 7 day(s) with further goals TBD as appropriate  1. Patient will move from supine <> sit with min assist for change of position. 2.  Patient will demonstrate intact/unsupported dynamic sitting balance for performance of ADL's with use of UE's.  3.  Patient will transfer bed to chair w/o armrests with min/mod assist via scooting/slide board for time OOB. 4.  Patient will demonstrate ROM/LE strengthening Ex program with accurately for increase of functional mobility as noted above. Outcome: Progressing Towards Goal  physical Therapy RE-EVALUATION and TREATMENT    Patient: Sudarshan Chu (28 y.o. female)  Date: 8/31/2021  Diagnosis: Gangrene of both feet ([address] [I96]  Diabetes ([address] [E11.9] Gangrene of both feet ([address]  Procedure(s) (LRB):  FORMAL CLOSURE OF RIGHT BELOW KNEE AMPUTATION, DRESSING CHANGE TO LEFT FOOT WOUND (Right) 12 Days Post-Op  Precautions: Fall, NWB    ASSESSMENT:  Pt seen with OT for second set of skilled hands. Found supine in bed and nurse Katie Coates present to give pain meds for R LE pain. Pt initially refused but complied with time and encouragement. Pt with damir'teresita ROM and strength BLE's, however, more willing to move R residual limb today.   Demonstrates supine to sit EOB toward pt R side with min/mod A. Pt with good static and fair dynamic sitting balance EOB. Participated with core strengthening and LE ex as noted below while EOB with CGA. Pt also able to use UE's for hair hygiene (see OT note for details). Scooted along side of bed with CGA while seated, but requires max assist once supine to move up in bed. Discussed pt discharge plan and encouraged rehab at SNF for max opportunity for optimal function upon return home. Pt agreeable with reservation. Will continue per POC. Patient's progression toward goals since last assessment: as noted above     PLAN:  Goals have been updated based on progression since last assessment. Patient continues to benefit from skilled intervention to address the above impairments. Continue to follow the patient 1-2 times per day/4-7 days per week to address goals. Planned Interventions:  [x]     Bed Mobility Training          []     Neuromuscular Re-Education  [x]     Transfer Training                []    Orthotic/Prosthetic Training  [x]     Gait Training                       []     Modalities  [x]     Therapeutic Exercises       []     Edema Management/Control  [x]     Therapeutic Activities         []     Patient and Family Training/Education  []     Other (comment):  Discharge Recommendations: Skilled Nursing Facility  Further Equipment Recommendations for Discharge: N/A     SUBJECTIVE:   Patient stated I cannot sit up today.     OBJECTIVE DATA SUMMARY:   Critical Behavior:  Neurologic State: Alert  Orientation Level: Oriented X4  Cognition: Follows commands  Safety/Judgement: Fall prevention  Functional Mobility Training:  Bed Mobility:  Rolling: Minimum assistance  Supine to Sit: Minimum assistance; Moderate assistance  Sit to Supine: Minimum assistance  Scooting: Maximum assistance  Balance:  Sitting: Impaired  Sitting - Static: Good (unsupported)  Sitting - Dynamic: Fair (occasional)  Standing:  (NOT TESTED)  Therapeutic Exercises:   LAQ BLE's while seated EOB x 10 each with CGA for safety; trunk flex/ext to facilitate core strengthening EOB   Pain:  Pain Scale 1: Numeric (0 - 10)  Pain Intensity 1: 3  Pain Location 1: Leg  Pain Orientation 1: Right  Pain Description 1: Aching  Pain Intervention(s) 1: Medication (see MAR)  Activity Tolerance:   Fair   Please refer to the flowsheet for vital signs taken during this treatment.   After treatment:   []  Patient left in no apparent distress sitting up in chair  [x]  Patient left in no apparent distress in bed  [x]  Call bell left within reach  [x]  Nursing notified  []  Caregiver present  []  Bed alarm activated    Shaunna Nur, PT   Time Calculation: 36 mins

## 2021-08-31 NOTE — PROGRESS NOTES
DC Plan: SNF vs acute rehab    Care manager met with patient who appeared alert and engaged; patient is accepting of SNF for rehab and when provided choices of facilities who have accepted to date, has [facility]    1700 care manager contacted by patient's cousin, Bryan Espinosa [phone]; she has requested broadening search for SNFs to the [facility] area as they as also seeking to place patient's bedbound mother at a [facility] facility; have sent patient info to [employer] and [employer]. Care Management Interventions  PCP Verified by CM:  Yes  Mode of Transport at Discharge: Self  Transition of Care Consult (CM Consult): Discharge Planning  Current Support Network: Own Home (takes care of mother at home)  The Plan for Transition of Care is Related to the Following Treatment Goals : gangrene of both feet, diabetes  The Patient and/or Patient Representative was Provided with a Choice of Provider and Agrees with the Discharge Plan?: Yes  Name of the Patient Representative Who was Provided with a Choice of Provider and Agrees with the Discharge Plan: Davonte Sweet, patient  Discharge Location  Discharge Placement:  (TBD)

## 2021-08-31 NOTE — PROGRESS NOTES
Hospitalist Progress Note    Patient: Chandler Anthony MRN: [id number]  [facility]: [id number]    YOB: 1970  Age: 46 y.o. Sex: female    DOA: 8/11/2021 LOS:  LOS: 19 days            Chief complaint :  Gangrene of both feet. Assessment/Plan     Patient Active Problem List   Diagnosis Code    Osteomyelitis ([facility] M86.9    Type 2 diabetes mellitus with left diabetic foot ulcer ([facility] E11.621, L97.529    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 ([facility] E11.52    YUNIEL (acute kidney injury) ([facility] N17.9    PAD (peripheral artery disease) ([facility] I73.9    Bilateral carotid artery stenosis I65.23    Gangrene of left foot ([facility]) I96    Non compliance with medical treatment Z91.19    Gangrene of both feet [facility]) I96    Fall W19. Susan Erb Tobacco use Z72.0    Preoperative evaluation to rule out surgical contraindication Z01.818    Anemia D64.9    Sepsis ([facility]) A41.9    Status post below-knee amputation of right lower extremity ([facility] Z89.511    Bacteremia R78.81    Dyspnea R06.00    Pulmonary infiltrates B34.8    Systolic CHF, acute ([facility]) I50.21    Debility R53.81          51-year-old female with uncontrolled type 2 diabetes mellitus, severe peripheral arterial disease, and tobacco use who has been noncompliant with wound care and hyperbaric oxygen treatment due to recent fall is admitted for severe gangrene of both feet. RRT was called on 08/16/2021, patient appeared confused, shortness of breath with hypoxia. ABG showed metabolic acidosis. Patient refused chest x-ray. She has been refusing medications. Given her condition she was transferred to ICU.    08/17/2021 - I tried to discuss with patient about her critical condition and guarded prognosis, she is not interested in listening to my concerns about her medical condition. She turned her face on other side and did not want to listen to me and did not answer my questions.       Again Patient refusing medications today, tried to talk to patient however she does not answer to questions, wants me to leave her alone. Events of last night noted  Patient was seen by psych on 08/20/2021.    08/27/2021Met with MPOA Ms. Zuleyka Cuevas along with palliative care. Discussed very poor prognosis, as she continues to refuse treatment. Discussed options of continuing to treat and not sure if she will improve with treatment vs comfort measures. Patient now taking medications. Resp -   Dyspnea/hypoxia -  Responded to lasix  CXR with pulmonary edema. Oxygen as needed by NC. ID -   septic shock  -  BP now improved  Continue iv abx,   Blood Cultures growing Peptoniphilus asaccharolyticus. Follow up blood cultures negative  Wound cultures growing providencia, MSSA, E-fecalis, alcaligenes fecalis. ID following. Antibiotics - received zosyn       CVS - Monitor HD. Acute CHF - on lasix  Echo 45 % and mild pulm htn      Gangrene of bilateral feet    S/p right BKA on 8/12   S/P RIGHT BELOW KNEE AMPUTATION, DRESSING CHANGE TO LEFT FOOT WOUND 08/20     Heme/onc - Follow H&H, plts. Anemia  Received blood transfusion   Monitor H/H    Renal - Trend BUN, Cr, follow I/O. Check and replace Mg, K, phos. YUNIEL - now BUN/Cr worse. Secondary to not taking any meds, not taking any   Metabolic acidosis resolved with bicarb drip    Endocrine -    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 of both feet  continueSliding scale and  lantus      Neuro/ Pain/ Sedation -   Stopped pain meds    GI -   Diabetic diet. Hematemesis - seen by GI. Refused EGD  Continue with PPI    Prophylaxis - DVT: heparin, GI: protonix.     Noncompliance with medical treatment   Continued tobacco use      PT/OT, she is not cooperating with PT/OT. Disposition : TBD    Review of systems  General: No fevers or chills. Cardiovascular: No chest pain or pressure. No palpitations. Pulmonary: see above. Gastrointestinal: No nausea, vomiting. Physical Exam:  General: As above   HEENT: NC, Atraumatic. PERRLA, anicteric sclerae. Lungs: CTA Bilaterally. No Wheezing/Rhonchi/Rales. Heart:  S1 S2,  No murmur, No Rubs, No Gallops  Abdomen: Soft, Non distended, Non tender.  +Bowel sounds,   Extremities: Right BKA, left foot with dressing  Psych:   Not anxious or agitated. Neurologic:  No acute neurological deficit. Vital signs/Intake and Output:  Visit Vitals  /67 (BP 1 Location: Left upper arm)   Pulse 80   Temp 98.7 °F (37.1 °C)   Resp 16   Ht 5' 8\" (1.727 m)   Wt 73.8 kg (162 lb 12.8 oz)   SpO2 97%   BMI 24.75 kg/m²     Current Shift:  No intake/output data recorded. Last three shifts:  No intake/output data recorded. Labs: Results:       Chemistry Recent Labs     08/29/21  1350 08/28/21  0505   * 167*    140   K 4.0 4.0    107   CO2 27 25   BUN 48* 69*   CREA 1.28 1.29   CA 8.5 9.0   AGAP 6 8   BUCR 38* 53*   *  --    TP 6.1*  --    ALB 3.5  --    GLOB 2.6  --    AGRAT 1.3  --       CBC w/Diff Recent Labs     08/29/21  1350 08/28/21  0505   WBC 7.3 6.3   RBC 2.92* 2.74*   HGB 8.6* 7.9*   HCT 27.9* 26.0*    232   GRANS 78*  --    LYMPH 8*  --    EOS 3  --       Cardiac Enzymes No results for input(s): CPK, CKND1, CONSTANTINO in the last 72 hours. No lab exists for component: CKRMB, TROIP   Coagulation No results for input(s): PTP, INR, APTT, INREXT, INREXT in the last 72 hours. Lipid Panel No results found for: CHOL, CHOLPOCT, CHOLX, CHLST, CHOLV, 562775, HDL, HDLP, LDL, LDLC, DLDLP, 778524, VLDLC, VLDL, TGLX, TRIGL, TRIGP, TGLPOCT, CHHD, CHHDX   BNP No results for input(s): BNPP in the last 72 hours.    Liver Enzymes Recent Labs     08/29/21  1350   TP 6.1*   ALB 3.5   *      Thyroid Studies No results found for: T4, T3U, TSH, TSHEXT, TSHEXT     Procedures/imaging: see electronic medical records for all procedures/Xrays and details which were not copied into this note but were reviewed prior to creation of Plan

## 2021-08-31 NOTE — PROGRESS NOTES
1858 Bedside and Verbal shift change report given to Paresh Roman RN (oncoming nurse) by Radha Wilson RN (offgoing nurse). Report included the following information SBAR, Kardex, MAR, Recent Results and Cardiac Rhythm .

## 2021-08-31 NOTE — PROGRESS NOTES
Problem: Falls - Risk of  Goal: *Absence of Falls  Description: Document Christina Barron Fall Risk and appropriate interventions in the flowsheet. Outcome: Progressing Towards Goal  Note: Fall Risk Interventions:  Mobility Interventions: Bed/chair exit alarm         Medication Interventions: Bed/chair exit alarm    Elimination Interventions: Bed/chair exit alarm    History of Falls Interventions: Bed/chair exit alarm, Consult care management for discharge planning         Problem: Patient Education: Go to Patient Education Activity  Goal: Patient/Family Education  Outcome: Progressing Towards Goal     Problem: Diabetes Self-Management  Goal: *Disease process and treatment process  Description: Define diabetes and identify own type of diabetes; list 3 options for treating diabetes. Outcome: Progressing Towards Goal  Goal: *Incorporating nutritional management into lifestyle  Description: Describe effect of type, amount and timing of food on blood glucose; list 3 methods for planning meals. Outcome: Progressing Towards Goal  Goal: *Incorporating physical activity into lifestyle  Description: State effect of exercise on blood glucose levels. Outcome: Progressing Towards Goal  Goal: *Developing strategies to promote health/change behavior  Description: Define the ABC's of diabetes; identify appropriate screenings, schedule and personal plan for screenings. Outcome: Progressing Towards Goal  Goal: *Using medications safely  Description: State effect of diabetes medications on diabetes; name diabetes medication taking, action and side effects. Outcome: Progressing Towards Goal  Goal: *Monitoring blood glucose, interpreting and using results  Description: Identify recommended blood glucose targets  and personal targets.   Outcome: Progressing Towards Goal  Goal: *Prevention, detection, treatment of acute complications  Description: List symptoms of hyper- and hypoglycemia; describe how to treat low blood sugar and actions for lowering  high blood glucose level. Outcome: Progressing Towards Goal  Goal: *Prevention, detection and treatment of chronic complications  Description: Define the natural course of diabetes and describe the relationship of blood glucose levels to long term complications of diabetes. Outcome: Progressing Towards Goal  Goal: *Developing strategies to address psychosocial issues  Description: Describe feelings about living with diabetes; identify support needed and support network  Outcome: Progressing Towards Goal     Problem: Pressure Injury - Risk of  Goal: *Prevention of pressure injury  Description: Document Flo Scale and appropriate interventions in the flowsheet.   Outcome: Progressing Towards Goal  Note: Pressure Injury Interventions:  Sensory Interventions: Keep linens dry and wrinkle-free    Moisture Interventions: Absorbent underpads    Activity Interventions: Pressure redistribution bed/mattress(bed type)    Mobility Interventions: Pressure redistribution bed/mattress (bed type)    Nutrition Interventions: Document food/fluid/supplement intake, Offer support with meals,snacks and hydration    Friction and Shear Interventions: Minimize layers

## 2021-08-31 NOTE — PROGRESS NOTES
Problem: Self Care Deficits Care Plan (Adult)  Goal: *Acute Goals and Plan of Care (Insert Text)  Description: Occupational Therapy Goals  Initiated 8/20/2021 within 7 day(s). 1.  Patient will perform grooming with minimal assistance standing at sink for 5 minutes or more. Change goal to seated EOB  2. Patient will perform upper body dressing with supervision/set-up. progressing  3. Patient will perform lower body dressing with minimal assistance/contact guard assist. progressing  4. Patient will perform toilet transfers with minimal assistance/contact guard assist.  progressing  5. Patient will perform all aspects of toileting with minimal assistance/contact guard assist. progressing  6. Patient will participate in upper extremity therapeutic exercise/activities with supervision/set-up for 10 minutes. progressing  7. Patient will utilize energy conservation techniques during functional activities with verbal, visual, and tactile cues. progressing    OT goals revised 8/31/2021  Within 7 days    1. Patient will perform grooming with minimal assistance standing seated EOB. 2.  Patient will perform upper body dressing with supervision/set-up. 3.  Patient will perform lower body dressing with minimal assistance/contact guard assist.  4.  Patient will perform toilet transfers with minimal assistance/contact guard assist.  5.  Patient will perform all aspects of toileting with minimal assistance/contact guard assist.  6.  Patient will participate in upper extremity therapeutic exercise/activities with supervision/set-up for 10 minutes. 7.  Patient will utilize energy conservation techniques during functional activities with verbal, visual, and tactile cues.   Outcome: Progressing Towards Goal    OCCUPATIONAL THERAPY RE-EVALUATION    Patient: Kathleen Vinson (65 y.o. female)  Date: 8/31/2021  Primary Diagnosis: Gangrene of both feet ([facility] [I96]  Diabetes ([facility] [E11.9]  Procedure(s) (LRB):  FORMAL CLOSURE OF RIGHT BELOW KNEE AMPUTATION, DRESSING CHANGE TO LEFT FOOT WOUND (Right) 12 Days Post-Op   Precautions:   Fall, NWB  PLOF:     ASSESSMENT :  Based on the objective data described below, the patient presents with decreased strength, endurance, balance and activity tolerance following above mentioned surgical procedure. Pt's participation has been very limited in the past week due to pt's own desire to not participate with therapy or refusing any treatments. Pt today agrees to participate with therapy but only if activities performed at bed level. Pt education on importance of improving OOB activity tolerance and overall strength for safe return to home or transition to appropriate level of care. Pt agrees to sit EOB with mod A x2. Pt co-treat with PT for the need of another set of skilled hands and safety with transfers/ADLs. Pt performed grooming activity including washing and brushing hair as they are severely matted. Pt tolerated sitting EOB for ~15 minutes and returned to bed with min A, max A for scooting and repositioning in bed. Pt adamant about returning home as she is worried about her mother; states her cousin lives in [place] but visits her daily. Discussed different scenarios and pt's inability to scoot self towards [facility] which may effect her performance for bed<>w/c<>toilet transfers at home. Importance of considering SNF placement for short term until pt is strong and safe to perform ADLs and transfers at home. Pt continues to refuse at this time. Pt was left supine in bed at the end of session in [facility]. Patient will benefit from skilled intervention to address the above impairments.   Patient's rehabilitation potential is considered to be Fair  Factors which may influence rehabilitation potential include:   []             None noted  [x]             Mental ability/status  [x]             Medical condition  [x]             Home/family situation and support systems  [x]             Safety awareness  [x]             Pain tolerance/management  []             Other:      PLAN :  Recommendations and Planned Interventions:   [x]               Self Care Training                  [x]      Therapeutic Activities  [x]               Functional Mobility Training   []      Cognitive Retraining  [x]               Therapeutic Exercises           [x]      Endurance Activities  [x]               Balance Training                    []      Neuromuscular Re-Education  []               Visual/Perceptual Training     [x]      Home Safety Training  [x]               Patient Education                   [x]      Family Training/Education  []               Other (comment):    Frequency/Duration: Patient will be followed by occupational therapy 1-2 times per day/4-7 days per week to address goals. Discharge Recommendations: Skilled Nursing Facility  Further Equipment Recommendations for Discharge: N/A     SUBJECTIVE:   Patient stated  I cannot sit up today. I get dizzy everytime I am up.     OBJECTIVE DATA SUMMARY:   Hospital course since last seen and reason for reevaluation:  adjust POC due to limited participation with therapy  Past Medical History:   Diagnosis Date    PAD (peripheral artery disease) ([facility]      Past Surgical History:   Procedure Laterality Date    HX ORTHOPAEDIC      L TMT amputation     Barriers to Learning/Limitations: None  Compensate with: visual, verbal, tactile, kinesthetic cues/model    Home Situation:   Home Situation  Home Environment: Apartment (3rd floor with elevator)  One/Two Story Residence: One story  Living Alone: No  Support Systems: Parent  Patient Expects to be Discharged to[de-identified] Unknown  Current DME Used/Available at Home: Cane, straight, Walker, rollator, Walker, rolling  Tub or Shower Type: Tub/Shower combination  []  Right hand dominant   []  Left hand dominant    Cognitive/Behavioral Status:  Neurologic State: Alert  Orientation Level: Oriented X4  Cognition: Follows commands  Safety/Judgement: Fall prevention    Skin: intact  Edema: none    Vision/Perceptual:    Tracking: Able to track stimulus in all quadrants w/o difficulty    Coordination: BUE  Coordination: Within functional limits  Fine Motor Skills-Upper: Left Intact; Right Intact    Gross Motor Skills-Upper: Left Intact; Right Intact  Balance:  Sitting: Impaired  Sitting - Static: Good (unsupported)  Sitting - Dynamic: Fair (occasional)  Standing:  (NOT TESTED)  Strength: BUE  Strength: Generally decreased, functional  Tone & Sensation: BUE  Sensation: Intact  Range of Motion: BUE  AROM: Generally decreased, functional  Functional Mobility and Transfers for ADLs:  Bed Mobility:  Rolling: Minimum assistance  Supine to Sit: Minimum assistance; Moderate assistance  Sit to Supine: Minimum assistance  Scooting: Maximum assistance  ADL Assessment:   Feeding: Independent    Oral Facial Hygiene/Grooming: Setup    Upper Body Dressing: Minimum assistance    Lower Body Dressing: Moderate assistance    Toileting: Moderate assistance;Maximum assistance  ADL Intervention:  Grooming  Grooming Assistance: Moderate assistance;Minimum assistance  Position Performed: Seated edge of bed  Brushing/Combing Hair: Moderate assistance (using shampoo cap)  Cues: Physical assistance    Cognitive Retraining  Safety/Judgement: Fall prevention  Pain:  Pain level pre-treatment: 8/10   Pain level post-treatment: 8/10  Pain Intervention(s): Medication (see MAR); Rest, Ice, Repositioning   Response to intervention: Nurse notified, See doc flow    Activity Tolerance:   Fair     Please refer to the flowsheet for vital signs taken during this treatment.   After treatment:   [] Patient left in no apparent distress sitting up in chair  [x] Patient left in no apparent distress in bed  [x] Call bell left within reach  [x] Nursing notified  [] Caregiver present  [] Bed alarm activated    COMMUNICATION/EDUCATION:   [x] Role of Occupational Therapy in the acute care setting  [] Home safety education was provided and the patient/caregiver indicated understanding. [] Patient/family have participated as able in goal setting and plan of care. [] Patient/family agree to work toward stated goals and plan of care. [x] Patient understands intent and goals of therapy, but is neutral about his/her participation. [x] Patient is unable to participate in goal setting and plan of care.     Thank you for this referral.  Davidson Ortas, OTR/L  Time Calculation: 35 mins

## 2021-09-01 LAB
ALBUMIN SERPL-MCNC: 3.4 G/DL (ref 3.4–5)
ALBUMIN/GLOB SERPL: 1.2 {RATIO} (ref 0.8–1.7)
ALP SERPL-CCNC: 133 U/L (ref 45–117)
ALT SERPL-CCNC: 110 U/L (ref 13–56)
ANION GAP SERPL CALC-SCNC: 7 MMOL/L (ref 3–18)
AST SERPL-CCNC: 45 U/L (ref 10–38)
BILIRUB SERPL-MCNC: 0.7 MG/DL (ref 0.2–1)
BUN SERPL-MCNC: 31 MG/DL (ref 7–18)
BUN/CREAT SERPL: 31 (ref 12–20)
CALCIUM SERPL-MCNC: 8.6 MG/DL (ref 8.5–10.1)
CHLORIDE SERPL-SCNC: 107 MMOL/L (ref 100–111)
CO2 SERPL-SCNC: 25 MMOL/L (ref 21–32)
CREAT SERPL-MCNC: 1 MG/DL (ref 0.6–1.3)
GLOBULIN SER CALC-MCNC: 2.8 G/DL (ref 2–4)
GLUCOSE BLD STRIP.AUTO-MCNC: 155 MG/DL (ref 70–110)
GLUCOSE BLD STRIP.AUTO-MCNC: 155 MG/DL (ref 70–110)
GLUCOSE BLD STRIP.AUTO-MCNC: 159 MG/DL (ref 70–110)
GLUCOSE BLD STRIP.AUTO-MCNC: 175 MG/DL (ref 70–110)
GLUCOSE BLD STRIP.AUTO-MCNC: 197 MG/DL (ref 70–110)
GLUCOSE SERPL-MCNC: 165 MG/DL (ref 74–99)
POTASSIUM SERPL-SCNC: 4.3 MMOL/L (ref 3.5–5.5)
PROT SERPL-MCNC: 6.2 G/DL (ref 6.4–8.2)
SODIUM SERPL-SCNC: 139 MMOL/L (ref 136–145)

## 2021-09-01 PROCEDURE — 74011250637 HC RX REV CODE- 250/637: Performed by: NURSE PRACTITIONER

## 2021-09-01 PROCEDURE — 74011250636 HC RX REV CODE- 250/636: Performed by: INTERNAL MEDICINE

## 2021-09-01 PROCEDURE — 74011250637 HC RX REV CODE- 250/637: Performed by: INTERNAL MEDICINE

## 2021-09-01 PROCEDURE — 82962 GLUCOSE BLOOD TEST: CPT

## 2021-09-01 PROCEDURE — 74011250637 HC RX REV CODE- 250/637: Performed by: HOSPITALIST

## 2021-09-01 PROCEDURE — 65660000000 HC RM CCU STEPDOWN

## 2021-09-01 PROCEDURE — 74011250637 HC RX REV CODE- 250/637: Performed by: FAMILY MEDICINE

## 2021-09-01 PROCEDURE — 74011250636 HC RX REV CODE- 250/636: Performed by: STUDENT IN AN ORGANIZED HEALTH CARE EDUCATION/TRAINING PROGRAM

## 2021-09-01 PROCEDURE — 74011250637 HC RX REV CODE- 250/637: Performed by: STUDENT IN AN ORGANIZED HEALTH CARE EDUCATION/TRAINING PROGRAM

## 2021-09-01 PROCEDURE — 36415 COLL VENOUS BLD VENIPUNCTURE: CPT

## 2021-09-01 PROCEDURE — 74011636637 HC RX REV CODE- 636/637: Performed by: FAMILY MEDICINE

## 2021-09-01 PROCEDURE — 97530 THERAPEUTIC ACTIVITIES: CPT

## 2021-09-01 PROCEDURE — 80053 COMPREHEN METABOLIC PANEL: CPT

## 2021-09-01 PROCEDURE — C9113 INJ PANTOPRAZOLE SODIUM, VIA: HCPCS | Performed by: INTERNAL MEDICINE

## 2021-09-01 PROCEDURE — 74011636637 HC RX REV CODE- 636/637: Performed by: STUDENT IN AN ORGANIZED HEALTH CARE EDUCATION/TRAINING PROGRAM

## 2021-09-01 RX ORDER — PANTOPRAZOLE SODIUM 40 MG/1
40 TABLET, DELAYED RELEASE ORAL EVERY 12 HOURS
Status: DISCONTINUED | OUTPATIENT
Start: 2021-09-01 | End: 2021-09-03 | Stop reason: HOSPADM

## 2021-09-01 RX ADMIN — ASPIRIN 81 MG: 81 TABLET, CHEWABLE ORAL at 08:10

## 2021-09-01 RX ADMIN — ATORVASTATIN CALCIUM 20 MG: 20 TABLET, FILM COATED ORAL at 21:35

## 2021-09-01 RX ADMIN — PANTOPRAZOLE SODIUM 40 MG: 40 TABLET, DELAYED RELEASE ORAL at 21:36

## 2021-09-01 RX ADMIN — INSULIN LISPRO 3 UNITS: 100 INJECTION, SOLUTION INTRAVENOUS; SUBCUTANEOUS at 00:46

## 2021-09-01 RX ADMIN — LOSARTAN POTASSIUM 25 MG: 25 TABLET, FILM COATED ORAL at 08:11

## 2021-09-01 RX ADMIN — DIPHENHYDRAMINE HYDROCHLORIDE 25 MG: 25 CAPSULE ORAL at 08:11

## 2021-09-01 RX ADMIN — DIPHENHYDRAMINE HYDROCHLORIDE 25 MG: 25 CAPSULE ORAL at 21:35

## 2021-09-01 RX ADMIN — PANTOPRAZOLE SODIUM 40 MG: 40 INJECTION, POWDER, FOR SOLUTION INTRAVENOUS at 08:11

## 2021-09-01 RX ADMIN — HEPARIN SODIUM 5000 UNITS: 5000 INJECTION INTRAVENOUS; SUBCUTANEOUS at 07:44

## 2021-09-01 RX ADMIN — COLLAGENASE SANTYL: 250 OINTMENT TOPICAL at 21:36

## 2021-09-01 RX ADMIN — ACETAMINOPHEN 975 MG: 325 TABLET ORAL at 17:23

## 2021-09-01 RX ADMIN — INSULIN GLARGINE 5 UNITS: 100 INJECTION, SOLUTION SUBCUTANEOUS at 08:11

## 2021-09-01 RX ADMIN — MAGNESIUM OXIDE TAB 400 MG (241.3 MG ELEMENTAL MG) 400 MG: 400 (241.3 MG) TAB at 08:10

## 2021-09-01 RX ADMIN — BUPROPION HYDROCHLORIDE 150 MG: 150 TABLET, FILM COATED, EXTENDED RELEASE ORAL at 08:10

## 2021-09-01 RX ADMIN — METOPROLOL SUCCINATE 25 MG: 25 TABLET, EXTENDED RELEASE ORAL at 08:11

## 2021-09-01 RX ADMIN — ACETAMINOPHEN 975 MG: 325 TABLET ORAL at 21:35

## 2021-09-01 RX ADMIN — INSULIN LISPRO 3 UNITS: 100 INJECTION, SOLUTION INTRAVENOUS; SUBCUTANEOUS at 17:23

## 2021-09-01 RX ADMIN — INSULIN LISPRO 3 UNITS: 100 INJECTION, SOLUTION INTRAVENOUS; SUBCUTANEOUS at 07:41

## 2021-09-01 RX ADMIN — ACETAMINOPHEN 975 MG: 325 TABLET ORAL at 08:10

## 2021-09-01 RX ADMIN — TRAMADOL HYDROCHLORIDE 50 MG: 50 TABLET, FILM COATED ORAL at 21:36

## 2021-09-01 RX ADMIN — METHOCARBAMOL TABLETS 1000 MG: 500 TABLET, COATED ORAL at 17:23

## 2021-09-01 RX ADMIN — METHOCARBAMOL TABLETS 1000 MG: 500 TABLET, COATED ORAL at 21:36

## 2021-09-01 RX ADMIN — METHOCARBAMOL TABLETS 1000 MG: 500 TABLET, COATED ORAL at 08:10

## 2021-09-01 RX ADMIN — INSULIN LISPRO 3 UNITS: 100 INJECTION, SOLUTION INTRAVENOUS; SUBCUTANEOUS at 11:56

## 2021-09-01 RX ADMIN — TRAMADOL HYDROCHLORIDE 50 MG: 50 TABLET, FILM COATED ORAL at 05:30

## 2021-09-01 RX ADMIN — MAGNESIUM OXIDE TAB 400 MG (241.3 MG ELEMENTAL MG) 400 MG: 400 (241.3 MG) TAB at 21:36

## 2021-09-01 RX ADMIN — TRAMADOL HYDROCHLORIDE 50 MG: 50 TABLET, FILM COATED ORAL at 11:56

## 2021-09-01 RX ADMIN — HEPARIN SODIUM 5000 UNITS: 5000 INJECTION INTRAVENOUS; SUBCUTANEOUS at 17:23

## 2021-09-01 NOTE — PROGRESS NOTES
Cardiology Progress Note        Patient: Karuna Rodriguez        Sex: female          DOA: 8/11/2021  YOB: 1970      Age:  46 y.o.        LOS:  LOS: 20 days    Patient seen and examined, chart reviewed. Assessment/Plan     Patient Active Problem List   Diagnosis Code    Osteomyelitis ([facility] M86.9    Type 2 diabetes mellitus with left diabetic foot ulcer ([facility] E11.621, L97.529    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 ([facility] E11.52    YUNIEL (acute kidney injury) ([facility] N17.9    PAD (peripheral artery disease) ([facility] I73.9    Bilateral carotid artery stenosis I65.23    Gangrene of left foot ([facility]) I96    Non compliance with medical treatment Z91.19    Gangrene of both feet [facility]) I96    Fall W19. Marylene Jewett Tobacco use Z72.0    Preoperative evaluation to rule out surgical contraindication Z01.818    Anemia D64.9    Sepsis ([facility]) A41.9    Status post below-knee amputation of right lower extremity ([facility]) Z89.511    Bacteremia R78.81    Dyspnea R06.00    Pulmonary infiltrates U19.7    Systolic CHF, acute ([facility]) I50.21    Debility R53.81      Acute systolic heart failure  Abnormal LFTs     Echocardiogram revealed     · Test was terminated early due to patient becoming combative. · Left Ventricle: Normal cavity size. Mild concentric hypertrophy. The estimated EF is 40 - 45%. Mildly reduced systolic function. There is inconclusive left ventricular diastolic function E'E= 53.60. Wall Scoring: The left ventricular wall motion is globally hypokinetic. · Pulmonary Artery: Pulmonary arteries not well visualized. Pulmonary arterial systolic pressure (PASP) is 35 mmHg. Pulmonary hypertension found to be mild. Plan:      Continue Metoprolol succinate and losartan  Hold lasix and aldactone  Start Atorvastatin 20 mg q HS  Strict input output.     Monitor renal/liver function   Salt restriction up to 2 g per day and fluid restriction up to 1.2 L per   day. Subjective:    cc:   denies any chest pain or shortness of breath, c/o pain at surgical site    REVIEW OF SYSTEMS:     General: No fevers or chills. Cardiovascular: No chest pain,No palpitations, No orthopnea, No PND, No leg swelling, No claudication  Pulmonary: No dyspnea. Gastrointestinal: No nausea, vomiting, bleeding  Neurology: No Dizziness    Objective:      Visit Vitals  /73 (BP 1 Location: Left upper arm, BP Patient Position: At rest)   Pulse 79   Temp 97.9 °F (36.6 °C)   Resp 17   Ht 5' 8\" (1.727 m)   Wt 74.8 kg (165 lb)   SpO2 97%   BMI 25.09 kg/m²     Body mass index is 25.09 kg/m². Physical Exam:  General Appearance: Comfortable, not using accessory muscles of respiration. HEENT: ROSA. HEAD: Atraumatic  NECK: No JVD, no thyroidomeglay. CAROTIDS: No bruit  LUNGS: Clear bilaterally. HEART: S1+S2 audible, no murmur, no pericardial rub.      ABD: Non-tender, BS Audible    EXT: Right below knee amputation      Medication:  Current Facility-Administered Medications   Medication Dose Route Frequency    diphenhydrAMINE (BENADRYL) capsule 25 mg  25 mg Oral Q6H PRN    atorvastatin (LIPITOR) tablet 20 mg  20 mg Oral QHS    traMADoL (ULTRAM) tablet 50 mg  50 mg Oral Q6H PRN    collagenase (SANTYL) 250 unit/gram ointment   Topical DAILY    pantoprazole (PROTONIX) injection 40 mg  40 mg IntraVENous Q12H    [Held by provider] spironolactone (ALDACTONE) tablet 25 mg  25 mg Oral DAILY    [Held by provider] furosemide (LASIX) injection 20 mg  20 mg IntraVENous DAILY    magnesium oxide (MAG-OX) tablet 400 mg  400 mg Oral BID    acetaminophen (TYLENOL) tablet 975 mg  975 mg Oral TID    methocarbamoL (ROBAXIN) tablet 1,000 mg  1,000 mg Oral TID    senna-docusate (PERICOLACE) 8.6-50 mg per tablet 1 Tablet  1 Tablet Oral DAILY    promethazine (PHENERGAN) 25 mg in 0.9% sodium chloride 50 mL IVPB  25 mg IntraVENous Q4H PRN    polyethylene glycol (MIRALAX) packet 17 g  17 g Oral DAILY    losartan (COZAAR) tablet 25 mg  25 mg Oral DAILY    metoprolol succinate (TOPROL-XL) XL tablet 25 mg  25 mg Oral DAILY    0.9% sodium chloride infusion 250 mL  250 mL IntraVENous PRN    [Held by provider] DULoxetine (CYMBALTA) capsule 60 mg  60 mg Oral DAILY    albuterol-ipratropium (DUO-NEB) 2.5 MG-0.5 MG/3 ML  3 mL Nebulization Q4H PRN    insulin glargine (LANTUS) injection 5 Units  5 Units SubCUTAneous DAILY    buPROPion SR (WELLBUTRIN SR) tablet 150 mg  150 mg Oral DAILY    COVID-19 vac,Ad26-PF (Motor2) injection 1 Dose  1 Dose IntraMUSCular PRIOR TO DISCHARGE    aspirin chewable tablet 81 mg  81 mg Oral DAILY    heparin (porcine) injection 5,000 Units  5,000 Units SubCUTAneous Q8H    naloxone (NARCAN) injection 0.1 mg  0.1 mg IntraVENous PRN    ondansetron (ZOFRAN ODT) tablet 4 mg  4 mg Oral Q8H PRN    Or    ondansetron (ZOFRAN) injection 4 mg  4 mg IntraVENous Q6H PRN    insulin lispro (HUMALOG) injection   SubCUTAneous Q6H    glucose chewable tablet 16 g  16 g Oral PRN    glucagon (GLUCAGEN) injection 1 mg  1 mg IntraMUSCular PRN    dextrose (D50W) injection syrg 12.5-25 g  25-50 mL IntraVENous PRN               Lab/Data Reviewed:       Recent Labs     08/29/21  1350   WBC 7.3   HGB 8.6*   HCT 27.9*        Recent Labs     08/31/21  0150 08/29/21  1350    139   K 4.7 4.0    106   CO2 25 27   * 163*   BUN 37* 48*   CREA 1.03 1.28   CA 8.4* 8.5         Signed By: Fer Herron MD     August 31, 2021

## 2021-09-01 NOTE — PROGRESS NOTES
1930: Assumed patient care from [address] [place] [address] Rd., [address]. Patient is alert and oriented to person, place, time and situation. Respiratory status is stable on room air. Vital signs are stable. MEWS score is a one. Patient denies any pain, discomfort, nausea vomiting dizziness or anxiety. White board and fall card is updated. Bed is locked and in lowest position. Call bell, water and personal belongings are within reach. Patient has no questions, comments or concerns after bedside shift report. 0700: Patient had an uneventful shift. Respiratory status, vital signs and MEWS score remained stable. Patient was resting quietly with no signs of distress noted. Bed locked and in lowest position. Call bell water and personal belongings were within reach. Patient had no questions, comments or concerns after bedside shift report.  Bedside report given to Brody Gutierres R.N.

## 2021-09-01 NOTE — PROGRESS NOTES
Problem: Falls - Risk of  Goal: *Absence of Falls  Description: Document [place] Fall Risk and appropriate interventions in the flowsheet. Outcome: Progressing Towards Goal  Note: Fall Risk Interventions:  Mobility Interventions: Bed/chair exit alarm         Medication Interventions: Bed/chair exit alarm    Elimination Interventions: Bed/chair exit alarm    History of Falls Interventions: Bed/chair exit alarm, Consult care management for discharge planning         Problem: Patient Education: Go to Patient Education Activity  Goal: Patient/Family Education  Outcome: Progressing Towards Goal     Problem: Diabetes Self-Management  Goal: *Disease process and treatment process  Description: Define diabetes and identify own type of diabetes; list 3 options for treating diabetes. Outcome: Progressing Towards Goal  Goal: *Incorporating nutritional management into lifestyle  Description: Describe effect of type, amount and timing of food on blood glucose; list 3 methods for planning meals. Outcome: Progressing Towards Goal  Goal: *Incorporating physical activity into lifestyle  Description: State effect of exercise on blood glucose levels. Outcome: Progressing Towards Goal  Goal: *Developing strategies to promote health/change behavior  Description: Define the ABC's of diabetes; identify appropriate screenings, schedule and personal plan for screenings. Outcome: Progressing Towards Goal  Goal: *Using medications safely  Description: State effect of diabetes medications on diabetes; name diabetes medication taking, action and side effects. Outcome: Progressing Towards Goal  Goal: *Monitoring blood glucose, interpreting and using results  Description: Identify recommended blood glucose targets  and personal targets.   Outcome: Progressing Towards Goal  Goal: *Prevention, detection, treatment of acute complications  Description: List symptoms of hyper- and hypoglycemia; describe how to treat low blood sugar and actions for lowering  high blood glucose level. Outcome: Progressing Towards Goal  Goal: *Prevention, detection and treatment of chronic complications  Description: Define the natural course of diabetes and describe the relationship of blood glucose levels to long term complications of diabetes. Outcome: Progressing Towards Goal  Goal: *Developing strategies to address psychosocial issues  Description: Describe feelings about living with diabetes; identify support needed and support network  Outcome: Progressing Towards Goal     Problem: Pressure Injury - Risk of  Goal: *Prevention of pressure injury  Description: Document Flo Scale and appropriate interventions in the flowsheet. Outcome: Progressing Towards Goal  Note: Pressure Injury Interventions:  Sensory Interventions: Keep linens dry and wrinkle-free    Moisture Interventions: Absorbent underpads    Activity Interventions: Pressure redistribution bed/mattress(bed type)    Mobility Interventions: Pressure redistribution bed/mattress (bed type)    Nutrition Interventions: Document food/fluid/supplement intake, Offer support with meals,snacks and hydration    Friction and Shear Interventions: Minimize layers                Problem: Patient Education: Go to Patient Education Activity  Goal: Patient/Family Education  Outcome: Progressing Towards Goal     Problem: Risk for Spread of Infection  Goal: Prevent transmission of infectious organism to others  Description: Prevent the transmission of infectious organisms to other patients, staff members, and visitors.   Outcome: Progressing Towards Goal     Problem: Patient Education:  Go to Education Activity  Goal: Patient/Family Education  Outcome: Progressing Towards Goal

## 2021-09-01 NOTE — PROGRESS NOTES
3342 Assumed care of the patient from [place] [id number] (offgoing Nurse). Patient is alert and oriented. Pt denies any pain or discomfort at this moment. bed in low position, call bell within reach. 1900 Patient had an uneventful shift and remained stable. Purposeful hourly rounding completed throughout the shift, NAD noted at this time, and patient is resting quietly in bed. No concerns or requests voiced    1908 Bedside and Verbal shift change report given to Brittney Mendoza RN (oncoming nurse) by Taran Jauregui RN (offgoing nurse). Report included the following information SBAR, Kardex, MAR, Recent Results and Cardiac Rhythm .

## 2021-09-01 NOTE — PROGRESS NOTES
Pharmacy Dosing Services: IV - PO Conversion    This patient meets [facility] approved criteria for conversion from IV to oral therapy for the following medication:  Pantoprazole    Order adjusted to:  Pantoprazole 40 mg po q12h  Pt has regular diet ordered and is taking other medications orally.     (prior order Pantoprazole 40 mg IV q12h)       Pharmacy will continue to monitor the patient's status.   Signed Noman Talavera [place] Contact information:  [phone]

## 2021-09-01 NOTE — PROGRESS NOTES
Problem: Mobility Impaired (Adult and Pediatric)  Goal: *Acute Goals and Plan of Care (Insert Text)  Description: Physical Therapy Goals   Initiated 8/20/2021 and to be accomplished within 7 day(s) with further goals   1. Patient will roll side to side and move from supine <> sit with CGA/min A for change of position and EOB activity. 2.  Patient will demonstrate intact dynamic sitting balance for performance scooting along side of bed and during transfer via slide board bed <>chair. 3.  Patient will demonstrate ROM/LE strengthening Ex program accurately for increase of functional mobility and activity performance as noted above. Physical Therapy Goals   Initiated 8/20/2021 and to be accomplished within 7 day(s) with further goals TBD as appropriate  1. Patient will move from supine <> sit with min assist for change of position. 2.  Patient will demonstrate intact/unsupported dynamic sitting balance for performance of ADL's with use of UE's.  3.  Patient will transfer bed to chair w/o armrests with min/mod assist via scooting/slide board for time OOB. 4.  Patient will demonstrate ROM/LE strengthening Ex program with accurately for increase of functional mobility as noted above. Outcome: Progressing Towards Goal  physical Therapy TREATMENT    Patient: Giana Alcala (19 y.o. female)  Date: 9/1/2021  Diagnosis: Gangrene of both feet ([address] [I96]  Diabetes ([address] [E11.9] Gangrene of both feet ([address]  Procedure(s) (LRB):  FORMAL CLOSURE OF RIGHT BELOW KNEE AMPUTATION, DRESSING CHANGE TO LEFT FOOT WOUND (Right) 13 Days Post-Op  Precautions: Fall, NWB   Chart, physical therapy assessment, plan of care and goals were reviewed. ASSESSMENT:  Pt supine in bed. Visitor present on arrival but exited room on PT arrival.  Pt rolls s<>s with SBA/CGA and vc for bed pad adjustment and application of bed pan.  Also able to shift up in bed once bed in trendelenburg position with mod A while pt using UE's to pull self up. Pt left with bed pan in place, all needs in reach, and attempting BM. Progression toward goals:  []      Improving appropriately and progressing toward goals  [x]      Improving slowly and progressing toward goals  []      Not making progress toward goals and plan of care will be adjusted     PLAN:  Patient continues to benefit from skilled intervention to address the above impairments. Continue treatment per established plan of care. Discharge Recommendations:  Rehab  Further Equipment Recommendations for Discharge:  N/A     SUBJECTIVE:   Patient stated I have to use the bedpan now number one and two.     OBJECTIVE DATA SUMMARY:   Critical Behavior:  Neurologic State: Alert  Orientation Level: Oriented X4  Cognition: Follows commands  Safety/Judgement: Fall prevention  Functional Mobility Training:  Bed Mobility:  Rolling: Contact guard assistance;Stand-by assistance  Pain:  Pain Scale 1: Numeric (0 - 10)  Pain Intensity 1: 3  Pain Location 1: Leg  Pain Orientation 1: Right  Pain Description 1: Aching  Pain Intervention(s) 1: Medication (see MAR)  Activity Tolerance:   Fair   Please refer to the flowsheet for vital signs taken during this treatment.   After treatment:   [] Patient left in no apparent distress sitting up in chair  [x] Patient left in no apparent distress in bed  [x] Call bell left within reach  [] Nursing notified  [] Caregiver present  [] Bed alarm activated      Reena Erickson PT   Time Calculation: 10 mins

## 2021-09-01 NOTE — PROGRESS NOTES
[facility] [phone]   [facility] [phone]    Palliative Care Support: This writer, along with Avis Garcia, RN, with the Palliative Care team; met with patient to offer support. Patient was in bed and alert and oriented. Patient reported that she has been having a hard time sleeping, especially with people coming in every 5 minutes. Patient was more engaging and focused, this visit, as opposed to previous visit. Patient has been working with PT/OT and the discharge plan is to go to a nursing facility for some short term rehab. At this time, patient will remain a FULL CODE WITH FULL INTERVENTIONS. Recommendations: The Palliative Care team will continue to offer support to patient, at this time. Kash Wade., [facility]  Palliative Care   RA#[phone]

## 2021-09-01 NOTE — PROGRESS NOTES
9/1/2021  PT treatment not completed due to:  [] Off Unit for testing/procedure  [] Pain  [] Eating  [] Patient too lethargic  [] Nausea/vomiting  [] Dialysis treatment in progress   [x] Other: Pt declines participation with PT session this am, stating \"I haven't slept in 4 days. \"  Will f/u at later time as pt schedule allows.      Billy Candelario, PT

## 2021-09-01 NOTE — ADT AUTH CERT NOTES
Knee: Amputation Above or Below Knee - Care Day 16 (8/30/2021) by Renea Enrique RN       Review Status Review Entered   Completed 8/31/2021 13:28      Criteria Review      Care Day: 16 Care Date: 8/30/2021 Level of Care: Telemetry    Guideline Day 4    Level Of Care    ( ) Floor to discharge    8/31/2021 13:28:38 EDT by Renea Enrique      tele    Clinical Status    (X) * Hemodynamic stability    8/31/2021 13:28:38 EDT by Renea Enrique      98.7 83 139/68 16 96%RA glucose 127 144 192    (X) * No evidence of postoperative or surgical site infection    8/31/2021 13:28:39 EDT by Renea Enrique      none reported    (X) * Wound intact    8/31/2021 13:28:39 EDT by Renea Enrique      Right BKA, left foot with dressing    (X) * Pain absent or managed    8/31/2021 13:28:39 EDT by Renea Enrique      no pain    ( ) * Discharge plans and education understood    Activity    (X) * Gait training at next level of care arranged    8/31/2021 13:28:39 EDT by Mickey Back up with assist PT/OT    Routes    ( ) * Oral hydration    ( ) * Oral medications or regimen acceptable for next level of care    8/31/2021 13:28:39 EDT by Renea Enrique      tylenol 975mg PO 3xdaily aspirin 81mg POdaily wellbutrin 150mg POdaily losartan 25mg POdaily magox 400mg [id number] robaxin 1000mg PO 3xdaily toprolol 25mg POdaily protonix 40mg IVQ12    ( ) * Oral diet or acceptable for next level of care    8/31/2021 13:28:39 EDT by Renea Enrique      Regular; 4 carb choices (60 gm/meal)    Interventions    (X) Physical therapy    8/31/2021 13:28:39 EDT by Renea Enrique      PT/OT    * Milestone   Additional Notes   8/30 LOC IP Tele      Attending   43-year-old female with uncontrolled type 2 diabetes mellitus, severe peripheral arterial disease, and tobacco use who has been noncompliant with wound care and hyperbaric oxygen treatment due to recent fall is admitted for severe gangrene of both feet.    RRT was called on 08/16/2021, patient appeared confused, shortness of breath with hypoxia.  ABG showed metabolic acidosis.  Patient refused chest x-ray.  She has been refusing medications.  Given her condition she was transferred to ICU.   08/17/2021 - I tried to discuss with patient about her critical condition and guarded prognosis, she is not interested in listening to my concerns about her medical condition.  She turned her face on other side and did not want to listen to me and did not answer my questions.     Again Patient refusing medications today, tried to talk to patient however she does not answer to questions, wants me to leave her alone. Events of last night noted   Patient was seen by psych on 08/20/2021.   08/27/2021Met with [facility] Ms. Sanjay Hernandez along with palliative care. Discussed very poor prognosis, as she continues to refuse treatment. Discussed options of continuing to treat and not sure if she will improve with treatment vs comfort measures. Patient now taking medications. Resp -    Dyspnea/hypoxia -   Responded to lasix   CXR with pulmonary edema. Oxygen as needed by NC. ID -    septic shock  -   BP now improved   Continue iv abx,    Blood Cultures growing Peptoniphilus asaccharolyticus. Follow up blood cultures negative   Wound cultures growing providencia, MSSA, E-fecalis, alcaligenes fecalis. ID following. Antibiotics - received zosyn    [facility] - Monitor HD. Acute CHF - on lasix   Echo 45 % and mild pulm htn    Gangrene of bilateral feet     S/p right BKA on 8/12    S/P RIGHT BELOW KNEE AMPUTATION, DRESSING CHANGE TO LEFT FOOT WOUND 08/20   Heme/onc - Follow H&H, plts. Anemia   Received blood transfusion    Monitor H/H   Renal - Trend BUN, Cr, follow I/O. Check and replace Mg, K, phos. YUNIEL - now BUN/Cr worse.  Secondary to not taking any meds, not taking any    Metabolic acidosis resolved with bicarb drip   Endocrine -     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 of both feet continueSliding scale and  lantus     Neuro/ Pain/ Sedation -    Stopped pain meds   GI -    Diabetic diet. Hematemesis - seen by GI. Refused EGD   Continue with PPI   Prophylaxis - DVT: heparin, GI: protonix. Noncompliance with medical treatment    Continued tobacco use      Cardio   Acute systolic heart failure   Hypokalemia    Anemia    Acute renal insufficiency improving   Abnormal LFTs   Echocardiogram revealed   · Test was terminated early due to patient becoming combative. · Left Ventricle: Normal cavity size. Mild concentric hypertrophy. The estimated EF is 40 - 45%. Mildly reduced systolic function. There is inconclusive left ventricular diastolic function E'E= 43.37. Wall Scoring: The left ventricular wall motion is globally hypokinetic. · Pulmonary Artery: Pulmonary arteries not well visualized. Pulmonary arterial systolic pressure (PASP) is 35 mmHg. Pulmonary hypertension found to be mild. Plan:    Continue Metoprolol succinate and losartan   Hold lasix and aldactone   Hold statin due to abnormal LFTs   Strict input output.     Monitor renal/liver function    Salt restriction up to 2 g per day and fluid restriction up to 1.2 L per   day.         Physical Exam:   General:         As above    HEENT:           NC, Atraumatic.  PERRLA, anicteric sclerae. Lungs:            CTA Bilaterally. No Wheezing/Rhonchi/Rales. Heart:              S1 S2,  No murmur, No Rubs, No Gallops   Abdomen:      Soft, Non distended, Non tender.  +Bowel sounds,    Extremities:   Right BKA, left foot with dressing   Psych:            Not anxious or agitated.    Neurologic:    No acute neurological deficit.         Knee: Amputation Above or Below Knee - Care Day 15 (8/29/2021) by Cynthia Ogden RN       Review Status Review Entered   Completed 8/31/2021 13:16      Criteria Review      Care Day: 15 Care Date: 8/29/2021 Level of Care: Telemetry    Guideline Day 4    Level Of Care    ( ) Floor to discharge    8/31/2021 13:16:50 EDT by Darrel East      tele    Clinical Status    (X) * Hemodynamic stability    8/31/2021 13:16:50 EDT by Darrel Jaimes      98.9 83 136/66 19 96%RA 73.8kg hgb 8.6 hct 27.9      (X) * No evidence of postoperative or surgical site infection    8/31/2021 13:16:51 EDT by Darrel Theodore      none reported    ( ) * Wound intact    ( ) * Pain absent or managed    8/31/2021 13:16:51 EDT by Darrel Jaimes      5/10    ( ) * Discharge plans and education understood    Activity    (X) * Gait training at next level of care arranged    8/31/2021 13:16:51 EDT by Magdalena Redmond up with assist, PT/OT    Routes    ( ) * Oral hydration    ( ) * Oral medications or regimen acceptable for next level of care    8/31/2021 13:16:51 EDT by Darrel Theodore      tylenol 975mg [id number] lipitor 40mg POQbedtime wellbutrin 150mg POdaily magox 400mg [id number] robaxin 1000mg [id number] toprolol 25mg POdaily tramadol 50mg PO [id number]    ( ) * Oral diet or acceptable for next level of care    8/31/2021 13:16:51 EDT by Darrel Jaimes      ADULT DIET Regular; 4 carb choices    Interventions    (X) Physical therapy    8/31/2021 13:16:51 EDT by [facility] Theodore      PT/OT    * Milestone   Additional Notes   8/29 LOC IP Tele      IM   Assessment and Plan:   Principal Problem:     Gangrene of both feet ([address] (8/11/2021)   Active Problems:     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 ([address] (4/4/2021)     YUNIEL (acute kidney injury) ([address] (4/4/2021)     Non compliance with medical treatment (4/12/2021)     Fall (8/12/2021)     Tobacco use (8/12/2021)     Preoperative evaluation to rule out surgical contraindication (8/12/2021)     Anemia (8/12/2021)     Sepsis ([address] (8/13/2021)     Status post below-knee amputation of right lower extremity ([facility] (8/13/2021)     Bacteremia (8/13/2021)     Dyspnea (8/16/2021)     Pulmonary infiltrates (4/66/5884)     Systolic CHF, acute ([facility] (8/17/2021)   Debility ()   Resp - Dyspnea/hypoxia -   Responded to lasix   CXR with pulmonary edema. Oxygen as needed by NC. ID -    septic shock  -   Off IV antibiotics   Blood Cultures growing Peptoniphilus asaccharolyticus. Follow up blood cultures negative   Wound cultures growing providencia, MSSA, E-fecalis, alcaligenes fecalis. [facility] - Monitor HD. Acute CHF - on lasix and metoptrolol. tolerating restart of losartan     Gangrene of bilateral feet     S/p right BKA on 8/12    S/P RIGHT BELOW KNEE AMPUTATION, DRESSING CHANGE TO LEFT FOOT WOUND 08/20   Heme/onc -will heck tomorrow   Renal - YUNIEL - Seems much better now. Will recheck tomorrow   Endocrine -     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 of both feet   continueSliding scale and  lantus     Neuro/ Pain/ Sedation -    Stopped pain meds   GI -    Diabetic diet. Hematemesis - seen by GI. Refused EGD   Continue with PPI   Prophylaxis - DVT: heparin, GI: protonix. Noncompliance with medical treatment    Continued tobacco use   Subjective:   Feels better tramadol helps with pain from surgery        Cardio   Assessment:   Principal Problem:     Gangrene of both feet ([address] (8/11/2021)   Active Problems:     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 ([address] (4/4/2021)     YUNIEL (acute kidney injury) ([address] (4/4/2021)     Non compliance with medical treatment (4/12/2021)     Fall (8/12/2021)     Tobacco use (8/12/2021)     Preoperative evaluation to rule out surgical contraindication (8/12/2021)     Anemia (8/12/2021)     Sepsis ([address] (8/13/2021)     Status post below-knee amputation of right lower extremity ([address] (8/13/2021)     Bacteremia (8/13/2021)     Dyspnea (8/16/2021)     Pulmonary infiltrates (2/20/0251)     Systolic CHF, acute ([address] (8/17/2021)     Debility (   Plan:   Awake and alert today. Complaining of leg pain.  C-V sable. Follow.       Physical Exam:   General:         NAD, AAOx3. Non-toxic. HEENT:           NC/AT.  PERRLA, EOMI.  MMM. Lungs:            Nml inspection. CTA B/L. No wheezing, rales or rhonchi. Heart:              S1S2 RRR,  PMI mid 5th IC space. No M/RG. Abdomen:      Soft, NT/ND.  BS+. No peritoneal signs. Extremities:   No C/C/E. Psych:            Nml affect. Neurologic:    2-12 intact.  Strength 5/5 throughout.  Sensation symmetrical.       PT   ASSESSMENT:   Pt supine in bed on PT entry, reporting 7/10 pain in R LE. Pt agreeable to bed level activity only, declining to attempt sitting EOB at this time. Pt required modA to scoot up in bed with bed in trendelenburg position. Pt then performed supine therapeutic exercise for LE strength and ROM as described below. Pt left supine in bed with all needs met and all questions answered. Pt encouraged to continue to perform LE therapeutic exercise to tolerance t/o the day. Progression toward goals:    ?Improving slowly and progressing toward goals   PLAN:   Patient continues to benefit from skilled intervention to address the above impairments.  Continue treatment per established plan of care. Discharge Recommendations: [facility]   Further Equipment Recommendations for Discharge:  TBD at next level of care      OT   ASSESSMENT:   Pt received supine in bed, agreeable to sit EOB to work on functional reach and balance exercises. Min A to achieve sitting EOB. Pt is able to maintain balance with resistance work both unilateral and bilatearl in different planes of motion with no sliding noted, no LOB noted. Pt reports fatigue following these activities and required min A to return supine. All needs left in reach. Progression toward goals:   ?Improving slowly and progressing toward goals   PLAN:   Patient continues to benefit from skilled intervention to address the above impairments.  Continue treatment per established plan of care.    Discharge Recommendations: [facility] Singh Further Equipment Recommendations for Discharge:  wheelchair and N/A                  Knee: Amputation Above or Below Knee - Care Day 14 (8/28/2021) by Cynthia Ogden RN       Review Status Review Entered   Completed 8/31/2021 12:54      Criteria Review      Care Day: 14 Care Date: 8/28/2021 Level of Care: Telemetry    Guideline Day 4    Level Of Care    ( ) Floor to discharge    8/31/2021 12:54:52 EDT by Cynthia Ogden      tele    Clinical Status    (X) * Hemodynamic stability    8/31/2021 12:54:52 EDT by Cynthia Ogden      98.2 69 14 134/70 98%RA hgb 7.9 hct 26.0    (X) * No evidence of postoperative or surgical site infection    8/31/2021 12:54:52 EDT by Cynthia Ogden      none reported    ( ) * Wound intact    ( ) * Pain absent or managed    8/31/2021 12:54:52 EDT by Cynthia Ogden      7/10    ( ) * Discharge plans and education understood    Activity    (X) * Gait training at next level of care arranged    8/31/2021 12:54:52 EDT by Cynthia Ogden      PT/OT up with assist    Routes    ( ) * Oral hydration    ( ) * Oral medications or regimen acceptable for next level of care    8/31/2021 12:54:52 EDT by Cynthia Ogden      tylenol 975mg PO 3xdaily aspirin 81mg POdaily lipitor 40mg PO Qbedtime wellbutrin 150mg POdaily magox 400mg POdaily robaxin 1000mg PO 3xdaily  toprolol 25mg POdaily protonix 40mg IVQ12 tramadol 50mg POQ6 PRNx1    ( ) * Oral diet or acceptable for next level of care    8/31/2021 12:54:52 EDT by Cynthia Ogden      Regular; 4 carb choices (60 gm/meal)    Interventions    (X) Physical therapy    8/31/2021 12:54:52 EDT by Cynthia Ogden      PT/OT    * Milestone   Additional Notes   8/28 LOC IP Tele      IM   Assessment and Plan:   Principal Problem:     Gangrene of both feet ([address] (8/11/2021)   Active Problems:     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 ([address] (4/4/2021)     YUNIEL (acute kidney injury) ([facility] (4/4/2021)     Non compliance with medical treatment (4/12/2021)     Fall (8/12/2021)     Tobacco use (8/12/2021)     Preoperative evaluation to rule out surgical contraindication (8/12/2021)     Anemia (8/12/2021)     Sepsis ([address] (8/13/2021)     Status post below-knee amputation of right lower extremity ([address] (8/13/2021)     Bacteremia (8/13/2021)     Dyspnea (8/16/2021)     Pulmonary infiltrates (6/96/5463)     Systolic CHF, acute ([address] (8/17/2021)     Debility ()   Resp - Dyspnea/hypoxia -   Responded to lasix   CXR with pulmonary edema. Oxygen as needed by NC. ID -    septic shock  -   BP now improved and will restart the losartan. Off IV antibiotics   Blood Cultures growing Peptoniphilus asaccharolyticus. Follow up blood cultures negative   Wound cultures growing providencia, MSSA, E-fecalis, alcaligenes fecalis. [facility] - Monitor HD. Acute CHF - on lasix and metoptrolol. Better now and will restart the losartanEcho 45 % and mild pulm htn     Gangrene of bilateral feet     S/p right BKA on 8/12    S/P RIGHT BELOW KNEE AMPUTATION, DRESSING CHANGE TO LEFT FOOT WOUND 08/20   Heme/onc - Follow H&H, plts. Anemia   Received blood transfusion    Monitor H/H   Renal - Trend BUN, Cr, follow I/O. Check and replace Mg, K, phos. YUNIEL - Seems much better now   Endocrine -     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 of both feet   continueSliding scale and  lantus     Neuro/ Pain/ Sedation -    Stopped pain meds   GI -    Diabetic diet. Hematemesis - seen by GI. Refused EGD   Continue with PPI   Prophylaxis - DVT: heparin, GI: protonix.    Noncompliance with medical treatment    Continued tobacco use   DC planning     Chief complaint:  Gangrene of both feet      Cardio   Assessment:   Principal Problem:     Gangrene of both feet ([address] (8/11/2021)   Active Problems:     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 ([address] (4/4/2021)     YUNIEL (acute kidney injury) ([address] (4/4/2021)     Non compliance with medical treatment (4/12/2021)     Fall (8/12/2021)     Tobacco use (8/12/2021)     Preoperative evaluation to rule out surgical contraindication (8/12/2021)     Anemia (8/12/2021)     Sepsis ([facility] (8/13/2021)     Status post below-knee amputation of right lower extremity ([facility] (8/13/2021)     Bacteremia (8/13/2021)     Dyspnea (8/16/2021)     Pulmonary infiltrates (8/32/2306)     Systolic CHF, acute ([facility] (8/17/2021)     Debility (   Plan:   Continue metoprolol succinate.  Holding losartan, Lasix and Aldactone.  Follow. Physical Exam:   General:         NAD, AAOx3. Non-toxic. HEENT:           NC/AT.  PERRLA, EOMI.  MMM. Lungs:            Nml inspection. CTA B/L. No wheezing, rales or rhonchi. Heart:              S1S2 RRR,  PMI mid 5th IC space. No M/RG. Abdomen:      Soft, NT/ND.  BS+. No peritoneal signs. Extremities:   No C/C/E. Psych:            Nml affect. Neurologic:    2-12 intact.  Strength 5/5 throughout.  Sensation symmetrical.      PT   ASSESSMENT:   Pt reluctant to agree to PT.  Min/modA to sit up EOB.  Requires modA to scoot to EOB.  Constant (B)UE support needed for sitting balance.  Performed seated (B)LE ROM strengthening exercises.  MaxA to scoot up to [facility] before returning to supine.  Performed supine ROM strengthening exercises on (B)LEs. Progression toward goals:   Improving slowly and progressing toward goals   PLAN:   Patient continues to benefit from skilled intervention to address the above impairments.  Continue treatment per established plan of care.    Discharge Recommendations:  Skilled Nursing Facility/LTC   Further Equipment Recommendations for Discharge:  wheelchair                 Knee: Amputation Above or Below Knee - Care Day 13 (8/27/2021) by Emilie Julian RN       Review Status Review Entered   Completed 8/31/2021 12:27      Criteria Review      Care Day: 13 Care Date: 8/27/2021 Level of Care: Telemetry    Guideline Day 4    Level Of Care    ( ) Floor to discharge    8/31/2021 12:27:40 EDT by Jeanmarie Garcia      tele    Clinical Status    (X) * Hemodynamic stability    8/31/2021 12:27:40 EDT by Jeanmarie Garcia      98.8 61 18 119/71 94%RA [id number] 161 179 185    (X) * No evidence of postoperative or surgical site infection    8/31/2021 12:27:40 EDT by Jeanmarie Garcia      none reported    (X) * Wound intact    8/31/2021 12:27:40 EDT by López Garcia dressing removed from right bka stump.    Begin local wound care of kerlex and ace bandage to right bka stump. Begin acuzyme wound care to left foot dorsal wound. ( ) * Pain absent or managed    8/31/2021 12:27:40 EDT by Jeanmarie Garcia      7, 5/10    ( ) * Discharge plans and education understood    Activity    (X) * Gait training at next level of care arranged    8/31/2021 12:27:40 EDT by Ba Moseley PT/OT out of bed with assist    Routes    ( ) * Oral hydration    ( ) * Oral medications or regimen acceptable for next level of care    8/31/2021 12:27:40 EDT by Jeanmarie Garcia      tylenol 975mg PO 3xdaily robaxin 1000mg PO 3xdaily zofran 4mg IVQ6 PRNx1    ( ) * Oral diet or acceptable for next level of care    8/31/2021 12:27:40 EDT by Jeanmarie Garcia      Regular; 4 carb choices (60 gm/meal)    Interventions    (X) Physical therapy    8/31/2021 12:27:40 EDT by Jeanmarie Garcia      PT/OT    * Milestone   Additional Notes   8/27 LOC  Tele      Cardio   Acute systolic heart failure   Hypokalemia    Anemia    Acute renal insufficiency    Echocardiogram revealed   · Test was terminated early due to patient becoming combative. · Left Ventricle: Normal cavity size. Mild concentric hypertrophy. The estimated EF is 40 - 45%. Mildly reduced systolic function. There is inconclusive left ventricular diastolic function E'E= 46.67. Wall Scoring: The left ventricular wall motion is globally hypokinetic. · Pulmonary Artery: Pulmonary arteries not well visualized.  Pulmonary arterial systolic pressure (PASP) is 35 mmHg. Pulmonary hypertension found to be mild. Plan:    Continue Metoprolol succinate   Hold losartan, lasix and aldactone   Strict input output.     Monitor renal function     restart losartan and Aldactone as per blood pressure and renal function   Salt restriction up to 2 g per day and fluid restriction up to 1.2 L per   day.        Vascular   Right bka stump is warm and perfused.  Suture line is clean, dry, and intact.  There is no erythema, no ecchymosis, no drainage, no skin edge necrosis.     Left foot wound with exposed tendons. Fibrinous exudate. Post-Op Day: 15   S/p right below knee guillotine amputation.     POD:  8   closure right below knee amputation.    Plan:   Prevena dressing removed from right bka stump.     Begin local wound care of kerlex and ace bandage to right bka stump. Begin acuzyme wound care to left foot dorsal wound. Will not follow actively. Will follow up as an outpatient 5-6 weeks for suture removal   Assessment:   47 yo female with atherosclerosis of native arteries with gangrene right foot. S/p bka. Wound dorsum left foot. Attending   [facility] along with palliative care. Discussed very poor prognosis, as she continues to refuse treatment. Discussed options of continuing to treat and not sure if she will improve with treatment vs comfort measures. Resp -    Dyspnea/hypoxia -   Responded to lasix   CXR with pulmonary edema. Oxygen as needed by NC. ID -    septic shock  -   BP now improved   Continue iv abx,    Blood Cultures growing Peptoniphilus asaccharolyticus. Follow up blood cultures negative   Wound cultures growing providencia, MSSA, E-fecalis, alcaligenes fecalis. ID following. Antibiotics - received zosyn    [facility] - Monitor HD.     Acute CHF - on lasix   Echo 45 % and mild pulm htn     Gangrene of bilateral feet     S/p right BKA on 8/12    S/P RIGHT BELOW KNEE AMPUTATION, DRESSING CHANGE TO LEFT FOOT WOUND 08/20   Heme/onc - Follow H&H, plts. Anemia   Received blood transfusion    Monitor H/H   Renal - Trend BUN, Cr, follow I/O. Check and replace Mg, K, phos. YUNIEL - now BUN/Cr worse. Secondary to not taking any meds, not taking any    Metabolic acidosis resolved with bicarb drip   Endocrine -     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 of both feet   continueSliding scale and  lantus     Neuro/ Pain/ Sedation -    Stopped pain meds   GI -    Diabetic diet. Hematemesis - seen by GI. Refused EGD   Continue with PPI      Physical Exam:   General:         As above    HEENT:           NC, Atraumatic.  PERRLA, anicteric sclerae. Lungs:            CTA Bilaterally. No Wheezing/Rhonchi/Rales. Heart:              S1 S2,  No murmur, No Rubs, No Gallops   Abdomen:      Soft, Non distended, Non tender.  +Bowel sounds,    Extremities:   Right BKA, left foot with dressing   Psych:            Not anxious or agitated. Neurologic:    No acute neurological deficit.         PT   ASSESSMENT:   Pt is mildly frustrated, but agreeable to transitioning to EOB. Pt is assisted to sitting and performs exercises listed below. 5/10 LBP reported before, during and after session. Pt will benefit from continued PT as tolerated. Progression toward goals:   ?Improving slowly and progressing toward goals   PLAN:   Patient continues to benefit from skilled intervention to address the above impairments.  Continue treatment per established plan of care.    Discharge Recommendations: [address] [facility] vs SNF   Further Equipment Recommendations for Discharge:  mechanical lift, W/c and sliding board                Knee: Amputation Above or Below Knee - Care Day 12 (8/26/2021) by Nilton Wise RN       Review Status Review Entered   Completed 8/30/2021 15:47      Criteria Review      Care Day: 12 Care Date: 8/26/2021 Level of Care: Telemetry    Guideline Day 4    Level Of Care    (X) Floor to discharge    8/30/2021 15:47:11 EDT by Camilo Martin      tele    Clinical Status    (X) * Hemodynamic stability    8/30/2021 15:47:11 EDT by Camilo Martin      98 83 18 138/76 99%RA bun 85 cr 1.97 glucose 236 219 211 216 190 168    (X) * No evidence of postoperative or surgical site infection    8/30/2021 15:47:11 EDT by Camilo Martin      none noted    (X) * Wound intact    8/30/2021 15:47:11 EDT by Camilo Martin      Right BKA, left foot with dressing    ( ) * Pain absent or managed    ( ) * Discharge plans and education understood    Activity    (X) * Gait training at next level of care arranged    8/30/2021 15:47:11 EDT by Camilo Martin      PT/OT    Routes    ( ) * Oral hydration    ( ) * Oral medications or regimen acceptable for next level of care    8/30/2021 15:47:11 EDT by Camilo Martin      tylenol 975mg [id number] aspirin 81mg POdaily lipitor 40mg POQbedtime wellbutrin 150mg POdaily lantus 5units SCdaily losartan 25mg POdaily magox 400mg [id number] robaxin 1000mf [id number] toprolol 25mg POdaily    ( ) * Oral diet or acceptable for next level of care    8/30/2021 15:47:11 EDT by Camilo Martin      Regular; 4 carb choices (60 gm/meal)    Interventions    (X) Physical therapy    8/30/2021 15:47:11 EDT by Avelina Ren ordered    * Milestone   Additional Notes   8/26 LOC  Tele      Cardio   Acute systolic heart failure   Hypokalemia    Anemia    Acute renal insufficiency    Echocardiogram revealed   · Test was terminated early due to patient becoming combative. · Left Ventricle: Normal cavity size. Mild concentric hypertrophy. The estimated EF is 40 - 45%. Mildly reduced systolic function. There is inconclusive left ventricular diastolic function E'E= 13.68. Wall Scoring: The left ventricular wall motion is globally hypokinetic. · Pulmonary Artery: Pulmonary arteries not well visualized. Pulmonary arterial systolic pressure (PASP) is 35 mmHg. Pulmonary hypertension found to be mild. Plan:    Continue Metoprolol succinate   Hold losartan, lasix and aldactone   Strict input output.     Monitor renal function    Salt restriction up to 2 g per day and fluid restriction up to 1.2 L per   day.     Plan discussed with patient's nurse      GI   55-year-old female with uncontrolled type 2 diabetes mellitus, severe peripheral arterial disease, and tobacco use who has been noncompliant with wound care and hyperbaric oxygen treatment due to recent fall is admitted on 8/11 for severe gangrene of both feet. 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 of both feet-had bilateral feet amputation on 8/12  below the knee amputation on the right side and on  the 19 formal BKA and closure. She had previousr left transmetatarsal amputation. I was asked to see her for coffee ground emesis that happened 2 days before without any melena. Her Hgb was 7.2   She is very poor historian and was not interested in answering my questions. She was refusing therapy and also refused having an upper endoscopy to investigate this further. I suspect that she has severe erosive esophagitis caused or aggravated by diabetic gastroparesis. Recommend to keep her on double dose PPI and initially put her on small dose Reglan 5 mg qid before feeding and bed time and then PRN. For now keep her on low fat and low roughage diet to allow her stomach to function easily. Her abdomen appeared benign not distended or tender. I think she has some psychiatric issue of depression and denial. She should stop smoking      IM   55-year-old female with uncontrolled type 2 diabetes mellitus, severe peripheral arterial disease, and tobacco use who has been noncompliant with wound care and hyperbaric oxygen treatment due to recent fall is admitted for severe gangrene of both feet.    RRT was called on 08/16/2021, patient appeared confused, shortness of breath with hypoxia.  ABG showed metabolic acidosis.  Patient refused chest x-ray.  She has been refusing medications. Manish Frame her condition she was transferred to ICU.   08/17/2021 - I tried to discuss with patient about her critical condition and guarded prognosis, she is not interested in listening to my concerns about her medical condition.  She turned her face on other side and did not want to listen to me and did not answer my questions.     Again Patient refusing medications today, tried to talk to patient however she does not answer to questions, wants me to leave her alone. Events of last night noted   Patient was seen by psych on 08/20/2021. Called and discussed with MPOA Ms. Chris Robledo. Discussed very poor prognosis. Discussed transitioning to comfort care if patient is not cooperating . Resp -    Dyspnea/hypoxia -   Responded to lasix   CXR with pulmonary edema. Oxygen as needed by NC. ID -    septic shock  -   BP now improved   Continue iv abx,    Blood Cultures growing Peptoniphilus asaccharolyticus. Follow up blood cultures negative   Wound cultures growing providencia, MSSA, E-fecalis, alcaligenes fecalis. ID following. Antibiotics - received zosyn    [facility] - Monitor HD. Acute CHF - on lasix   Echo 45 % and mild pulm htn     Gangrene of bilateral feet     S/p right BKA on 8/12    S/P RIGHT BELOW KNEE AMPUTATION, DRESSING CHANGE TO LEFT FOOT WOUND 08/20   Heme/onc - Follow H&H, plts. Anemia   Received blood transfusion    Monitor H/H   Renal - Trend BUN, Cr, follow I/O. Check and replace Mg, K, phos. YUNIEL - now BUN/Cr worse. Secondary to not taking any meds, not taking any    Metabolic acidosis resolved with bicarb drip   Endocrine -     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 of both feet   continueSliding scale and  lantus     Neuro/ Pain/ Sedation -    Stopped pain meds   GI -    Diabetic diet. Hematemesis - seen by GI. Refused EGD   Continue with PPI   Prophylaxis - DVT: heparin, GI: protonix. Noncompliance with medical treatment    Continued tobacco use      Physical Exam:   General:         As above    HEENT:           NC, Atraumatic.  PERRLA, anicteric sclerae. Lungs:            CTA Bilaterally. No Wheezing/Rhonchi/Rales. Heart:              S1 S2,  No murmur, No Rubs, No Gallops   Abdomen:      Soft, Non distended, Non tender.  +Bowel sounds,    Extremities:   Right BKA, left foot with dressing   Psych:            Not anxious or agitated.    Neurologic:    No acute neurological deficit.

## 2021-09-02 LAB
GLUCOSE BLD STRIP.AUTO-MCNC: 158 MG/DL (ref 70–110)
GLUCOSE BLD STRIP.AUTO-MCNC: 161 MG/DL (ref 70–110)
GLUCOSE BLD STRIP.AUTO-MCNC: 183 MG/DL (ref 70–110)
GLUCOSE BLD STRIP.AUTO-MCNC: 188 MG/DL (ref 70–110)

## 2021-09-02 PROCEDURE — 74011250637 HC RX REV CODE- 250/637: Performed by: INTERNAL MEDICINE

## 2021-09-02 PROCEDURE — 74011636637 HC RX REV CODE- 636/637: Performed by: FAMILY MEDICINE

## 2021-09-02 PROCEDURE — 82962 GLUCOSE BLOOD TEST: CPT

## 2021-09-02 PROCEDURE — 74011250636 HC RX REV CODE- 250/636: Performed by: HOSPITALIST

## 2021-09-02 PROCEDURE — 74011636637 HC RX REV CODE- 636/637: Performed by: STUDENT IN AN ORGANIZED HEALTH CARE EDUCATION/TRAINING PROGRAM

## 2021-09-02 PROCEDURE — 74011250636 HC RX REV CODE- 250/636: Performed by: STUDENT IN AN ORGANIZED HEALTH CARE EDUCATION/TRAINING PROGRAM

## 2021-09-02 PROCEDURE — U0003 INFECTIOUS AGENT DETECTION BY NUCLEIC ACID (DNA OR RNA); SEVERE ACUTE RESPIRATORY SYNDROME CORONAVIRUS 2 (SARS-COV-2) (CORONAVIRUS DISEASE [COVID-19]), AMPLIFIED PROBE TECHNIQUE, MAKING USE OF HIGH THROUGHPUT TECHNOLOGIES AS DESCRIBED BY CMS-2020-01-R: HCPCS

## 2021-09-02 PROCEDURE — 74011250637 HC RX REV CODE- 250/637: Performed by: STUDENT IN AN ORGANIZED HEALTH CARE EDUCATION/TRAINING PROGRAM

## 2021-09-02 PROCEDURE — 65660000000 HC RM CCU STEPDOWN

## 2021-09-02 PROCEDURE — 74011250637 HC RX REV CODE- 250/637: Performed by: NURSE PRACTITIONER

## 2021-09-02 PROCEDURE — 74011250637 HC RX REV CODE- 250/637: Performed by: FAMILY MEDICINE

## 2021-09-02 PROCEDURE — 74011250637 HC RX REV CODE- 250/637: Performed by: HOSPITALIST

## 2021-09-02 RX ORDER — FUROSEMIDE 10 MG/ML
20 INJECTION INTRAMUSCULAR; INTRAVENOUS ONCE
Status: COMPLETED | OUTPATIENT
Start: 2021-09-02 | End: 2021-09-02

## 2021-09-02 RX ADMIN — TRAMADOL HYDROCHLORIDE 50 MG: 50 TABLET, FILM COATED ORAL at 23:09

## 2021-09-02 RX ADMIN — ACETAMINOPHEN 975 MG: 325 TABLET ORAL at 21:15

## 2021-09-02 RX ADMIN — METOPROLOL SUCCINATE 25 MG: 25 TABLET, EXTENDED RELEASE ORAL at 11:00

## 2021-09-02 RX ADMIN — TRAMADOL HYDROCHLORIDE 50 MG: 50 TABLET, FILM COATED ORAL at 11:00

## 2021-09-02 RX ADMIN — COLLAGENASE SANTYL: 250 OINTMENT TOPICAL at 21:46

## 2021-09-02 RX ADMIN — INSULIN LISPRO 3 UNITS: 100 INJECTION, SOLUTION INTRAVENOUS; SUBCUTANEOUS at 07:34

## 2021-09-02 RX ADMIN — HEPARIN SODIUM 5000 UNITS: 5000 INJECTION INTRAVENOUS; SUBCUTANEOUS at 10:59

## 2021-09-02 RX ADMIN — ATORVASTATIN CALCIUM 20 MG: 20 TABLET, FILM COATED ORAL at 21:16

## 2021-09-02 RX ADMIN — MAGNESIUM OXIDE TAB 400 MG (241.3 MG ELEMENTAL MG) 400 MG: 400 (241.3 MG) TAB at 21:04

## 2021-09-02 RX ADMIN — PANTOPRAZOLE SODIUM 40 MG: 40 TABLET, DELAYED RELEASE ORAL at 10:58

## 2021-09-02 RX ADMIN — METHOCARBAMOL TABLETS 1000 MG: 500 TABLET, COATED ORAL at 16:11

## 2021-09-02 RX ADMIN — LOSARTAN POTASSIUM 25 MG: 25 TABLET, FILM COATED ORAL at 10:58

## 2021-09-02 RX ADMIN — HEPARIN SODIUM 5000 UNITS: 5000 INJECTION INTRAVENOUS; SUBCUTANEOUS at 23:09

## 2021-09-02 RX ADMIN — BUPROPION HYDROCHLORIDE 150 MG: 150 TABLET, FILM COATED, EXTENDED RELEASE ORAL at 10:59

## 2021-09-02 RX ADMIN — TRAMADOL HYDROCHLORIDE 50 MG: 50 TABLET, FILM COATED ORAL at 16:58

## 2021-09-02 RX ADMIN — HEPARIN SODIUM 5000 UNITS: 5000 INJECTION INTRAVENOUS; SUBCUTANEOUS at 16:11

## 2021-09-02 RX ADMIN — INSULIN LISPRO 3 UNITS: 100 INJECTION, SOLUTION INTRAVENOUS; SUBCUTANEOUS at 01:00

## 2021-09-02 RX ADMIN — MAGNESIUM OXIDE TAB 400 MG (241.3 MG ELEMENTAL MG) 400 MG: 400 (241.3 MG) TAB at 10:58

## 2021-09-02 RX ADMIN — HEPARIN SODIUM 5000 UNITS: 5000 INJECTION INTRAVENOUS; SUBCUTANEOUS at 01:00

## 2021-09-02 RX ADMIN — METHOCARBAMOL TABLETS 1000 MG: 500 TABLET, COATED ORAL at 21:23

## 2021-09-02 RX ADMIN — ACETAMINOPHEN 975 MG: 325 TABLET ORAL at 16:11

## 2021-09-02 RX ADMIN — ASPIRIN 81 MG: 81 TABLET, CHEWABLE ORAL at 10:58

## 2021-09-02 RX ADMIN — FUROSEMIDE 20 MG: 10 INJECTION, SOLUTION INTRAMUSCULAR; INTRAVENOUS at 18:25

## 2021-09-02 RX ADMIN — PANTOPRAZOLE SODIUM 40 MG: 40 TABLET, DELAYED RELEASE ORAL at 21:04

## 2021-09-02 RX ADMIN — INSULIN GLARGINE 5 UNITS: 100 INJECTION, SOLUTION SUBCUTANEOUS at 10:57

## 2021-09-02 RX ADMIN — DOCUSATE SODIUM 50 MG AND SENNOSIDES 8.6 MG 1 TABLET: 8.6; 5 TABLET, FILM COATED ORAL at 10:59

## 2021-09-02 RX ADMIN — INSULIN LISPRO 2 UNITS: 100 INJECTION, SOLUTION INTRAVENOUS; SUBCUTANEOUS at 23:20

## 2021-09-02 RX ADMIN — METHOCARBAMOL TABLETS 1000 MG: 500 TABLET, COATED ORAL at 10:58

## 2021-09-02 RX ADMIN — INSULIN LISPRO 3 UNITS: 100 INJECTION, SOLUTION INTRAVENOUS; SUBCUTANEOUS at 16:31

## 2021-09-02 RX ADMIN — DIPHENHYDRAMINE HYDROCHLORIDE 25 MG: 25 CAPSULE ORAL at 21:43

## 2021-09-02 NOTE — PROGRESS NOTES
Physical Therapy Treatment Attempt     Chart reviewed. Attempted Physical Therapy Treatment, however, patient unable to be seen due to:  []  Nausea/vomiting  []  Eating  [x]  Pain: pt reporting leg pain, requesting medication, AMANDEEP Lloyd notified. Pt also reporting feeling overwhelmed over discharge planning. Requesting to rest today. []  Patient too lethargic  []  Off Unit for testing/procedure  []  Dialysis treatment in progress   []  Telemetry Results  []  Other:      Will follow up tomorrow as patient's schedule allows.    Thank you for this referral.    Erwin Juares, PT, DPT

## 2021-09-02 NOTE — PROGRESS NOTES
1935: Assumed patient care from [address] [place] [address] Rd., [address]. Patient is alert and oriented to person, place, time and situation. Respiratory status Is stable on room air. Vital signs are stable. MEWS score is a one. Patient denies any pain, discomfort, nausea vomiting dizziness or anxiety. White board and fall card is updated. Bed is locked and in lowest position. Call bell, water and personal belongings are within reach. Patient has no questions, comments or concerns after bedside shift report. 0700: Patient had an uneventful shift. Respiratory status, vital signs and MEWS score remained stable. Patient was resting quietly with no signs of distress noted. Bed locked and in lowest position. Call bell water and personal belongings were within reach. Patient had no questions, comments or concerns after bedside shift report.  Bedside report given to [facility].

## 2021-09-02 NOTE — PROGRESS NOTES
Nutrition Assessment     Type and Reason for Visit: Reassess    Nutrition Recommendations/Plan: Continue w/ POC    Nutrition Assessment:  Patient admitted for gangrene of both foot and bacteremia, s/p right BKA on 8/12, T2DM, tobacco use, anemia- received blood transfusion, YUNIEL- resolved. Estimated Daily Nutrient Needs:  Energy (kcal):  1870  Protein (g):  75       Fluid (ml/day):  1870    Nutrition Related Findings:  Med: pericolace, miralac, protonix, toprol xl, mag ox, humalog, lantus, lasix. Noted PO per nursing documentation % x3 as of 9/1.       Current Nutrition Therapies:  ADULT DIET Regular; 4 carb choices (60 gm/meal)    Anthropometric Measures:  · Height:  5' 8\" (172.7 cm)  · Current Body Wt:  74.8 kg (164 lb 14.5 oz)  · BMI: 25.1    Nutrition Diagnosis:   · No nutrition diagnosis at this time related to as evidenced by     Nutrition Intervention:  Food and/or Nutrient Delivery: Continue current diet  Nutrition Education and Counseling: No recommendations at this time  Coordination of Nutrition Care: Continue to monitor while inpatient    Goals:  Continue PO intake >75% at most meals throughout the next 5-7 days       Nutrition Monitoring and Evaluation:   Behavioral-Environmental Outcomes: None identified  Food/Nutrient Intake Outcomes: Food and nutrient intake  Physical Signs/Symptoms Outcomes: Biochemical data, Meal time behavior, Skin, Weight, GI status, Nausea/vomiting    Discharge Planning:    Continue current diet     Electronically signed by Edvin Boles RD on 9/2/2021 at 11:19 AM

## 2021-09-02 NOTE — PROGRESS NOTES
Care Management    Discharge Planning: SNF    CM met with the patient at the bedside to discuss plans for discharge. CM informed the patient that the MD feels that she is ready to discharge from the hospital and that therapy is still recommending SNF/rehab. CM provided the patient with a list of the 3 accepting SNFs. The patient informs this CM that she feels that she is safe to discharge home. CM informed the patient that itt is highly recommended that she go to a SNF/rehab upon discharge in order to regain strength and work with therapy before going home. CM had a lengthy discussion with the patient reviewing SNF vs home with home health. CM informed the patient that this CM will return this afternoon and asks that the patient have her decision at that time regarding whether or not she plans to go to SNF or if she will discharge home. 1230: CM met with the patient again at the bedside. The patient states that she has discussed discharge plans with her cousin and that she would like to go to [facility] upon discharge. CM to reach out to [facility] and ask that they begin insurance authorization process. Care Management Interventions  PCP Verified by CM:  Yes  Mode of Transport at Discharge: Self  Transition of Care Consult (CM Consult): Discharge Planning  Current Support Network: Own Home (takes care of mother at home)  The Plan for Transition of Care is Related to the Following Treatment Goals : gangrene of both feet, diabetes  The Patient and/or Patient Representative was Provided with a Choice of Provider and Agrees with the Discharge Plan?: Yes  Name of the Patient Representative Who was Provided with a Choice of Provider and Agrees with the Discharge Plan: Anam Quiñones, patient  Discharge Location  Discharge Placement:  (TBD)

## 2021-09-02 NOTE — PROGRESS NOTES
Cardiology Progress Note        Patient: Miguel Angel Meyer        Sex: female          DOA: 8/11/2021  YOB: 1970      Age:  46 y.o.        LOS:  LOS: 21 days    Patient seen and examined, chart reviewed. Assessment/Plan     Patient Active Problem List   Diagnosis Code    Osteomyelitis ([facility] M86.9    Type 2 diabetes mellitus with left diabetic foot ulcer ([facility] E11.621, L97.529    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 ([facility] E11.52    YUNIEL (acute kidney injury) ([facility] N17.9    PAD (peripheral artery disease) ([facility]) I73.9    Bilateral carotid artery stenosis I65.23    Gangrene of left foot ([facility]) I96    Non compliance with medical treatment Z91.19    Gangrene of both feet [facility]) I96    Fall W19. Valerio Boogie Tobacco use Z72.0    Preoperative evaluation to rule out surgical contraindication Z01.818    Anemia D64.9    Sepsis ([facility]) A41.9    Status post below-knee amputation of right lower extremity ([facility]) Z89.511    Bacteremia R78.81    Dyspnea R06.00    Pulmonary infiltrates M67.1    Systolic CHF, acute ([facility]) I50.21    Debility R53.81      Acute systolic heart failure  Abnormal LFTs     Echocardiogram revealed     · Test was terminated early due to patient becoming combative. · Left Ventricle: Normal cavity size. Mild concentric hypertrophy. The estimated EF is 40 - 45%. Mildly reduced systolic function. There is inconclusive left ventricular diastolic function E'E= 68.97. Wall Scoring: The left ventricular wall motion is globally hypokinetic. · Pulmonary Artery: Pulmonary arteries not well visualized. Pulmonary arterial systolic pressure (PASP) is 35 mmHg. Pulmonary hypertension found to be mild. Plan:      Continue Metoprolol succinate and losartan  Hold lasix and aldactone  Continue Atorvastatin 20 mg q HS  Strict input output.     Monitor renal/liver function   Salt restriction up to 2 g per day and fluid restriction up to 1.2 L per   day. Subjective:    cc:   denies any chest pain or shortness of breath, c/o pain at surgical site    REVIEW OF SYSTEMS:     General: No fevers or chills. Cardiovascular: No chest pain,No palpitations, No orthopnea, No PND, No leg swelling, No claudication  Pulmonary: No dyspnea. Gastrointestinal: No nausea, vomiting, bleeding  Neurology: No Dizziness    Objective:      Visit Vitals  /66   Pulse 80   Temp 97.4 °F (36.3 °C)   Resp 16   Ht 5' 8\" (1.727 m)   Wt 74.8 kg (164 lb 14.5 oz)   SpO2 100%   BMI 25.07 kg/m²     Body mass index is 25.07 kg/m². Physical Exam:  General Appearance: Comfortable, not using accessory muscles of respiration. HEENT: ROSA. HEAD: Atraumatic  NECK: No JVD, no thyroidomeglay. CAROTIDS: No bruit  LUNGS: Clear bilaterally. HEART: S1+S2 audible, no murmur, no pericardial rub.      ABD: Non-tender, BS Audible    EXT: Right below knee amputation      Medication:  Current Facility-Administered Medications   Medication Dose Route Frequency    pantoprazole (PROTONIX) tablet 40 mg  40 mg Oral Q12H    diphenhydrAMINE (BENADRYL) capsule 25 mg  25 mg Oral Q6H PRN    atorvastatin (LIPITOR) tablet 20 mg  20 mg Oral QHS    traMADoL (ULTRAM) tablet 50 mg  50 mg Oral Q6H PRN    collagenase (SANTYL) 250 unit/gram ointment   Topical DAILY    [Held by provider] spironolactone (ALDACTONE) tablet 25 mg  25 mg Oral DAILY    [Held by provider] furosemide (LASIX) injection 20 mg  20 mg IntraVENous DAILY    magnesium oxide (MAG-OX) tablet 400 mg  400 mg Oral BID    acetaminophen (TYLENOL) tablet 975 mg  975 mg Oral TID    methocarbamoL (ROBAXIN) tablet 1,000 mg  1,000 mg Oral TID    senna-docusate (PERICOLACE) 8.6-50 mg per tablet 1 Tablet  1 Tablet Oral DAILY    promethazine (PHENERGAN) 25 mg in 0.9% sodium chloride 50 mL IVPB  25 mg IntraVENous Q4H PRN    polyethylene glycol (MIRALAX) packet 17 g  17 g Oral DAILY    losartan (COZAAR) tablet 25 mg  25 mg Oral DAILY    metoprolol succinate (TOPROL-XL) XL tablet 25 mg  25 mg Oral DAILY    0.9% sodium chloride infusion 250 mL  250 mL IntraVENous PRN    [Held by provider] DULoxetine (CYMBALTA) capsule 60 mg  60 mg Oral DAILY    albuterol-ipratropium (DUO-NEB) 2.5 MG-0.5 MG/3 ML  3 mL Nebulization Q4H PRN    insulin glargine (LANTUS) injection 5 Units  5 Units SubCUTAneous DAILY    buPROPion SR (WELLBUTRIN SR) tablet 150 mg  150 mg Oral DAILY    COVID-19 vac,Ad26-PF ([facility]) injection 1 Dose  1 Dose IntraMUSCular PRIOR TO DISCHARGE    aspirin chewable tablet 81 mg  81 mg Oral DAILY    heparin (porcine) injection 5,000 Units  5,000 Units SubCUTAneous Q8H    naloxone (NARCAN) injection 0.1 mg  0.1 mg IntraVENous PRN    ondansetron (ZOFRAN ODT) tablet 4 mg  4 mg Oral Q8H PRN    Or    ondansetron (ZOFRAN) injection 4 mg  4 mg IntraVENous Q6H PRN    insulin lispro (HUMALOG) injection   SubCUTAneous Q6H    glucose chewable tablet 16 g  16 g Oral PRN    glucagon (GLUCAGEN) injection 1 mg  1 mg IntraMUSCular PRN    dextrose (D50W) injection syrg 12.5-25 g  25-50 mL IntraVENous PRN               Lab/Data Reviewed:       No results for input(s): WBC, HGB, HCT, PLT, HGBEXT, HCTEXT, PLTEXT, HGBEXT, HCTEXT, PLTEXT in the last 72 hours.   Recent Labs     09/01/21  0315 08/31/21  0150    138   K 4.3 4.7    106   CO2 25 25   * 203*   BUN 31* 37*   CREA 1.00 1.03   CA 8.6 8.4*         Signed By: Kary Chu MD     September 1, 2021

## 2021-09-02 NOTE — PROGRESS NOTES
Hospitalist Progress Note    Patient: Davonte Sweet MRN: [id number]  CSN: [id number]    YOB: 1970  Age: 46 y.o. Sex: female    DOA: 8/11/2021 LOS:  LOS: 22 days            Chief complaint :  Gangrene of both feet. Assessment/Plan     Patient Active Problem List   Diagnosis Code    Osteomyelitis ([facility] M86.9    Type 2 diabetes mellitus with left diabetic foot ulcer ([facility] E11.621, L97.529    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 ([facility] E11.52    YUNIEL (acute kidney injury) ([facility] N17.9    PAD (peripheral artery disease) ([facility]) I73.9    Bilateral carotid artery stenosis I65.23    Gangrene of left foot ([facility]) I96    Non compliance with medical treatment Z91.19    Gangrene of both feet [facility]) I96    Fall W19. Ezzard Burows Tobacco use Z72.0    Preoperative evaluation to rule out surgical contraindication Z01.818    Anemia D64.9    Sepsis ([facility]) A41.9    Status post below-knee amputation of right lower extremity ([facility] Z89.511    Bacteremia R78.81    Dyspnea R06.00    Pulmonary infiltrates V84.5    Systolic CHF, acute ([facility]) I50.21    Debility R53.81          42-year-old female with uncontrolled type 2 diabetes mellitus, severe peripheral arterial disease, and tobacco use who has been noncompliant with wound care and hyperbaric oxygen treatment due to recent fall is admitted for severe gangrene of both feet. RRT was called on 08/16/2021, patient appeared confused, shortness of breath with hypoxia. ABG showed metabolic acidosis. Patient refused chest x-ray. She has been refusing medications. Given her condition she was transferred to ICU.    08/17/2021 - I tried to discuss with patient about her critical condition and guarded prognosis, she is not interested in listening to my concerns about her medical condition. She turned her face on other side and did not want to listen to me and did not answer my questions.       Again Patient refusing medications today, tried to talk to patient however she does not answer to questions, wants me to leave her alone. Events of last night noted  Patient was seen by psych on 08/20/2021.    08/27/2021Met with MPOA Ms. Aditya Meek along with palliative care. Discussed very poor prognosis, as she continues to refuse treatment. Discussed options of continuing to treat and not sure if she will improve with treatment vs comfort measures. Patient now taking medications. Resp -   Dyspnea/hypoxia -  Responded to lasix  CXR with pulmonary edema. Oxygen as needed by NC. ID -   septic shock  -  BP now improved  Completed antibiotics course per ID  Blood Cultures growing Peptoniphilus asaccharolyticus. Follow up blood cultures negative  Wound cultures growing providencia, MSSA, E-fecalis, alcaligenes fecalis. [facility] - Monitor HD. Acute CHF - lasix on hold   Continue toprol xl and losartan  Echo 45 % and mild pulm htn      Gangrene of bilateral feet    S/p right BKA on 8/12   S/P RIGHT BELOW KNEE AMPUTATION, DRESSING CHANGE TO LEFT FOOT WOUND 08/20     Heme/onc - Follow H&H, plts. Anemia  Received blood transfusion   Monitor H/H    Renal - Trend BUN, Cr, follow I/O. Check and replace Mg, K, phos. YUNIEL - resolved. Endocrine -    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 of both feet  continueSliding scale and  lantus      Neuro/ Pain/ Sedation -   Stopped pain meds    GI -   Diabetic diet. Hematemesis - seen by GI. Refused EGD  LFTs improving  Continue with PPI    Prophylaxis - DVT: heparin, GI: protonix.     Noncompliance with medical treatment   Continued tobacco use      PT/OT,    Disposition : awaiting rehab    Review of systems  General: No fevers or chills. Cardiovascular: No chest pain or pressure. No palpitations. Pulmonary: see above. Gastrointestinal: No nausea, vomiting. Physical Exam:  General: As above   HEENT: NC, Atraumatic.   PERRLA, anicteric sclerae. Lungs: CTA Bilaterally. No Wheezing/Rhonchi/Rales. Heart:  S1 S2,  No murmur, No Rubs, No Gallops  Abdomen: Soft, Non distended, Non tender.  +Bowel sounds,   Extremities: Right BKA, left foot with dressing  Psych:   Not anxious or agitated. Neurologic:  No acute neurological deficit. Vital signs/Intake and Output:  Visit Vitals  BP (!) 147/78   Pulse 75   Temp 97.7 °F (36.5 °C)   Resp 16   Ht 5' 8\" (1.727 m)   Wt 74.8 kg (164 lb 14.5 oz)   SpO2 97%   BMI 25.07 kg/m²     Current Shift:  No intake/output data recorded. Last three shifts:  08/31 1901 - 09/02 0700  In: 1200 [P.O.:1200]  Out: 0             Labs: Results:       Chemistry Recent Labs     09/01/21  0315 08/31/21  0150   * 203*    138   K 4.3 4.7    106   CO2 25 25   BUN 31* 37*   CREA 1.00 1.03   CA 8.6 8.4*   AGAP 7 7   BUCR 31* 36*   * 134*   TP 6.2* 6.0*   ALB 3.4 3.6   GLOB 2.8 2.4   AGRAT 1.2 1.5      CBC w/Diff No results for input(s): WBC, RBC, HGB, HCT, PLT, GRANS, LYMPH, EOS, HGBEXT, HCTEXT, PLTEXT, HGBEXT, HCTEXT, PLTEXT in the last 72 hours. Cardiac Enzymes No results for input(s): CPK, CKND1, CONSTANTINO in the last 72 hours. No lab exists for component: CKRMB, TROIP   Coagulation No results for input(s): PTP, INR, APTT, INREXT, INREXT in the last 72 hours. Lipid Panel No results found for: CHOL, CHOLPOCT, CHOLX, CHLST, CHOLV, 585272, HDL, HDLP, LDL, LDLC, DLDLP, 674479, VLDLC, VLDL, TGLX, TRIGL, TRIGP, TGLPOCT, CHHD, CHHDX   BNP No results for input(s): BNPP in the last 72 hours.    Liver Enzymes Recent Labs     09/01/21  0315   TP 6.2*   ALB 3.4   *      Thyroid Studies No results found for: T4, T3U, TSH, TSHEXT, TSHEXT     Procedures/imaging: see electronic medical records for all procedures/Xrays and details which were not copied into this note but were reviewed prior to creation of Plan

## 2021-09-03 VITALS
SYSTOLIC BLOOD PRESSURE: 117 MMHG | DIASTOLIC BLOOD PRESSURE: 73 MMHG | HEART RATE: 68 BPM | OXYGEN SATURATION: 100 % | HEIGHT: 68 IN | TEMPERATURE: 97.5 F | BODY MASS INDEX: 24.99 KG/M2 | RESPIRATION RATE: 18 BRPM | WEIGHT: 164.9 LBS

## 2021-09-03 PROBLEM — A41.9 SEPSIS (HCC): Status: RESOLVED | Noted: 2021-08-13 | Resolved: 2021-09-03

## 2021-09-03 PROBLEM — M86.9 OSTEOMYELITIS (HCC): Status: RESOLVED | Noted: 2021-04-04 | Resolved: 2021-09-03

## 2021-09-03 PROBLEM — N17.9 AKI (ACUTE KIDNEY INJURY) (HCC): Status: RESOLVED | Noted: 2021-04-04 | Resolved: 2021-09-03

## 2021-09-03 PROBLEM — I96 GANGRENE OF BOTH FEET (HCC): Status: RESOLVED | Noted: 2021-08-11 | Resolved: 2021-09-03

## 2021-09-03 PROBLEM — I96 GANGRENE OF LEFT FOOT (HCC): Status: RESOLVED | Noted: 2021-04-09 | Resolved: 2021-09-03

## 2021-09-03 PROBLEM — R78.81 BACTEREMIA: Status: RESOLVED | Noted: 2021-08-13 | Resolved: 2021-09-03

## 2021-09-03 PROBLEM — R06.00 DYSPNEA: Status: RESOLVED | Noted: 2021-08-16 | Resolved: 2021-09-03

## 2021-09-03 PROBLEM — R91.8 PULMONARY INFILTRATES: Status: RESOLVED | Noted: 2021-08-17 | Resolved: 2021-09-03

## 2021-09-03 PROBLEM — D64.9 ANEMIA: Status: RESOLVED | Noted: 2021-08-12 | Resolved: 2021-09-03

## 2021-09-03 LAB
COVID-19 RAPID TEST, COVR: NOT DETECTED
GLUCOSE BLD STRIP.AUTO-MCNC: 122 MG/DL (ref 70–110)
GLUCOSE BLD STRIP.AUTO-MCNC: 225 MG/DL (ref 70–110)
SOURCE, COVRS: NORMAL

## 2021-09-03 PROCEDURE — 74011250637 HC RX REV CODE- 250/637: Performed by: NURSE PRACTITIONER

## 2021-09-03 PROCEDURE — 74011636637 HC RX REV CODE- 636/637: Performed by: FAMILY MEDICINE

## 2021-09-03 PROCEDURE — 74011250637 HC RX REV CODE- 250/637: Performed by: INTERNAL MEDICINE

## 2021-09-03 PROCEDURE — 82962 GLUCOSE BLOOD TEST: CPT

## 2021-09-03 PROCEDURE — 91303 HC RX REV CODE- 250/636: CPT | Performed by: STUDENT IN AN ORGANIZED HEALTH CARE EDUCATION/TRAINING PROGRAM

## 2021-09-03 PROCEDURE — 74011250636 HC RX REV CODE- 250/636: Performed by: STUDENT IN AN ORGANIZED HEALTH CARE EDUCATION/TRAINING PROGRAM

## 2021-09-03 PROCEDURE — 74011636637 HC RX REV CODE- 636/637: Performed by: STUDENT IN AN ORGANIZED HEALTH CARE EDUCATION/TRAINING PROGRAM

## 2021-09-03 PROCEDURE — 74011250637 HC RX REV CODE- 250/637: Performed by: STUDENT IN AN ORGANIZED HEALTH CARE EDUCATION/TRAINING PROGRAM

## 2021-09-03 PROCEDURE — 87635 SARS-COV-2 COVID-19 AMP PRB: CPT

## 2021-09-03 PROCEDURE — 74011250636 HC RX REV CODE- 250/636: Performed by: HOSPITALIST

## 2021-09-03 PROCEDURE — 74011250637 HC RX REV CODE- 250/637: Performed by: FAMILY MEDICINE

## 2021-09-03 PROCEDURE — 74011250637 HC RX REV CODE- 250/637: Performed by: HOSPITALIST

## 2021-09-03 RX ORDER — INSULIN LISPRO 100 [IU]/ML
INJECTION, SOLUTION INTRAVENOUS; SUBCUTANEOUS
Qty: 1 EACH | Refills: 0 | Status: SHIPPED
Start: 2021-09-03

## 2021-09-03 RX ORDER — FUROSEMIDE 10 MG/ML
20 INJECTION INTRAMUSCULAR; INTRAVENOUS DAILY
Status: DISCONTINUED | OUTPATIENT
Start: 2021-09-03 | End: 2021-09-03

## 2021-09-03 RX ORDER — PANTOPRAZOLE SODIUM 40 MG/1
40 TABLET, DELAYED RELEASE ORAL EVERY 12 HOURS
Qty: 30 TABLET | Refills: 0 | Status: SHIPPED
Start: 2021-09-03

## 2021-09-03 RX ORDER — FUROSEMIDE 10 MG/ML
40 INJECTION INTRAMUSCULAR; INTRAVENOUS ONCE
Status: COMPLETED | OUTPATIENT
Start: 2021-09-03 | End: 2021-09-03

## 2021-09-03 RX ORDER — FUROSEMIDE 20 MG/1
20 TABLET ORAL DAILY
Qty: 30 TABLET | Refills: 0 | Status: SHIPPED
Start: 2021-09-03

## 2021-09-03 RX ORDER — INSULIN GLARGINE 100 [IU]/ML
5 INJECTION, SOLUTION SUBCUTANEOUS
Qty: 1 ML | Refills: 0 | Status: SHIPPED
Start: 2021-09-03

## 2021-09-03 RX ADMIN — POLYETHYLENE GLYCOL 3350 17 G: 17 POWDER, FOR SOLUTION ORAL at 12:24

## 2021-09-03 RX ADMIN — BUPROPION HYDROCHLORIDE 150 MG: 150 TABLET, FILM COATED, EXTENDED RELEASE ORAL at 09:49

## 2021-09-03 RX ADMIN — INSULIN GLARGINE 5 UNITS: 100 INJECTION, SOLUTION SUBCUTANEOUS at 09:49

## 2021-09-03 RX ADMIN — HEPARIN SODIUM 5000 UNITS: 5000 INJECTION INTRAVENOUS; SUBCUTANEOUS at 09:49

## 2021-09-03 RX ADMIN — ASPIRIN 81 MG: 81 TABLET, CHEWABLE ORAL at 09:49

## 2021-09-03 RX ADMIN — FUROSEMIDE 40 MG: 10 INJECTION, SOLUTION INTRAMUSCULAR; INTRAVENOUS at 12:25

## 2021-09-03 RX ADMIN — DOCUSATE SODIUM 50 MG AND SENNOSIDES 8.6 MG 1 TABLET: 8.6; 5 TABLET, FILM COATED ORAL at 12:24

## 2021-09-03 RX ADMIN — INSULIN LISPRO 6 UNITS: 100 INJECTION, SOLUTION INTRAVENOUS; SUBCUTANEOUS at 09:50

## 2021-09-03 RX ADMIN — ACETAMINOPHEN 975 MG: 325 TABLET ORAL at 09:48

## 2021-09-03 RX ADMIN — DIPHENHYDRAMINE HYDROCHLORIDE 25 MG: 25 CAPSULE ORAL at 06:42

## 2021-09-03 RX ADMIN — INSULIN LISPRO 6 UNITS: 100 INJECTION, SOLUTION INTRAVENOUS; SUBCUTANEOUS at 06:41

## 2021-09-03 RX ADMIN — METHOCARBAMOL TABLETS 1000 MG: 500 TABLET, COATED ORAL at 09:49

## 2021-09-03 RX ADMIN — PANTOPRAZOLE SODIUM 40 MG: 40 TABLET, DELAYED RELEASE ORAL at 09:49

## 2021-09-03 RX ADMIN — JNJ-78436735 1 DOSE: 50000000000 SUSPENSION INTRAMUSCULAR at 14:00

## 2021-09-03 RX ADMIN — LOSARTAN POTASSIUM 25 MG: 25 TABLET, FILM COATED ORAL at 09:49

## 2021-09-03 RX ADMIN — METOPROLOL SUCCINATE 25 MG: 25 TABLET, EXTENDED RELEASE ORAL at 09:49

## 2021-09-03 RX ADMIN — MAGNESIUM OXIDE TAB 400 MG (241.3 MG ELEMENTAL MG) 400 MG: 400 (241.3 MG) TAB at 09:49

## 2021-09-03 NOTE — PROGRESS NOTES
Pt refused PT due to:    [x]  Anxiety   [x]  Prepping for D/c  []  Pain  []  Pt lethargic  []  Off Unit  Will f/u if D/c is held. Thank you.   Eloy Forte, PTA

## 2021-09-03 NOTE — PROGRESS NOTES
Palliative Medicine    CODE STATUS: FULL CODE     AMD Status: AMD on file naming her cousin, Roselle Brittle as her MPOA     9/3/2021 0900 Seen today in room 348. She was sleeping comfortably and was not awakened. Has plans for transfer to SNF today for rehab    Disposition plan: SNF    After meeting with patient and her cousin, Roselle Brittle, the goals of care have been defined. Code status remains: FULL CODE  AMD status: completed naming Roselle Brittle as MPOA Will sign off but remain available for reconsult as needed/if appropriate.      Thank you for the Palliative Medicine consult and allowing us to participate in the care of Reyes Foley, RN, MSN  Palliative Medicine  P: [phone]

## 2021-09-03 NOTE — PROGRESS NOTES
Addendum to previous care manager note today - this care manager contacted [facility] to verify they have all info they need for patient to go at 1400; they have added a request to obtain a rapid covid and this request has been relayed to staff to collect as soon as possible.

## 2021-09-03 NOTE — PROGRESS NOTES
Wound care consulted to evaluate patient's left foot wound and right BKA dressing. Wound care evaluated left foot wound deferring right BKA wound to surgeon. In preparation of patient;s discharge, PCR sent to lab. Covid VAC arranged per protocol. Andrea Conte to schedule administration of vaccine prior to patient's discharge.

## 2021-09-03 NOTE — WOUND CARE
IP WOUND CONSULT    Pierre Simms  MEDICAL RECORD NUMBER:  [id number]  AGE: 46 y.o. GENDER: female  : 1970  TODAY'S DATE:  9/3/2021    GENERAL     [] Follow-up   [x] New Consult    Pierre Simms is a 46 y.o. female referred by:   [] Physician  [x] Nursing  [] Other:         PAST MEDICAL HISTORY    Past Medical History:   Diagnosis Date    PAD (peripheral artery disease) ([address]         PAST SURGICAL HISTORY    Past Surgical History:   Procedure Laterality Date    HX ORTHOPAEDIC      L TMT amputation       FAMILY HISTORY    Family History   Problem Relation Age of Onset    Diabetes Mother        SOCIAL HISTORY    Social History     Tobacco Use    Smoking status: Current Every Day Smoker     Packs/day: 1.50    Smokeless tobacco: Never Used   Substance Use Topics    Alcohol use: Not Currently     Comment: on rare occasion    Drug use: Not Currently       ALLERGIES    No Known Allergies    MEDICATIONS    No current facility-administered medications on file prior to encounter. Current Outpatient Medications on File Prior to Encounter   Medication Sig Dispense Refill    collagenase (SantyL) 250 unit/gram ointment Apply  to affected area daily. Indications: bilat feet      ALPRAZolam (XANAX) 0.5 mg tablet Take 0.5 mg by mouth nightly as needed for Sleep.  pregabalin (LYRICA) 300 mg capsule Take 300 mg by mouth two (2) times a day. Indications: diabetic complication causing injury to some body nerves      metFORMIN (GLUCOPHAGE) 1,000 mg tablet Take 1,000 mg by mouth two (2) times daily (with meals). Indications: type 2 diabetes mellitus      buPROPion SR (WELLBUTRIN SR) 150 mg SR tablet Take 150 mg by mouth daily.  DULoxetine (CYMBALTA) 60 mg capsule Take 60 mg by mouth daily.  losartan (COZAAR) 50 mg tablet Take 50 mg by mouth daily.  ferrous sulfate 324 mg (65 mg iron) tablet Take 324 mg by mouth two (2) times daily (with meals).       glipiZIDE SR (GLUCOTROL XL) 5 mg CR tablet Take 5 mg by mouth daily.  aspirin 81 mg chewable tablet Take 1 Tab by mouth daily. 30 Tab 5    atorvastatin (LIPITOR) 40 mg tablet Take 1 Tab by mouth daily. 30 Tab 5    clopidogreL (PLAVIX) 75 mg tab Take 1 Tab by mouth daily. 30 Tab 5    sodium chloride (Normal Saline Flush) 10-40 mL by IntraVENous route daily.  heparin sod,porcine/0.9 % NaCl (HEPARIN FLUSH IV) 10 Units/mL by IntraVENous route daily.  acetaminophen (TYLENOL) 500 mg tablet Take 1,000 mg by mouth every six (6) hours as needed for Fever or Pain.  losartan (COZAAR) 25 mg tablet Take 1 Tab by mouth daily. 30 Tab 5    metFORMIN (GLUCOPHAGE) 500 mg tablet Take 1 Tab by mouth two (2) times daily (with meals). 60 Tab 0       Wt Readings from Last 3 Encounters:   09/01/21 74.8 kg (164 lb 14.5 oz)   04/09/21 79 kg (174 lb 1.6 oz)   04/06/21 70.9 kg (156 lb 4.8 oz)       [unfilled]  Visit Vitals  /70   Pulse 68   Temp 97.4 °F (36.3 °C)   Resp 18   Ht 5' 8\" (1.727 m)   Wt 74.8 kg (164 lb 14.5 oz)   LMP 04/06/2018 (Within Years)   SpO2 100%   BMI 25.07 kg/m²       ASSESSMENT     Skin impairment Identification:  Type: arterial    Contributing Factors: diabetes, poor glucose control, decreased mobility, arterial insufficiency, malnutrition and non-adherence    Wound Foot Anterior; Left (Active)   Wound Image   09/03/21 1026   Wound Etiology Arterial 09/03/21 1026   Dressing Status Intact 09/03/21 1026   Cleansed Wound cleanser 09/03/21 1026   Dressing/Treatment Ace wrap;Cast padding 09/03/21 1026   Offloading for Diabetic Foot Ulcers Refused 08/29/21 2143   Wound Length (cm) 3.5 cm 09/03/21 1026   Wound Width (cm) 3.2 cm 09/03/21 1026   Wound Surface Area (cm^2) 11.2 cm^2 09/03/21 1026   Wound Assessment Eschar dry 09/03/21 1026   Drainage Amount Small 09/03/21 1026   Drainage Description Sanguineous 09/03/21 1026   Wound Odor Mild 09/03/21 1026   Mary-Wound/Incision Assessment Fragile; Intact 09/03/21 1026   Edges Undefined edges 08/16/21 1600   Number of days: 22       Wound Foot Left;Dorsal (Active)   Wound Image   09/03/21 1026   Wound Etiology Arterial 09/03/21 1026   Dressing Status Intact 09/03/21 1026   Cleansed Wound cleanser 09/03/21 1026   Dressing/Treatment Ace wrap;Cast padding 09/03/21 1026   Wound Length (cm) 4.5 cm 09/03/21 1026   Wound Width (cm) 4.2 cm 09/03/21 1026   Wound Depth (cm) 0.3 cm 09/03/21 1026   Wound Surface Area (cm^2) 18.9 cm^2 09/03/21 1026   Wound Volume (cm^3) 5.67 cm^3 09/03/21 1026   Wound Assessment Exposed Structure Tendon 09/03/21 1026   Drainage Amount Small 09/03/21 1026   Drainage Description Serosanguinous 09/03/21 1026   Wound Odor None 09/03/21 1026   Mary-Wound/Incision Assessment Fragile; Intact 09/03/21 1026   Number of days: 0       Incision 08/19/21 Leg Right (Active)   Dressing Status Clean;Dry; Intact 08/29/21 2143   Cleansed Cleansed with saline 08/19/21 1637   Dressing/Treatment Other (Comment) 08/26/21 1513   Margins Approximated 08/19/21 1637   Wound Odor None 08/26/21 1513   Number of days: 15       [REMOVED] Wound Pretibial Proximal;Right; Anterior ABRASION (Removed)   Number of days: 16       [REMOVED] Wound Ankle Anterior;Right RIGHT ANTERIOR FOOT ABRASION. (Removed)   Number of days: 16       [REMOVED] Wound Foot Medial;Left 04/09/21 (Removed)   Number of days: 11       [REMOVED] Wound Foot Anterior;Right (Removed)   Wound Etiology Surgical 08/19/21 0015   Dressing Status Clean;Dry; Intact 08/20/21 2345   Cleansed Cleansed with saline 08/16/21 1600   Dressing/Treatment Xeroform;Gauze dressing/dressing sponge; Other (Comment) 08/16/21 1600   Offloading for Diabetic Foot Ulcers Yes (type); Other (comment) 08/16/21 1600   Wound Assessment Pink/red; Exposed Structure Bone 08/16/21 1600   Drainage Amount Small 08/16/21 1600   Drainage Description Sanguineous; Serosanguinous 08/16/21 1600   Wound Odor None 08/16/21 1600   Mary-Wound/Incision Assessment Intact 08/16/21 1600   Number of days: 8       [REMOVED] Incision 04/06/21 Foot Left (Removed)   Number of days: 14       [REMOVED] Incision 04/12/21 Foot Left (Removed)   Dressing Status Clean;Dry; Intact 08/20/21 0450   Number of days: 121       [REMOVED] Incision 08/12/21 Leg lower Right (Removed)   Dressing Status Clean;Dry; Intact 08/16/21 0524   Dressing/Treatment ABD pad; Ace wrap;Gauze dressing/dressing sponge 08/12/21 2110   Number of days: 4          PLAN     Skin Care & Pressure Relief Recommendations  N/A    Physician/Provider notified: No:   Recommendations:ask to see by staff nurse. Assessment completed and dressing applied per vascular orders.      Teaching completed with:   [x] Patient           [] Family member       [] Caregiver          [] Nursing  [] Other    Patient/Caregiver Teaching:  Level of patient/caregiver understanding able to:   [] Indicates understanding       [x] Needs reinforcement  [x] Unsuccessful      [] Verbal Understanding  [] Demonstrated understanding       [] No evidence of learning  [] Refused teaching         [] N/A       Electronically signed by Lesa Durbin RN on 9/3/2021 at 10:30 AM

## 2021-09-03 NOTE — DISCHARGE SUMMARY
Discharge Summary    Patient: José Miguel Christian MRN: [id number]  CSN: [id number]    YOB: 1970  Age: 46 y.o. Sex: female    DOA: 8/11/2021 LOS:  LOS: 23 days   Discharge Date:      Primary Care Provider:  Onelia Sapp MD    Admission Diagnoses: Gangrene of both feet ([facility] [place] [facility] Heather Abreu  Diabetes ([place] [address] [E11.9]    Discharge Diagnoses:    Problem List as of 9/3/2021 Date Reviewed: 8/12/2021        Codes Class Noted - Resolved    Debility ICD-10-CM: R53.81  ICD-9-CM: 799.3  Unknown - Present        Systolic CHF, acute ([place] [address].) ICD-10-CM: I50.21  ICD-9-CM: 428.21, 428.0  8/17/2021 - Present        Status post below-knee amputation of right lower extremity ([place] [address].) ICD-10-CM: Z89.511  ICD-9-CM: V49.75  8/13/2021 - Present        Fall ICD-10-CM: W19. Luis Doom  ICD-9-CM: E888.9  8/12/2021 - Present        Tobacco use ICD-10-CM: Z72.0  ICD-9-CM: 305.1  8/12/2021 - Present        Non compliance with medical treatment ICD-10-CM: Z91.19  ICD-9-CM: V15.81  4/12/2021 - Present        Bilateral carotid artery stenosis ICD-10-CM: I65.23  ICD-9-CM: 433.10, 433.30  4/5/2021 - Present        Type 2 diabetes mellitus with left diabetic foot ulcer ([facility] [place] [facility] ICD-10-CM: E11.621, L97.529  ICD-9-CM: 250.80, 707.15  4/4/2021 - Present        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 ([facility] [place] [facility] ICD-10-CM: E11.52  ICD-9-CM: 250.70, 443.81, 785.4  4/4/2021 - Present        PAD (peripheral artery disease) ([address] ICD-10-CM: I73.9  ICD-9-CM: 443.9  4/4/2021 - Present        RESOLVED: Pulmonary infiltrates ICD-10-CM: R91.8  ICD-9-CM: 793.19  8/17/2021 - 9/3/2021        RESOLVED: Dyspnea ICD-10-CM: R06.00  ICD-9-CM: 786.09  8/16/2021 - 9/3/2021        RESOLVED: Sepsis ([facility] [place] [facility] ICD-10-CM: A41.9  ICD-9-CM: 038.9, 995.91  8/13/2021 - 9/3/2021        RESOLVED: Bacteremia ICD-10-CM: R78.81  ICD-9-CM: 790.7  8/13/2021 - 9/3/2021        RESOLVED: Hypotension ICD-10-CM: I95.9  ICD-9-CM: 458.9  8/13/2021 - 8/16/2021 RESOLVED: Anemia ICD-10-CM: D64.9  ICD-9-CM: 285.9  8/12/2021 - 9/3/2021        * (Principal) RESOLVED: Gangrene of both feet ([facility] ICD-10-CM: U00  ICD-9-CM: 785.4  8/11/2021 - 9/3/2021        RESOLVED: Gangrene of left foot ([facility] ICD-10-CM: T69  ICD-9-CM: 785.4  4/9/2021 - 9/3/2021        RESOLVED: Osteomyelitis ([facility] ICD-10-CM: M86.9  ICD-9-CM: 730.20  4/4/2021 - 9/3/2021        RESOLVED: YUNIEL (acute kidney injury) ([facility] ICD-10-CM: N17.9  ICD-9-CM: 584.9  4/4/2021 - 9/3/2021              Discharge Medications:     Current Discharge Medication List      START taking these medications    Details   insulin glargine (LANTUS) 100 unit/mL injection 5 Units by SubCUTAneous route nightly. Qty: 1 mL, Refills: 0  Start date: 9/3/2021      insulin lispro (HUMALOG) 100 unit/mL injection Very Insulin Resistant  For Blood Sugar (mg/dL) of:              Less than 150 =   0 units  150 -199 =   3 units  200 -249 =   6 units  250 -299 =   9 units  300 -349 =   12 units  350 and above =   15 units  Initiate Hypoglycemic protocol if blood glucose is <70 mg/dL. INITIATE INSULIN CORRECTIVE PROTOCOL: Fast Acting - Administer Immediately - or within 15 minutes of start of meal, if mealtime coverage. Qty: 1 Each, Refills: 0  Start date: 9/3/2021      pantoprazole (PROTONIX) 40 mg tablet Take 1 Tablet by mouth every twelve (12) hours every twelve (12) hours. Qty: 30 Tablet, Refills: 0  Start date: 9/3/2021      furosemide (Lasix) 20 mg tablet Take 1 Tablet by mouth daily. Qty: 30 Tablet, Refills: 0  Start date: 9/3/2021         CONTINUE these medications which have NOT CHANGED    Details   collagenase (SantyL) 250 unit/gram ointment Apply  to affected area daily. Indications: bilat feet      buPROPion SR (WELLBUTRIN SR) 150 mg SR tablet Take 150 mg by mouth daily. DULoxetine (CYMBALTA) 60 mg capsule Take 60 mg by mouth daily. aspirin 81 mg chewable tablet Take 1 Tab by mouth daily.   Qty: 30 Tab, Refills: 5      atorvastatin (LIPITOR) 40 mg tablet Take 1 Tab by mouth daily. Qty: 30 Tab, Refills: 5      acetaminophen (TYLENOL) 500 mg tablet Take 1,000 mg by mouth every six (6) hours as needed for Fever or Pain.      losartan (COZAAR) 25 mg tablet Take 1 Tab by mouth daily. Qty: 30 Tab, Refills: 5         STOP taking these medications       ALPRAZolam (XANAX) 0.5 mg tablet Comments:   Reason for Stopping:         pregabalin (LYRICA) 300 mg capsule Comments:   Reason for Stopping:         metFORMIN (GLUCOPHAGE) 1,000 mg tablet Comments:   Reason for Stopping:         ferrous sulfate 324 mg (65 mg iron) tablet Comments:   Reason for Stopping:         glipiZIDE SR (GLUCOTROL XL) 5 mg CR tablet Comments:   Reason for Stopping:         clopidogreL (PLAVIX) 75 mg tab Comments:   Reason for Stopping:         sodium chloride (Normal Saline Flush) Comments:   Reason for Stopping:         heparin sod,porcine/0.9 % NaCl (HEPARIN FLUSH IV) Comments:   Reason for Stopping:         metFORMIN (GLUCOPHAGE) 500 mg tablet Comments:   Reason for Stopping:               Discharge Condition: Good    Procedures : S/p right BKA on 8/12   S/P RIGHT BELOW KNEE AMPUTATION, DRESSING CHANGE TO LEFT FOOT WOUND 08/20    Consults: Cardiology, Gastroenterology, Infectious Disease, Nephrology, Psychiatry, Pulmonary/Critical Care, Vascular Surgery and podiatry      PHYSICAL EXAM   Visit Vitals  /70   Pulse 68   Temp 97.4 °F (36.3 °C)   Resp 18   Ht 5' 8\" (1.727 m)   Wt 74.8 kg (164 lb 14.5 oz)   SpO2 100%   BMI 25.07 kg/m²     General: Awake, un cooperative    HEENT: NC, Atraumatic. PERRLA, EOMI. Anicteric sclerae. Lungs:  CTA Bilaterally. No Wheezing/Rhonchi/Rales. Heart:  Regular  rhythm,  No murmur, No Rubs, No Gallops  Abdomen: Soft, Non distended, Non tender. +Bowel sounds,   Extremities: Right BKA, left foot with dressing. Psych:   Not anxious or agitated. Neurologic:  No acute neurological deficits.                                      Admission HPI : Anam Quiñones is a 46 y.o. female with uncontrolled type 2 diabetes mellitus, severe peripheral arterial disease, and tobacco use who has been noncompliant with wound care treatment due to recent fall presents to the ED for fever tonight. She notes that her feet have become gangrenous over the last few days. She fell 3 weeks ago, which has left her almost bedbound. She has been using a bucket to go to the bathroom. She has not been able to go to her hyperbaric appointments or wound care appointments due to this. She denies any shortness of breath or chest pain. She has no prior history of heart attack. She denies any cough, nausea, vomiting, or diarrhea. Hospital Course :   Ms. Ashley Flores was initially admitted to monitored floor, later she required transfer to intensive care unit for shortness of breath, hypoxia metabolic acidosis. Once her condition improved she was then moved back to monitored floor. She was seen and followed by cardiology, pulmonary critical care, vascular surgery, nephrology, gastroenterology, infectious disease, psychiatry. Patient has been noncompliant even during hospitalization. On multiple occasions she has been refusing medications. Psychiatry was consulted for her behavior. Her refusal of taking medication resulted in worsening of her condition that required transfer to ICU. After discussion with her MPOA Ms. Hira Serrano, patient has started taking medication. At one point we had discussion with patient and her MPOA that if she does not want any medication she can pursue with hospice. Bilateral feet gangrene-  Seen and followed by vascular. S/p right BKA on 8/12   S/P RIGHT BELOW KNEE AMPUTATION, DRESSING CHANGE TO LEFT FOOT WOUND 08/20 she will follow-up with vascular surgery as outpatient. Rajesh Rothman local wound care of kerlex and ace bandage to right bka stump.  acuzyme wound care to left foot dorsal wound. Acute on chronic systolic CHF-  Seen by cardiology. Echocardiogram showed EF of 45%, mild pulmonary hypertension. She was initially started on Lasix and later it was on hold due to her acute kidney injury. At discharge will discharge on low-dose Lasix. Recommend follow-up with cardiology as outpatient. She is started on losartan and Toprol-XL per ID. Acute kidney injury-  Secondary to noncompliance with the medication and treatment. She also developed metabolic acidosis. Nephrology was consulted after she was given fluids her renal function now back to normal range. Hematemesis-  She had episode of coffee-ground emesis. GI consulted recommended EGD she refused EGD. GI recommended PPI. Anemia-  She received blood transfusion with improvement in her H&H. Her H&H stable. Bacteremia-  Her blood cultures growing Peptoniphilus asaccharolyticus. Follow up blood cultures negative  Wound cultures growing providencia, MSSA, E-fecalis, alcaligenes fecalis. Seen and followed by infectious disease, she received antibiotics per infectious disease and completed the course of antibiotics. Activity: Activity as tolerated    Diet: Diabetic Diet    Follow-up: PCP, vascular, cardiology    Disposition: Rehab    Minutes spent on discharge: 65       Labs: Results:       Chemistry Recent Labs     09/01/21  0315   *      K 4.3      CO2 25   BUN 31*   CREA 1.00   CA 8.6   AGAP 7   BUCR 31*   *   TP 6.2*   ALB 3.4   GLOB 2.8   AGRAT 1.2      CBC w/Diff No results for input(s): WBC, RBC, HGB, HCT, PLT, GRANS, LYMPH, EOS, HGBEXT, HCTEXT, PLTEXT, HGBEXT, HCTEXT, PLTEXT in the last 72 hours. Cardiac Enzymes No results for input(s): CPK, CKND1, CONSTANTINO in the last 72 hours. No lab exists for component: CKRMB, TROIP   Coagulation No results for input(s): PTP, INR, APTT, INREXT, INREXT in the last 72 hours.     Lipid Panel No results found for: CHOL, CHOLPOCT, CHOLX, CHLST, CHOLV, 121027, HDL, HDLP, LDL, LDLC, DLDLP, 815977, VLDLC, VLDL, TGLX, TRIGL, TRIGP, TGLPOCT, CHHD, CHHDX   BNP No results for input(s): BNPP in the last 72 hours. Liver Enzymes Recent Labs     09/01/21  0315   TP 6.2*   ALB 3.4   *      Thyroid Studies No results found for: T4, T3U, TSH, TSHEXT, TSHEXT         Significant Diagnostic Studies: XR FOOT LT AP/LAT    Result Date: 8/14/2021  EXAM: LEFT FOOT HISTORY: Infection. COMPARISON: None. TECHNIQUE: 2 views left foot _______________ FINDINGS: No evidence of fracture. Calcaneal enthesopathy. Transmetatarsal amputation. Broad-based the soft tissue ulceration along dorsum of hindfoot. No bony destructive process. _______________     Broad deep ulceration along dorsal hindfoot. No bony destructive process. Transmetatarsal amputation. Plantar calcaneal enthesopathy. CTA CHEST W OR W WO CONT    Result Date: 8/17/2021  Chest CT/CT pulmonary angiogram:  CLINICAL HISTORY: Shortness of breath, chest pain. COMPARISON EXAMINATIONS: Chest film 8/16/2021. TECHNIQUE: Thin section CT was performed through the chest during pulmonary arterial enhancement. Orthogonal MIP angiographic reconstructions were generated to increase sensitivity in the detection of pulmonary emboli. One or more dose reduction techniques were used on this CT: automated exposure control, adjustment of the mAs and/or kVp according to patient size, and iterative reconstruction techniques. The specific techniques used on this CT exam have been documented in the patient's electronic medical record. Digital Imaging and Communications in Medicine (DICOM) format image data are available to nonaffiliated external healthcare facilities or entities on a secure, media free, reciprocally searchable basis with patient authorization for at least a 12-month period after this study. ---  PULMONARY CT ANGIOGRAM  --- PULMONARY VASCULATURE: No convincing evidence of pulmonary arterial filling defect. Exam quality: Overall exam quality is reasonable.   Pulmonary arterial enhancement is noted with less than optimal breath hold and some mild lower lobe respiratory motion artifact . THORACIC AORTA: Low density enhancement with some calcified plaque but no dissection or aneurysm. ---  CT CHEST --- LUNG PARENCHYMA:   Multifocal areas of groundglass density including a moderately prominent peripheral left upper lobe area of groundglass density. There is bilateral left greater than right lower lobe likely compressive atelectasis. Troy Rich PLEURAL SPACES:   Bilateral moderate pleural effusions which appear free-flowing. MEDIASTINUM:   Moderately prominent coronary arterial calcifications of the LAD and to a lesser extent of the circumflex and right coronary proximally. . OSSEOUS STRUCTURES:   Mild thoracic spondylosis. UPPER ABDOMEN:  There is some free fluid in the upper abdomen surrounding the liver dome. --------------    -------------- 1. No convincing evidence of pulmonary thromboembolic disease. 2. Areas of multifocal upper lobe groundglass density. This could be focal pulmonary edema but infection including Covid pneumonia is difficult to exclude. 3. Bilateral pleural effusions. Previous chest film suggested congestive heart failure. Bilateral lower lobe compressive atelectasis. 4. Coronary artery calcifications are moderately prominent consistent with coronary artery disease. 5. Free fluid in the upper abdomen. XR CHEST PORT    Result Date: 8/18/2021  EXAM: XR CHEST PORT CLINICAL INDICATION/HISTORY: Dyspnea -Additional: None COMPARISON: CT one day prior TECHNIQUE: Portable frontal view of the chest _______________ FINDINGS: SUPPORT DEVICES: None. HEART AND MEDIASTINUM: Cardiomediastinal silhouette within normal limits. LUNGS AND PLEURAL SPACES: Right pleural effusion. Bilateral parenchymal opacities, predominantly within the left upper lobe. No pneumothorax. _______________     Right pleural effusion. Bilateral parenchymal opacities, predominantly within the left upper lobe.      XR CHEST PORT    Result Date: 8/16/2021  EXAM:  AP Portable Chest X-ray 1 view INDICATION: Shortness of breath COMPARISON: August 14, 2021 _______________ FINDINGS:  Heart and mediastinal contours are within normal limits for portable radiograph. There are diffuse bilateral alveolar and interstitial infiltrates suggesting pneumonia and/or pulmonary edema. There are small to moderate bilateral pleural effusions. No acute osseous findings. ________________      Small to moderate bilateral effusions with bilateral interstitial and alveolar infiltrates new from prior exam suggesting pneumonia and/or pulmonary edema. XR CHEST PORT    Result Date: 8/14/2021  EXAM: PORTABLE CHEST HISTORY: Cough. COMPARISON: 8/11/2021 TECHNIQUE: Single portable view. _______________ FINDINGS: SUPPORT DEVICES: None HEART AND MEDIASTINUM: Cardiac size is normal. Mediastinal contours are normal. Normal pulmonary vasculature. LUNGS AND PLEURAL SPACES: Patchy airspace disease right base likely developing pneumonia. Small atelectatic streak left base. BONES AND SOFT TISSUES: Bony structures are normal. _______________     Patchy airspace disease right base very likely developing pneumonia. Small atelectatic streak left base. XR CHEST PORT    Result Date: 8/12/2021  EXAM: XR CHEST PORT CLINICAL INDICATION/HISTORY: Sepsis   > Additional: None. COMPARISON: April 5, 2021 TECHNIQUE: Portable chest _______________ FINDINGS: SUPPORT DEVICES: None. HEART AND MEDIASTINUM: Normal size and contour. Normal pulmonary vasculature. LUNGS AND PLEURAL SPACES: The lungs are well expanded and clear. No focal consolidation, effusion, or pneumothorax. BONY THORAX AND SOFT TISSUES: No acute osseous abnormality. _______________     No active cardiopulmonary disease. ECHO ADULT COMPLETE    Result Date: 8/13/2021  · Image quality for this study was suboptimal. Contrast used: DEFINITY. · LV: Estimated LVEF is 45 - 50%. Normal cavity size and diastolic function.  Mild concentric hypertrophy. E/E' ratio is 13.34. · LA: Mildly dilated left atrium. · RV: Mildly dilated right ventricle. Normal global systolic function. Assessment of RV function: TAPSE=16mm. · AV: Moderate aortic valve leaflet calcification present. · MV: Mitral valve non-specific thickening. Mitral valve leaflet calcification without reduced excursion. Mild mitral valve regurgitation is present. · TV: Mild tricuspid valve regurgitation is present. · PA: Mild pulmonary hypertension. Pulmonary arterial systolic pressure is 35 mmHg. ECHO ADULT FOLLOW-UP OR LIMITED    Result Date: 8/17/2021  · Test was terminated early due to patient becoming combative. · Left Ventricle: Normal cavity size. Mild concentric hypertrophy. The estimated EF is 40 - 45%. Mildly reduced systolic function. There is inconclusive left ventricular diastolic function E'E= 12.83. Wall Scoring: The left ventricular wall motion is globally hypokinetic. · Pulmonary Artery: Pulmonary arteries not well visualized. Pulmonary arterial systolic pressure (PASP) is 35 mmHg. Pulmonary hypertension found to be mild. DUPLEX LOWER EXT ARTERY BILAT    Result Date: 8/13/2021  · Right proximal to mid superficial femoral artery is occluded · Distal superficial femoral artery is reconstituted by the deeper vessel. · Monophasic Doppler waveforms in the left mid to distal superficial femoral, proximal popliteal, anterior tibial and peroneal artery. No results found for this or any previous visit. Please note that this dictation was completed with GuestD[employer]en, the computer voice recognition software. Quite often unanticipated grammatical, syntax, homophones, and other interpretive errors are inadvertently transcribed by the computer software. Please disregard these errors. Please excuse any errors that have escaped final proofreading.      CC: Margaret Adorno MD

## 2021-09-03 NOTE — PROGRESS NOTES
Transition of Care Plan: [facility] today via [facility] @ 2:00 pm    Communication to Patient/Family:  Met with patient and family, and they are agreeable to the transition plan. The Plan for Transition of Care is related to the following treatment goals: Gangrene of both feet    The Patient and/or patient representative, cousin Cyrus Hdez, was provided with a choice of provider and agrees with the discharge plan. Yes [x] No []    Freedom of choice list was provided with basic dialogue that supports the patient's individualized plan of care/goals and shares the quality data associated with the providers. Yes [x] No []    SNF/Rehab Transition:  Patient has been accepted to [facility] at [facility], [address] [place] [address] for SNF and meets criteria for admission. Patient will transported by [facility] and expected to leave at [facility] Communication to SNF/Rehab:  Bedside RN,  has been notified to update the transition plan to the facility and call report [phone]. Discharge information has been updated on the AVS and communicated through [facility] or [facility]. Discharge instructions to be fax'd to facility at [phone]     Please include all hard scripts for controlled substances, med rec and dc summary, and AVS in packet. Please medicate for pain prior to dc if possible and needed to help offset delay when patient first arrives to facility. Reviewed and confirmed with facility, [facility] can manage the patient care needs for the following:     Shawna Meng with (X) only those applicable:  Medication:  [x]Medications are available at the facility  []IV Antibiotics    []Controlled Substance  hard copies available sent.   []Weekly Labs    Equipment:  []CPAP/BiPAP  []Wound Vacuum  []Hill or Urinary Device  []PICC/Central Line  []Nebulizer  []Ventilator    Treatment:  []Isolation (for MRSA, VRE, etc.)  []Surgical Drain Management  []Tracheostomy Care  [x]Dressing Changes  []Dialysis with transportation  []PEG Care  []Oxygen  []Daily Weights for Heart Failure    Dietary:  [x]Any diet limitations  []Tube Feedings   []Total Parenteral Management (TPN)    Financial Resources:  []Medicaid Application Completed    [x]UAI Completed and copy given to pt/family. []A screening has previously been completed. []Level II Completed    [] Private pay individual who will not become   financially eligible for Medicaid within 6 months from admission to a [address] [place] [address] facility. [] Individual refused to have screening conducted. []Medicaid Application Completed    []The screening denied because it was determined individual did not need/did not qualify for nursing facility level of care. [] Out of state residents seeking direct admission to a [address] facility. [] Individuals who are inpatients of an out of state hospital, or in state or out of state veterans/ hospital and seek direct admission to a [address] facility  [] Individuals who are pateints or residents of a state owned/operated facility that is licensed by [employer] and seek direct admission to [address] [place] [address]  [] A screening not required for enrollment in [address] [place] [address] [address] services as set out in [address] [place] [address] 12-  [] [facility]) staff shall perform screenings of the [facility] clients. Advanced Care Plan:  []Surrogate Decision Maker of Care  []POA  []Communicated Code Status and copy sent. Other:         Care Management Interventions  PCP Verified by CM:  Yes  Mode of Transport at Discharge: Self  Transition of Care Consult (CM Consult): Discharge Planning  Current Support Network: Own Home (takes care of mother at home)  The Plan for Transition of Care is Related to the Following Treatment Goals : gangrene of both feet, diabetes  The Patient and/or Patient Representative was Provided with a Choice of Provider and Agrees with the Discharge Plan?: Yes  Name of the Patient Representative Who was Provided with a Choice of Provider and Agrees with the Discharge Plan: Viri Otero  Freedom of Choice List was Provided with Basic Dialogue that Supports the Patient's Individualized Plan of Care/Goals, Treatment Preferences and Shares the Quality Data Associated with the Providers?: Yes  Discharge Location  Discharge Placement: Skilled nursing facility

## 2021-09-04 LAB — SARS-COV-2, NAA: NOT DETECTED

## 2021-09-08 ENCOUNTER — HOSPITAL ENCOUNTER (OUTPATIENT)
Dept: LAB | Age: 51
Discharge: HOME | End: 2021-09-08

## 2021-09-08 PROCEDURE — U0003 INFECTIOUS AGENT DETECTION BY NUCLEIC ACID (DNA OR RNA); SEVERE ACUTE RESPIRATORY SYNDROME CORONAVIRUS 2 (SARS-COV-2) (CORONAVIRUS DISEASE [COVID-19]), AMPLIFIED PROBE TECHNIQUE, MAKING USE OF HIGH THROUGHPUT TECHNOLOGIES AS DESCRIBED BY CMS-2020-01-R: HCPCS

## 2021-09-09 LAB — SARS-COV-2, NAA: NOT DETECTED

## 2021-09-10 ENCOUNTER — HOSPITAL ENCOUNTER (OUTPATIENT)
Dept: LAB | Age: 51
Discharge: HOME | End: 2021-09-10
Payer: COMMERCIAL

## 2021-09-10 LAB
ALBUMIN SERPL-MCNC: 3.2 G/DL (ref 3.4–5)
ALBUMIN/GLOB SERPL: 1 {RATIO} (ref 0.8–1.7)
ALP SERPL-CCNC: 152 U/L (ref 45–117)
ALT SERPL-CCNC: 21 U/L (ref 13–56)
ANION GAP SERPL CALC-SCNC: 10 MMOL/L (ref 3–18)
AST SERPL-CCNC: 15 U/L (ref 10–38)
BASOPHILS # BLD: 0 K/UL (ref 0–0.1)
BASOPHILS NFR BLD: 1 % (ref 0–2)
BILIRUB SERPL-MCNC: 0.9 MG/DL (ref 0.2–1)
BNP SERPL-MCNC: ABNORMAL PG/ML (ref 0–900)
BUN SERPL-MCNC: 14 MG/DL (ref 7–18)
BUN/CREAT SERPL: 15 (ref 12–20)
CALCIUM SERPL-MCNC: 8.6 MG/DL (ref 8.5–10.1)
CHLORIDE SERPL-SCNC: 108 MMOL/L (ref 100–111)
CO2 SERPL-SCNC: 23 MMOL/L (ref 21–32)
CREAT SERPL-MCNC: 0.96 MG/DL (ref 0.6–1.3)
DIFFERENTIAL METHOD BLD: ABNORMAL
EOSINOPHIL # BLD: 0.1 K/UL (ref 0–0.4)
EOSINOPHIL NFR BLD: 3 % (ref 0–5)
ERYTHROCYTE [DISTWIDTH] IN BLOOD BY AUTOMATED COUNT: 18.4 % (ref 11.6–14.5)
GLOBULIN SER CALC-MCNC: 3.2 G/DL (ref 2–4)
GLUCOSE SERPL-MCNC: 115 MG/DL (ref 74–99)
HCT VFR BLD AUTO: 28.4 % (ref 35–45)
HGB BLD-MCNC: 8.7 G/DL (ref 12–16)
LYMPHOCYTES # BLD: 0.8 K/UL (ref 0.9–3.6)
LYMPHOCYTES NFR BLD: 17 % (ref 21–52)
MAGNESIUM SERPL-MCNC: 1.8 MG/DL (ref 1.6–2.6)
MCH RBC QN AUTO: 28.6 PG (ref 24–34)
MCHC RBC AUTO-ENTMCNC: 30.6 G/DL (ref 31–37)
MCV RBC AUTO: 93.4 FL (ref 78–100)
MONOCYTES # BLD: 0.5 K/UL (ref 0.05–1.2)
MONOCYTES NFR BLD: 10 % (ref 3–10)
NEUTS SEG # BLD: 3.4 K/UL (ref 1.8–8)
NEUTS SEG NFR BLD: 69 % (ref 40–73)
PLATELET # BLD AUTO: 203 K/UL (ref 135–420)
PLATELET COMMENTS,PCOM: ABNORMAL
PMV BLD AUTO: 10.6 FL (ref 9.2–11.8)
POTASSIUM SERPL-SCNC: 4 MMOL/L (ref 3.5–5.5)
PROT SERPL-MCNC: 6.4 G/DL (ref 6.4–8.2)
RBC # BLD AUTO: 3.04 M/UL (ref 4.2–5.3)
RBC MORPH BLD: ABNORMAL
SODIUM SERPL-SCNC: 141 MMOL/L (ref 136–145)
WBC # BLD AUTO: 4.8 K/UL (ref 4.6–13.2)

## 2021-09-10 PROCEDURE — 80053 COMPREHEN METABOLIC PANEL: CPT

## 2021-09-10 PROCEDURE — 85025 COMPLETE CBC W/AUTO DIFF WBC: CPT

## 2021-09-10 PROCEDURE — 83880 ASSAY OF NATRIURETIC PEPTIDE: CPT

## 2021-09-10 PROCEDURE — 83735 ASSAY OF MAGNESIUM: CPT

## 2021-09-14 ENCOUNTER — HOSPITAL ENCOUNTER (OUTPATIENT)
Dept: LAB | Age: 51
Discharge: HOME | End: 2021-09-14
Payer: COMMERCIAL

## 2021-09-14 LAB
ALBUMIN SERPL-MCNC: 3.2 G/DL (ref 3.4–5)
ALBUMIN/GLOB SERPL: 1 {RATIO} (ref 0.8–1.7)
ALP SERPL-CCNC: 154 U/L (ref 45–117)
ALT SERPL-CCNC: 17 U/L (ref 13–56)
ANION GAP SERPL CALC-SCNC: 6 MMOL/L (ref 3–18)
AST SERPL-CCNC: 13 U/L (ref 10–38)
BASOPHILS # BLD: 0.1 K/UL (ref 0–0.1)
BASOPHILS NFR BLD: 1 % (ref 0–2)
BILIRUB SERPL-MCNC: 0.6 MG/DL (ref 0.2–1)
BNP SERPL-MCNC: ABNORMAL PG/ML (ref 0–900)
BUN SERPL-MCNC: 28 MG/DL (ref 7–18)
BUN/CREAT SERPL: 23 (ref 12–20)
CALCIUM SERPL-MCNC: 8.9 MG/DL (ref 8.5–10.1)
CHLORIDE SERPL-SCNC: 106 MMOL/L (ref 100–111)
CO2 SERPL-SCNC: 28 MMOL/L (ref 21–32)
CREAT SERPL-MCNC: 1.23 MG/DL (ref 0.6–1.3)
DIFFERENTIAL METHOD BLD: ABNORMAL
EOSINOPHIL # BLD: 0.2 K/UL (ref 0–0.4)
EOSINOPHIL NFR BLD: 3 % (ref 0–5)
ERYTHROCYTE [DISTWIDTH] IN BLOOD BY AUTOMATED COUNT: 17.4 % (ref 11.6–14.5)
GLOBULIN SER CALC-MCNC: 3.2 G/DL (ref 2–4)
GLUCOSE SERPL-MCNC: 136 MG/DL (ref 74–99)
HCT VFR BLD AUTO: 31.5 % (ref 35–45)
HGB BLD-MCNC: 9.6 G/DL (ref 12–16)
LYMPHOCYTES # BLD: 0.8 K/UL (ref 0.9–3.6)
LYMPHOCYTES NFR BLD: 17 % (ref 21–52)
MAGNESIUM SERPL-MCNC: 1.9 MG/DL (ref 1.6–2.6)
MCH RBC QN AUTO: 29.4 PG (ref 24–34)
MCHC RBC AUTO-ENTMCNC: 30.5 G/DL (ref 31–37)
MCV RBC AUTO: 96.6 FL (ref 78–100)
MONOCYTES # BLD: 0.4 K/UL (ref 0.05–1.2)
MONOCYTES NFR BLD: 8 % (ref 3–10)
NEUTS SEG # BLD: 3.4 K/UL (ref 1.8–8)
NEUTS SEG NFR BLD: 70 % (ref 40–73)
PLATELET # BLD AUTO: 181 K/UL (ref 135–420)
PMV BLD AUTO: 10.3 FL (ref 9.2–11.8)
POTASSIUM SERPL-SCNC: 4.1 MMOL/L (ref 3.5–5.5)
PROT SERPL-MCNC: 6.4 G/DL (ref 6.4–8.2)
RBC # BLD AUTO: 3.26 M/UL (ref 4.2–5.3)
SODIUM SERPL-SCNC: 140 MMOL/L (ref 136–145)
WBC # BLD AUTO: 4.9 K/UL (ref 4.6–13.2)

## 2021-09-14 PROCEDURE — 83880 ASSAY OF NATRIURETIC PEPTIDE: CPT

## 2021-09-14 PROCEDURE — 85025 COMPLETE CBC W/AUTO DIFF WBC: CPT

## 2021-09-14 PROCEDURE — 80053 COMPREHEN METABOLIC PANEL: CPT

## 2021-09-14 PROCEDURE — 83735 ASSAY OF MAGNESIUM: CPT

## 2021-09-15 ENCOUNTER — HOSPITAL ENCOUNTER (OUTPATIENT)
Dept: LAB | Age: 51
Discharge: HOME | End: 2021-09-15

## 2021-09-15 PROCEDURE — U0003 INFECTIOUS AGENT DETECTION BY NUCLEIC ACID (DNA OR RNA); SEVERE ACUTE RESPIRATORY SYNDROME CORONAVIRUS 2 (SARS-COV-2) (CORONAVIRUS DISEASE [COVID-19]), AMPLIFIED PROBE TECHNIQUE, MAKING USE OF HIGH THROUGHPUT TECHNOLOGIES AS DESCRIBED BY CMS-2020-01-R: HCPCS

## 2021-09-16 LAB — SARS-COV-2, NAA: NOT DETECTED

## 2021-09-22 ENCOUNTER — HOSPITAL ENCOUNTER (OUTPATIENT)
Dept: LAB | Age: 51
Discharge: HOME | End: 2021-09-22
Payer: COMMERCIAL

## 2021-09-22 ENCOUNTER — HOSPITAL ENCOUNTER (OUTPATIENT)
Dept: LAB | Age: 51
Discharge: HOME | End: 2021-09-22

## 2021-09-22 LAB
ANION GAP SERPL CALC-SCNC: 9 MMOL/L (ref 3–18)
BASOPHILS # BLD: 0.1 K/UL (ref 0–0.1)
BASOPHILS NFR BLD: 1 % (ref 0–2)
BNP SERPL-MCNC: ABNORMAL PG/ML (ref 0–900)
BUN SERPL-MCNC: 40 MG/DL (ref 7–18)
BUN/CREAT SERPL: 33 (ref 12–20)
CALCIUM SERPL-MCNC: 9.2 MG/DL (ref 8.5–10.1)
CHLORIDE SERPL-SCNC: 106 MMOL/L (ref 100–111)
CO2 SERPL-SCNC: 23 MMOL/L (ref 21–32)
CREAT SERPL-MCNC: 1.21 MG/DL (ref 0.6–1.3)
DIFFERENTIAL METHOD BLD: ABNORMAL
EOSINOPHIL # BLD: 0.2 K/UL (ref 0–0.4)
EOSINOPHIL NFR BLD: 4 % (ref 0–5)
ERYTHROCYTE [DISTWIDTH] IN BLOOD BY AUTOMATED COUNT: 16.3 % (ref 11.6–14.5)
GLUCOSE SERPL-MCNC: 133 MG/DL (ref 74–99)
HCT VFR BLD AUTO: 35.1 % (ref 35–45)
HGB BLD-MCNC: 10.7 G/DL (ref 12–16)
LYMPHOCYTES # BLD: 0.6 K/UL (ref 0.9–3.6)
LYMPHOCYTES NFR BLD: 13 % (ref 21–52)
MAGNESIUM SERPL-MCNC: 2.2 MG/DL (ref 1.6–2.6)
MCH RBC QN AUTO: 29.2 PG (ref 24–34)
MCHC RBC AUTO-ENTMCNC: 30.5 G/DL (ref 31–37)
MCV RBC AUTO: 95.6 FL (ref 78–100)
MONOCYTES # BLD: 0.4 K/UL (ref 0.05–1.2)
MONOCYTES NFR BLD: 8 % (ref 3–10)
NEUTS SEG # BLD: 3.6 K/UL (ref 1.8–8)
NEUTS SEG NFR BLD: 73 % (ref 40–73)
PLATELET # BLD AUTO: 202 K/UL (ref 135–420)
PMV BLD AUTO: 9.9 FL (ref 9.2–11.8)
POTASSIUM SERPL-SCNC: 4.1 MMOL/L (ref 3.5–5.5)
RBC # BLD AUTO: 3.67 M/UL (ref 4.2–5.3)
SODIUM SERPL-SCNC: 138 MMOL/L (ref 136–145)
WBC # BLD AUTO: 4.9 K/UL (ref 4.6–13.2)

## 2021-09-22 PROCEDURE — U0003 INFECTIOUS AGENT DETECTION BY NUCLEIC ACID (DNA OR RNA); SEVERE ACUTE RESPIRATORY SYNDROME CORONAVIRUS 2 (SARS-COV-2) (CORONAVIRUS DISEASE [COVID-19]), AMPLIFIED PROBE TECHNIQUE, MAKING USE OF HIGH THROUGHPUT TECHNOLOGIES AS DESCRIBED BY CMS-2020-01-R: HCPCS

## 2021-09-22 PROCEDURE — 80048 BASIC METABOLIC PNL TOTAL CA: CPT

## 2021-09-22 PROCEDURE — 83880 ASSAY OF NATRIURETIC PEPTIDE: CPT

## 2021-09-22 PROCEDURE — 83735 ASSAY OF MAGNESIUM: CPT

## 2021-09-22 PROCEDURE — 85025 COMPLETE CBC W/AUTO DIFF WBC: CPT

## 2021-09-23 LAB — SARS-COV-2, NAA: NOT DETECTED

## 2021-09-29 ENCOUNTER — HOSPITAL ENCOUNTER (OUTPATIENT)
Dept: LAB | Age: 51
Discharge: HOME | End: 2021-09-29

## 2021-09-29 PROCEDURE — U0003 INFECTIOUS AGENT DETECTION BY NUCLEIC ACID (DNA OR RNA); SEVERE ACUTE RESPIRATORY SYNDROME CORONAVIRUS 2 (SARS-COV-2) (CORONAVIRUS DISEASE [COVID-19]), AMPLIFIED PROBE TECHNIQUE, MAKING USE OF HIGH THROUGHPUT TECHNOLOGIES AS DESCRIBED BY CMS-2020-01-R: HCPCS

## 2021-09-30 ENCOUNTER — HOSPITAL ENCOUNTER (OUTPATIENT)
Dept: LAB | Age: 51
Discharge: HOME | End: 2021-09-30
Payer: COMMERCIAL

## 2021-09-30 LAB
ALBUMIN SERPL-MCNC: 3.3 G/DL (ref 3.4–5)
ALBUMIN/GLOB SERPL: 0.9 {RATIO} (ref 0.8–1.7)
ALP SERPL-CCNC: 133 U/L (ref 45–117)
ALT SERPL-CCNC: 16 U/L (ref 13–56)
ANION GAP SERPL CALC-SCNC: 6 MMOL/L (ref 3–18)
AST SERPL-CCNC: 12 U/L (ref 10–38)
BASOPHILS # BLD: 0.1 K/UL (ref 0–0.1)
BASOPHILS NFR BLD: 1 % (ref 0–2)
BILIRUB SERPL-MCNC: 0.6 MG/DL (ref 0.2–1)
BNP SERPL-MCNC: ABNORMAL PG/ML (ref 0–900)
BUN SERPL-MCNC: 21 MG/DL (ref 7–18)
BUN/CREAT SERPL: 22 (ref 12–20)
CALCIUM SERPL-MCNC: 9.5 MG/DL (ref 8.5–10.1)
CHLORIDE SERPL-SCNC: 103 MMOL/L (ref 100–111)
CO2 SERPL-SCNC: 29 MMOL/L (ref 21–32)
CREAT SERPL-MCNC: 0.95 MG/DL (ref 0.6–1.3)
DIFFERENTIAL METHOD BLD: ABNORMAL
EOSINOPHIL # BLD: 0.1 K/UL (ref 0–0.4)
EOSINOPHIL NFR BLD: 2 % (ref 0–5)
ERYTHROCYTE [DISTWIDTH] IN BLOOD BY AUTOMATED COUNT: 15.6 % (ref 11.6–14.5)
GLOBULIN SER CALC-MCNC: 3.7 G/DL (ref 2–4)
GLUCOSE SERPL-MCNC: 169 MG/DL (ref 74–99)
HCT VFR BLD AUTO: 35.5 % (ref 35–45)
HGB BLD-MCNC: 11.2 G/DL (ref 12–16)
LYMPHOCYTES # BLD: 0.7 K/UL (ref 0.9–3.6)
LYMPHOCYTES NFR BLD: 11 % (ref 21–52)
MAGNESIUM SERPL-MCNC: 2.9 MG/DL (ref 1.6–2.6)
MCH RBC QN AUTO: 28.1 PG (ref 24–34)
MCHC RBC AUTO-ENTMCNC: 31.5 G/DL (ref 31–37)
MCV RBC AUTO: 89 FL (ref 78–100)
MONOCYTES # BLD: 0.6 K/UL (ref 0.05–1.2)
MONOCYTES NFR BLD: 10 % (ref 3–10)
NEUTS SEG # BLD: 4.7 K/UL (ref 1.8–8)
NEUTS SEG NFR BLD: 76 % (ref 40–73)
PLATELET # BLD AUTO: 219 K/UL (ref 135–420)
PMV BLD AUTO: 9.9 FL (ref 9.2–11.8)
POTASSIUM SERPL-SCNC: 4.3 MMOL/L (ref 3.5–5.5)
PROT SERPL-MCNC: 7 G/DL (ref 6.4–8.2)
RBC # BLD AUTO: 3.99 M/UL (ref 4.2–5.3)
SARS-COV-2, NAA: NOT DETECTED
SODIUM SERPL-SCNC: 138 MMOL/L (ref 136–145)
WBC # BLD AUTO: 6.2 K/UL (ref 4.6–13.2)

## 2021-09-30 PROCEDURE — 83735 ASSAY OF MAGNESIUM: CPT

## 2021-09-30 PROCEDURE — 83880 ASSAY OF NATRIURETIC PEPTIDE: CPT

## 2021-09-30 PROCEDURE — 80053 COMPREHEN METABOLIC PANEL: CPT

## 2021-09-30 PROCEDURE — 85025 COMPLETE CBC W/AUTO DIFF WBC: CPT

## 2021-10-01 ENCOUNTER — HOME HEALTH ADMISSION (OUTPATIENT)
Dept: HOME HEALTH SERVICES | Facility: HOME HEALTH | Age: 51
End: 2021-10-01
Payer: COMMERCIAL

## 2021-10-03 ENCOUNTER — HOME CARE VISIT (OUTPATIENT)
Dept: SCHEDULING | Facility: HOME HEALTH | Age: 51
End: 2021-10-03
Payer: COMMERCIAL

## 2021-10-03 ENCOUNTER — HOME CARE VISIT (OUTPATIENT)
Dept: HOME HEALTH SERVICES | Facility: HOME HEALTH | Age: 51
End: 2021-10-03
Payer: COMMERCIAL

## 2021-10-03 PROCEDURE — G0299 HHS/HOSPICE OF RN EA 15 MIN: HCPCS

## 2021-10-03 PROCEDURE — 400013 HH SOC

## 2021-10-04 ENCOUNTER — HOME CARE VISIT (OUTPATIENT)
Dept: HOME HEALTH SERVICES | Facility: HOME HEALTH | Age: 51
End: 2021-10-04
Payer: COMMERCIAL

## 2021-10-04 ENCOUNTER — HOME CARE VISIT (OUTPATIENT)
Dept: SCHEDULING | Facility: HOME HEALTH | Age: 51
End: 2021-10-04
Payer: COMMERCIAL

## 2021-10-04 VITALS
OXYGEN SATURATION: 100 % | HEART RATE: 89 BPM | DIASTOLIC BLOOD PRESSURE: 60 MMHG | TEMPERATURE: 97.6 F | SYSTOLIC BLOOD PRESSURE: 120 MMHG | RESPIRATION RATE: 16 BRPM

## 2021-10-04 PROCEDURE — G0151 HHCP-SERV OF PT,EA 15 MIN: HCPCS

## 2021-10-04 NOTE — Clinical Note
Julisa Jones is a 47 yo female post right BKA performed 8/12/21. Pt also presents with left partial foot amputation. PMH includes: osteomyelitis, DM2, severe PAD, medical non-compliance, tobacco use, hypotension, systolic CHF, gangrene both feet, and hypotension  Pt will benefit from home health PT to improve independence/safety with transfers to enable patient to navigate her home/bathroom with use of WC. At time of evaluation patient reported being in her bed since she returned home from rehab.   .  Therapy Functional Score Assessment  Question   Score   Grooming  1     Upper Dressing 1     Lower Dressing 2      Bathing  5     Toilet Transfer  4    Transfer  4            Ambulation  5   Dyspnea                     2       Pain Interfering with activity 4  Est number therapy visits      10

## 2021-10-05 ENCOUNTER — HOME CARE VISIT (OUTPATIENT)
Dept: SCHEDULING | Facility: HOME HEALTH | Age: 51
End: 2021-10-05
Payer: COMMERCIAL

## 2021-10-05 ENCOUNTER — HOME CARE VISIT (OUTPATIENT)
Dept: HOME HEALTH SERVICES | Facility: HOME HEALTH | Age: 51
End: 2021-10-05
Payer: COMMERCIAL

## 2021-10-05 VITALS
DIASTOLIC BLOOD PRESSURE: 72 MMHG | HEART RATE: 76 BPM | RESPIRATION RATE: 17 BRPM | TEMPERATURE: 98.2 F | SYSTOLIC BLOOD PRESSURE: 118 MMHG | OXYGEN SATURATION: 98 %

## 2021-10-05 VITALS
RESPIRATION RATE: 16 BRPM | TEMPERATURE: 97.6 F | HEART RATE: 87 BPM | SYSTOLIC BLOOD PRESSURE: 100 MMHG | OXYGEN SATURATION: 97 % | DIASTOLIC BLOOD PRESSURE: 68 MMHG

## 2021-10-05 PROCEDURE — G0299 HHS/HOSPICE OF RN EA 15 MIN: HCPCS

## 2021-10-05 NOTE — HOME HEALTH
Skilled reason for visit: Patient was recently hospitalized for  , requiring observation by a SN for s/s of decomposition or adverse effects resulting from newly prescribed medications. Skilled observation needed to determine if new medication regimen prescribed requires modifications or other therapeutic interventions considered until pt's clinical condition or treatment has stabilized. Caregiver involvement:  Patient's caregiver is Am[employer]. Caregiver assists patient with bathing, dressing, walking, bathroom, meal prep and setup, medication management, grocery shopping, household chores, transportation to MD appointment and home exercise program.  Medications reconciled and all medications are available in the home this visit. The following education was provided regarding medications, medication interactions, and look alike medications:  ALPRAZolam (XANAX) 0.5 mg tablet  -Contact Agency or MD with questions. Medications are effective at this time. Patient states understanding. Patient education provided this visit:  . X. Darlynn Magic Darlynn Magic Darlynn Magic Disease Management: Wound care, CHF, DM teaching, pain management, constipation prevention, fall precautions, s/s of infection, deep breathing exercises. ..X... Wound Care:   Keep wound clean, covered and dry, s/s of infection, elevate  Goals/teaching progressing. Patient's goal is to have wound healing. Patient has remained free from falls, free from infection; no safety concerns at this time and is ambulating independently with. SN to complete education of patient and patient to follow up with Dr Randy Baltazar exercise program: PT, OT  Continued need for the following skills: Nursing, PT, OT  Patient and/or caregiver notified and agree to changes in the Plan of Care: No changes  The following discharge planning was discussed with the pt/caregiver:  SN to continue education of patient and discharge patient when teaching and goals are met.

## 2021-10-05 NOTE — HOME HEALTH
SUMMARY: Navi Hansen is a 47 yo female post right BKA performed 8/12/21. Pt also presents with left partial foot amputation. PMH includes: osteomyelitis, DM2, severe PAD, medical non-compliance, tobacco use, hypotension, systolic CHF, gangrene both feet, and hypotension  Pt will benefit from home health PT to improve independence/safety with transfers to enable patient to navigate her home/bathroom with use of WC. At time of evaluation patient reported being in her bed since she returned home from rehab. .  Treatment precautions: Right BKA, wound left foot, left partial foot amputation, fall risk. .  SUBJECTIVE: Pt reports she has been staying in her bed because she is not sure she will be able to use the slide board to get back in back due to her bed being much higher than her WC.    LIVING SITUATION:  Pt lives alone in a 3rd floor apartment with elevator access. REQUIRES CAREGIVER ASSISTANCE FOR: Recommend assist for ADLs and safe transfers. PLOF: Pt reports prior to surgery she used a WC to navigate her home. she reports she was able to exit her home and access a car using a cane. Pt was driving. MEDICATIONS REVIEWED AND UPDATED: Pt denies any medication changes. .  WOUNDS:  Right BKA. Wound left anterior foot. ROM: LE ROM WFL for ADLs. STRENGTH:  Pt able to complete supine flexion SLR. Unable to accept resistance. BED MOBILITY: independent with rolling. Supine sit sit/sit to supine: supervision only with safe sequencing noted. TRANSFERS: Slide board training from bed to WC/WC to bed. Bed to [facility]: therapist placed WC and removed WC arm. CGA for slide. WC to bed: Therapist depressed mattress to lower height. Pt able to complete with min assist x 1. Pt unable to complete sit to stand using standing frame. GAIT: N/A  STAIRS: N/A  BALANCE: Good static sitting balance note.   Reduced dynamic sitting balance noted with patient demonstrating decreased trunk control during slide board transfer from [facility] to bed. Jaci Nam PATIENT EDUCATION PROVIDED THIS VISIT: safety, HEP, walking, deep breathing, DVT/infection monitoring, elevation, fall risk/ prevention strategies. Jaci Nam HEP consisting of:  2-3x daily: quad sets 15x5\", sidelying hip abd 10x, supine hip adduction isometric 15x5\", SAQ/LAQ 15x, sidelying hip flex/ext 10x  Written instructions issued. Patient/caregiver verbalized understanding. .  CONTINUED NEED FOR THE FOLLOWING SKILLS: HH PT is medically necessary to address pain, decreased ROM, decreased strength, increased swelling, impaired bed mobility, decreased independence with functional transfers, impaired gait, impaired stair negotiation, and impaired balance in order to improve functional independence, quality of life, return to PLOF, and reduce the risk for falls. PLAN: 3w2, 2w2. DISCHARGE PLANNING DISCUSSED: Discharge to self and family under MD supervision once all goals have been met or patient has reached max potential.  .  Pt reported her family member could get her a BSC.

## 2021-10-06 ENCOUNTER — HOME CARE VISIT (OUTPATIENT)
Dept: SCHEDULING | Facility: HOME HEALTH | Age: 51
End: 2021-10-06
Payer: COMMERCIAL

## 2021-10-06 PROCEDURE — G0152 HHCP-SERV OF OT,EA 15 MIN: HCPCS

## 2021-10-06 NOTE — HOME HEALTH
Skilled services/Home bound verification:     Skilled Reason for admission/summary of clinical condition:  Encounter for orthopedic aftercare following surgical amputation requiring disease process teaching and medication management, wound care. This patient is homebound for the following reasons Requires considerable and taxing effort to leave the home  and Requires the assistance of 1 or more persons to leave the home . Caregiver: fiance and cousin. Caregiver assists with iadls, adls, meal prep, med management, taking to md appointments. Medications reconciled and all medications are not available in the home this visit. pt taking iron, glipizide, lyrica, glucophage, plavix, xanax. cozaar dose adjusted. pt needing to get protonix and lasix. pt stating she is not going to take lantus/humalog, she is going to continue with her glipizide and metformin. The following education was provided regarding medications, medication interactions, and look alike medications (specify): reviewed side effects, purposes, dosage, frequencies. Medications  are unable to assess effectiveness at this time. High risk medication teaching regarding anticoagulants, hyperglycemic agents or opiod narcotics performed (specify) aspirin and plavix, metformin and glipizide-reviewed side effects, purposes, dosage, frequencies. MD called and notified of any discrepancies/medication interactions- cymbalta and wellbutrin; lasix and cozaar; plavix and cymbalta; xanax and lyrica; lasix and aspirin; cymbalta and aspirin. Home health supplies by type and quantity ordered/delivered this visit include: pt has adequate supplies at this time. Patient education provided this visit to include: patient/cg instructed to monitor for edema/increase in edema, to elevate extremity when edema occurs and to notify md if edema exceeds normal limits for patient, none noted at this time.  patient made aware to monitor for s/s of infection [increased swelling, increased redness around site, increased pain, foul smelling drainage, fever] aware who to report to/when. no s/s of infection noted. pt aware to keep dressing clean, dry and intact as ordered. wound care to left plantar foot, left superior foot and right BKA performed per orders- old dressing completely removed, wound cleansed with saline and applied santyl wet to dry, secured with dry dressing and kerlix, ace wrap. encouraged patient to get three nutritional meals daily and to stay hydrated, drink plenty of fluids. pt instructed to follow a high protein diet for healing- to try to get 90g protein daily. patient encouraged to supplement nutrition with protein shakes, glucerna in order to get nutrition needed. notified patient of heart healthy diet- instructed patient to reduce intake of saturated and fatty acids (butter, creams, red meats, fried foods, margarines, high fat baked goods, shortening, cheeses, etc) and to increase daily fresh fruits and vegetables and whole grains, encouraged to avoid canned and processed foods as much as possible. pt instructed to follow with diabetic diet- monitoring sugar intake, limiting foods with high sugar content. Discussed dietary adjustments such as: Reduce portion sizes, Limit daily carbohydrate intake to not greater than 45-60 grams per meal for a total of <180 grams of carbohydrate daily, Avoid concentrated carbohydrates such as soft drinks, sweet tea, and sweet treats. Increase physical activity along with strength training as tolerated to facilitate weight loss and build muscle mass. pt instructed to begin monitoring BS and to ensure BS levels are within good range to ensure proper healing. patient got a new glucometer and pt is reporting she does not have some parts to check her bs (lancets) but she is getting from pharmacy when her cousin picks up her prescriptions.  patient to follow aspirin and plavix regimen as directed by MD and to monitor for s/s of excessive bleeding, aware who to report to/when. Patients made aware to report bleeding to their practitioner including blood in the urine or stools and bleeding from minor cuts and scratches that won't stop. Patient also made aware to recognize the signs and symptoms of significant bleeding that can include unsteady gait, dizziness, new headache, and nausea and vomiting. discussed fall precautions in detail- having lighted hallways, removing throw rugs, monitoring medication that may alter mental status. patient made aware to turn every 2 hours and to keep pressure off of bony prominences, to monitor for any pressure ulcer development/worsening. pt denies any questions or concerns at this time. Patient/caregiver degree of understanding: fair    Home exercise program/Homework provided: patient instructed to perform sob hep 4-5 x daily and prn for sob, to promote lung expansion. pt also encouraged to use ICS q 2 hours and to perform sob hep during therapy. patient instructed to perform incontinence hep 3-4 x daily to strengthen muscles and to perform timed voiding q 2 hours to prevent incontinence from occurring. Pt/Caregiver instructed on plan of care and are agreeable to plan of care at this time. Physician Dr. Andrew Becerril called and notified of patient admission to home health and plan of care including anticipated frequency of 3w2, 2w2, 1w3, 4 prn and treatments/interventions/modalities of disease process teaching and medication management, wound care. Discharge planning discussed with patient and caregiver. Discharge planning as follows: Patient will be discharged once education has completed, patient is medically stable and pt/cg are able to independently manage wound care/ wound has healed or no longer requires skilled care. Pt/Caregiver did verbalize understanding of discharge planning. Next MD appointment 10/8/21 (date) with surgeon, per patient.  Patient/caregiver encouraged/instructed to keep appointment as lack of follow through with physician appointment could result in discontinuation of home care services for non-compliance.

## 2021-10-07 ENCOUNTER — HOME CARE VISIT (OUTPATIENT)
Dept: HOME HEALTH SERVICES | Facility: HOME HEALTH | Age: 51
End: 2021-10-07
Payer: COMMERCIAL

## 2021-10-07 VITALS
DIASTOLIC BLOOD PRESSURE: 60 MMHG | TEMPERATURE: 97.8 F | HEART RATE: 106 BPM | OXYGEN SATURATION: 91 % | RESPIRATION RATE: 16 BRPM | SYSTOLIC BLOOD PRESSURE: 94 MMHG

## 2021-10-07 NOTE — HOME HEALTH
Summary of clinical condition:  Pt had her toes amputated on her left foot in April 2021. She was being treated for wounds until she fell and stopped going to treatment. She developed gangrene in both LE and had a right BKA and a revision of her left foot. [facility]. Medical History: DM, Left 1/2 foot amputation, Depression, Anxiety, High cholesterol.,  Medications review completed. No adverse reactions noted. Caregiver involvement: Pt's fiance and family assist with ADLs and IADLs. Patient education provided this visit:  Pt was educated about the difference between [facility] and a drop arm [facility]. Home exercise program:  Pt was trained in HEP of UE exercises including shoulder flexion and 2 hands clasped and touch neck x 10. ASSESSMENT:   Pt is nonambulatory and needs a sliding board for transfers. She was able to transfer bed to [facility] with Mod  Assist but needed Max assist getting back in bed. She is unable to transfer on toilet or tub for a shower. Pt is able to dress UE and LE on the bed after ROMO. She needs assistance for washing her hair and is only able to wash herself with wipes. Pt has decreased sitting balance and decreased UE strength. Pt needs a drop arm 3-in-1 commode. Pt will be seen for transfer training, AE training, ADL training, and thera, ex. Patient Verbalized Goals:  Be able to transfer without help. Continued need for the following skills: Occupational Therapy  Pt/Caregiver instructed on plan of care and are agreeable to plan of care at this time. Discharge planning discussed with patient and caregiver. Discharge planning as follows: Pt will be seen 2 x week x 2 then discharged when goals are met. Pt/Caregiver did verbalize understanding.

## 2021-10-08 ENCOUNTER — HOME CARE VISIT (OUTPATIENT)
Dept: SCHEDULING | Facility: HOME HEALTH | Age: 51
End: 2021-10-08
Payer: COMMERCIAL

## 2021-10-08 ENCOUNTER — HOME CARE VISIT (OUTPATIENT)
Dept: HOME HEALTH SERVICES | Facility: HOME HEALTH | Age: 51
End: 2021-10-08
Payer: COMMERCIAL

## 2021-10-08 VITALS
SYSTOLIC BLOOD PRESSURE: 115 MMHG | DIASTOLIC BLOOD PRESSURE: 62 MMHG | RESPIRATION RATE: 16 BRPM | HEART RATE: 83 BPM | OXYGEN SATURATION: 98 %

## 2021-10-08 PROCEDURE — G0152 HHCP-SERV OF OT,EA 15 MIN: HCPCS

## 2021-10-08 PROCEDURE — G0299 HHS/HOSPICE OF RN EA 15 MIN: HCPCS

## 2021-10-09 VITALS
HEART RATE: 94 BPM | TEMPERATURE: 97.8 F | OXYGEN SATURATION: 98 % | RESPIRATION RATE: 16 BRPM | DIASTOLIC BLOOD PRESSURE: 78 MMHG | SYSTOLIC BLOOD PRESSURE: 104 MMHG

## 2021-10-09 NOTE — HOME HEALTH
Patient/Caregiver Subjective:   Pt said her significant other plans on lowering her bed this weekend. Caregiver involvement: Pt's significant other assists with shopping and ADLs. Patient education provided this visit:  Pt was educated about the difference between regular BSC and a drop arm BSC and why she needs one. I explained why a bed rail would assist her in bed mobility and transfers. Home exercise program: See intervention  Progress toward goals: Pt was improved in going supine to sit on EOB without assistance. Continued need for the following skills: Pt continues to have decreased bed, commode, and shower transfers. She has decreased sitting balance that makes ADLs more difficult. She has decreased UE function. Pt will be seen by Occupational Therapy transfer training, ADLs, and thera ex. The following discharge planning was discussed with the pt/caregiver: Pt will be seen 2 x week x 1 then discharged if goals are met.

## 2021-10-09 NOTE — HOME HEALTH
Skilled reason for visit: Patient was recently hospitalized for Surgical amputation, requiring observation by a SN for s/s of decomposition or adverse effects resulting from newly prescribed medications. Skilled observation needed to determine if new medication regimen prescribed requires modifications or other therapeutic interventions considered until pt's clinical condition or treatment has stabilized. Caregiver involvement:  Patient's caregiver is her friend. Caregiver assists patient with bathing, dressing, walking, bathroom, meal prep and setup, medication management, grocery shopping, household chores, transportation to MD appointment and home exercise program.  Medications reconciled and all medications are available in the home this visit. The following education was provided regarding medications, medication interactions, and look alike medications:  atorvastatin (LIPITOR) 40 mg tablet - Purpose:  lowers LDL cholesterol     Precautions/Side Effects/Adverse reactions:  Cough, dizziness, itching, muscle cramps  - Report medication questions or  problems to Natty Mckeon or MD.  -Contact Agency or MD with questions. Medications are effective at this time. Patient states understanding. Patient education provided this visit:  Lidia Holm Disease Management: Wound care teaching, DM teaching, fall precautions, s/s of infection, smoking cessation    Goals/teaching progressing. Patient's goal is to have wound healing. Patient has remained free from falls, free from infection; no safety concerns at this time and is patient is not ambulating at this time.   SN to complete education of patient and patient to follow up with Dr Stella Liao exercise program: PT, OT  Continued need for the following skills: Nursing, PT, OT   Patient and/or caregiver notified and agree to changes in the Plan of Care: No changes  The following discharge planning was discussed with the pt/caregiver:  SN to continue education of patient and discharge patient when teaching and goals are met. Dr Lana Thorpe wound care orders state Santyl is to be applied to all of patient's wound, but patient only has small amount of Santyl remaining and she stated it is to expensive for her to get refilled ($300). Dr Lana Thorpe office called today and made aware and SN asked if there is something else that can be used, that isn't as expensive. Dr Lana Thorpe nurse called SN back and stated Dr Edgar Palafox is out of the office today, but she will ask him on Monday and get back to me.

## 2021-10-12 ENCOUNTER — HOME CARE VISIT (OUTPATIENT)
Dept: SCHEDULING | Facility: HOME HEALTH | Age: 51
End: 2021-10-12
Payer: COMMERCIAL

## 2021-10-12 VITALS
OXYGEN SATURATION: 98 % | TEMPERATURE: 96.7 F | DIASTOLIC BLOOD PRESSURE: 62 MMHG | SYSTOLIC BLOOD PRESSURE: 114 MMHG | HEART RATE: 88 BPM

## 2021-10-12 PROCEDURE — G0157 HHC PT ASSISTANT EA 15: HCPCS

## 2021-10-13 ENCOUNTER — HOME CARE VISIT (OUTPATIENT)
Dept: HOME HEALTH SERVICES | Facility: HOME HEALTH | Age: 51
End: 2021-10-13
Payer: COMMERCIAL

## 2021-10-13 NOTE — HOME HEALTH
Subjective; Pt in bed and reporting her stomach is upset due to eating fast food and that is her only pain. Pt did not have ace wrap on R LE and stated she could not find it in her covers. Family/caregiver assistance needed for;pt has fiance that assists her as needed    Home Health supplies ordered/delivered this visit;NA Recommedned drop arm bedside commode as pt would be able to transfer on and off. Pt is currently using her depends for bowel and urination and changes as needed. Noted pt has dark under nails. Objective; See Interventions    Assessment of progress towards goals;PT is able to demonstrate safe transfer to and from [facility] using sliding board with safe technique. Pt able to amb with WC in the apt. Supervision by therapist for transfers and [facility] mobility this treatment with VC to make sure sliding board is secure. Educated pt on the benefits of a drop arm bsc to allow her to use commode to prevent skin breakdown using breifs. Discharge Planning discussed with patient as pt does not have insurance at this time and is unable to cover cost of home care services. Pt is able to demonstrate safe transfer techniques and WC mobility to aloow her to get to MD appts.

## 2021-10-14 NOTE — CASE COMMUNICATION
SN [place] non-billable discharge due to no insurance coverage and patient not wanting to pay out of pocket per visit for home health. Case communication with PT, OT, Sachi Ruggiero and schedulers.

## 2021-10-20 NOTE — HOME HEALTH
Pt completed 2 PT sessions with treatments focused on safe transfer technique. Pt was discharged per patient request due to loss of insurance coverage. During last treatment patient was noted to demonstrate safe transfer technique to/from [place] [id number] with slide board and patient demonstrated ability to navigate her home in [place] [id number].

## 2021-11-10 NOTE — ROUTINE PROCESS
Bedside shift change report given to Rafita Hatfield / Cam Bender RN (oncoming nurse) by Rylie Arroyo RN (offgoing nurse). Report included the following information SBAR, Kardex, Intake/Output and MAR. none present

## 2022-02-28 ENCOUNTER — TRANSCRIBE ORDER (OUTPATIENT)
Dept: INTERVENTIONAL RADIOLOGY/VASCULAR | Age: 52
End: 2022-02-28

## 2022-02-28 DIAGNOSIS — I70.209 OCCLUSIVE DISEASE OF ARTERY OF LOWER EXTREMITY (HCC): Primary | ICD-10-CM

## 2022-02-28 RX ORDER — FLUMAZENIL 0.1 MG/ML
0.2 INJECTION INTRAVENOUS
Status: CANCELLED | OUTPATIENT
Start: 2022-02-28

## 2022-02-28 RX ORDER — NALOXONE HYDROCHLORIDE 0.4 MG/ML
0.1 INJECTION, SOLUTION INTRAMUSCULAR; INTRAVENOUS; SUBCUTANEOUS
Status: CANCELLED | OUTPATIENT
Start: 2022-02-28

## 2022-02-28 RX ORDER — HEPARIN SODIUM 1000 [USP'U]/ML
1000-10000 INJECTION, SOLUTION INTRAVENOUS; SUBCUTANEOUS
Status: CANCELLED | OUTPATIENT
Start: 2022-02-28

## 2022-02-28 RX ORDER — SODIUM CHLORIDE 0.9 % (FLUSH) 0.9 %
5-40 SYRINGE (ML) INJECTION AS NEEDED
Status: CANCELLED | OUTPATIENT
Start: 2022-02-28

## 2022-02-28 RX ORDER — SODIUM CHLORIDE 0.9 % (FLUSH) 0.9 %
5-40 SYRINGE (ML) INJECTION EVERY 8 HOURS
Status: CANCELLED | OUTPATIENT
Start: 2022-02-28

## 2022-02-28 RX ORDER — MIDAZOLAM HYDROCHLORIDE 1 MG/ML
.5-2 INJECTION, SOLUTION INTRAMUSCULAR; INTRAVENOUS
Status: CANCELLED | OUTPATIENT
Start: 2022-02-28

## 2022-02-28 RX ORDER — FENTANYL CITRATE 50 UG/ML
25-100 INJECTION, SOLUTION INTRAMUSCULAR; INTRAVENOUS
Status: CANCELLED | OUTPATIENT
Start: 2022-02-28

## 2022-02-28 RX ORDER — CEFAZOLIN SODIUM/WATER 2 G/20 ML
2 SYRINGE (ML) INTRAVENOUS ONCE
Status: CANCELLED | OUTPATIENT
Start: 2022-02-28 | End: 2022-02-28

## 2022-02-28 RX ORDER — SODIUM CHLORIDE 9 MG/ML
20 INJECTION, SOLUTION INTRAVENOUS CONTINUOUS
Status: CANCELLED | OUTPATIENT
Start: 2022-02-28

## 2022-03-10 ENCOUNTER — HOSPITAL ENCOUNTER (OUTPATIENT)
Dept: INTERVENTIONAL RADIOLOGY/VASCULAR | Age: 52
Discharge: HOME | End: 2022-03-10
Attending: STUDENT IN AN ORGANIZED HEALTH CARE EDUCATION/TRAINING PROGRAM

## 2022-03-18 PROBLEM — Z91.199 NON COMPLIANCE WITH MEDICAL TREATMENT: Status: ACTIVE | Noted: 2021-04-12
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
## 2022-03-19 PROBLEM — Z89.511 STATUS POST BELOW-KNEE AMPUTATION OF RIGHT LOWER EXTREMITY (HCC): Status: ACTIVE | Noted: 2021-08-13

## 2022-03-19 PROBLEM — E11.52 TYPE 2 DIABETES MELLITUS WITH DIABETIC PERIPHERAL ANGIOPATHY WITH GANGRENE (HCC): Status: ACTIVE | Noted: 2021-04-04

## 2022-03-19 PROBLEM — L97.529 TYPE 2 DIABETES MELLITUS WITH LEFT DIABETIC FOOT ULCER (HCC): Status: ACTIVE | Noted: 2021-04-04

## 2022-03-19 PROBLEM — I50.21 SYSTOLIC CHF, ACUTE (HCC): Status: ACTIVE | Noted: 2021-08-17

## 2022-03-19 PROBLEM — E11.621 TYPE 2 DIABETES MELLITUS WITH LEFT DIABETIC FOOT ULCER (HCC): Status: ACTIVE | Noted: 2021-04-04

## 2022-03-19 PROBLEM — Z72.0 TOBACCO USE: Status: ACTIVE | Noted: 2021-08-12

## 2022-03-20 PROBLEM — I73.9 PAD (PERIPHERAL ARTERY DISEASE) (HCC): Status: ACTIVE | Noted: 2021-04-04

## 2022-03-20 PROBLEM — I65.23 BILATERAL CAROTID ARTERY STENOSIS: Status: ACTIVE | Noted: 2021-04-05

## 2023-01-30 DIAGNOSIS — I70.209 OCCLUSIVE DISEASE OF ARTERY OF LOWER EXTREMITY (HCC): Primary | ICD-10-CM

## 2023-02-03 DIAGNOSIS — I70.209 OCCLUSIVE DISEASE OF ARTERY OF LOWER EXTREMITY (HCC): Primary | ICD-10-CM

## 2023-02-06 DIAGNOSIS — I70.209 OCCLUSIVE DISEASE OF ARTERY OF LOWER EXTREMITY (HCC): Primary | ICD-10-CM

## 2023-05-08 NOTE — PROGRESS NOTES
Hospitalist Progress Note    Patient: Pierre Simms MRN: [id number]  CSN: [id number]    YOB: 1970  Age: 46 y.o. Sex: female    DOA: 8/11/2021 LOS:  LOS: 21 days            Chief complaint :  Gangrene of both feet. Assessment/Plan     Patient Active Problem List   Diagnosis Code    Osteomyelitis ([facility] M86.9    Type 2 diabetes mellitus with left diabetic foot ulcer ([facility] E11.621, L97.529    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 ([facility] E11.52    YUNIEL (acute kidney injury) ([facility] N17.9    PAD (peripheral artery disease) ([facility]) I73.9    Bilateral carotid artery stenosis I65.23    Gangrene of left foot ([facility]) I96    Non compliance with medical treatment Z91.19    Gangrene of both feet [facility]) I96    Fall W19. Hezzie Ruby Tobacco use Z72.0    Preoperative evaluation to rule out surgical contraindication Z01.818    Anemia D64.9    Sepsis ([facility]) A41.9    Status post below-knee amputation of right lower extremity ([facility] Z89.511    Bacteremia R78.81    Dyspnea R06.00    Pulmonary infiltrates T62.9    Systolic CHF, acute ([facility]) I50.21    Debility R53.81          70-year-old female with uncontrolled type 2 diabetes mellitus, severe peripheral arterial disease, and tobacco use who has been noncompliant with wound care and hyperbaric oxygen treatment due to recent fall is admitted for severe gangrene of both feet. RRT was called on 08/16/2021, patient appeared confused, shortness of breath with hypoxia. ABG showed metabolic acidosis. Patient refused chest x-ray. She has been refusing medications. Given her condition she was transferred to ICU.    08/17/2021 - I tried to discuss with patient about her critical condition and guarded prognosis, she is not interested in listening to my concerns about her medical condition. She turned her face on other side and did not want to listen to me and did not answer my questions.       Again Patient refusing medications today, tried to talk to patient however she does not answer to questions, wants me to leave her alone. Events of last night noted  Patient was seen by psych on 08/20/2021.    08/27/2021Met with MPOA Ms. Wale Ricks along with palliative care. Discussed very poor prognosis, as she continues to refuse treatment. Discussed options of continuing to treat and not sure if she will improve with treatment vs comfort measures. Patient now taking medications. Says \"I am being disturbed every 5 mins\"      Resp -   Dyspnea/hypoxia -  Responded to lasix  CXR with pulmonary edema. Oxygen as needed by NC. ID -   septic shock  -  BP now improved  Continue iv abx,   Blood Cultures growing Peptoniphilus asaccharolyticus. Follow up blood cultures negative  Wound cultures growing providencia, MSSA, E-fecalis, alcaligenes fecalis. ID following. Antibiotics - received zosyn       [facility] - Monitor HD. Acute CHF - on lasix  Echo 45 % and mild pulm htn      Gangrene of bilateral feet    S/p right BKA on 8/12   S/P RIGHT BELOW KNEE AMPUTATION, DRESSING CHANGE TO LEFT FOOT WOUND 08/20     Heme/onc - Follow H&H, plts. Anemia  Received blood transfusion   Monitor H/H    Renal - Trend BUN, Cr, follow I/O. Check and replace Mg, K, phos. YUNIEL - resolved. Endocrine -    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 of both feet  continueSliding scale and  lantus      Neuro/ Pain/ Sedation -   Stopped pain meds    GI -   Diabetic diet. Hematemesis - seen by GI. Refused EGD  Continue with PPI    Prophylaxis - DVT: heparin, GI: protonix.     Noncompliance with medical treatment   Continued tobacco use      PT/OT,    Disposition : awaiting rehab    Review of systems  General: No fevers or chills. Cardiovascular: No chest pain or pressure. No palpitations. Pulmonary: see above. Gastrointestinal: No nausea, vomiting. Physical Exam:  General: As above   HEENT: NC, Atraumatic.   PERRLA, anicteric sclerae. Lungs: CTA Bilaterally. No Wheezing/Rhonchi/Rales. Heart:  S1 S2,  No murmur, No Rubs, No Gallops  Abdomen: Soft, Non distended, Non tender.  +Bowel sounds,   Extremities: Right BKA, left foot with dressing  Psych:   Not anxious or agitated. Neurologic:  No acute neurological deficit. Vital signs/Intake and Output:  Visit Vitals  /66   Pulse 80   Temp 97.4 °F (36.3 °C)   Resp 16   Ht 5' 8\" (1.727 m)   Wt 74.8 kg (164 lb 14.5 oz)   SpO2 100%   BMI 25.07 kg/m²     Current Shift:  No intake/output data recorded. Last three shifts:  08/31 0701 - 09/01 1900  In: 1200 [P.O.:1200]  Out: 0             Labs: Results:       Chemistry Recent Labs     09/01/21  0315 08/31/21  0150   * 203*    138   K 4.3 4.7    106   CO2 25 25   BUN 31* 37*   CREA 1.00 1.03   CA 8.6 8.4*   AGAP 7 7   BUCR 31* 36*   * 134*   TP 6.2* 6.0*   ALB 3.4 3.6   GLOB 2.8 2.4   AGRAT 1.2 1.5      CBC w/Diff No results for input(s): WBC, RBC, HGB, HCT, PLT, GRANS, LYMPH, EOS, HGBEXT, HCTEXT, PLTEXT, HGBEXT, HCTEXT, PLTEXT in the last 72 hours. Cardiac Enzymes No results for input(s): CPK, CKND1, CONSTANTINO in the last 72 hours. No lab exists for component: CKRMB, TROIP   Coagulation No results for input(s): PTP, INR, APTT, INREXT, INREXT in the last 72 hours. Lipid Panel No results found for: CHOL, CHOLPOCT, CHOLX, CHLST, CHOLV, 420751, HDL, HDLP, LDL, LDLC, DLDLP, 051700, VLDLC, VLDL, TGLX, TRIGL, TRIGP, TGLPOCT, CHHD, CHHDX   BNP No results for input(s): BNPP in the last 72 hours.    Liver Enzymes Recent Labs     09/01/21  0315   TP 6.2*   ALB 3.4   *      Thyroid Studies No results found for: T4, T3U, TSH, TSHEXT, TSHEXT     Procedures/imaging: see electronic medical records for all procedures/Xrays and details which were not copied into this note but were reviewed prior to creation of Plan Benzoyl Peroxide Counseling: Patient counseled that medicine may cause skin irritation and bleach clothing.  In the event of skin irritation, the patient was advised to reduce the amount of the drug applied or use it less frequently.   The patient verbalized understanding of the proper use and possible adverse effects of benzoyl peroxide.  All of the patient's questions and concerns were addressed.

## (undated) DEVICE — MASTISOL ADHESIVE LIQ 2/3ML

## (undated) DEVICE — SUTURE PERMAHAND SZ 4-0 L12X30IN NONABSORBABLE BLK SILK A303H

## (undated) DEVICE — PACK PROCEDURE SURG EXTREMITY CUST

## (undated) DEVICE — INTENDED FOR TISSUE SEPARATION, AND OTHER PROCEDURES THAT REQUIRE A SHARP SURGICAL BLADE TO PUNCTURE OR CUT.: Brand: BARD-PARKER ® CARBON RIB-BACK BLADES

## (undated) DEVICE — NEEDLE HYPO 25GA L1.5IN BLU POLYPR HUB S STL REG BVL STR

## (undated) DEVICE — SUT PROL 1 30IN CT1 BLU --

## (undated) DEVICE — SUTURE PERMAHAND SZ 3-0 L30IN NONABSORBABLE BLK W/O NDL SA84H

## (undated) DEVICE — SUT VCRL + 2-0 27IN SH UD --

## (undated) DEVICE — DRESSING PETRO W3XL8IN N ADH OIL EMUL GZ CURAD

## (undated) DEVICE — PAD,ABDOMINAL,5"X9",ST,LF,25/BX: Brand: MEDLINE INDUSTRIES, INC.

## (undated) DEVICE — SPONGE GZ W4XL4IN COT 12 PLY TYP VII WVN C FLD DSGN

## (undated) DEVICE — SPONGE DRAIN NONWOVEN 4X4IN -- 2/PK

## (undated) DEVICE — PREP SKN CHLRAPRP APL 26ML STR --

## (undated) DEVICE — BANDAGE COMPR SELF ADH 5 YDX6 IN TAN STRL PREMIERPRO LF

## (undated) DEVICE — SUT VCRL + 2-0 36IN CT1 UD --

## (undated) DEVICE — SUTURE VCRL SZ 2-0 L18IN ABSRB VLT L26MM SH 1/2 CIR J775D

## (undated) DEVICE — SUT ETHLN 3-0 18IN PS1 BLK --

## (undated) DEVICE — IMMOBILIZER KNEE UNIV L19IN FOR 12-24IN THGH FOAM T BAR

## (undated) DEVICE — STERILE POLYISOPRENE POWDER-FREE SURGICAL GLOVES: Brand: PROTEXIS

## (undated) DEVICE — SWAB CULT LIQ STUART AGR AERB MOD IN BRK SGL RAYON TIP PLAS 220099] BECTON DICKINSON MICRO]

## (undated) DEVICE — SUTURE VCRL SZ 0 L18IN ABSRB UD L36MM CT-1 1/2 CIR J840D

## (undated) DEVICE — PRECISION (9.0 X 0.51 X 25.0MM)

## (undated) DEVICE — IMMOBILIZER KNEE PREMIER PRO TRI PNL 22INCH FOAM TIETEX PAT

## (undated) DEVICE — SUTURE VCRL + SZ 3-0 L36IN ABSRB UD L36MM CT-1 1/2 CIR VCP944H

## (undated) DEVICE — BLADE SAW 1.27X90 MM FOR LG BNE [KMS2513PM62STE] [KOMET MEDICAL]

## (undated) DEVICE — SHEET,DRAPE,70X85,STERILE: Brand: MEDLINE

## (undated) DEVICE — SUTURE VCRL SZ 2-0 L18IN ABSRB UD CT-1 L36MM 1/2 CIR J839D

## (undated) DEVICE — BANDAGE COMPR W4INXL10YD WHITE/BEIGE E MTRX HK LOOP CLSR

## (undated) DEVICE — 450 ML BOTTLE OF 0.05% CHLORHEXIDINE GLUCONATE IN 99.95% STERILE WATER FOR IRRIGATION, USP AND APPLICATOR.: Brand: IRRISEPT ANTIMICROBIAL WOUND LAVAGE

## (undated) DEVICE — SPONGE LAP 18X18IN STRL -- 5/PK

## (undated) DEVICE — BANDAGE,GAUZE,BULKEE II,4.5"X4.1YD,STRL: Brand: MEDLINE

## (undated) DEVICE — BLADE SAW GIGLI RND 300MM --

## (undated) DEVICE — SUTURE PERMAHAND SZ 2-0 L30IN NONABSORBABLE BLK SILK W/O A305H

## (undated) DEVICE — APPLIER LIG CLP M L11IN TI STR RNG HNDL FOR 20 CLP DISP

## (undated) DEVICE — DRESSING,GAUZE,XEROFORM,CURAD,5"X9",ST: Brand: CURAD

## (undated) DEVICE — Z DUPLICATE USE 2624755 KIT NEG PRSS DSG W/ PRSS INDIC PTCH STRP 45ML CANSTR CARR

## (undated) DEVICE — GAUZE,SPONGE,FLUFF,6"X6.75",STRL,10/TRAY: Brand: MEDLINE

## (undated) DEVICE — SWAB CULT SGL AMIES W/O CHAR FOR THRT VAG SKIN HRT CULTSWAB

## (undated) DEVICE — SOL IRRIGATION INJ NACL 0.9% 500ML BTL

## (undated) DEVICE — GARMENT,MEDLINE,DVT,INT,CALF,MED, GEN2: Brand: MEDLINE

## (undated) DEVICE — Device

## (undated) DEVICE — REM POLYHESIVE ADULT PATIENT RETURN ELECTRODE: Brand: VALLEYLAB

## (undated) DEVICE — ROCKER SWITCH PENCIL BLADE ELECTRODE, HOLSTER: Brand: EDGE

## (undated) DEVICE — APPLIER CLP L L13IN TI MULT RNG HNDL 20 CLP STR LIGACLP

## (undated) DEVICE — YANKAUER,FLEXIBLE HANDLE,REGLR CAPACITY: Brand: MEDLINE INDUSTRIES, INC.

## (undated) DEVICE — SOLUTION LACTATED RINGERS INJECTION USP

## (undated) DEVICE — HANDPIECE SET WITH HIGH FLOW TIP AND SUCTION TUBE: Brand: INTERPULSE

## (undated) DEVICE — SYR 20ML LL STRL LF --

## (undated) DEVICE — BANDAGE COMPR W3INXL5YD BGE POLY COT E RECOVERABLE BRTH W/

## (undated) DEVICE — SUT ETHLN 3-0 18IN PS2 BLK --

## (undated) DEVICE — 3M™ COBAN™ NL STERILE NON-LATEX SELF-ADHERENT WRAP, 2084S, 4 IN X 5 YD (10 CM X 4,5 M), 18 ROLLS/CASE: Brand: 3M™ COBAN™

## (undated) DEVICE — TRAY PREP DRY W/ PREM GLV 2 APPL 6 SPNG 2 UNDPD 1 OVERWRAP

## (undated) DEVICE — STRAP,POSITIONING,KNEE/BODY,FOAM,4X60": Brand: MEDLINE

## (undated) DEVICE — 3M™ IOBAN™ 2 ANTIMICROBIAL INCISE DRAPE 6650EZ: Brand: IOBAN™ 2

## (undated) DEVICE — NEEDLE SPNL 20GA L3.5IN YEL HUB S STL REG WALL FIT STYL W/

## (undated) DEVICE — SUT PROL 1 30IN CTX BLU --

## (undated) DEVICE — MARKER,SKIN,WI/RULER AND LABELS: Brand: MEDLINE

## (undated) DEVICE — SUTURE PERMAHAND SZ 3-0 L18IN NONABSORBABLE BLK L26MM SH C013D